# Patient Record
Sex: MALE | Race: WHITE | NOT HISPANIC OR LATINO | Employment: OTHER | URBAN - NONMETROPOLITAN AREA
[De-identification: names, ages, dates, MRNs, and addresses within clinical notes are randomized per-mention and may not be internally consistent; named-entity substitution may affect disease eponyms.]

---

## 2017-01-24 ENCOUNTER — OFFICE VISIT (OUTPATIENT)
Dept: CARDIOLOGY | Facility: CLINIC | Age: 82
End: 2017-01-24

## 2017-01-24 VITALS
HEART RATE: 64 BPM | OXYGEN SATURATION: 99 % | SYSTOLIC BLOOD PRESSURE: 142 MMHG | DIASTOLIC BLOOD PRESSURE: 78 MMHG | HEIGHT: 72 IN | WEIGHT: 176 LBS | BODY MASS INDEX: 23.84 KG/M2

## 2017-01-24 DIAGNOSIS — E78.2 MIXED HYPERLIPIDEMIA: ICD-10-CM

## 2017-01-24 DIAGNOSIS — I44.7 LEFT BUNDLE BRANCH BLOCK: Primary | ICD-10-CM

## 2017-01-24 DIAGNOSIS — I25.10 CAD IN NATIVE ARTERY: ICD-10-CM

## 2017-01-24 DIAGNOSIS — I10 ESSENTIAL HYPERTENSION: ICD-10-CM

## 2017-01-24 PROCEDURE — 99214 OFFICE O/P EST MOD 30 MIN: CPT | Performed by: INTERNAL MEDICINE

## 2017-01-24 NOTE — PROGRESS NOTES
Dexter Suggs  [id number]  1934  82 y.o.  male    Referring Provider: Jarad Alexis MD    Reason for Follow-up Visit: CAD    Overall doing well  Denies any chest pain or excessive shortness of breath  Compliant with medications    History of present illness:  Dexter Suggs is a 82 y.o. yo male with history of CAD who presents today for   Chief Complaint   Patient presents with   • Coronary Artery Disease     1 MON FU RESULTS ECHO, SRINATH   .    History  Past Medical History   Diagnosis Date   • Abnormal nuclear stress test    • BPH (benign prostatic hyperplasia)    • Chest pain    • Coronary artery disease    • Disease of thyroid gland    • Hyperlipidemia    • Hypertension    • LV dysfunction    • Melanoma    ,   Past Surgical History   Procedure Laterality Date   • Cardiac catheterization Left 12/10/2012   • Hernia repair     • Appendectomy     • Prostate surgery     • Knee surgery     • Hand surgery Right    • Skin lesion excision     • Cholecystectomy     • Inguinal hernia repair     • Skin cancer excision       face   ,   Family History   Problem Relation Age of Onset   • Alzheimer's disease Mother    • Cancer Father    • Cancer Sister    ,   Social History   Substance Use Topics   • Smoking status: Former Smoker     Quit date: 1985   • Smokeless tobacco: Never Used   • Alcohol use No   ,     Medications  Current Outpatient Prescriptions   Medication Sig Dispense Refill   • allopurinol (ZYLOPRIM) 300 MG tablet Take 300 mg by mouth Daily.     • aspirin 81 MG EC tablet Take 81 mg by mouth Daily.     • finasteride (PROSCAR) 5 MG tablet Take 5 mg by mouth Daily.     • glucosamine sulfate 500 MG capsule capsule Take  by mouth 3 (Three) Times a Day With Meals.     • isosorbide mononitrate (IMDUR) 30 MG 24 hr tablet Take 1 tablet by mouth Daily. 90 tablet 1   • levothyroxine (SYNTHROID, LEVOTHROID) 75 MCG tablet Take 75 mcg by mouth Daily.     • LORazepam (ATIVAN) 1 MG tablet Take 1 mg by mouth Every 8  "(Eight) Hours As Needed for anxiety.     • metoprolol succinate XL (TOPROL-XL) 50 MG 24 hr tablet TAKE ONE TABLET BY MOUTH ONCE DAILY (NO  FURTHER  REFILLS  WITHOUT  APPOINTMENT) 90 tablet 3   • nitroglycerin (NITROSTAT) 0.4 MG SL tablet Place 1 tablet under the tongue Every 5 (Five) Minutes As Needed for chest pain (PRN for CP as directed). 30 tablet 11   • pantoprazole (PROTONIX) 40 MG EC tablet Take 40 mg by mouth Daily.     • simvastatin (ZOCOR) 10 MG tablet Take 10 mg by mouth Every Night.     • triamterene-hydrochlorothiazide (MAXZIDE) 75-50 MG per tablet Take 1 tablet by mouth Daily.       No current facility-administered medications for this visit.        Allergies:  Adhesive tape and Neosporin [neomycin-bacitracin zn-polymyx]    Review of Systems  Review of Systems   Constitution: Negative.   HENT: Negative.    Eyes: Negative.    Cardiovascular: Negative for chest pain, claudication, cyanosis, dyspnea on exertion, irregular heartbeat, leg swelling, near-syncope, orthopnea, palpitations, paroxysmal nocturnal dyspnea and syncope.   Respiratory: Negative.    Endocrine: Negative.    Hematologic/Lymphatic: Negative.    Skin: Negative.    Gastrointestinal: Negative for anorexia.   Genitourinary: Negative.    Neurological: Negative.    Psychiatric/Behavioral: Negative.        Objective     Physical Exam:  Visit Vitals   • /78   • Pulse 64   • Ht 72\" (182.9 cm)   • Wt 176 lb (79.8 kg)   • SpO2 99%   • BMI 23.87 kg/m2     Physical Exam   Constitutional: He appears well-developed.   HENT:   Head: Normocephalic.   Neck: Normal carotid pulses and no JVD present. No tracheal tenderness present. Carotid bruit is not present. No tracheal deviation and no edema present.   Cardiovascular: Regular rhythm, normal heart sounds and normal pulses.    Pulmonary/Chest: Effort normal. No stridor.   Abdominal: Soft.   Neurological: He is alert. He has normal strength. No cranial nerve deficit or sensory deficit.   Skin: Skin " is warm.   Psychiatric: He has a normal mood and affect. His speech is normal and behavior is normal.       Results Review:     Procedures    Assessment/Plan   Patient Active Problem List   Diagnosis   • Left bundle branch block   • CAD in native artery   • Essential hypertension   • Mixed hyperlipidemia     No palpitations. No significant pedal edema. Compliant with medications and diet. Latest labs and medications reviewed.  Recent nuclear stress test is normal   Echo normal LVEF    Plan:    Close follow up with you as scheduled.  Intensive factor modifications.  See order list.    Counseled regarding disease appropriate diet, fluid, caffeine, stimulants and sodium intake as well as importance of compliance to diet, exercise and regular follow up.    Return in about 10 months (around 11/24/2017).

## 2017-01-24 NOTE — LETTER
January 24, 2017     Jarad Alexis MD  [address] [place] [address]    Patient: Dexter Suggs   YOB: 1934   Date of Visit: 1/24/2017       Dear Dr. Reuben MD:    Thank you for referring Dexter Suggs to me for evaluation. Below are the relevant portions of my assessment and plan of care.    If you have questions, please do not hesitate to call me. I look forward to following Dexter along with you.         Sincerely,        Austin Granados MD        CC: No Recipients  Austin Granados MD  1/24/2017  8:38 AM  Signed  Dexter Suggs  [id number]  1934  82 y.o.  male    Referring Provider: Jarad Alexis MD    Reason for Follow-up Visit: CAD    Overall doing well  Denies any chest pain or excessive shortness of breath  Compliant with medications    History of present illness:  Dexter Suggs is a 82 y.o. yo male with history of CAD who presents today for   Chief Complaint   Patient presents with   • Coronary Artery Disease     1 MON FU RESULTS ECHO, SRINATH   .    History  Past Medical History   Diagnosis Date   • Abnormal nuclear stress test    • BPH (benign prostatic hyperplasia)    • Chest pain    • Coronary artery disease    • Disease of thyroid gland    • Hyperlipidemia    • Hypertension    • LV dysfunction    • Melanoma    ,   Past Surgical History   Procedure Laterality Date   • Cardiac catheterization Left 12/10/2012   • Hernia repair     • Appendectomy     • Prostate surgery     • Knee surgery     • Hand surgery Right    • Skin lesion excision     • Cholecystectomy     • Inguinal hernia repair     • Skin cancer excision       face   ,   Family History   Problem Relation Age of Onset   • Alzheimer's disease Mother    • Cancer Father    • Cancer Sister    ,   Social History   Substance Use Topics   • Smoking status: Former Smoker     Quit date: 1985   • Smokeless tobacco: Never Used   • Alcohol use No   ,     Medications  Current Outpatient Prescriptions   Medication Sig Dispense  "Refill   • allopurinol (ZYLOPRIM) 300 MG tablet Take 300 mg by mouth Daily.     • aspirin 81 MG EC tablet Take 81 mg by mouth Daily.     • finasteride (PROSCAR) 5 MG tablet Take 5 mg by mouth Daily.     • glucosamine sulfate 500 MG capsule capsule Take  by mouth 3 (Three) Times a Day With Meals.     • isosorbide mononitrate (IMDUR) 30 MG 24 hr tablet Take 1 tablet by mouth Daily. 90 tablet 1   • levothyroxine (SYNTHROID, LEVOTHROID) 75 MCG tablet Take 75 mcg by mouth Daily.     • LORazepam (ATIVAN) 1 MG tablet Take 1 mg by mouth Every 8 (Eight) Hours As Needed for anxiety.     • metoprolol succinate XL (TOPROL-XL) 50 MG 24 hr tablet TAKE ONE TABLET BY MOUTH ONCE DAILY (NO  FURTHER  REFILLS  WITHOUT  APPOINTMENT) 90 tablet 3   • nitroglycerin (NITROSTAT) 0.4 MG SL tablet Place 1 tablet under the tongue Every 5 (Five) Minutes As Needed for chest pain (PRN for CP as directed). 30 tablet 11   • pantoprazole (PROTONIX) 40 MG EC tablet Take 40 mg by mouth Daily.     • simvastatin (ZOCOR) 10 MG tablet Take 10 mg by mouth Every Night.     • triamterene-hydrochlorothiazide (MAXZIDE) 75-50 MG per tablet Take 1 tablet by mouth Daily.       No current facility-administered medications for this visit.        Allergies:  Adhesive tape and Neosporin [neomycin-bacitracin zn-polymyx]    Review of Systems  Review of Systems   Constitution: Negative.   HENT: Negative.    Eyes: Negative.    Cardiovascular: Negative for chest pain, claudication, cyanosis, dyspnea on exertion, irregular heartbeat, leg swelling, near-syncope, orthopnea, palpitations, paroxysmal nocturnal dyspnea and syncope.   Respiratory: Negative.    Endocrine: Negative.    Hematologic/Lymphatic: Negative.    Skin: Negative.    Gastrointestinal: Negative for anorexia.   Genitourinary: Negative.    Neurological: Negative.    Psychiatric/Behavioral: Negative.        Objective     Physical Exam:  Visit Vitals   • /78   • Pulse 64   • Ht 72\" (182.9 cm)   • Wt 176 lb " (79.8 kg)   • SpO2 99%   • BMI 23.87 kg/m2     Physical Exam   Constitutional: He appears well-developed.   HENT:   Head: Normocephalic.   Neck: Normal carotid pulses and no JVD present. No tracheal tenderness present. Carotid bruit is not present. No tracheal deviation and no edema present.   Cardiovascular: Regular rhythm, normal heart sounds and normal pulses.    Pulmonary/Chest: Effort normal. No stridor.   Abdominal: Soft.   Neurological: He is alert. He has normal strength. No cranial nerve deficit or sensory deficit.   Skin: Skin is warm.   Psychiatric: He has a normal mood and affect. His speech is normal and behavior is normal.       Results Review:     Procedures    Assessment/Plan   Patient Active Problem List   Diagnosis   • Left bundle branch block   • CAD in native artery   • Essential hypertension   • Mixed hyperlipidemia     No palpitations. No significant pedal edema. Compliant with medications and diet. Latest labs and medications reviewed.  Recent nuclear stress test is normal   Echo normal LVEF    Plan:    Close follow up with you as scheduled.  Intensive factor modifications.  See order list.    Counseled regarding disease appropriate diet, fluid, caffeine, stimulants and sodium intake as well as importance of compliance to diet, exercise and regular follow up.    Return in about 10 months (around 11/24/2017).

## 2017-01-24 NOTE — MR AVS SNAPSHOT
Dexter Suggs   1/24/2017 8:00 AM   Office Visit    Dept Phone:  [phone]   Encounter #:  [id number]    Provider:  Austin Granados MD   Department:  [facility]                Your Full Care Plan              Your Updated Medication List          This list is accurate as of: 1/24/17  8:40 AM.  Always use your most recent med list.                allopurinol 300 MG tablet   Commonly known as:  ZYLOPRIM       aspirin 81 MG EC tablet       finasteride 5 MG tablet   Commonly known as:  PROSCAR       glucosamine sulfate 500 MG capsule capsule       isosorbide mononitrate 30 MG 24 hr tablet   Commonly known as:  IMDUR   Take 1 tablet by mouth Daily.       levothyroxine 75 MCG tablet   Commonly known as:  SYNTHROID, LEVOTHROID       LORazepam 1 MG tablet   Commonly known as:  ATIVAN       metoprolol succinate XL 50 MG 24 hr tablet   Commonly known as:  TOPROL-XL   TAKE ONE TABLET BY MOUTH ONCE DAILY (NO  FURTHER  REFILLS  WITHOUT  APPOINTMENT)       nitroglycerin 0.4 MG SL tablet   Commonly known as:  NITROSTAT   Place 1 tablet under the tongue Every 5 (Five) Minutes As Needed for chest pain (PRN for CP as directed).       pantoprazole 40 MG EC tablet   Commonly known as:  PROTONIX       simvastatin 10 MG tablet   Commonly known as:  ZOCOR       triamterene-hydrochlorothiazide 75-50 MG per tablet   Commonly known as:  MAXZIDE               You Were Diagnosed With        Codes Comments    Left bundle branch block    -  Primary ICD-10-CM: I44.7  ICD-9-CM: 426.3     CAD in native artery     ICD-10-CM: I25.10  ICD-9-CM: 414.01     Essential hypertension     ICD-10-CM: I10  ICD-9-CM: 401.9     Mixed hyperlipidemia     ICD-10-CM: E78.2  ICD-9-CM: 272.2       Instructions     None    Patient Instructions History      Upcoming Appointments     Visit Type Date Time Department    FOLLOW UP 1/24/2017  8:00 AM [facility]    FOLLOW UP 11/20/2017  8:00 AM [facility]  "PAD      [employer] Signup     [facility] [employer] allows you to send messages to your doctor, view your test results, renew your prescriptions, schedule appointments, and more. To sign up, go to [employer] and click on the Sign Up Now link in the New User? box. Enter your [employer] Activation Code exactly as it appears below along with the last four digits of your Social Security Number and your Date of Birth () to complete the sign-up process. If you do not sign up before the expiration date, you must request a new code.    Border[employer] Activation Code: [email or web address]  Expires: 2017  8:39 AM    If you have questions, you can email [email or web address] or call [phone] to talk to our [employer] staff. Remember, [employer] is NOT to be used for urgent needs. For medical emergencies, dial 911.               Other Info from Your Visit           Your Appointments     2017  8:00 AM CST   Follow Up with Austin Granados MD   [facility] (--)    [address] [place] [address] [address]  formerly [facility] [address] [phone]           Arrive 15 minutes prior to appointment.              Allergies     Adhesive Tape      Neosporin [Neomycin-bacitracin Zn-polymyx]        Reason for Visit     Coronary Artery Disease 1 MON FU RESULTS ECHO, SRINATH      Vital Signs     Blood Pressure Pulse Height Weight Oxygen Saturation Body Mass Index    142/78 64 72\" (182.9 cm) 176 lb (79.8 kg) 99% 23.87 kg/m2    Smoking Status                   Former Smoker           Problems and Diagnoses Noted     Heart disease due to blocked artery    High blood pressure    Left bundle branch block    Mixed hyperlipidemia        "

## 2017-01-30 RX ORDER — SIMVASTATIN 10 MG
TABLET ORAL
Qty: 90 TABLET | Refills: 0 | Status: SHIPPED | OUTPATIENT
Start: 2017-01-30 | End: 2017-05-03 | Stop reason: SDUPTHER

## 2017-02-07 ENCOUNTER — HOSPITAL ENCOUNTER (OUTPATIENT)
Dept: GENERAL RADIOLOGY | Facility: HOSPITAL | Age: 82
Discharge: HOME | End: 2017-02-07
Attending: INTERNAL MEDICINE | Admitting: INTERNAL MEDICINE

## 2017-02-07 ENCOUNTER — HOSPITAL ENCOUNTER (OUTPATIENT)
Dept: GENERAL RADIOLOGY | Facility: HOSPITAL | Age: 82
Discharge: HOME | End: 2017-02-07
Attending: INTERNAL MEDICINE

## 2017-02-07 ENCOUNTER — TRANSCRIBE ORDERS (OUTPATIENT)
Dept: GENERAL RADIOLOGY | Facility: HOSPITAL | Age: 82
End: 2017-02-07

## 2017-02-07 DIAGNOSIS — M53.3 COCCYALGIA: ICD-10-CM

## 2017-02-07 DIAGNOSIS — R63.4 LOSS OF WEIGHT: Primary | ICD-10-CM

## 2017-02-07 DIAGNOSIS — R63.4 LOSS OF WEIGHT: ICD-10-CM

## 2017-02-07 PROCEDURE — 71020 HC CHEST PA AND LATERAL: CPT

## 2017-02-07 PROCEDURE — 72220 X-RAY EXAM SACRUM TAILBONE: CPT

## 2017-04-13 ENCOUNTER — OFFICE VISIT (OUTPATIENT)
Dept: GASTROENTEROLOGY | Facility: CLINIC | Age: 82
End: 2017-04-13

## 2017-04-13 VITALS
HEART RATE: 64 BPM | SYSTOLIC BLOOD PRESSURE: 136 MMHG | WEIGHT: 176 LBS | OXYGEN SATURATION: 99 % | BODY MASS INDEX: 23.84 KG/M2 | HEIGHT: 72 IN | DIASTOLIC BLOOD PRESSURE: 74 MMHG

## 2017-04-13 DIAGNOSIS — Z78.9 NONSMOKER: ICD-10-CM

## 2017-04-13 DIAGNOSIS — Z86.010 HX OF COLONIC POLYPS: Primary | ICD-10-CM

## 2017-04-13 DIAGNOSIS — I10 HTN (HYPERTENSION), BENIGN: ICD-10-CM

## 2017-04-13 PROBLEM — Z86.0100 HX OF COLONIC POLYPS: Status: ACTIVE | Noted: 2017-04-13

## 2017-04-13 PROCEDURE — S0260 H&P FOR SURGERY: HCPCS | Performed by: CLINICAL NURSE SPECIALIST

## 2017-04-13 NOTE — PROGRESS NOTES
Dexter Suggs  1934 4/13/2017  Chief Complaint   Patient presents with   • Colon Cancer Screening     Patient is here is here today for colon screening.     Subjective   HPI  Dexter Suggs is a 82 y.o. male who presents as a referral for preventative maintenance. He has no complaints of nausea or vomiting. No change in bowels. No wt loss. No BRBPR. No melena. There is a negative family hx for colon cancer. No abdominal pain.  Past Medical History:   Diagnosis Date   • Abnormal nuclear stress test    • BPH (benign prostatic hyperplasia)    • Chest pain    • Coronary artery disease    • Disease of thyroid gland    • Hyperlipidemia    • Hypertension    • LV dysfunction    • Melanoma      Past Surgical History:   Procedure Laterality Date   • APPENDECTOMY     • CARDIAC CATHETERIZATION Left 12/10/2012   • CHOLECYSTECTOMY     • COLONOSCOPY W/ POLYPECTOMY  02/16/2012    adenomatous polyp at 80cm   • HAND SURGERY Right    • HERNIA REPAIR     • INGUINAL HERNIA REPAIR     • KNEE SURGERY     • PROSTATE SURGERY     • SKIN CANCER EXCISION      face   • SKIN LESION EXCISION       Outpatient Prescriptions Marked as Taking for the 4/13/17 encounter (Office Visit) with ALENA Ramon   Medication Sig Dispense Refill   • allopurinol (ZYLOPRIM) 300 MG tablet Take 300 mg by mouth Daily.     • aspirin 81 MG EC tablet Take 81 mg by mouth Daily.     • finasteride (PROSCAR) 5 MG tablet Take 5 mg by mouth Daily.     • glucosamine sulfate 500 MG capsule capsule Take  by mouth 3 (Three) Times a Day With Meals.     • isosorbide mononitrate (IMDUR) 30 MG 24 hr tablet Take 1 tablet by mouth Daily. 90 tablet 1   • levothyroxine (SYNTHROID, LEVOTHROID) 75 MCG tablet Take 75 mcg by mouth Daily.     • LORazepam (ATIVAN) 1 MG tablet Take 1 mg by mouth Every 8 (Eight) Hours As Needed for anxiety.     • metoprolol succinate XL (TOPROL-XL) 50 MG 24 hr tablet TAKE ONE TABLET BY MOUTH ONCE DAILY (NO  FURTHER  REFILLS  WITHOUT   APPOINTMENT) 90 tablet 3   • nitroglycerin (NITROSTAT) 0.4 MG SL tablet Place 1 tablet under the tongue Every 5 (Five) Minutes As Needed for chest pain (PRN for CP as directed). 30 tablet 11   • pantoprazole (PROTONIX) 40 MG EC tablet Take 40 mg by mouth Daily.     • simvastatin (ZOCOR) 10 MG tablet TAKE ONE TABLET BY MOUTH ONCE DAILY 90 tablet 0   • triamterene-hydrochlorothiazide (MAXZIDE) 75-50 MG per tablet Take 1 tablet by mouth Daily.       Allergies   Allergen Reactions   • Adhesive Tape    • Neosporin [Neomycin-Bacitracin Zn-Polymyx]      Social History     Social History   • Marital status:      Spouse name: N/A   • Number of children: N/A   • Years of education: N/A     Occupational History   • Not on file.     Social History Main Topics   • Smoking status: Former Smoker     Quit date: 1985   • Smokeless tobacco: Never Used   • Alcohol use No   • Drug use: No   • Sexual activity: Defer     Other Topics Concern   • Not on file     Social History Narrative     Family History   Problem Relation Age of Onset   • Alzheimer's disease Mother    • Cancer Father    • Cancer Sister      Health Maintenance   Topic Date Due   • TDAP/TD VACCINES (1 - Tdap) 07/07/1953   • PNEUMOCOCCAL VACCINES (65+ LOW/MEDIUM RISK) (1 of 2 - PCV13) 07/07/1999   • INFLUENZA VACCINE  08/01/2016   • LIPID PANEL  04/13/2017   • ZOSTER VACCINE  04/13/2017   • MEDICARE ANNUAL WELLNESS  04/13/2017       REVIEW OF SYSTEMS  General: well appearing, no fever chills or sweats, no unexplained wt loss  HEENT: no acute visual or hearing disturbances  Cardiovascular: No chest pain or palpitations  Pulmonary: No shortness of breath, coughing, wheezing or hemoptysis  : No burning, urgency, hematuria, or dysuria  Musculoskeletal: No joint pain or stiffness  Peripheral: no edema  Skin: No lesions or rashes  Neuro: No dizziness, headaches, stroke, syncope  Endocrine: No hot or cold intolerances  Hematological: No blood dyscrasias    Objective  "  Vitals:    04/13/17 1004   BP: 136/74   Pulse: 64   SpO2: 99%   Weight: 176 lb (79.8 kg)   Height: 72\" (182.9 cm)     Body mass index is 23.87 kg/(m^2).    PHYSICAL EXAM  General: age appropriate well nourished well appearing, no acute distress  Head: normocephalic and atraumatic  Global assessment-supple  Neck-No JVD noted, no lymphadenopathy  Pulmonary-clear to auscultation bilaterally, normal respiratory effort  Cardiovascular-normal rate and rhythm, normal heart sounds, S1 and S2 noted  Abdomen-soft, non tender, non distended, normal bowel sounds all 4 quadrants, no hepatosplenomegaly noted  Extremities-No clubbing cyanosis or edema  Neuro-Non focal, converses appropriately, awake, alert, oriented    Assessment/Plan     Dexter was seen today for colon cancer screening.    Diagnoses and all orders for this visit:    Hx of colonic polyps  -     polyethylene glycol (GoLYTELY) 236 G solution; Take as directed by office instructions.  -     Case Request; Standing  -     Implement Anesthesia Orders Day of Procedure; Standing  -     Obtain Informed Consent; Standing  -     Verify Informed Consent; Standing  -     Verify bowel prep was successful; Standing  -     Case Request    HTN (hypertension), benign  Comments:  continue BP medication the am of procedure    Nonsmoker        COLONOSCOPY WITH ANESTHESIA (N/A)  Body mass index is 23.87 kg/(m^2).  There are no Patient Instructions on file for this visit.  Patient instructions on prep prior to procedure provided to the patient.    All risks, benefits, alternatives, and indications of colonoscopy procedure have been discussed with the patient. Risks to include perforation of the colon requiring possible surgery or colostomy, risk of bleeding from biopsies or removal of colon tissue, possibility of missing a colon polyp or cancer, or adverse drug reaction.  Benefits to include the diagnosis and management of disease of the colon and rectum. Alternatives to include barium " enema, radiographic evaluation, lab testing or no intervention. Pt verbalizes understanding and agrees.

## 2017-05-03 RX ORDER — SIMVASTATIN 10 MG
TABLET ORAL
Qty: 90 TABLET | Refills: 3 | Status: SHIPPED | OUTPATIENT
Start: 2017-05-03 | End: 2019-02-07

## 2017-05-31 RX ORDER — METOPROLOL SUCCINATE 50 MG/1
50 TABLET, EXTENDED RELEASE ORAL DAILY
Qty: 90 TABLET | Refills: 3 | Status: SHIPPED | OUTPATIENT
Start: 2017-05-31 | End: 2018-05-21 | Stop reason: SDUPTHER

## 2017-06-06 ENCOUNTER — TELEPHONE (OUTPATIENT)
Dept: UROLOGY | Facility: CLINIC | Age: 82
End: 2017-06-06

## 2017-06-06 DIAGNOSIS — C61 MALIGNANT NEOPLASM OF PROSTATE (HCC): Primary | ICD-10-CM

## 2017-06-06 NOTE — TELEPHONE ENCOUNTER
----- Message from Diane Cheng sent at 6/6/2017 10:05 AM CDT -----  He needs order for PSA 6/20/17, thanks

## 2017-06-08 ENCOUNTER — ANESTHESIA EVENT (OUTPATIENT)
Dept: GASTROENTEROLOGY | Facility: HOSPITAL | Age: 82
End: 2017-06-08

## 2017-06-09 ENCOUNTER — ANESTHESIA (OUTPATIENT)
Dept: GASTROENTEROLOGY | Facility: HOSPITAL | Age: 82
End: 2017-06-09

## 2017-06-09 ENCOUNTER — HOSPITAL ENCOUNTER (OUTPATIENT)
Facility: HOSPITAL | Age: 82
Setting detail: HOSPITAL OUTPATIENT SURGERY
Discharge: HOME | End: 2017-06-09
Attending: INTERNAL MEDICINE | Admitting: INTERNAL MEDICINE

## 2017-06-09 VITALS
SYSTOLIC BLOOD PRESSURE: 145 MMHG | HEIGHT: 72 IN | WEIGHT: 177 LBS | DIASTOLIC BLOOD PRESSURE: 83 MMHG | OXYGEN SATURATION: 98 % | RESPIRATION RATE: 20 BRPM | TEMPERATURE: 97.8 F | HEART RATE: 61 BPM | BODY MASS INDEX: 23.98 KG/M2

## 2017-06-09 DIAGNOSIS — Z86.010 HX OF COLONIC POLYPS: ICD-10-CM

## 2017-06-09 PROCEDURE — 25010000002 PROPOFOL 10 MG/ML EMULSION: Performed by: NURSE ANESTHETIST, CERTIFIED REGISTERED

## 2017-06-09 PROCEDURE — G0105 COLORECTAL SCRN; HI RISK IND: HCPCS | Performed by: INTERNAL MEDICINE

## 2017-06-09 RX ORDER — LIDOCAINE HYDROCHLORIDE 20 MG/ML
INJECTION, SOLUTION INFILTRATION; PERINEURAL AS NEEDED
Status: DISCONTINUED | OUTPATIENT
Start: 2017-06-09 | End: 2017-06-09 | Stop reason: SURG

## 2017-06-09 RX ORDER — SODIUM CHLORIDE 0.9 % (FLUSH) 0.9 %
1-10 SYRINGE (ML) INJECTION AS NEEDED
Status: DISCONTINUED | OUTPATIENT
Start: 2017-06-09 | End: 2017-06-09 | Stop reason: HOSPADM

## 2017-06-09 RX ORDER — PROPOFOL 10 MG/ML
VIAL (ML) INTRAVENOUS AS NEEDED
Status: DISCONTINUED | OUTPATIENT
Start: 2017-06-09 | End: 2017-06-09 | Stop reason: SURG

## 2017-06-09 RX ORDER — SODIUM CHLORIDE 9 MG/ML
9 INJECTION, SOLUTION INTRAVENOUS CONTINUOUS PRN
Status: DISCONTINUED | OUTPATIENT
Start: 2017-06-09 | End: 2017-06-09 | Stop reason: HOSPADM

## 2017-06-09 RX ADMIN — LIDOCAINE HYDROCHLORIDE 0.5 ML: 10 INJECTION, SOLUTION EPIDURAL; INFILTRATION; INTRACAUDAL; PERINEURAL at 09:17

## 2017-06-09 RX ADMIN — LIDOCAINE HYDROCHLORIDE 100 MG: 20 INJECTION, SOLUTION INFILTRATION; PERINEURAL at 09:49

## 2017-06-09 RX ADMIN — SODIUM CHLORIDE 9 ML/HR: 9 INJECTION, SOLUTION INTRAVENOUS at 09:17

## 2017-06-09 RX ADMIN — PROPOFOL 200 MG: 10 INJECTION, EMULSION INTRAVENOUS at 09:49

## 2017-06-09 NOTE — PLAN OF CARE
Problem: Patient Care Overview (Adult)  Goal: Plan of Care Review  Outcome: Outcome(s) achieved Date Met:  06/09/17 06/09/17 1020   Coping/Psychosocial Response Interventions   Plan Of Care Reviewed With patient;spouse   Patient Care Overview   Progress improving   Outcome Evaluation   Outcome Summary/Follow up Plan DISCHARGE CRITERIA MET

## 2017-06-09 NOTE — PLAN OF CARE
Problem: GI Endoscopy (Adult)  Goal: Signs and Symptoms of Listed Potential Problems Will be Absent or Manageable (GI Endoscopy)  Outcome: Outcome(s) achieved Date Met:  06/09/17 06/09/17 1020   GI Endoscopy   Problems Assessed (GI Endoscopy) all   Problems Present (GI Endoscopy) none

## 2017-06-09 NOTE — ANESTHESIA POSTPROCEDURE EVALUATION
Patient: Dexter Suggs    Procedure Summary     Date Anesthesia Start Anesthesia Stop Room / Location    06/09/17 0940 1007  [facility] END[facility]COPY 2 /  [facility]       Procedure Diagnosis Surgeon Provider    COLONOSCOPY WITH ANESTHESIA (N/A ) Hx of colonic polyps  (Hx of colonic polyps [Z86.010]) MD Michael Mcintosh CRNA          Anesthesia Type: general  Last vitals  BP      Temp      Pulse     Resp      SpO2        Post Anesthesia Care and Evaluation    Patient location during evaluation: PACU  Patient participation: complete - patient participated  Level of consciousness: awake and alert  Pain score: 1  Pain management: adequate  Airway patency: patent  Anesthetic complications: No anesthetic complications    Cardiovascular status: acceptable  Respiratory status: room air  Hydration status: acceptable

## 2017-06-09 NOTE — PLAN OF CARE
Problem: Patient Care Overview (Adult)  Goal: Plan of Care Review  Outcome: Ongoing (interventions implemented as appropriate)    06/09/17 0978   Coping/Psychosocial Response Interventions   Plan Of Care Reviewed With patient   Patient Care Overview   Progress no change   Outcome Evaluation   Outcome Summary/Follow up Plan tolerating well         Problem: GI Endoscopy (Adult)  Goal: Signs and Symptoms of Listed Potential Problems Will be Absent or Manageable (GI Endoscopy)  Outcome: Ongoing (interventions implemented as appropriate)

## 2017-06-09 NOTE — H&P
[facility] Gastroenterology  Pre Procedure History & Physical    Chief Complaint:   History of polyps    Subjective     HPI:   Here today for colonoscopy.  History of polyps.  See office note dated 4/13/2017    Past Medical History:   Past Medical History:   Diagnosis Date   • Abnormal nuclear stress test    • BPH (benign prostatic hyperplasia)    • Chest pain    • Coronary artery disease    • Disease of thyroid gland    • Hyperlipidemia    • Hypertension    • LV dysfunction    • Melanoma    • Prostate CA        Past Surgical History:  [unfilled]    Family History:  Family History   Problem Relation Age of Onset   • Alzheimer's disease Mother    • Cancer Father    • Cancer Sister        Social History:   reports that he quit smoking about 32 years ago. He has never used smokeless tobacco. He reports that he does not drink alcohol or use illicit drugs.    Medications:   Prior to Admission medications    Medication Sig Start Date End Date Taking? Authorizing Provider   allopurinol (ZYLOPRIM) 300 MG tablet Take 300 mg by mouth Daily.   Yes Historical Provider, MD   glucosamine sulfate 500 MG capsule capsule Take  by mouth 3 (Three) Times a Day With Meals.   Yes Historical Provider, MD   isosorbide mononitrate (IMDUR) 30 MG 24 hr tablet Take 1 tablet by mouth Daily. 11/14/16  Yes Sheridan Yeager PA-C   levothyroxine (SYNTHROID, LEVOTHROID) 75 MCG tablet Take 75 mcg by mouth Daily.   Yes Historical Provider, MD   LORazepam (ATIVAN) 1 MG tablet Take 1 mg by mouth Every 8 (Eight) Hours As Needed for anxiety.   Yes Historical Provider, MD   metoprolol succinate XL (TOPROL-XL) 50 MG 24 hr tablet Take 1 tablet by mouth Daily. 5/31/17  Yes Austin Granados MD   pantoprazole (PROTONIX) 40 MG EC tablet Take 40 mg by mouth Daily.   Yes Historical Provider, MD   simvastatin (ZOCOR) 10 MG tablet TAKE ONE TABLET BY MOUTH ONCE DAILY 5/3/17  Yes Austin Granados MD   triamterene-hydrochlorothiazide (MAXZIDE) 75-50 MG per tablet Take 1  "tablet by mouth Daily.   Yes Historical Provider, MD   finasteride (PROSCAR) 5 MG tablet Take 5 mg by mouth Daily.  6/9/17 Yes Historical Provider, MD   aspirin 81 MG EC tablet Take 81 mg by mouth Daily.    Historical Provider, MD   nitroglycerin (NITROSTAT) 0.4 MG SL tablet Place 1 tablet under the tongue Every 5 (Five) Minutes As Needed for chest pain (PRN for CP as directed). 11/14/16   Sheridan Yeager PA-C   polyethylene glycol (GoLYTELY) 236 G solution Take as directed by office instructions. 4/13/17   ALENA Ramon       Allergies:  Adhesive tape and Neosporin [neomycin-bacitracin zn-polymyx]    Objective     Blood pressure 146/71, pulse 78, temperature 97.8 °F (36.6 °C), temperature source Temporal Artery , resp. rate 20, height 72\" (182.9 cm), weight 177 lb (80.3 kg), SpO2 99 %.    Physical Exam   Constitutional: Pt is oriented to person, place, and in no distress.   HENT: Mouth/Throat: Oropharynx is clear.   Cardiovascular: Normal rate, regular rhythm.    Pulmonary/Chest: Effort normal. No respiratory distress. No  wheezes.   Abdominal: Soft. Non-distended.  Skin: Skin is warm and dry.   Psychiatric: Mood, memory, affect and judgment appear normal.     Assessment/Plan     Diagnosis:  History of polyps    Anticipated Surgical Procedure:    Proceed with colonoscopy as scheduled    The risks, benefits, and alternatives of this procedure have been discussed with the patient or the responsible party- the patient understands and agrees to proceed.    EMR Dragon/transcription disclaimer: Much of this encounter note is an electronic transcription/translation of spoken language to printed text.  The electronic translation of spoken language may permit erroneous, or at times, nonsensical words or phrases to be inadvertently transcribed.  Although I have reviewed the note for such errors, some may still exist.    Felice Marroquin MD  9:47 AM  6/9/2017    "

## 2017-06-09 NOTE — ANESTHESIA PREPROCEDURE EVALUATION
Anesthesia Evaluation     Patient summary reviewed and Nursing notes reviewed   no history of anesthetic complications:  NPO Solid Status: > 8 hours  NPO Liquid Status: > 4 hours     Airway   Mallampati: I  TM distance: >3 FB  Neck ROM: full  no difficulty expected  Dental - normal exam     Pulmonary - normal exam   (+) a smoker Former,   Cardiovascular - normal exam  Exercise tolerance: good (4-7 METS)    Patient on routine beta blocker and Beta blocker given within 24 hours of surgery    (+) hypertension well controlled, CAD, dysrhythmias PAC, hyperlipidemia      Neuro/Psych- negative ROS  GI/Hepatic/Renal/Endo - negative ROS     Musculoskeletal (-) negative ROS    Abdominal  - normal exam   Substance History - negative use     OB/GYN negative ob/gyn ROS         Other      history of cancer                                Anesthesia Plan    ASA 3     general   total IV anesthesia  intravenous induction   Anesthetic plan and risks discussed with patient and spouse/significant other.

## 2017-06-20 DIAGNOSIS — C61 MALIGNANT NEOPLASM OF PROSTATE (HCC): Primary | ICD-10-CM

## 2017-06-20 LAB — PSA SERPL-MCNC: <0.07 NG/ML (ref 0–4)

## 2017-06-20 RX ORDER — ISOSORBIDE MONONITRATE 30 MG/1
30 TABLET, EXTENDED RELEASE ORAL DAILY
Qty: 90 TABLET | Refills: 0 | Status: SHIPPED | OUTPATIENT
Start: 2017-06-20 | End: 2017-09-28 | Stop reason: SDUPTHER

## 2017-06-25 ENCOUNTER — HOSPITAL ENCOUNTER (EMERGENCY)
Facility: HOSPITAL | Age: 82
Discharge: HOME | End: 2017-06-25
Attending: EMERGENCY MEDICINE | Admitting: EMERGENCY MEDICINE

## 2017-06-25 VITALS
DIASTOLIC BLOOD PRESSURE: 55 MMHG | SYSTOLIC BLOOD PRESSURE: 105 MMHG | WEIGHT: 180 LBS | RESPIRATION RATE: 18 BRPM | BODY MASS INDEX: 24.38 KG/M2 | HEART RATE: 58 BPM | OXYGEN SATURATION: 98 % | HEIGHT: 72 IN | TEMPERATURE: 98.1 F

## 2017-06-25 DIAGNOSIS — L24.9 IRRITANT CONTACT DERMATITIS, UNSPECIFIED TRIGGER: ICD-10-CM

## 2017-06-25 DIAGNOSIS — R21 RASH: Primary | ICD-10-CM

## 2017-06-25 DIAGNOSIS — R74.01 TRANSAMINITIS: ICD-10-CM

## 2017-06-25 LAB
ALBUMIN SERPL-MCNC: 3.8 G/DL (ref 3.5–5)
ALBUMIN/GLOB SERPL: 1.2 G/DL (ref 1.1–2.5)
ALP SERPL-CCNC: 154 U/L (ref 24–120)
ALT SERPL W P-5'-P-CCNC: 100 U/L (ref 0–54)
ANION GAP SERPL CALCULATED.3IONS-SCNC: 9 MMOL/L (ref 4–13)
AST SERPL-CCNC: 137 U/L (ref 7–45)
BASOPHILS # BLD AUTO: 0.03 10*3/MM3 (ref 0–0.2)
BASOPHILS NFR BLD AUTO: 0.3 % (ref 0–2)
BILIRUB SERPL-MCNC: 0.8 MG/DL (ref 0.1–1)
BUN BLD-MCNC: 23 MG/DL (ref 5–21)
BUN/CREAT SERPL: 23.5 (ref 7–25)
CALCIUM SPEC-SCNC: 9.8 MG/DL (ref 8.4–10.4)
CHLORIDE SERPL-SCNC: 102 MMOL/L (ref 98–110)
CO2 SERPL-SCNC: 30 MMOL/L (ref 24–31)
CREAT BLD-MCNC: 0.98 MG/DL (ref 0.5–1.4)
DEPRECATED RDW RBC AUTO: 49 FL (ref 40–54)
EOSINOPHIL # BLD AUTO: 0.68 10*3/MM3 (ref 0–0.7)
EOSINOPHIL NFR BLD AUTO: 7.3 % (ref 0–4)
ERYTHROCYTE [DISTWIDTH] IN BLOOD BY AUTOMATED COUNT: 15 % (ref 12–15)
GFR SERPL CREATININE-BSD FRML MDRD: 73 ML/MIN/1.73
GLOBULIN UR ELPH-MCNC: 3.3 GM/DL
GLUCOSE BLD-MCNC: 122 MG/DL (ref 70–100)
HCT VFR BLD AUTO: 36 % (ref 40–52)
HGB BLD-MCNC: 11.6 G/DL (ref 14–18)
IMM GRANULOCYTES # BLD: 0.02 10*3/MM3 (ref 0–0.03)
IMM GRANULOCYTES NFR BLD: 0.2 % (ref 0–5)
LYMPHOCYTES # BLD AUTO: 1.14 10*3/MM3 (ref 0.72–4.86)
LYMPHOCYTES NFR BLD AUTO: 12.3 % (ref 15–45)
MCH RBC QN AUTO: 29 PG (ref 28–32)
MCHC RBC AUTO-ENTMCNC: 32.2 G/DL (ref 33–36)
MCV RBC AUTO: 90 FL (ref 82–95)
MONOCYTES # BLD AUTO: 0.88 10*3/MM3 (ref 0.19–1.3)
MONOCYTES NFR BLD AUTO: 9.5 % (ref 4–12)
NEUTROPHILS # BLD AUTO: 6.51 10*3/MM3 (ref 1.87–8.4)
NEUTROPHILS NFR BLD AUTO: 70.4 % (ref 39–78)
PLATELET # BLD AUTO: 185 10*3/MM3 (ref 130–400)
PMV BLD AUTO: 10.5 FL (ref 6–12)
POTASSIUM BLD-SCNC: 4.4 MMOL/L (ref 3.5–5.3)
PROT SERPL-MCNC: 7.1 G/DL (ref 6.3–8.7)
RBC # BLD AUTO: 4 10*6/MM3 (ref 4.8–5.9)
SODIUM BLD-SCNC: 141 MMOL/L (ref 135–145)
URATE SERPL-MCNC: 5.5 MG/DL (ref 3.5–8.5)
WBC NRBC COR # BLD: 9.26 10*3/MM3 (ref 4.8–10.8)

## 2017-06-25 PROCEDURE — 85025 COMPLETE CBC W/AUTO DIFF WBC: CPT | Performed by: EMERGENCY MEDICINE

## 2017-06-25 PROCEDURE — 80053 COMPREHEN METABOLIC PANEL: CPT | Performed by: EMERGENCY MEDICINE

## 2017-06-25 PROCEDURE — 99283 EMERGENCY DEPT VISIT LOW MDM: CPT

## 2017-06-25 PROCEDURE — 84550 ASSAY OF BLOOD/URIC ACID: CPT | Performed by: EMERGENCY MEDICINE

## 2017-06-25 RX ORDER — CETIRIZINE HYDROCHLORIDE 10 MG/1
5 TABLET ORAL DAILY
Qty: 10 TABLET | Refills: 0 | Status: SHIPPED | OUTPATIENT
Start: 2017-06-25 | End: 2018-06-26 | Stop reason: ALTCHOICE

## 2017-06-25 RX ORDER — METHYLPREDNISOLONE 4 MG/1
TABLET ORAL
Qty: 21 TABLET | Refills: 0 | Status: SHIPPED | OUTPATIENT
Start: 2017-06-25 | End: 2018-01-17

## 2017-06-25 NOTE — ED PROVIDER NOTES
Subjective   Patient is a 82 y.o. male presenting with lower extremity pain.   Lower Extremity Issue   Location:  Foot  Injury: no    Foot location:  L foot and R foot  Pain details:     Quality:  Aching and tingling (rash)    Radiates to:  Does not radiate    Severity:  Mild    Onset quality:  Gradual    Timing:  Constant    Progression:  Unchanged  Chronicity:  New  Dislocation: no    Foreign body present:  No foreign bodies  Associated symptoms: no back pain, no decreased ROM, no fatigue, no fever, no itching, no muscle weakness, no neck pain, no numbness, no stiffness, no swelling and no tingling    Risk factors: no concern for non-accidental trauma, no frequent fractures, no known bone disorder, no obesity and no recent illness        Review of Systems   Constitutional: Negative.  Negative for chills, fatigue and fever.   HENT: Negative.  Negative for congestion.    Respiratory: Negative.  Negative for cough, chest tightness and stridor.    Cardiovascular: Negative.  Negative for chest pain.   Gastrointestinal: Negative.  Negative for abdominal distention, abdominal pain, nausea and vomiting.   Endocrine: Negative.    Genitourinary: Negative.  Negative for difficulty urinating and flank pain.   Musculoskeletal: Negative.  Negative for back pain, neck pain and stiffness.   Skin: Negative.  Negative for color change and itching.   Neurological: Negative.  Negative for dizziness and headaches.   All other systems reviewed and are negative.      Past Medical History:   Diagnosis Date   • Abnormal nuclear stress test    • BPH (benign prostatic hyperplasia)    • Chest pain    • Coronary artery disease    • Disease of thyroid gland    • Hyperlipidemia    • Hypertension    • LV dysfunction    • Melanoma    • Prostate CA        Allergies   Allergen Reactions   • Adhesive Tape    • Neosporin [Neomycin-Bacitracin Zn-Polymyx] Rash       Past Surgical History:   Procedure Laterality Date   • APPENDECTOMY     • CARDIAC  CATHETERIZATION Left 12/10/2012   • CHOLECYSTECTOMY     • COLONOSCOPY N/A 6/9/2017    Procedure: COLONOSCOPY WITH ANESTHESIA;  Surgeon: Felice Marroquin MD;  Location: [facility] ENDOSCOPY;  Service:    • COLONOSCOPY W/ POLYPECTOMY  02/16/2012    adenomatous polyp at 80cm   • HAND SURGERY Right    • HERNIA REPAIR     • INGUINAL HERNIA REPAIR     • KNEE SURGERY     • PROSTATE SURGERY     • SKIN CANCER EXCISION      face   • SKIN LESION EXCISION         Family History   Problem Relation Age of Onset   • Alzheimer's disease Mother    • Cancer Father    • Cancer Sister        Social History     Social History   • Marital status:      Spouse name: N/A   • Number of children: N/A   • Years of education: N/A     Social History Main Topics   • Smoking status: Former Smoker     Quit date: 1985   • Smokeless tobacco: Never Used   • Alcohol use No   • Drug use: No   • Sexual activity: Defer     Other Topics Concern   • None     Social History Narrative           Objective   Physical Exam   Constitutional: He is oriented to person, place, and time. He appears well-developed and well-nourished.   HENT:   Head: Normocephalic and atraumatic.   Eyes: Conjunctivae and EOM are normal. Pupils are equal, round, and reactive to light.   Neck: Normal range of motion. Neck supple.   Cardiovascular: Normal rate, regular rhythm, normal heart sounds and intact distal pulses.    Pulmonary/Chest: Effort normal and breath sounds normal.   Abdominal: Soft. Bowel sounds are normal.   Musculoskeletal: Normal range of motion.   Feet mildly red pruritic no cellulitis dp and pt palpable    Neurological: He is alert and oriented to person, place, and time. He has normal reflexes.   Skin: Skin is warm and dry.   Psychiatric: He has a normal mood and affect.   Nursing note and vitals reviewed.      Procedures         ED Course  ED Course   Comment By Time   I have discussed the the rash with the patient and a reasonable follow-up also discussed his  liver enzyme elevation and the need to follow-up sometimes hep C can present with these are rashes Bert Humphrey MD 06/25 0814                  [facility]    Final diagnoses:   Rash   Irritant contact dermatitis, unspecified trigger   Transaminitis            Bert Humphrey MD  06/25/17 0928

## 2017-06-27 ENCOUNTER — OFFICE VISIT (OUTPATIENT)
Dept: UROLOGY | Facility: CLINIC | Age: 82
End: 2017-06-27

## 2017-06-27 VITALS — TEMPERATURE: 97.4 F | BODY MASS INDEX: 24.38 KG/M2 | HEIGHT: 72 IN | WEIGHT: 180 LBS

## 2017-06-27 DIAGNOSIS — C61 MALIGNANT NEOPLASM OF PROSTATE (HCC): Primary | ICD-10-CM

## 2017-06-27 LAB
BILIRUB BLD-MCNC: NEGATIVE MG/DL
CLARITY, POC: CLEAR
COLOR UR: YELLOW
GLUCOSE UR STRIP-MCNC: NEGATIVE MG/DL
KETONES UR QL: NEGATIVE
LEUKOCYTE EST, POC: NEGATIVE
NITRITE UR-MCNC: NEGATIVE MG/ML
PH UR: 6 [PH] (ref 5–8)
PROT UR STRIP-MCNC: NEGATIVE MG/DL
RBC # UR STRIP: NEGATIVE /UL
SP GR UR: 1.02 (ref 1–1.03)
UROBILINOGEN UR QL: NORMAL

## 2017-06-27 PROCEDURE — 81003 URINALYSIS AUTO W/O SCOPE: CPT | Performed by: UROLOGY

## 2017-06-27 PROCEDURE — 99213 OFFICE O/P EST LOW 20 MIN: CPT | Performed by: UROLOGY

## 2017-06-27 NOTE — PROGRESS NOTES
Subjective    Mr. Suggs is 82 y.o. 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    He received radiation therapy he has done well since his most recent PSA is undetectable he denies any significant change in his symptoms such as gross hematuria flank pain symptoms metastatic disease bone pain back pain weight loss and anorexia    His most recent urinalysis is clear as well he is having no specific complaints except he wants to live 3 more years so his second wife who is been  to 7 years and get a Social Security    From a BPH point review he is doing fine as a way score today is quite low denies any gross hematuria recurrent urinary tract sections etc      History of Present Illness      The following portions of the patient's history were reviewed and updated as appropriate: allergies, current medications, past family history, past medical history, past social history, past surgical history and problem list.    Review of Systems   Constitutional: Negative.  Negative for chills and fever.   Gastrointestinal: Negative for abdominal distention, abdominal pain, anal bleeding, blood in stool and nausea.   Genitourinary: Negative for difficulty urinating, dysuria, flank pain, frequency, hematuria and urgency.   Psychiatric/Behavioral: Negative.  Negative for agitation and confusion.         Current Outpatient Prescriptions:   •  allopurinol (ZYLOPRIM) 300 MG tablet, Take 300 mg by mouth Daily., Disp: , Rfl:   •  aspirin 81 MG EC tablet, Take 81 mg by mouth Daily., Disp: , Rfl:   •  cetirizine (zyrTEC) 10 MG tablet, Take 0.5 tablets by mouth Daily., Disp: 10 tablet, Rfl: 0  •  glucosamine sulfate 500 MG capsule capsule, Take  by mouth 3 (Three) Times a Day With Meals., Disp: , Rfl:   •  isosorbide mononitrate (IMDUR) 30 MG 24 hr tablet, Take 1 tablet by mouth Daily., Disp: 90 tablet, Rfl: 0  •   levothyroxine (SYNTHROID, LEVOTHROID) 75 MCG tablet, Take 75 mcg by mouth Daily., Disp: , Rfl:   •  LORazepam (ATIVAN) 1 MG tablet, Take 1 mg by mouth Every 8 (Eight) Hours As Needed for anxiety., Disp: , Rfl:   •  MethylPREDNISolone (MEDROL, ARON,) 4 MG tablet, Take as directed on package instructions., Disp: 21 tablet, Rfl: 0  •  metoprolol succinate XL (TOPROL-XL) 50 MG 24 hr tablet, Take 1 tablet by mouth Daily., Disp: 90 tablet, Rfl: 3  •  nitroglycerin (NITROSTAT) 0.4 MG SL tablet, Place 1 tablet under the tongue Every 5 (Five) Minutes As Needed for chest pain (PRN for CP as directed)., Disp: 30 tablet, Rfl: 11  •  pantoprazole (PROTONIX) 40 MG EC tablet, Take 40 mg by mouth Daily., Disp: , Rfl:   •  simvastatin (ZOCOR) 10 MG tablet, TAKE ONE TABLET BY MOUTH ONCE DAILY, Disp: 90 tablet, Rfl: 3  •  triamterene-hydrochlorothiazide (MAXZIDE) 75-50 MG per tablet, Take 1 tablet by mouth Daily., Disp: , Rfl:     Past Medical History:   Diagnosis Date   • Abnormal nuclear stress test    • BPH (benign prostatic hyperplasia)    • Chest pain    • Coronary artery disease    • Disease of thyroid gland    • Hyperlipidemia    • Hypertension    • LV dysfunction    • Melanoma    • Prostate CA        Past Surgical History:   Procedure Laterality Date   • APPENDECTOMY     • CARDIAC CATHETERIZATION Left 12/10/2012   • CHOLECYSTECTOMY     • COLONOSCOPY N/A 6/9/2017    Procedure: COLONOSCOPY WITH ANESTHESIA;  Surgeon: Felice Marroquin MD;  Location: [facility] ENDOSCOPY;  Service:    • COLONOSCOPY W/ POLYPECTOMY  02/16/2012    adenomatous polyp at 80cm   • HAND SURGERY Right    • HERNIA REPAIR     • INGUINAL HERNIA REPAIR     • KNEE SURGERY     • PROSTATE SURGERY     • SKIN CANCER EXCISION      face   • SKIN LESION EXCISION         Social History     Social History   • Marital status:      Spouse name: N/A   • Number of children: N/A   • Years of education: N/A     Social History Main Topics   • Smoking status: Former Smoker      "Quit date: 1985   • Smokeless tobacco: Never Used   • Alcohol use No   • Drug use: No   • Sexual activity: Defer     Other Topics Concern   • None     Social History Narrative       Family History   Problem Relation Age of Onset   • Alzheimer's disease Mother    • Cancer Father    • Cancer Sister        Objective    Temp 97.4 °F (36.3 °C)  Ht 72\" (182.9 cm)  Wt 180 lb (81.6 kg)  BMI 24.41 kg/m2    Physical Exam      Admission on 06/25/2017, Discharged on 06/25/2017   Component Date Value Ref Range Status   • Glucose 06/25/2017 122* 70 - 100 mg/dL Final   • BUN 06/25/2017 23* 5 - 21 mg/dL Final   • Creatinine 06/25/2017 0.98  0.50 - 1.40 mg/dL Final   • Sodium 06/25/2017 141  135 - 145 mmol/L Final   • Potassium 06/25/2017 4.4  3.5 - 5.3 mmol/L Final   • Chloride 06/25/2017 102  98 - 110 mmol/L Final   • CO2 06/25/2017 30.0  24.0 - 31.0 mmol/L Final   • Calcium 06/25/2017 9.8  8.4 - 10.4 mg/dL Final   • Total Protein 06/25/2017 7.1  6.3 - 8.7 g/dL Final   • Albumin 06/25/2017 3.80  3.50 - 5.00 g/dL Final   • ALT (SGPT) 06/25/2017 100* 0 - 54 U/L Final   • AST (SGOT) 06/25/2017 137* 7 - 45 U/L Final   • Alkaline Phosphatase 06/25/2017 154* 24 - 120 U/L Final   • Total Bilirubin 06/25/2017 0.8  0.1 - 1.0 mg/dL Final   • eGFR Non  Amer 06/25/2017 73  >60 mL/min/1.73 Final   • Globulin 06/25/2017 3.3  gm/dL Final   • A/G Ratio 06/25/2017 1.2  1.1 - 2.5 g/dL Final   • BUN/Creatinine Ratio 06/25/2017 23.5  7.0 - 25.0 Final   • Anion Gap 06/25/2017 9.0  4.0 - 13.0 mmol/L Final   • Uric Acid 06/25/2017 5.5  3.5 - 8.5 mg/dL Final   • WBC 06/25/2017 9.26  4.80 - 10.80 10*3/mm3 Final   • RBC 06/25/2017 4.00* 4.80 - 5.90 10*6/mm3 Final   • Hemoglobin 06/25/2017 11.6* 14.0 - 18.0 g/dL Final   • Hematocrit 06/25/2017 36.0* 40.0 - 52.0 % Final   • MCV 06/25/2017 90.0  82.0 - 95.0 fL Final   • MCH 06/25/2017 29.0  28.0 - 32.0 pg Final   • MCHC 06/25/2017 32.2* 33.0 - 36.0 g/dL Final   • RDW 06/25/2017 15.0  12.0 - 15.0 % " Final   • RDW-SD 06/25/2017 49.0  40.0 - 54.0 fl Final   • MPV 06/25/2017 10.5  6.0 - 12.0 fL Final   • Platelets 06/25/2017 185  130 - 400 10*3/mm3 Final   • Neutrophil % 06/25/2017 70.4  39.0 - 78.0 % Final   • Lymphocyte % 06/25/2017 12.3* 15.0 - 45.0 % Final   • Monocyte % 06/25/2017 9.5  4.0 - 12.0 % Final   • Eosinophil % 06/25/2017 7.3* 0.0 - 4.0 % Final   • Basophil % 06/25/2017 0.3  0.0 - 2.0 % Final   • Immature Grans % 06/25/2017 0.2  0.0 - 5.0 % Final   • Neutrophils, Absolute 06/25/2017 6.51  1.87 - 8.40 10*3/mm3 Final   • Lymphocytes, Absolute 06/25/2017 1.14  0.72 - 4.86 10*3/mm3 Final   • Monocytes, Absolute 06/25/2017 0.88  0.19 - 1.30 10*3/mm3 Final   • Eosinophils, Absolute 06/25/2017 0.68  0.00 - 0.70 10*3/mm3 Final   • Basophils, Absolute 06/25/2017 0.03  0.00 - 0.20 10*3/mm3 Final   • Immature Grans, Absolute 06/25/2017 0.02  0.00 - 0.03 10*3/mm3 Final       Results for orders placed or performed in visit on 06/27/17   POC Urinalysis Dipstick, Automated   Result Value Ref Range    Color Yellow Yellow, Straw, Dark Yellow, Ariana    Clarity, UA Clear Clear    Glucose, UA Negative Negative, 1000 mg/dL (3+) mg/dL    Bilirubin Negative Negative    Ketones, UA Negative Negative    Specific Gravity  1.020 1.005 - 1.030    Blood, UA Negative Negative    pH, Urine 6.0 5.0 - 8.0    Protein, POC Negative Negative mg/dL    Urobilinogen, UA Normal Normal    Leukocytes Negative Negative    Nitrite, UA Negative Negative       Assessment and Plan    Diagnoses and all orders for this visit:    Malignant neoplasm of prostate  -     POC Urinalysis Dipstick, Automated    Plan--the patient seems to doing well from a seated the prostate is a way score today is quite low he denies any symptoms of metastatic disease and his PSA remains undetectable.    On was seen back again in 1 year's time with a PSA call sooner as needed    EMR Dragon/Transcription disclaimer:  Much of this encounter note is an electronic  transcription/translation of spoken language to printed text. The electronic translation of spoken language may permit erroneous, or at times, nonsensical words or phrases to be inadvertently transcribed; although I have reviewed the note for such errors, some may still exist.

## 2017-06-28 ENCOUNTER — HOSPITAL ENCOUNTER (OUTPATIENT)
Dept: ULTRASOUND IMAGING | Facility: HOSPITAL | Age: 82
Discharge: HOME | End: 2017-06-28
Attending: INTERNAL MEDICINE | Admitting: INTERNAL MEDICINE

## 2017-06-28 ENCOUNTER — TRANSCRIBE ORDERS (OUTPATIENT)
Dept: GENERAL RADIOLOGY | Facility: HOSPITAL | Age: 82
End: 2017-06-28

## 2017-06-28 DIAGNOSIS — R79.89 ABNORMAL LIVER FUNCTION TEST: Primary | ICD-10-CM

## 2017-06-28 DIAGNOSIS — R79.89 ABNORMAL LIVER FUNCTION TEST: ICD-10-CM

## 2017-06-28 PROCEDURE — 76705 ECHO EXAM OF ABDOMEN: CPT

## 2017-07-02 ENCOUNTER — RESULTS ENCOUNTER (OUTPATIENT)
Dept: UROLOGY | Facility: CLINIC | Age: 82
End: 2017-07-02

## 2017-07-02 DIAGNOSIS — C61 MALIGNANT NEOPLASM OF PROSTATE (HCC): ICD-10-CM

## 2017-09-29 RX ORDER — ISOSORBIDE MONONITRATE 30 MG/1
30 TABLET, EXTENDED RELEASE ORAL DAILY
Qty: 90 TABLET | Refills: 3 | Status: SHIPPED | OUTPATIENT
Start: 2017-09-29 | End: 2017-11-20 | Stop reason: SDUPTHER

## 2017-11-20 ENCOUNTER — OFFICE VISIT (OUTPATIENT)
Dept: CARDIOLOGY | Facility: CLINIC | Age: 82
End: 2017-11-20

## 2017-11-20 VITALS
DIASTOLIC BLOOD PRESSURE: 78 MMHG | BODY MASS INDEX: 24.52 KG/M2 | HEART RATE: 71 BPM | HEIGHT: 72 IN | WEIGHT: 181 LBS | SYSTOLIC BLOOD PRESSURE: 154 MMHG

## 2017-11-20 DIAGNOSIS — I44.7 LEFT BUNDLE BRANCH BLOCK: Primary | ICD-10-CM

## 2017-11-20 DIAGNOSIS — C61 MALIGNANT NEOPLASM OF PROSTATE (HCC): ICD-10-CM

## 2017-11-20 DIAGNOSIS — I10 HTN (HYPERTENSION), BENIGN: ICD-10-CM

## 2017-11-20 DIAGNOSIS — I10 ESSENTIAL HYPERTENSION: ICD-10-CM

## 2017-11-20 DIAGNOSIS — Z86.010 HX OF COLONIC POLYPS: ICD-10-CM

## 2017-11-20 DIAGNOSIS — E78.2 MIXED HYPERLIPIDEMIA: ICD-10-CM

## 2017-11-20 DIAGNOSIS — I25.10 CAD IN NATIVE ARTERY: ICD-10-CM

## 2017-11-20 PROCEDURE — 93000 ELECTROCARDIOGRAM COMPLETE: CPT | Performed by: INTERNAL MEDICINE

## 2017-11-20 PROCEDURE — 99214 OFFICE O/P EST MOD 30 MIN: CPT | Performed by: INTERNAL MEDICINE

## 2017-11-20 RX ORDER — ISOSORBIDE MONONITRATE 30 MG/1
30 TABLET, EXTENDED RELEASE ORAL DAILY
Qty: 90 TABLET | Refills: 3 | Status: ON HOLD | OUTPATIENT
Start: 2017-11-20 | End: 2018-11-24

## 2017-11-20 RX ORDER — NITROGLYCERIN 0.4 MG/1
0.4 TABLET SUBLINGUAL
Qty: 30 TABLET | Refills: 11 | Status: SHIPPED | OUTPATIENT
Start: 2017-11-20 | End: 2019-10-29 | Stop reason: SDUPTHER

## 2017-11-20 NOTE — PROGRESS NOTES
Dexter Suggs  [id number]  1934  83 y.o.  male    Referring Provider: Jarad Alexis MD    Reason for Follow-up Visit:  Routine follow up.  Subjective    Overall feeling well   No chest pain or shortness of breath   No palpitations  No significant pedal edema  Compliant with medications and dietary advice  Good effort tolerance  No presyncope or syncope  Compliant with medications  Excellent effort tolerance with no cardiovascular limitations and at the patient's baseline  History of present illness:  Dexter Suggs is a 83 y.o. yo male with history of CAD who presents today for   Chief Complaint   Patient presents with   • Coronary Artery Disease     10 MON FU    .    History  Past Medical History:   Diagnosis Date   • Abnormal nuclear stress test    • BPH (benign prostatic hyperplasia)    • Chest pain    • Coronary artery disease    • Disease of thyroid gland    • Hyperlipidemia    • Hypertension    • LV dysfunction    • Melanoma    • Prostate CA    ,   Past Surgical History:   Procedure Laterality Date   • APPENDECTOMY     • CARDIAC CATHETERIZATION Left 12/10/2012   • CHOLECYSTECTOMY     • COLONOSCOPY N/A 6/9/2017    Procedure: COLONOSCOPY WITH ANESTHESIA;  Surgeon: Felice Marroquin MD;  Location: [facility] ENDOSCOPY;  Service:    • COLONOSCOPY W/ POLYPECTOMY  02/16/2012    adenomatous polyp at 80cm   • HAND SURGERY Right    • HERNIA REPAIR     • INGUINAL HERNIA REPAIR     • KNEE SURGERY     • PROSTATE SURGERY     • SKIN CANCER EXCISION      face   • SKIN LESION EXCISION     ,   Family History   Problem Relation Age of Onset   • Alzheimer's disease Mother    • Cancer Father    • Cancer Sister    ,   Social History   Substance Use Topics   • Smoking status: Former Smoker     Years: 30.00     Types: Cigarettes     Quit date: 1985   • Smokeless tobacco: Never Used   • Alcohol use No   ,     Medications  Current Outpatient Prescriptions   Medication Sig Dispense Refill   • allopurinol (ZYLOPRIM) 300 MG tablet  "Take 300 mg by mouth Daily.     • aspirin 81 MG EC tablet Take 81 mg by mouth Daily.     • glucosamine sulfate 500 MG capsule capsule Take  by mouth 3 (Three) Times a Day With Meals.     • isosorbide mononitrate (IMDUR) 30 MG 24 hr tablet Take 1 tablet by mouth Daily. 90 tablet 3   • levothyroxine (SYNTHROID, LEVOTHROID) 75 MCG tablet Take 75 mcg by mouth Daily.     • metoprolol succinate XL (TOPROL-XL) 50 MG 24 hr tablet Take 1 tablet by mouth Daily. 90 tablet 3   • nitroglycerin (NITROSTAT) 0.4 MG SL tablet Place 1 tablet under the tongue Every 5 (Five) Minutes As Needed for Chest Pain (PRN for CP as directed). 30 tablet 11   • pantoprazole (PROTONIX) 40 MG EC tablet Take 40 mg by mouth Daily.     • triamterene-hydrochlorothiazide (MAXZIDE) 75-50 MG per tablet Take 1 tablet by mouth Daily.     • cetirizine (zyrTEC) 10 MG tablet Take 0.5 tablets by mouth Daily. 10 tablet 0   • LORazepam (ATIVAN) 1 MG tablet Take 1 mg by mouth Every 8 (Eight) Hours As Needed for anxiety.     • MethylPREDNISolone (MEDROL, ARON,) 4 MG tablet Take as directed on package instructions. 21 tablet 0   • simvastatin (ZOCOR) 10 MG tablet TAKE ONE TABLET BY MOUTH ONCE DAILY 90 tablet 3     No current facility-administered medications for this visit.        Allergies:  Adhesive tape and Neosporin [neomycin-bacitracin zn-polymyx]    Review of Systems  Review of Systems   Constitution: Negative.   HENT: Negative.    Eyes: Negative.    Cardiovascular: Negative for chest pain, claudication, cyanosis, dyspnea on exertion, irregular heartbeat, leg swelling, near-syncope, orthopnea, palpitations, paroxysmal nocturnal dyspnea and syncope.   Respiratory: Negative.    Endocrine: Negative.    Hematologic/Lymphatic: Negative.    Skin: Negative.    Gastrointestinal: Negative for anorexia.   Genitourinary: Negative.    Neurological: Negative.    Psychiatric/Behavioral: Negative.        Objective     Physical Exam:  /78  Pulse 71  Ht 72\" (182.9 cm)  " Wt 181 lb (82.1 kg)  BMI 24.55 kg/m2  Physical Exam   Constitutional: He appears well-developed.   HENT:   Head: Normocephalic.   Neck: Normal carotid pulses and no JVD present. No tracheal tenderness present. Carotid bruit is not present. No tracheal deviation and no edema present.   Cardiovascular: Regular rhythm, normal heart sounds and normal pulses.    Pulmonary/Chest: Effort normal. No stridor.   Abdominal: Soft.   Neurological: He is alert. He has normal strength. No cranial nerve deficit or sensory deficit.   Skin: Skin is warm.   Psychiatric: He has a normal mood and affect. His speech is normal and behavior is normal.       Results Review:       ECG 12 Lead  Date/Time: 11/20/2017 8:14 AM  Performed by: RAHUL VAIL  Authorized by: RAHUL VAIL   Comparison: compared with previous ECG from 11/14/2016  Similar to previous ECG  Rhythm: sinus rhythm  Rate: normal  Conduction: left bundle branch block  QRS axis: right  Clinical impression: abnormal ECG            Assessment/Plan   Patient Active Problem List   Diagnosis   • Left bundle branch block   • CAD in native artery   • Essential hypertension   • Mixed hyperlipidemia   • Hx of colonic polyps   • HTN (hypertension), benign   • Malignant neoplasm of prostate     No palpitations. No significant pedal edema. Compliant with medications and diet. Latest labs and medications reviewed.  Recent nuclear stress test is normal   Echo normal LVEF    Plan:    No additional cardiac testing required at this point in time.  Keep LDL below 70 mg/dl. Monitor liver and renal functions.   Monitor for any signs of bleeding including red or dark stools. Fall precautions.   Patient is asked to monitor BP at home or work, several times per month and return with written values at next office visit.  Monitor CBC, CMP and Lipid Panel by primary  Keep LDL below 70 mg/dl. Monitor liver and renal functions.    Refilled medications electronically.  Close follow up with you as  scheduled.  Intensive factor modifications.  See order list.    Counseled regarding disease appropriate diet, fluid, caffeine, stimulants and sodium intake as well as importance of compliance to diet, exercise and regular follow up.    Return in about 6 months (around 5/20/2018).

## 2018-01-17 ENCOUNTER — OFFICE VISIT (OUTPATIENT)
Dept: OTOLARYNGOLOGY | Facility: CLINIC | Age: 83
End: 2018-01-17

## 2018-01-17 VITALS
RESPIRATION RATE: 20 BRPM | DIASTOLIC BLOOD PRESSURE: 80 MMHG | SYSTOLIC BLOOD PRESSURE: 141 MMHG | HEART RATE: 85 BPM | BODY MASS INDEX: 23.57 KG/M2 | TEMPERATURE: 98 F | WEIGHT: 174 LBS | HEIGHT: 72 IN

## 2018-01-17 DIAGNOSIS — L57.0 ACTINIC KERATOSIS: Primary | ICD-10-CM

## 2018-01-17 DIAGNOSIS — D48.5 NEOPLASM OF UNCERTAIN BEHAVIOR OF SKIN: ICD-10-CM

## 2018-01-17 DIAGNOSIS — D04.0 SQUAMOUS CELL CARCINOMA IN SITU OF SKIN OF LIP: ICD-10-CM

## 2018-01-17 PROCEDURE — 99214 OFFICE O/P EST MOD 30 MIN: CPT | Performed by: OTOLARYNGOLOGY

## 2018-01-17 NOTE — PROGRESS NOTES
CC:   Chief Complaint   Patient presents with   • Skin Lesion     LEFT UPPER LIP LESION       HPI: Dexter Suggs is a 83 y.o. male who reports a skin lesion on the left upper lip.  This lesion has been present for 1.5 years.  The lesion has changed. He reports roughness to the skin.  He used fluorouracil for 2 weeks to the lesion 1 year ago and it became irritated and resolved.  It recurred 3 months ago, but over the past 2 weeks has gotten a bit better.  A biopsy has not not been performed.    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  The squamous cell carcinoma was removed with a 6 mm punch biopsy on 08/12/2013.    He also has a history of 2 melanomas of the left cheek treated many years ago with excision.  He has not noted recurrence.    Dermatologist: CESAR Alexis      PFSH:  Past Medical History:   Diagnosis Date   • Abnormal nuclear stress test    • BPH (benign prostatic hyperplasia)    • Chest pain    • Coronary artery disease    • Disease of thyroid gland    • Hyperlipidemia    • Hypertension    • LV dysfunction    • Melanoma    • Prostate CA      Past Surgical History:   Procedure Laterality Date   • APPENDECTOMY     • CARDIAC CATHETERIZATION Left 12/10/2012   • CHOLECYSTECTOMY     • COLONOSCOPY N/A 6/9/2017    Procedure: COLONOSCOPY WITH ANESTHESIA;  Surgeon: Felice Marroquin MD;  Location: [facility] ENDOSCOPY;  Service:    • COLONOSCOPY W/ POLYPECTOMY  02/16/2012    adenomatous polyp at 80cm   • HAND SURGERY Right    • HERNIA REPAIR     • INGUINAL HERNIA REPAIR     • KNEE SURGERY     • PROSTATE SURGERY     • SKIN CANCER EXCISION      face   • SKIN LESION EXCISION       Family History   Problem Relation Age of Onset   • Alzheimer's disease Mother    • Cancer Father    • Cancer Sister      Social History   Substance Use Topics   • Smoking status: Former Smoker     Years: 30.00     Types: Cigarettes     Quit date: 1985   •  "Smokeless tobacco: Never Used   • Alcohol use No     Allergies:  Adhesive tape and Neosporin [neomycin-bacitracin zn-polymyx]    Current Outpatient Prescriptions:   •  allopurinol (ZYLOPRIM) 300 MG tablet, Take 300 mg by mouth Daily., Disp: , Rfl:   •  aspirin 81 MG EC tablet, Take 81 mg by mouth Daily., Disp: , Rfl:   •  cetirizine (zyrTEC) 10 MG tablet, Take 0.5 tablets by mouth Daily., Disp: 10 tablet, Rfl: 0  •  glucosamine sulfate 500 MG capsule capsule, Take  by mouth 3 (Three) Times a Day With Meals., Disp: , Rfl:   •  isosorbide mononitrate (IMDUR) 30 MG 24 hr tablet, Take 1 tablet by mouth Daily., Disp: 90 tablet, Rfl: 3  •  levothyroxine (SYNTHROID, LEVOTHROID) 75 MCG tablet, Take 75 mcg by mouth Daily., Disp: , Rfl:   •  LORazepam (ATIVAN) 1 MG tablet, Take 1 mg by mouth Every 8 (Eight) Hours As Needed for anxiety., Disp: , Rfl:   •  metoprolol succinate XL (TOPROL-XL) 50 MG 24 hr tablet, Take 1 tablet by mouth Daily., Disp: 90 tablet, Rfl: 3  •  nitroglycerin (NITROSTAT) 0.4 MG SL tablet, Place 1 tablet under the tongue Every 5 (Five) Minutes As Needed for Chest Pain (PRN for CP as directed)., Disp: 30 tablet, Rfl: 11  •  pantoprazole (PROTONIX) 40 MG EC tablet, Take 40 mg by mouth Daily., Disp: , Rfl:   •  simvastatin (ZOCOR) 10 MG tablet, TAKE ONE TABLET BY MOUTH ONCE DAILY, Disp: 90 tablet, Rfl: 3  •  triamterene-hydrochlorothiazide (MAXZIDE) 75-50 MG per tablet, Take 1 tablet by mouth Daily., Disp: , Rfl:     ROS:  Review of Systems   Constitutional: Negative for chills, fatigue and unexpected weight change.   Respiratory: Negative for chest tightness and shortness of breath.    Cardiovascular: Negative for chest pain.   Skin: Positive for wound.   Hematological: Negative for adenopathy.   All other systems reviewed and are negative.      PE:  /80  Pulse 85  Temp 98 °F (36.7 °C)  Resp 20  Ht 182.9 cm (72\")  Wt 78.9 kg (174 lb)  BMI 23.6 kg/m2  Physical Exam   Constitutional: He is " oriented to person, place, and time. He appears well-developed and well-nourished. He is cooperative. No distress.   HENT:   Head: Atraumatic.   Right Ear: External ear normal.   Left Ear: External ear normal.   Nose: Nose normal.   Mouth/Throat:       Eyes: Conjunctivae and EOM are normal. Pupils are equal, round, and reactive to light. Right eye exhibits no discharge. Left eye exhibits no discharge. No scleral icterus.   Neck: Normal range of motion. Neck supple. No JVD present. No tracheal deviation present. No thyromegaly present.   Pulmonary/Chest: Effort normal. No stridor.   Musculoskeletal: Normal range of motion. He exhibits no edema or deformity.   Lymphadenopathy:     He has no cervical adenopathy.   Neurological: He is alert and oriented to person, place, and time. He has normal strength. No cranial nerve deficit. Coordination normal.   Skin: Skin is warm and dry. Lesion (See HENT) noted. No rash noted. He is not diaphoretic. No erythema. No pallor.   Psychiatric: He has a normal mood and affect. His speech is normal and behavior is normal. Judgment and thought content normal. Cognition and memory are normal.   Nursing note and vitals reviewed.            Data review:  I have reviewed the prior pathology:      Assessment:  1. Actinic keratosis    2. 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    1.  I have Discussed the option of retreatment with topical chemotherapy cream, cryotherapy, punch biopsy.  Due to the fact that this is getting better and appears more like an actinic keratosis versus squamous cell carcinoma in situ, I have recommended resuming the couple can go cream.  But to see him back 5 months later for further evaluation.  Call the lesion worsens after the treatment.  He has had topical cream before and understands irritation that it may cause.  If the lesion persists after treatment for 4 weeks, will consider  biopsy.  2. The risks and benefits of my recommendations, as well as other treatment options were discussed with the patient today.       Return in about 5 months (around 6/17/2018).      Zia Martin MD   01/17/2018  12:10 PM

## 2018-05-21 ENCOUNTER — OFFICE VISIT (OUTPATIENT)
Dept: CARDIOLOGY | Facility: CLINIC | Age: 83
End: 2018-05-21

## 2018-05-21 VITALS
HEART RATE: 53 BPM | WEIGHT: 174 LBS | BODY MASS INDEX: 23.57 KG/M2 | HEIGHT: 72 IN | SYSTOLIC BLOOD PRESSURE: 136 MMHG | DIASTOLIC BLOOD PRESSURE: 59 MMHG

## 2018-05-21 DIAGNOSIS — I10 HTN (HYPERTENSION), BENIGN: ICD-10-CM

## 2018-05-21 DIAGNOSIS — I10 ESSENTIAL HYPERTENSION: ICD-10-CM

## 2018-05-21 DIAGNOSIS — I44.7 LEFT BUNDLE BRANCH BLOCK: Primary | ICD-10-CM

## 2018-05-21 DIAGNOSIS — C61 MALIGNANT NEOPLASM OF PROSTATE (HCC): ICD-10-CM

## 2018-05-21 DIAGNOSIS — I25.10 CAD IN NATIVE ARTERY: ICD-10-CM

## 2018-05-21 DIAGNOSIS — E78.2 MIXED HYPERLIPIDEMIA: ICD-10-CM

## 2018-05-21 PROCEDURE — 93000 ELECTROCARDIOGRAM COMPLETE: CPT | Performed by: INTERNAL MEDICINE

## 2018-05-21 PROCEDURE — 99214 OFFICE O/P EST MOD 30 MIN: CPT | Performed by: INTERNAL MEDICINE

## 2018-05-21 RX ORDER — METOPROLOL SUCCINATE 50 MG/1
50 TABLET, EXTENDED RELEASE ORAL DAILY
Qty: 90 TABLET | Refills: 3 | Status: SHIPPED | OUTPATIENT
Start: 2018-05-21 | End: 2019-01-18 | Stop reason: SDUPTHER

## 2018-05-21 NOTE — PROGRESS NOTES
Dexter Suggs  [id number]  1934  83 y.o.  male    Referring Provider: Jarad Alexis MD    Reason for Follow-up Visit:  Routine follow up.  coronary artery disease   left bundle branch block     Subjective    Overall feeling well   No chest pain or excessive  shortness of breath   No palpitations  No significant pedal edema  Compliant with medications and dietary advice  Good effort tolerance  No presyncope or syncope  Compliant with medications  Excellent effort tolerance with no cardiovascular limitations and at the patient's baseline  History of present illness:  Dexter Suggs is a 83 y.o. yo male with history of CAD who presents today for   Chief Complaint   Patient presents with   • Coronary Artery Disease     6 mo f/u   .    History  Past Medical History:   Diagnosis Date   • Abnormal nuclear stress test    • BPH (benign prostatic hyperplasia)    • Chest pain    • Coronary artery disease    • Disease of thyroid gland    • Hyperlipidemia    • Hypertension    • LV dysfunction    • Melanoma    • Prostate CA    ,   Past Surgical History:   Procedure Laterality Date   • APPENDECTOMY     • CARDIAC CATHETERIZATION Left 12/10/2012   • CHOLECYSTECTOMY     • COLONOSCOPY N/A 6/9/2017    Procedure: COLONOSCOPY WITH ANESTHESIA;  Surgeon: Felice Marroquin MD;  Location: [facility] ENDOSCOPY;  Service:    • COLONOSCOPY W/ POLYPECTOMY  02/16/2012    adenomatous polyp at 80cm   • HAND SURGERY Right    • HERNIA REPAIR     • INGUINAL HERNIA REPAIR     • KNEE SURGERY     • PROSTATE SURGERY     • SKIN CANCER EXCISION      face   • SKIN LESION EXCISION     ,   Family History   Problem Relation Age of Onset   • Alzheimer's disease Mother    • Cancer Father    • Cancer Sister    ,   Social History   Substance Use Topics   • Smoking status: Former Smoker     Years: 30.00     Types: Cigarettes     Quit date: 1985   • Smokeless tobacco: Never Used   • Alcohol use No   ,     Medications  Current Outpatient Prescriptions    Medication Sig Dispense Refill   • allopurinol (ZYLOPRIM) 300 MG tablet Take 300 mg by mouth Daily.     • aspirin 81 MG EC tablet Take 81 mg by mouth Daily.     • cetirizine (zyrTEC) 10 MG tablet Take 0.5 tablets by mouth Daily. 10 tablet 0   • glucosamine sulfate 500 MG capsule capsule Take  by mouth 3 (Three) Times a Day With Meals.     • isosorbide mononitrate (IMDUR) 30 MG 24 hr tablet Take 1 tablet by mouth Daily. 90 tablet 3   • levothyroxine (SYNTHROID, LEVOTHROID) 75 MCG tablet Take 75 mcg by mouth Daily.     • LORazepam (ATIVAN) 1 MG tablet Take 1 mg by mouth Every 8 (Eight) Hours As Needed for anxiety.     • metoprolol succinate XL (TOPROL-XL) 50 MG 24 hr tablet Take 1 tablet by mouth Daily. 90 tablet 3   • nitroglycerin (NITROSTAT) 0.4 MG SL tablet Place 1 tablet under the tongue Every 5 (Five) Minutes As Needed for Chest Pain (PRN for CP as directed). 30 tablet 11   • pantoprazole (PROTONIX) 40 MG EC tablet Take 40 mg by mouth Daily.     • triamterene-hydrochlorothiazide (MAXZIDE) 75-50 MG per tablet Take 1 tablet by mouth Daily.     • simvastatin (ZOCOR) 10 MG tablet TAKE ONE TABLET BY MOUTH ONCE DAILY 90 tablet 3     No current facility-administered medications for this visit.        Allergies:  Adhesive tape and Neosporin [neomycin-bacitracin zn-polymyx]    Review of Systems  Review of Systems   Constitution: Negative.   HENT: Negative.    Eyes: Negative.    Cardiovascular: Negative for chest pain, claudication, cyanosis, dyspnea on exertion, irregular heartbeat, leg swelling, near-syncope, orthopnea, palpitations, paroxysmal nocturnal dyspnea and syncope.   Respiratory: Negative.    Endocrine: Negative.    Hematologic/Lymphatic: Negative.    Skin: Negative.    Musculoskeletal: Positive for arthritis and back pain.   Gastrointestinal: Negative for anorexia.   Genitourinary: Negative.    Neurological: Negative.    Psychiatric/Behavioral: Negative.        Objective     Physical Exam:  /59    "Pulse 53   Ht 182.9 cm (72\")   Wt 78.9 kg (174 lb)   BMI 23.60 kg/m²   Physical Exam   Constitutional: He appears well-developed.   HENT:   Head: Normocephalic.   Neck: Normal carotid pulses and no JVD present. No tracheal tenderness present. Carotid bruit is not present. No tracheal deviation and no edema present.   Cardiovascular: Regular rhythm, normal heart sounds and normal pulses.    Pulmonary/Chest: Effort normal. No stridor.   Abdominal: Soft.   Neurological: He is alert. He has normal strength. No cranial nerve deficit or sensory deficit.   Skin: Skin is warm.   Psychiatric: He has a normal mood and affect. His speech is normal and behavior is normal.       Results Review:  Results for orders placed during the hospital encounter of 11/22/16   Adult Transthoracic Echo Complete    Narrative · Calculated EF = 64.2%  · Left ventricular wall thickness is consistent with mild concentric   hypertrophy.  · Left ventricular diastolic dysfunction (grade I) consistent with   impaired relaxation.  · No pulmonary hypertension.             ECG 12 Lead  Date/Time: 5/21/2018 8:38 AM  Performed by: RAHUL VAIL  Authorized by: RAHUL VAIL   Comparison: compared with previous ECG from 11/20/2017  Similar to previous ECG  Rhythm: sinus rhythm  Rate: bradycardic  Conduction: complete LBBB and 1st degree  QRS axis: right  Clinical impression: abnormal ECG            Assessment/Plan   Patient Active Problem List   Diagnosis   • Left bundle branch block   • CAD in native artery   • Essential hypertension   • Mixed hyperlipidemia   • Hx of colonic polyps   • HTN (hypertension), benign   • Malignant neoplasm of prostate     No palpitations. No significant pedal edema. Compliant with medications and diet. Latest labs and medications reviewed.  Prior nuclear stress test is normal   Echo normal LVEF    Plan:    Low salt/ HTN/ Heart healthy carbohydrate restricted cardiac diet as applicable to this patient's current diagnoses.   " "This handout has relevant information regarding shopping for food, preparing meals, what to eat at restaurants, tracking of food intake, information regarding sodium intake and salt content, how to read food labels, knowing what to eat, tips reagarding physical activity, calorie count and calorie expenditure. What foods to avoid. Information regarding alcoholic drinks along with \"good\" and \"bad\" fats.  Reiterated prior recommendations     The patient is advised to reduce or avoid caffeine or other cardiac stimulants.     The patient was advised that NSAID-type medications have three  very important potential side effects: cardiovascular complications, gastrointestinal irritation including hemorrhage and renal injuries.  Do not use anti-inflammatories such as Motrin/ibuprofen, Alleve/naprosyn, Mobic and like medications Use Tylenol instead.The patient expresses understanding of these issues and questions were answered.   Monitor for any signs of bleeding including red or dark stools. Fall precautions.       Monitor for any signs of bleeding including red or dark stools. Fall precautions.   Patient is asked to monitor BP at home or work, several times per month and return with written values at next office visit.     Advised staying uptodate with immunizations per established standard guidelines.      Keep LDL below 70 mg/dl. Monitor liver and renal functions.   Monitor CBC, CMP and Lipid Panel by primary     No additional cardiac testing required at this point in time.     Requested Prescriptions     Signed Prescriptions Disp Refills   • metoprolol succinate XL (TOPROL-XL) 50 MG 24 hr tablet 90 tablet 3     Sig: Take 1 tablet by mouth Daily.          Return in about 1 year (around 5/21/2019).                "

## 2018-06-20 ENCOUNTER — OFFICE VISIT (OUTPATIENT)
Dept: OTOLARYNGOLOGY | Facility: CLINIC | Age: 83
End: 2018-06-20

## 2018-06-20 VITALS
DIASTOLIC BLOOD PRESSURE: 78 MMHG | SYSTOLIC BLOOD PRESSURE: 135 MMHG | WEIGHT: 173 LBS | RESPIRATION RATE: 20 BRPM | HEIGHT: 72 IN | TEMPERATURE: 98 F | BODY MASS INDEX: 23.43 KG/M2 | HEART RATE: 82 BPM

## 2018-06-20 DIAGNOSIS — L57.0 ACTINIC KERATOSIS: ICD-10-CM

## 2018-06-20 DIAGNOSIS — C61 MALIGNANT NEOPLASM OF PROSTATE (HCC): Primary | ICD-10-CM

## 2018-06-20 DIAGNOSIS — D04.0 SQUAMOUS CELL CARCINOMA IN SITU OF SKIN OF LIP: Primary | ICD-10-CM

## 2018-06-20 LAB — PSA SERPL-MCNC: <0.07 NG/ML (ref 0–4)

## 2018-06-20 PROCEDURE — 99213 OFFICE O/P EST LOW 20 MIN: CPT | Performed by: NURSE PRACTITIONER

## 2018-06-20 PROCEDURE — 17110 DESTRUCTION B9 LES UP TO 14: CPT | Performed by: NURSE PRACTITIONER

## 2018-06-20 NOTE — PROGRESS NOTES
Procedure   Procedures      Preoperative Diagnosis: Actinic keratosis of left neck    Postoperative Diagnosis: Same    Procedure: Destruction with Cryotherapy     Provider: ALENA Miranda    Anesthesia: None    Location: [facility] office    Complications: None    Details of Procedure:     Patient identity was verified and consent was signed. The lesion was treated with 2 pulses of 6 second duration each; allowing the skin to completely thaw between pulses. The patient tolerated the procedure without complication.    ALENA Sandoval  06/20/18  11:49 AM

## 2018-06-20 NOTE — PROGRESS NOTES
CC:   Chief Complaint   Patient presents with   • Follow-up     LEFT UPPER LIP LESION   • Skin Lesion     LEFT NECK       HPI: Dexter Suggs is a 83 y.o. male who is here to follow-up from complaints of a skin lesion on the left upper lip.  This lesion has been present for 2 years.  The lesion has changed. He reports roughness to the skin.  He used fluorouracil for 2 weeks to the lesion 1-2 years ago and it became irritated and resolved.  It recurred 9 months ago.  A biopsy has not not been performed. He used topical chemotherapy cream for 4 weeks back in January. He states this lesion became irritated and has now resolved.    He also complains of a skin lesion of the left neck. The lesion has been present for a few months. Symptoms associated with the lesion are: rough, flaking. Patient denies itching, bleeding, pain, drainage, infection. Nothing makes the lesion better or worse. A biopsy has not been performed.     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  The squamous cell carcinoma was removed with a 6 mm punch biopsy on 08/12/2013.    He also has a history of 2 melanomas of the left cheek treated many years ago with excision.  He has not noted recurrence.    Dermatologist: Stiven Chavez MD      PFSH:  Past Medical History:   Diagnosis Date   • Abnormal nuclear stress test    • BPH (benign prostatic hyperplasia)    • Chest pain    • Coronary artery disease    • Disease of thyroid gland    • Hyperlipidemia    • Hypertension    • LV dysfunction    • Melanoma    • Prostate CA      Past Surgical History:   Procedure Laterality Date   • APPENDECTOMY     • CARDIAC CATHETERIZATION Left 12/10/2012   • CHOLECYSTECTOMY     • COLONOSCOPY N/A 6/9/2017    Procedure: COLONOSCOPY WITH ANESTHESIA;  Surgeon: Felice Marroquin MD;  Location: [facility] ENDOSCOPY;  Service:    • COLONOSCOPY W/ POLYPECTOMY  02/16/2012    adenomatous polyp at 80cm   •  HAND SURGERY Right    • HERNIA REPAIR     • INGUINAL HERNIA REPAIR     • KNEE SURGERY     • PROSTATE SURGERY     • SKIN CANCER EXCISION      face   • SKIN LESION EXCISION       Family History   Problem Relation Age of Onset   • Alzheimer's disease Mother    • Cancer Father    • Cancer Sister      Social History   Substance Use Topics   • Smoking status: Former Smoker     Years: 30.00     Types: Cigarettes     Quit date: 1985   • Smokeless tobacco: Never Used   • Alcohol use No     Allergies:  Adhesive tape and Neosporin [neomycin-bacitracin zn-polymyx]    Current Outpatient Prescriptions:   •  allopurinol (ZYLOPRIM) 300 MG tablet, Take 300 mg by mouth Daily., Disp: , Rfl:   •  aspirin 81 MG EC tablet, Take 81 mg by mouth Daily., Disp: , Rfl:   •  cetirizine (zyrTEC) 10 MG tablet, Take 0.5 tablets by mouth Daily., Disp: 10 tablet, Rfl: 0  •  glucosamine sulfate 500 MG capsule capsule, Take  by mouth 3 (Three) Times a Day With Meals., Disp: , Rfl:   •  isosorbide mononitrate (IMDUR) 30 MG 24 hr tablet, Take 1 tablet by mouth Daily., Disp: 90 tablet, Rfl: 3  •  levothyroxine (SYNTHROID, LEVOTHROID) 75 MCG tablet, Take 75 mcg by mouth Daily., Disp: , Rfl:   •  LORazepam (ATIVAN) 1 MG tablet, Take 1 mg by mouth Every 8 (Eight) Hours As Needed for anxiety., Disp: , Rfl:   •  metoprolol succinate XL (TOPROL-XL) 50 MG 24 hr tablet, Take 1 tablet by mouth Daily., Disp: 90 tablet, Rfl: 3  •  nitroglycerin (NITROSTAT) 0.4 MG SL tablet, Place 1 tablet under the tongue Every 5 (Five) Minutes As Needed for Chest Pain (PRN for CP as directed)., Disp: 30 tablet, Rfl: 11  •  pantoprazole (PROTONIX) 40 MG EC tablet, Take 40 mg by mouth Daily., Disp: , Rfl:   •  simvastatin (ZOCOR) 10 MG tablet, TAKE ONE TABLET BY MOUTH ONCE DAILY, Disp: 90 tablet, Rfl: 3  •  triamterene-hydrochlorothiazide (MAXZIDE) 75-50 MG per tablet, Take 1 tablet by mouth Daily., Disp: , Rfl:     ROS:  Review of Systems   Constitutional: Negative for chills,  "fatigue and unexpected weight change.   Respiratory: Negative for chest tightness and shortness of breath.    Cardiovascular: Negative for chest pain.   Skin: Negative for wound.   Hematological: Negative for adenopathy.   All other systems reviewed and are negative.      PE:  /78   Pulse 82   Temp 98 °F (36.7 °C)   Resp 20   Ht 182.9 cm (72\")   Wt 78.5 kg (173 lb)   BMI 23.46 kg/m²   Physical Exam   Constitutional: He is oriented to person, place, and time. He appears well-developed and well-nourished. He is cooperative. No distress.   HENT:   Head: Atraumatic.   Right Ear: External ear normal.   Left Ear: External ear normal.   Nose: Nose normal.   Mouth/Throat:       Eyes: Conjunctivae and EOM are normal. Pupils are equal, round, and reactive to light. Right eye exhibits no discharge. Left eye exhibits no discharge. No scleral icterus.   Neck: Normal range of motion. Neck supple. No JVD present. No tracheal deviation present. No thyromegaly present.   Pulmonary/Chest: Effort normal. No stridor.   Musculoskeletal: Normal range of motion. He exhibits no edema or deformity.   Lymphadenopathy:     He has no cervical adenopathy.   Neurological: He is alert and oriented to person, place, and time. He has normal strength. No cranial nerve deficit. Coordination normal.   Skin: Skin is warm and dry. Lesion (See HENT) noted. No rash noted. He is not diaphoretic. No erythema. No pallor.   Left neck with 3-4 mm rough, scaly lesion c/w AK   Psychiatric: He has a normal mood and affect. His speech is normal and behavior is normal. Judgment and thought content normal. Cognition and memory are normal.   Nursing note and vitals reviewed.            Data review:  I have reviewed the prior pathology:      Assessment:  1. 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 Actinic keratosis        Plan:    1.  I have discussed the option of treatment of the AK of the left neck with topical chemotherapy cream vs " cryotherapy. He would like to proceed with cryotherapy today- see procedure note.  If the lesion persists, will consider biopsy.  2. The risks and benefits of my recommendations, as well as other treatment options were discussed with the patient today.   3. JEANNA to upper lip.  4. Discussed the importance of routine skin checks with a dermatologist every 6-12 months. He has been set up to see Dr. Chavez, but cancelled his appointment. He is going to reschedule this. He would like to follow-up here in 6 months.       QUALITY MEASURES    Body Mass Index Screening and Follow-Up Plan  Body mass index is 23.46 kg/m².  Patient's Body mass index is 23.46 kg/m². BMI is within normal parameters. No follow-up required.    Tobacco Use: Screening and Cessation Intervention  Smoking status: Former Smoker                                                              Packs/day: 0.00      Years: 30.00        Types: Cigarettes     Quit date: 1985  Smokeless tobacco: Never Used                          Return in about 6 months (around 12/20/2018).      Jen Maldonado, ALENA   01/17/2018  12:10 PM

## 2018-06-26 ENCOUNTER — OFFICE VISIT (OUTPATIENT)
Dept: UROLOGY | Facility: CLINIC | Age: 83
End: 2018-06-26

## 2018-06-26 VITALS — BODY MASS INDEX: 23.92 KG/M2 | WEIGHT: 176.6 LBS | TEMPERATURE: 97.4 F | HEIGHT: 72 IN

## 2018-06-26 DIAGNOSIS — N40.1 BENIGN PROSTATIC HYPERPLASIA WITH LOWER URINARY TRACT SYMPTOMS, SYMPTOM DETAILS UNSPECIFIED: ICD-10-CM

## 2018-06-26 DIAGNOSIS — C61 MALIGNANT NEOPLASM OF PROSTATE (HCC): Primary | ICD-10-CM

## 2018-06-26 LAB
BILIRUB BLD-MCNC: NEGATIVE MG/DL
CLARITY, POC: CLEAR
COLOR UR: YELLOW
GLUCOSE UR STRIP-MCNC: NEGATIVE MG/DL
KETONES UR QL: NEGATIVE
LEUKOCYTE EST, POC: NEGATIVE
NITRITE UR-MCNC: NEGATIVE MG/ML
PH UR: 5.5 [PH] (ref 5–8)
PROT UR STRIP-MCNC: ABNORMAL MG/DL
RBC # UR STRIP: ABNORMAL /UL
SP GR UR: 1.02 (ref 1–1.03)
UROBILINOGEN UR QL: NORMAL

## 2018-06-26 PROCEDURE — 81001 URINALYSIS AUTO W/SCOPE: CPT | Performed by: UROLOGY

## 2018-06-26 PROCEDURE — 99213 OFFICE O/P EST LOW 20 MIN: CPT | Performed by: UROLOGY

## 2018-06-26 RX ORDER — FINASTERIDE 5 MG/1
5 TABLET, FILM COATED ORAL DAILY
COMMUNITY
End: 2020-01-31

## 2018-06-26 NOTE — PROGRESS NOTES
Subjective    Mr. Suggs is 83 y.o. male    CHIEF COMPLAINT: Prostate cancer    The patient came back today for follow-up prostate cancer I he is now 13 years status post radiation therapy for [place]'s grade 6 (3+3 CA of the prostate.    He is done very well without evidence recurrence his most recent PSA is undetectable.    He denies any gross hematuria there is no flank pain Or Significant Voiding Symptoms His AUA Score Today Is Only 4 No Burning Stinging Urgency Frequency Etc. so He Is Done Very Well with His Radiation and No Obstructive Voiding Symptoms As Far As BPH Is Concerned      History of Present Illness      The following portions of the patient's history were reviewed and updated as appropriate: allergies, current medications, past family history, past medical history, past social history, past surgical history and problem list.    Review of Systems   Constitutional: Negative for chills and fever.   Gastrointestinal: Negative for abdominal pain, anal bleeding and blood in stool.   Genitourinary: Negative for difficulty urinating, flank pain, frequency, hematuria and urgency.         Current Outpatient Prescriptions:   •  allopurinol (ZYLOPRIM) 300 MG tablet, Take 300 mg by mouth Daily., Disp: , Rfl:   •  aspirin 81 MG EC tablet, Take 81 mg by mouth Daily., Disp: , Rfl:   •  finasteride (PROSCAR) 5 MG tablet, Take 5 mg by mouth Daily., Disp: , Rfl:   •  glucosamine sulfate 500 MG capsule capsule, Take  by mouth 3 (Three) Times a Day With Meals., Disp: , Rfl:   •  isosorbide mononitrate (IMDUR) 30 MG 24 hr tablet, Take 1 tablet by mouth Daily., Disp: 90 tablet, Rfl: 3  •  levothyroxine (SYNTHROID, LEVOTHROID) 75 MCG tablet, Take 75 mcg by mouth Daily., Disp: , Rfl:   •  LORazepam (ATIVAN) 1 MG tablet, Take 1 mg by mouth Every 8 (Eight) Hours As Needed for anxiety., Disp: , Rfl:   •  metoprolol succinate XL (TOPROL-XL) 50 MG 24 hr tablet, Take 1 tablet by mouth Daily., Disp: 90 tablet, Rfl: 3  •   "nitroglycerin (NITROSTAT) 0.4 MG SL tablet, Place 1 tablet under the tongue Every 5 (Five) Minutes As Needed for Chest Pain (PRN for CP as directed)., Disp: 30 tablet, Rfl: 11  •  pantoprazole (PROTONIX) 40 MG EC tablet, Take 40 mg by mouth Daily., Disp: , Rfl:   •  simvastatin (ZOCOR) 10 MG tablet, TAKE ONE TABLET BY MOUTH ONCE DAILY, Disp: 90 tablet, Rfl: 3  •  triamterene-hydrochlorothiazide (MAXZIDE) 75-50 MG per tablet, Take 1 tablet by mouth Daily., Disp: , Rfl:     Past Medical History:   Diagnosis Date   • Abnormal nuclear stress test    • BPH (benign prostatic hyperplasia)    • Chest pain    • Coronary artery disease    • Disease of thyroid gland    • Hyperlipidemia    • Hypertension    • LV dysfunction    • Melanoma    • Prostate CA        Past Surgical History:   Procedure Laterality Date   • APPENDECTOMY     • CARDIAC CATHETERIZATION Left 12/10/2012   • CHOLECYSTECTOMY     • COLONOSCOPY N/A 6/9/2017    Procedure: COLONOSCOPY WITH ANESTHESIA;  Surgeon: Felice Marroquin MD;  Location: [facility] ENDOSCOPY;  Service:    • COLONOSCOPY W/ POLYPECTOMY  02/16/2012    adenomatous polyp at 80cm   • HAND SURGERY Right    • HERNIA REPAIR     • INGUINAL HERNIA REPAIR     • KNEE SURGERY     • PROSTATE SURGERY     • SKIN CANCER EXCISION      face   • SKIN LESION EXCISION         Social History     Social History   • Marital status:      Social History Main Topics   • Smoking status: Former Smoker     Years: 30.00     Types: Cigarettes     Quit date: 1985   • Smokeless tobacco: Never Used   • Alcohol use No   • Drug use: No   • Sexual activity: Defer     Other Topics Concern   • Not on file       Family History   Problem Relation Age of Onset   • Alzheimer's disease Mother    • Cancer Father    • Cancer Sister        Objective    Temp 97.4 °F (36.3 °C)   Ht 182.9 cm (72\")   Wt 80.1 kg (176 lb 9.6 oz)   BMI 23.95 kg/m²     Physical Exam      Orders Only on 06/20/2018   Component Date Value Ref Range Status   • PSA " 06/20/2018 <0.070  0.000 - 4.000 ng/mL Final       Results for orders placed or performed in visit on 06/26/18   POC Urinalysis Dipstick, Multipro   Result Value Ref Range    Color Yellow Yellow, Straw, Dark Yellow, Ariana    Clarity, UA Clear Clear    Glucose, UA Negative Negative, 1000 mg/dL (3+) mg/dL    Bilirubin Negative Negative    Ketones, UA Negative Negative    Specific Gravity  1.025 1.005 - 1.030    Blood, UA Small (A) Negative    pH, Urine 5.5 5.0 - 8.0    Protein,  mg/dL (A) Negative mg/dL    Urobilinogen, UA Normal Normal    Nitrite, UA Negative Negative    Leukocytes Negative Negative     Microscopic Urinalysis  I inspected the urine myself based on the clinical situation including the dipstick urine. The urine is spun in a centrifuge for three minutes. The spun urine shows 0-2 rbc/hpf, none wbc/hpf, 0-2 epi/hpf, negative bacteria, negative crystals, and negative casts.     Assessment and Plan    Dexter was seen today for prostate cancer.    Diagnoses and all orders for this visit:    Malignant neoplasm of prostate  -     POC Urinalysis Dipstick, Multipro  -     PSA DIAGNOSTIC; Future    Plan--the patient has no evidence recurrence for prostate cancer he is doing very well he has no symptoms metastatic disease as bone pain back pain weight loss or anorexia.    We will seen back again in 1 year's time with a PSA.    He also has no obstructive voiding symptoms and is doing well from that point of view as well

## 2018-07-01 ENCOUNTER — RESULTS ENCOUNTER (OUTPATIENT)
Dept: UROLOGY | Facility: CLINIC | Age: 83
End: 2018-07-01

## 2018-07-01 DIAGNOSIS — C61 MALIGNANT NEOPLASM OF PROSTATE (HCC): ICD-10-CM

## 2018-11-07 DIAGNOSIS — C61 MALIGNANT NEOPLASM OF PROSTATE (HCC): Primary | ICD-10-CM

## 2018-11-21 ENCOUNTER — HOSPITAL ENCOUNTER (INPATIENT)
Facility: HOSPITAL | Age: 83
LOS: 3 days | Discharge: HOME | End: 2018-11-24
Attending: EMERGENCY MEDICINE | Admitting: INTERNAL MEDICINE

## 2018-11-21 ENCOUNTER — APPOINTMENT (OUTPATIENT)
Dept: GENERAL RADIOLOGY | Facility: HOSPITAL | Age: 83
End: 2018-11-21

## 2018-11-21 DIAGNOSIS — I44.2 COMPLETE HEART BLOCK (HCC): Primary | ICD-10-CM

## 2018-11-21 LAB
ANION GAP SERPL CALCULATED.3IONS-SCNC: 11 MMOL/L (ref 4–13)
BASOPHILS # BLD AUTO: 0.05 10*3/MM3 (ref 0–0.2)
BASOPHILS NFR BLD AUTO: 0.6 % (ref 0–2)
BUN BLD-MCNC: 33 MG/DL (ref 5–21)
BUN/CREAT SERPL: 21.2 (ref 7–25)
CALCIUM SPEC-SCNC: 9.5 MG/DL (ref 8.4–10.4)
CHLORIDE SERPL-SCNC: 105 MMOL/L (ref 98–110)
CK MB SERPL-CCNC: 1.06 NG/ML (ref 0–5)
CO2 SERPL-SCNC: 24 MMOL/L (ref 24–31)
CREAT BLD-MCNC: 1.56 MG/DL (ref 0.5–1.4)
DEPRECATED RDW RBC AUTO: 50.5 FL (ref 40–54)
EOSINOPHIL # BLD AUTO: 0.49 10*3/MM3 (ref 0–0.7)
EOSINOPHIL NFR BLD AUTO: 5.4 % (ref 0–4)
ERYTHROCYTE [DISTWIDTH] IN BLOOD BY AUTOMATED COUNT: 15.5 % (ref 12–15)
GFR SERPL CREATININE-BSD FRML MDRD: 43 ML/MIN/1.73
GLUCOSE BLD-MCNC: 116 MG/DL (ref 70–100)
HCT VFR BLD AUTO: 32.2 % (ref 40–52)
HGB BLD-MCNC: 10.8 G/DL (ref 14–18)
IMM GRANULOCYTES # BLD: 0.02 10*3/MM3 (ref 0–0.03)
IMM GRANULOCYTES NFR BLD: 0.2 % (ref 0–5)
INR PPP: 0.92 (ref 0.91–1.09)
LYMPHOCYTES # BLD AUTO: 1.8 10*3/MM3 (ref 0.72–4.86)
LYMPHOCYTES NFR BLD AUTO: 20 % (ref 15–45)
MAGNESIUM SERPL-MCNC: 1.9 MG/DL (ref 1.4–2.2)
MCH RBC QN AUTO: 29.8 PG (ref 28–32)
MCHC RBC AUTO-ENTMCNC: 33.5 G/DL (ref 33–36)
MCV RBC AUTO: 88.7 FL (ref 82–95)
MONOCYTES # BLD AUTO: 0.71 10*3/MM3 (ref 0.19–1.3)
MONOCYTES NFR BLD AUTO: 7.9 % (ref 4–12)
NEUTROPHILS # BLD AUTO: 5.93 10*3/MM3 (ref 1.87–8.4)
NEUTROPHILS NFR BLD AUTO: 65.9 % (ref 39–78)
NRBC BLD MANUAL-RTO: 0 /100 WBC (ref 0–0)
NT-PROBNP SERPL-MCNC: 2040 PG/ML (ref 0–1800)
PHOSPHATE SERPL-MCNC: 4.2 MG/DL (ref 2.5–4.5)
PLATELET # BLD AUTO: 191 10*3/MM3 (ref 130–400)
PMV BLD AUTO: 10 FL (ref 6–12)
POTASSIUM BLD-SCNC: 4.5 MMOL/L (ref 3.5–5.3)
PROTHROMBIN TIME: 12.6 SECONDS (ref 11.9–14.6)
RBC # BLD AUTO: 3.63 10*6/MM3 (ref 4.8–5.9)
SODIUM BLD-SCNC: 140 MMOL/L (ref 135–145)
T4 FREE SERPL-MCNC: 1.11 NG/DL (ref 0.78–2.19)
TROPONIN I SERPL-MCNC: 0.01 NG/ML (ref 0–0.03)
TSH SERPL DL<=0.05 MIU/L-ACNC: 1.58 MIU/ML (ref 0.47–4.68)
WBC NRBC COR # BLD: 9 10*3/MM3 (ref 4.8–10.8)

## 2018-11-21 PROCEDURE — 82553 CREATINE MB FRACTION: CPT | Performed by: EMERGENCY MEDICINE

## 2018-11-21 PROCEDURE — 93005 ELECTROCARDIOGRAM TRACING: CPT | Performed by: EMERGENCY MEDICINE

## 2018-11-21 PROCEDURE — 84100 ASSAY OF PHOSPHORUS: CPT | Performed by: EMERGENCY MEDICINE

## 2018-11-21 PROCEDURE — 99285 EMERGENCY DEPT VISIT HI MDM: CPT

## 2018-11-21 PROCEDURE — 85610 PROTHROMBIN TIME: CPT | Performed by: EMERGENCY MEDICINE

## 2018-11-21 PROCEDURE — 80048 BASIC METABOLIC PNL TOTAL CA: CPT | Performed by: EMERGENCY MEDICINE

## 2018-11-21 PROCEDURE — 84484 ASSAY OF TROPONIN QUANT: CPT | Performed by: EMERGENCY MEDICINE

## 2018-11-21 PROCEDURE — 83735 ASSAY OF MAGNESIUM: CPT | Performed by: EMERGENCY MEDICINE

## 2018-11-21 PROCEDURE — 25010000002 ENOXAPARIN PER 10 MG: Performed by: INTERNAL MEDICINE

## 2018-11-21 PROCEDURE — 71045 X-RAY EXAM CHEST 1 VIEW: CPT

## 2018-11-21 PROCEDURE — 84439 ASSAY OF FREE THYROXINE: CPT | Performed by: EMERGENCY MEDICINE

## 2018-11-21 PROCEDURE — 83880 ASSAY OF NATRIURETIC PEPTIDE: CPT | Performed by: EMERGENCY MEDICINE

## 2018-11-21 PROCEDURE — 84443 ASSAY THYROID STIM HORMONE: CPT | Performed by: EMERGENCY MEDICINE

## 2018-11-21 PROCEDURE — 93010 ELECTROCARDIOGRAM REPORT: CPT | Performed by: INTERNAL MEDICINE

## 2018-11-21 PROCEDURE — 85025 COMPLETE CBC W/AUTO DIFF WBC: CPT | Performed by: EMERGENCY MEDICINE

## 2018-11-21 RX ORDER — NITROGLYCERIN 0.4 MG/1
0.4 TABLET SUBLINGUAL
Status: DISCONTINUED | OUTPATIENT
Start: 2018-11-21 | End: 2018-11-24 | Stop reason: HOSPADM

## 2018-11-21 RX ORDER — ALLOPURINOL 300 MG/1
300 TABLET ORAL DAILY
Status: DISCONTINUED | OUTPATIENT
Start: 2018-11-22 | End: 2018-11-24 | Stop reason: HOSPADM

## 2018-11-21 RX ORDER — SODIUM CHLORIDE 0.9 % (FLUSH) 0.9 %
3-10 SYRINGE (ML) INJECTION AS NEEDED
Status: DISCONTINUED | OUTPATIENT
Start: 2018-11-21 | End: 2018-11-24 | Stop reason: HOSPADM

## 2018-11-21 RX ORDER — LORAZEPAM 1 MG/1
1 TABLET ORAL EVERY 8 HOURS PRN
Status: DISCONTINUED | OUTPATIENT
Start: 2018-11-21 | End: 2018-11-24 | Stop reason: HOSPADM

## 2018-11-21 RX ORDER — ASPIRIN 81 MG/1
81 TABLET ORAL DAILY
Status: DISCONTINUED | OUTPATIENT
Start: 2018-11-22 | End: 2018-11-24 | Stop reason: HOSPADM

## 2018-11-21 RX ORDER — ATORVASTATIN CALCIUM 10 MG/1
10 TABLET, FILM COATED ORAL DAILY
Status: DISCONTINUED | OUTPATIENT
Start: 2018-11-22 | End: 2018-11-24 | Stop reason: HOSPADM

## 2018-11-21 RX ORDER — TRIAMTERENE AND HYDROCHLOROTHIAZIDE 75; 50 MG/1; MG/1
1 TABLET ORAL DAILY
Status: DISCONTINUED | OUTPATIENT
Start: 2018-11-22 | End: 2018-11-24 | Stop reason: HOSPADM

## 2018-11-21 RX ORDER — ASPIRIN 81 MG/1
324 TABLET, CHEWABLE ORAL ONCE
Status: DISCONTINUED | OUTPATIENT
Start: 2018-11-21 | End: 2018-11-21 | Stop reason: SDUPTHER

## 2018-11-21 RX ORDER — ASPIRIN 81 MG/1
324 TABLET, CHEWABLE ORAL ONCE
Status: COMPLETED | OUTPATIENT
Start: 2018-11-21 | End: 2018-11-21

## 2018-11-21 RX ORDER — ASPIRIN 81 MG/1
81 TABLET ORAL DAILY
Status: DISCONTINUED | OUTPATIENT
Start: 2018-11-22 | End: 2018-11-21 | Stop reason: SDUPTHER

## 2018-11-21 RX ORDER — ACETAMINOPHEN 325 MG/1
650 TABLET ORAL EVERY 4 HOURS PRN
Status: DISCONTINUED | OUTPATIENT
Start: 2018-11-21 | End: 2018-11-24 | Stop reason: HOSPADM

## 2018-11-21 RX ORDER — FINASTERIDE 5 MG/1
5 TABLET, FILM COATED ORAL DAILY
Status: DISCONTINUED | OUTPATIENT
Start: 2018-11-22 | End: 2018-11-24 | Stop reason: HOSPADM

## 2018-11-21 RX ORDER — PANTOPRAZOLE SODIUM 40 MG/1
40 TABLET, DELAYED RELEASE ORAL DAILY
Status: DISCONTINUED | OUTPATIENT
Start: 2018-11-22 | End: 2018-11-24 | Stop reason: HOSPADM

## 2018-11-21 RX ORDER — SODIUM CHLORIDE 0.9 % (FLUSH) 0.9 %
3 SYRINGE (ML) INJECTION EVERY 12 HOURS SCHEDULED
Status: DISCONTINUED | OUTPATIENT
Start: 2018-11-21 | End: 2018-11-23

## 2018-11-21 RX ORDER — LEVOTHYROXINE SODIUM 0.07 MG/1
75 TABLET ORAL
Status: DISCONTINUED | OUTPATIENT
Start: 2018-11-22 | End: 2018-11-24 | Stop reason: HOSPADM

## 2018-11-21 RX ADMIN — SODIUM CHLORIDE 500 ML: 9 INJECTION, SOLUTION INTRAVENOUS at 19:58

## 2018-11-21 RX ADMIN — ENOXAPARIN SODIUM 40 MG: 40 INJECTION SUBCUTANEOUS at 23:07

## 2018-11-21 RX ADMIN — ASPIRIN 81 MG CHEWABLE TABLET 324 MG: 81 TABLET CHEWABLE at 19:27

## 2018-11-22 ENCOUNTER — APPOINTMENT (OUTPATIENT)
Dept: CARDIOLOGY | Facility: HOSPITAL | Age: 83
End: 2018-11-22
Attending: INTERNAL MEDICINE

## 2018-11-22 LAB
ANION GAP SERPL CALCULATED.3IONS-SCNC: 10 MMOL/L (ref 4–13)
BH CV ECHO MEAS - AO MAX PG (FULL): 2.1 MMHG
BH CV ECHO MEAS - AO MAX PG: 7 MMHG
BH CV ECHO MEAS - AO MEAN PG (FULL): 1 MMHG
BH CV ECHO MEAS - AO MEAN PG: 3 MMHG
BH CV ECHO MEAS - AO ROOT AREA (BSA CORRECTED): 1.9
BH CV ECHO MEAS - AO ROOT AREA: 11.3 CM^2
BH CV ECHO MEAS - AO ROOT DIAM: 3.8 CM
BH CV ECHO MEAS - AO V2 MAX: 132 CM/SEC
BH CV ECHO MEAS - AO V2 MEAN: 84.9 CM/SEC
BH CV ECHO MEAS - AO V2 VTI: 31.6 CM
BH CV ECHO MEAS - AVA(I,A): 3.3 CM^2
BH CV ECHO MEAS - AVA(I,D): 3.3 CM^2
BH CV ECHO MEAS - AVA(V,A): 2.9 CM^2
BH CV ECHO MEAS - AVA(V,D): 2.9 CM^2
BH CV ECHO MEAS - BSA(HAYCOCK): 2 M^2
BH CV ECHO MEAS - BSA: 2 M^2
BH CV ECHO MEAS - BZI_BMI: 23.3 KILOGRAMS/M^2
BH CV ECHO MEAS - BZI_METRIC_HEIGHT: 182.9 CM
BH CV ECHO MEAS - BZI_METRIC_WEIGHT: 78 KG
BH CV ECHO MEAS - EDV(CUBED): 121.3 ML
BH CV ECHO MEAS - EDV(MOD-SP4): 154 ML
BH CV ECHO MEAS - EDV(TEICH): 115.5 ML
BH CV ECHO MEAS - EF(CUBED): 79 %
BH CV ECHO MEAS - EF(MOD-SP4): 73.2 %
BH CV ECHO MEAS - EF(TEICH): 71.2 %
BH CV ECHO MEAS - ESV(CUBED): 25.4 ML
BH CV ECHO MEAS - ESV(MOD-SP4): 41.2 ML
BH CV ECHO MEAS - ESV(TEICH): 33.3 ML
BH CV ECHO MEAS - FS: 40.6 %
BH CV ECHO MEAS - IVS/LVPW: 0.91
BH CV ECHO MEAS - IVSD: 0.83 CM
BH CV ECHO MEAS - LA DIMENSION: 3.5 CM
BH CV ECHO MEAS - LA/AO: 0.92
BH CV ECHO MEAS - LAT PEAK E' VEL: 16 CM/SEC
BH CV ECHO MEAS - LV DIASTOLIC VOL/BSA (35-75): 77.1 ML/M^2
BH CV ECHO MEAS - LV MASS(C)D: 148 GRAMS
BH CV ECHO MEAS - LV MASS(C)DI: 74.1 GRAMS/M^2
BH CV ECHO MEAS - LV MAX PG: 4.8 MMHG
BH CV ECHO MEAS - LV MEAN PG: 2 MMHG
BH CV ECHO MEAS - LV SYSTOLIC VOL/BSA (12-30): 20.6 ML/M^2
BH CV ECHO MEAS - LV V1 MAX: 110 CM/SEC
BH CV ECHO MEAS - LV V1 MEAN: 72.7 CM/SEC
BH CV ECHO MEAS - LV V1 VTI: 29.8 CM
BH CV ECHO MEAS - LVIDD: 5 CM
BH CV ECHO MEAS - LVIDS: 2.9 CM
BH CV ECHO MEAS - LVLD AP4: 9.4 CM
BH CV ECHO MEAS - LVLS AP4: 7.3 CM
BH CV ECHO MEAS - LVOT AREA (M): 3.5 CM^2
BH CV ECHO MEAS - LVOT AREA: 3.5 CM^2
BH CV ECHO MEAS - LVOT DIAM: 2.1 CM
BH CV ECHO MEAS - LVPWD: 0.91 CM
BH CV ECHO MEAS - MED PEAK E' VEL: 16.2 CM/SEC
BH CV ECHO MEAS - MV A MAX VEL: 114 CM/SEC
BH CV ECHO MEAS - MV DEC TIME: 0.38 SEC
BH CV ECHO MEAS - MV E MAX VEL: 103.8 CM/SEC
BH CV ECHO MEAS - MV E/A: 0.91
BH CV ECHO MEAS - RAP SYSTOLE: 5 MMHG
BH CV ECHO MEAS - RVSP: 23.3 MMHG
BH CV ECHO MEAS - SI(AO): 179.4 ML/M^2
BH CV ECHO MEAS - SI(CUBED): 48 ML/M^2
BH CV ECHO MEAS - SI(LVOT): 51.7 ML/M^2
BH CV ECHO MEAS - SI(MOD-SP4): 56.5 ML/M^2
BH CV ECHO MEAS - SI(TEICH): 41.1 ML/M^2
BH CV ECHO MEAS - SV(AO): 358.4 ML
BH CV ECHO MEAS - SV(CUBED): 95.9 ML
BH CV ECHO MEAS - SV(LVOT): 103.2 ML
BH CV ECHO MEAS - SV(MOD-SP4): 112.8 ML
BH CV ECHO MEAS - SV(TEICH): 82.2 ML
BH CV ECHO MEAS - TR MAX VEL: 214 CM/SEC
BH CV ECHO MEASUREMENTS AVERAGE E/E' RATIO: 6.45
BUN BLD-MCNC: 32 MG/DL (ref 5–21)
BUN/CREAT SERPL: 23.4 (ref 7–25)
CALCIUM SPEC-SCNC: 9.3 MG/DL (ref 8.4–10.4)
CHLORIDE SERPL-SCNC: 109 MMOL/L (ref 98–110)
CO2 SERPL-SCNC: 24 MMOL/L (ref 24–31)
CREAT BLD-MCNC: 1.37 MG/DL (ref 0.5–1.4)
DEPRECATED RDW RBC AUTO: 50.4 FL (ref 40–54)
ERYTHROCYTE [DISTWIDTH] IN BLOOD BY AUTOMATED COUNT: 15.5 % (ref 12–15)
GFR SERPL CREATININE-BSD FRML MDRD: 50 ML/MIN/1.73
GLUCOSE BLD-MCNC: 113 MG/DL (ref 70–100)
HCT VFR BLD AUTO: 30.6 % (ref 40–52)
HGB BLD-MCNC: 10 G/DL (ref 14–18)
LEFT ATRIUM VOLUME INDEX: 28.8 ML/M2
LEFT ATRIUM VOLUME: 57.5 CM3
LV EF 2D ECHO EST: 65 %
MAGNESIUM SERPL-MCNC: 2.1 MG/DL (ref 1.4–2.2)
MAXIMAL PREDICTED HEART RATE: 136 BPM
MCH RBC QN AUTO: 29.2 PG (ref 28–32)
MCHC RBC AUTO-ENTMCNC: 32.7 G/DL (ref 33–36)
MCV RBC AUTO: 89.5 FL (ref 82–95)
PLATELET # BLD AUTO: 185 10*3/MM3 (ref 130–400)
PMV BLD AUTO: 10.8 FL (ref 6–12)
POTASSIUM BLD-SCNC: 5 MMOL/L (ref 3.5–5.3)
RBC # BLD AUTO: 3.42 10*6/MM3 (ref 4.8–5.9)
SODIUM BLD-SCNC: 143 MMOL/L (ref 135–145)
STRESS TARGET HR: 116 BPM
WBC NRBC COR # BLD: 8.04 10*3/MM3 (ref 4.8–10.8)

## 2018-11-22 PROCEDURE — 80048 BASIC METABOLIC PNL TOTAL CA: CPT | Performed by: INTERNAL MEDICINE

## 2018-11-22 PROCEDURE — 83735 ASSAY OF MAGNESIUM: CPT | Performed by: INTERNAL MEDICINE

## 2018-11-22 PROCEDURE — 99223 1ST HOSP IP/OBS HIGH 75: CPT | Performed by: INTERNAL MEDICINE

## 2018-11-22 PROCEDURE — 94799 UNLISTED PULMONARY SVC/PX: CPT

## 2018-11-22 PROCEDURE — 93306 TTE W/DOPPLER COMPLETE: CPT | Performed by: INTERNAL MEDICINE

## 2018-11-22 PROCEDURE — 93306 TTE W/DOPPLER COMPLETE: CPT

## 2018-11-22 PROCEDURE — 25010000002 PERFLUTREN 6.52 MG/ML SUSPENSION: Performed by: INTERNAL MEDICINE

## 2018-11-22 PROCEDURE — 85027 COMPLETE CBC AUTOMATED: CPT | Performed by: INTERNAL MEDICINE

## 2018-11-22 RX ORDER — CEFAZOLIN SODIUM 1 G/50ML
1 INJECTION, SOLUTION INTRAVENOUS ONCE
Status: DISCONTINUED | OUTPATIENT
Start: 2018-11-22 | End: 2018-11-22

## 2018-11-22 RX ADMIN — TRIAMTERENE AND HYDROCHLOROTHIAZIDE 1 TABLET: 75; 50 TABLET ORAL at 09:18

## 2018-11-22 RX ADMIN — ASPIRIN 81 MG: 81 TABLET, DELAYED RELEASE ORAL at 09:18

## 2018-11-22 RX ADMIN — FINASTERIDE 5 MG: 5 TABLET, FILM COATED ORAL at 09:18

## 2018-11-22 RX ADMIN — SODIUM CHLORIDE, PRESERVATIVE FREE 3 ML: 5 INJECTION INTRAVENOUS at 22:41

## 2018-11-22 RX ADMIN — LORAZEPAM 1 MG: 1 TABLET ORAL at 21:54

## 2018-11-22 RX ADMIN — SODIUM CHLORIDE, PRESERVATIVE FREE 3 ML: 5 INJECTION INTRAVENOUS at 09:18

## 2018-11-22 RX ADMIN — SODIUM CHLORIDE, PRESERVATIVE FREE 3 ML: 5 INJECTION INTRAVENOUS at 00:42

## 2018-11-22 RX ADMIN — PERFLUTREN: 6.52 INJECTION, SUSPENSION INTRAVENOUS at 17:17

## 2018-11-22 RX ADMIN — LEVOTHYROXINE SODIUM 75 MCG: 0.07 TABLET ORAL at 05:39

## 2018-11-22 RX ADMIN — PANTOPRAZOLE SODIUM 40 MG: 40 TABLET, DELAYED RELEASE ORAL at 09:18

## 2018-11-22 RX ADMIN — LORAZEPAM 1 MG: 1 TABLET ORAL at 11:42

## 2018-11-22 RX ADMIN — ALLOPURINOL 300 MG: 300 TABLET ORAL at 09:18

## 2018-11-22 RX ADMIN — ATORVASTATIN CALCIUM 10 MG: 10 TABLET, FILM COATED ORAL at 09:18

## 2018-11-23 ENCOUNTER — APPOINTMENT (OUTPATIENT)
Dept: GENERAL RADIOLOGY | Facility: HOSPITAL | Age: 83
End: 2018-11-23

## 2018-11-23 LAB
ANION GAP SERPL CALCULATED.3IONS-SCNC: 12 MMOL/L (ref 4–13)
BUN BLD-MCNC: 21 MG/DL (ref 5–21)
BUN/CREAT SERPL: 18.9 (ref 7–25)
CALCIUM SPEC-SCNC: 9.7 MG/DL (ref 8.4–10.4)
CHLORIDE SERPL-SCNC: 103 MMOL/L (ref 98–110)
CO2 SERPL-SCNC: 25 MMOL/L (ref 24–31)
CREAT BLD-MCNC: 1.11 MG/DL (ref 0.5–1.4)
DEPRECATED RDW RBC AUTO: 47.9 FL (ref 40–54)
ERYTHROCYTE [DISTWIDTH] IN BLOOD BY AUTOMATED COUNT: 15 % (ref 12–15)
GFR SERPL CREATININE-BSD FRML MDRD: 63 ML/MIN/1.73
GLUCOSE BLD-MCNC: 148 MG/DL (ref 70–100)
HCT VFR BLD AUTO: 35.4 % (ref 40–52)
HGB BLD-MCNC: 11.7 G/DL (ref 14–18)
INR PPP: 0.96 (ref 0.91–1.09)
MCH RBC QN AUTO: 29.4 PG (ref 28–32)
MCHC RBC AUTO-ENTMCNC: 33.1 G/DL (ref 33–36)
MCV RBC AUTO: 88.9 FL (ref 82–95)
PLATELET # BLD AUTO: 187 10*3/MM3 (ref 130–400)
PMV BLD AUTO: 10.1 FL (ref 6–12)
POTASSIUM BLD-SCNC: 4.1 MMOL/L (ref 3.5–5.3)
PROTHROMBIN TIME: 13.1 SECONDS (ref 11.9–14.6)
RBC # BLD AUTO: 3.98 10*6/MM3 (ref 4.8–5.9)
SODIUM BLD-SCNC: 140 MMOL/L (ref 135–145)
WBC NRBC COR # BLD: 7.16 10*3/MM3 (ref 4.8–10.8)

## 2018-11-23 PROCEDURE — 71045 X-RAY EXAM CHEST 1 VIEW: CPT

## 2018-11-23 PROCEDURE — 0JH606Z INSERTION OF PACEMAKER, DUAL CHAMBER INTO CHEST SUBCUTANEOUS TISSUE AND FASCIA, OPEN APPROACH: ICD-10-PCS | Performed by: INTERNAL MEDICINE

## 2018-11-23 PROCEDURE — 02H63JZ INSERTION OF PACEMAKER LEAD INTO RIGHT ATRIUM, PERCUTANEOUS APPROACH: ICD-10-PCS | Performed by: INTERNAL MEDICINE

## 2018-11-23 PROCEDURE — C1898 LEAD, PMKR, OTHER THAN TRANS: HCPCS | Performed by: INTERNAL MEDICINE

## 2018-11-23 PROCEDURE — 80048 BASIC METABOLIC PNL TOTAL CA: CPT | Performed by: INTERNAL MEDICINE

## 2018-11-23 PROCEDURE — 85610 PROTHROMBIN TIME: CPT | Performed by: INTERNAL MEDICINE

## 2018-11-23 PROCEDURE — 25010000002 MIDAZOLAM PER 1 MG: Performed by: INTERNAL MEDICINE

## 2018-11-23 PROCEDURE — 02HK3JZ INSERTION OF PACEMAKER LEAD INTO RIGHT VENTRICLE, PERCUTANEOUS APPROACH: ICD-10-PCS | Performed by: INTERNAL MEDICINE

## 2018-11-23 PROCEDURE — C1785 PMKR, DUAL, RATE-RESP: HCPCS | Performed by: INTERNAL MEDICINE

## 2018-11-23 PROCEDURE — 25010000003 CEFAZOLIN PER 500 MG: Performed by: INTERNAL MEDICINE

## 2018-11-23 PROCEDURE — 0 IOPAMIDOL PER 1 ML: Performed by: INTERNAL MEDICINE

## 2018-11-23 PROCEDURE — 33208 INSRT HEART PM ATRIAL & VENT: CPT | Performed by: INTERNAL MEDICINE

## 2018-11-23 PROCEDURE — 99152 MOD SED SAME PHYS/QHP 5/>YRS: CPT | Performed by: INTERNAL MEDICINE

## 2018-11-23 PROCEDURE — 25010000002 FENTANYL CITRATE (PF) 100 MCG/2ML SOLUTION: Performed by: INTERNAL MEDICINE

## 2018-11-23 PROCEDURE — C1892 INTRO/SHEATH,FIXED,PEEL-AWAY: HCPCS | Performed by: INTERNAL MEDICINE

## 2018-11-23 PROCEDURE — 85027 COMPLETE CBC AUTOMATED: CPT | Performed by: INTERNAL MEDICINE

## 2018-11-23 DEVICE — LD PM TENDRIL STS 6F46CM 2088TC46: Type: IMPLANTABLE DEVICE | Status: FUNCTIONAL

## 2018-11-23 DEVICE — LD PM TENDRIL STS 6F52CM 2088TC52: Type: IMPLANTABLE DEVICE | Status: FUNCTIONAL

## 2018-11-23 DEVICE — GEN PM ASSURITY MRI DR RF PM2272: Type: IMPLANTABLE DEVICE | Status: FUNCTIONAL

## 2018-11-23 RX ORDER — MIDAZOLAM HYDROCHLORIDE 1 MG/ML
INJECTION INTRAMUSCULAR; INTRAVENOUS AS NEEDED
Status: DISCONTINUED | OUTPATIENT
Start: 2018-11-23 | End: 2018-11-23 | Stop reason: HOSPADM

## 2018-11-23 RX ORDER — HYDROCODONE BITARTRATE AND ACETAMINOPHEN 5; 325 MG/1; MG/1
1 TABLET ORAL EVERY 4 HOURS PRN
Status: DISCONTINUED | OUTPATIENT
Start: 2018-11-23 | End: 2018-11-24 | Stop reason: HOSPADM

## 2018-11-23 RX ORDER — SODIUM CHLORIDE 0.9 % (FLUSH) 0.9 %
3 SYRINGE (ML) INJECTION EVERY 12 HOURS SCHEDULED
Status: DISCONTINUED | OUTPATIENT
Start: 2018-11-23 | End: 2018-11-24 | Stop reason: HOSPADM

## 2018-11-23 RX ORDER — SODIUM CHLORIDE 0.9 % (FLUSH) 0.9 %
3-10 SYRINGE (ML) INJECTION AS NEEDED
Status: DISCONTINUED | OUTPATIENT
Start: 2018-11-23 | End: 2018-11-24 | Stop reason: HOSPADM

## 2018-11-23 RX ORDER — FENTANYL CITRATE 50 UG/ML
INJECTION, SOLUTION INTRAMUSCULAR; INTRAVENOUS AS NEEDED
Status: DISCONTINUED | OUTPATIENT
Start: 2018-11-23 | End: 2018-11-23 | Stop reason: HOSPADM

## 2018-11-23 RX ORDER — HYDROCODONE BITARTRATE AND ACETAMINOPHEN 7.5; 325 MG/1; MG/1
2 TABLET ORAL EVERY 4 HOURS PRN
Status: DISCONTINUED | OUTPATIENT
Start: 2018-11-23 | End: 2018-11-24 | Stop reason: HOSPADM

## 2018-11-23 RX ORDER — SODIUM CHLORIDE 9 MG/ML
60 INJECTION, SOLUTION INTRAVENOUS CONTINUOUS
Status: DISCONTINUED | OUTPATIENT
Start: 2018-11-23 | End: 2018-11-24 | Stop reason: HOSPADM

## 2018-11-23 RX ORDER — LIDOCAINE HYDROCHLORIDE 20 MG/ML
INJECTION, SOLUTION INFILTRATION; PERINEURAL AS NEEDED
Status: DISCONTINUED | OUTPATIENT
Start: 2018-11-23 | End: 2018-11-23 | Stop reason: HOSPADM

## 2018-11-23 RX ORDER — CEFAZOLIN SODIUM 1 G/3ML
INJECTION, POWDER, FOR SOLUTION INTRAMUSCULAR; INTRAVENOUS AS NEEDED
Status: DISCONTINUED | OUTPATIENT
Start: 2018-11-23 | End: 2018-11-23 | Stop reason: HOSPADM

## 2018-11-23 RX ADMIN — SODIUM CHLORIDE, PRESERVATIVE FREE 3 ML: 5 INJECTION INTRAVENOUS at 20:56

## 2018-11-23 RX ADMIN — ACETAMINOPHEN 650 MG: 325 TABLET, FILM COATED ORAL at 21:53

## 2018-11-23 RX ADMIN — ALLOPURINOL 300 MG: 300 TABLET ORAL at 18:46

## 2018-11-23 RX ADMIN — SODIUM CHLORIDE 60 ML/HR: 9 INJECTION, SOLUTION INTRAVENOUS at 11:37

## 2018-11-23 RX ADMIN — ACETAMINOPHEN 650 MG: 325 TABLET, FILM COATED ORAL at 17:52

## 2018-11-23 RX ADMIN — SODIUM CHLORIDE, PRESERVATIVE FREE 3 ML: 5 INJECTION INTRAVENOUS at 11:38

## 2018-11-23 RX ADMIN — TRIAMTERENE AND HYDROCHLOROTHIAZIDE 1 TABLET: 75; 50 TABLET ORAL at 20:03

## 2018-11-23 RX ADMIN — SODIUM CHLORIDE 60 ML/HR: 9 INJECTION, SOLUTION INTRAVENOUS at 21:52

## 2018-11-23 RX ADMIN — ASPIRIN 81 MG: 81 TABLET, DELAYED RELEASE ORAL at 18:46

## 2018-11-23 RX ADMIN — LORAZEPAM 1 MG: 1 TABLET ORAL at 21:53

## 2018-11-23 RX ADMIN — ACETAMINOPHEN 650 MG: 325 TABLET, FILM COATED ORAL at 11:46

## 2018-11-23 RX ADMIN — PANTOPRAZOLE SODIUM 40 MG: 40 TABLET, DELAYED RELEASE ORAL at 18:46

## 2018-11-24 VITALS
TEMPERATURE: 98.1 F | BODY MASS INDEX: 23.61 KG/M2 | RESPIRATION RATE: 18 BRPM | OXYGEN SATURATION: 95 % | HEART RATE: 64 BPM | HEIGHT: 72 IN | DIASTOLIC BLOOD PRESSURE: 68 MMHG | WEIGHT: 174.3 LBS | SYSTOLIC BLOOD PRESSURE: 138 MMHG

## 2018-11-24 PROCEDURE — 93010 ELECTROCARDIOGRAM REPORT: CPT | Performed by: INTERNAL MEDICINE

## 2018-11-24 PROCEDURE — 94760 N-INVAS EAR/PLS OXIMETRY 1: CPT

## 2018-11-24 PROCEDURE — 99024 POSTOP FOLLOW-UP VISIT: CPT | Performed by: INTERNAL MEDICINE

## 2018-11-24 PROCEDURE — 94799 UNLISTED PULMONARY SVC/PX: CPT

## 2018-11-24 PROCEDURE — 93005 ELECTROCARDIOGRAM TRACING: CPT | Performed by: INTERNAL MEDICINE

## 2018-11-24 RX ORDER — ISOSORBIDE MONONITRATE 30 MG/1
30 TABLET, EXTENDED RELEASE ORAL DAILY
Qty: 90 TABLET | Refills: 3 | Status: SHIPPED | OUTPATIENT
Start: 2018-11-24 | End: 2019-11-24

## 2018-11-24 RX ADMIN — ASPIRIN 81 MG: 81 TABLET, DELAYED RELEASE ORAL at 08:57

## 2018-11-24 RX ADMIN — ACETAMINOPHEN 650 MG: 325 TABLET, FILM COATED ORAL at 04:14

## 2018-11-24 RX ADMIN — FINASTERIDE 5 MG: 5 TABLET, FILM COATED ORAL at 08:57

## 2018-11-24 RX ADMIN — LEVOTHYROXINE SODIUM 75 MCG: 0.07 TABLET ORAL at 06:39

## 2018-11-24 RX ADMIN — SODIUM CHLORIDE, PRESERVATIVE FREE 3 ML: 5 INJECTION INTRAVENOUS at 08:57

## 2018-11-24 RX ADMIN — TRIAMTERENE AND HYDROCHLOROTHIAZIDE 1 TABLET: 75; 50 TABLET ORAL at 08:57

## 2018-11-24 RX ADMIN — ATORVASTATIN CALCIUM 10 MG: 10 TABLET, FILM COATED ORAL at 08:57

## 2018-11-24 RX ADMIN — PANTOPRAZOLE SODIUM 40 MG: 40 TABLET, DELAYED RELEASE ORAL at 08:57

## 2018-11-24 RX ADMIN — ALLOPURINOL 300 MG: 300 TABLET ORAL at 08:57

## 2018-11-25 ENCOUNTER — NURSE TRIAGE (OUTPATIENT)
Dept: CALL CENTER | Facility: HOSPITAL | Age: 83
End: 2018-11-25

## 2018-11-25 ENCOUNTER — READMISSION MANAGEMENT (OUTPATIENT)
Dept: CALL CENTER | Facility: HOSPITAL | Age: 83
End: 2018-11-25

## 2018-11-25 NOTE — TELEPHONE ENCOUNTER
"Wife is concern about her  who has recently had slow heart rate, pacemaker inserted,  bp was 92/62 heart rate 105, and regular according to the wife. Patient is walking, feeling fine without problems and has been taken off bp medication.  Encourage the wife to call Dr. Granados office on Monday. Will be calling the physician on call.[phone]. Called  Dr. Granados  About the patient suggestions given to decrease the Imdur to one half, hold the diuretic, Maxide and Proscar, wife states the patient is not taking this medication. Wife voiced understanding    Reason for Disposition  • Fall in systolic BP > 20 mm Hg from normal and NOT dizzy, lightheaded, or weak    Additional Information  • Negative: Systolic BP < 90 and feeling dizzy, lightheaded, or weak  • Negative: Started suddenly after an allergic medicine, an allergic food, or bee sting  • Negative: Shock suspected (e.g., cold/pale/clammy skin, too weak to stand, low BP, rapid pulse)  • Negative: Difficult to awaken or acting confused  (e.g., disoriented, slurred speech)  • Negative: Fainted  • Negative: Chest pain  • Negative: Bleeding (e.g., vomiting blood, rectal bleeding or tarry stools, severe vaginal bleeding)  • Negative: Extra heart beats or heart is beating fast  (i.e., \"palpitations\")  • Negative: Sounds like a life-threatening emergency to the triager  • Negative: Systolic BP < 80 and NOT dizzy, lightheaded or weak (feels normal)  • Negative: Abdominal pain  • Negative: Major surgery in the past month  • Negative: Fever > 100.5 F (38.1 C)  • Negative: Drinking very little and has signs of dehydration (e.g., no urine > 12 hours, very dry mouth, very lightheaded)  • Negative: Fall in systolic BP > 20 mm Hg from normal and feeling dizzy, lightheaded, or weak  • Negative: Patient sounds very sick or weak to the triager  • Negative: Systolic BP < 90 and NOT dizzy, lightheaded or weak  • Negative: Systolic BP  while taking blood pressure medications " "and NOT dizzy, lightheaded or weak    Answer Assessment - Initial Assessment Questions  1. BLOOD PRESSURE: \"What is the blood pressure?\" \"Did you take at least two measurements 5 minutes apart?\"      92/52  2. ONSET: \"When did you take your blood pressure?\"      Started keeping tract this morning   3. HOW: \"How did you obtain the blood pressure?\" (e.g., visiting nurse, automatic home BP monitor)       Automatic cuff  4. HISTORY: \"Do you have a history of low blood pressure?\" \"What is your blood pressure normally?\"      None had been on bp medication but is of  5. MEDICATIONS: \"Are you taking any medications for blood pressure?\" If yes: \"Have they been changed recently?\"      None has been discontinued  6. PULSE RATE: \"Do you know what your pulse rate is?\" 105        7. OTHER SYMPTOMS: \"Have you been sick recently?\" \"Have you had a recent injury?\"       Recent pacemaker  8. PREGNANCY: \"Is there any chance you are pregnant?\" \"When was your last menstrual period?\"      na    Protocols used: LOW BLOOD PRESSURE-ADULT-[place]      "

## 2018-11-25 NOTE — TELEPHONE ENCOUNTER
Wife is calling about .  HR is 105-108. He had a pacemaker implanted and wife wants to know what would be a fast heart rate, when should she be concerned.  She says they talked to them about slow heart rate but not a fast heart rate. Instructed normal HR is .   is feeling fine, not having palpitations, no other symptoms.  Denies dizziness when standing.  B/p 105/67  108/76.  Advised to keep check on B/p and HR and if other symptoms occur or heart rate increases to call back.  Reason for Disposition  • [1] Skipped or extra beat(s) AND [2] occurs < 4 times / minute    Additional Information  • Negative: Passed out (i.e., lost consciousness, collapsed and was not responding)  • Negative: Shock suspected (e.g., cold/pale/clammy skin, too weak to stand, low BP, rapid pulse)  • Negative: Difficult to awaken or acting confused (e.g., disoriented, slurred speech)  • Negative: Visible sweat on face or sweat dripping down face  • Negative: Unable to walk, or can only walk with assistance (e.g., requires support)  • Negative: [1] Received SHOCK from implantable cardiac defibrillator AND [2] persisting symptoms (i.e., palpitations, lightheadedness)  • Negative: Sounds like a life-threatening emergency to the triager  • Negative: Chest pain  • Negative: Difficulty breathing  • Negative: Dizziness, lightheadedness, or weakness  • Negative: [1] Heart beating very rapidly (e.g., > 140 / minute) AND [2] present now  (Exception: during exercise)  • Negative: Heart beating very slowly (e.g., < 50 / minute)  (Exception: athlete)  • Negative: New or worsened shortness of breath with activity (dyspnea on exertion)  • Negative: Patient sounds very sick or weak to the triager  • Negative: [1] Heart beating very rapidly (e.g., > 140 / minute) AND [2] not present now  (Exception: during exercise)  • Negative: [1] Skipped or extra beat(s) AND [2] increases with exercise or exertion  • Negative: [1] Skipped or extra beat(s)  "AND [2] occurs 4 or more times per minute  • Negative: New or worsened ankle swelling  • Negative: History of heart disease  (i.e., heart attack, bypass surgery, angina, angioplasty, CHF) (Exception: brief heart beat symptoms that went away and now feels well)  • Negative: Age > 60 years (Exception: brief heart beat symptoms that went away and now feels well)  • Negative: Taking water pill (i.e., diuretic) or heart medication (e.g., digoxin)  • Negative: Wearing a \"holter monitor\" or \"cardiac event monitor\"  • Negative: [1] Received SHOCK from implantable cardiac defibrillator AND [2] now feels well  • Negative: History of hyperthyroidism or taking thyroid medication  • Negative: Known or suspected substance abuse (e.g., cocaine, alcohol abuse)  • Negative: [1] Palpitations AND [2] no improvement after following Care Advice  • Negative: Problems with anxiety or stress  • Negative: Palpitations are a chronic symptom (recurrent or ongoing AND present > 4 weeks)    Answer Assessment - Initial Assessment Questions  1. DESCRIPTION: \"Please describe your heart rate or heart beat that you are having\" (e.g., fast/slow, regular/irregular, skipped or extra beats, \"palpitations\")      No palpitations  2. ONSET: \"When did it start?\" (Minutes, hours or days)      Heart rate is 105  3. DURATION: \"How long does it last\" (e.g., seconds, minutes, hours)      current  4. PATTERN \"Does it come and go, or has it been constant since it started?\"  \"Does it get worse with exertion?\"   \"Are you feeling it now?\"      Feels fine  5. TAP: \"Using your hand, can you tap out what you are feeling on a chair or table in front of you, so that I can hear?\" (Note: not all patients can do this)        na  6. HEART RATE: \"Can you tell me your heart rate?\" \"How many beats in 15 seconds?\"  (Note: not all patients can do this)        105 per machine  7. RECURRENT SYMPTOM: \"Have you ever had this before?\" If so, ask: \"When was the last time?\" and \"What " "happened that time?\"       curent  8. CAUSE: \"What do you think is causing the palpitations?\"      No palpitations  9. CARDIAC HISTORY: \"Do you have any history of heart disease?\" (e.g., heart attack, angina, bypass surgery, angioplasty, arrhythmia)       Pacemaker placed  10. OTHER SYMPTOMS: \"Do you have any other symptoms?\" (e.g., dizziness, chest pain, sweating, difficulty breathing)        No symptoms, feels fine  11. PREGNANCY: \"Is there any chance you are pregnant?\" \"When was your last menstrual period?\"        na    Protocols used: HEART RATE AND HEARTBEAT QUESTIONS-ADULT-[place]      "

## 2018-11-25 NOTE — OUTREACH NOTE
Prep Survey      Responses   Facility patient discharged from?  [facility]   Is patient eligible?  Yes   Discharge diagnosis  Complete heart block, Generalized weakness, S/P Pacemaker placement 11/23/18   Does the patient have one of the following disease processes/diagnoses(primary or secondary)?  General Surgery   Does the patient have Home health ordered?  No   Is there a DME ordered?  No   Comments regarding appointments  See AVS   Prep survey completed?  Yes          Nadya Redd RN

## 2018-11-28 ENCOUNTER — READMISSION MANAGEMENT (OUTPATIENT)
Dept: CALL CENTER | Facility: HOSPITAL | Age: 83
End: 2018-11-28

## 2018-11-28 NOTE — OUTREACH NOTE
General Surgery Week 1 Survey      Responses   Facility patient discharged from?  [facility]   Does the patient have one of the following disease processes/diagnoses(primary or secondary)?  General Surgery   Is there a successful TCM telephone encounter documented?  No   Week 1 attempt successful?  Yes   Call start time  1517   Call end time  1530   Discharge diagnosis  Complete heart block, Generalized weakness, S/P Pacemaker placement 11/23/18   Is patient permission given to speak with other caregiver?  Yes   List who call center can speak with  wife   Meds reviewed with patient/caregiver?  Yes   Is the patient having any side effects they believe may be caused by any medication additions or changes?  No   Does the patient have all medications related to this admission filled (includes all antibiotics, pain medications, etc.)  N/A   Is the patient taking all medications as directed (includes completed medication regime)?  Yes   Medication comments  Pt says that the MD DC HCTC and Metoprolol decreased Imdur to 15mg daily.    Does the patient have a follow up appointment scheduled with their surgeon?  Yes   Has the patient kept scheduled appointments due by today?  N/A   Psychosocial issues?  No   Comments  chest dressing is still intact, no s/sx of inf per pt. /60 HR 80-90   Did the patient receive a copy of their discharge instructions?  Yes   Nursing interventions  Reviewed instructions with patient   What is the patient's perception of their health status since discharge?  Improving   Nursing interventions  Nurse provided patient education   Is the patient /caregiver able to teach back basic post-op care?  Continue use of incentive spirometry at least 1 week post discharge, Drive as instructed by MD in discharge instructions, Keep incision areas clean,dry and protected, Lifting as instructed by MD in discharge instructions   Is the patient/caregiver able to teach back signs and symptoms of incisional  infection?  Increased redness, swelling or pain at the incisonal site, Pus or odor from incision   Is the patient/caregiver able to teach back steps to recovery at home?  Practice good oral hygiene, Set small, achievable goals for return to baseline health, Rest and rebuild strength, gradually increase activity   Is the patient/caregiver able to teach back the hierarchy of who to call/visit for symptoms/problems? PCP, Specialist, Home health nurse, Urgent Care, ED, 911  Yes   Week 1 call completed?  Yes          Luanne Farfan RN

## 2018-12-12 ENCOUNTER — OFFICE VISIT (OUTPATIENT)
Dept: OTOLARYNGOLOGY | Facility: CLINIC | Age: 83
End: 2018-12-12

## 2018-12-12 VITALS
TEMPERATURE: 97.8 F | WEIGHT: 173 LBS | SYSTOLIC BLOOD PRESSURE: 135 MMHG | RESPIRATION RATE: 20 BRPM | HEART RATE: 80 BPM | HEIGHT: 72 IN | BODY MASS INDEX: 23.43 KG/M2 | DIASTOLIC BLOOD PRESSURE: 72 MMHG

## 2018-12-12 DIAGNOSIS — L57.0 ACTINIC KERATOSIS: ICD-10-CM

## 2018-12-12 DIAGNOSIS — D48.5 NEOPLASM OF UNCERTAIN BEHAVIOR OF SKIN: ICD-10-CM

## 2018-12-12 DIAGNOSIS — D04.0 SQUAMOUS CELL CARCINOMA IN SITU OF SKIN OF LIP: Primary | ICD-10-CM

## 2018-12-12 PROCEDURE — 99213 OFFICE O/P EST LOW 20 MIN: CPT | Performed by: OTOLARYNGOLOGY

## 2018-12-12 PROCEDURE — 88305 TISSUE EXAM BY PATHOLOGIST: CPT | Performed by: OTOLARYNGOLOGY

## 2018-12-12 PROCEDURE — 11100 PR BIOPSY OF SKIN LESION: CPT | Performed by: OTOLARYNGOLOGY

## 2018-12-12 NOTE — PROGRESS NOTES
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  The skin was cleansed with alcohol.  The skin was infiltrated with 1 mL of 1% lidocaine with 1-100,000 epinephrine.  After approximately 15 minutes, a 3 millimeter punch biopsy was taken by placing circular motion on the biopsy tool, the skin was elevated and clipped with iris scissors.  The specimen was sent in formalin.  The skin was closed using a simple 5-0 fast absorbing gut suture x 2.  A Band-Aide was placed over the biopsy site.  The patient tolerated the procedure with minimal discomfort.    Zia Martin MD  12/12/18  12:09 PM

## 2018-12-12 NOTE — PROGRESS NOTES
CC:   Chief Complaint   Patient presents with   • Follow-up     Keratosis Left Neck       HPI: Dexter Suggs is a 84 y.o. male who is here to follow-up from complaints of a skin lesion on the left cheek.  This lesion had been present for a few months.  The lesion had changed. He reported roughness to the skin  And overlying scabbing.      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  The squamous cell carcinoma was removed with a 6 mm punch biopsy on 08/12/2013. He also has a history of recurrent actinic keratosis of the left upper lip that was treated with topical chemotherapy cream- he has not noted recurrence.    He also has a history of 2 melanomas of the left cheek treated many years ago with excision.  He has not noted recurrence.    He has multiple rough scaling lesions of the face that have been present for a few months.  Nothing makes them better or worse.  He has not treated these.  They are mildly tender.      Dermatologist: Stiven Chavez MD      PFSH:  Past Medical History:   Diagnosis Date   • Abnormal nuclear stress test    • BPH (benign prostatic hyperplasia)    • Chest pain    • Coronary artery disease    • Disease of thyroid gland    • Hyperlipidemia    • Hypertension    • LV dysfunction    • Melanoma (CMS/HCC)    • Prostate CA (CMS/HCC)      Past Surgical History:   Procedure Laterality Date   • APPENDECTOMY     • CARDIAC CATHETERIZATION Left 12/10/2012   • CHOLECYSTECTOMY     • COLONOSCOPY W/ POLYPECTOMY  02/16/2012    adenomatous polyp at 80cm   • HAND SURGERY Right    • HERNIA REPAIR     • INGUINAL HERNIA REPAIR     • KNEE SURGERY     • PROSTATE SURGERY     • SKIN CANCER EXCISION      face   • SKIN LESION EXCISION       Family History   Problem Relation Age of Onset   • Alzheimer's disease Mother    • Cancer Father    • Cancer Sister      Social History     Tobacco Use   • Smoking status: Former Smoker     Years:  30.00     Types: Cigarettes     Last attempt to quit:      Years since quittin.9   • Smokeless tobacco: Never Used   Substance Use Topics   • Alcohol use: No   • Drug use: No     Allergies:  Adhesive tape and Neosporin [neomycin-bacitracin zn-polymyx]    Current Outpatient Medications:   •  allopurinol (ZYLOPRIM) 300 MG tablet, Take 300 mg by mouth Daily., Disp: , Rfl:   •  aspirin 81 MG EC tablet, Take 81 mg by mouth Daily., Disp: , Rfl:   •  finasteride (PROSCAR) 5 MG tablet, Take 5 mg by mouth Daily., Disp: , Rfl:   •  glucosamine sulfate 500 MG capsule capsule, Take  by mouth 3 (Three) Times a Day With Meals., Disp: , Rfl:   •  isosorbide mononitrate (IMDUR) 30 MG 24 hr tablet, Take 1 tablet by mouth Daily., Disp: 90 tablet, Rfl: 3  •  levothyroxine (SYNTHROID, LEVOTHROID) 75 MCG tablet, Take 75 mcg by mouth Daily., Disp: , Rfl:   •  LORazepam (ATIVAN) 1 MG tablet, Take 1 mg by mouth Every 8 (Eight) Hours As Needed for anxiety., Disp: , Rfl:   •  metoprolol succinate XL (TOPROL-XL) 50 MG 24 hr tablet, Take 1 tablet by mouth Daily., Disp: 90 tablet, Rfl: 3  •  nitroglycerin (NITROSTAT) 0.4 MG SL tablet, Place 1 tablet under the tongue Every 5 (Five) Minutes As Needed for Chest Pain (PRN for CP as directed)., Disp: 30 tablet, Rfl: 11  •  pantoprazole (PROTONIX) 40 MG EC tablet, Take 40 mg by mouth Daily., Disp: , Rfl:   •  simvastatin (ZOCOR) 10 MG tablet, TAKE ONE TABLET BY MOUTH ONCE DAILY, Disp: 90 tablet, Rfl: 3  •  triamterene-hydrochlorothiazide (MAXZIDE) 75-50 MG per tablet, Take 1 tablet by mouth Daily., Disp: , Rfl:     ROS:  Review of Systems   Constitutional: Negative for chills, fatigue and unexpected weight change.   Respiratory: Negative for chest tightness and shortness of breath.    Cardiovascular: Negative for chest pain.   Skin: Negative for wound.   Hematological: Negative for adenopathy.   All other systems reviewed and are negative.      PE:  /72   Pulse 80   Temp 97.8 °F  "(36.6 °C)   Resp 20   Ht 182.9 cm (72\")   Wt 78.5 kg (173 lb)   BMI 23.46 kg/m²   Physical Exam   Constitutional: He is oriented to person, place, and time. He appears well-developed and well-nourished. He is cooperative. No distress.   HENT:   Head: Atraumatic.       Right Ear: External ear normal.   Left Ear: External ear normal.   Nose: Nose normal.   Mouth/Throat:       Eyes: Conjunctivae and EOM are normal. Pupils are equal, round, and reactive to light. Right eye exhibits no discharge. Left eye exhibits no discharge. No scleral icterus.   Neck: Normal range of motion. Neck supple. No JVD present. No tracheal deviation present. No thyromegaly present.   Pulmonary/Chest: Effort normal. No stridor.   Musculoskeletal: Normal range of motion. He exhibits no edema or deformity.   Lymphadenopathy:     He has no cervical adenopathy.   Neurological: He is alert and oriented to person, place, and time. He has normal strength. No cranial nerve deficit. Coordination normal.   Skin: Skin is warm and dry. No rash noted. He is not diaphoretic. No erythema. No pallor.   Psychiatric: He has a normal mood and affect. His speech is normal and behavior is normal. Judgment and thought content normal. Cognition and memory are normal.   Nursing note and vitals reviewed.      Data review:  I have reviewed the prior pathology:      Assessment:  1. 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 Actinic keratosis    3. Neoplasm of uncertain behavior of skin        Plan:    1.  3 mm punch biopsy to the left cheek- see procedure note. Biopsy care instructions were given. We will call him with pathology results and plan. Otherwise, F/U 6 months.  2.  He may treat the actinic keratoses of his face and left ear with topical chemotherapy cream x 2 weeks. He has this Rx at home.   3. The risks and benefits of my recommendations, as well as other treatment options were discussed with the patient today.   4. JEANNA to upper " lip.    QUALITY MEASURES    Body Mass Index Screening and Follow-Up Plan  Body mass index is 23.46 kg/m².  Patient's Body mass index is 23.46 kg/m². BMI is within normal parameters. No follow-up required.    Tobacco Use: Screening and Cessation Intervention  Social History    Tobacco Use      Smoking status: Former Smoker        Years: 30.00        Types: Cigarettes        Quit date:         Years since quittin.9      Smokeless tobacco: Never Used      Return in about 6 months (around 2019), or if symptoms worsen or fail to improve, for Recheck.      Zia Martin MD

## 2018-12-14 LAB
CYTO UR: NORMAL
LAB AP CASE REPORT: NORMAL
LAB AP CLINICAL INFORMATION: NORMAL
PATH REPORT.FINAL DX SPEC: NORMAL
PATH REPORT.GROSS SPEC: NORMAL

## 2018-12-18 ENCOUNTER — CLINICAL SUPPORT NO REQUIREMENTS (OUTPATIENT)
Dept: CARDIOLOGY | Facility: CLINIC | Age: 83
End: 2018-12-18

## 2018-12-18 DIAGNOSIS — Z95.0 PACEMAKER: Primary | ICD-10-CM

## 2018-12-18 DIAGNOSIS — R06.09 DOE (DYSPNEA ON EXERTION): Primary | ICD-10-CM

## 2018-12-18 DIAGNOSIS — I44.2 COMPLETE HEART BLOCK (HCC): ICD-10-CM

## 2018-12-18 PROCEDURE — 93280 PM DEVICE PROGR EVAL DUAL: CPT | Performed by: INTERNAL MEDICINE

## 2018-12-20 NOTE — PROGRESS NOTES
Dual Chamber Pacemaker Evaluation Report  IN OFFICE INTERROGATION BY COMPANY DEVICE REP    December 20, 2018     Interrogation Date:  12/18/2018    Primary Cardiologist: Roberta  : [employer] [facility] Model: [employer]na MRI 2272 Pacemaker  Implant date: 11/23/2018    Reason for evaluation: provider requested  Indication for pacemaker: complete heart block, intermittent    Measurements  Atrial sensing - P wave: N/P  Atrial threshold: N/P  Atrial lead impedance: N/P  Ventricular sensing - R wave: >12 mV  Ventricular threshold: N/P  Ventricular lead impedance:   N/P     Diagnostic Data  Atrial paced: 0 %  Ventricular paced: 12 %    Episodes recorded since 11/23/2018:  1 AMS entry episode, duration 6 seconds, No EGM available--settings changed to store atrial and ventricular EGMs  2 NS-VT episodes, duration 3 seconds, rates 176-192 bpm    Battery status: satisfactory, 3.05V, estimated 9.2-10.4 years remaining      Final Parameters  Mode:  DDDR  Lower rate: 60 bpm   Upper rate: 125 bpm  AV Delay: paced- 200 ms  Sensed-170 ms  Atrial - Amplitude: 2.5 V   Pulse width: 0.4 ms   Sensitivity: 0.5 mV     Ventricular - Amplitude: 2.5 V  Pulse width: 0.4 ms  Sensitivity: 2 mV    Changes made: Channel 1: Stored EGM Configuration A SENSE AMP; Channel 2: Stored EGM Configuration V SENSE AMP; Channels changed from 1 to 2; PMT episode trigger LOW  Conclusions: normal pacemaker function and adequate battery reserve    Follow up: Every 3 months via Merlin, annually in office     Interrogation performed by Jelani Logan [employer]

## 2018-12-23 NOTE — PROGRESS NOTES
I have reviewed the notes, assessments, and/or procedures performed by  Helen Stover RN, I concur with her  documentation  of Dexter Suggs.

## 2018-12-26 ENCOUNTER — APPOINTMENT (OUTPATIENT)
Dept: CARDIOLOGY | Facility: HOSPITAL | Age: 83
End: 2018-12-26
Attending: INTERNAL MEDICINE

## 2019-01-04 ENCOUNTER — HOSPITAL ENCOUNTER (OUTPATIENT)
Dept: CARDIOLOGY | Facility: HOSPITAL | Age: 84
Discharge: HOME | End: 2019-01-04
Attending: INTERNAL MEDICINE

## 2019-01-04 VITALS — HEART RATE: 92 BPM | SYSTOLIC BLOOD PRESSURE: 119 MMHG | DIASTOLIC BLOOD PRESSURE: 60 MMHG

## 2019-01-04 DIAGNOSIS — R06.09 DOE (DYSPNEA ON EXERTION): ICD-10-CM

## 2019-01-04 LAB
BH CV STRESS BP STAGE 1: NORMAL
BH CV STRESS COMMENTS STAGE 1: NORMAL
BH CV STRESS DOSE REGADENOSON STAGE 1: 0.4
BH CV STRESS DURATION MIN STAGE 1: 0
BH CV STRESS DURATION SEC STAGE 1: 10
BH CV STRESS HR STAGE 1: 93
BH CV STRESS PROTOCOL 1: NORMAL
BH CV STRESS RECOVERY BP: NORMAL MMHG
BH CV STRESS RECOVERY HR: 96 BPM
BH CV STRESS STAGE 1: 1
LV EF NUC BP: 63 %
MAXIMAL PREDICTED HEART RATE: 136 BPM
PERCENT MAX PREDICTED HR: 68.38 %
STRESS BASELINE BP: NORMAL MMHG
STRESS BASELINE HR: 92 BPM
STRESS PERCENT HR: 80 %
STRESS POST EXERCISE DUR SEC: 10 SEC
STRESS POST PEAK BP: NORMAL MMHG
STRESS POST PEAK HR: 93 BPM
STRESS TARGET HR: 116 BPM

## 2019-01-04 PROCEDURE — 0 TECHNETIUM SESTAMIBI: Performed by: INTERNAL MEDICINE

## 2019-01-04 PROCEDURE — 78452 HT MUSCLE IMAGE SPECT MULT: CPT | Performed by: INTERNAL MEDICINE

## 2019-01-04 PROCEDURE — A9500 TC99M SESTAMIBI: HCPCS | Performed by: INTERNAL MEDICINE

## 2019-01-04 PROCEDURE — 78452 HT MUSCLE IMAGE SPECT MULT: CPT

## 2019-01-04 PROCEDURE — 25010000002 REGADENOSON 0.4 MG/5ML SOLUTION: Performed by: INTERNAL MEDICINE

## 2019-01-04 PROCEDURE — 93017 CV STRESS TEST TRACING ONLY: CPT

## 2019-01-04 PROCEDURE — 93018 CV STRESS TEST I&R ONLY: CPT | Performed by: INTERNAL MEDICINE

## 2019-01-04 RX ADMIN — TECHNETIUM TC 99M SESTAMIBI 1 DOSE: 1 INJECTION INTRAVENOUS at 09:25

## 2019-01-04 RX ADMIN — REGADENOSON 0.4 MG: 0.08 INJECTION, SOLUTION INTRAVENOUS at 09:21

## 2019-01-04 RX ADMIN — TECHNETIUM TC 99M SESTAMIBI 1 DOSE: 1 INJECTION INTRAVENOUS at 07:56

## 2019-01-07 ENCOUNTER — TELEPHONE (OUTPATIENT)
Dept: OTOLARYNGOLOGY | Facility: CLINIC | Age: 84
End: 2019-01-07

## 2019-01-07 DIAGNOSIS — C44.319 BASAL CELL CARCINOMA OF CHEEK: Primary | ICD-10-CM

## 2019-01-07 NOTE — TELEPHONE ENCOUNTER
Pathology from [facility] is basal cell carcinoma.  I called and left a message that we should excise this in the office.  Sharon to call to schedule.  I gave him our # if he has any questions prior.    Zia Martin MD

## 2019-01-08 ENCOUNTER — TELEPHONE (OUTPATIENT)
Dept: OTOLARYNGOLOGY | Facility: CLINIC | Age: 84
End: 2019-01-08

## 2019-01-08 NOTE — TELEPHONE ENCOUNTER
Patient's wife notified of biopsy result of left cheek. Office procedure for excision of basal cell carcinoma scheduled for 02/07/2018. Patient has appointment with cardiologist on 01/18/2019. I advised I will contact Dr. Granados and see if patient can hold aspirin 5 days prior to procedure and resume 1 day after procedure.

## 2019-01-08 NOTE — TELEPHONE ENCOUNTER
----- Message from Zia Martin MD sent at 1/7/2019  4:43 PM CST -----   Needs excision.  Called 1/7/19 - no answer.  Left message. Would you call to set up excision.

## 2019-01-10 ENCOUNTER — TELEPHONE (OUTPATIENT)
Dept: CARDIOLOGY | Facility: CLINIC | Age: 84
End: 2019-01-10

## 2019-01-10 NOTE — TELEPHONE ENCOUNTER
Acceptable cardiovascular risk of planned procedure.  Can proceed with surgery with usual caution and perioperative hemodynamic and cardiac rhythm monitoring.   OK to hold Aspirin

## 2019-01-10 NOTE — TELEPHONE ENCOUNTER
DR MUNGUIA  IS REQUESTING CLEARANCE FOR SKIN LESION REMOVAL. THIS IS SCHEDULED FOR 02/07/2019.     PT HAS CAD AND LBBB  & PACER PLACED 11/23/2018.     ON ASA  81MG - LOV WITH YOU WAS 05/21/2018 NEXT OV 01/18/2018    DR MUNGUIA WOULD LIKE TO STOP ASA 5 DAYS PRIOR TO PROCEDURE.      PLEASE ADVISE

## 2019-01-18 ENCOUNTER — OFFICE VISIT (OUTPATIENT)
Dept: CARDIOLOGY | Facility: CLINIC | Age: 84
End: 2019-01-18

## 2019-01-18 VITALS
OXYGEN SATURATION: 98 % | BODY MASS INDEX: 24.77 KG/M2 | WEIGHT: 182.9 LBS | DIASTOLIC BLOOD PRESSURE: 80 MMHG | SYSTOLIC BLOOD PRESSURE: 132 MMHG | HEIGHT: 72 IN | HEART RATE: 104 BPM

## 2019-01-18 DIAGNOSIS — I44.2 COMPLETE HEART BLOCK (HCC): ICD-10-CM

## 2019-01-18 DIAGNOSIS — I25.10 CAD IN NATIVE ARTERY: ICD-10-CM

## 2019-01-18 DIAGNOSIS — I10 ESSENTIAL HYPERTENSION: Primary | ICD-10-CM

## 2019-01-18 DIAGNOSIS — I44.7 LEFT BUNDLE BRANCH BLOCK: ICD-10-CM

## 2019-01-18 DIAGNOSIS — Z95.0 PACEMAKER: ICD-10-CM

## 2019-01-18 PROCEDURE — 93000 ELECTROCARDIOGRAM COMPLETE: CPT | Performed by: INTERNAL MEDICINE

## 2019-01-18 PROCEDURE — 99024 POSTOP FOLLOW-UP VISIT: CPT | Performed by: INTERNAL MEDICINE

## 2019-01-18 RX ORDER — METOPROLOL SUCCINATE 25 MG/1
50 TABLET, EXTENDED RELEASE ORAL DAILY
Qty: 90 TABLET | Refills: 3 | Status: SHIPPED | OUTPATIENT
Start: 2019-01-18 | End: 2020-01-31

## 2019-01-18 NOTE — PROGRESS NOTES
Dexter Suggs  [id number]  1934  84 y.o.  male    Referring Provider: Jarad Alexis MD    Reason for Follow-up Visit:  Routine follow up.  coronary artery disease   left bundle branch block   Recent myocardial perfusion scan          Subjective    Mild chronic exertional shortness of breath on exertion relieved with rest  No significant cough or wheezing  Going on for several months or longer    No palpitations  No associated chest pain  No significant pedal edema    No fever or chills  No significant expectoration    No hemoptysis  No presyncope or syncope     myocardial perfusion scan       · Left ventricular ejection fraction is normal (Calculated EF = 63%).  · Myocardial perfusion imaging indicates a medium-sized infarct located in the inferior wall, septal wall and apex with no significant ischemia noted.  · Abnormal LV wall motion consistent with mild hypokinesis of the inferior wall and septal wall.  · Raw images reviewed with the following abnormalities noted: vertical motion.        History of present illness:  Dexter Suggs is a 84 y.o. yo male with history of CAD who presents today for   Chief Complaint   Patient presents with   • Coronary Artery Disease     6 WK FU S/P PACER    • SURGERY CLEARANCE     DR MUNGUIA SKIN CA REMOVAL    .    History  Past Medical History:   Diagnosis Date   • Abnormal nuclear stress test    • BPH (benign prostatic hyperplasia)    • Chest pain    • Coronary artery disease    • Disease of thyroid gland    • Hyperlipidemia    • Hypertension    • LV dysfunction    • Melanoma (CMS/HCC)    • Prostate CA (CMS/HCC)    ,   Past Surgical History:   Procedure Laterality Date   • APPENDECTOMY     • CARDIAC CATHETERIZATION Left 12/10/2012   • CARDIAC ELECTROPHYSIOLOGY PROCEDURE N/A 11/23/2018    Procedure: Pacemaker DC new 11/23/2018;  Surgeon: Austin Granados MD;  Location: [facility] CATH INVASIVE LOCATION;  Service: Cardiology   • CHOLECYSTECTOMY     • COLONOSCOPY N/A 6/9/2017     Procedure: COLONOSCOPY WITH ANESTHESIA;  Surgeon: Felice Marroquin MD;  Location: [facility] ENDOSCOPY;  Service:    • COLONOSCOPY W/ POLYPECTOMY  2012    adenomatous polyp at 80cm   • HAND SURGERY Right    • HERNIA REPAIR     • INGUINAL HERNIA REPAIR     • KNEE SURGERY     • PROSTATE SURGERY     • SKIN CANCER EXCISION      face   • SKIN LESION EXCISION     ,   Family History   Problem Relation Age of Onset   • Alzheimer's disease Mother    • Cancer Father    • Cancer Sister    ,   Social History     Tobacco Use   • Smoking status: Former Smoker     Years: 30.00     Types: Cigarettes     Last attempt to quit: 1985     Years since quittin.0   • Smokeless tobacco: Never Used   Substance Use Topics   • Alcohol use: No   • Drug use: No   ,     Medications  Current Outpatient Medications   Medication Sig Dispense Refill   • allopurinol (ZYLOPRIM) 300 MG tablet Take 300 mg by mouth Daily.     • aspirin 81 MG EC tablet Take 81 mg by mouth Daily.     • finasteride (PROSCAR) 5 MG tablet Take 5 mg by mouth Daily.     • glucosamine sulfate 500 MG capsule capsule Take  by mouth 3 (Three) Times a Day With Meals.     • isosorbide mononitrate (IMDUR) 30 MG 24 hr tablet Take 1 tablet by mouth Daily. 90 tablet 3   • levothyroxine (SYNTHROID, LEVOTHROID) 75 MCG tablet Take 75 mcg by mouth Daily.     • LORazepam (ATIVAN) 1 MG tablet Take 1 mg by mouth Every 8 (Eight) Hours As Needed for anxiety.     • metoprolol succinate XL (TOPROL-XL) 25 MG 24 hr tablet Take 2 tablets by mouth Daily. 90 tablet 3   • nitroglycerin (NITROSTAT) 0.4 MG SL tablet Place 1 tablet under the tongue Every 5 (Five) Minutes As Needed for Chest Pain (PRN for CP as directed). 30 tablet 11   • pantoprazole (PROTONIX) 40 MG EC tablet Take 40 mg by mouth Daily.     • simvastatin (ZOCOR) 10 MG tablet TAKE ONE TABLET BY MOUTH ONCE DAILY 90 tablet 3   • triamterene-hydrochlorothiazide (MAXZIDE) 75-50 MG per tablet Take 1 tablet by mouth Daily.       No current  "facility-administered medications for this visit.        Allergies:  Adhesive tape and Neosporin [neomycin-bacitracin zn-polymyx]    Review of Systems  Review of Systems   Constitution: Negative.   HENT: Negative.    Eyes: Negative.    Cardiovascular: Negative for chest pain, claudication, cyanosis, dyspnea on exertion, irregular heartbeat, leg swelling, near-syncope, orthopnea, palpitations, paroxysmal nocturnal dyspnea and syncope.   Respiratory: Negative.    Endocrine: Negative.    Hematologic/Lymphatic: Negative.    Skin: Negative.    Musculoskeletal: Positive for arthritis and back pain.   Gastrointestinal: Negative for anorexia.   Genitourinary: Negative.    Neurological: Negative.    Psychiatric/Behavioral: Negative.        Objective     Physical Exam:  /80   Pulse 104   Ht 182.9 cm (72.01\")   Wt 83 kg (182 lb 14.4 oz)   SpO2 98%   BMI 24.80 kg/m²   Physical Exam   Constitutional: He appears well-developed.   HENT:   Head: Normocephalic.   Neck: Normal carotid pulses and no JVD present. No tracheal tenderness present. Carotid bruit is not present. No tracheal deviation and no edema present.   Cardiovascular: Regular rhythm, normal heart sounds and normal pulses.   Pulmonary/Chest: Effort normal. No stridor.   Abdominal: Soft.   Neurological: He is alert. He has normal strength. No cranial nerve deficit or sensory deficit.   Skin: Skin is warm.   Psychiatric: He has a normal mood and affect. His speech is normal and behavior is normal.       Results Review:  Results for orders placed during the hospital encounter of 11/21/18   Adult Transthoracic Echo Complete W/ Cont if Necessary Per Protocol    Narrative · Left ventricular systolic function is normal. Estimated EF = 65%.  · Left ventricular diastolic dysfunction.  · No evidence of pulmonary hypertension is present.             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      Resume low dose beta blocker therapy  Though mentioned in chart it appears he is not taking Toprol XL   Will start Toprol XL 25 mg daily  Requested Prescriptions     Signed Prescriptions Disp Refills   • metoprolol succinate XL (TOPROL-XL) 25 MG 24 hr tablet 90 tablet 3     Sig: Take 2 tablets by mouth Daily.          xxxxxxxxxxxxxxxxxxxxxxxxxxxxxxxxxxxxxxxxxxxxxxxxxxxxxxxxxxxxxxxxxxxxxxxxxxxxxxxxxxxxxxxxxxxxxxxxxxxxxxxxxxxxxxxxxxxxxxxxxxxxxxxxxxxxxxxxxxxxxxxxxxxxxxxxxxxxxxxxxxxxxxxxxxxxxxxxxxxxxxxxxxxxxxxxxxxxxxxxxxxxxxxxxxxxxxxxxxxxxxxxxxxxxxxxxxxxxxxxxxxxxxxx  Health maintenance    Low salt/ HTN/ Heart healthy carbohydrate restricted cardiac diet as applicable to this patient's current diagnoses.   This handout has relevant information regarding shopping for food, preparing meals, what to eat at restaurants, tracking of food intake, information regarding sodium intake and salt content, how to read food labels, knowing what to eat, tips reagarding physical activity, calorie count and calorie expenditure. What foods to avoid. Information regarding alcoholic drinks along with \"good\" and \"bad\" fats.  Reiterated prior recommendations     The patient is " advised to reduce or avoid caffeine or other cardiac stimulants.     The patient was advised that NSAID-type medications have three  very important potential side effects: cardiovascular complications, gastrointestinal irritation including hemorrhage and renal injuries.  Do not use anti-inflammatories such as Motrin/ibuprofen, Alleve/naprosyn, Mobic and like medications Use Tylenol instead.The patient expresses understanding of these issues and questions were answered.   Monitor for any signs of bleeding including red or dark stools. Fall precautions.       Monitor for any signs of bleeding including red or dark stools. Fall precautions.   Patient is asked to monitor BP at home or work, several times per month and return with written values at next office visit.     Advised staying uptodate with immunizations per established standard guidelines.      Offered to give patient  a copy of my notes and relevant tests/ prior ECG etc for the patient to review and follow specific advise and relevant findings if any, prognosis, along with my current and future plans.      Questions were encouraged, asked and answered to the patient's  understanding and satisfaction. Questions if any regarding current medications and side effects, need for refills and importance of compliance to medications stressed.    Reviewed available prior notes, consults, prior visits, laboratory findings, radiology and cardiology relevant reports. Updated chart as applicable. I have reviewed the patient's medical history in detail and updated the computerized patient record as relevant.      Updated patient regarding any new or relevant abnormalities on review of records or any new findings on physical exam. Mentioned to patient about purpose of visit and desirable health short and long term goals and  objectives.        xxxxxxxxxxxxxxxxxxxxxxxxxxxxxxxxxxxxxxxxxxxxxxxxxxxxxxxxxxxxxxxxxxxxxxxxxxxxxxxxxxxxxxxxxxxxxxxxxxxxxxxxxxxxxxxxxxxxxxxxxxxxxxxxxxxxxxxxxxxxxxxxxxxxxxxxxxxxxxxxxxxxxxxxxxxxxxxxxxxxxxxxxxxxxxxxxxxxxxxxxxxxxxxxxxxxxxxxxxxxxxxxxxxxxxxxxxxxxxxxxxxxxxxx         Return in about 6 weeks (around 3/1/2019).

## 2019-01-22 ENCOUNTER — CLINICAL SUPPORT (OUTPATIENT)
Dept: CARDIOLOGY | Facility: CLINIC | Age: 84
End: 2019-01-22

## 2019-01-22 DIAGNOSIS — I44.2 COMPLETE HEART BLOCK (HCC): ICD-10-CM

## 2019-01-22 DIAGNOSIS — Z95.0 PACEMAKER: Primary | ICD-10-CM

## 2019-01-25 ENCOUNTER — TELEPHONE (OUTPATIENT)
Dept: OTOLARYNGOLOGY | Facility: CLINIC | Age: 84
End: 2019-01-25

## 2019-01-25 NOTE — TELEPHONE ENCOUNTER
----- Message from Austin Granados MD sent at 1/19/2019 11:17 AM CST -----  Regarding: RE: Other  OK to proceed   Can hold Aspirin  No need for Lovenox  ----- Message -----  From: Sharon Diaz LPN  Sent: 1/9/2019  10:51 AM  To: Austin Granados MD  Subject: Other

## 2019-01-28 ENCOUNTER — CLINICAL SUPPORT NO REQUIREMENTS (OUTPATIENT)
Dept: CARDIOLOGY | Facility: CLINIC | Age: 84
End: 2019-01-28

## 2019-01-28 ENCOUNTER — TELEPHONE (OUTPATIENT)
Dept: CARDIOLOGY | Facility: CLINIC | Age: 84
End: 2019-01-28

## 2019-01-28 DIAGNOSIS — Z95.0 PACEMAKER: Primary | ICD-10-CM

## 2019-01-28 DIAGNOSIS — I44.2 COMPLETE HEART BLOCK (HCC): ICD-10-CM

## 2019-01-28 PROCEDURE — 93280 PM DEVICE PROGR EVAL DUAL: CPT | Performed by: INTERNAL MEDICINE

## 2019-02-07 ENCOUNTER — PROCEDURE VISIT (OUTPATIENT)
Dept: OTOLARYNGOLOGY | Facility: CLINIC | Age: 84
End: 2019-02-07

## 2019-02-07 VITALS
RESPIRATION RATE: 20 BRPM | HEART RATE: 85 BPM | TEMPERATURE: 97.6 F | DIASTOLIC BLOOD PRESSURE: 76 MMHG | WEIGHT: 182 LBS | BODY MASS INDEX: 24.65 KG/M2 | HEIGHT: 72 IN | SYSTOLIC BLOOD PRESSURE: 136 MMHG

## 2019-02-07 DIAGNOSIS — C44.319 BASAL CELL CARCINOMA OF CHEEK: Primary | ICD-10-CM

## 2019-02-07 PROCEDURE — 88331 PATH CONSLTJ SURG 1 BLK 1SPC: CPT | Performed by: PATHOLOGY

## 2019-02-07 PROCEDURE — 13132 CMPLX RPR F/C/C/M/N/AX/G/H/F: CPT | Performed by: OTOLARYNGOLOGY

## 2019-02-07 PROCEDURE — 88305 TISSUE EXAM BY PATHOLOGIST: CPT | Performed by: OTOLARYNGOLOGY

## 2019-02-07 PROCEDURE — 11642 EXC F/E/E/N/L MAL+MRG 1.1-2: CPT | Performed by: OTOLARYNGOLOGY

## 2019-02-07 RX ORDER — FLUTICASONE PROPIONATE 50 MCG
1 SPRAY, SUSPENSION (ML) NASAL DAILY PRN
Status: ON HOLD | COMMUNITY
Start: 2015-12-01 | End: 2022-01-19

## 2019-02-07 RX ORDER — ROSUVASTATIN CALCIUM 10 MG/1
TABLET, COATED ORAL DAILY
COMMUNITY
End: 2020-01-31

## 2019-02-07 RX ORDER — LISINOPRIL 5 MG/1
TABLET ORAL DAILY
COMMUNITY
End: 2020-01-31

## 2019-02-07 NOTE — PROGRESS NOTES
Preprocedure diagnosis: Basal cell carcinoma skin of left cheek    Post procedure diagnosis:  Basal cell carcinoma skin of left cheek    Procedure performed: 1) Excision malignant neoplasm of skin of left cheek, 1.2 cm x 3.0 cm     2) Complex closure of skin of left cheek, 3 cm    Surgeon: Zia Martin M.D.    Anesthesia: Local with 2 cc of 1% lidocaine with 1:100,000 epinephrine    Specimen:   Basal cell carcinoma skin of left cheek - stitch at 12 o'clock     Complications: None    Disposition: Home    Lesion: 4 mm   Margins: 4 mm  Defect: 12 mm x 30 mm  Depth: To subdermal fat, but not including  Closure Length: 3.0 cm      Details: After patient verification and informed consent was obtained, the skin was prepped with alcohol and infiltrated with 1% lidocaine with 1:100,000 epinephrine.  After approximately 10-15 minutes the skin was tested for anesthesia.  The skin was then sterilely prepped with Hibiclens and the patient was sterilely draped.    The skin at the periphery of the lesion was marked with surgical pen.  I then measured 4 mm margins.  I converted this to a fusiform shape.  The skin was then incised in fusiform fashion with a 15 blade beveling the incision laterally to facilitate wound eversion.  The skin was grasped and the lesion was removed from the underlying tissue utilizing sharp dissection with the 15 blade.  The specimen was oriented with a stitch at 12 o'clock and sent for frozen section analysis.  Hemostasis was obtained with bipolar cautery set at 12.      The surrounding skin was undermined in the subcutaneous plane for approximately 1 cm in each direction utilizing a fresh 15 blade.  Hemostasis was again obtained with bipolar cautery set at 12.  The tissue was advanced and closed utilizing 5-0 undyed Vicryl suture in buried fashion, followed by 6-0 fast-absorbing gut suture in running, locking fashion.  Antibiotic ointment was placed to the incision.    The patient tolerated the  procedure without any complication.

## 2019-02-12 LAB
CYTO UR: NORMAL
LAB AP CASE REPORT: NORMAL
LAB AP CLINICAL INFORMATION: NORMAL
Lab: NORMAL
PATH REPORT.FINAL DX SPEC: NORMAL
PATH REPORT.GROSS SPEC: NORMAL

## 2019-03-01 ENCOUNTER — OFFICE VISIT (OUTPATIENT)
Dept: CARDIOLOGY | Facility: CLINIC | Age: 84
End: 2019-03-01

## 2019-03-01 VITALS
HEART RATE: 113 BPM | WEIGHT: 179 LBS | OXYGEN SATURATION: 98 % | BODY MASS INDEX: 24.24 KG/M2 | SYSTOLIC BLOOD PRESSURE: 132 MMHG | HEIGHT: 72 IN | DIASTOLIC BLOOD PRESSURE: 72 MMHG

## 2019-03-01 DIAGNOSIS — I10 ESSENTIAL HYPERTENSION: ICD-10-CM

## 2019-03-01 DIAGNOSIS — E78.2 MIXED HYPERLIPIDEMIA: ICD-10-CM

## 2019-03-01 DIAGNOSIS — Z95.0 PACEMAKER: ICD-10-CM

## 2019-03-01 DIAGNOSIS — I44.7 LEFT BUNDLE BRANCH BLOCK: Primary | ICD-10-CM

## 2019-03-01 PROBLEM — I49.5 SICK SINUS SYNDROME (HCC): Status: ACTIVE | Noted: 2019-03-01

## 2019-03-01 PROCEDURE — 99214 OFFICE O/P EST MOD 30 MIN: CPT | Performed by: INTERNAL MEDICINE

## 2019-03-01 NOTE — PROGRESS NOTES
Dexter Suggs  [id number]  1934  84 y.o.  male    Referring Provider: Jarad Alexis MD    Reason for Follow-up Visit:  Routine follow up.  coronary artery disease   sick sinus syndrome s/p pacemaker          Subjective    Mild chronic exertional shortness of breath on exertion relieved with rest  No significant cough or wheezing  Going on for several months or longer    No palpitations  No associated chest pain  No significant pedal edema    No fever or chills  No significant expectoration    No hemoptysis  No presyncope or syncope     Feels much better           History of present illness:  Dexter Suggs is a 84 y.o. yo male with history of CAD who presents today for   Chief Complaint   Patient presents with   • Coronary Artery Disease     6 WK FU    .    History  Past Medical History:   Diagnosis Date   • Abnormal nuclear stress test    • BPH (benign prostatic hyperplasia)    • Chest pain    • Coronary artery disease    • Disease of thyroid gland    • Hyperlipidemia    • Hypertension    • LV dysfunction    • Melanoma (CMS/HCC)    • Prostate CA (CMS/HCC)    ,   Past Surgical History:   Procedure Laterality Date   • APPENDECTOMY     • CARDIAC CATHETERIZATION Left 12/10/2012   • CARDIAC ELECTROPHYSIOLOGY PROCEDURE N/A 11/23/2018    Procedure: Pacemaker DC new 11/23/2018;  Surgeon: Austin Granados MD;  Location: [facility] CATH INVASIVE LOCATION;  Service: Cardiology   • CHOLECYSTECTOMY     • COLONOSCOPY N/A 6/9/2017    Procedure: COLONOSCOPY WITH ANESTHESIA;  Surgeon: Felice Marroquin MD;  Location: [facility] ENDOSCOPY;  Service:    • COLONOSCOPY W/ POLYPECTOMY  02/16/2012    adenomatous polyp at 80cm   • HAND SURGERY Right    • HERNIA REPAIR     • INGUINAL HERNIA REPAIR     • KNEE SURGERY     • PROSTATE SURGERY     • SKIN CANCER EXCISION      face   • SKIN LESION EXCISION     ,   Family History   Problem Relation Age of Onset   • Alzheimer's disease Mother    • Cancer Father    • Cancer Sister    ,   Social  History     Tobacco Use   • Smoking status: Former Smoker     Years: 30.00     Types: Cigarettes     Last attempt to quit: 1985     Years since quittin.1   • Smokeless tobacco: Never Used   Substance Use Topics   • Alcohol use: No   • Drug use: No   ,     Medications  Current Outpatient Medications   Medication Sig Dispense Refill   • allopurinol (ZYLOPRIM) 300 MG tablet Take 300 mg by mouth Daily.     • aspirin 81 MG EC tablet Take 81 mg by mouth Daily.     • fluticasone (FLONASE) 50 MCG/ACT nasal spray 1 spray into the nostril(s) as directed by provider.     • glucosamine sulfate 500 MG capsule capsule Take  by mouth 3 (Three) Times a Day With Meals.     • isosorbide mononitrate (IMDUR) 30 MG 24 hr tablet Take 1 tablet by mouth Daily. 90 tablet 3   • levothyroxine (SYNTHROID, LEVOTHROID) 75 MCG tablet Take 75 mcg by mouth Daily.     • lisinopril (PRINIVIL,ZESTRIL) 5 MG tablet Take  by mouth Daily.     • LORazepam (ATIVAN) 1 MG tablet Take 1 mg by mouth Every 8 (Eight) Hours As Needed for anxiety.     • metoprolol succinate XL (TOPROL-XL) 25 MG 24 hr tablet Take 2 tablets by mouth Daily. 90 tablet 3   • nitroglycerin (NITROSTAT) 0.4 MG SL tablet Place 1 tablet under the tongue Every 5 (Five) Minutes As Needed for Chest Pain (PRN for CP as directed). 30 tablet 11   • pantoprazole (PROTONIX) 40 MG EC tablet Take 40 mg by mouth Daily.     • rosuvastatin (CRESTOR) 10 MG tablet Take  by mouth Daily.     • triamterene-hydrochlorothiazide (MAXZIDE) 75-50 MG per tablet Take 1 tablet by mouth Daily.     • finasteride (PROSCAR) 5 MG tablet Take 5 mg by mouth Daily.       No current facility-administered medications for this visit.        Allergies:  Adhesive tape and Neosporin [neomycin-bacitracin zn-polymyx]    Review of Systems  Review of Systems   Constitution: Negative.   HENT: Negative.    Eyes: Negative.    Cardiovascular: Negative for chest pain, claudication, cyanosis, dyspnea on exertion, irregular heartbeat,  "leg swelling, near-syncope, orthopnea, palpitations, paroxysmal nocturnal dyspnea and syncope.   Respiratory: Negative.    Endocrine: Negative.    Hematologic/Lymphatic: Negative.    Skin: Negative.    Musculoskeletal: Positive for arthritis and back pain.   Gastrointestinal: Negative for anorexia.   Genitourinary: Negative.    Neurological: Negative.    Psychiatric/Behavioral: Negative.        Objective     Physical Exam:  /72   Pulse 113   Ht 182.9 cm (72.01\")   Wt 81.2 kg (179 lb)   SpO2 98%   BMI 24.27 kg/m²   Physical Exam   Constitutional: He appears well-developed.   HENT:   Head: Normocephalic.   Neck: Normal carotid pulses and no JVD present. No tracheal tenderness present. Carotid bruit is not present. No tracheal deviation and no edema present.   Cardiovascular: Regular rhythm, normal heart sounds and normal pulses.   Pulmonary/Chest: Effort normal. No stridor.   Abdominal: Soft.   Neurological: He is alert. He has normal strength. No cranial nerve deficit or sensory deficit.   Skin: Skin is warm.   Psychiatric: He has a normal mood and affect. His speech is normal and behavior is normal.       Results Review:  Results for orders placed during the hospital encounter of 11/21/18   Adult Transthoracic Echo Complete W/ Cont if Necessary Per Protocol    Narrative · Left ventricular systolic function is normal. Estimated EF = 65%.  · Left ventricular diastolic dysfunction.  · No evidence of pulmonary hypertension is present.             myocardial perfusion scan         · Left ventricular ejection fraction is normal (Calculated EF = 63%).  · Myocardial perfusion imaging indicates a medium-sized infarct located in the inferior wall, septal wall and apex with no significant ischemia noted.  · Abnormal LV wall motion consistent with mild hypokinesis of the inferior wall and septal wall.  Raw images reviewed with the following abnormalities noted: vertical motion.       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   • Actinic keratosis   • Benign prostatic hyperplasia with lower urinary tract symptoms   • Complete heart block (CMS/HCC)   • Pacemaker   • Sick sinus syndrome (CMS/HCC)            Plan    Monitor cardiac rhythm device function regularly per established schedule or PRN     ____________________________________________________________________________________________________________________________________________  Health maintenance and recommendations      Low salt/ HTN/ Heart healthy carbohydrate restricted cardiac diet   The patient is advised to reduce or avoid caffeine or other cardiac stimulants.     The patient was advised that NSAID-type medications        Monitor for any signs of bleeding including red or dark stools. Fall precautions.        Advised staying uptodate with immunizations per established standard guidelines.      Offered to give patient  a copy      Questions were encouraged, asked and answered to the patient's  understanding and satisfaction. Questions if any regarding current medications and side effects, need for refills and importance of compliance to medications stressed.    Reviewed available prior notes, consults, prior visits, laboratory findings, radiology and cardiology relevant reports. Updated chart as applicable. I have reviewed the patient's medical history in detail and updated the computerized patient record as relevant.      Updated patient regarding any new or relevant abnormalities on review of records or any new findings on physical exam. Mentioned to patient about purpose of visit and desirable health short and long term goals and objectives.    Primary to monitor CBC CMP Lipid panel and TSH as  applicable    ___________________________________________________________________________________________________________________________________________              Return in about 6 months (around 9/1/2019).

## 2019-03-15 ENCOUNTER — APPOINTMENT (OUTPATIENT)
Dept: CT IMAGING | Facility: HOSPITAL | Age: 84
End: 2019-03-15

## 2019-03-15 ENCOUNTER — APPOINTMENT (OUTPATIENT)
Dept: GENERAL RADIOLOGY | Facility: HOSPITAL | Age: 84
End: 2019-03-15

## 2019-03-15 ENCOUNTER — HOSPITAL ENCOUNTER (EMERGENCY)
Facility: HOSPITAL | Age: 84
Discharge: HOME | End: 2019-03-15
Admitting: EMERGENCY MEDICINE

## 2019-03-15 VITALS
DIASTOLIC BLOOD PRESSURE: 63 MMHG | HEIGHT: 72 IN | BODY MASS INDEX: 24.24 KG/M2 | OXYGEN SATURATION: 98 % | HEART RATE: 106 BPM | RESPIRATION RATE: 16 BRPM | WEIGHT: 179 LBS | SYSTOLIC BLOOD PRESSURE: 124 MMHG | TEMPERATURE: 97.7 F

## 2019-03-15 DIAGNOSIS — R93.7 ABNORMAL MUSCULOSKELETAL X-RAY: ICD-10-CM
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
## 2019-03-15 DIAGNOSIS — S52.501A CLOSED FRACTURE OF DISTAL END OF RIGHT RADIUS, UNSPECIFIED FRACTURE MORPHOLOGY, INITIAL ENCOUNTER: ICD-10-CM

## 2019-03-15 DIAGNOSIS — S01.81XA FACIAL LACERATION, INITIAL ENCOUNTER: ICD-10-CM

## 2019-03-15 LAB
ALBUMIN SERPL-MCNC: 3.9 G/DL (ref 3.5–5)
ALBUMIN/GLOB SERPL: 1.1 G/DL (ref 1.1–2.5)
ALP SERPL-CCNC: 136 U/L (ref 24–120)
ALT SERPL W P-5'-P-CCNC: <15 U/L (ref 0–54)
ANION GAP SERPL CALCULATED.3IONS-SCNC: 9 MMOL/L (ref 4–13)
AST SERPL-CCNC: 27 U/L (ref 7–45)
BASOPHILS # BLD AUTO: 0.05 10*3/MM3 (ref 0–0.2)
BASOPHILS NFR BLD AUTO: 0.7 % (ref 0–2)
BILIRUB SERPL-MCNC: 0.5 MG/DL (ref 0.1–1)
BUN BLD-MCNC: 18 MG/DL (ref 5–21)
BUN/CREAT SERPL: 20.9 (ref 7–25)
CALCIUM SPEC-SCNC: 9.8 MG/DL (ref 8.4–10.4)
CHLORIDE SERPL-SCNC: 104 MMOL/L (ref 98–110)
CO2 SERPL-SCNC: 28 MMOL/L (ref 24–31)
CREAT BLD-MCNC: 0.86 MG/DL (ref 0.5–1.4)
DEPRECATED RDW RBC AUTO: 49.7 FL (ref 40–54)
EOSINOPHIL # BLD AUTO: 0.35 10*3/MM3 (ref 0–0.7)
EOSINOPHIL NFR BLD AUTO: 5.1 % (ref 0–4)
ERYTHROCYTE [DISTWIDTH] IN BLOOD BY AUTOMATED COUNT: 16 % (ref 12–15)
GFR SERPL CREATININE-BSD FRML MDRD: 85 ML/MIN/1.73
GLOBULIN UR ELPH-MCNC: 3.4 GM/DL
GLUCOSE BLD-MCNC: 128 MG/DL (ref 70–100)
HCT VFR BLD AUTO: 33.8 % (ref 40–52)
HGB BLD-MCNC: 11.1 G/DL (ref 14–18)
IMM GRANULOCYTES # BLD AUTO: 0.02 10*3/MM3 (ref 0–0.05)
IMM GRANULOCYTES NFR BLD AUTO: 0.3 % (ref 0–5)
LYMPHOCYTES # BLD AUTO: 1.51 10*3/MM3 (ref 0.72–4.86)
LYMPHOCYTES NFR BLD AUTO: 22.1 % (ref 15–45)
MCH RBC QN AUTO: 28.4 PG (ref 28–32)
MCHC RBC AUTO-ENTMCNC: 32.8 G/DL (ref 33–36)
MCV RBC AUTO: 86.4 FL (ref 82–95)
MONOCYTES # BLD AUTO: 0.58 10*3/MM3 (ref 0.19–1.3)
MONOCYTES NFR BLD AUTO: 8.5 % (ref 4–12)
NEUTROPHILS # BLD AUTO: 4.32 10*3/MM3 (ref 1.87–8.4)
NEUTROPHILS NFR BLD AUTO: 63.3 % (ref 39–78)
NRBC BLD AUTO-RTO: 0 /100 WBC (ref 0–0)
PLATELET # BLD AUTO: 206 10*3/MM3 (ref 130–400)
PMV BLD AUTO: 9.7 FL (ref 6–12)
POTASSIUM BLD-SCNC: 4 MMOL/L (ref 3.5–5.3)
PROT SERPL-MCNC: 7.3 G/DL (ref 6.3–8.7)
RBC # BLD AUTO: 3.91 10*6/MM3 (ref 4.8–5.9)
SODIUM BLD-SCNC: 141 MMOL/L (ref 135–145)
WBC NRBC COR # BLD: 6.83 10*3/MM3 (ref 4.8–10.8)

## 2019-03-15 PROCEDURE — 25010000002 TDAP 5-2.5-18.5 LF-MCG/0.5 SUSPENSION: Performed by: PHYSICIAN ASSISTANT

## 2019-03-15 PROCEDURE — 80053 COMPREHEN METABOLIC PANEL: CPT | Performed by: PHYSICIAN ASSISTANT

## 2019-03-15 PROCEDURE — 25010000002 ONDANSETRON PER 1 MG: Performed by: EMERGENCY MEDICINE

## 2019-03-15 PROCEDURE — S0260 H&P FOR SURGERY: HCPCS | Performed by: OTOLARYNGOLOGY

## 2019-03-15 PROCEDURE — 13153 CMPLX RPR E/N/E/L ADDL 5CM/<: CPT | Performed by: OTOLARYNGOLOGY

## 2019-03-15 PROCEDURE — 90471 IMMUNIZATION ADMIN: CPT | Performed by: PHYSICIAN ASSISTANT

## 2019-03-15 PROCEDURE — 25010000002 MORPHINE PER 10 MG: Performed by: EMERGENCY MEDICINE

## 2019-03-15 PROCEDURE — 99284 EMERGENCY DEPT VISIT MOD MDM: CPT

## 2019-03-15 PROCEDURE — 25010000002 CEFAZOLIN PER 500 MG: Performed by: PHYSICIAN ASSISTANT

## 2019-03-15 PROCEDURE — 96365 THER/PROPH/DIAG IV INF INIT: CPT

## 2019-03-15 PROCEDURE — 90715 TDAP VACCINE 7 YRS/> IM: CPT | Performed by: PHYSICIAN ASSISTANT

## 2019-03-15 PROCEDURE — 13152 CMPLX RPR E/N/E/L 2.6-7.5 CM: CPT | Performed by: OTOLARYNGOLOGY

## 2019-03-15 PROCEDURE — 72125 CT NECK SPINE W/O DYE: CPT

## 2019-03-15 PROCEDURE — 25010000002 HYDROMORPHONE PER 4 MG: Performed by: EMERGENCY MEDICINE

## 2019-03-15 PROCEDURE — 96375 TX/PRO/DX INJ NEW DRUG ADDON: CPT

## 2019-03-15 PROCEDURE — 70450 CT HEAD/BRAIN W/O DYE: CPT

## 2019-03-15 PROCEDURE — 85025 COMPLETE CBC W/AUTO DIFF WBC: CPT | Performed by: PHYSICIAN ASSISTANT

## 2019-03-15 PROCEDURE — 99283 EMERGENCY DEPT VISIT LOW MDM: CPT | Performed by: OTOLARYNGOLOGY

## 2019-03-15 PROCEDURE — 73110 X-RAY EXAM OF WRIST: CPT

## 2019-03-15 PROCEDURE — 70486 CT MAXILLOFACIAL W/O DYE: CPT

## 2019-03-15 PROCEDURE — 73130 X-RAY EXAM OF HAND: CPT

## 2019-03-15 PROCEDURE — 73564 X-RAY EXAM KNEE 4 OR MORE: CPT

## 2019-03-15 RX ORDER — HYDROCODONE BITARTRATE AND ACETAMINOPHEN 5; 325 MG/1; MG/1
1 TABLET ORAL EVERY 6 HOURS PRN
Qty: 12 TABLET | Refills: 0 | Status: SHIPPED | OUTPATIENT
Start: 2019-03-15 | End: 2020-01-31

## 2019-03-15 RX ORDER — ONDANSETRON 2 MG/ML
4 INJECTION INTRAMUSCULAR; INTRAVENOUS ONCE
Status: COMPLETED | OUTPATIENT
Start: 2019-03-15 | End: 2019-03-15

## 2019-03-15 RX ORDER — CEPHALEXIN 500 MG/1
500 CAPSULE ORAL 3 TIMES DAILY
Qty: 21 CAPSULE | Refills: 0 | Status: SHIPPED | OUTPATIENT
Start: 2019-03-15 | End: 2020-01-31

## 2019-03-15 RX ORDER — LIDOCAINE HYDROCHLORIDE 10 MG/ML
20 INJECTION, SOLUTION EPIDURAL; INFILTRATION; INTRACAUDAL; PERINEURAL ONCE
Status: DISCONTINUED | OUTPATIENT
Start: 2019-03-15 | End: 2019-03-15 | Stop reason: HOSPADM

## 2019-03-15 RX ORDER — HYDROMORPHONE HYDROCHLORIDE 1 MG/ML
0.5 INJECTION, SOLUTION INTRAMUSCULAR; INTRAVENOUS; SUBCUTANEOUS ONCE
Status: COMPLETED | OUTPATIENT
Start: 2019-03-15 | End: 2019-03-15

## 2019-03-15 RX ORDER — LIDOCAINE HYDROCHLORIDE AND EPINEPHRINE BITARTRATE 20; .01 MG/ML; MG/ML
10 INJECTION, SOLUTION SUBCUTANEOUS ONCE
Status: DISCONTINUED | OUTPATIENT
Start: 2019-03-15 | End: 2019-03-15 | Stop reason: HOSPADM

## 2019-03-15 RX ORDER — BUPIVACAINE HCL/0.9 % NACL/PF 0.1 %
2 PLASTIC BAG, INJECTION (ML) EPIDURAL ONCE
Status: COMPLETED | OUTPATIENT
Start: 2019-03-15 | End: 2019-03-15

## 2019-03-15 RX ADMIN — TETANUS TOXOID, REDUCED DIPHTHERIA TOXOID AND ACELLULAR PERTUSSIS VACCINE, ADSORBED 0.5 ML: 5; 2.5; 8; 8; 2.5 SUSPENSION INTRAMUSCULAR at 09:20

## 2019-03-15 RX ADMIN — MORPHINE SULFATE 4 MG: 4 INJECTION INTRAVENOUS at 09:16

## 2019-03-15 RX ADMIN — ONDANSETRON 4 MG: 2 INJECTION INTRAMUSCULAR; INTRAVENOUS at 09:16

## 2019-03-15 RX ADMIN — SODIUM CHLORIDE 2 G: 9 INJECTION, SOLUTION INTRAVENOUS at 11:05

## 2019-03-15 RX ADMIN — HYDROMORPHONE HYDROCHLORIDE 0.5 MG: 1 INJECTION, SOLUTION INTRAMUSCULAR; INTRAVENOUS; SUBCUTANEOUS at 11:05

## 2019-03-15 RX ADMIN — LIDOCAINE HYDROCHLORIDE: 20 JELLY TOPICAL at 10:20

## 2019-03-25 ENCOUNTER — OFFICE VISIT (OUTPATIENT)
Dept: OTOLARYNGOLOGY | Facility: CLINIC | Age: 84
End: 2019-03-25

## 2019-03-25 DIAGNOSIS — S01.81XD LACERATION OF FACE WITHOUT COMPLICATION, SUBSEQUENT ENCOUNTER: Primary | ICD-10-CM

## 2019-03-25 PROCEDURE — 99024 POSTOP FOLLOW-UP VISIT: CPT | Performed by: OTOLARYNGOLOGY

## 2019-03-25 NOTE — PROGRESS NOTES
Procedure   Suture Removal  Date/Time: 3/25/2019 10:35 AM  Performed by: Josie Winslow RN  Authorized by: Zia Martin MD   Consent: Verbal consent obtained.  Consent given by: patient  Patient identity confirmed: verbally with patient  Body area: head/neck  Location details: left eyebrow  Comments: Patient presents for suture removal. The incision is well-approximated with no infection or redness noted. Patient seen by Dr. Martin today.

## 2019-05-02 ENCOUNTER — CLINICAL SUPPORT (OUTPATIENT)
Dept: CARDIOLOGY | Facility: CLINIC | Age: 84
End: 2019-05-02

## 2019-05-02 DIAGNOSIS — I44.2 COMPLETE HEART BLOCK (HCC): ICD-10-CM

## 2019-05-02 PROCEDURE — 93294 REM INTERROG EVL PM/LDLS PM: CPT | Performed by: PHYSICIAN ASSISTANT

## 2019-05-02 PROCEDURE — 93296 REM INTERROG EVL PM/IDS: CPT | Performed by: PHYSICIAN ASSISTANT

## 2019-05-02 NOTE — PROGRESS NOTES
Dual Chamber Pacemaker Evaluation Report  Merlin    May 2, 2019    Primary Cardiologist: Roberta  : [facility] Model: Assurity  Implant date: 11/23/18    Reason for evaluation: routine  Indication for pacemaker: complete heart block    Measurements  Atrial sensing - P wave: 2.2 mV  Atrial threshold: n/r  Atrial lead impedance: 430 ohms  Ventricular sensing - R wave: >12 mV  Ventricular threshold: n/r  Ventricular lead impedance:  210 ohms     Diagnostic Data  Atrial paced: 16 %  Ventricular paced: 61 %  Other: no new episodes noted  Battery status: satisfactory         Final Parameters  Mode:  DDDR  Lower rate: 60 bpm   Upper rate: 125 bpm  AV Delay: paced- 200 ms  Sensed-170 ms  Atrial - Amplitude: 2.5 V   Pulse width: 0.4 ms   Sensitivity: 0.5 mV     Ventricular - Amplitude: 2.5 V  Pulse width: 0.4 ms  Sensitivity: 2.0 mV    Changes made: n/a  Conclusions: normal pacemaker function    Follow up: 3 months

## 2019-05-05 NOTE — PROGRESS NOTES
I have reviewed the notes, assessments, and/or procedures performed by  Sheridan Yeager PA-C  , I concur with her  documentation of Dexter Suggs.

## 2019-06-12 ENCOUNTER — OFFICE VISIT (OUTPATIENT)
Dept: OTOLARYNGOLOGY | Facility: CLINIC | Age: 84
End: 2019-06-12

## 2019-06-12 VITALS
BODY MASS INDEX: 23.43 KG/M2 | TEMPERATURE: 97.6 F | DIASTOLIC BLOOD PRESSURE: 81 MMHG | HEART RATE: 68 BPM | SYSTOLIC BLOOD PRESSURE: 155 MMHG | HEIGHT: 72 IN | WEIGHT: 173 LBS | RESPIRATION RATE: 18 BRPM

## 2019-06-12 DIAGNOSIS — D04.0 SQUAMOUS CELL CARCINOMA IN SITU OF SKIN OF LIP: ICD-10-CM

## 2019-06-12 DIAGNOSIS — L57.0 ACTINIC KERATOSIS: ICD-10-CM

## 2019-06-12 DIAGNOSIS — S01.81XD LACERATION OF FACE WITHOUT COMPLICATION, SUBSEQUENT ENCOUNTER: ICD-10-CM

## 2019-06-12 DIAGNOSIS — C44.319 BASAL CELL CARCINOMA OF CHEEK: Primary | ICD-10-CM

## 2019-06-12 DIAGNOSIS — D48.5 NEOPLASM OF UNCERTAIN BEHAVIOR OF SKIN: ICD-10-CM

## 2019-06-12 PROCEDURE — 11104 PUNCH BX SKIN SINGLE LESION: CPT | Performed by: OTOLARYNGOLOGY

## 2019-06-12 PROCEDURE — 17110 DESTRUCTION B9 LES UP TO 14: CPT | Performed by: OTOLARYNGOLOGY

## 2019-06-12 PROCEDURE — 99214 OFFICE O/P EST MOD 30 MIN: CPT | Performed by: OTOLARYNGOLOGY

## 2019-06-12 PROCEDURE — 88305 TISSUE EXAM BY PATHOLOGIST: CPT | Performed by: OTOLARYNGOLOGY

## 2019-06-12 NOTE — PROGRESS NOTES
Procedure   Procedures    Preprocedure diagnosis: A changing lesion of the right brow    Post procedure diagnosis: Same    Procedure: Punch biopsy    Details:  Patient consent was obtained.  The skin was cleansed with alcohol.  The skin was infiltrated with 1 mL of 1% lidocaine with 1-100,000 epinephrine.  After approximately 10 minutes, a 3 millimeter punch biopsy was taken by placing circular motion on the biopsy tool, the skin was elevated and clipped with iris scissors.  The specimen was sent in formalin.  The skin was closed using a simple 5-0 fast absorbing gut suture, orienting the closure  In the horizontal fashion.  Antibiotic ointment was placed over the biopsy site.  The patient tolerated the procedure with minimal discomfort.          Preoperative Diagnosis: Actinic keratosis of left cheek    Postoperative Diagnosis: Same    Procedure: Destruction with Cryotherapy     Provider: Zia Martin MD    Anesthesia: None    Location: [facility] office    Complications: None    Details of Procedure:     Patient identity was verified and consent was signed. The lesion was treated with 2 pulses of 8 second duration each; allowing the skin to completely thaw between pulses. The patient tolerated the procedure without complication.        Zia Martin MD  06/12/19  10:08 AM

## 2019-06-12 NOTE — PROGRESS NOTES
Chief Complaint   Patient presents with   • Skin Lesion     6 month follow-up.       Skin Cancer Follow-up and Surveillance Visit    Dexter Suggs presents for a cancer surveillance and skin check following excision of a basal cell carcinoma of the left cheek with linear closure on 2/7/19. He is also s/p repair of the left forehead, eyebrow, and eyelid lacerations from a fall on 3/15/19.    Subjective: Since surgery, He is doing well without complaints.  Symptoms denied postoperatively include fever, chills, bleeding and drainage. The patient states the pain has ceased since surgery.     Patient presents for follow-up general head and neck skin examination.  Patient has a history of basal cell carcinoma, squamous cell carcinoma, malignant melanoma.  He has not noted recurrence.      The patient has noted new worrisome lesions. He complains of a skin lesion of the right eyebrow. The lesion has been present for 3 months. Symptoms associated with the lesion are: raised, bleeding, and crusting. Patient denies increasing diameter, increasing thickness, itching, pain, drainage, infection. Nothing makes the lesion better or worse. A biopsy has not been performed.     He also complains of a skin lesion of the left cheek. The lesion has been present for several months. Symptoms associated with the lesion are: rough. Patient denies increasing diameter, increasing thickness, itching, bleeding, pain, drainage, infection. Nothing makes the lesion better or worse. A biopsy has not been performed.      Pathology review: Pathology was reviewed previously.  Pathology demonstrates a diagnosis of basal cell carcinoma with clear margins.     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  The squamous cell carcinoma was removed with a 6 mm punch biopsy on 08/12/2013. He also has a history of recurrent actinic  keratosis of the left upper lip that was treated with topical chemotherapy cream- he has not noted recurrence.     He also has a history of 2 melanomas of the left cheek treated many years ago with excision.  He has not noted recurrence    Past Medical History:   Diagnosis Date   • Abnormal nuclear stress test    • BPH (benign prostatic hyperplasia)    • Chest pain    • Coronary artery disease    • Disease of thyroid gland    • Hyperlipidemia    • Hypertension    • LV dysfunction    • Melanoma (CMS/HCC)    • Prostate CA (CMS/HCC)      Past Surgical History:   Procedure Laterality Date   • APPENDECTOMY     • CARDIAC CATHETERIZATION Left 12/10/2012   • CARDIAC ELECTROPHYSIOLOGY PROCEDURE N/A 2018    Procedure: Pacemaker DC new 2018;  Surgeon: Austin Granados MD;  Location: [facility] CATH INVASIVE LOCATION;  Service: Cardiology   • CHOLECYSTECTOMY     • COLONOSCOPY N/A 2017    Procedure: COLONOSCOPY WITH ANESTHESIA;  Surgeon: Felice Marroquin MD;  Location: [facility] ENDOSCOPY;  Service:    • COLONOSCOPY W/ POLYPECTOMY  2012    adenomatous polyp at 80cm   • HAND SURGERY Right    • HERNIA REPAIR     • INGUINAL HERNIA REPAIR     • KNEE SURGERY     • PROSTATE SURGERY     • SKIN CANCER EXCISION      face   • SKIN LESION EXCISION       Family History   Problem Relation Age of Onset   • Alzheimer's disease Mother    • Cancer Father    • Cancer Sister      Social History     Tobacco Use   • Smoking status: Former Smoker     Years: 30.00     Types: Cigarettes     Last attempt to quit: 1985     Years since quittin.4   • Smokeless tobacco: Never Used   Substance Use Topics   • Alcohol use: No   • Drug use: No     Allergies:  Adhesive tape; Simvastatin; and Neosporin [neomycin-bacitracin zn-polymyx]    Current Outpatient Medications:   •  allopurinol (ZYLOPRIM) 300 MG tablet, Take 300 mg by mouth Daily., Disp: , Rfl:   •  aspirin 81 MG EC tablet, Take 81 mg by mouth Daily., Disp: , Rfl:   •  fluticasone  (FLONASE) 50 MCG/ACT nasal spray, 1 spray into the nostril(s) as directed by provider., Disp: , Rfl:   •  glucosamine sulfate 500 MG capsule capsule, Take  by mouth 3 (Three) Times a Day With Meals., Disp: , Rfl:   •  levothyroxine (SYNTHROID, LEVOTHROID) 75 MCG tablet, Take 75 mcg by mouth Daily., Disp: , Rfl:   •  LORazepam (ATIVAN) 1 MG tablet, Take 1 mg by mouth Every 8 (Eight) Hours As Needed for anxiety., Disp: , Rfl:   •  metoprolol succinate XL (TOPROL-XL) 25 MG 24 hr tablet, Take 2 tablets by mouth Daily., Disp: 90 tablet, Rfl: 3  •  nitroglycerin (NITROSTAT) 0.4 MG SL tablet, Place 1 tablet under the tongue Every 5 (Five) Minutes As Needed for Chest Pain (PRN for CP as directed)., Disp: 30 tablet, Rfl: 11  •  pantoprazole (PROTONIX) 40 MG EC tablet, Take 40 mg by mouth Daily., Disp: , Rfl:   •  triamterene-hydrochlorothiazide (MAXZIDE) 75-50 MG per tablet, Take 1 tablet by mouth Daily., Disp: , Rfl:   •  cephalexin (KEFLEX) 500 MG capsule, Take 1 capsule by mouth 3 (Three) Times a Day., Disp: 21 capsule, Rfl: 0  •  finasteride (PROSCAR) 5 MG tablet, Take 5 mg by mouth Daily., Disp: , Rfl:   •  HYDROcodone-acetaminophen (NORCO) 5-325 MG per tablet, Take 1 tablet by mouth Every 6 (Six) Hours As Needed for Moderate Pain ., Disp: 12 tablet, Rfl: 0  •  isosorbide mononitrate (IMDUR) 30 MG 24 hr tablet, Take 1 tablet by mouth Daily., Disp: 90 tablet, Rfl: 3  •  lisinopril (PRINIVIL,ZESTRIL) 5 MG tablet, Take  by mouth Daily., Disp: , Rfl:   •  rosuvastatin (CRESTOR) 10 MG tablet, Take  by mouth Daily., Disp: , Rfl:     Review of Systems   Constitutional: Negative for chills, fatigue and unexpected weight change.   Respiratory: Negative for chest tightness and shortness of breath.    Cardiovascular: Negative for chest pain.   Skin: Negative for wound.   Hematological: Negative for adenopathy.   All other systems reviewed and are negative.         Objective:  /81 (BP Location: Left arm, Patient Position:  "Sitting, Cuff Size: Adult)   Pulse 68   Temp 97.6 °F (36.4 °C) (Temporal)   Resp 18   Ht 182.9 cm (72\")   Wt 78.5 kg (173 lb)   BMI 23.46 kg/m²     Physical Exam   Constitutional: He is oriented to person, place, and time. He appears well-developed and well-nourished. He is cooperative. No distress.   HENT:   Head: Normocephalic and atraumatic.       Right Ear: External ear normal.   Left Ear: External ear normal.   Nose: Nasal deformity present.   Mouth/Throat: Oropharynx is clear and moist.       Old C-shaped nasal deformity   Eyes: Conjunctivae and EOM are normal. Pupils are equal, round, and reactive to light. Right eye exhibits no discharge. Left eye exhibits no discharge. No scleral icterus.   Neck: Normal range of motion. Neck supple. No JVD present. No tracheal deviation present. No thyromegaly present.   Pulmonary/Chest: Effort normal. No stridor.   Musculoskeletal: Normal range of motion. He exhibits no edema or deformity.   Lymphadenopathy:     He has no cervical adenopathy.   Neurological: He is alert and oriented to person, place, and time. He has normal strength. No cranial nerve deficit. Coordination normal.   Skin: Skin is warm and dry. No rash noted. He is not diaphoretic. No erythema. No pallor.   Psychiatric: He has a normal mood and affect. His speech is normal and behavior is normal. Judgment and thought content normal. Cognition and memory are normal.   Nursing note and vitals reviewed.      Assessment:  Dexter was seen today for skin lesion.    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 See procedure note. Biopsy care instructions were given.  I will call him with pathology results and plan.  "     Cryotherapy to left cheek lesion. See procedure note.     If the right brow lesion is benign, we will plan to follow-up in 6 months for routine surveillance.     Return in about 6 months (around 12/12/2019), or if symptoms worsen or fail to improve, for Recheck.      Zia Martin MD  06/12/19  10:10 AM

## 2019-06-14 ENCOUNTER — TELEPHONE (OUTPATIENT)
Dept: OTOLARYNGOLOGY | Facility: CLINIC | Age: 84
End: 2019-06-14

## 2019-06-18 DIAGNOSIS — C61 MALIGNANT NEOPLASM OF PROSTATE (HCC): ICD-10-CM

## 2019-06-19 LAB — PSA SERPL-MCNC: 0.04 NG/ML (ref 0–4)

## 2019-06-24 NOTE — PROGRESS NOTES
Mr. Suggs is 84 y.o. male    Chief Complaint   Patient presents with   • Prostate Cancer       History of Present Illness  Patient is here to follow up on prostate cancer which was diagnosed approximately 14 years ago and is s/p radiation therapy.  His prostate cancer was graded a [place] 6 (3+3).  His current PSA is 0.035.  He does not demonstrate any evidence of metastasis.  He denies any hematuria, flank pain, voiding symptoms, dysuria, bone pain, weight loss.      The following portions of the patient's history were reviewed and updated as appropriate: allergies, current medications, past family history, past medical history, past social history, past surgical history and problem list.    Review of Systems   Constitutional: Negative for chills and fever.   Gastrointestinal: Negative for abdominal distention, abdominal pain, anal bleeding, blood in stool, nausea and vomiting.   Genitourinary: Negative for decreased urine volume, difficulty urinating, discharge, dysuria, enuresis, flank pain, frequency, genital sores, hematuria, penile pain, penile swelling, scrotal swelling, testicular pain and urgency.         Current Outpatient Medications:   •  allopurinol (ZYLOPRIM) 300 MG tablet, Take 300 mg by mouth Daily., Disp: , Rfl:   •  aspirin 81 MG EC tablet, Take 81 mg by mouth Daily., Disp: , Rfl:   •  cephalexin (KEFLEX) 500 MG capsule, Take 1 capsule by mouth 3 (Three) Times a Day., Disp: 21 capsule, Rfl: 0  •  finasteride (PROSCAR) 5 MG tablet, Take 5 mg by mouth Daily., Disp: , Rfl:   •  fluticasone (FLONASE) 50 MCG/ACT nasal spray, 1 spray into the nostril(s) as directed by provider., Disp: , Rfl:   •  glucosamine sulfate 500 MG capsule capsule, Take  by mouth 3 (Three) Times a Day With Meals., Disp: , Rfl:   •  HYDROcodone-acetaminophen (NORCO) 5-325 MG per tablet, Take 1 tablet by mouth Every 6 (Six) Hours As Needed for Moderate Pain ., Disp: 12 tablet, Rfl: 0  •  isosorbide mononitrate (IMDUR) 30 MG 24 hr  tablet, Take 1 tablet by mouth Daily., Disp: 90 tablet, Rfl: 3  •  levothyroxine (SYNTHROID, LEVOTHROID) 75 MCG tablet, Take 75 mcg by mouth Daily., Disp: , Rfl:   •  lisinopril (PRINIVIL,ZESTRIL) 5 MG tablet, Take  by mouth Daily., Disp: , Rfl:   •  LORazepam (ATIVAN) 1 MG tablet, Take 1 mg by mouth Every 8 (Eight) Hours As Needed for anxiety., Disp: , Rfl:   •  metoprolol succinate XL (TOPROL-XL) 25 MG 24 hr tablet, Take 2 tablets by mouth Daily., Disp: 90 tablet, Rfl: 3  •  nitroglycerin (NITROSTAT) 0.4 MG SL tablet, Place 1 tablet under the tongue Every 5 (Five) Minutes As Needed for Chest Pain (PRN for CP as directed)., Disp: 30 tablet, Rfl: 11  •  pantoprazole (PROTONIX) 40 MG EC tablet, Take 40 mg by mouth Daily., Disp: , Rfl:   •  rosuvastatin (CRESTOR) 10 MG tablet, Take  by mouth Daily., Disp: , Rfl:   •  triamterene-hydrochlorothiazide (MAXZIDE) 75-50 MG per tablet, Take 1 tablet by mouth Daily., Disp: , Rfl:     Past Medical History:   Diagnosis Date   • Abnormal nuclear stress test    • BPH (benign prostatic hyperplasia)    • Chest pain    • Coronary artery disease    • Disease of thyroid gland    • Hyperlipidemia    • Hypertension    • LV dysfunction    • Melanoma (CMS/HCC)    • Prostate CA (CMS/HCC)        Past Surgical History:   Procedure Laterality Date   • APPENDECTOMY     • CARDIAC CATHETERIZATION Left 12/10/2012   • CARDIAC ELECTROPHYSIOLOGY PROCEDURE N/A 11/23/2018    Procedure: Pacemaker DC new 11/23/2018;  Surgeon: Austin Granados MD;  Location: [facility] CATH INVASIVE LOCATION;  Service: Cardiology   • CHOLECYSTECTOMY     • COLONOSCOPY N/A 6/9/2017    Procedure: COLONOSCOPY WITH ANESTHESIA;  Surgeon: Felice Marroquin MD;  Location: [facility] ENDOSCOPY;  Service:    • COLONOSCOPY W/ POLYPECTOMY  02/16/2012    adenomatous polyp at 80cm   • HAND SURGERY Right    • HERNIA REPAIR     • INGUINAL HERNIA REPAIR     • KNEE SURGERY     • PROSTATE SURGERY     • SKIN CANCER EXCISION      face   • SKIN LESION  "EXCISION         Social History     Socioeconomic History   • Marital status:      Spouse name: Not on file   • Number of children: Not on file   • Years of education: Not on file   • Highest education level: Not on file   Tobacco Use   • Smoking status: Former Smoker     Years: 30.00     Types: Cigarettes     Last attempt to quit: 1985     Years since quittin.5   • Smokeless tobacco: Never Used   Substance and Sexual Activity   • Alcohol use: No   • Drug use: No   • Sexual activity: Defer       Family History   Problem Relation Age of Onset   • Alzheimer's disease Mother    • Cancer Father    • Cancer Sister        Objective    Temp 97.6 °F (36.4 °C)   Ht 182.9 cm (72.01\")   Wt 79.4 kg (175 lb)   BMI 23.73 kg/m²     Physical Exam   Constitutional: He is oriented to person, place, and time. He appears well-developed and well-nourished.   HENT:   Head: Normocephalic.   Pulmonary/Chest: Effort normal. No respiratory distress.   Abdominal: He exhibits no distension.   Musculoskeletal: He exhibits no edema.   Neurological: He is alert and oriented to person, place, and time.   Skin: Skin is warm and dry.   Psychiatric: He has a normal mood and affect. His behavior is normal.   Vitals reviewed.    Patient's Body mass index is 23.73 kg/m². BMI is within normal parameters. No follow-up required..      Orders Only on 2019   Component Date Value Ref Range Status   • PSA 2019 0.035  0.000 - 4.000 ng/mL Final       Results for orders placed or performed in visit on 19   POC Urinalysis Dipstick, Multipro   Result Value Ref Range    Color Yellow Yellow, Straw, Dark Yellow, Ariana    Clarity, UA Clear Clear    Glucose, UA Negative Negative, 1000 mg/dL (3+) mg/dL    Bilirubin Negative Negative    Ketones, UA Negative Negative    Specific Gravity  1.020 1.005 - 1.030    Blood, UA Small (A) Negative    pH, Urine 5.5 5.0 - 8.0    Protein, POC 30 mg/dL (A) Negative mg/dL    Urobilinogen, UA Normal " Normal    Nitrite, UA Negative Negative    Leukocytes Negative Negative          Assessment/Plan   Assessment and Plan    Dexter was seen today for prostate cancer.    Diagnoses and all orders for this visit:    Malignant neoplasm of prostate (CMS/HCC)  -     POC Urinalysis Dipstick, Multipro  -     PSA Diagnostic; Future      Patient presents for follow-up of prostate cancer.  He is doing well with no evidence of metastasis.  He is also doing well voiding symptoms.  We will follow-up with him in 1 year with a PSA prior.

## 2019-06-25 ENCOUNTER — OFFICE VISIT (OUTPATIENT)
Dept: UROLOGY | Facility: CLINIC | Age: 84
End: 2019-06-25

## 2019-06-25 VITALS — BODY MASS INDEX: 23.7 KG/M2 | TEMPERATURE: 97.6 F | WEIGHT: 175 LBS | HEIGHT: 72 IN

## 2019-06-25 DIAGNOSIS — C61 MALIGNANT NEOPLASM OF PROSTATE (HCC): Primary | ICD-10-CM

## 2019-06-25 PROCEDURE — 99212 OFFICE O/P EST SF 10 MIN: CPT | Performed by: NURSE PRACTITIONER

## 2019-06-25 PROCEDURE — 81001 URINALYSIS AUTO W/SCOPE: CPT | Performed by: NURSE PRACTITIONER

## 2019-08-02 ENCOUNTER — CLINICAL SUPPORT (OUTPATIENT)
Dept: CARDIOLOGY | Facility: CLINIC | Age: 84
End: 2019-08-02

## 2019-08-02 DIAGNOSIS — I44.2 COMPLETE HEART BLOCK (HCC): ICD-10-CM

## 2019-08-02 PROCEDURE — 93294 REM INTERROG EVL PM/LDLS PM: CPT | Performed by: PHYSICIAN ASSISTANT

## 2019-08-02 PROCEDURE — 93296 REM INTERROG EVL PM/IDS: CPT | Performed by: PHYSICIAN ASSISTANT

## 2019-08-05 NOTE — PROGRESS NOTES
Dual Chamber Pacemaker Evaluation Report  Merlin    August 4, 2019    Primary Cardiologist: Roberta  : [employer] Model: Assurity  Implant date: 11/23/18    Reason for evaluation: routine  Indication for pacemaker: complete heart block    Measurements  Atrial sensing - P wave: 3.0 mV  Atrial threshold: n/r  Atrial lead impedance: 430 ohms  Ventricular sensing - R wave: >12 mV  Ventricular threshold: n/r  Ventricular lead impedance:   600 ohms     Diagnostic Data  Atrial paced: 15 %  Ventricular paced: 99 %  Other: 1 alert noted for 3 sec high V rate.  No strips to correlate.  Battery status: satisfactory         Final Parameters  Mode:  DDDR  Lower rate: 60 bpm   Upper rate: 125 bpm  AV Delay: paced- 200 ms  Sensed-170 ms  Atrial - Amplitude: 2.5 V   Pulse width: 0.4 ms   Sensitivity: 0.5 mV     Ventricular - Amplitude: 2.5 V  Pulse width: 0.4 ms  Sensitivity: 2.0 mV    Changes made: n/a  Conclusions: normal pacemaker function    Follow up: 3 months

## 2019-09-13 NOTE — TELEPHONE ENCOUNTER
Pt of Dr Granados,  Will forward to him to refill.     This was sent to the [facility] office.    Have a great day   No

## 2019-10-29 ENCOUNTER — DOCUMENTATION (OUTPATIENT)
Dept: CARDIOLOGY | Facility: CLINIC | Age: 84
End: 2019-10-29

## 2019-10-29 ENCOUNTER — OFFICE VISIT (OUTPATIENT)
Dept: CARDIOLOGY | Facility: CLINIC | Age: 84
End: 2019-10-29

## 2019-10-29 VITALS
HEIGHT: 72 IN | DIASTOLIC BLOOD PRESSURE: 60 MMHG | SYSTOLIC BLOOD PRESSURE: 132 MMHG | WEIGHT: 171 LBS | OXYGEN SATURATION: 98 % | BODY MASS INDEX: 23.16 KG/M2 | HEART RATE: 70 BPM

## 2019-10-29 DIAGNOSIS — Z95.0 PACEMAKER: Primary | ICD-10-CM

## 2019-10-29 DIAGNOSIS — I25.10 CAD IN NATIVE ARTERY: ICD-10-CM

## 2019-10-29 DIAGNOSIS — I48.0 PAF (PAROXYSMAL ATRIAL FIBRILLATION) (HCC): ICD-10-CM

## 2019-10-29 DIAGNOSIS — I10 HTN (HYPERTENSION), BENIGN: ICD-10-CM

## 2019-10-29 DIAGNOSIS — I10 ESSENTIAL HYPERTENSION: Primary | ICD-10-CM

## 2019-10-29 DIAGNOSIS — E78.2 MIXED HYPERLIPIDEMIA: ICD-10-CM

## 2019-10-29 DIAGNOSIS — I44.2 COMPLETE HEART BLOCK (HCC): ICD-10-CM

## 2019-10-29 DIAGNOSIS — Z95.0 PACEMAKER: ICD-10-CM

## 2019-10-29 DIAGNOSIS — C61 MALIGNANT NEOPLASM OF PROSTATE (HCC): ICD-10-CM

## 2019-10-29 PROBLEM — I48.92 PAROXYSMAL ATRIAL FLUTTER: Status: ACTIVE | Noted: 2019-10-29

## 2019-10-29 PROCEDURE — 99214 OFFICE O/P EST MOD 30 MIN: CPT | Performed by: INTERNAL MEDICINE

## 2019-10-29 PROCEDURE — 93000 ELECTROCARDIOGRAM COMPLETE: CPT | Performed by: INTERNAL MEDICINE

## 2019-10-29 RX ORDER — NITROGLYCERIN 0.4 MG/1
TABLET SUBLINGUAL
Qty: 30 TABLET | Refills: 3 | Status: SHIPPED | OUTPATIENT
Start: 2019-10-29 | End: 2021-11-23 | Stop reason: SDUPTHER

## 2019-10-29 NOTE — PROGRESS NOTES
Dexter Suggs  [id number]  1934  85 y.o.  male    Referring Provider: Jarad Alexis MD    Reason for Follow-up Visit:  Routine follow up.  coronary artery disease   sick sinus syndrome s/p pacemaker       Subjective        Feels same overall as during last visit  No new events or complaints since last visit   Overall the patient feels no major change from baseline symptoms   Similar symptoms as during last visit      Paroxysmal atrial flutter on pacer check     Mild chronic exertional shortness of breath on exertion relieved with rest  No significant cough or wheezing    No palpitations  No associated chest pain  No significant pedal edema    No fever or chills  No significant expectoration    No hemoptysis  No presyncope or syncope     Feels much better           History of present illness:  Dexter Suggs is a 85 y.o. yo male with history of CAD who presents today for   Chief Complaint   Patient presents with   • Coronary Artery Disease     6 mo f/u   .    History  Past Medical History:   Diagnosis Date   • Abnormal nuclear stress test    • BPH (benign prostatic hyperplasia)    • Chest pain    • Coronary artery disease    • Disease of thyroid gland    • Hyperlipidemia    • Hypertension    • LV dysfunction    • Melanoma (CMS/HCC)    • Prostate CA (CMS/HCC)    ,   Past Surgical History:   Procedure Laterality Date   • APPENDECTOMY     • CARDIAC CATHETERIZATION Left 12/10/2012   • CARDIAC ELECTROPHYSIOLOGY PROCEDURE N/A 11/23/2018    Procedure: Pacemaker DC new 11/23/2018;  Surgeon: Austin Granados MD;  Location: [facility] CATH INVASIVE LOCATION;  Service: Cardiology   • CHOLECYSTECTOMY     • COLONOSCOPY N/A 6/9/2017    Procedure: COLONOSCOPY WITH ANESTHESIA;  Surgeon: Felice Marroquin MD;  Location: [facility] ENDOSCOPY;  Service:    • COLONOSCOPY W/ POLYPECTOMY  02/16/2012    adenomatous polyp at 80cm   • HAND SURGERY Right    • HERNIA REPAIR     • INGUINAL HERNIA REPAIR     • KNEE SURGERY     • PROSTATE SURGERY      • SKIN CANCER EXCISION      face   • SKIN LESION EXCISION     ,   Family History   Problem Relation Age of Onset   • Alzheimer's disease Mother    • Cancer Father    • Cancer Sister    ,   Social History     Tobacco Use   • Smoking status: Former Smoker     Years: 30.00     Types: Cigarettes     Last attempt to quit: 1985     Years since quittin.8   • Smokeless tobacco: Never Used   Substance Use Topics   • Alcohol use: No   • Drug use: No   ,     Medications  Current Outpatient Medications   Medication Sig Dispense Refill   • allopurinol (ZYLOPRIM) 300 MG tablet Take 300 mg by mouth Daily.     • aspirin 81 MG EC tablet Take 81 mg by mouth Daily.     • glucosamine sulfate 500 MG capsule capsule Take  by mouth 3 (Three) Times a Day With Meals.     • isosorbide mononitrate (IMDUR) 30 MG 24 hr tablet Take 1 tablet by mouth Daily. 90 tablet 3   • levothyroxine (SYNTHROID, LEVOTHROID) 75 MCG tablet Take 75 mcg by mouth Daily.     • LORazepam (ATIVAN) 1 MG tablet Take 1 mg by mouth Every 8 (Eight) Hours As Needed for anxiety.     • metoprolol succinate XL (TOPROL-XL) 25 MG 24 hr tablet Take 2 tablets by mouth Daily. 90 tablet 3   • nitroglycerin (NITROSTAT) 0.4 MG SL tablet Place 1 tablet under the tongue Every 5 (Five) Minutes As Needed for Chest Pain (PRN for CP as directed). 30 tablet 11   • pantoprazole (PROTONIX) 40 MG EC tablet Take 40 mg by mouth Daily.     • triamterene-hydrochlorothiazide (MAXZIDE) 75-50 MG per tablet Take 1 tablet by mouth Daily.     • apixaban (ELIQUIS) 5 MG tablet tablet Take 1 tablet by mouth Every 12 (Twelve) Hours. 60 tablet 11   • cephalexin (KEFLEX) 500 MG capsule Take 1 capsule by mouth 3 (Three) Times a Day. 21 capsule 0   • finasteride (PROSCAR) 5 MG tablet Take 5 mg by mouth Daily.     • fluticasone (FLONASE) 50 MCG/ACT nasal spray 1 spray into the nostril(s) as directed by provider.     • HYDROcodone-acetaminophen (NORCO) 5-325 MG per tablet Take 1 tablet by mouth Every 6  "(Six) Hours As Needed for Moderate Pain . 12 tablet 0   • lisinopril (PRINIVIL,ZESTRIL) 5 MG tablet Take  by mouth Daily.     • rosuvastatin (CRESTOR) 10 MG tablet Take  by mouth Daily.       No current facility-administered medications for this visit.        Allergies:  Adhesive tape; Simvastatin; and Neosporin [neomycin-bacitracin zn-polymyx]    Review of Systems  Review of Systems   Constitution: Negative.   HENT: Negative.    Eyes: Negative.    Cardiovascular: Negative for chest pain, claudication, cyanosis, dyspnea on exertion, irregular heartbeat, leg swelling, near-syncope, orthopnea, palpitations, paroxysmal nocturnal dyspnea and syncope.   Respiratory: Negative.    Endocrine: Negative.    Hematologic/Lymphatic: Negative.    Skin: Negative.    Musculoskeletal: Positive for arthritis and back pain.   Gastrointestinal: Negative for anorexia.   Genitourinary: Negative.    Neurological: Negative.    Psychiatric/Behavioral: Negative.        Objective     Physical Exam:  /60   Pulse 70   Ht 182.9 cm (72\")   Wt 77.6 kg (171 lb)   SpO2 98%   BMI 23.19 kg/m²   Physical Exam   Constitutional: He appears well-developed.   HENT:   Head: Normocephalic.   Neck: Normal carotid pulses and no JVD present. No tracheal tenderness present. Carotid bruit is not present. No tracheal deviation and no edema present.   Cardiovascular: Regular rhythm, normal heart sounds and normal pulses.   Pulmonary/Chest: Effort normal. No stridor.   Abdominal: Soft. He exhibits no distension. There is no hepatosplenomegaly. There is no tenderness.   Neurological: He is alert. He has normal strength. No cranial nerve deficit or sensory deficit.   Skin: Skin is warm.   Psychiatric: He has a normal mood and affect. His speech is normal and behavior is normal.       Results Review:  Results for orders placed during the hospital encounter of 11/21/18   Adult Transthoracic Echo Complete W/ Cont if Necessary Per Protocol    Narrative · Left " ventricular systolic function is normal. Estimated EF = 65%.  · Left ventricular diastolic dysfunction.  · No evidence of pulmonary hypertension is present.             myocardial perfusion scan         · Left ventricular ejection fraction is normal (Calculated EF = 63%).  · Myocardial perfusion imaging indicates a medium-sized infarct located in the inferior wall, septal wall and apex with no significant ischemia noted.  · Abnormal LV wall motion consistent with mild hypokinesis of the inferior wall and septal wall.  Raw images reviewed with the following abnormalities noted: vertical motion.         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        Monitor cardiac rhythm device function regularly per established schedule or PRN       ____________________________________________________________________________________________________________________________________________  Health maintenance and recommendations      Similar recommendations as last visit       Offered to give patient  a copy      Questions were encouraged, asked and answered to the patient's  understanding and satisfaction. Questions if any regarding current medications and side effects, need for refills and importance of compliance to medications stressed.    Reviewed available prior notes, consults, prior visits, laboratory findings, radiology and cardiology relevant reports. Updated chart as applicable. I have reviewed the patient's medical history in detail and updated the computerized patient record as relevant.      Updated patient regarding any new or relevant abnormalities on review of records or any new findings on physical exam. Mentioned to patient about purpose of visit and desirable health short and long term goals and objectives.    Primary to monitor CBC CMP Lipid panel and TSH as applicable    ___________________________________________________________________________________________________________________________________________       Return in about 3 months (around 1/29/2020).

## 2019-11-05 NOTE — PROGRESS NOTES
Dual Chamber Pacemaker Evaluation Report  REMOTE/MERLIN    October 29, 2019    Primary Cardiologist: Roberta  : [employer] Medical Model: Assurity MRI 2272 Pacemaker  Implant date: 11/23/2018    Reason for evaluation: routine  Indication for pacemaker: complete heart block    Measurements  Atrial sensing - P wave: 2.7 mV  Atrial threshold: N/P  Atrial lead impedance: 410 ohms  Ventricular sensing - R wave: 11.8 mV  Ventricular threshold: N/P  Ventricular lead impedance:   540 ohms     Diagnostic Data  Atrial paced: 23 %  Ventricular paced: >99 %    Episodes recorded since 8/2/2019:  3 paroxysmal atrial flutter episodes: longest duration 8 seconds, ventricular rates controlled, burden < 1%.  Patient started on Eliquis during OV with Dr. Granados.    NS-VT x 1, rate 205 bpm, duration 2 seconds    Battery status: satisfactory   3.01V, estimated 8.9 years remaining      Final Parameters  Mode:  DDDR  Lower rate: 60 bpm   Upper rate: 125 bpm  AV Delay: paced- 200 ms  Sensed-170 ms  Atrial - Amplitude: 2.5 V   Pulse width: 0.4 ms   Sensitivity: 0.5 mV     Ventricular - Amplitude: 2.5 V  Pulse width: 0.4 ms  Sensitivity: 2 mV    Changes made: No changes made.  Conclusions: normal pacemaker function and adequate battery reserve    Follow up: Every 3 months via [facility], annually in office (12/17/2019)

## 2019-12-11 ENCOUNTER — OFFICE VISIT (OUTPATIENT)
Dept: OTOLARYNGOLOGY | Facility: CLINIC | Age: 84
End: 2019-12-11

## 2019-12-11 VITALS
DIASTOLIC BLOOD PRESSURE: 70 MMHG | RESPIRATION RATE: 20 BRPM | SYSTOLIC BLOOD PRESSURE: 148 MMHG | HEIGHT: 72 IN | TEMPERATURE: 97.3 F | BODY MASS INDEX: 23.16 KG/M2 | HEART RATE: 81 BPM | WEIGHT: 171 LBS

## 2019-12-11 DIAGNOSIS — S02.2XXS OPEN FRACTURE OF NASAL BONE, SEQUELA: ICD-10-CM

## 2019-12-11 DIAGNOSIS — S01.81XD LACERATION OF FACE WITHOUT COMPLICATION, SUBSEQUENT ENCOUNTER: ICD-10-CM

## 2019-12-11 DIAGNOSIS — D04.0 SQUAMOUS CELL CARCINOMA IN SITU OF SKIN OF LIP: ICD-10-CM

## 2019-12-11 DIAGNOSIS — C44.319 BASAL CELL CARCINOMA OF CHEEK: Primary | ICD-10-CM

## 2019-12-11 DIAGNOSIS — L57.0 ACTINIC KERATOSIS: ICD-10-CM

## 2019-12-11 PROCEDURE — 99214 OFFICE O/P EST MOD 30 MIN: CPT | Performed by: OTOLARYNGOLOGY

## 2019-12-11 RX ORDER — FLUOROURACIL 50 MG/G
CREAM TOPICAL 2 TIMES DAILY
Qty: 30 G | Refills: 0 | Status: SHIPPED | OUTPATIENT
Start: 2019-12-11 | End: 2019-12-25

## 2019-12-11 NOTE — PROGRESS NOTES
Chief Complaint   Patient presents with   • Follow-up     skin lesions  Skin cancer surveillance       Skin Cancer Follow-up and Surveillance Visit    Dexter Suggs presents for a cancer surveillance and skin check following excision of a basal cell carcinoma of the left cheek with linear closure on 2/7/19. He is also s/p repair of the left forehead, eyebrow, and eyelid lacerations from a fall on 3/15/19. At his last visit, he had an actinic keratosis of the left cheek treated with cryotherapy. He also had a biopsy of a right brow lesion revealing severe actinic change.     He is doing well without complaints.  Symptoms denied postoperatively include fever, chills, bleeding and drainage.      Patient presents for follow-up general head and neck skin examination.  Patient has a history of basal cell carcinoma, squamous cell carcinoma, malignant melanoma.  He has not noted any recurrence.    The patient has noted new a few worrisome lesions. He complains of a skin lesions of the right cheek and left cheek. The lesion has been present for a few months. Symptoms associated with the lesion are: rough. Patient denies increasing diameter, increasing thickness, itching, pain, drainage, infection. Nothing makes the lesion better or worse. A biopsy has not been performed. He has had similar spots treated successfully with topical fluorouracil.    Dermatologist:  Stiven Chavez MD     Additional Skin Cancer History was reviewed: He has a biopsy-proven actinic keratosis and squamous cell carcinoma in situ of the right upper lip.  The squamous cell carcinoma was removed with a 6 mm punch biopsy on 08/12/2013. He also has a history of recurrent actinic keratosis of the left upper lip that was treated with topical chemotherapy cream- he has not noted recurrence. He also has a history of 2 melanomas of the left cheek treated many years ago with excision.  He has not noted recurrence    He also has a history of nasal bone fractures  (open) with repair of the lacerations, but no repair of the nasal bones.  He denies nasal obstruction.  Reports moderate nasal asymmetry.  Glasses fit well on the bridge of the nose - nothing makes better, nor worse.    Past Medical History:   Diagnosis Date   • Abnormal nuclear stress test    • BPH (benign prostatic hyperplasia)    • Chest pain    • Coronary artery disease    • Disease of thyroid gland    • Hyperlipidemia    • Hypertension    • LV dysfunction    • Melanoma (CMS/HCC)    • Prostate CA (CMS/HCC)      Past Surgical History:   Procedure Laterality Date   • APPENDECTOMY     • CARDIAC CATHETERIZATION Left 12/10/2012   • CARDIAC ELECTROPHYSIOLOGY PROCEDURE N/A 2018    Procedure: Pacemaker DC new 2018;  Surgeon: Austin Granados MD;  Location:  [facility] CATH INVASIVE LOCATION;  Service: Cardiology   • CHOLECYSTECTOMY     • COLONOSCOPY N/A 2017    Procedure: COLONOSCOPY WITH ANESTHESIA;  Surgeon: Felice Marroquin MD;  Location:  [facility] ENDOSCOPY;  Service:    • COLONOSCOPY W/ POLYPECTOMY  2012    adenomatous polyp at 80cm   • HAND SURGERY Right    • HERNIA REPAIR     • INGUINAL HERNIA REPAIR     • KNEE SURGERY     • PROSTATE SURGERY     • SKIN CANCER EXCISION      face   • SKIN LESION EXCISION       Family History   Problem Relation Age of Onset   • Alzheimer's disease Mother    • Cancer Father    • Cancer Sister      Social History     Tobacco Use   • Smoking status: Former Smoker     Years: 30.00     Types: Cigarettes     Last attempt to quit: 1985     Years since quittin.9   • Smokeless tobacco: Never Used   Substance Use Topics   • Alcohol use: No   • Drug use: No     Allergies:  Adhesive tape; Simvastatin; and Neosporin [neomycin-bacitracin zn-polymyx]    Current Outpatient Medications:   •  allopurinol (ZYLOPRIM) 300 MG tablet, Take 300 mg by mouth Daily., Disp: , Rfl:   •  apixaban (ELIQUIS) 5 MG tablet tablet, Take 1 tablet by mouth Every 12 (Twelve) Hours., Disp: 60 tablet, Rfl:  11  •  aspirin 81 MG EC tablet, Take 81 mg by mouth Daily., Disp: , Rfl:   •  cephalexin (KEFLEX) 500 MG capsule, Take 1 capsule by mouth 3 (Three) Times a Day., Disp: 21 capsule, Rfl: 0  •  finasteride (PROSCAR) 5 MG tablet, Take 5 mg by mouth Daily., Disp: , Rfl:   •  fluticasone (FLONASE) 50 MCG/ACT nasal spray, 1 spray into the nostril(s) as directed by provider., Disp: , Rfl:   •  glucosamine sulfate 500 MG capsule capsule, Take  by mouth 3 (Three) Times a Day With Meals., Disp: , Rfl:   •  HYDROcodone-acetaminophen (NORCO) 5-325 MG per tablet, Take 1 tablet by mouth Every 6 (Six) Hours As Needed for Moderate Pain ., Disp: 12 tablet, Rfl: 0  •  levothyroxine (SYNTHROID, LEVOTHROID) 75 MCG tablet, Take 75 mcg by mouth Daily., Disp: , Rfl:   •  lisinopril (PRINIVIL,ZESTRIL) 5 MG tablet, Take  by mouth Daily., Disp: , Rfl:   •  LORazepam (ATIVAN) 1 MG tablet, Take 1 mg by mouth Every 8 (Eight) Hours As Needed for anxiety., Disp: , Rfl:   •  metoprolol succinate XL (TOPROL-XL) 25 MG 24 hr tablet, Take 2 tablets by mouth Daily., Disp: 90 tablet, Rfl: 3  •  nitroglycerin (NITROSTAT) 0.4 MG SL tablet, DISSOLVE 1 UNDER TONGUE AS NEEDED FOR CHEST PAIN, MAY REPEAT TWICE IN 15 MIN PERIOD., Disp: 30 tablet, Rfl: 3  •  pantoprazole (PROTONIX) 40 MG EC tablet, Take 40 mg by mouth Daily., Disp: , Rfl:   •  rosuvastatin (CRESTOR) 10 MG tablet, Take  by mouth Daily., Disp: , Rfl:   •  triamterene-hydrochlorothiazide (MAXZIDE) 75-50 MG per tablet, Take 1 tablet by mouth Daily., Disp: , Rfl:   •  fluorouracil (EFUDEX) 5 % cream, Apply  topically to the appropriate area as directed 2 (Two) Times a Day for 14 days. 6%, Disp: 30 g, Rfl: 0    Review of Systems   Constitutional: Negative for chills, fatigue and unexpected weight change.   Respiratory: Negative for chest tightness and shortness of breath.    Cardiovascular: Negative for chest pain.   Skin: Negative for color change and wound.   Hematological: Negative for adenopathy.  "  All other systems reviewed and are negative.       I have reviewed and edited the CC/HPI/ROS/PFSH/Allergy/Medication information entered into the note by the patient/ancillary staff to accurately document the details of this visit.    Objective:  /70   Pulse 81   Temp 97.3 °F (36.3 °C)   Resp 20   Ht 182.9 cm (72\")   Wt 77.6 kg (171 lb)   BMI 23.19 kg/m²     Physical Exam   Constitutional: He is oriented to person, place, and time. He appears well-developed and well-nourished. He is cooperative. No distress.   HENT:   Head: Normocephalic and atraumatic.       Right Ear: Tympanic membrane, external ear and ear canal normal. Decreased hearing is noted.   Left Ear: Tympanic membrane, external ear and ear canal normal. Decreased hearing is noted.   Ears:    Nose: Nasal deformity present.   Mouth/Throat: Oropharynx is clear and moist.       Old C-shaped nasal deformity   Eyes: Pupils are equal, round, and reactive to light. Conjunctivae and EOM are normal. Right eye exhibits no discharge. Left eye exhibits no discharge. No scleral icterus.   Neck: Normal range of motion. Neck supple. No JVD present. No tracheal deviation present. No thyromegaly present.   Pulmonary/Chest: Effort normal. No stridor.   Musculoskeletal: Normal range of motion. He exhibits no edema or deformity.   Lymphadenopathy:     He has no cervical adenopathy.   Neurological: He is alert and oriented to person, place, and time. He has normal strength. No cranial nerve deficit. Coordination normal.   Skin: Skin is warm and dry. No rash noted. He is not diaphoretic. No erythema. No pallor.   Psychiatric: He has a normal mood and affect. His speech is normal and behavior is normal. Judgment and thought content normal. Cognition and memory are normal.   Nursing note and vitals reviewed.    Results Reviewed:        Assessment:  Dexter was seen today for follow-up.    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 6%        Plan:    May apply topical chemotherapy cream to the actinic keratoses on the right and left cheeks BID for 2-3 weeks. Refill of 5FU sent to his pharmacy.     Follow-up in 6 months for routine surveillance.     Return in about 6 months (around 6/11/2020), or if symptoms worsen or fail to improve, for Recheck.      Zia Martin MD  12/11/19  10:44 AM

## 2019-12-17 ENCOUNTER — CLINICAL SUPPORT NO REQUIREMENTS (OUTPATIENT)
Dept: CARDIOLOGY | Facility: CLINIC | Age: 84
End: 2019-12-17

## 2019-12-17 DIAGNOSIS — I44.2 COMPLETE HEART BLOCK (HCC): ICD-10-CM

## 2019-12-17 DIAGNOSIS — Z95.0 PACEMAKER: Primary | ICD-10-CM

## 2019-12-17 PROCEDURE — 93280 PM DEVICE PROGR EVAL DUAL: CPT | Performed by: INTERNAL MEDICINE

## 2019-12-17 NOTE — PROGRESS NOTES
Dual Chamber Pacemaker Evaluation Report  IN OFFICE INTERROGATION    December 17, 2019    Primary Cardiologist: Roberta  : [employer] Medical Model: Assurity MRI 2272 Pacemaker  Implant date: 11/23/2018    Reason for evaluation: routine  Indication for pacemaker: complete heart block    Measurements  Atrial sensing - P wave: 3 mV  Atrial threshold: 0.75V@ 0.4ms  Atrial lead impedance: 400 ohms  Ventricular sensing - R wave: None mV  Ventricular threshold: 0.5 V @ 0.4 ms  Ventricular lead impedance:   540 ohms     Diagnostic Data  Atrial paced: 21 %  Ventricular paced: >99 %    Episodes recorded since 10/29/2019:  P-AFl, burden <1%, longest duration 8 seconds.  Anticoagulated with Eliquis.  NS-VT x 1, duration 2 seconds, rate 196 bpm    Battery status: satisfactory   3.01V, estimated 8.9 years remaining    Final Parameters  Mode:  DDDR  Lower rate: 60 bpm   Upper rate: 125 bpm  AV Delay: paced- 200 ms  Sensed-170 ms  Atrial - Amplitude: 2.5 V   Pulse width: 0.4 ms   Sensitivity: Auto mV     Ventricular - Amplitude: 2.5 V  Pulse width: 0.4 ms  Sensitivity: Auto mV    Changes made: Ventricular and atrial autosense turned on  Conclusions: normal pacemaker function, stable pacing and sensing thresholds and adequate battery reserve    Follow up: Every 3 months via [facility], annually in office (12/22/2020)

## 2020-01-28 ENCOUNTER — OFFICE VISIT (OUTPATIENT)
Dept: CARDIOLOGY | Facility: CLINIC | Age: 85
End: 2020-01-28

## 2020-01-28 VITALS
BODY MASS INDEX: 23.3 KG/M2 | SYSTOLIC BLOOD PRESSURE: 140 MMHG | HEART RATE: 99 BPM | DIASTOLIC BLOOD PRESSURE: 88 MMHG | HEIGHT: 72 IN | WEIGHT: 172 LBS | OXYGEN SATURATION: 95 %

## 2020-01-28 DIAGNOSIS — E78.2 MIXED HYPERLIPIDEMIA: ICD-10-CM

## 2020-01-28 DIAGNOSIS — I25.10 CAD IN NATIVE ARTERY: Primary | ICD-10-CM

## 2020-01-28 DIAGNOSIS — N40.1 BENIGN PROSTATIC HYPERPLASIA WITH LOWER URINARY TRACT SYMPTOMS, SYMPTOM DETAILS UNSPECIFIED: ICD-10-CM

## 2020-01-28 DIAGNOSIS — I48.0 PAF (PAROXYSMAL ATRIAL FIBRILLATION) (HCC): ICD-10-CM

## 2020-01-28 DIAGNOSIS — I10 HTN (HYPERTENSION), BENIGN: ICD-10-CM

## 2020-01-28 DIAGNOSIS — I10 ESSENTIAL HYPERTENSION: ICD-10-CM

## 2020-01-28 DIAGNOSIS — I49.5 SICK SINUS SYNDROME (HCC): ICD-10-CM

## 2020-01-28 PROCEDURE — 99213 OFFICE O/P EST LOW 20 MIN: CPT | Performed by: INTERNAL MEDICINE

## 2020-01-28 NOTE — PROGRESS NOTES
Dexter Suggs  [id number]  1934  85 y.o.  male    Referring Provider: Jarad Alexis MD    Reason for Follow-up Visit:  Routine follow up.  coronary artery disease   sick sinus syndrome s/p pacemaker     Subjective    Feels same overall as during last visit  No new events or complaints since last visit   Overall the patient feels no major change from baseline symptoms   Similar symptoms as during last visit      Paroxysmal atrial flutter on pacer check     Mild chronic exertional shortness of breath on exertion relieved with rest  No significant cough or wheezing    No palpitations  No associated chest pain  No significant pedal edema    No fever or chills  No significant expectoration    No hemoptysis  No presyncope or syncope     Excellent effort tolerance with no cardiovascular limitations and at the patient's baseline   BP well controlled at home.      Tolerating current medications well with no untoward side effects     History of present illness:  Dexter Suggs is a 85 y.o. yo male with history of CAD sick sinus syndrome s/p pacemaker who presents today for   Chief Complaint   Patient presents with   • Atrial Fibrillation     3 mo f/u   • SSS   .    History  Past Medical History:   Diagnosis Date   • Abnormal nuclear stress test    • BPH (benign prostatic hyperplasia)    • Chest pain    • Coronary artery disease    • Disease of thyroid gland    • Hyperlipidemia    • Hypertension    • LV dysfunction    • Melanoma (CMS/HCC)    • Prostate CA (CMS/HCC)    ,   Past Surgical History:   Procedure Laterality Date   • APPENDECTOMY     • CARDIAC CATHETERIZATION Left 12/10/2012   • CARDIAC ELECTROPHYSIOLOGY PROCEDURE N/A 11/23/2018    Procedure: Pacemaker DC new 11/23/2018;  Surgeon: Austin Granados MD;  Location: [facility] CATH INVASIVE LOCATION;  Service: Cardiology   • CHOLECYSTECTOMY     • COLONOSCOPY N/A 6/9/2017    Procedure: COLONOSCOPY WITH ANESTHESIA;  Surgeon: Felice Marroquin MD;  Location: [facility]  ENDOSCOPY;  Service:    • COLONOSCOPY W/ POLYPECTOMY  2012    adenomatous polyp at 80cm   • HAND SURGERY Right    • HERNIA REPAIR     • INGUINAL HERNIA REPAIR     • KNEE SURGERY     • PROSTATE SURGERY     • SKIN CANCER EXCISION      face   • SKIN LESION EXCISION     ,   Family History   Problem Relation Age of Onset   • Alzheimer's disease Mother    • Cancer Father    • Cancer Sister    ,   Social History     Tobacco Use   • Smoking status: Former Smoker     Years: 30.00     Types: Cigarettes     Last attempt to quit: 1985     Years since quittin.0   • Smokeless tobacco: Never Used   Substance Use Topics   • Alcohol use: No   • Drug use: No   ,     Medications  Current Outpatient Medications   Medication Sig Dispense Refill   • allopurinol (ZYLOPRIM) 300 MG tablet Take 300 mg by mouth Daily.     • apixaban (ELIQUIS) 5 MG tablet tablet Take 1 tablet by mouth Every 12 (Twelve) Hours. 60 tablet 11   • aspirin 81 MG EC tablet Take 81 mg by mouth Daily.     • cephalexin (KEFLEX) 500 MG capsule Take 1 capsule by mouth 3 (Three) Times a Day. 21 capsule 0   • finasteride (PROSCAR) 5 MG tablet Take 5 mg by mouth Daily.     • fluticasone (FLONASE) 50 MCG/ACT nasal spray 1 spray into the nostril(s) as directed by provider.     • glucosamine sulfate 500 MG capsule capsule Take  by mouth 3 (Three) Times a Day With Meals.     • HYDROcodone-acetaminophen (NORCO) 5-325 MG per tablet Take 1 tablet by mouth Every 6 (Six) Hours As Needed for Moderate Pain . 12 tablet 0   • levothyroxine (SYNTHROID, LEVOTHROID) 75 MCG tablet Take 75 mcg by mouth Daily.     • lisinopril (PRINIVIL,ZESTRIL) 5 MG tablet Take  by mouth Daily.     • LORazepam (ATIVAN) 1 MG tablet Take 1 mg by mouth Every 8 (Eight) Hours As Needed for anxiety.     • metoprolol succinate XL (TOPROL-XL) 25 MG 24 hr tablet Take 2 tablets by mouth Daily. 90 tablet 3   • nitroglycerin (NITROSTAT) 0.4 MG SL tablet DISSOLVE 1 UNDER TONGUE AS NEEDED FOR CHEST PAIN, MAY  "REPEAT TWICE IN 15 MIN PERIOD. 30 tablet 3   • pantoprazole (PROTONIX) 40 MG EC tablet Take 40 mg by mouth Daily.     • rosuvastatin (CRESTOR) 10 MG tablet Take  by mouth Daily.     • triamterene-hydrochlorothiazide (MAXZIDE) 75-50 MG per tablet Take 1 tablet by mouth Daily.       No current facility-administered medications for this visit.        Allergies:  Adhesive tape; Simvastatin; and Neosporin [neomycin-bacitracin zn-polymyx]    Review of Systems  Review of Systems   Constitution: Negative.   HENT: Negative.    Eyes: Negative.    Cardiovascular: Negative for chest pain, claudication, cyanosis, dyspnea on exertion, irregular heartbeat, leg swelling, near-syncope, orthopnea, palpitations, paroxysmal nocturnal dyspnea and syncope.   Respiratory: Negative.    Endocrine: Negative.    Hematologic/Lymphatic: Negative.    Skin: Negative.    Musculoskeletal: Positive for arthritis and back pain.   Gastrointestinal: Negative for anorexia.   Genitourinary: Negative.    Neurological: Negative.    Psychiatric/Behavioral: Negative.        Objective     Physical Exam:  /88   Pulse 99   Ht 182.9 cm (72\")   Wt 78 kg (172 lb)   SpO2 95%   BMI 23.33 kg/m²   Physical Exam   Constitutional: He appears well-developed.   HENT:   Head: Normocephalic.   Neck: Normal carotid pulses and no JVD present. No tracheal tenderness present. Carotid bruit is not present. No tracheal deviation and no edema present.   Cardiovascular: Regular rhythm, normal heart sounds and normal pulses.   Pulmonary/Chest: Effort normal. No stridor.   Abdominal: Soft. He exhibits no distension. There is no hepatosplenomegaly. There is no tenderness.   Neurological: He is alert. He has normal strength. No cranial nerve deficit or sensory deficit.   Skin: Skin is warm.   Psychiatric: He has a normal mood and affect. His speech is normal and behavior is normal.       Results Review:  Results for orders placed during the hospital encounter of 11/21/18 "   Adult Transthoracic Echo Complete W/ Cont if Necessary Per Protocol    Narrative · Left ventricular systolic function is normal. Estimated EF = 65%.  · Left ventricular diastolic dysfunction.  · No evidence of pulmonary hypertension is present.             myocardial perfusion scan         · Left ventricular ejection fraction is normal (Calculated EF = 63%).  · Myocardial perfusion imaging indicates a medium-sized infarct located in the inferior wall, septal wall and apex with no significant ischemia noted.  · Abnormal LV wall motion consistent with mild hypokinesis of the inferior wall and septal wall.  Raw images reviewed with the following abnormalities noted: vertical motion.       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      ____________________________________________________________________________________________________________________________________________  Health maintenance and recommendations      Similar recommendations as last visit       Offered to give patient  a copy      Questions were encouraged, asked and answered to the patient's  understanding and satisfaction. Questions if any regarding current medications and side effects, need for refills and importance of  compliance to medications stressed.    Reviewed available prior notes, consults, prior visits, laboratory findings, radiology and cardiology relevant reports. Updated chart as applicable. I have reviewed the patient's medical history in detail and updated the computerized patient record as relevant.      Updated patient regarding any new or relevant abnormalities on review of records or any new findings on physical exam. Mentioned to patient about purpose of visit and desirable health short and long term goals and objectives.    Primary to monitor CBC CMP Lipid panel and TSH as applicable    ___________________________________________________________________________________________________________________________________________       Return in about 6 months (around 7/28/2020).

## 2020-01-31 RX ORDER — METOPROLOL SUCCINATE 25 MG/1
25 TABLET, EXTENDED RELEASE ORAL DAILY
Qty: 90 TABLET | Refills: 3
Start: 2020-01-31 | End: 2021-03-10

## 2020-02-19 ENCOUNTER — CLINICAL SUPPORT (OUTPATIENT)
Dept: INTERNAL MEDICINE | Facility: CLINIC | Age: 85
End: 2020-02-19

## 2020-02-19 DIAGNOSIS — D51.0 PERNICIOUS ANEMIA: Primary | ICD-10-CM

## 2020-02-19 PROCEDURE — 96372 THER/PROPH/DIAG INJ SC/IM: CPT | Performed by: INTERNAL MEDICINE

## 2020-02-19 RX ORDER — CYANOCOBALAMIN 1000 UG/ML
1000 INJECTION, SOLUTION INTRAMUSCULAR; SUBCUTANEOUS ONCE
Status: COMPLETED | OUTPATIENT
Start: 2020-02-19 | End: 2020-02-19

## 2020-02-19 RX ADMIN — CYANOCOBALAMIN 1000 MCG: 1000 INJECTION, SOLUTION INTRAMUSCULAR; SUBCUTANEOUS at 13:39

## 2020-03-02 RX ORDER — METOPROLOL SUCCINATE 25 MG/1
TABLET, EXTENDED RELEASE ORAL
Qty: 90 TABLET | Refills: 3 | OUTPATIENT
Start: 2020-03-02

## 2020-03-11 RX ORDER — PANTOPRAZOLE SODIUM 40 MG/1
TABLET, DELAYED RELEASE ORAL
Qty: 90 TABLET | Refills: 3 | Status: SHIPPED | OUTPATIENT
Start: 2020-03-11 | End: 2021-03-02

## 2020-03-17 ENCOUNTER — CLINICAL SUPPORT (OUTPATIENT)
Dept: CARDIOLOGY | Facility: CLINIC | Age: 85
End: 2020-03-17

## 2020-03-17 DIAGNOSIS — I44.2 COMPLETE HEART BLOCK (HCC): ICD-10-CM

## 2020-03-17 PROCEDURE — 93296 REM INTERROG EVL PM/IDS: CPT | Performed by: PHYSICIAN ASSISTANT

## 2020-03-17 PROCEDURE — 93294 REM INTERROG EVL PM/LDLS PM: CPT | Performed by: PHYSICIAN ASSISTANT

## 2020-03-23 NOTE — PROGRESS NOTES
Dual Chamber Pacemaker Evaluation Report  Merlin    March 22, 2020    Primary Cardiologist: Roberta  : [facility] Model: Assurity  Implant date: 11/23/18    Reason for evaluation: routine  Indication for pacemaker: complete heart block    Measurements  Atrial sensing - P wave: 3.1 mV  Atrial threshold: n/r  Atrial lead impedance: 410 ohms  Ventricular sensing - R wave: 11.2 mV  Ventricular threshold: n/r  Ventricular lead impedance:   550 ohms     Diagnostic Data  Atrial paced: 18 %  Ventricular paced: >99 %  Other: 1 AMS entry that lasted for 6 sec.  Battery status: satisfactory         Final Parameters  Mode:  DDDR  Lower rate: 60 bpm   Upper rate: 155 bpm  AV Delay: paced- 200 ms  Sensed-170 ms  Atrial - Amplitude: 2.5 V   Pulse width: 0.4 ms   Sensitivity: auto     Ventricular - Amplitude: 2.5 V  Pulse width: 0.4 ms  Sensitivity: auto    Changes made: n/a  Conclusions: normal pacemaker function    Follow up: 3 months

## 2020-04-21 ENCOUNTER — TELEPHONE (OUTPATIENT)
Dept: INTERNAL MEDICINE | Facility: CLINIC | Age: 85
End: 2020-04-21

## 2020-04-21 RX ORDER — CYANOCOBALAMIN 1000 UG/ML
1000 INJECTION, SOLUTION INTRAMUSCULAR; SUBCUTANEOUS
Qty: 1 ML | Refills: 2 | Status: ON HOLD | OUTPATIENT
Start: 2020-04-21 | End: 2021-08-25

## 2020-04-21 NOTE — TELEPHONE ENCOUNTER
PT'S WIFE, ALYSON, CALLED REQUESTING A B12 SHOT BE SENT TO PT'S PHARMACY. ALYSON STATED SHE WAS ADVISED TO GIVE PT OVER THE COUNTER B12 HOWEVER PT'S PHARMACY TOLD ALYSON THEY COULD GIVE PT THE SHOT.    PLEASE ADVISE ALYSON.    PHARMACY: [facility], [facility] - [address] [place] - [phone] [facility] [phone]   [phone]    ALYSON'S CALLBACK NUMBER: [phone]

## 2020-05-12 ENCOUNTER — HOSPITAL ENCOUNTER (EMERGENCY)
Facility: HOSPITAL | Age: 85
Discharge: HOME | End: 2020-05-12
Admitting: FAMILY MEDICINE

## 2020-05-12 VITALS
RESPIRATION RATE: 20 BRPM | HEART RATE: 87 BPM | TEMPERATURE: 98 F | WEIGHT: 176 LBS | SYSTOLIC BLOOD PRESSURE: 138 MMHG | HEIGHT: 72 IN | BODY MASS INDEX: 23.84 KG/M2 | DIASTOLIC BLOOD PRESSURE: 109 MMHG | OXYGEN SATURATION: 97 %

## 2020-05-12 DIAGNOSIS — S69.92XA FISH HOOK INJURY OF LEFT THUMB, INITIAL ENCOUNTER: Primary | ICD-10-CM

## 2020-05-12 PROCEDURE — 99283 EMERGENCY DEPT VISIT LOW MDM: CPT

## 2020-05-12 NOTE — DISCHARGE INSTRUCTIONS
Increase fluids. Tylenol as needed for pain/fever. Wash area three times a day with antibacterial soap and apply antibiotic ointment. Follow up with PCP - call for appointment. Return to ED if condition does not improve or worsens

## 2020-05-12 NOTE — ED PROVIDER NOTES
Subjective   85 yom presents with fish hook in the left thumb.  He states he was fishing this am, caught a fish and while trying to remove the hook caught it in his left thumb.  He has +PMS of the left thumb.  His TD is UTD.          Review of Systems   Constitutional: Negative for activity change, appetite change, fatigue and fever.   HENT: Negative for congestion, ear pain, facial swelling and sore throat.    Eyes: Negative for discharge and visual disturbance.   Respiratory: Negative for apnea, chest tightness, shortness of breath, wheezing and stridor.    Cardiovascular: Negative for chest pain and palpitations.   Gastrointestinal: Negative for abdominal distention, abdominal pain, diarrhea, nausea and vomiting.   Genitourinary: Negative for difficulty urinating and dysuria.   Musculoskeletal: Negative for arthralgias and myalgias.   Skin: Negative for rash and wound.   Neurological: Negative for dizziness and seizures.   Psychiatric/Behavioral: Negative for agitation and confusion.       Past Medical History:   Diagnosis Date   • Abnormal nuclear stress test    • BPH (benign prostatic hyperplasia)    • Chest pain    • Coronary artery disease    • Disease of thyroid gland    • Hyperlipidemia    • Hypertension    • LV dysfunction    • Melanoma (CMS/HCC)    • Prostate CA (CMS/HCC)        Allergies   Allergen Reactions   • Adhesive Tape    • Simvastatin Other (See Comments)     Abnormal LFT's   • Neosporin [Neomycin-Bacitracin Zn-Polymyx] Rash       Past Surgical History:   Procedure Laterality Date   • APPENDECTOMY     • CARDIAC CATHETERIZATION Left 12/10/2012   • CARDIAC ELECTROPHYSIOLOGY PROCEDURE N/A 11/23/2018    Procedure: Pacemaker DC new 11/23/2018;  Surgeon: Austin Granados MD;  Location: [facility] CATH INVASIVE LOCATION;  Service: Cardiology   • CHOLECYSTECTOMY     • COLONOSCOPY N/A 6/9/2017    Procedure: COLONOSCOPY WITH ANESTHESIA;  Surgeon: Felice Marroquin MD;  Location: [facility] ENDOSCOPY;  Service:    •  COLONOSCOPY W/ POLYPECTOMY  2012    adenomatous polyp at 80cm   • HAND SURGERY Right    • HERNIA REPAIR     • INGUINAL HERNIA REPAIR     • KNEE SURGERY     • PROSTATE SURGERY     • SKIN CANCER EXCISION      face   • SKIN LESION EXCISION         Family History   Problem Relation Age of Onset   • Alzheimer's disease Mother    • Cancer Father    • Cancer Sister        Social History     Socioeconomic History   • Marital status:      Spouse name: Not on file   • Number of children: Not on file   • Years of education: Not on file   • Highest education level: Not on file   Tobacco Use   • Smoking status: Former Smoker     Years: 30.00     Types: Cigarettes     Last attempt to quit:      Years since quittin.3   • Smokeless tobacco: Never Used   Substance and Sexual Activity   • Alcohol use: No   • Drug use: No   • Sexual activity: Defer           Objective   Physical Exam   Constitutional: He is oriented to person, place, and time. He appears well-developed.   HENT:   Head: Normocephalic.   Eyes: Pupils are equal, round, and reactive to light. EOM are normal.   Neck: Normal range of motion. Neck supple.   Cardiovascular: Normal rate and regular rhythm.   No murmur heard.  Pulmonary/Chest: Effort normal and breath sounds normal.   Abdominal: Soft. Bowel sounds are normal.   Musculoskeletal: Normal range of motion.        Hands:  One tushar of a fish hook present to the lateral aspect of the left thumb.  +PMS of the left thumb present.   Neurological: He is alert and oriented to person, place, and time.   Skin: Skin is warm and dry.   Psychiatric: He has a normal mood and affect.   Nursing note and vitals reviewed.      Foreign Body Removal - Embedded  Date/Time: 2020 2:00 PM  Performed by: Burke Sparks APRN  Authorized by: Burke Sparks APRN     Consent:     Consent obtained:  Verbal    Consent given by:  Patient    Risks discussed:  Bleeding, incomplete removal, infection,  nerve damage, pain, poor cosmetic result and worsening of condition    Alternatives discussed:  No treatment  Location:     Location:  Finger    Finger location:  L thumb    Depth:  Subcutaneous    Tendon involvement:  None  Pre-procedure details:     Imaging:  None    Neurovascular status: intact    Anesthesia (see MAR for exact dosages):     Anesthesia method:  None  Procedure type:     Procedure complexity:  Simple  Procedure details:     Localization method:  Visualized    Dissection of underlying tissues: no      Removal mechanism: Suture placed around tushar. Slight pressure applied to thumb and hook easily removed.    Foreign bodies recovered:  1    Description:  One fish hook tushar  Post-procedure details:     Neurovascular status: intact      Confirmation:  No additional foreign bodies on visualization    Skin closure:  None    Dressing:  Antibiotic ointment and non-adherent dressing    Patient tolerance of procedure:  Tolerated well, no immediate complications  Comments:      Procedure completed by Dr Hong               ED Course                                           MDM  Number of Diagnoses or Management Options  Fish hook injury of left thumb, initial encounter: minor  Risk of Complications, Morbidity, and/or Mortality  Presenting problems: minimal  Diagnostic procedures: minimal  Management options: minimal    Patient Progress  Patient progress: improved      Final diagnoses:   Fish hook injury of left thumb, initial encounter            Burke Sparks, APRN  05/12/20 [id number]

## 2020-05-13 DIAGNOSIS — F41.9 ANXIETY: Primary | ICD-10-CM

## 2020-05-13 RX ORDER — LORAZEPAM 1 MG/1
TABLET ORAL
Qty: 90 TABLET | Refills: 1 | Status: SHIPPED | OUTPATIENT
Start: 2020-05-13 | End: 2020-11-10

## 2020-06-01 ENCOUNTER — CLINICAL SUPPORT (OUTPATIENT)
Dept: INTERNAL MEDICINE | Facility: CLINIC | Age: 85
End: 2020-06-01

## 2020-06-01 DIAGNOSIS — D51.0 PERNICIOUS ANEMIA: Primary | ICD-10-CM

## 2020-06-01 PROCEDURE — 96372 THER/PROPH/DIAG INJ SC/IM: CPT | Performed by: INTERNAL MEDICINE

## 2020-06-01 RX ADMIN — CYANOCOBALAMIN 1000 MCG: 1000 INJECTION, SOLUTION INTRAMUSCULAR; SUBCUTANEOUS at 15:15

## 2020-06-02 RX ORDER — CYANOCOBALAMIN 1000 UG/ML
1000 INJECTION, SOLUTION INTRAMUSCULAR; SUBCUTANEOUS
Status: SHIPPED | OUTPATIENT
Start: 2020-06-01

## 2020-06-10 ENCOUNTER — OFFICE VISIT (OUTPATIENT)
Dept: OTOLARYNGOLOGY | Facility: CLINIC | Age: 85
End: 2020-06-10

## 2020-06-10 VITALS
RESPIRATION RATE: 20 BRPM | TEMPERATURE: 98 F | HEART RATE: 66 BPM | WEIGHT: 164 LBS | BODY MASS INDEX: 22.21 KG/M2 | DIASTOLIC BLOOD PRESSURE: 78 MMHG | SYSTOLIC BLOOD PRESSURE: 130 MMHG | HEIGHT: 72 IN

## 2020-06-10 DIAGNOSIS — C44.319 BASAL CELL CARCINOMA OF CHEEK: ICD-10-CM

## 2020-06-10 DIAGNOSIS — S02.2XXS OPEN FRACTURE OF NASAL BONE, SEQUELA: ICD-10-CM

## 2020-06-10 DIAGNOSIS — D04.0 SQUAMOUS CELL CARCINOMA IN SITU OF SKIN OF LIP: ICD-10-CM

## 2020-06-10 DIAGNOSIS — L57.0 ACTINIC KERATOSIS: Primary | ICD-10-CM

## 2020-06-10 DIAGNOSIS — S01.81XD LACERATION OF FACE WITHOUT COMPLICATION, SUBSEQUENT ENCOUNTER: ICD-10-CM

## 2020-06-10 DIAGNOSIS — D48.5 NEOPLASM OF UNCERTAIN BEHAVIOR OF SKIN: ICD-10-CM

## 2020-06-10 PROCEDURE — 17110 DESTRUCTION B9 LES UP TO 14: CPT | Performed by: OTOLARYNGOLOGY

## 2020-06-10 PROCEDURE — 99213 OFFICE O/P EST LOW 20 MIN: CPT | Performed by: OTOLARYNGOLOGY

## 2020-06-10 NOTE — PROGRESS NOTES
Preoperative Diagnosis: 1.) Actinic keratosis of right forearm                                         2.) Actinic keratosis of right auricle                                          3.) Actinic keratosis of left cheek    Postoperative Diagnosis: Same    Procedure: 1.) Destruction with Cryotherapy                      2.) Destruction with Cryotherapy                     3.) Destruction with Cryotherapy     Provider: Zia Martin MD    Anesthesia: None    Location: [place] [facility] office    Complications: None    Details of Procedure:     Patient identity was verified and consent was signed. Lesion #1 was treated with 2 pulses of 8 second duration each; allowing the skin to completely thaw between pulses. Lesion #2 was treated with 2 pulses of 6 second duration each; allowing the skin to completely thaw between pulses. Lesion #3 was treated with 2 pulses of 6 second duration each; allowing the skin to completely thaw between pulses. The patient tolerated the procedure without complication.    Zia Martin MD  06/10/20  09:25

## 2020-06-10 NOTE — PROGRESS NOTES
Chief Complaint   Patient presents with   • Follow-up     skin lesions  Skin cancer surveillance       Skin Cancer Follow-up and Surveillance Visit    Dexter Suggs presents for a cancer surveillance and skin check following excision of a basal cell carcinoma of the left cheek with linear closure on 2/7/19. He is also s/p repair of the left forehead, eyebrow, and eyelid lacerations from a fall on 3/15/19.     Patient presents for follow-up general head and neck skin examination.  Patient has a history of basal cell carcinoma, squamous cell carcinoma, malignant melanoma.  He has not noted any recurrence.    He reports 3 flaking and waxing-and-waning lesions.  One is located on the right forearm, one on the left cheek (separate from the prior lesion treated with topical), and one on the right antihelix (inferior harleen).  These lesions have been present for 3 months.  They are mild.    Additional Skin Cancer History was reviewed: He has a biopsy-proven actinic keratosis and squamous cell carcinoma in situ of the right upper lip.  The squamous cell carcinoma was removed with a 6 mm punch biopsy on 08/12/2013. He also has a history of recurrent actinic keratosis of the left upper lip that was treated with topical chemotherapy cream- he has not noted recurrence. He also has a history of 2 melanomas of the left cheek treated many years ago with excision.  He has not noted recurrence      Past Medical History:   Diagnosis Date   • Abnormal nuclear stress test    • BPH (benign prostatic hyperplasia)    • Chest pain    • Coronary artery disease    • Disease of thyroid gland    • Hyperlipidemia    • Hypertension    • LV dysfunction    • Melanoma (CMS/HCC)    • Prostate CA (CMS/HCC)      Past Surgical History:   Procedure Laterality Date   • APPENDECTOMY     • CARDIAC CATHETERIZATION Left 12/10/2012   • CARDIAC ELECTROPHYSIOLOGY PROCEDURE N/A 11/23/2018    Procedure: Pacemaker DC new 11/23/2018;  Surgeon: Austin Granados MD;   Location: [facility] CATH INVASIVE LOCATION;  Service: Cardiology   • CHOLECYSTECTOMY     • COLONOSCOPY N/A 2017    Procedure: COLONOSCOPY WITH ANESTHESIA;  Surgeon: Felice Marroquin MD;  Location: [facility] ENDOSCOPY;  Service:    • COLONOSCOPY W/ POLYPECTOMY  2012    adenomatous polyp at 80cm   • HAND SURGERY Right    • HERNIA REPAIR     • INGUINAL HERNIA REPAIR     • KNEE SURGERY     • PROSTATE SURGERY     • SKIN CANCER EXCISION      face   • SKIN LESION EXCISION       Family History   Problem Relation Age of Onset   • Alzheimer's disease Mother    • Cancer Father    • Cancer Sister      Social History     Tobacco Use   • Smoking status: Former Smoker     Years: 30.00     Types: Cigarettes     Last attempt to quit:      Years since quittin.4   • Smokeless tobacco: Never Used   Substance Use Topics   • Alcohol use: No   • Drug use: No     Allergies:  Adhesive tape; Simvastatin; and Neosporin [neomycin-bacitracin zn-polymyx]    Current Outpatient Medications:   •  allopurinol (ZYLOPRIM) 300 MG tablet, Take 300 mg by mouth Daily., Disp: , Rfl:   •  apixaban (ELIQUIS) 5 MG tablet tablet, Take 1 tablet by mouth Every 12 (Twelve) Hours., Disp: 60 tablet, Rfl: 11  •  aspirin 81 MG EC tablet, Take 81 mg by mouth Daily., Disp: , Rfl:   •  cyanocobalamin 1000 MCG/ML injection, Inject 1 mL into the appropriate muscle as directed by prescriber Every 28 (Twenty-Eight) Days., Disp: 1 mL, Rfl: 2  •  fluticasone (FLONASE) 50 MCG/ACT nasal spray, 1 spray into the nostril(s) as directed by provider., Disp: , Rfl:   •  glucosamine sulfate 500 MG capsule capsule, Take  by mouth 3 (Three) Times a Day With Meals., Disp: , Rfl:   •  levothyroxine (SYNTHROID, LEVOTHROID) 75 MCG tablet, Take 75 mcg by mouth Daily., Disp: , Rfl:   •  LORazepam (ATIVAN) 1 MG tablet, TAKE 1 TABLET BY MOUTH EVERY DAY, Disp: 90 tablet, Rfl: 1  •  metoprolol succinate XL (TOPROL-XL) 25 MG 24 hr tablet, Take 1 tablet by mouth Daily., Disp: 90 tablet,  "Rfl: 3  •  nitroglycerin (NITROSTAT) 0.4 MG SL tablet, DISSOLVE 1 UNDER TONGUE AS NEEDED FOR CHEST PAIN, MAY REPEAT TWICE IN 15 MIN PERIOD., Disp: 30 tablet, Rfl: 3  •  pantoprazole (PROTONIX) 40 MG EC tablet, TAKE 1 TABLET BY MOUTH EVERY DAY, Disp: 90 tablet, Rfl: 3  •  triamterene-hydrochlorothiazide (MAXZIDE) 75-50 MG per tablet, Take 1 tablet by mouth Daily., Disp: , Rfl:     Current Facility-Administered Medications:   •  cyanocobalamin injection 1,000 mcg, 1,000 mcg, Intramuscular, Q28 Days, Jarad Alexis MD, 1,000 mcg at 06/01/20 1515    Review of Systems   Constitutional: Negative for chills, fatigue and unexpected weight change.   Respiratory: Negative for chest tightness and shortness of breath.    Cardiovascular: Negative for chest pain.   Skin: Positive for wound (Finger - stuc with fishing hook). Negative for color change.   Hematological: Negative for adenopathy. Bruises/bleeds easily.   All other systems reviewed and are negative.       I have reviewed and edited the CC/HPI/ROS/PFSH/Allergy/Medication information entered into the note by the patient/ancillary staff to accurately document the details of this visit.    Objective:  /78   Pulse 66   Temp 98 °F (36.7 °C)   Resp 20   Ht 182.9 cm (72\")   Wt 74.4 kg (164 lb)   BMI 22.24 kg/m²     Physical Exam   Constitutional: He is oriented to person, place, and time. He appears well-developed and well-nourished. He is cooperative. No distress.   HENT:   Head: Normocephalic and atraumatic.   Right Ear: Tympanic membrane, external ear and ear canal normal. Decreased hearing is noted.   Left Ear: Tympanic membrane, external ear and ear canal normal. Decreased hearing is noted.   Nose: Nasal deformity present.   Mouth/Throat: Oropharynx is clear and moist.       Old C-shaped nasal deformity   Eyes: Pupils are equal, round, and reactive to light. Conjunctivae and EOM are normal. Right eye exhibits no discharge. Left eye exhibits no discharge. No " scleral icterus.   Neck: Normal range of motion. Neck supple. No JVD present. No tracheal deviation present. No thyromegaly present.   Pulmonary/Chest: Effort normal. No stridor.   Musculoskeletal: Normal range of motion. He exhibits no edema or deformity.   Lymphadenopathy:     He has no cervical adenopathy.   Neurological: He is alert and oriented to person, place, and time. He has normal strength. No cranial nerve deficit. Coordination normal.   Skin: Skin is warm and dry. No rash noted. He is not diaphoretic. No erythema. No pallor.   Psychiatric: He has a normal mood and affect. His speech is normal and behavior is normal. Judgment and thought content normal. Cognition and memory are normal.   Nursing note and vitals reviewed.    Results Reviewed:        Assessment:  Dexter was seen today for follow-up.    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     Follow-up in 6 months for routine surveillance.     Return in about 6 months (around 12/10/2020).      Zia Martin MD  06/10/20  09:41

## 2020-06-22 ENCOUNTER — CLINICAL SUPPORT (OUTPATIENT)
Dept: CARDIOLOGY | Facility: CLINIC | Age: 85
End: 2020-06-22

## 2020-06-22 DIAGNOSIS — I44.2 COMPLETE HEART BLOCK (HCC): ICD-10-CM

## 2020-06-22 PROCEDURE — 93294 REM INTERROG EVL PM/LDLS PM: CPT | Performed by: PHYSICIAN ASSISTANT

## 2020-06-22 PROCEDURE — 93296 REM INTERROG EVL PM/IDS: CPT | Performed by: PHYSICIAN ASSISTANT

## 2020-06-22 NOTE — PROGRESS NOTES
Dual Chamber Pacemaker Evaluation Report  Merlin    June 22, 2020    Primary Cardiologist: Roberta  : [facility] Model: Assurity  Implant date: 11/23/18    Reason for evaluation: routine  Indication for pacemaker: complete heart block    Measurements  Atrial sensing - P wave: 2.7 mV  Atrial threshold: n/r  Atrial lead impedance: 430 ohms  Ventricular sensing - R wave: 10.4 mV  Ventricular threshold: n/r  Ventricular lead impedance:   540 ohms     Diagnostic Data  Atrial paced: 24 %  Ventricular paced: >99 %  Other: 18 sec run of AF noted.  V rate controlled.  Battery status: satisfactory         Final Parameters  Mode:  DDDR  Lower rate: 60 bpm   Upper rate: 125 bpm  AV Delay: paced- 200 ms  Sensed-170 ms  Atrial - Amplitude: 2.5 V   Pulse width: 0.4 ms   Sensitivity: auto     Ventricular - Amplitude: 2.5 V  Pulse width: 0.4 ms  Sensitivity: auto    Changes made: n/a  Conclusions: normal pacemaker function    Follow up: 3 months

## 2020-06-25 ENCOUNTER — RESULTS ENCOUNTER (OUTPATIENT)
Dept: UROLOGY | Facility: CLINIC | Age: 85
End: 2020-06-25

## 2020-06-25 DIAGNOSIS — C61 MALIGNANT NEOPLASM OF PROSTATE (HCC): ICD-10-CM

## 2020-06-28 NOTE — PROGRESS NOTES
Agree with assessment and plan of mid level provider as below with any changes if made as noted by Sheridan Yeager PA-C     of Dexter Suggs.

## 2020-07-02 ENCOUNTER — LAB (OUTPATIENT)
Dept: INTERNAL MEDICINE | Facility: CLINIC | Age: 85
End: 2020-07-02

## 2020-07-02 DIAGNOSIS — Z12.5 ENCOUNTER FOR SCREENING FOR MALIGNANT NEOPLASM OF PROSTATE: ICD-10-CM

## 2020-07-02 DIAGNOSIS — E11.9 TYPE 2 DIABETES MELLITUS WITHOUT COMPLICATION, WITHOUT LONG-TERM CURRENT USE OF INSULIN (HCC): ICD-10-CM

## 2020-07-02 DIAGNOSIS — I10 ESSENTIAL HYPERTENSION: Primary | ICD-10-CM

## 2020-07-02 DIAGNOSIS — C61 MALIGNANT NEOPLASM OF PROSTATE (HCC): ICD-10-CM

## 2020-07-02 DIAGNOSIS — E78.2 MIXED HYPERLIPIDEMIA: ICD-10-CM

## 2020-07-03 LAB
ALBUMIN SERPL-MCNC: 3.8 G/DL (ref 3.5–5.2)
ALBUMIN/GLOB SERPL: 1.2 G/DL
ALP SERPL-CCNC: 114 U/L (ref 39–117)
ALT SERPL-CCNC: 8 U/L (ref 1–41)
APPEARANCE UR: CLEAR
AST SERPL-CCNC: 11 U/L (ref 1–40)
BACTERIA #/AREA URNS HPF: NORMAL /HPF
BASOPHILS # BLD AUTO: 0.06 10*3/MM3 (ref 0–0.2)
BASOPHILS NFR BLD AUTO: 0.7 % (ref 0–1.5)
BILIRUB SERPL-MCNC: 0.5 MG/DL (ref 0.2–1.2)
BILIRUB UR QL STRIP: NEGATIVE
BUN SERPL-MCNC: 16 MG/DL (ref 8–23)
BUN/CREAT SERPL: 15.7 (ref 7–25)
CALCIUM SERPL-MCNC: 9.7 MG/DL (ref 8.6–10.5)
CASTS URNS MICRO: NORMAL
CHLORIDE SERPL-SCNC: 105 MMOL/L (ref 98–107)
CHOLEST SERPL-MCNC: 135 MG/DL (ref 0–200)
CO2 SERPL-SCNC: 28.2 MMOL/L (ref 22–29)
COLOR UR: YELLOW
CREAT SERPL-MCNC: 1.02 MG/DL (ref 0.76–1.27)
EOSINOPHIL # BLD AUTO: 0.39 10*3/MM3 (ref 0–0.4)
EOSINOPHIL NFR BLD AUTO: 4.5 % (ref 0.3–6.2)
EPI CELLS #/AREA URNS HPF: NORMAL /HPF
ERYTHROCYTE [DISTWIDTH] IN BLOOD BY AUTOMATED COUNT: 15.2 % (ref 12.3–15.4)
GLOBULIN SER CALC-MCNC: 3.2 GM/DL
GLUCOSE SERPL-MCNC: 115 MG/DL (ref 65–99)
GLUCOSE UR QL: NEGATIVE
HBA1C MFR BLD: 6 % (ref 4.8–5.6)
HCT VFR BLD AUTO: 34 % (ref 37.5–51)
HDLC SERPL-MCNC: 39 MG/DL (ref 40–60)
HGB BLD-MCNC: 11.1 G/DL (ref 13–17.7)
HGB UR QL STRIP: NEGATIVE
IMM GRANULOCYTES # BLD AUTO: 0.02 10*3/MM3 (ref 0–0.05)
IMM GRANULOCYTES NFR BLD AUTO: 0.2 % (ref 0–0.5)
KETONES UR QL STRIP: NEGATIVE
LDLC SERPL CALC-MCNC: 77 MG/DL (ref 0–100)
LEUKOCYTE ESTERASE UR QL STRIP: NEGATIVE
LYMPHOCYTES # BLD AUTO: 1.78 10*3/MM3 (ref 0.7–3.1)
LYMPHOCYTES NFR BLD AUTO: 20.5 % (ref 19.6–45.3)
MAGNESIUM SERPL-MCNC: 2 MG/DL (ref 1.6–2.4)
MCH RBC QN AUTO: 29 PG (ref 26.6–33)
MCHC RBC AUTO-ENTMCNC: 32.6 G/DL (ref 31.5–35.7)
MCV RBC AUTO: 88.8 FL (ref 79–97)
MICROALBUMIN UR-MCNC: 104.6 UG/ML
MONOCYTES # BLD AUTO: 0.77 10*3/MM3 (ref 0.1–0.9)
MONOCYTES NFR BLD AUTO: 8.9 % (ref 5–12)
NEUTROPHILS # BLD AUTO: 5.66 10*3/MM3 (ref 1.7–7)
NEUTROPHILS NFR BLD AUTO: 65.2 % (ref 42.7–76)
NITRITE UR QL STRIP: NEGATIVE
NRBC BLD AUTO-RTO: 0 /100 WBC (ref 0–0.2)
PH UR STRIP: <=5 [PH] (ref 5–8)
PLATELET # BLD AUTO: 218 10*3/MM3 (ref 140–450)
POTASSIUM SERPL-SCNC: 4.5 MMOL/L (ref 3.5–5.2)
PROT SERPL-MCNC: 7 G/DL (ref 6–8.5)
PROT UR QL STRIP: ABNORMAL
PSA SERPL-MCNC: 0.02 NG/ML (ref 0–4)
RBC # BLD AUTO: 3.83 10*6/MM3 (ref 4.14–5.8)
RBC #/AREA URNS HPF: NORMAL /HPF
SODIUM SERPL-SCNC: 141 MMOL/L (ref 136–145)
SP GR UR: 1.02 (ref 1–1.03)
TRIGL SERPL-MCNC: 93 MG/DL (ref 0–150)
TSH SERPL DL<=0.005 MIU/L-ACNC: 1.5 UIU/ML (ref 0.27–4.2)
URATE SERPL-MCNC: 5.4 MG/DL (ref 3.4–7)
UROBILINOGEN UR STRIP-MCNC: ABNORMAL MG/DL
VLDLC SERPL CALC-MCNC: 18.6 MG/DL
WBC # BLD AUTO: 8.68 10*3/MM3 (ref 3.4–10.8)
WBC #/AREA URNS HPF: NORMAL /HPF

## 2020-07-07 ENCOUNTER — OFFICE VISIT (OUTPATIENT)
Dept: INTERNAL MEDICINE | Facility: CLINIC | Age: 85
End: 2020-07-07

## 2020-07-07 VITALS
DIASTOLIC BLOOD PRESSURE: 60 MMHG | HEIGHT: 72 IN | SYSTOLIC BLOOD PRESSURE: 124 MMHG | HEART RATE: 81 BPM | WEIGHT: 163.38 LBS | RESPIRATION RATE: 18 BRPM | OXYGEN SATURATION: 98 % | BODY MASS INDEX: 22.13 KG/M2 | TEMPERATURE: 97.5 F

## 2020-07-07 DIAGNOSIS — I25.10 CAD IN NATIVE ARTERY: ICD-10-CM

## 2020-07-07 DIAGNOSIS — I48.0 PAF (PAROXYSMAL ATRIAL FIBRILLATION) (HCC): ICD-10-CM

## 2020-07-07 DIAGNOSIS — E78.2 MIXED HYPERLIPIDEMIA: ICD-10-CM

## 2020-07-07 DIAGNOSIS — I10 ESSENTIAL HYPERTENSION: Primary | ICD-10-CM

## 2020-07-07 PROCEDURE — G0439 PPPS, SUBSEQ VISIT: HCPCS | Performed by: INTERNAL MEDICINE

## 2020-07-07 NOTE — PROGRESS NOTES
The ABCs of the Annual Wellness Visit  Initial Medicare Wellness Visit    Chief Complaint   Patient presents with   • Welcome To Medicare     Medicare Wellness Exam       Subjective   History of Present Illness:  Dexter Suggs is a 86 y.o. male who presents for an Initial Medicare Wellness Visit.  Patient has no new complaints other than feeling of not being able to exert himself and do the work that he was able to do many years ago all of which is likely attributable age    HEALTH RISK ASSESSMENT    Recent Hospitalizations:  No hospitalization(s) within the last year.    Current Medical Providers:  Patient Care Team:  Jarad Alexis MD as PCP - General  Jarad Alexis MD as PCP - Family Medicine  Austin Granados MD as Cardiologist (Cardiology)  Chi Hunter MD as Consulting Physician (Urology)    Smoking Status:  Social History     Tobacco Use   Smoking Status Former Smoker   • Years: 30.00   • Types: Cigarettes   • Last attempt to quit:    • Years since quittin.5   Smokeless Tobacco Never Used       Alcohol Consumption:  Social History     Substance and Sexual Activity   Alcohol Use No       Depression Screen:   PHQ-2/PHQ-9 Depression Screening 2020   Little interest or pleasure in doing things 0   Feeling down, depressed, or hopeless 0   Total Score 0       Fall Risk Screen:  TARYNADI Fall Risk Assessment was completed, and patient is at LOW risk for falls.Assessment completed on:2020    Health Habits and Functional and Cognitive Screening:  Functional & Cognitive Status 2020   Do you have difficulty preparing food and eating? No   Do you have difficulty bathing yourself, getting dressed or grooming yourself? No   Do you have difficulty using the toilet? No   Do you have difficulty moving around from place to place? No   Do you have trouble with steps or getting out of a bed or a chair? No   Current Diet Well Balanced Diet   Dental Exam Not up to date   Eye Exam Up to date    Exercise (times per week) 5 times per week   Current Exercise Activities Include Walking   Do you need help using the phone?  No   Are you deaf or do you have serious difficulty hearing?  Yes   Do you need help with transportation? No   Do you need help shopping? No   Do you need help preparing meals?  No   Do you need help with housework?  No   Do you need help with laundry? No   Do you need help taking your medications? No   Do you need help managing money? No   Do you ever drive or ride in a car without wearing a seat belt? No   Have you felt unusual stress, anger or loneliness in the last month? No   Who do you live with? Spouse   If you need help, do you have trouble finding someone available to you? No   Have you been bothered in the last four weeks by sexual problems? No   Do you have difficulty concentrating, remembering or making decisions? No         Does the patient have evidence of cognitive impairment? No    Asprin use counseling:Taking ASA appropriately as indicated    Age-appropriate Screening Schedule:  Refer to the list below for future screening recommendations based on patient's age, sex and/or medical conditions. Orders for these recommended tests are listed in the plan section. The patient has been provided with a written plan.    Health Maintenance   Topic Date Due   • ZOSTER VACCINE (2 of 3) 03/25/2010   • DIABETIC EYE EXAM  04/13/2017   • INFLUENZA VACCINE  08/01/2020   • HEMOGLOBIN A1C  01/02/2021   • LIPID PANEL  07/02/2021   • URINE MICROALBUMIN  07/02/2021   • TDAP/TD VACCINES (2 - Td) 03/15/2029          The following portions of the patient's history were reviewed and updated as appropriate: allergies, current medications, past family history, past medical history, past social history, past surgical history and problem list.    Outpatient Medications Prior to Visit   Medication Sig Dispense Refill   • allopurinol (ZYLOPRIM) 300 MG tablet Take 300 mg by mouth Daily.     • apixaban  (ELIQUIS) 5 MG tablet tablet Take 1 tablet by mouth Every 12 (Twelve) Hours. 60 tablet 11   • aspirin 81 MG EC tablet Take 81 mg by mouth Daily.     • cyanocobalamin 1000 MCG/ML injection Inject 1 mL into the appropriate muscle as directed by prescriber Every 28 (Twenty-Eight) Days. 1 mL 2   • fluticasone (FLONASE) 50 MCG/ACT nasal spray 1 spray into the nostril(s) as directed by provider.     • glucosamine sulfate 500 MG capsule capsule Take  by mouth 3 (Three) Times a Day With Meals.     • levothyroxine (SYNTHROID, LEVOTHROID) 75 MCG tablet Take 75 mcg by mouth Daily.     • LORazepam (ATIVAN) 1 MG tablet TAKE 1 TABLET BY MOUTH EVERY DAY 90 tablet 1   • metoprolol succinate XL (TOPROL-XL) 25 MG 24 hr tablet Take 1 tablet by mouth Daily. 90 tablet 3   • nitroglycerin (NITROSTAT) 0.4 MG SL tablet DISSOLVE 1 UNDER TONGUE AS NEEDED FOR CHEST PAIN, MAY REPEAT TWICE IN 15 MIN PERIOD. 30 tablet 3   • pantoprazole (PROTONIX) 40 MG EC tablet TAKE 1 TABLET BY MOUTH EVERY DAY 90 tablet 3   • triamterene-hydrochlorothiazide (MAXZIDE) 75-50 MG per tablet Take 1 tablet by mouth Daily.       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 Patient has an advance directive in EMR which is still valid.     Review of Systems   Constitutional: Negative for activity change, appetite change and chills.   HENT: Negative for congestion, ear pain and facial swelling.    Eyes: Negative for pain, discharge and itching.   Respiratory: Negative for apnea, cough and shortness of breath.    Cardiovascular: Negative for chest pain, palpitations and leg swelling.   Gastrointestinal: Negative for abdominal distention, abdominal pain and anal bleeding.   Endocrine: Negative for cold intolerance and heat intolerance.   Genitourinary: Negative for difficulty urinating, dysuria and flank pain.   Musculoskeletal: Negative for arthralgias, back pain and joint swelling.   Skin: Negative for color change, pallor and rash.   Allergic/Immunologic: Negative for environmental allergies and food allergies.   Neurological: Negative for dizziness and facial asymmetry.   Hematological: Negative for adenopathy. Does not bruise/bleed easily.   Psychiatric/Behavioral: Negative for agitation, behavioral problems and confusion.       Compared to one year ago, the patient feels his physical health is the same.  Compared to one year ago, the patient feels his mental health is the same.    Reviewed chart for potential of high risk medication in the elderly: yes  Reviewed chart for potential of harmful drug interactions in the elderly:yes    Objective         Vitals:    07/07/20 0910   BP: 124/60   BP Location: Left arm   Patient Position: Sitting   Cuff Size: Adult   Pulse: 81   Resp: 18   Temp: 97.5 °F (36.4 °C)   TempSrc: Temporal   SpO2: 98%   Weight: 74.1 kg (163 lb 6 oz)   Height: 182.9 cm (72\")       Body mass index is 22.16 kg/m².  Discussed the patient's BMI with him. The BMI is in the acceptable range.    Physical Exam   Constitutional: He is oriented to person, place, and time. He appears well-developed and well-nourished. "   HENT:   Head: Normocephalic and atraumatic.   Mouth/Throat: Oropharynx is clear and moist.   Eyes: Pupils are equal, round, and reactive to light. EOM are normal.   Neck: Normal range of motion. Neck supple.   Cardiovascular: Normal rate and regular rhythm.   Pulmonary/Chest: Effort normal and breath sounds normal.   Abdominal: Soft. Bowel sounds are normal.   Musculoskeletal: Normal range of motion.   Neurological: He is alert and oriented to person, place, and time.   Skin: Skin is warm and dry.   Psychiatric: He has a normal mood and affect. His behavior is normal.   Nursing note and vitals reviewed.      Lab Results   Component Value Date     (H) 07/02/2020    CHLPL 135 07/02/2020    TRIG 93 07/02/2020    HDL 39 (L) 07/02/2020    LDL 77 07/02/2020    VLDL 18.6 07/02/2020    HGBA1C 6.00 (H) 07/02/2020        Assessment/Plan   Medicare Risks and Personalized Health Plan  CMS Preventative Services Quick Reference  Cardiovascular risk  Colon Cancer Screening  Immunizations Discussed/Encouraged (specific immunizations; Shingrix )  Prostate Cancer Screening     The above risks/problems have been discussed with the patient.  Pertinent information has been shared with the patient in the After Visit Summary.  Follow up plans and orders are seen below in the Assessment/Plan Section.    Diagnoses and all orders for this visit:    1. Essential hypertension (Primary)    2. CAD in native artery    3. Mixed hyperlipidemia    4. PAF (paroxysmal atrial fibrillation) (CMS/[facility])      Follow Up:  Return in about 6 months (around 1/7/2021).  Patient's blood pressure is adequately controlled his atrial fib fib is in sinus rhythm today he is not having chest discomfort.  We did discuss his PSA which shows only trace activity    An After Visit Summary and PPPS were given to the patient.

## 2020-07-16 ENCOUNTER — CLINICAL SUPPORT (OUTPATIENT)
Dept: INTERNAL MEDICINE | Facility: CLINIC | Age: 85
End: 2020-07-16

## 2020-07-16 DIAGNOSIS — D51.0 PERNICIOUS ANEMIA: ICD-10-CM

## 2020-07-16 PROCEDURE — 96372 THER/PROPH/DIAG INJ SC/IM: CPT | Performed by: INTERNAL MEDICINE

## 2020-07-16 RX ADMIN — CYANOCOBALAMIN 1000 MCG: 1000 INJECTION, SOLUTION INTRAMUSCULAR; SUBCUTANEOUS at 15:29

## 2020-08-05 ENCOUNTER — OFFICE VISIT (OUTPATIENT)
Dept: CARDIOLOGY | Facility: CLINIC | Age: 85
End: 2020-08-05

## 2020-08-05 VITALS
HEIGHT: 72 IN | HEART RATE: 97 BPM | OXYGEN SATURATION: 98 % | DIASTOLIC BLOOD PRESSURE: 72 MMHG | SYSTOLIC BLOOD PRESSURE: 163 MMHG | WEIGHT: 162 LBS | BODY MASS INDEX: 21.94 KG/M2

## 2020-08-05 DIAGNOSIS — I48.92 PAROXYSMAL ATRIAL FLUTTER (HCC): ICD-10-CM

## 2020-08-05 DIAGNOSIS — I49.5 SICK SINUS SYNDROME (HCC): ICD-10-CM

## 2020-08-05 DIAGNOSIS — Z86.010 HX OF COLONIC POLYPS: ICD-10-CM

## 2020-08-05 DIAGNOSIS — E78.2 MIXED HYPERLIPIDEMIA: ICD-10-CM

## 2020-08-05 DIAGNOSIS — I48.0 PAF (PAROXYSMAL ATRIAL FIBRILLATION) (HCC): ICD-10-CM

## 2020-08-05 DIAGNOSIS — Z95.0 PACEMAKER: ICD-10-CM

## 2020-08-05 DIAGNOSIS — I25.10 CAD IN NATIVE ARTERY: ICD-10-CM

## 2020-08-05 DIAGNOSIS — C61 MALIGNANT NEOPLASM OF PROSTATE (HCC): Primary | ICD-10-CM

## 2020-08-05 DIAGNOSIS — I10 HTN (HYPERTENSION), BENIGN: ICD-10-CM

## 2020-08-05 DIAGNOSIS — I10 ESSENTIAL HYPERTENSION: ICD-10-CM

## 2020-08-05 PROCEDURE — 99213 OFFICE O/P EST LOW 20 MIN: CPT | Performed by: INTERNAL MEDICINE

## 2020-08-05 PROCEDURE — 93000 ELECTROCARDIOGRAM COMPLETE: CPT | Performed by: INTERNAL MEDICINE

## 2020-08-05 NOTE — PROGRESS NOTES
Dexter Suggs  [id number]  1934  86 y.o.  male    Referring Provider: Jarad Alexis MD    Reason for Follow-up Visit:  Routine follow up.  coronary artery disease   sick sinus syndrome s/p pacemaker     Subjective    Feels same overall as during last visit  No new events or complaints since last visit except irina his neck while trying to garden  No major injuries   Overall the patient feels no major change from baseline symptoms   Similar symptoms as during last visit      Paroxysmal atrial flutter on pacer check     Mild chronic exertional shortness of breath on exertion relieved with rest  No significant cough or wheezing    No palpitations  No associated chest pain  No significant pedal edema    No fever or chills  No significant expectoration    No hemoptysis  No presyncope or syncope     Excellent effort tolerance with no cardiovascular limitations and at the patient's baseline   BP well controlled at home.      Tolerating current medications well with no untoward side effects     History of present illness:  Dexter Suggs is a 86 y.o. yo male with history of CAD sick sinus syndrome s/p pacemaker who presents today for   Chief Complaint   Patient presents with   • Coronary Artery Disease     7 MON FU    .    History  Past Medical History:   Diagnosis Date   • Abnormal nuclear stress test    • BPH (benign prostatic hyperplasia)    • Chest pain    • Coronary artery disease    • Disease of thyroid gland    • Hyperlipidemia    • Hypertension    • LV dysfunction    • Melanoma (CMS/HCC)    • Prostate CA (CMS/HCC)    ,   Past Surgical History:   Procedure Laterality Date   • APPENDECTOMY     • CARDIAC CATHETERIZATION Left 12/10/2012   • CARDIAC ELECTROPHYSIOLOGY PROCEDURE N/A 11/23/2018    Procedure: Pacemaker DC new 11/23/2018;  Surgeon: Austin Granados MD;  Location: [facility] CATH INVASIVE LOCATION;  Service: Cardiology   • CHOLECYSTECTOMY     • COLONOSCOPY N/A 6/9/2017    Procedure: COLONOSCOPY WITH  ANESTHESIA;  Surgeon: Felice Marroquin MD;  Location: [facility] ENDOSCOPY;  Service:    • COLONOSCOPY W/ POLYPECTOMY  2012    adenomatous polyp at 80cm   • HAND SURGERY Right    • HERNIA REPAIR     • INGUINAL HERNIA REPAIR     • KNEE SURGERY     • PROSTATE SURGERY     • SKIN CANCER EXCISION      face   • SKIN LESION EXCISION     ,   Family History   Problem Relation Age of Onset   • Alzheimer's disease Mother    • Cancer Father    • Cancer Sister    ,   Social History     Tobacco Use   • Smoking status: Former Smoker     Years: 30.00     Types: Cigarettes     Last attempt to quit: 1985     Years since quittin.6   • Smokeless tobacco: Never Used   Substance Use Topics   • Alcohol use: No   • Drug use: No   ,     Medications  Current Outpatient Medications   Medication Sig Dispense Refill   • allopurinol (ZYLOPRIM) 300 MG tablet Take 300 mg by mouth Daily.     • apixaban (ELIQUIS) 5 MG tablet tablet Take 1 tablet by mouth Every 12 (Twelve) Hours. 60 tablet 11   • aspirin 81 MG EC tablet Take 81 mg by mouth Daily.     • cyanocobalamin 1000 MCG/ML injection Inject 1 mL into the appropriate muscle as directed by prescriber Every 28 (Twenty-Eight) Days. 1 mL 2   • fluticasone (FLONASE) 50 MCG/ACT nasal spray 1 spray into the nostril(s) as directed by provider.     • glucosamine sulfate 500 MG capsule capsule Take  by mouth 3 (Three) Times a Day With Meals.     • levothyroxine (SYNTHROID, LEVOTHROID) 75 MCG tablet Take 75 mcg by mouth Daily.     • LORazepam (ATIVAN) 1 MG tablet TAKE 1 TABLET BY MOUTH EVERY DAY 90 tablet 1   • metoprolol succinate XL (TOPROL-XL) 25 MG 24 hr tablet Take 1 tablet by mouth Daily. 90 tablet 3   • nitroglycerin (NITROSTAT) 0.4 MG SL tablet DISSOLVE 1 UNDER TONGUE AS NEEDED FOR CHEST PAIN, MAY REPEAT TWICE IN 15 MIN PERIOD. 30 tablet 3   • pantoprazole (PROTONIX) 40 MG EC tablet TAKE 1 TABLET BY MOUTH EVERY DAY 90 tablet 3   • triamterene-hydrochlorothiazide (MAXZIDE) 75-50 MG per tablet  "Take 1 tablet by mouth Daily.       Current Facility-Administered Medications   Medication Dose Route Frequency Provider Last Rate Last Dose   • cyanocobalamin injection 1,000 mcg  1,000 mcg Intramuscular Q28 Days Jarad Alexis MD   1,000 mcg at 07/16/20 1529       Allergies:  Adhesive tape; Simvastatin; and Neosporin [neomycin-bacitracin zn-polymyx]    Review of Systems  Review of Systems   Constitution: Negative.   HENT: Negative.    Eyes: Negative.    Cardiovascular: Negative for chest pain, claudication, cyanosis, dyspnea on exertion, irregular heartbeat, leg swelling, near-syncope, orthopnea, palpitations, paroxysmal nocturnal dyspnea and syncope.   Respiratory: Negative.    Endocrine: Negative.    Hematologic/Lymphatic: Negative.    Skin: Negative.    Musculoskeletal: Positive for arthritis and back pain.   Gastrointestinal: Negative for anorexia.   Genitourinary: Negative.    Neurological: Negative.    Psychiatric/Behavioral: Negative.        Objective     Physical Exam:  /72   Pulse 97   Ht 182.9 cm (72\")   Wt 73.5 kg (162 lb)   SpO2 98%   BMI 21.97 kg/m²   Physical Exam   Constitutional: He appears well-developed.   HENT:   Head: Normocephalic.   Neck: Normal carotid pulses and no JVD present. No tracheal tenderness present. Carotid bruit is not present. No tracheal deviation and no edema present.   Cardiovascular: Regular rhythm, normal heart sounds and normal pulses.   Pulmonary/Chest: Effort normal. No stridor.   Abdominal: Soft. He exhibits no distension. There is no hepatosplenomegaly. There is no tenderness.   Neurological: He is alert. He has normal strength. No cranial nerve deficit or sensory deficit.   Skin: Skin is warm.   Psychiatric: He has a normal mood and affect. His speech is normal and behavior is normal.       Results Review:  Results for orders placed during the hospital encounter of 11/21/18   Adult Transthoracic Echo Complete W/ Cont if Necessary Per Protocol    " Narrative · Left ventricular systolic function is normal. Estimated EF = 65%.  · Left ventricular diastolic dysfunction.  · No evidence of pulmonary hypertension is present.             myocardial perfusion scan         · Left ventricular ejection fraction is normal (Calculated EF = 63%).  · Myocardial perfusion imaging indicates a medium-sized infarct located in the inferior wall, septal wall and apex with no significant ischemia noted.  · Abnormal LV wall motion consistent with mild hypokinesis of the inferior wall and septal wall.  Raw images reviewed with the following abnormalities noted: vertical motion.         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   • Actinic keratosis   • Benign prostatic hyperplasia with lower urinary tract symptoms   • Complete heart block (CMS/HCC)   • Pacemaker   • Sick sinus syndrome (CMS/HCC)   • Laceration of face without complication   • PAF (paroxysmal atrial fibrillation) (CMS/HCC)   • Paroxysmal atrial flutter (CMS/HCC)   • Pernicious anemia        ____________________________________________________________________________________________________________________________________________  Health maintenance and recommendations    Similar recommendations as last visit   Offered to give patient  a copy      Questions were encouraged, asked and answered to the patient's  understanding and satisfaction. Questions if any regarding current medications and side  effects, need for refills and importance of compliance to medications stressed.    Reviewed available prior notes, consults, prior visits, laboratory findings, radiology and cardiology relevant reports. Updated chart as applicable. I have reviewed the patient's medical history in detail and updated the computerized patient record as relevant.      Updated patient regarding any new or relevant abnormalities on review of records or any new findings on physical exam. Mentioned to patient about purpose of visit and desirable health short and long term goals and objectives.    Primary to monitor CBC CMP Lipid panel and TSH as applicable    ___________________________________________________________________________________________________________________________________________       Plan      Check BP and heart rates twice daily at least 3x / week at home and bring a recording for me to review next visit  If BP >140/90 or < 100/60 persistently over 3 reading 30 mins apart call sooner     Monitor cardiac rhythm device function regularly per established schedule or PRN      No additional cardiac testing required at this point in time.    Monitor for any signs of bleeding including red or dark stools. Fall precautions.   Patient is asked to monitor BP at home or work, several times per month and return with written values at next office visit.         Return in about 6 months (around 2/5/2021).

## 2020-08-17 ENCOUNTER — OFFICE VISIT (OUTPATIENT)
Dept: INTERNAL MEDICINE | Facility: CLINIC | Age: 85
End: 2020-08-17

## 2020-08-17 VITALS
TEMPERATURE: 98.2 F | HEIGHT: 72 IN | DIASTOLIC BLOOD PRESSURE: 68 MMHG | BODY MASS INDEX: 22.44 KG/M2 | WEIGHT: 165.7 LBS | OXYGEN SATURATION: 98 % | SYSTOLIC BLOOD PRESSURE: 140 MMHG | HEART RATE: 97 BPM

## 2020-08-17 DIAGNOSIS — M06.9 RHEUMATOID ARTHRITIS OF HAND, UNSPECIFIED LATERALITY, UNSPECIFIED RHEUMATOID FACTOR PRESENCE: Primary | ICD-10-CM

## 2020-08-17 DIAGNOSIS — C44.619 BASAL CELL CARCINOMA (BCC) OF SKIN OF LEFT UPPER EXTREMITY INCLUDING SHOULDER: ICD-10-CM

## 2020-08-17 DIAGNOSIS — D51.0 PERNICIOUS ANEMIA: ICD-10-CM

## 2020-08-17 PROBLEM — R74.8 ALKALINE PHOSPHATASE ELEVATION: Status: ACTIVE | Noted: 2020-08-17

## 2020-08-17 PROBLEM — E06.3 HYPOTHYROIDISM DUE TO HASHIMOTO'S THYROIDITIS: Status: ACTIVE | Noted: 2020-08-17

## 2020-08-17 PROBLEM — K21.9 GASTROESOPHAGEAL REFLUX DISEASE: Status: ACTIVE | Noted: 2020-08-17

## 2020-08-17 PROBLEM — C43.8: Status: ACTIVE | Noted: 2020-08-17

## 2020-08-17 PROBLEM — R73.01 IMPAIRED FASTING GLUCOSE: Status: ACTIVE | Noted: 2020-08-17

## 2020-08-17 PROBLEM — E03.8 HYPOTHYROIDISM DUE TO HASHIMOTO'S THYROIDITIS: Status: ACTIVE | Noted: 2020-08-17

## 2020-08-17 PROBLEM — D64.9 ANEMIA: Status: ACTIVE | Noted: 2020-08-17

## 2020-08-17 PROCEDURE — 99214 OFFICE O/P EST MOD 30 MIN: CPT | Performed by: INTERNAL MEDICINE

## 2020-08-17 PROCEDURE — 96372 THER/PROPH/DIAG INJ SC/IM: CPT | Performed by: INTERNAL MEDICINE

## 2020-08-17 RX ORDER — METHYLPREDNISOLONE 4 MG/1
TABLET ORAL
Qty: 1 TABLET | Refills: 0 | Status: SHIPPED | OUTPATIENT
Start: 2020-08-17 | End: 2020-12-01

## 2020-08-17 RX ADMIN — CYANOCOBALAMIN 1000 MCG: 1000 INJECTION, SOLUTION INTRAMUSCULAR; SUBCUTANEOUS at 09:54

## 2020-08-17 NOTE — PROGRESS NOTES
Subjective   Dexter Suggs is a 86 y.o. male.   Chief Complaint   Patient presents with   • Hand Pain     LEFT HAND PAIN AND SWELLING    • SPOT ON LEFT ARM       Patient is here for hand arthritis.  He is having difficulty with his MCPs both hands swelling as well as his wrist very stiff he has difficulty making a full fist.       The following portions of the patient's history were reviewed and updated as appropriate: allergies, current medications, past family history, past medical history, past social history, past surgical history and problem list.    Review of Systems   Constitutional: Negative for activity change, appetite change, fatigue, fever, unexpected weight gain and unexpected weight loss.   HENT: Negative for swollen glands, trouble swallowing and voice change.    Eyes: Negative for blurred vision and visual disturbance.   Respiratory: Negative for cough and shortness of breath.    Cardiovascular: Negative for chest pain, palpitations and leg swelling.   Gastrointestinal: Negative for abdominal pain, constipation, diarrhea, nausea, vomiting and indigestion.   Endocrine: Negative for cold intolerance, heat intolerance, polydipsia and polyphagia.   Genitourinary: Negative for dysuria and frequency.   Musculoskeletal: Positive for arthralgias ( Both hands and wrist). Negative for back pain, joint swelling and neck pain.   Skin: Negative for color change, rash and skin lesions.   Neurological: Negative for dizziness, weakness, headache, memory problem and confusion.   Hematological: Does not bruise/bleed easily.   Psychiatric/Behavioral: Negative for agitation, hallucinations and suicidal ideas. The patient is not nervous/anxious.        Objective   Past Medical History:   Diagnosis Date   • Abnormal nuclear stress test    • BPH (benign prostatic hyperplasia)    • Chest pain    • Coronary artery disease    • Disease of thyroid gland    • Hyperlipidemia    • Hypertension    • LV dysfunction    • Melanoma  (CMS/HCC)    • Prostate CA (CMS/HCC)       Past Surgical History:   Procedure Laterality Date   • APPENDECTOMY     • CARDIAC CATHETERIZATION Left 12/10/2012   • CARDIAC ELECTROPHYSIOLOGY PROCEDURE N/A 11/23/2018    Procedure: Pacemaker DC new 11/23/2018;  Surgeon: Austin Granados MD;  Location:  [facility] CATH INVASIVE LOCATION;  Service: Cardiology   • CHOLECYSTECTOMY     • COLONOSCOPY N/A 6/9/2017    Procedure: COLONOSCOPY WITH ANESTHESIA;  Surgeon: Felice Marroquin MD;  Location:  [facility] ENDOSCOPY;  Service:    • COLONOSCOPY W/ POLYPECTOMY  02/16/2012    adenomatous polyp at 80cm   • HAND SURGERY Right    • HERNIA REPAIR     • INGUINAL HERNIA REPAIR     • KNEE SURGERY     • PROSTATE SURGERY     • SKIN CANCER EXCISION      face   • SKIN LESION EXCISION          Current Outpatient Medications:   •  allopurinol (ZYLOPRIM) 300 MG tablet, Take 300 mg by mouth Daily., Disp: , Rfl:   •  apixaban (ELIQUIS) 5 MG tablet tablet, Take 1 tablet by mouth Every 12 (Twelve) Hours., Disp: 60 tablet, Rfl: 11  •  aspirin 81 MG EC tablet, Take 81 mg by mouth Daily., Disp: , Rfl:   •  cyanocobalamin 1000 MCG/ML injection, Inject 1 mL into the appropriate muscle as directed by prescriber Every 28 (Twenty-Eight) Days., Disp: 1 mL, Rfl: 2  •  fluticasone (FLONASE) 50 MCG/ACT nasal spray, 1 spray into the nostril(s) as directed by provider., Disp: , Rfl:   •  glucosamine sulfate 500 MG capsule capsule, Take  by mouth 3 (Three) Times a Day With Meals., Disp: , Rfl:   •  levothyroxine (SYNTHROID, LEVOTHROID) 75 MCG tablet, Take 75 mcg by mouth Daily., Disp: , Rfl:   •  LORazepam (ATIVAN) 1 MG tablet, TAKE 1 TABLET BY MOUTH EVERY DAY, Disp: 90 tablet, Rfl: 1  •  metoprolol succinate XL (TOPROL-XL) 25 MG 24 hr tablet, Take 1 tablet by mouth Daily., Disp: 90 tablet, Rfl: 3  •  nitroglycerin (NITROSTAT) 0.4 MG SL tablet, DISSOLVE 1 UNDER TONGUE AS NEEDED FOR CHEST PAIN, MAY REPEAT TWICE IN 15 MIN PERIOD., Disp: 30 tablet, Rfl: 3  •  pantoprazole  (PROTONIX) 40 MG EC tablet, TAKE 1 TABLET BY MOUTH EVERY DAY, Disp: 90 tablet, Rfl: 3  •  triamterene-hydrochlorothiazide (MAXZIDE) 75-50 MG per tablet, Take 1 tablet by mouth Daily., Disp: , Rfl:   •  methylPREDNISolone (MEDROL, ARON,) 4 MG tablet, Take as directed on package instructions., Disp: 1 tablet, Rfl: 0    Current Facility-Administered Medications:   •  cyanocobalamin injection 1,000 mcg, 1,000 mcg, Intramuscular, Q28 Days, Jarad Alexis MD, 1,000 mcg at 08/17/20 0954     Vitals:    08/17/20 0940   BP: 140/68   Pulse: 97   Temp: 98.2 °F (36.8 °C)   SpO2: 98%         08/17/20  0940   Weight: 75.2 kg (165 lb 11.2 oz)     Patient's Body mass index is 22.47 kg/m². BMI is .      Physical Exam   Constitutional: He is oriented to person, place, and time. He appears well-developed and well-nourished.   HENT:   Head: Normocephalic and atraumatic.   Right Ear: External ear normal.   Left Ear: External ear normal.   Nose: Nose normal.   Mouth/Throat: Oropharynx is clear and moist.   Eyes: Pupils are equal, round, and reactive to light. Conjunctivae and EOM are normal.   Neck: Normal range of motion. Neck supple. No thyromegaly present.   Cardiovascular: Normal rate, regular rhythm, normal heart sounds and intact distal pulses.   Pulmonary/Chest: Effort normal and breath sounds normal.   Abdominal: Soft. Bowel sounds are normal.   Lymphadenopathy:     He has no cervical adenopathy.   Neurological: He is alert and oriented to person, place, and time.   Skin: Skin is warm and dry.   Pearly lesion dorsum of left forearm measures about 8 mm   Psychiatric: He has a normal mood and affect. His behavior is normal. Thought content normal.   Nursing note and vitals reviewed.            Assessment/Plan   Diagnoses and all orders for this visit:    1. Rheumatoid arthritis of hand, unspecified laterality, unspecified rheumatoid factor presence (CMS/[facility]) (Primary)  -     Rheumatoid Factor  -     Sedimentation Rate  -      Antinuclear Antibodies Direct    2. Pernicious anemia    3. Basal cell carcinoma (BCC) of skin of left upper extremity including shoulder  -     Ambulatory Referral to ENT (Otolaryngology)    Other orders  -     methylPREDNISolone (MEDROL, ARON,) 4 MG tablet; Take as directed on package instructions.  Dispense: 1 tablet; Refill: 0    This appears to be a new onset rheumatoid arthritis both MCPs as well as his left wrist I am giving him a round of steroids running the usual rheumatoid sed rate MARY ANN.  Coincidently noticed a lesion on the dorsum of his left forearm which appears to be a basal cell cancer tried to make an appoint with Dr. Chavez however he is wanting to see Dr. Martin who is done all of his facial skin cancers.  We will check and see if Dr. Martin will remove this.           Injection

## 2020-08-19 LAB
ANA TITR SER IF: NEGATIVE {TITER}
ERYTHROCYTE [SEDIMENTATION RATE] IN BLOOD BY WESTERGREN METHOD: 81 MM/HR (ref 0–20)
RHEUMATOID FACT SERPL-ACNC: <10 IU/ML (ref 0–13.9)

## 2020-08-25 DIAGNOSIS — M06.9 RHEUMATOID ARTHRITIS OF HAND, UNSPECIFIED LATERALITY, UNSPECIFIED RHEUMATOID FACTOR PRESENCE: Primary | ICD-10-CM

## 2020-08-25 RX ORDER — LEVOTHYROXINE SODIUM 0.07 MG/1
TABLET ORAL
Qty: 90 TABLET | Refills: 3 | Status: SHIPPED | OUTPATIENT
Start: 2020-08-25 | End: 2021-05-25

## 2020-08-26 ENCOUNTER — TELEPHONE (OUTPATIENT)
Dept: INTERNAL MEDICINE | Facility: CLINIC | Age: 85
End: 2020-08-26

## 2020-08-26 RX ORDER — PREDNISONE 1 MG/1
5 TABLET ORAL DAILY
Qty: 7 TABLET | Refills: 0 | Status: SHIPPED | OUTPATIENT
Start: 2020-08-26 | End: 2020-12-01

## 2020-08-26 NOTE — TELEPHONE ENCOUNTER
Called wife and she says that when she got home yesterday and was relaying the information we had given her, he said that his hand(s) did get better for a day or two, but now swollen and painful again, so will place him on Prednisone 5mg daily, until his appt here on 8/31, as per Dr. Alexis's previous plan.

## 2020-08-26 NOTE — TELEPHONE ENCOUNTER
PATIENTS WIFE CALLING IN STATING THAT  WOULD LIKE TO START TAKING THE MEDICATION THAT DR. BARRY HAS SUGGESTED. PREDNISONE.     CONFIRMED PHARMACY: [facility] - [address] - [phone]  - [phone]   [phone]

## 2020-08-31 ENCOUNTER — OFFICE VISIT (OUTPATIENT)
Dept: INTERNAL MEDICINE | Facility: CLINIC | Age: 85
End: 2020-08-31

## 2020-08-31 VITALS
SYSTOLIC BLOOD PRESSURE: 130 MMHG | WEIGHT: 167.8 LBS | DIASTOLIC BLOOD PRESSURE: 78 MMHG | OXYGEN SATURATION: 99 % | HEIGHT: 72 IN | BODY MASS INDEX: 22.73 KG/M2 | TEMPERATURE: 97.7 F | HEART RATE: 124 BPM

## 2020-08-31 DIAGNOSIS — M19.041 PRIMARY OSTEOARTHRITIS OF BOTH HANDS: ICD-10-CM

## 2020-08-31 DIAGNOSIS — I10 ESSENTIAL HYPERTENSION: ICD-10-CM

## 2020-08-31 DIAGNOSIS — M72.0 DUPUYTREN'S CONTRACTURE OF BOTH HANDS: Primary | ICD-10-CM

## 2020-08-31 DIAGNOSIS — I44.2 COMPLETE HEART BLOCK (HCC): ICD-10-CM

## 2020-08-31 DIAGNOSIS — M19.042 PRIMARY OSTEOARTHRITIS OF BOTH HANDS: ICD-10-CM

## 2020-08-31 PROCEDURE — 99213 OFFICE O/P EST LOW 20 MIN: CPT | Performed by: INTERNAL MEDICINE

## 2020-08-31 NOTE — PROGRESS NOTES
Subjective   Dexter Suggs is a 86 y.o. male.   Chief Complaint   Patient presents with   • Follow-up     LT HAND PAIN        Patient is here complaining of pain in both hands left worse than right he is having difficulty straightening his fingers.  He recently took a Medrol Dosepak however he is still having pains in his hands.       The following portions of the patient's history were reviewed and updated as appropriate: allergies, current medications, past family history, past medical history, past social history, past surgical history and problem list.    Review of Systems   Constitutional: Negative for activity change, appetite change, fatigue, fever, unexpected weight gain and unexpected weight loss.   HENT: Negative for swollen glands, trouble swallowing and voice change.    Eyes: Negative for blurred vision and visual disturbance.   Respiratory: Negative for cough and shortness of breath.    Cardiovascular: Negative for chest pain, palpitations and leg swelling.   Gastrointestinal: Negative for abdominal pain, constipation, diarrhea, nausea, vomiting and indigestion.   Endocrine: Negative for cold intolerance, heat intolerance, polydipsia and polyphagia.   Genitourinary: Negative for dysuria and frequency.   Musculoskeletal: Positive for arthralgias ( Bilateral hand pain and stiffness stiffness is worse in the right than the left). Negative for back pain, joint swelling and neck pain.   Skin: Negative for color change, rash and skin lesions.   Neurological: Negative for dizziness, weakness, headache, memory problem and confusion.   Hematological: Does not bruise/bleed easily.   Psychiatric/Behavioral: Negative for agitation, hallucinations and suicidal ideas. The patient is not nervous/anxious.        Objective   Past Medical History:   Diagnosis Date   • Abnormal nuclear stress test    • BPH (benign prostatic hyperplasia)    • Chest pain    • Coronary artery disease    • Disease of thyroid gland    •  Hyperlipidemia    • Hypertension    • LV dysfunction    • Melanoma (CMS/HCC)    • Prostate CA (CMS/HCC)       Past Surgical History:   Procedure Laterality Date   • APPENDECTOMY     • CARDIAC CATHETERIZATION Left 12/10/2012   • CARDIAC ELECTROPHYSIOLOGY PROCEDURE N/A 11/23/2018    Procedure: Pacemaker DC new 11/23/2018;  Surgeon: Austin Granados MD;  Location:  [facility] CATH INVASIVE LOCATION;  Service: Cardiology   • CHOLECYSTECTOMY     • COLONOSCOPY N/A 6/9/2017    Procedure: COLONOSCOPY WITH ANESTHESIA;  Surgeon: Felice Marroquin MD;  Location:  [facility] ENDOSCOPY;  Service:    • COLONOSCOPY W/ POLYPECTOMY  02/16/2012    adenomatous polyp at 80cm   • HAND SURGERY Right    • HERNIA REPAIR     • INGUINAL HERNIA REPAIR     • KNEE SURGERY     • PROSTATE SURGERY     • SKIN CANCER EXCISION      face   • SKIN LESION EXCISION          Current Outpatient Medications:   •  allopurinol (ZYLOPRIM) 300 MG tablet, Take 300 mg by mouth Daily., Disp: , Rfl:   •  apixaban (ELIQUIS) 5 MG tablet tablet, Take 1 tablet by mouth Every 12 (Twelve) Hours., Disp: 60 tablet, Rfl: 11  •  aspirin 81 MG EC tablet, Take 81 mg by mouth Daily., Disp: , Rfl:   •  cyanocobalamin 1000 MCG/ML injection, Inject 1 mL into the appropriate muscle as directed by prescriber Every 28 (Twenty-Eight) Days., Disp: 1 mL, Rfl: 2  •  fluticasone (FLONASE) 50 MCG/ACT nasal spray, 1 spray into the nostril(s) as directed by provider., Disp: , Rfl:   •  glucosamine sulfate 500 MG capsule capsule, Take  by mouth 3 (Three) Times a Day With Meals., Disp: , Rfl:   •  levothyroxine (SYNTHROID, LEVOTHROID) 75 MCG tablet, TAKE 1 TABLET BY MOUTH EVERY DAY , Disp: 90 tablet, Rfl: 3  •  LORazepam (ATIVAN) 1 MG tablet, TAKE 1 TABLET BY MOUTH EVERY DAY, Disp: 90 tablet, Rfl: 1  •  methylPREDNISolone (MEDROL, ARON,) 4 MG tablet, Take as directed on package instructions., Disp: 1 tablet, Rfl: 0  •  metoprolol succinate XL (TOPROL-XL) 25 MG 24 hr tablet, Take 1 tablet by mouth Daily.,  Disp: 90 tablet, Rfl: 3  •  nitroglycerin (NITROSTAT) 0.4 MG SL tablet, DISSOLVE 1 UNDER TONGUE AS NEEDED FOR CHEST PAIN, MAY REPEAT TWICE IN 15 MIN PERIOD., Disp: 30 tablet, Rfl: 3  •  pantoprazole (PROTONIX) 40 MG EC tablet, TAKE 1 TABLET BY MOUTH EVERY DAY, Disp: 90 tablet, Rfl: 3  •  predniSONE (DELTASONE) 5 MG tablet, Take 1 tablet by mouth Daily., Disp: 7 tablet, Rfl: 0  •  triamterene-hydrochlorothiazide (MAXZIDE) 75-50 MG per tablet, Take 1 tablet by mouth Daily., Disp: , Rfl:     Current Facility-Administered Medications:   •  cyanocobalamin injection 1,000 mcg, 1,000 mcg, Intramuscular, Q28 Days, Jarad Alexis MD, 1,000 mcg at 08/17/20 0954     Vitals:    08/31/20 1451   BP: 130/78   Pulse: (!) 124   Temp: 97.7 °F (36.5 °C)   SpO2: 99%         08/31/20  1451   Weight: 76.1 kg (167 lb 12.8 oz)     Patient's Body mass index is 22.76 kg/m². BMI is .      Physical Exam   Constitutional: He is oriented to person, place, and time. He appears well-developed and well-nourished.   HENT:   Head: Normocephalic and atraumatic.   Right Ear: External ear normal.   Left Ear: External ear normal.   Nose: Nose normal.   Mouth/Throat: Oropharynx is clear and moist.   Eyes: Pupils are equal, round, and reactive to light. Conjunctivae and EOM are normal.   Neck: Normal range of motion. Neck supple. No thyromegaly present.   Cardiovascular: Normal rate, regular rhythm, normal heart sounds and intact distal pulses.   Pulmonary/Chest: Effort normal and breath sounds normal.   Abdominal: Soft. Bowel sounds are normal.   Musculoskeletal:   Bilateral Dupuytren contractures as well as deformities of PIP and DIP joints   Lymphadenopathy:     He has no cervical adenopathy.   Neurological: He is alert and oriented to person, place, and time.   Skin: Skin is warm and dry.   Psychiatric: He has a normal mood and affect. His behavior is normal. Thought content normal.   Nursing note and vitals reviewed.            Assessment/Plan    Diagnoses and all orders for this visit:    1. Dupuytren's contracture of both hands (Primary)    2. Essential hypertension    3. Complete heart block (CMS/HCC)    4. Primary osteoarthritis of both hands    Other orders  -     Ambulatory Referral to Hand Surgery      Will refer to hand surgery for Dupuytren's contractures I am not adding additional medication today as he is recently completed a steroid Dosepak and is trying some over-the- creams.

## 2020-09-10 ENCOUNTER — TRANSCRIBE ORDERS (OUTPATIENT)
Dept: ADMINISTRATIVE | Facility: HOSPITAL | Age: 85
End: 2020-09-10

## 2020-09-10 DIAGNOSIS — Z01.818 PREOP TESTING: Primary | ICD-10-CM

## 2020-10-15 ENCOUNTER — CLINICAL SUPPORT (OUTPATIENT)
Dept: INTERNAL MEDICINE | Facility: CLINIC | Age: 85
End: 2020-10-15

## 2020-10-15 DIAGNOSIS — D51.0 PERNICIOUS ANEMIA: Primary | ICD-10-CM

## 2020-10-15 PROCEDURE — 96372 THER/PROPH/DIAG INJ SC/IM: CPT | Performed by: INTERNAL MEDICINE

## 2020-10-15 RX ADMIN — CYANOCOBALAMIN 1000 MCG: 1000 INJECTION, SOLUTION INTRAMUSCULAR; SUBCUTANEOUS at 11:16

## 2020-10-19 RX ORDER — ALLOPURINOL 300 MG/1
TABLET ORAL
Qty: 90 TABLET | Refills: 3 | Status: SHIPPED | OUTPATIENT
Start: 2020-10-19 | End: 2021-11-08

## 2020-11-03 RX ORDER — TRIAMTERENE AND HYDROCHLOROTHIAZIDE 75; 50 MG/1; MG/1
TABLET ORAL
Qty: 45 TABLET | Refills: 3 | Status: SHIPPED | OUTPATIENT
Start: 2020-11-03 | End: 2021-11-23

## 2020-11-10 DIAGNOSIS — F41.9 ANXIETY: ICD-10-CM

## 2020-11-10 RX ORDER — LORAZEPAM 1 MG/1
TABLET ORAL
Qty: 90 TABLET | Refills: 1 | Status: SHIPPED | OUTPATIENT
Start: 2020-11-10 | End: 2021-05-10

## 2020-11-10 RX ORDER — APIXABAN 5 MG/1
TABLET, FILM COATED ORAL
Qty: 180 TABLET | Refills: 4 | Status: SHIPPED | OUTPATIENT
Start: 2020-11-10 | End: 2021-12-01

## 2020-11-17 ENCOUNTER — CLINICAL SUPPORT (OUTPATIENT)
Dept: INTERNAL MEDICINE | Facility: CLINIC | Age: 85
End: 2020-11-17

## 2020-11-17 DIAGNOSIS — D51.0 PERNICIOUS ANEMIA: ICD-10-CM

## 2020-11-17 PROCEDURE — 96372 THER/PROPH/DIAG INJ SC/IM: CPT | Performed by: INTERNAL MEDICINE

## 2020-11-17 RX ADMIN — CYANOCOBALAMIN 1000 MCG: 1000 INJECTION, SOLUTION INTRAMUSCULAR; SUBCUTANEOUS at 11:35

## 2020-12-01 ENCOUNTER — OFFICE VISIT (OUTPATIENT)
Dept: INTERNAL MEDICINE | Facility: CLINIC | Age: 85
End: 2020-12-01

## 2020-12-01 ENCOUNTER — HOSPITAL ENCOUNTER (OUTPATIENT)
Dept: GENERAL RADIOLOGY | Facility: HOSPITAL | Age: 85
Discharge: HOME | End: 2020-12-01
Admitting: INTERNAL MEDICINE

## 2020-12-01 VITALS
DIASTOLIC BLOOD PRESSURE: 80 MMHG | OXYGEN SATURATION: 98 % | SYSTOLIC BLOOD PRESSURE: 138 MMHG | WEIGHT: 162 LBS | HEIGHT: 72 IN | BODY MASS INDEX: 21.94 KG/M2 | TEMPERATURE: 97.1 F | HEART RATE: 104 BPM

## 2020-12-01 DIAGNOSIS — M06.041 RHEUMATOID ARTHRITIS INVOLVING BOTH HANDS WITH NEGATIVE RHEUMATOID FACTOR (HCC): ICD-10-CM

## 2020-12-01 DIAGNOSIS — M06.042 RHEUMATOID ARTHRITIS INVOLVING BOTH HANDS WITH NEGATIVE RHEUMATOID FACTOR (HCC): ICD-10-CM

## 2020-12-01 DIAGNOSIS — B35.3 TINEA PEDIS, UNSPECIFIED LATERALITY: ICD-10-CM

## 2020-12-01 DIAGNOSIS — E11.9 ENCOUNTER FOR DIABETIC FOOT EXAM (HCC): ICD-10-CM

## 2020-12-01 DIAGNOSIS — R73.01 IMPAIRED FASTING GLUCOSE: Primary | ICD-10-CM

## 2020-12-01 LAB — HBA1C MFR BLD: 5.6 %

## 2020-12-01 PROCEDURE — 71046 X-RAY EXAM CHEST 2 VIEWS: CPT

## 2020-12-01 PROCEDURE — 99214 OFFICE O/P EST MOD 30 MIN: CPT | Performed by: INTERNAL MEDICINE

## 2020-12-01 PROCEDURE — 83036 HEMOGLOBIN GLYCOSYLATED A1C: CPT | Performed by: INTERNAL MEDICINE

## 2020-12-01 RX ORDER — PRENATAL VIT 91/IRON/FOLIC/DHA 28-975-200
COMBINATION PACKAGE (EA) ORAL NIGHTLY
Qty: 1 EACH | Refills: 0 | Status: SHIPPED | OUTPATIENT
Start: 2020-12-01 | End: 2021-05-26

## 2020-12-01 RX ORDER — BENZONATATE 100 MG/1
100 CAPSULE ORAL 3 TIMES DAILY PRN
Qty: 30 CAPSULE | Refills: 1 | Status: SHIPPED | OUTPATIENT
Start: 2020-12-01 | End: 2021-02-24

## 2020-12-01 NOTE — PROGRESS NOTES
Subjective   Dexter Suggs is a 86 y.o. male.   Chief Complaint   Patient presents with   • Hypertension     3 MONTH FOLLOW UP    • Prediabetes     A1C: 5.6       Patient is continuing to have hand pain he is unable to make a full fist. Had seen his orthopedic surgeon and was placed on some topical diclofenac. Last visit I had offered to make patient an appointment with Dr. Fernandez I had run some blood work including sed rate rheumatoid factor MARY ANN. Sed rate was elevated approximately 80. Rheumatoid factor was negative my working diagnosis was seronegative rheumatoid arthritis. Patient responded nicely to steroids but has residual arthritic MCP changes.       The following portions of the patient's history were reviewed and updated as appropriate: allergies, current medications, past family history, past medical history, past social history, past surgical history and problem list.    Review of Systems   Constitutional: Negative for activity change, appetite change, fatigue, fever, unexpected weight gain and unexpected weight loss.   HENT: Negative for swollen glands, trouble swallowing and voice change.    Eyes: Negative for blurred vision and visual disturbance.   Respiratory: Negative for cough and shortness of breath.    Cardiovascular: Negative for chest pain, palpitations and leg swelling.   Gastrointestinal: Positive for diarrhea ( OCCASIONAL). Negative for abdominal pain, constipation, nausea, vomiting and indigestion.   Endocrine: Negative for cold intolerance, heat intolerance, polydipsia and polyphagia.   Genitourinary: Negative for dysuria and frequency.   Musculoskeletal: Positive for arthralgias ( HANDS). Negative for back pain, joint swelling and neck pain.   Skin: Negative for color change, rash and skin lesions.   Neurological: Negative for dizziness, weakness, headache, memory problem and confusion.   Hematological: Does not bruise/bleed easily.   Psychiatric/Behavioral: Negative for agitation,  hallucinations and suicidal ideas. The patient is not nervous/anxious.        Objective   Past Medical History:   Diagnosis Date   • Abnormal nuclear stress test    • BPH (benign prostatic hyperplasia)    • Chest pain    • Coronary artery disease    • Disease of thyroid gland    • Hyperlipidemia    • Hypertension    • LV dysfunction    • Melanoma (CMS/HCC)    • Prostate CA (CMS/HCC)       Past Surgical History:   Procedure Laterality Date   • APPENDECTOMY     • CARDIAC CATHETERIZATION Left 12/10/2012   • CARDIAC ELECTROPHYSIOLOGY PROCEDURE N/A 11/23/2018    Procedure: Pacemaker DC new 11/23/2018;  Surgeon: Austin Granados MD;  Location:  [facility];  Service: Cardiology   • CHOLECYSTECTOMY     • COLONOSCOPY N/A 6/9/2017    Procedure: COLONOSCOPY WITH ANESTHESIA;  Surgeon: Felice Marroquin MD;  Location:  [facility] ENDOSCOPY;  Service:    • COLONOSCOPY W/ POLYPECTOMY  02/16/2012    adenomatous polyp at 80cm   • HAND SURGERY Right    • HERNIA REPAIR     • INGUINAL HERNIA REPAIR     • KNEE SURGERY     • PROSTATE SURGERY     • SKIN CANCER EXCISION      face   • SKIN LESION EXCISION          Current Outpatient Medications:   •  allopurinol (ZYLOPRIM) 300 MG tablet, TAKE 1 TABLET BY MOUTH EVERY DAY , Disp: 90 tablet, Rfl: 3  •  aspirin 81 MG EC tablet, Take 81 mg by mouth Daily., Disp: , Rfl:   •  cyanocobalamin 1000 MCG/ML injection, Inject 1 mL into the appropriate muscle as directed by prescriber Every 28 (Twenty-Eight) Days., Disp: 1 mL, Rfl: 2  •  Eliquis 5 MG tablet tablet, TAKE 1 TABLET BY MOUTH EVERY 12 HOURS , Disp: 180 tablet, Rfl: 4  •  fluticasone (FLONASE) 50 MCG/ACT nasal spray, 1 spray into the nostril(s) as directed by provider., Disp: , Rfl:   •  glucosamine sulfate 500 MG capsule capsule, Take  by mouth 3 (Three) Times a Day With Meals., Disp: , Rfl:   •  levothyroxine (SYNTHROID, LEVOTHROID) 75 MCG tablet, TAKE 1 TABLET BY MOUTH EVERY DAY , Disp: 90 tablet, Rfl: 3  •  LORazepam (ATIVAN) 1 MG  tablet, TAKE 1 TABLET BY MOUTH EVERY DAY , Disp: 90 tablet, Rfl: 1  •  metoprolol succinate XL (TOPROL-XL) 25 MG 24 hr tablet, Take 1 tablet by mouth Daily., Disp: 90 tablet, Rfl: 3  •  nitroglycerin (NITROSTAT) 0.4 MG SL tablet, DISSOLVE 1 UNDER TONGUE AS NEEDED FOR CHEST PAIN, MAY REPEAT TWICE IN 15 MIN PERIOD., Disp: 30 tablet, Rfl: 3  •  pantoprazole (PROTONIX) 40 MG EC tablet, TAKE 1 TABLET BY MOUTH EVERY DAY, Disp: 90 tablet, Rfl: 3  •  triamterene-hydrochlorothiazide (MAXZIDE) 75-50 MG per tablet, TAKE 1/2 TABLET BY MOUTH EVERY DAY , Disp: 45 tablet, Rfl: 3  •  terbinafine (LamISIL AT) 1 % cream, Apply  topically to the appropriate area as directed Every Night., Disp: 1 each, Rfl: 0    Current Facility-Administered Medications:   •  cyanocobalamin injection 1,000 mcg, 1,000 mcg, Intramuscular, Q28 Days, Jarad Alexis MD, 1,000 mcg at 11/17/20 1135     Vitals:    12/01/20 0943   BP: 138/80   Pulse: 104   Temp: 97.1 °F (36.2 °C)   SpO2: 98%         12/01/20  0943   Weight: 73.5 kg (162 lb)     Patient's Body mass index is 21.97 kg/m². BMI is .      Physical Exam  Vitals signs and nursing note reviewed.   Constitutional:       General: He is not in acute distress.     Appearance: Normal appearance. He is well-developed.   HENT:      Head: Normocephalic and atraumatic.      Right Ear: External ear normal.      Left Ear: External ear normal.      Nose: Nose normal.   Eyes:      Extraocular Movements: Extraocular movements intact.      Conjunctiva/sclera: Conjunctivae normal.      Pupils: Pupils are equal, round, and reactive to light.   Neck:      Musculoskeletal: Normal range of motion and neck supple. No neck rigidity or muscular tenderness.   Cardiovascular:      Rate and Rhythm: Normal rate and regular rhythm.      Pulses: Normal pulses.      Heart sounds: Normal heart sounds.   Pulmonary:      Effort: Pulmonary effort is normal.      Breath sounds: Normal breath sounds.   Abdominal:      General: Bowel  sounds are normal.      Palpations: Abdomen is soft.   Musculoskeletal: Normal range of motion.         General: Deformity ( BOTH HANDS) present.   Skin:     General: Skin is warm and dry.   Neurological:      General: No focal deficit present.      Mental Status: He is alert and oriented to person, place, and time.   Psychiatric:         Mood and Affect: Mood normal.         Behavior: Behavior normal.               Assessment/Plan   Diagnoses and all orders for this visit:    1. Impaired fasting glucose (Primary)  -     POC Glycosylated Hemoglobin (Hb A1C)    2. Encounter for diabetic foot exam (CMS/[facility])  -     POC Glycosylated Hemoglobin (Hb A1C)    3. Rheumatoid arthritis involving both hands with negative rheumatoid factor (CMS/[facility])  -     Ambulatory Referral to Rheumatology  -     XR Chest 2 View; Future    4. Tinea pedis, unspecified laterality    Other orders  -     terbinafine (LamISIL AT) 1 % cream; Apply  topically to the appropriate area as directed Every Night.  Dispense: 1 each; Refill: 0        Patient has what appears to be tenia pedis on his foot. He also has what appears to be seronegative rheumatoid arthritis and he has agreed to seeing a rheumatologist at this point I think treatment with alternative agents like methotrexate would be appropriate at this point I am not prescribing anything additional will wait for his consultation.

## 2020-12-16 PROCEDURE — U0004 COV-19 TEST NON-CDC HGH THRU: HCPCS | Performed by: FAMILY MEDICINE

## 2021-01-04 ENCOUNTER — TELEPHONE (OUTPATIENT)
Dept: INTERNAL MEDICINE | Facility: CLINIC | Age: 86
End: 2021-01-04

## 2021-01-08 ENCOUNTER — OFFICE VISIT (OUTPATIENT)
Dept: INTERNAL MEDICINE | Facility: CLINIC | Age: 86
End: 2021-01-08

## 2021-01-08 ENCOUNTER — HOSPITAL ENCOUNTER (OUTPATIENT)
Dept: INFUSION THERAPY | Facility: HOSPITAL | Age: 86
Discharge: HOME | End: 2021-01-08
Admitting: INTERNAL MEDICINE

## 2021-01-08 VITALS
RESPIRATION RATE: 18 BRPM | HEART RATE: 72 BPM | TEMPERATURE: 101.1 F | SYSTOLIC BLOOD PRESSURE: 121 MMHG | DIASTOLIC BLOOD PRESSURE: 57 MMHG | OXYGEN SATURATION: 100 %

## 2021-01-08 VITALS — TEMPERATURE: 98.5 F

## 2021-01-08 DIAGNOSIS — U07.1 COVID-19 VIRUS INFECTION: Primary | ICD-10-CM

## 2021-01-08 PROCEDURE — 25010000006 BAMLANIVIMAB 700 MG/20ML SOLUTION 20 ML VIAL: Performed by: INTERNAL MEDICINE

## 2021-01-08 PROCEDURE — 87636 SARSCOV2 & INF A&B AMP PRB: CPT | Performed by: NURSE PRACTITIONER

## 2021-01-08 PROCEDURE — 99442 PR PHYS/QHP TELEPHONE EVALUATION 11-20 MIN: CPT | Performed by: INTERNAL MEDICINE

## 2021-01-08 PROCEDURE — M0239 BAMLANIVIMAB-XXXX INFUSION: HCPCS | Performed by: INTERNAL MEDICINE

## 2021-01-08 PROCEDURE — 96413 CHEMO IV INFUSION 1 HR: CPT

## 2021-01-08 RX ORDER — EPINEPHRINE 1 MG/ML
0.3 INJECTION, SOLUTION, CONCENTRATE INTRAVENOUS AS NEEDED
Status: CANCELLED | OUTPATIENT
Start: 2021-01-08

## 2021-01-08 RX ORDER — SODIUM CHLORIDE 9 MG/ML
250 INJECTION, SOLUTION INTRAVENOUS ONCE
Status: CANCELLED | OUTPATIENT
Start: 2021-01-08

## 2021-01-08 RX ORDER — DIPHENHYDRAMINE HYDROCHLORIDE 50 MG/ML
50 INJECTION INTRAMUSCULAR; INTRAVENOUS AS NEEDED
Status: CANCELLED | OUTPATIENT
Start: 2021-01-08

## 2021-01-08 RX ORDER — DIPHENHYDRAMINE HYDROCHLORIDE 50 MG/ML
50 INJECTION INTRAMUSCULAR; INTRAVENOUS AS NEEDED
Status: DISCONTINUED | OUTPATIENT
Start: 2021-01-08 | End: 2021-01-10 | Stop reason: HOSPADM

## 2021-01-08 RX ORDER — SODIUM CHLORIDE 9 MG/ML
250 INJECTION, SOLUTION INTRAVENOUS ONCE
Status: COMPLETED | OUTPATIENT
Start: 2021-01-08 | End: 2021-01-08

## 2021-01-08 RX ORDER — METHYLPREDNISOLONE SODIUM SUCCINATE 125 MG/2ML
125 INJECTION, POWDER, LYOPHILIZED, FOR SOLUTION INTRAMUSCULAR; INTRAVENOUS AS NEEDED
Status: DISCONTINUED | OUTPATIENT
Start: 2021-01-08 | End: 2021-01-10 | Stop reason: HOSPADM

## 2021-01-08 RX ORDER — METHYLPREDNISOLONE SODIUM SUCCINATE 125 MG/2ML
125 INJECTION, POWDER, LYOPHILIZED, FOR SOLUTION INTRAMUSCULAR; INTRAVENOUS AS NEEDED
Status: CANCELLED | OUTPATIENT
Start: 2021-01-08

## 2021-01-08 RX ORDER — EPINEPHRINE 1 MG/ML
0.3 INJECTION, SOLUTION, CONCENTRATE INTRAVENOUS AS NEEDED
Status: DISCONTINUED | OUTPATIENT
Start: 2021-01-08 | End: 2021-01-10 | Stop reason: HOSPADM

## 2021-01-08 RX ADMIN — SODIUM CHLORIDE 250 ML: 9 INJECTION, SOLUTION INTRAVENOUS at 13:42

## 2021-01-08 RX ADMIN — SODIUM CHLORIDE 700 MG: 9 INJECTION, SOLUTION INTRAVENOUS at 13:44

## 2021-01-08 NOTE — PROGRESS NOTES
Subjective   Dexter Suggs is a 86 y.o. male.   Chief Complaint   Patient presents with   • Illness     Covid Positive, symptoms started 1/6, productive cough with light yellow sputum, fever, denies nausea vomiting and diarrhea.       History of Present Illness You have chosen to receive care through a telephone visit. Do you consent to use a telephone visit for your medical care today? Yes  At this point patient has had a temperature for over 24hours they were very suspicious that he might have Covid's they went to urgent care this morning and was seen he had a quick test which was positive for COVID-19.  He has had not had diarrhea or vomiting he has not had any sinus problems or cough.  He has lost his sense of smell at this point.    The following portions of the patient's history were reviewed and updated as appropriate: allergies, current medications, past family history, past medical history, past social history, past surgical history and problem list.    Review of Systems   Constitutional: Negative for activity change, appetite change, fatigue, fever, unexpected weight gain and unexpected weight loss.   HENT: Negative for swollen glands, trouble swallowing and voice change.    Eyes: Negative for blurred vision and visual disturbance.   Respiratory: Negative for cough and shortness of breath.    Cardiovascular: Negative for chest pain, palpitations and leg swelling.   Gastrointestinal: Negative for abdominal pain, constipation, diarrhea, nausea, vomiting and indigestion.   Endocrine: Negative for cold intolerance, heat intolerance, polydipsia and polyphagia.   Genitourinary: Negative for dysuria and frequency.   Musculoskeletal: Negative for arthralgias, back pain, joint swelling and neck pain.   Skin: Negative for color change, rash and skin lesions.   Neurological: Negative for dizziness, weakness, headache, memory problem and confusion.   Hematological: Does not bruise/bleed easily.    Psychiatric/Behavioral: Negative for agitation, hallucinations and suicidal ideas. The patient is not nervous/anxious.        Objective   Past Medical History:   Diagnosis Date   • Abnormal nuclear stress test    • BPH (benign prostatic hyperplasia)    • Chest pain    • Coronary artery disease    • Disease of thyroid gland    • Hyperlipidemia    • Hypertension    • LV dysfunction    • Melanoma (CMS/HCC)    • Prostate CA (CMS/HCC)       Past Surgical History:   Procedure Laterality Date   • APPENDECTOMY     • CARDIAC CATHETERIZATION Left 12/10/2012   • CARDIAC ELECTROPHYSIOLOGY PROCEDURE N/A 11/23/2018    Procedure: Pacemaker DC new 11/23/2018;  Surgeon: Austin Granados MD;  Location:  [facility];  Service: Cardiology   • CHOLECYSTECTOMY     • COLONOSCOPY N/A 6/9/2017    Procedure: COLONOSCOPY WITH ANESTHESIA;  Surgeon: Felice Marroquin MD;  Location: [facility] ENDOSCOPY;  Service:    • COLONOSCOPY W/ POLYPECTOMY  02/16/2012    adenomatous polyp at 80cm   • HAND SURGERY Right    • HERNIA REPAIR     • INGUINAL HERNIA REPAIR     • KNEE SURGERY     • PROSTATE SURGERY     • SKIN CANCER EXCISION      face   • SKIN LESION EXCISION          Current Outpatient Medications:   •  allopurinol (ZYLOPRIM) 300 MG tablet, TAKE 1 TABLET BY MOUTH EVERY DAY , Disp: 90 tablet, Rfl: 3  •  aspirin 81 MG EC tablet, Take 81 mg by mouth Daily., Disp: , Rfl:   •  benzonatate (Tessalon Perles) 100 MG capsule, Take 1 capsule by mouth 3 (Three) Times a Day As Needed for Cough., Disp: 30 capsule, Rfl: 1  •  cyanocobalamin 1000 MCG/ML injection, Inject 1 mL into the appropriate muscle as directed by prescriber Every 28 (Twenty-Eight) Days., Disp: 1 mL, Rfl: 2  •  Eliquis 5 MG tablet tablet, TAKE 1 TABLET BY MOUTH EVERY 12 HOURS , Disp: 180 tablet, Rfl: 4  •  levothyroxine (SYNTHROID, LEVOTHROID) 75 MCG tablet, TAKE 1 TABLET BY MOUTH EVERY DAY , Disp: 90 tablet, Rfl: 3  •  LORazepam (ATIVAN) 1 MG tablet, TAKE 1 TABLET BY MOUTH EVERY  DAY , Disp: 90 tablet, Rfl: 1  •  metoprolol succinate XL (TOPROL-XL) 25 MG 24 hr tablet, Take 1 tablet by mouth Daily., Disp: 90 tablet, Rfl: 3  •  nitroglycerin (NITROSTAT) 0.4 MG SL tablet, DISSOLVE 1 UNDER TONGUE AS NEEDED FOR CHEST PAIN, MAY REPEAT TWICE IN 15 MIN PERIOD., Disp: 30 tablet, Rfl: 3  •  pantoprazole (PROTONIX) 40 MG EC tablet, TAKE 1 TABLET BY MOUTH EVERY DAY, Disp: 90 tablet, Rfl: 3  •  terbinafine (LamISIL AT) 1 % cream, Apply  topically to the appropriate area as directed Every Night., Disp: 1 each, Rfl: 0  •  triamterene-hydrochlorothiazide (MAXZIDE) 75-50 MG per tablet, TAKE 1/2 TABLET BY MOUTH EVERY DAY , Disp: 45 tablet, Rfl: 3  •  fluticasone (FLONASE) 50 MCG/ACT nasal spray, 1 spray into the nostril(s) as directed by provider., Disp: , Rfl:   •  glucosamine sulfate 500 MG capsule capsule, Take  by mouth 3 (Three) Times a Day With Meals., Disp: , Rfl:     Current Facility-Administered Medications:   •  cyanocobalamin injection 1,000 mcg, 1,000 mcg, Intramuscular, Q28 Days, Jarad Alexis MD, 1,000 mcg at 11/17/20 1135     Vitals:    01/08/21 1011   Temp: 98.5 °F (36.9 °C)     There were no vitals filed for this visit.  Patient's There is no height or weight on file to calculate BMI. BMI is .      Physical Exam          Assessment/Plan   Diagnoses and all orders for this visit:    1. COVID-19 virus infection (Primary)      At this time patient is positive for COVID-19 he meets the criteria based on age for COVID-19 antibody infusion.  Patient also has other comorbid conditions other than his age including prediabetes and single-vessel coronary artery disease.    This visit has been rescheduled as a phone visit to comply with patient safety concerns in accordance with CDC recommendations. Total time of discussion was 15 minutes.

## 2021-01-12 ENCOUNTER — TELEPHONE (OUTPATIENT)
Dept: INTERNAL MEDICINE | Facility: CLINIC | Age: 86
End: 2021-01-12

## 2021-01-12 NOTE — TELEPHONE ENCOUNTER
PATIENTS WIFE ALYSON CALLS         REQUESTING A CALL BACK TO BE ADVISED ABOUT PATIENT LOSING HIS VOICE AND IF THERE IS ANYTHING THAT HE CAN TAKE FOR IT   SHE STATES HE WAS POSITIVE FOR COVID 19 LAST Friday       GOOD CONTACT NUMBER   [phone] ([place])

## 2021-01-13 NOTE — TELEPHONE ENCOUNTER
Called wife and advised that resting voice is the best thing.  She voiced understanding and says he is not having any SOB, doing well otherwise except for a low grade fever.

## 2021-01-26 ENCOUNTER — CLINICAL SUPPORT (OUTPATIENT)
Dept: INTERNAL MEDICINE | Facility: CLINIC | Age: 86
End: 2021-01-26

## 2021-01-26 DIAGNOSIS — D51.0 PERNICIOUS ANEMIA: ICD-10-CM

## 2021-01-26 PROCEDURE — 96372 THER/PROPH/DIAG INJ SC/IM: CPT | Performed by: INTERNAL MEDICINE

## 2021-01-26 RX ADMIN — CYANOCOBALAMIN 1000 MCG: 1000 INJECTION, SOLUTION INTRAMUSCULAR; SUBCUTANEOUS at 15:09

## 2021-02-03 ENCOUNTER — TELEPHONE (OUTPATIENT)
Dept: INTERNAL MEDICINE | Facility: CLINIC | Age: 86
End: 2021-02-03

## 2021-02-03 NOTE — TELEPHONE ENCOUNTER
Had talked with Dr. Alexis after lunch and tried to call wife back and there was NA.  Called her now and told her had discussed with Dr. Alexis and he says he cannot do any more for him in the office than what he would recommend over the phone.  Most of the time, this sort of thing is expected to be short-lived and he needs to remain hydrated and follow the BRAT diet, avoid dairy.  As long as he is urinating, he is hydrated.  Continue to use Imodium to try to slow diarrhea, though wife says hadn't helped much yet.  If continues on for another 24-48hours, will need to come to the ER for lab, possible xrays, etc.  She voiced understanding.

## 2021-02-03 NOTE — TELEPHONE ENCOUNTER
Called wife - has had watery diarrhea since Sunday, Imodium not helping.  No fever, some abd cramps.  Has appt tomorrow but asking for something for relief today.  Do you want to see him today instead or do a telephone visit?

## 2021-02-03 NOTE — TELEPHONE ENCOUNTER
Caller: Amirah Suggs    Relationship to patient: Emergency Contact    Best call back number: [phone]    Patient is needing:   Patient spouse called and wanted to have PCP or nurse call them back to see what could possibly be taken to provide comfort until appt 02/04/21 @ 9:00

## 2021-02-05 ENCOUNTER — CLINICAL SUPPORT NO REQUIREMENTS (OUTPATIENT)
Dept: CARDIOLOGY | Facility: CLINIC | Age: 86
End: 2021-02-05

## 2021-02-05 ENCOUNTER — OFFICE VISIT (OUTPATIENT)
Dept: CARDIOLOGY | Facility: CLINIC | Age: 86
End: 2021-02-05

## 2021-02-05 VITALS
OXYGEN SATURATION: 98 % | WEIGHT: 154 LBS | HEART RATE: 80 BPM | SYSTOLIC BLOOD PRESSURE: 120 MMHG | HEIGHT: 72 IN | BODY MASS INDEX: 20.86 KG/M2 | DIASTOLIC BLOOD PRESSURE: 60 MMHG

## 2021-02-05 DIAGNOSIS — I44.2 COMPLETE HEART BLOCK (HCC): ICD-10-CM

## 2021-02-05 DIAGNOSIS — I10 HTN (HYPERTENSION), BENIGN: ICD-10-CM

## 2021-02-05 DIAGNOSIS — I48.0 PAF (PAROXYSMAL ATRIAL FIBRILLATION) (HCC): ICD-10-CM

## 2021-02-05 DIAGNOSIS — I10 ESSENTIAL HYPERTENSION: ICD-10-CM

## 2021-02-05 DIAGNOSIS — R73.01 IMPAIRED FASTING GLUCOSE: ICD-10-CM

## 2021-02-05 DIAGNOSIS — R06.09 DOE (DYSPNEA ON EXERTION): ICD-10-CM

## 2021-02-05 DIAGNOSIS — E78.2 MIXED HYPERLIPIDEMIA: Primary | ICD-10-CM

## 2021-02-05 DIAGNOSIS — Z95.0 PACEMAKER: ICD-10-CM

## 2021-02-05 DIAGNOSIS — I25.10 SINGLE VESSEL CORONARY ARTERY DISEASE: ICD-10-CM

## 2021-02-05 PROCEDURE — 93000 ELECTROCARDIOGRAM COMPLETE: CPT | Performed by: INTERNAL MEDICINE

## 2021-02-05 PROCEDURE — 99213 OFFICE O/P EST LOW 20 MIN: CPT | Performed by: INTERNAL MEDICINE

## 2021-02-05 PROCEDURE — 93288 INTERROG EVL PM/LDLS PM IP: CPT | Performed by: PHYSICIAN ASSISTANT

## 2021-02-05 NOTE — PROGRESS NOTES
Dexter Suggs  [id number]  1934  86 y.o.  male    Referring Provider: Jarad Alexis MD    Reason for Follow-up Visit:  Routine follow up.  coronary artery disease   sick sinus syndrome s/p pacemaker       Subjective    Recent Covid   paroxysmal atrial fibrillation on device check   Mild chronic exertional shortness of breath on exertion relieved with rest  No significant cough or wheezing    No palpitations  No associated chest pain  No significant pedal edema    No fever or chills  No significant expectoration    No hemoptysis  No presyncope or syncope    Tolerating current medications well with no untoward side effects   Compliant with prescribed medication regimen. Tries to adhere to cardiac diet.     No bleeding, excessive bruising, gait instability or fall risks    BP well controlled at home.         History of present illness:  Dexter Suggs is a 86 y.o. yo male with history of paroxysmal atrial fibrillation coronary artery disease  sick sinus syndrome s/p pacemaker who presents today for   Chief Complaint   Patient presents with   • Coronary Artery Disease     6 MONTH FOLLOW UP    • Hypertension   .    History  Past Medical History:   Diagnosis Date   • Abnormal nuclear stress test    • BPH (benign prostatic hyperplasia)    • Chest pain    • Coronary artery disease    • Disease of thyroid gland    • Hyperlipidemia    • Hypertension    • LV dysfunction    • Melanoma (CMS/HCC)    • Prostate CA (CMS/HCC)    ,   Past Surgical History:   Procedure Laterality Date   • APPENDECTOMY     • CARDIAC CATHETERIZATION Left 12/10/2012   • CARDIAC ELECTROPHYSIOLOGY PROCEDURE N/A 11/23/2018    Procedure: Pacemaker DC new 11/23/2018;  Surgeon: Austin Granados MD;  Location: [facility] CATH INVASIVE LOCATION;  Service: Cardiology   • CHOLECYSTECTOMY     • COLONOSCOPY N/A 6/9/2017    Procedure: COLONOSCOPY WITH ANESTHESIA;  Surgeon: Felice Marroquin MD;  Location: [facility] ENDOSCOPY;  Service:    • COLONOSCOPY W/ POLYPECTOMY   2012    adenomatous polyp at 80cm   • HAND SURGERY Right    • HERNIA REPAIR     • INGUINAL HERNIA REPAIR     • KNEE SURGERY     • PROSTATE SURGERY     • SKIN CANCER EXCISION      face   • SKIN LESION EXCISION     ,   Family History   Problem Relation Age of Onset   • Alzheimer's disease Mother    • Cancer Father    • Cancer Sister    ,   Social History     Tobacco Use   • Smoking status: Former Smoker     Years: 30.00     Types: Cigarettes     Quit date:      Years since quittin.1   • Smokeless tobacco: Never Used   Substance Use Topics   • Alcohol use: No   • Drug use: No   ,     Medications  Current Outpatient Medications   Medication Sig Dispense Refill   • allopurinol (ZYLOPRIM) 300 MG tablet TAKE 1 TABLET BY MOUTH EVERY DAY  90 tablet 3   • aspirin 81 MG EC tablet Take 81 mg by mouth Daily.     • Eliquis 5 MG tablet tablet TAKE 1 TABLET BY MOUTH EVERY 12 HOURS  180 tablet 4   • glucosamine sulfate 500 MG capsule capsule Take  by mouth 3 (Three) Times a Day With Meals.     • levothyroxine (SYNTHROID, LEVOTHROID) 75 MCG tablet TAKE 1 TABLET BY MOUTH EVERY DAY  90 tablet 3   • LORazepam (ATIVAN) 1 MG tablet TAKE 1 TABLET BY MOUTH EVERY DAY  90 tablet 1   • metoprolol succinate XL (TOPROL-XL) 25 MG 24 hr tablet Take 1 tablet by mouth Daily. 90 tablet 3   • nitroglycerin (NITROSTAT) 0.4 MG SL tablet DISSOLVE 1 UNDER TONGUE AS NEEDED FOR CHEST PAIN, MAY REPEAT TWICE IN 15 MIN PERIOD. 30 tablet 3   • pantoprazole (PROTONIX) 40 MG EC tablet TAKE 1 TABLET BY MOUTH EVERY DAY 90 tablet 3   • triamterene-hydrochlorothiazide (MAXZIDE) 75-50 MG per tablet TAKE 1/2 TABLET BY MOUTH EVERY DAY  45 tablet 3   • benzonatate (Tessalon Perles) 100 MG capsule Take 1 capsule by mouth 3 (Three) Times a Day As Needed for Cough. 30 capsule 1   • cyanocobalamin 1000 MCG/ML injection Inject 1 mL into the appropriate muscle as directed by prescriber Every 28 (Twenty-Eight) Days. 1 mL 2   • fluticasone (FLONASE) 50 MCG/ACT  "nasal spray 1 spray into the nostril(s) as directed by provider.     • terbinafine (LamISIL AT) 1 % cream Apply  topically to the appropriate area as directed Every Night. 1 each 0     Current Facility-Administered Medications   Medication Dose Route Frequency Provider Last Rate Last Admin   • cyanocobalamin injection 1,000 mcg  1,000 mcg Intramuscular Q28 Days Jarad Alexis MD   1,000 mcg at 01/26/21 1509       Allergies:  Adhesive tape, Simvastatin, and Neosporin [neomycin-bacitracin zn-polymyx]    Review of Systems  Review of Systems   Constitution: Negative.   HENT: Negative.    Eyes: Negative.    Cardiovascular: Negative for chest pain, claudication, cyanosis, dyspnea on exertion, irregular heartbeat, leg swelling, near-syncope, orthopnea, palpitations, paroxysmal nocturnal dyspnea and syncope.   Respiratory: Negative.    Endocrine: Negative.    Hematologic/Lymphatic: Negative.    Skin: Negative.    Musculoskeletal: Positive for arthritis and back pain.   Gastrointestinal: Negative for anorexia.   Genitourinary: Negative.    Neurological: Negative.    Psychiatric/Behavioral: Negative.        Objective     Physical Exam:  /60   Pulse 80   Ht 182.9 cm (72\")   Wt 69.9 kg (154 lb)   SpO2 98%   BMI 20.89 kg/m²   Physical Exam   Constitutional: He appears well-developed.   HENT:   Head: Normocephalic.   Neck: Normal carotid pulses and no JVD present. No tracheal tenderness present. Carotid bruit is not present. No tracheal deviation and no edema present.   Cardiovascular: Regular rhythm, normal heart sounds and normal pulses.   Pulmonary/Chest: Effort normal. No stridor.   Abdominal: Soft. He exhibits no distension. There is no abdominal tenderness.   Neurological: He is alert. No cranial nerve deficit or sensory deficit.   Skin: Skin is warm.   Psychiatric: His speech is normal and behavior is normal.       Results Review:  Results for orders placed during the hospital encounter of 11/21/18   Adult " Transthoracic Echo Complete W/ Cont if Necessary Per Protocol    Narrative · Left ventricular systolic function is normal. Estimated EF = 65%.  · Left ventricular diastolic dysfunction.  · No evidence of pulmonary hypertension is present.             myocardial perfusion scan         · Left ventricular ejection fraction is normal (Calculated EF = 63%).  · Myocardial perfusion imaging indicates a medium-sized infarct located in the inferior wall, septal wall and apex with no significant ischemia noted.  · Abnormal LV wall motion consistent with mild hypokinesis of the inferior wall and septal wall.  Raw images reviewed with the following abnormalities noted: vertical motion.         ECG 12 Lead    Date/Time: 2/5/2021 9:05 AM  Performed by: Austin Granados MD  Authorized by: Austin Granados MD   Comparison: compared with previous ECG from 8/5/2020  Similar to previous ECG  Comparison to previous ECG: .A sensed V paced   Rhythm: sinus rhythm and paced  Rate: normal    Clinical impression: abnormal EKG            Assessment/Plan   Patient Active Problem List   Diagnosis   • Single vessel coronary artery disease   • Essential hypertension   • Mixed hyperlipidemia   • Hx of colonic polyps   • HTN (hypertension), benign   • Malignant neoplasm of prostate (CMS/HCC)   • 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   • Actinic keratosis   • Benign prostatic hyperplasia with lower urinary tract symptoms   • Complete heart block (CMS/HCC)   • Pacemaker   • Sick sinus syndrome (CMS/HCC)   • Laceration of face without complication   • PAF (paroxysmal atrial fibrillation) (CMS/HCC) on Eliquis    • Paroxysmal atrial flutter (CMS/HCC)   • Pernicious anemia   • Alkaline phosphatase elevation   • Anemia   • Gastroesophageal reflux disease   • 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 Questions if any regarding current medications and side effects, need for refills and importance of compliance to medications stressed.    Reviewed available prior notes, consults, prior visits, laboratory findings, radiology and cardiology relevant reports. Updated chart as applicable. I have reviewed the patient's medical history in detail and updated the computerized patient record as relevant.      Updated patient regarding any new or relevant abnormalities on review of records or any new findings on physical exam. Mentioned to patient about purpose of visit and desirable health short and long term goals and objectives.    Primary to monitor CBC CMP Lipid panel and TSH as applicable    ___________________________________________________________________________________________________________________________________________       Plan      Orders Placed This Encounter   Procedures   • ECG 12 Lead     This order was created via procedure documentation   • Adult Transthoracic Echo Complete w/ Color, Spectral and Contrast if necessary per protocol     Myocardial strain to be performed during echocardiogram as long as technically feasible     Standing Status:   Future     Standing Expiration Date:   2/5/2022     Order Specific Question:   Reason for exam?     Answer:   Dyspnea      The current medical regimen is effective;  continue present plan and medications.     Check BP and heart rates twice daily at least 3x / week, week a month  at home and bring a recording for me to review next visit  If BP >130/85 or < 100/60 persistently over 3 reading 30 mins apart  call sooner      Monitor for any signs of bleeding including red or dark stools as well as easy bruisabilty. Fall precautions.   Monitor cardiac rhythm device function regularly per established schedule or PRN          No follow-ups on file.

## 2021-02-17 DIAGNOSIS — E11.9 TYPE 2 DIABETES MELLITUS WITHOUT COMPLICATION, WITHOUT LONG-TERM CURRENT USE OF INSULIN (HCC): Primary | ICD-10-CM

## 2021-02-17 RX ORDER — LANCETS 30 GAUGE
EACH MISCELLANEOUS
Qty: 100 EACH | Refills: 3 | Status: ON HOLD | OUTPATIENT
Start: 2021-02-17 | End: 2022-03-26

## 2021-02-17 RX ORDER — CALCIUM CITRATE/VITAMIN D3 200MG-6.25
TABLET ORAL
Qty: 300 EACH | Refills: 3 | Status: SHIPPED | OUTPATIENT
Start: 2021-02-17 | End: 2022-01-22 | Stop reason: HOSPADM

## 2021-02-24 ENCOUNTER — OFFICE VISIT (OUTPATIENT)
Dept: INTERNAL MEDICINE | Facility: CLINIC | Age: 86
End: 2021-02-24

## 2021-02-24 VITALS
OXYGEN SATURATION: 98 % | BODY MASS INDEX: 21.62 KG/M2 | WEIGHT: 159.6 LBS | SYSTOLIC BLOOD PRESSURE: 132 MMHG | TEMPERATURE: 97.3 F | HEART RATE: 79 BPM | HEIGHT: 72 IN | DIASTOLIC BLOOD PRESSURE: 80 MMHG

## 2021-02-24 DIAGNOSIS — E78.2 MIXED HYPERLIPIDEMIA: ICD-10-CM

## 2021-02-24 DIAGNOSIS — R73.01 IMPAIRED FASTING GLUCOSE: Primary | ICD-10-CM

## 2021-02-24 DIAGNOSIS — D51.0 PERNICIOUS ANEMIA: ICD-10-CM

## 2021-02-24 DIAGNOSIS — I10 ESSENTIAL HYPERTENSION: ICD-10-CM

## 2021-02-24 LAB — HBA1C MFR BLD: 5.8 %

## 2021-02-24 PROCEDURE — 83036 HEMOGLOBIN GLYCOSYLATED A1C: CPT | Performed by: INTERNAL MEDICINE

## 2021-02-24 PROCEDURE — 99214 OFFICE O/P EST MOD 30 MIN: CPT | Performed by: INTERNAL MEDICINE

## 2021-02-24 PROCEDURE — 96372 THER/PROPH/DIAG INJ SC/IM: CPT | Performed by: INTERNAL MEDICINE

## 2021-02-24 RX ORDER — FOLIC ACID 1 MG/1
1 TABLET ORAL DAILY
COMMUNITY

## 2021-02-24 RX ORDER — PREDNISONE 1 MG/1
1 TABLET ORAL EVERY OTHER DAY
COMMUNITY
End: 2023-02-02

## 2021-02-24 RX ADMIN — CYANOCOBALAMIN 1000 MCG: 1000 INJECTION, SOLUTION INTRAMUSCULAR; SUBCUTANEOUS at 09:26

## 2021-02-24 NOTE — PROGRESS NOTES
Subjective   Dexter Suggs is a 86 y.o. male.   Chief Complaint   Patient presents with   • IFG     A1C: 5.8%   • Hypertension       History of Present Illness this is a follow-up visit for patient with impaired fasting glucose and hypertension he also has recently undergone COVID-19 with outpatient Covid antibody infusion.    The following portions of the patient's history were reviewed and updated as appropriate: allergies, current medications, past family history, past medical history, past social history, past surgical history and problem list.    Review of Systems   Constitutional: Positive for fatigue and unexpected weight loss. Negative for activity change, appetite change, fever and unexpected weight gain.   HENT: Negative for swollen glands, trouble swallowing and voice change.    Eyes: Negative for blurred vision and visual disturbance.   Respiratory: Negative for cough and shortness of breath.    Cardiovascular: Negative for chest pain, palpitations and leg swelling.   Gastrointestinal: Negative for abdominal pain, constipation, diarrhea, nausea, vomiting and indigestion.   Endocrine: Negative for cold intolerance, heat intolerance, polydipsia and polyphagia.   Genitourinary: Negative for dysuria and frequency.   Musculoskeletal: Positive for arthralgias and back pain. Negative for joint swelling and neck pain.   Skin: Negative for color change, rash and skin lesions.   Neurological: Negative for dizziness, weakness, headache, memory problem and confusion.   Hematological: Does not bruise/bleed easily.   Psychiatric/Behavioral: Negative for agitation, hallucinations and suicidal ideas. The patient is not nervous/anxious.        Objective   Past Medical History:   Diagnosis Date   • Abnormal nuclear stress test    • BPH (benign prostatic hyperplasia)    • Chest pain    • Coronary artery disease    • Disease of thyroid gland    • Hyperlipidemia    • Hypertension    • LV dysfunction    • Melanoma (CMS/HCC)     • Prostate CA (CMS/HCC)       Past Surgical History:   Procedure Laterality Date   • APPENDECTOMY     • CARDIAC CATHETERIZATION Left 12/10/2012   • CARDIAC ELECTROPHYSIOLOGY PROCEDURE N/A 11/23/2018    Procedure: Pacemaker DC new 11/23/2018;  Surgeon: Austin Granados MD;  Location: [facility] CATH INVASIVE LOCATION;  Service: Cardiology   • CHOLECYSTECTOMY     • COLONOSCOPY N/A 6/9/2017    Procedure: COLONOSCOPY WITH ANESTHESIA;  Surgeon: Felice Marroquin MD;  Location: [facility] ENDOSCOPY;  Service:    • COLONOSCOPY W/ POLYPECTOMY  02/16/2012    adenomatous polyp at 80cm   • HAND SURGERY Right    • HERNIA REPAIR     • INGUINAL HERNIA REPAIR     • KNEE SURGERY     • PROSTATE SURGERY     • SKIN CANCER EXCISION      face   • SKIN LESION EXCISION          Current Outpatient Medications:   •  allopurinol (ZYLOPRIM) 300 MG tablet, TAKE 1 TABLET BY MOUTH EVERY DAY , Disp: 90 tablet, Rfl: 3  •  aspirin 81 MG EC tablet, Take 81 mg by mouth Daily., Disp: , Rfl:   •  cyanocobalamin 1000 MCG/ML injection, Inject 1 mL into the appropriate muscle as directed by prescriber Every 28 (Twenty-Eight) Days., Disp: 1 mL, Rfl: 2  •  Diclofenac Sodium (VOLTAREN) 1 % gel gel, Apply 4 g topically to the appropriate area as directed 4 (Four) Times a Day As Needed., Disp: , Rfl:   •  Easy Touch Lancets 30G/Twist misc, TEST DAILY AS DIRECTED , Disp: 100 each, Rfl: 3  •  Eliquis 5 MG tablet tablet, TAKE 1 TABLET BY MOUTH EVERY 12 HOURS , Disp: 180 tablet, Rfl: 4  •  fluticasone (FLONASE) 50 MCG/ACT nasal spray, 1 spray into the nostril(s) as directed by provider., Disp: , Rfl:   •  folic acid (FOLVITE) 1 MG tablet, Take 1 mg by mouth Daily., Disp: , Rfl:   •  glucosamine sulfate 500 MG capsule capsule, Take  by mouth 3 (Three) Times a Day With Meals., Disp: , Rfl:   •  levothyroxine (SYNTHROID, LEVOTHROID) 75 MCG tablet, TAKE 1 TABLET BY MOUTH EVERY DAY , Disp: 90 tablet, Rfl: 3  •  LORazepam (ATIVAN) 1 MG tablet, TAKE 1 TABLET BY MOUTH EVERY DAY ,  Disp: 90 tablet, Rfl: 1  •  methotrexate 2.5 MG tablet, Take 7.5 mg by mouth 1 (One) Time Per Week., Disp: , Rfl:   •  metoprolol succinate XL (TOPROL-XL) 25 MG 24 hr tablet, Take 1 tablet by mouth Daily., Disp: 90 tablet, Rfl: 3  •  nitroglycerin (NITROSTAT) 0.4 MG SL tablet, DISSOLVE 1 UNDER TONGUE AS NEEDED FOR CHEST PAIN, MAY REPEAT TWICE IN 15 MIN PERIOD., Disp: 30 tablet, Rfl: 3  •  pantoprazole (PROTONIX) 40 MG EC tablet, TAKE 1 TABLET BY MOUTH EVERY DAY, Disp: 90 tablet, Rfl: 3  •  predniSONE (DELTASONE) 5 MG tablet, Take 5 mg by mouth Daily., Disp: , Rfl:   •  terbinafine (LamISIL AT) 1 % cream, Apply  topically to the appropriate area as directed Every Night., Disp: 1 each, Rfl: 0  •  triamterene-hydrochlorothiazide (MAXZIDE) 75-50 MG per tablet, TAKE 1/2 TABLET BY MOUTH EVERY DAY , Disp: 45 tablet, Rfl: 3  •  True Metrix Blood Glucose Test test strip, TEST DAILY AS DIRECTED , Disp: 300 each, Rfl: 3    Current Facility-Administered Medications:   •  cyanocobalamin injection 1,000 mcg, 1,000 mcg, Intramuscular, Q28 Days, Jarad Alexis MD, 1,000 mcg at 02/24/21 0926     Vitals:    02/24/21 0856   BP: 132/80   Pulse: 79   Temp: 97.3 °F (36.3 °C)   SpO2: 98%         02/24/21  0856   Weight: 72.4 kg (159 lb 9.6 oz)     Patient's Body mass index is 21.65 kg/m². BMI is .      Physical Exam  Vitals signs and nursing note reviewed.   Constitutional:       General: He is not in acute distress.     Appearance: Normal appearance. He is well-developed.   HENT:      Head: Normocephalic and atraumatic.      Right Ear: External ear normal.      Left Ear: External ear normal.      Nose: Nose normal.   Eyes:      Extraocular Movements: Extraocular movements intact.      Conjunctiva/sclera: Conjunctivae normal.      Pupils: Pupils are equal, round, and reactive to light.   Neck:      Musculoskeletal: Normal range of motion and neck supple. No neck rigidity or muscular tenderness.   Cardiovascular:      Rate and Rhythm:  Normal rate and regular rhythm.      Pulses: Normal pulses.      Heart sounds: Normal heart sounds.   Pulmonary:      Effort: Pulmonary effort is normal.      Breath sounds: Normal breath sounds.   Abdominal:      General: Bowel sounds are normal.      Palpations: Abdomen is soft.   Musculoskeletal: Normal range of motion.   Skin:     General: Skin is warm and dry.   Neurological:      General: No focal deficit present.      Mental Status: He is alert and oriented to person, place, and time.   Psychiatric:         Mood and Affect: Mood normal.         Behavior: Behavior normal.               Assessment/Plan   Diagnoses and all orders for this visit:    1. Impaired fasting glucose (Primary)  -     POC Glycosylated Hemoglobin (Hb A1C)  -     Comprehensive Metabolic Panel    2. Mixed hyperlipidemia  -     Comprehensive Metabolic Panel  -     CBC & Differential    3. Essential hypertension    4. Pernicious anemia      Today patient is being followed for for long-term chronic problems #1 impaired fasting glucose which is adequately controlled at this point he is to continue his current diet regimen.  In regard to his hyperlipidemia and his recent weight loss I am going to go ahead and run CMP and CBC on him.  In regard to his hypertension it is reasonably well controlled.  Patient's pernicious anemia he is to continue B12 injections.

## 2021-02-25 LAB
ALBUMIN SERPL-MCNC: 3.8 G/DL (ref 3.5–5.2)
ALBUMIN/GLOB SERPL: 1.3 G/DL
ALP SERPL-CCNC: 135 U/L (ref 39–117)
ALT SERPL-CCNC: 18 U/L (ref 1–41)
AST SERPL-CCNC: 23 U/L (ref 1–40)
BASOPHILS # BLD AUTO: 0.06 10*3/MM3 (ref 0–0.2)
BASOPHILS NFR BLD AUTO: 0.7 % (ref 0–1.5)
BILIRUB SERPL-MCNC: 0.4 MG/DL (ref 0–1.2)
BUN SERPL-MCNC: 20 MG/DL (ref 8–23)
BUN/CREAT SERPL: 20.8 (ref 7–25)
CALCIUM SERPL-MCNC: 10.3 MG/DL (ref 8.6–10.5)
CHLORIDE SERPL-SCNC: 102 MMOL/L (ref 98–107)
CO2 SERPL-SCNC: 26 MMOL/L (ref 22–29)
CREAT SERPL-MCNC: 0.96 MG/DL (ref 0.76–1.27)
EOSINOPHIL # BLD AUTO: 0.11 10*3/MM3 (ref 0–0.4)
EOSINOPHIL NFR BLD AUTO: 1.3 % (ref 0.3–6.2)
ERYTHROCYTE [DISTWIDTH] IN BLOOD BY AUTOMATED COUNT: 17.5 % (ref 12.3–15.4)
GLOBULIN SER CALC-MCNC: 2.9 GM/DL
GLUCOSE SERPL-MCNC: 117 MG/DL (ref 65–99)
HCT VFR BLD AUTO: 35.2 % (ref 37.5–51)
HGB BLD-MCNC: 10.9 G/DL (ref 13–17.7)
IMM GRANULOCYTES # BLD AUTO: 0.05 10*3/MM3 (ref 0–0.05)
IMM GRANULOCYTES NFR BLD AUTO: 0.6 % (ref 0–0.5)
LYMPHOCYTES # BLD AUTO: 1.31 10*3/MM3 (ref 0.7–3.1)
LYMPHOCYTES NFR BLD AUTO: 15.4 % (ref 19.6–45.3)
MCH RBC QN AUTO: 26.7 PG (ref 26.6–33)
MCHC RBC AUTO-ENTMCNC: 31 G/DL (ref 31.5–35.7)
MCV RBC AUTO: 86.1 FL (ref 79–97)
MONOCYTES # BLD AUTO: 0.67 10*3/MM3 (ref 0.1–0.9)
MONOCYTES NFR BLD AUTO: 7.9 % (ref 5–12)
NEUTROPHILS # BLD AUTO: 6.33 10*3/MM3 (ref 1.7–7)
NEUTROPHILS NFR BLD AUTO: 74.1 % (ref 42.7–76)
NRBC BLD AUTO-RTO: 0 /100 WBC (ref 0–0.2)
PLATELET # BLD AUTO: 240 10*3/MM3 (ref 140–450)
POTASSIUM SERPL-SCNC: 4.8 MMOL/L (ref 3.5–5.2)
PROT SERPL-MCNC: 6.7 G/DL (ref 6–8.5)
RBC # BLD AUTO: 4.09 10*6/MM3 (ref 4.14–5.8)
SODIUM SERPL-SCNC: 140 MMOL/L (ref 136–145)
WBC # BLD AUTO: 8.53 10*3/MM3 (ref 3.4–10.8)

## 2021-03-02 RX ORDER — PANTOPRAZOLE SODIUM 40 MG/1
TABLET, DELAYED RELEASE ORAL
Qty: 90 TABLET | Refills: 3 | Status: ON HOLD | OUTPATIENT
Start: 2021-03-02 | End: 2022-01-19

## 2021-03-05 ENCOUNTER — HOSPITAL ENCOUNTER (OUTPATIENT)
Dept: CARDIOLOGY | Facility: HOSPITAL | Age: 86
Discharge: HOME | End: 2021-03-05
Admitting: INTERNAL MEDICINE

## 2021-03-05 VITALS
BODY MASS INDEX: 21.54 KG/M2 | DIASTOLIC BLOOD PRESSURE: 80 MMHG | SYSTOLIC BLOOD PRESSURE: 132 MMHG | WEIGHT: 159 LBS | HEIGHT: 72 IN

## 2021-03-05 DIAGNOSIS — R06.09 DOE (DYSPNEA ON EXERTION): ICD-10-CM

## 2021-03-05 PROCEDURE — 93356 MYOCRD STRAIN IMG SPCKL TRCK: CPT

## 2021-03-05 PROCEDURE — 93356 MYOCRD STRAIN IMG SPCKL TRCK: CPT | Performed by: INTERNAL MEDICINE

## 2021-03-05 PROCEDURE — 93306 TTE W/DOPPLER COMPLETE: CPT

## 2021-03-05 PROCEDURE — 93306 TTE W/DOPPLER COMPLETE: CPT | Performed by: INTERNAL MEDICINE

## 2021-03-07 LAB
BH CV ECHO MEAS - AO MAX PG (FULL): 3.3 MMHG
BH CV ECHO MEAS - AO MAX PG: 6.1 MMHG
BH CV ECHO MEAS - AO MEAN PG (FULL): 2 MMHG
BH CV ECHO MEAS - AO MEAN PG: 4 MMHG
BH CV ECHO MEAS - AO ROOT AREA (BSA CORRECTED): 1.8
BH CV ECHO MEAS - AO ROOT AREA: 9.1 CM^2
BH CV ECHO MEAS - AO ROOT DIAM: 3.4 CM
BH CV ECHO MEAS - AO V2 MAX: 123 CM/SEC
BH CV ECHO MEAS - AO V2 MEAN: 97.2 CM/SEC
BH CV ECHO MEAS - AO V2 VTI: 31.4 CM
BH CV ECHO MEAS - AVA(I,A): 2.6 CM^2
BH CV ECHO MEAS - AVA(I,D): 2.6 CM^2
BH CV ECHO MEAS - AVA(V,A): 2.8 CM^2
BH CV ECHO MEAS - AVA(V,D): 2.8 CM^2
BH CV ECHO MEAS - BSA(HAYCOCK): 1.9 M^2
BH CV ECHO MEAS - BSA: 1.9 M^2
BH CV ECHO MEAS - BZI_BMI: 21.6 KILOGRAMS/M^2
BH CV ECHO MEAS - BZI_METRIC_HEIGHT: 182.9 CM
BH CV ECHO MEAS - BZI_METRIC_WEIGHT: 72.1 KG
BH CV ECHO MEAS - EDV(CUBED): 101.2 ML
BH CV ECHO MEAS - EDV(MOD-SP4): 71 ML
BH CV ECHO MEAS - EDV(TEICH): 100.3 ML
BH CV ECHO MEAS - EF(CUBED): 65.1 %
BH CV ECHO MEAS - EF(MOD-SP4): 58 %
BH CV ECHO MEAS - EF(TEICH): 56.7 %
BH CV ECHO MEAS - ESV(CUBED): 35.3 ML
BH CV ECHO MEAS - ESV(MOD-SP4): 29.8 ML
BH CV ECHO MEAS - ESV(TEICH): 43.5 ML
BH CV ECHO MEAS - FS: 29.6 %
BH CV ECHO MEAS - IVS/LVPW: 1.2
BH CV ECHO MEAS - IVSD: 1.3 CM
BH CV ECHO MEAS - LA DIMENSION: 3.5 CM
BH CV ECHO MEAS - LA/AO: 1
BH CV ECHO MEAS - LAT PEAK E' VEL: 9.7 CM/SEC
BH CV ECHO MEAS - LV DIASTOLIC VOL/BSA (35-75): 36.7 ML/M^2
BH CV ECHO MEAS - LV MASS(C)D: 274.5 GRAMS
BH CV ECHO MEAS - LV MASS(C)DI: 142 GRAMS/M^2
BH CV ECHO MEAS - LV MAX PG: 2.8 MMHG
BH CV ECHO MEAS - LV MEAN PG: 2 MMHG
BH CV ECHO MEAS - LV SYSTOLIC VOL/BSA (12-30): 15.4 ML/M^2
BH CV ECHO MEAS - LV V1 MAX: 83.5 CM/SEC
BH CV ECHO MEAS - LV V1 MEAN: 56.5 CM/SEC
BH CV ECHO MEAS - LV V1 VTI: 19.4 CM
BH CV ECHO MEAS - LVIDD: 4.7 CM
BH CV ECHO MEAS - LVIDS: 3.3 CM
BH CV ECHO MEAS - LVLD AP4: 7.7 CM
BH CV ECHO MEAS - LVLS AP4: 6.9 CM
BH CV ECHO MEAS - LVOT AREA (M): 4.2 CM^2
BH CV ECHO MEAS - LVOT AREA: 4.2 CM^2
BH CV ECHO MEAS - LVOT DIAM: 2.3 CM
BH CV ECHO MEAS - LVPWD: 1.3 CM
BH CV ECHO MEAS - MED PEAK E' VEL: 11.9 CM/SEC
BH CV ECHO MEAS - MV A MAX VEL: 101 CM/SEC
BH CV ECHO MEAS - MV DEC SLOPE: 305 CM/SEC^2
BH CV ECHO MEAS - MV DEC TIME: 0.23 SEC
BH CV ECHO MEAS - MV E MAX VEL: 69.8 CM/SEC
BH CV ECHO MEAS - MV E/A: 0.69
BH CV ECHO MEAS - RAP SYSTOLE: 5 MMHG
BH CV ECHO MEAS - RVSP: 23.1 MMHG
BH CV ECHO MEAS - SI(AO): 147.5 ML/M^2
BH CV ECHO MEAS - SI(CUBED): 34.1 ML/M^2
BH CV ECHO MEAS - SI(LVOT): 41.7 ML/M^2
BH CV ECHO MEAS - SI(MOD-SP4): 21.3 ML/M^2
BH CV ECHO MEAS - SI(TEICH): 29.4 ML/M^2
BH CV ECHO MEAS - SV(AO): 285.1 ML
BH CV ECHO MEAS - SV(CUBED): 65.9 ML
BH CV ECHO MEAS - SV(LVOT): 80.6 ML
BH CV ECHO MEAS - SV(MOD-SP4): 41.2 ML
BH CV ECHO MEAS - SV(TEICH): 56.8 ML
BH CV ECHO MEAS - TR MAX VEL: 213 CM/SEC
BH CV ECHO MEASUREMENTS AVERAGE E/E' RATIO: 6.46
LEFT ATRIUM VOLUME INDEX: 17.4 ML/M2
LEFT ATRIUM VOLUME: 33.6 CM3
MAXIMAL PREDICTED HEART RATE: 134 BPM
STRESS TARGET HR: 114 BPM

## 2021-03-10 RX ORDER — METOPROLOL SUCCINATE 25 MG/1
TABLET, EXTENDED RELEASE ORAL
Qty: 90 TABLET | Refills: 3 | Status: ON HOLD | OUTPATIENT
Start: 2021-03-10 | End: 2022-01-19

## 2021-04-05 PROCEDURE — 93294 REM INTERROG EVL PM/LDLS PM: CPT | Performed by: INTERNAL MEDICINE

## 2021-04-05 PROCEDURE — 93296 REM INTERROG EVL PM/IDS: CPT | Performed by: INTERNAL MEDICINE

## 2021-04-14 ENCOUNTER — CLINICAL SUPPORT (OUTPATIENT)
Dept: INTERNAL MEDICINE | Facility: CLINIC | Age: 86
End: 2021-04-14

## 2021-04-14 DIAGNOSIS — D51.0 PERNICIOUS ANEMIA: ICD-10-CM

## 2021-04-14 PROCEDURE — 96372 THER/PROPH/DIAG INJ SC/IM: CPT | Performed by: INTERNAL MEDICINE

## 2021-04-14 RX ADMIN — CYANOCOBALAMIN 1000 MCG: 1000 INJECTION, SOLUTION INTRAMUSCULAR; SUBCUTANEOUS at 08:36

## 2021-05-10 DIAGNOSIS — F41.9 ANXIETY: ICD-10-CM

## 2021-05-11 RX ORDER — LORAZEPAM 1 MG/1
TABLET ORAL
Qty: 90 TABLET | Refills: 1 | Status: SHIPPED | OUTPATIENT
Start: 2021-05-11 | End: 2021-11-30

## 2021-05-25 RX ORDER — LEVOTHYROXINE SODIUM 0.07 MG/1
TABLET ORAL
Qty: 90 TABLET | Refills: 3 | Status: ON HOLD | OUTPATIENT
Start: 2021-05-25 | End: 2022-01-19

## 2021-05-26 ENCOUNTER — OFFICE VISIT (OUTPATIENT)
Dept: INTERNAL MEDICINE | Facility: CLINIC | Age: 86
End: 2021-05-26

## 2021-05-26 VITALS
DIASTOLIC BLOOD PRESSURE: 78 MMHG | HEART RATE: 66 BPM | HEIGHT: 72 IN | WEIGHT: 168 LBS | TEMPERATURE: 97.1 F | BODY MASS INDEX: 22.75 KG/M2 | SYSTOLIC BLOOD PRESSURE: 158 MMHG | OXYGEN SATURATION: 99 %

## 2021-05-26 DIAGNOSIS — M54.42 CHRONIC BILATERAL LOW BACK PAIN WITH BILATERAL SCIATICA: ICD-10-CM

## 2021-05-26 DIAGNOSIS — I10 ESSENTIAL HYPERTENSION: ICD-10-CM

## 2021-05-26 DIAGNOSIS — R73.01 IMPAIRED FASTING GLUCOSE: ICD-10-CM

## 2021-05-26 DIAGNOSIS — M54.41 CHRONIC BILATERAL LOW BACK PAIN WITH BILATERAL SCIATICA: ICD-10-CM

## 2021-05-26 DIAGNOSIS — D51.0 PERNICIOUS ANEMIA: ICD-10-CM

## 2021-05-26 DIAGNOSIS — G89.29 CHRONIC BILATERAL LOW BACK PAIN WITH BILATERAL SCIATICA: ICD-10-CM

## 2021-05-26 DIAGNOSIS — E11.9 ENCOUNTER FOR DIABETIC FOOT EXAM (HCC): Primary | ICD-10-CM

## 2021-05-26 LAB — HBA1C MFR BLD: 6 %

## 2021-05-26 PROCEDURE — 99214 OFFICE O/P EST MOD 30 MIN: CPT | Performed by: INTERNAL MEDICINE

## 2021-05-26 PROCEDURE — 96372 THER/PROPH/DIAG INJ SC/IM: CPT | Performed by: INTERNAL MEDICINE

## 2021-05-26 PROCEDURE — 83036 HEMOGLOBIN GLYCOSYLATED A1C: CPT | Performed by: INTERNAL MEDICINE

## 2021-05-26 RX ADMIN — CYANOCOBALAMIN 1000 MCG: 1000 INJECTION, SOLUTION INTRAMUSCULAR; SUBCUTANEOUS at 10:34

## 2021-05-26 NOTE — PROGRESS NOTES
Subjective   Dexter Suggs is a 86 y.o. male.   Chief Complaint   Patient presents with   • Hypertension     3 month follow up    • Prediabetes     a1c: 6.0   • Cough     persistant cough  over the last couple of years, says has been xrayed but everythign is clear,  honey seems to sooth it some    • Back Pain     ongoing back and shoulder pain        History of Present Illness patient is here for 3-month follow-up of hypertension diabetes.  He is having a persistent cough he has had a chest x-ray recently I went back and actually pulled the films up and we read that found he does have a elevated right hemidiaphragm.  Is also having some ongoing back and shoulder problems he is apparently on the lawnmower a lot mowing and he likes to do that but is obviously irritating his back.    The following portions of the patient's history were reviewed and updated as appropriate: allergies, current medications, past family history, past medical history, past social history, past surgical history and problem list.    Review of Systems   Constitutional: Negative for activity change, appetite change, fatigue, fever, unexpected weight gain and unexpected weight loss.   HENT: Negative for swollen glands, trouble swallowing and voice change.    Eyes: Negative for blurred vision and visual disturbance.   Respiratory: Negative for cough and shortness of breath.    Cardiovascular: Negative for chest pain, palpitations and leg swelling.   Gastrointestinal: Negative for abdominal pain, constipation, diarrhea, nausea, vomiting and indigestion.   Endocrine: Negative for cold intolerance, heat intolerance, polydipsia and polyphagia.   Genitourinary: Negative for dysuria and frequency.   Musculoskeletal: Negative for arthralgias, back pain, joint swelling and neck pain.   Skin: Negative for color change, rash and skin lesions.   Neurological: Negative for dizziness, weakness, headache, memory problem and confusion.   Hematological: Does  not bruise/bleed easily.   Psychiatric/Behavioral: Negative for agitation, hallucinations and suicidal ideas. The patient is not nervous/anxious.        Objective   Past Medical History:   Diagnosis Date   • Abnormal nuclear stress test    • BPH (benign prostatic hyperplasia)    • Chest pain    • Coronary artery disease    • Disease of thyroid gland    • Hyperlipidemia    • Hypertension    • LV dysfunction    • Melanoma (CMS/HCC)    • Prostate CA (CMS/HCC)       Past Surgical History:   Procedure Laterality Date   • APPENDECTOMY     • CARDIAC CATHETERIZATION Left 12/10/2012   • CARDIAC ELECTROPHYSIOLOGY PROCEDURE N/A 11/23/2018    Procedure: Pacemaker DC new 11/23/2018;  Surgeon: Austin Granados MD;  Location:  [facility] CATH INVASIVE LOCATION;  Service: Cardiology   • CHOLECYSTECTOMY     • COLONOSCOPY N/A 6/9/2017    Procedure: COLONOSCOPY WITH ANESTHESIA;  Surgeon: Felice Marroquin MD;  Location:  [facility] ENDOSCOPY;  Service:    • COLONOSCOPY W/ POLYPECTOMY  02/16/2012    adenomatous polyp at 80cm   • HAND SURGERY Right    • HERNIA REPAIR     • INGUINAL HERNIA REPAIR     • KNEE SURGERY     • PROSTATE SURGERY     • SKIN CANCER EXCISION      face   • SKIN LESION EXCISION          Current Outpatient Medications:   •  allopurinol (ZYLOPRIM) 300 MG tablet, TAKE 1 TABLET BY MOUTH EVERY DAY , Disp: 90 tablet, Rfl: 3  •  aspirin 81 MG EC tablet, Take 81 mg by mouth Daily., Disp: , Rfl:   •  cyanocobalamin 1000 MCG/ML injection, Inject 1 mL into the appropriate muscle as directed by prescriber Every 28 (Twenty-Eight) Days., Disp: 1 mL, Rfl: 2  •  Easy Touch Lancets 30G/Twist misc, TEST DAILY AS DIRECTED , Disp: 100 each, Rfl: 3  •  Eliquis 5 MG tablet tablet, TAKE 1 TABLET BY MOUTH EVERY 12 HOURS , Disp: 180 tablet, Rfl: 4  •  fluticasone (FLONASE) 50 MCG/ACT nasal spray, 1 spray into the nostril(s) as directed by provider., Disp: , Rfl:   •  folic acid (FOLVITE) 1 MG tablet, Take 1 mg by mouth Daily., Disp: , Rfl:   •  glucosamine  sulfate 500 MG capsule capsule, Take  by mouth 3 (Three) Times a Day With Meals., Disp: , Rfl:   •  levothyroxine (SYNTHROID, LEVOTHROID) 75 MCG tablet, TAKE 1 TABLET BY MOUTH EVERY DAY , Disp: 90 tablet, Rfl: 3  •  LORazepam (ATIVAN) 1 MG tablet, TAKE 1 TABLET BY MOUTH EVERY DAY , Disp: 90 tablet, Rfl: 1  •  methotrexate 2.5 MG tablet, Take 7.5 mg by mouth 1 (One) Time Per Week., Disp: , Rfl:   •  metoprolol succinate XL (TOPROL-XL) 25 MG 24 hr tablet, TAKE 1 TABLET BY MOUTH EVERY DAY , Disp: 90 tablet, Rfl: 3  •  nitroglycerin (NITROSTAT) 0.4 MG SL tablet, DISSOLVE 1 UNDER TONGUE AS NEEDED FOR CHEST PAIN, MAY REPEAT TWICE IN 15 MIN PERIOD., Disp: 30 tablet, Rfl: 3  •  pantoprazole (PROTONIX) 40 MG EC tablet, TAKE 1 TABLET BY MOUTH EVERY DAY , Disp: 90 tablet, Rfl: 3  •  predniSONE (DELTASONE) 5 MG tablet, Take 5 mg by mouth Daily., Disp: , Rfl:   •  triamterene-hydrochlorothiazide (MAXZIDE) 75-50 MG per tablet, TAKE 1/2 TABLET BY MOUTH EVERY DAY , Disp: 45 tablet, Rfl: 3  •  True Metrix Blood Glucose Test test strip, TEST DAILY AS DIRECTED , Disp: 300 each, Rfl: 3    Current Facility-Administered Medications:   •  cyanocobalamin injection 1,000 mcg, 1,000 mcg, Intramuscular, Q28 Days, Jarad Alexis MD, 1,000 mcg at 04/14/21 0836     Vitals:    05/26/21 0922   BP: 158/78   Pulse: 66   Temp: 97.1 °F (36.2 °C)   SpO2: 99%         05/26/21 0922   Weight: 76.2 kg (168 lb)         Physical Exam  Vitals and nursing note reviewed.   Constitutional:       General: He is not in acute distress.     Appearance: Normal appearance. He is well-developed.   HENT:      Head: Normocephalic and atraumatic.      Right Ear: External ear normal.      Left Ear: External ear normal.      Nose: Nose normal.   Eyes:      Extraocular Movements: Extraocular movements intact.      Conjunctiva/sclera: Conjunctivae normal.      Pupils: Pupils are equal, round, and reactive to light.   Cardiovascular:      Rate and Rhythm: Normal rate and  regular rhythm.      Pulses: Normal pulses.      Heart sounds: Normal heart sounds.   Pulmonary:      Effort: Pulmonary effort is normal.      Breath sounds: Normal breath sounds.   Abdominal:      General: Bowel sounds are normal.      Palpations: Abdomen is soft.   Musculoskeletal:         General: Normal range of motion.      Cervical back: Normal range of motion and neck supple. No rigidity. No muscular tenderness.   Skin:     General: Skin is warm and dry.   Neurological:      General: No focal deficit present.      Mental Status: He is alert and oriented to person, place, and time.   Psychiatric:         Mood and Affect: Mood normal.         Behavior: Behavior normal.               Assessment/Plan   Diagnoses and all orders for this visit:    1. Encounter for diabetic foot exam (CMS/[facility]) (Primary)  -     POC Glycosylated Hemoglobin (Hb A1C)    2. Essential hypertension  -     Basic Metabolic Panel    3. Impaired fasting glucose    4. Chronic bilateral low back pain with bilateral sciatica      Patient has well controlled impaired fasting glucose.  He also has hypertension with slightly elevated systolic reading of 158.  He is having quite a bit of lower back issues he rides a mower a lot during the day and that creates worsening of his back pain.  He is recovered well from COVID-19 no new symptoms there.  I did pull up his actual films and reread those he does have an elevated right hemidiaphragm which likely he has some congestion that right chest that requires clearing out I do not see anything more suggestive of a acute problem.

## 2021-05-27 LAB
BUN SERPL-MCNC: 18 MG/DL (ref 8–23)
BUN/CREAT SERPL: 19.8 (ref 7–25)
CALCIUM SERPL-MCNC: 10.5 MG/DL (ref 8.6–10.5)
CHLORIDE SERPL-SCNC: 102 MMOL/L (ref 98–107)
CO2 SERPL-SCNC: 26.4 MMOL/L (ref 22–29)
CREAT SERPL-MCNC: 0.91 MG/DL (ref 0.76–1.27)
GLUCOSE SERPL-MCNC: 109 MG/DL (ref 65–99)
POTASSIUM SERPL-SCNC: 4.4 MMOL/L (ref 3.5–5.2)
SODIUM SERPL-SCNC: 143 MMOL/L (ref 136–145)

## 2021-06-02 ENCOUNTER — TELEPHONE (OUTPATIENT)
Dept: INTERNAL MEDICINE | Facility: CLINIC | Age: 86
End: 2021-06-02

## 2021-08-03 ENCOUNTER — CLINICAL SUPPORT (OUTPATIENT)
Dept: INTERNAL MEDICINE | Facility: CLINIC | Age: 86
End: 2021-08-03

## 2021-08-03 DIAGNOSIS — D51.0 PERNICIOUS ANEMIA: ICD-10-CM

## 2021-08-03 PROCEDURE — 96372 THER/PROPH/DIAG INJ SC/IM: CPT | Performed by: INTERNAL MEDICINE

## 2021-08-03 RX ADMIN — CYANOCOBALAMIN 1000 MCG: 1000 INJECTION, SOLUTION INTRAMUSCULAR; SUBCUTANEOUS at 15:55

## 2021-08-06 ENCOUNTER — APPOINTMENT (OUTPATIENT)
Dept: GENERAL RADIOLOGY | Facility: HOSPITAL | Age: 86
End: 2021-08-06

## 2021-08-06 ENCOUNTER — HOSPITAL ENCOUNTER (EMERGENCY)
Facility: HOSPITAL | Age: 86
Discharge: HOME | End: 2021-08-06
Attending: EMERGENCY MEDICINE | Admitting: EMERGENCY MEDICINE

## 2021-08-06 ENCOUNTER — APPOINTMENT (OUTPATIENT)
Dept: CT IMAGING | Facility: HOSPITAL | Age: 86
End: 2021-08-06

## 2021-08-06 VITALS
TEMPERATURE: 98.2 F | HEIGHT: 72 IN | BODY MASS INDEX: 21.67 KG/M2 | SYSTOLIC BLOOD PRESSURE: 138 MMHG | DIASTOLIC BLOOD PRESSURE: 70 MMHG | WEIGHT: 160 LBS | HEART RATE: 78 BPM | RESPIRATION RATE: 17 BRPM | OXYGEN SATURATION: 98 %

## 2021-08-06 DIAGNOSIS — S82.832A CLOSED FRACTURE OF DISTAL END OF LEFT FIBULA, UNSPECIFIED FRACTURE MORPHOLOGY, INITIAL ENCOUNTER: Primary | ICD-10-CM

## 2021-08-06 DIAGNOSIS — S76.211A INGUINAL STRAIN, RIGHT, INITIAL ENCOUNTER: ICD-10-CM

## 2021-08-06 PROCEDURE — 96372 THER/PROPH/DIAG INJ SC/IM: CPT

## 2021-08-06 PROCEDURE — 63710000001 ONDANSETRON ODT 4 MG TABLET DISPERSIBLE: Performed by: EMERGENCY MEDICINE

## 2021-08-06 PROCEDURE — 72192 CT PELVIS W/O DYE: CPT

## 2021-08-06 PROCEDURE — 99283 EMERGENCY DEPT VISIT LOW MDM: CPT

## 2021-08-06 PROCEDURE — 73502 X-RAY EXAM HIP UNI 2-3 VIEWS: CPT

## 2021-08-06 PROCEDURE — 73610 X-RAY EXAM OF ANKLE: CPT

## 2021-08-06 PROCEDURE — 25010000002 MORPHINE PER 10 MG: Performed by: EMERGENCY MEDICINE

## 2021-08-06 RX ORDER — ONDANSETRON 4 MG/1
4 TABLET, ORALLY DISINTEGRATING ORAL ONCE
Status: COMPLETED | OUTPATIENT
Start: 2021-08-06 | End: 2021-08-06

## 2021-08-06 RX ORDER — HYDROCODONE BITARTRATE AND ACETAMINOPHEN 5; 325 MG/1; MG/1
1 TABLET ORAL EVERY 6 HOURS PRN
Qty: 6 TABLET | Refills: 0 | Status: ON HOLD | OUTPATIENT
Start: 2021-08-06 | End: 2021-08-25

## 2021-08-06 RX ORDER — MORPHINE SULFATE 10 MG/ML
6 INJECTION INTRAMUSCULAR; INTRAVENOUS; SUBCUTANEOUS ONCE
Status: COMPLETED | OUTPATIENT
Start: 2021-08-06 | End: 2021-08-06

## 2021-08-06 RX ADMIN — ONDANSETRON 4 MG: 4 TABLET, ORALLY DISINTEGRATING ORAL at 20:14

## 2021-08-06 RX ADMIN — MORPHINE SULFATE 6 MG: 10 INJECTION INTRAVENOUS at 20:14

## 2021-08-07 NOTE — DISCHARGE INSTRUCTIONS
"Dexter and wife,    You did break the small bone in your ankle called your fibula. Please take it easy! You need to elevate that leg and apply ice when you are not walking. It is a \"hairline\" and should do well with the boot but you should have either your family doctor or Dr. Anderson repeat an xray to assure it is not getting worse.   "

## 2021-08-07 NOTE — ED PROVIDER NOTES
"Subjective   86 y/o male arrives for evaluation of left ankle pain and right thigh pain. He notes that 3 days ago he \"twisted\" his left ankle. Since that time he has been \"favoring\" this leg putting more weight on the right leg noting worsening pain today to right upper thigh. All pain is at the locations above, no radiation, made worse with ambulating without prior similar history nor alleviating factors. He denies any weakness, numbness or tingling, loss of sensation, further falls or trauma, nausea, vomiting, diarrhea. He has not tried anything at home but he is continued to walking on his \"bad leg.\"            Review of Systems   All other systems reviewed and are negative.      Past Medical History:   Diagnosis Date   • Abnormal nuclear stress test    • BPH (benign prostatic hyperplasia)    • Chest pain    • Coronary artery disease    • Disease of thyroid gland    • Hyperlipidemia    • Hypertension    • LV dysfunction    • Melanoma (CMS/HCC)    • Prostate CA (CMS/HCC)        Allergies   Allergen Reactions   • Adhesive Tape    • Simvastatin Other (See Comments)     Abnormal LFT's   • Neosporin [Neomycin-Bacitracin Zn-Polymyx] Rash       Past Surgical History:   Procedure Laterality Date   • APPENDECTOMY     • CARDIAC CATHETERIZATION Left 12/10/2012   • CARDIAC ELECTROPHYSIOLOGY PROCEDURE N/A 11/23/2018    Procedure: Pacemaker DC new 11/23/2018;  Surgeon: Austin Granados MD;  Location: [facility] CATH INVASIVE LOCATION;  Service: Cardiology   • CHOLECYSTECTOMY     • COLONOSCOPY N/A 6/9/2017    Procedure: COLONOSCOPY WITH ANESTHESIA;  Surgeon: Felice Marroquin MD;  Location: [facility] ENDOSCOPY;  Service:    • COLONOSCOPY W/ POLYPECTOMY  02/16/2012    adenomatous polyp at 80cm   • HAND SURGERY Right    • HERNIA REPAIR     • INGUINAL HERNIA REPAIR     • KNEE SURGERY     • PROSTATE SURGERY     • SKIN CANCER EXCISION      face   • SKIN LESION EXCISION         Family History   Problem Relation Age of Onset   • Alzheimer's " disease Mother    • Cancer Father    • Cancer Sister        Social History     Socioeconomic History   • Marital status:      Spouse name: Not on file   • Number of children: Not on file   • Years of education: Not on file   • Highest education level: Not on file   Tobacco Use   • Smoking status: Former Smoker     Years: 30.00     Types: Cigarettes     Quit date:      Years since quittin.6   • Smokeless tobacco: Never Used   Vaping Use   • Vaping Use: Never used   Substance and Sexual Activity   • Alcohol use: No   • Drug use: No   • Sexual activity: Defer           Objective   Physical Exam  Vitals and nursing note reviewed.   Constitutional:       Appearance: Normal appearance. He is normal weight.   HENT:      Head: Normocephalic and atraumatic.      Right Ear: External ear normal.      Left Ear: External ear normal.      Nose: Nose normal.      Mouth/Throat:      Mouth: Mucous membranes are moist.      Pharynx: Oropharynx is clear.   Eyes:      Conjunctiva/sclera: Conjunctivae normal.      Pupils: Pupils are equal, round, and reactive to light.   Musculoskeletal:         General: Swelling, tenderness and signs of injury present. No deformity.      Cervical back: Normal range of motion and neck supple. No tenderness.      Left ankle: Swelling present. Tenderness present over the lateral malleolus. No CF ligament or posterior TF ligament tenderness. Decreased range of motion.        Legs:       Comments: Tenderness over the right groin with movement, no obvious swelling or masses, distally pulses are intact, sensation is intact.    Swelling and pain to the left ankle,     Distally all pulses and motor function are intact       Skin:     General: Skin is warm and dry.      Capillary Refill: Capillary refill takes less than 2 seconds.   Neurological:      General: No focal deficit present.      Mental Status: He is alert and oriented to person, place, and time. Mental status is at baseline.    Psychiatric:         Mood and Affect: Mood normal.         Behavior: Behavior normal.         Thought Content: Thought content normal.         Procedures           ED Course  ED Course as of Aug 06 2222   Fri Aug 06, 2021   2209 Given no displacement of the ? Fracture at the fibula in a patient that CANNOT walk with crutches we will do his walker at home with a walking boot. We will also encourage follow up and return. He remains neurologically intact after walking boot placed.     []   2218 I explained to the patient that I felt his right groin hurt because he strained the muscle in this area from favoring his broken ankle. We will do short term of narcotic pain medication with strict return and follow up. All questions answered.     []      ED Course User Index  [] Earle Adames MD            CT Pelvis Without Contrast   Final Result      XR Ankle 3+ View Left   Final Result      XR Hip With or Without Pelvis 2 - 3 View Right   Final Result        Labs Reviewed - No data to display                                    MDM    Final diagnoses:   Closed fracture of distal end of left fibula, unspecified fracture morphology, initial encounter   Inguinal strain, right, initial encounter       ED Disposition  ED Disposition     ED Disposition Condition Comment    Discharge Stable           Jarad Alexis MD  [address] [place] [id number] [phone]          Yuval Anderson, DPM  [address] [place] [id number] [phone]    Call            Medication List      New Prescriptions    HYDROcodone-acetaminophen 5-325 MG per tablet  Commonly known as: NORCO  Take 1 tablet by mouth Every 6 (Six) Hours As Needed for Moderate Pain .           Where to Get Your Medications      These medications were sent to [facility]/pharmacy #[id number] - [place] - [address] AT ACROSS FROM [place] [place]  [phone] [facility] [phone]   [address] [place] [address]    Hours: 24-hours Phone:  [phone]   · HYDROcodone-acetaminophen 5-325 MG per tablet          Earle Adames MD  08/06/21 2221       Earle Adames MD  08/06/21 9635

## 2021-08-11 ENCOUNTER — OFFICE VISIT (OUTPATIENT)
Dept: INTERNAL MEDICINE | Facility: CLINIC | Age: 86
End: 2021-08-11

## 2021-08-11 VITALS
WEIGHT: 160 LBS | SYSTOLIC BLOOD PRESSURE: 138 MMHG | HEIGHT: 71 IN | HEART RATE: 63 BPM | TEMPERATURE: 97.9 F | DIASTOLIC BLOOD PRESSURE: 80 MMHG | OXYGEN SATURATION: 99 % | BODY MASS INDEX: 22.4 KG/M2

## 2021-08-11 DIAGNOSIS — M16.10 HIP ARTHRITIS: ICD-10-CM

## 2021-08-11 DIAGNOSIS — S82.892A CLOSED FRACTURE OF LEFT ANKLE, INITIAL ENCOUNTER: Primary | ICD-10-CM

## 2021-08-11 PROBLEM — M19.049 ARTHRITIS OF HAND: Status: ACTIVE | Noted: 2021-08-11

## 2021-08-11 PROBLEM — S52.509A CLOSED FRACTURE OF DISTAL END OF RADIUS: Status: ACTIVE | Noted: 2019-03-25

## 2021-08-11 PROCEDURE — 96372 THER/PROPH/DIAG INJ SC/IM: CPT | Performed by: INTERNAL MEDICINE

## 2021-08-11 PROCEDURE — 99213 OFFICE O/P EST LOW 20 MIN: CPT | Performed by: INTERNAL MEDICINE

## 2021-08-11 RX ORDER — OXYCODONE AND ACETAMINOPHEN 7.5; 325 MG/1; MG/1
1 TABLET ORAL EVERY 6 HOURS PRN
Qty: 60 TABLET | Refills: 0 | Status: SHIPPED | OUTPATIENT
Start: 2021-08-11 | End: 2021-09-20

## 2021-08-11 RX ORDER — METHYLPREDNISOLONE ACETATE 80 MG/ML
80 INJECTION, SUSPENSION INTRA-ARTICULAR; INTRALESIONAL; INTRAMUSCULAR; SOFT TISSUE ONCE
Status: COMPLETED | OUTPATIENT
Start: 2021-08-11 | End: 2021-08-11

## 2021-08-11 RX ADMIN — METHYLPREDNISOLONE ACETATE 80 MG: 80 INJECTION, SUSPENSION INTRA-ARTICULAR; INTRALESIONAL; INTRAMUSCULAR; SOFT TISSUE at 14:29

## 2021-08-11 NOTE — PROGRESS NOTES
Subjective   Dexter Suggs is a 87 y.o. male.   Chief Complaint   Patient presents with   • ankle fracture     left ankle; seen in [facility] ER on 08/06/2021   • Groin Pain     right, done around or just after ankle        History of Present Illness I have reviewed patient's x-rays of ankle and pelvis.  He has a tremendous amount of arthritis and spurs in both hips he also has a fractured left ankle.  Patient did not fall he stumbled in the yard turning his left ankle since that time he has been hurting in his right hip area.  Apparently he hobbled around on the broken ankle for about a week.    The following portions of the patient's history were reviewed and updated as appropriate: allergies, current medications, past family history, past medical history, past social history, past surgical history and problem list.    Review of Systems   Constitutional: Negative for activity change, appetite change, fatigue, fever, unexpected weight gain and unexpected weight loss.   HENT: Negative for swollen glands, trouble swallowing and voice change.    Eyes: Negative for blurred vision and visual disturbance.   Respiratory: Negative for cough and shortness of breath.    Cardiovascular: Negative for chest pain, palpitations and leg swelling.   Gastrointestinal: Negative for abdominal pain, constipation, diarrhea, nausea, vomiting and indigestion.   Endocrine: Negative for cold intolerance, heat intolerance, polydipsia and polyphagia.   Genitourinary: Negative for dysuria and frequency.   Musculoskeletal: Positive for arthralgias. Negative for back pain, joint swelling and neck pain.   Skin: Negative for color change, rash and skin lesions.   Neurological: Negative for dizziness, weakness, headache, memory problem and confusion.   Hematological: Does not bruise/bleed easily.   Psychiatric/Behavioral: Negative for agitation, hallucinations and suicidal ideas. The patient is not nervous/anxious.        Objective   Past Medical  History:   Diagnosis Date   • Abnormal nuclear stress test    • BPH (benign prostatic hyperplasia)    • Chest pain    • Coronary artery disease    • Disease of thyroid gland    • Hyperlipidemia    • Hypertension    • LV dysfunction    • Melanoma (CMS/HCC)    • Prostate CA (CMS/HCC)       Past Surgical History:   Procedure Laterality Date   • APPENDECTOMY     • CARDIAC CATHETERIZATION Left 12/10/2012   • CARDIAC ELECTROPHYSIOLOGY PROCEDURE N/A 11/23/2018    Procedure: Pacemaker DC new 11/23/2018;  Surgeon: Austin Granados MD;  Location: [facility] CATH INVASIVE LOCATION;  Service: Cardiology   • CHOLECYSTECTOMY     • COLONOSCOPY N/A 6/9/2017    Procedure: COLONOSCOPY WITH ANESTHESIA;  Surgeon: Felice Marroquin MD;  Location:  [facility] ENDOSCOPY;  Service:    • COLONOSCOPY W/ POLYPECTOMY  02/16/2012    adenomatous polyp at 80cm   • HAND SURGERY Right    • HERNIA REPAIR     • INGUINAL HERNIA REPAIR     • KNEE SURGERY     • PROSTATE SURGERY     • SKIN CANCER EXCISION      face   • SKIN LESION EXCISION          Current Outpatient Medications:   •  allopurinol (ZYLOPRIM) 300 MG tablet, TAKE 1 TABLET BY MOUTH EVERY DAY , Disp: 90 tablet, Rfl: 3  •  aspirin 81 MG EC tablet, Take 81 mg by mouth Daily., Disp: , Rfl:   •  cyanocobalamin 1000 MCG/ML injection, Inject 1 mL into the appropriate muscle as directed by prescriber Every 28 (Twenty-Eight) Days., Disp: 1 mL, Rfl: 2  •  Easy Touch Lancets 30G/Twist misc, TEST DAILY AS DIRECTED , Disp: 100 each, Rfl: 3  •  Eliquis 5 MG tablet tablet, TAKE 1 TABLET BY MOUTH EVERY 12 HOURS , Disp: 180 tablet, Rfl: 4  •  fluticasone (FLONASE) 50 MCG/ACT nasal spray, 1 spray into the nostril(s) as directed by provider., Disp: , Rfl:   •  folic acid (FOLVITE) 1 MG tablet, Take 1 mg by mouth Daily., Disp: , Rfl:   •  glucosamine sulfate 500 MG capsule capsule, Take  by mouth 3 (Three) Times a Day With Meals., Disp: , Rfl:   •  HYDROcodone-acetaminophen (NORCO) 5-325 MG per tablet, Take 1 tablet by mouth  Every 6 (Six) Hours As Needed for Moderate Pain ., Disp: 6 tablet, Rfl: 0  •  levothyroxine (SYNTHROID, LEVOTHROID) 75 MCG tablet, TAKE 1 TABLET BY MOUTH EVERY DAY , Disp: 90 tablet, Rfl: 3  •  LORazepam (ATIVAN) 1 MG tablet, TAKE 1 TABLET BY MOUTH EVERY DAY , Disp: 90 tablet, Rfl: 1  •  methotrexate 2.5 MG tablet, Take 7.5 mg by mouth 1 (One) Time Per Week., Disp: , Rfl:   •  metoprolol succinate XL (TOPROL-XL) 25 MG 24 hr tablet, TAKE 1 TABLET BY MOUTH EVERY DAY , Disp: 90 tablet, Rfl: 3  •  nitroglycerin (NITROSTAT) 0.4 MG SL tablet, DISSOLVE 1 UNDER TONGUE AS NEEDED FOR CHEST PAIN, MAY REPEAT TWICE IN 15 MIN PERIOD., Disp: 30 tablet, Rfl: 3  •  pantoprazole (PROTONIX) 40 MG EC tablet, TAKE 1 TABLET BY MOUTH EVERY DAY , Disp: 90 tablet, Rfl: 3  •  predniSONE (DELTASONE) 5 MG tablet, Take 5 mg by mouth Daily., Disp: , Rfl:   •  triamterene-hydrochlorothiazide (MAXZIDE) 75-50 MG per tablet, TAKE 1/2 TABLET BY MOUTH EVERY DAY , Disp: 45 tablet, Rfl: 3  •  True Metrix Blood Glucose Test test strip, TEST DAILY AS DIRECTED , Disp: 300 each, Rfl: 3  •  oxyCODONE-acetaminophen (Percocet) 7.5-325 MG per tablet, Take 1 tablet by mouth Every 6 (Six) Hours As Needed (pain)., Disp: 60 tablet, Rfl: 0    Current Facility-Administered Medications:   •  cyanocobalamin injection 1,000 mcg, 1,000 mcg, Intramuscular, Q28 Days, Jarad Alexis MD, 1,000 mcg at 08/03/21 1555  •  methylPREDNISolone acetate (DEPO-medrol) injection 80 mg, 80 mg, Intramuscular, Once, Jarad Alexis MD     Vitals:    08/11/21 1357   BP: 138/80   Pulse: 63   Temp: 97.9 °F (36.6 °C)   SpO2: 99%         08/11/21  1357   Weight: 72.6 kg (160 lb)         Physical Exam  Vitals and nursing note reviewed.   Constitutional:       General: He is not in acute distress.     Appearance: Normal appearance. He is well-developed.   HENT:      Head: Normocephalic and atraumatic.      Right Ear: External ear normal.      Left Ear: External ear normal.      Nose: Nose  normal.   Eyes:      Extraocular Movements: Extraocular movements intact.      Conjunctiva/sclera: Conjunctivae normal.      Pupils: Pupils are equal, round, and reactive to light.   Cardiovascular:      Rate and Rhythm: Normal rate and regular rhythm.      Pulses: Normal pulses.      Heart sounds: Normal heart sounds.   Pulmonary:      Effort: Pulmonary effort is normal.      Breath sounds: Normal breath sounds.   Abdominal:      General: Bowel sounds are normal.      Palpations: Abdomen is soft.   Musculoskeletal:      Cervical back: Normal range of motion and neck supple. No rigidity. No muscular tenderness.      Comments: Patient is walking with a walker today he has the left ankle and a cast boot.  He has some swelling and tenderness over the lateral portion of the left foot and ankle.  He also has diminished range of motion of the right hip he guards tremendously it hurts when I try to rotate his hip.   Skin:     General: Skin is warm and dry.   Neurological:      General: No focal deficit present.      Mental Status: He is alert and oriented to person, place, and time.   Psychiatric:         Mood and Affect: Mood normal.         Behavior: Behavior normal.               Assessment/Plan   Diagnoses and all orders for this visit:    1. Closed fracture of left ankle, initial encounter (Primary)  -     Ambulatory Referral to Podiatry  -     oxyCODONE-acetaminophen (Percocet) 7.5-325 MG per tablet; Take 1 tablet by mouth Every 6 (Six) Hours As Needed (pain).  Dispense: 60 tablet; Refill: 0    2. Hip arthritis  -     Ambulatory Referral to Orthopedic Surgery  -     methylPREDNISolone acetate (DEPO-medrol) injection 80 mg      At today's visit patient has quite a bit of pain in his right hip area and after examining his hip trying to rotate in reviewing his x-rays of his pelvis he has a tremendous amount of arthritis he is also having some spasm around the hip.  I gave him a shot of Depo-Medrol also increased his  pain medication to Percocet until we can get him into see one of the orthopedic surgeons ultimately he is going to need to have some hip surgery I believe.  He does have a left ankle fracture he does have some swelling around that I encouraged him to elevate the left ankle is much as possible and I have made him appointment with  from the orthopedic [place].

## 2021-08-13 ENCOUNTER — OFFICE VISIT (OUTPATIENT)
Dept: CARDIOLOGY | Facility: CLINIC | Age: 86
End: 2021-08-13

## 2021-08-13 VITALS
SYSTOLIC BLOOD PRESSURE: 130 MMHG | HEIGHT: 72 IN | HEART RATE: 89 BPM | OXYGEN SATURATION: 98 % | DIASTOLIC BLOOD PRESSURE: 70 MMHG | BODY MASS INDEX: 21.7 KG/M2

## 2021-08-13 DIAGNOSIS — I10 ESSENTIAL HYPERTENSION: Primary | ICD-10-CM

## 2021-08-13 DIAGNOSIS — R06.09 DOE (DYSPNEA ON EXERTION): ICD-10-CM

## 2021-08-13 DIAGNOSIS — Z86.010 HX OF COLONIC POLYPS: ICD-10-CM

## 2021-08-13 DIAGNOSIS — I25.10 SINGLE VESSEL CORONARY ARTERY DISEASE: ICD-10-CM

## 2021-08-13 DIAGNOSIS — I10 HTN (HYPERTENSION), BENIGN: ICD-10-CM

## 2021-08-13 DIAGNOSIS — E78.2 MIXED HYPERLIPIDEMIA: ICD-10-CM

## 2021-08-13 DIAGNOSIS — E06.3 HYPOTHYROIDISM DUE TO HASHIMOTO'S THYROIDITIS: ICD-10-CM

## 2021-08-13 DIAGNOSIS — I48.0 PAF (PAROXYSMAL ATRIAL FIBRILLATION) (HCC): ICD-10-CM

## 2021-08-13 DIAGNOSIS — E03.8 HYPOTHYROIDISM DUE TO HASHIMOTO'S THYROIDITIS: ICD-10-CM

## 2021-08-13 PROCEDURE — 99214 OFFICE O/P EST MOD 30 MIN: CPT | Performed by: INTERNAL MEDICINE

## 2021-08-13 PROCEDURE — 93000 ELECTROCARDIOGRAM COMPLETE: CPT | Performed by: INTERNAL MEDICINE

## 2021-08-13 NOTE — PROGRESS NOTES
Dexter Suggs  [id number]  1934  87 y.o.  male    Referring Provider: Jarad Alexis MD    Reason for Follow-up Visit:  Routine follow up.  coronary artery disease   sick sinus syndrome s/p pacemaker   Recent accidental fall and fracture left foot in walking boot   Pain right hip and possible right hip surgery, due to see Dr Thomas    Subjective    Recent Covid   paroxysmal atrial fibrillation on device check   Mild chronic exertional shortness of breath on exertion relieved with rest  No significant cough or wheezing    No palpitations  No associated chest pain  No significant pedal edema    No fever or chills  No significant expectoration    No hemoptysis  No presyncope or syncope    Tolerating current medications well with no untoward side effects   Compliant with prescribed medication regimen. Tries to adhere to cardiac diet.     No bleeding, excessive bruising, gait instability or fall risks    BP well controlled at home.         History of present illness:  Dexter Suggs is a 87 y.o. yo male with history of paroxysmal atrial fibrillation coronary artery disease  sick sinus syndrome s/p pacemaker who presents today for   Chief Complaint   Patient presents with   • Coronary Artery Disease     6 MO- NO PRBLEMS   .    History  Past Medical History:   Diagnosis Date   • Abnormal nuclear stress test    • BPH (benign prostatic hyperplasia)    • Chest pain    • Coronary artery disease    • Disease of thyroid gland    • Hyperlipidemia    • Hypertension    • LV dysfunction    • Melanoma (CMS/HCC)    • Prostate CA (CMS/HCC)    ,   Past Surgical History:   Procedure Laterality Date   • APPENDECTOMY     • CARDIAC CATHETERIZATION Left 12/10/2012   • CARDIAC ELECTROPHYSIOLOGY PROCEDURE N/A 11/23/2018    Procedure: Pacemaker DC new 11/23/2018;  Surgeon: Austin Granados MD;  Location: [facility] CATH INVASIVE LOCATION;  Service: Cardiology   • CHOLECYSTECTOMY     • COLONOSCOPY N/A 6/9/2017    Procedure: COLONOSCOPY WITH  ANESTHESIA;  Surgeon: Felice Marroquin MD;  Location: [facility] ENDOSCOPY;  Service:    • COLONOSCOPY W/ POLYPECTOMY  2012    adenomatous polyp at 80cm   • HAND SURGERY Right    • HERNIA REPAIR     • INGUINAL HERNIA REPAIR     • KNEE SURGERY     • PROSTATE SURGERY     • SKIN CANCER EXCISION      face   • SKIN LESION EXCISION     ,   Family History   Problem Relation Age of Onset   • Alzheimer's disease Mother    • Cancer Father    • Cancer Sister    ,   Social History     Tobacco Use   • Smoking status: Former Smoker     Years: 30.00     Types: Cigarettes     Quit date:      Years since quittin.6   • Smokeless tobacco: Never Used   Vaping Use   • Vaping Use: Never used   Substance Use Topics   • Alcohol use: No   • Drug use: No   ,     Medications  Current Outpatient Medications   Medication Sig Dispense Refill   • allopurinol (ZYLOPRIM) 300 MG tablet TAKE 1 TABLET BY MOUTH EVERY DAY  90 tablet 3   • aspirin 81 MG EC tablet Take 81 mg by mouth Daily.     • cyanocobalamin 1000 MCG/ML injection Inject 1 mL into the appropriate muscle as directed by prescriber Every 28 (Twenty-Eight) Days. 1 mL 2   • Easy Touch Lancets 30G/Twist misc TEST DAILY AS DIRECTED  100 each 3   • Eliquis 5 MG tablet tablet TAKE 1 TABLET BY MOUTH EVERY 12 HOURS  180 tablet 4   • fluticasone (FLONASE) 50 MCG/ACT nasal spray 1 spray into the nostril(s) as directed by provider.     • folic acid (FOLVITE) 1 MG tablet Take 1 mg by mouth Daily.     • glucosamine sulfate 500 MG capsule capsule Take  by mouth 3 (Three) Times a Day With Meals.     • levothyroxine (SYNTHROID, LEVOTHROID) 75 MCG tablet TAKE 1 TABLET BY MOUTH EVERY DAY  90 tablet 3   • LORazepam (ATIVAN) 1 MG tablet TAKE 1 TABLET BY MOUTH EVERY DAY  90 tablet 1   • methotrexate 2.5 MG tablet Take 17.5 mg by mouth 1 (One) Time Per Week.     • metoprolol succinate XL (TOPROL-XL) 25 MG 24 hr tablet TAKE 1 TABLET BY MOUTH EVERY DAY  90 tablet 3   • nitroglycerin (NITROSTAT) 0.4 MG  "SL tablet DISSOLVE 1 UNDER TONGUE AS NEEDED FOR CHEST PAIN, MAY REPEAT TWICE IN 15 MIN PERIOD. 30 tablet 3   • oxyCODONE-acetaminophen (Percocet) 7.5-325 MG per tablet Take 1 tablet by mouth Every 6 (Six) Hours As Needed (pain). 60 tablet 0   • pantoprazole (PROTONIX) 40 MG EC tablet TAKE 1 TABLET BY MOUTH EVERY DAY  90 tablet 3   • predniSONE (DELTASONE) 5 MG tablet Take 5 mg by mouth Daily.     • triamterene-hydrochlorothiazide (MAXZIDE) 75-50 MG per tablet TAKE 1/2 TABLET BY MOUTH EVERY DAY  45 tablet 3   • True Metrix Blood Glucose Test test strip TEST DAILY AS DIRECTED  300 each 3   • HYDROcodone-acetaminophen (NORCO) 5-325 MG per tablet Take 1 tablet by mouth Every 6 (Six) Hours As Needed for Moderate Pain . 6 tablet 0     Current Facility-Administered Medications   Medication Dose Route Frequency Provider Last Rate Last Admin   • cyanocobalamin injection 1,000 mcg  1,000 mcg Intramuscular Q28 Days Jarad Alexis MD   1,000 mcg at 08/03/21 1555       Allergies:  Adhesive tape, Simvastatin, and Neosporin [neomycin-bacitracin zn-polymyx]    Review of Systems  Review of Systems   Constitutional: Negative.   HENT: Negative.    Eyes: Negative.    Cardiovascular: Positive for dyspnea on exertion. Negative for chest pain, claudication, cyanosis, irregular heartbeat, leg swelling, near-syncope, orthopnea, palpitations, paroxysmal nocturnal dyspnea and syncope.   Respiratory: Negative.    Endocrine: Negative.    Hematologic/Lymphatic: Negative.    Skin: Negative.    Musculoskeletal: Positive for arthritis and back pain.   Gastrointestinal: Negative for anorexia.   Genitourinary: Negative.    Neurological: Negative.    Psychiatric/Behavioral: Negative.        Objective     Physical Exam:  /70   Pulse 89   Ht 182.9 cm (72\")   SpO2 98%   BMI 21.70 kg/m²   Physical Exam   Constitutional: He appears well-developed.   HENT:   Head: Normocephalic.   Neck: Normal carotid pulses and no JVD present. No tracheal " tenderness present. Carotid bruit is not present. No tracheal deviation present.   Cardiovascular: Regular rhythm, normal heart sounds and normal pulses.   Pulmonary/Chest: Effort normal. No stridor.   Abdominal: Soft. He exhibits no distension. There is no abdominal tenderness.   Neurological: He is alert. No cranial nerve deficit or sensory deficit.   Skin: Skin is warm.   Psychiatric: His speech is normal and behavior is normal.       Results Review:  Results for orders placed during the hospital encounter of 03/05/21    Adult Transthoracic Echo Complete w/ Color, Spectral and Contrast if necessary per protocol    Interpretation Summary  · Left ventricular wall thickness is consistent with mild concentric hypertrophy.  · Left ventricular ejection fraction appears to be 56 - 60%. Left ventricular systolic function is normal.  · Abnormal global longitudinal LV strain (GLS) = -13%.  · Left ventricular diastolic function was normal.  · Estimated right ventricular systolic pressure from tricuspid regurgitation is normal (<35 mmHg).         myocardial perfusion scan  1/2019       · Left ventricular ejection fraction is normal (Calculated EF = 63%).  · Myocardial perfusion imaging indicates a medium-sized infarct located in the inferior wall, septal wall and apex with no significant ischemia noted.  · Abnormal LV wall motion consistent with mild hypokinesis of the inferior wall and septal wall.  Raw images reviewed with the following abnormalities noted: vertical motion.         ECG 12 Lead    Date/Time: 8/13/2021 10:14 AM  Performed by: Austin Granados MD  Authorized by: Austin Granados MD   Comparison: compared with previous ECG from 2/5/2021  Similar to previous ECG  Comparison to previous ECG: .A sensed V paced     Clinical impression: abnormal EKG          ____________________________________________________________________________________________________________________________________________  Health maintenance and  recommendations    Low salt/ HTN/ Heart healthy carbohydrate restricted cardiac diet (printed dietary and general instructions provided for home review )  The patient is advised to reduce or avoid caffeine or other cardiac stimulants.     The patient was advised to avoid long term NSAIDS , use Tylenol PRN instead  Avoid cardiac stimulants including common drugs like Pseudoephedrine or excessive amounts of caffeine  Monitor for any signs of bleeding including red or dark stools. Fall precautions.   Advised staying uptodate with immunizations per established standard guidelines.  Offered to give patient  a copy      Questions were encouraged, asked and answered to the patient's  understanding and satisfaction. Questions if any regarding current medications and side effects, need for refills and importance of compliance to medications stressed.    Reviewed available prior notes, consults, prior visits, laboratory findings, radiology and cardiology relevant reports. Updated chart as applicable. I have reviewed the patient's medical history in detail and updated the computerized patient record as relevant.      Updated patient regarding any new or relevant abnormalities on review of records or any new findings on physical exam. Mentioned to patient about purpose of visit and desirable health short and long term goals and objectives.    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   • Actinic keratosis   • Benign prostatic hyperplasia with lower urinary tract  symptoms   • Complete heart block (CMS/HCC)   • Pacemaker   • Sick sinus syndrome (CMS/HCC)   • Laceration of face without complication   • PAF (paroxysmal atrial fibrillation) (CMS/HCC) on Eliquis    • Paroxysmal atrial flutter (CMS/HCC)   • Pernicious anemia   • Alkaline phosphatase elevation   • Anemia   • Gastroesophageal reflux disease   • Hypothyroidism due to Hashimoto's thyroiditis   • Impaired fasting glucose   • Overlapping malignant melanoma of skin (CMS/HCC)   • Primary osteoarthritis of both hands   • COVID-19 virus infection   • Closed fracture of distal end of radius   • Arthritis of hand          Plan    Orders Placed This Encounter   Procedures   • Stress Test With Myocardial Perfusion (1 Day)     Standing Status:   Future     Standing Expiration Date:   8/13/2022     Order Specific Question:   What stress agent will be used?     Answer:   Regadenoson (Lexiscan)     Order Specific Question:   Difficulty walking criteria?     Answer:   Musculoskeletal (hips, knees, feet, back, amputee)     Order Specific Question:   Reason for exam?     Answer:   Known Coronary Artery Disease     Order Specific Question:   Release to patient     Answer:   Immediate   • ECG 12 Lead     This order was created via procedure documentation     Order Specific Question:   Release to patient     Answer:   Immediate      Patient expressed understanding  Encouraged and answered all questions   Discussed with the patient and all questioned fully answered. He will call me if any problems arise.   Discussed results of prior testing with patient : echo   as well electrocardiogram from today       Due to see Dr Thomas for right hip pain      The current medical regimen is effective;  continue present plan and medications.     Check BP and heart rates twice daily at least 3x / week, week a month  at home and bring a recording for me to review next visit  If BP >130/85 or < 100/60 persistently over 3 reading 30 mins apart call sooner       Monitor for any signs of bleeding including red or dark stools as well as easy bruisabilty. Fall precautions.   Monitor cardiac rhythm device function regularly per established schedule or PRN      I support the patient's decision to take the Covid -19 vaccine   Had both dose   No major issues   Now fully immunized    Recommend booster when approved recommended         Return in about 7 months (around 3/13/2022).

## 2021-08-23 ENCOUNTER — APPOINTMENT (OUTPATIENT)
Dept: GENERAL RADIOLOGY | Facility: HOSPITAL | Age: 86
End: 2021-08-23

## 2021-08-23 ENCOUNTER — HOSPITAL ENCOUNTER (INPATIENT)
Facility: HOSPITAL | Age: 86
LOS: 6 days | Discharge: NURSING, REHAB OR LONG-TERM CARE | End: 2021-08-30
Attending: EMERGENCY MEDICINE | Admitting: INTERNAL MEDICINE

## 2021-08-23 ENCOUNTER — TELEPHONE (OUTPATIENT)
Dept: INTERNAL MEDICINE | Facility: CLINIC | Age: 86
End: 2021-08-23

## 2021-08-23 ENCOUNTER — APPOINTMENT (OUTPATIENT)
Dept: ULTRASOUND IMAGING | Facility: HOSPITAL | Age: 86
End: 2021-08-23

## 2021-08-23 DIAGNOSIS — S72.019A: ICD-10-CM

## 2021-08-23 DIAGNOSIS — Z74.09 IMPAIRED MOBILITY: ICD-10-CM

## 2021-08-23 DIAGNOSIS — S72.001A CLOSED FRACTURE OF RIGHT HIP, INITIAL ENCOUNTER (HCC): Primary | ICD-10-CM

## 2021-08-23 LAB
ALBUMIN SERPL-MCNC: 3.6 G/DL (ref 3.5–5.2)
ALBUMIN/GLOB SERPL: 1.2 G/DL
ALP SERPL-CCNC: 156 U/L (ref 39–117)
ALT SERPL W P-5'-P-CCNC: 10 U/L (ref 1–41)
ANION GAP SERPL CALCULATED.3IONS-SCNC: 6 MMOL/L (ref 5–15)
AST SERPL-CCNC: 12 U/L (ref 1–40)
BASOPHILS # BLD AUTO: 0.02 10*3/MM3 (ref 0–0.2)
BASOPHILS NFR BLD AUTO: 0.2 % (ref 0–1.5)
BILIRUB SERPL-MCNC: 0.3 MG/DL (ref 0–1.2)
BUN SERPL-MCNC: 21 MG/DL (ref 8–23)
BUN/CREAT SERPL: 22.6 (ref 7–25)
CALCIUM SPEC-SCNC: 9.7 MG/DL (ref 8.6–10.5)
CHLORIDE SERPL-SCNC: 104 MMOL/L (ref 98–107)
CO2 SERPL-SCNC: 29 MMOL/L (ref 22–29)
CREAT SERPL-MCNC: 0.93 MG/DL (ref 0.76–1.27)
DEPRECATED RDW RBC AUTO: 58.3 FL (ref 37–54)
EOSINOPHIL # BLD AUTO: 0.05 10*3/MM3 (ref 0–0.4)
EOSINOPHIL NFR BLD AUTO: 0.5 % (ref 0.3–6.2)
ERYTHROCYTE [DISTWIDTH] IN BLOOD BY AUTOMATED COUNT: 17.2 % (ref 12.3–15.4)
GFR SERPL CREATININE-BSD FRML MDRD: 77 ML/MIN/1.73
GLOBULIN UR ELPH-MCNC: 2.9 GM/DL
GLUCOSE SERPL-MCNC: 150 MG/DL (ref 65–99)
HCT VFR BLD AUTO: 34.3 % (ref 37.5–51)
HGB BLD-MCNC: 11.2 G/DL (ref 13–17.7)
IMM GRANULOCYTES # BLD AUTO: 0.11 10*3/MM3 (ref 0–0.05)
IMM GRANULOCYTES NFR BLD AUTO: 1.1 % (ref 0–0.5)
LYMPHOCYTES # BLD AUTO: 1.24 10*3/MM3 (ref 0.7–3.1)
LYMPHOCYTES NFR BLD AUTO: 12.6 % (ref 19.6–45.3)
MCH RBC QN AUTO: 30.9 PG (ref 26.6–33)
MCHC RBC AUTO-ENTMCNC: 32.7 G/DL (ref 31.5–35.7)
MCV RBC AUTO: 94.5 FL (ref 79–97)
MONOCYTES # BLD AUTO: 0.84 10*3/MM3 (ref 0.1–0.9)
MONOCYTES NFR BLD AUTO: 8.6 % (ref 5–12)
NEUTROPHILS NFR BLD AUTO: 7.55 10*3/MM3 (ref 1.7–7)
NEUTROPHILS NFR BLD AUTO: 77 % (ref 42.7–76)
NRBC BLD AUTO-RTO: 0 /100 WBC (ref 0–0.2)
PLATELET # BLD AUTO: 195 10*3/MM3 (ref 140–450)
PMV BLD AUTO: 9.3 FL (ref 6–12)
POTASSIUM SERPL-SCNC: 4.2 MMOL/L (ref 3.5–5.2)
PROT SERPL-MCNC: 6.5 G/DL (ref 6–8.5)
RBC # BLD AUTO: 3.63 10*6/MM3 (ref 4.14–5.8)
SODIUM SERPL-SCNC: 139 MMOL/L (ref 136–145)
WBC # BLD AUTO: 9.81 10*3/MM3 (ref 3.4–10.8)

## 2021-08-23 PROCEDURE — 84439 ASSAY OF FREE THYROXINE: CPT | Performed by: INTERNAL MEDICINE

## 2021-08-23 PROCEDURE — 73502 X-RAY EXAM HIP UNI 2-3 VIEWS: CPT

## 2021-08-23 PROCEDURE — 84443 ASSAY THYROID STIM HORMONE: CPT | Performed by: INTERNAL MEDICINE

## 2021-08-23 PROCEDURE — 80053 COMPREHEN METABOLIC PANEL: CPT | Performed by: EMERGENCY MEDICINE

## 2021-08-23 PROCEDURE — 83735 ASSAY OF MAGNESIUM: CPT | Performed by: INTERNAL MEDICINE

## 2021-08-23 PROCEDURE — 99284 EMERGENCY DEPT VISIT MOD MDM: CPT

## 2021-08-23 PROCEDURE — 83036 HEMOGLOBIN GLYCOSYLATED A1C: CPT | Performed by: INTERNAL MEDICINE

## 2021-08-23 PROCEDURE — 93971 EXTREMITY STUDY: CPT | Performed by: SURGERY

## 2021-08-23 PROCEDURE — 93971 EXTREMITY STUDY: CPT

## 2021-08-23 PROCEDURE — P9612 CATHETERIZE FOR URINE SPEC: HCPCS

## 2021-08-23 PROCEDURE — 85025 COMPLETE CBC W/AUTO DIFF WBC: CPT | Performed by: EMERGENCY MEDICINE

## 2021-08-23 NOTE — TELEPHONE ENCOUNTER
Patient's wife was called. She stated the same message over again. She was informed that the best thing she could do for an appointment with Dr. Thomas is to be put on a cancellation list, which they are on already. As far as the pain, it is controlled with the Oxycodone but the concern is that it is causing him to be loopy. He was able to tolerate a lower frequency of dosing for this medication- being taken [id number]. They are hoping that he could have home health come in and do PT for his hip pain. He is not as active as he was previously and are concerned that the patient may have risk of getting pneumonia. I advised deep breathing exercises in the meantime to help prevent lung constriction. Patient and wife were advised to go to the ER if pain is uncontrollable. Are there any further instructions that you would like to give the patient or further advise on?

## 2021-08-23 NOTE — TELEPHONE ENCOUNTER
Wife stated pt is in pain. He has appt with Dr. austin 10/21 for his hip. They are hoping Dr talamantes can get him in sooner or possibly in [place] sooner. Possibly order PT or something. She is worried he might get pneumonia since he can't get up and move around. Also the oxycodone is making him loopy. She is worried about him hurting, and what the med is doing to him. They have even stretched the oxycodone out further than directed.

## 2021-08-24 ENCOUNTER — TELEPHONE (OUTPATIENT)
Dept: INTERNAL MEDICINE | Facility: CLINIC | Age: 86
End: 2021-08-24

## 2021-08-24 ENCOUNTER — APPOINTMENT (OUTPATIENT)
Dept: CT IMAGING | Facility: HOSPITAL | Age: 86
End: 2021-08-24

## 2021-08-24 PROBLEM — Z79.01 CHRONIC ANTICOAGULATION: Status: ACTIVE | Noted: 2021-08-24

## 2021-08-24 PROBLEM — S72.001A CLOSED FRACTURE OF RIGHT HIP: Status: ACTIVE | Noted: 2021-08-24

## 2021-08-24 PROBLEM — R31.0 GROSS HEMATURIA: Status: ACTIVE | Noted: 2021-08-24

## 2021-08-24 LAB
BACTERIA UR QL AUTO: ABNORMAL /HPF
BACTERIA UR QL AUTO: ABNORMAL /HPF
BASOPHILS # BLD AUTO: 0.04 10*3/MM3 (ref 0–0.2)
BASOPHILS NFR BLD AUTO: 0.4 % (ref 0–1.5)
BILIRUB UR QL STRIP: NEGATIVE
BILIRUB UR QL STRIP: NEGATIVE
CLARITY UR: ABNORMAL
CLARITY UR: CLEAR
COLOR UR: ABNORMAL
COLOR UR: YELLOW
DEPRECATED RDW RBC AUTO: 59.1 FL (ref 37–54)
EOSINOPHIL # BLD AUTO: 0.08 10*3/MM3 (ref 0–0.4)
EOSINOPHIL NFR BLD AUTO: 0.8 % (ref 0.3–6.2)
ERYTHROCYTE [DISTWIDTH] IN BLOOD BY AUTOMATED COUNT: 17.2 % (ref 12.3–15.4)
GLUCOSE BLDC GLUCOMTR-MCNC: 95 MG/DL (ref 70–130)
GLUCOSE UR STRIP-MCNC: NEGATIVE MG/DL
GLUCOSE UR STRIP-MCNC: NEGATIVE MG/DL
HBA1C MFR BLD: 6 % (ref 4.8–5.6)
HCT VFR BLD AUTO: 33.9 % (ref 37.5–51)
HCT VFR BLD AUTO: 33.9 % (ref 37.5–51)
HCT VFR BLD AUTO: 34.3 % (ref 37.5–51)
HGB BLD-MCNC: 11.1 G/DL (ref 13–17.7)
HGB BLD-MCNC: 11.2 G/DL (ref 13–17.7)
HGB BLD-MCNC: 11.2 G/DL (ref 13–17.7)
HGB UR QL STRIP.AUTO: ABNORMAL
HGB UR QL STRIP.AUTO: ABNORMAL
HYALINE CASTS UR QL AUTO: ABNORMAL /LPF
HYALINE CASTS UR QL AUTO: ABNORMAL /LPF
IMM GRANULOCYTES # BLD AUTO: 0.09 10*3/MM3 (ref 0–0.05)
IMM GRANULOCYTES NFR BLD AUTO: 0.9 % (ref 0–0.5)
KETONES UR QL STRIP: NEGATIVE
KETONES UR QL STRIP: NEGATIVE
LEUKOCYTE ESTERASE UR QL STRIP.AUTO: ABNORMAL
LEUKOCYTE ESTERASE UR QL STRIP.AUTO: NEGATIVE
LYMPHOCYTES # BLD AUTO: 1.53 10*3/MM3 (ref 0.7–3.1)
LYMPHOCYTES NFR BLD AUTO: 15.6 % (ref 19.6–45.3)
MAGNESIUM SERPL-MCNC: 1.9 MG/DL (ref 1.6–2.4)
MCH RBC QN AUTO: 31 PG (ref 26.6–33)
MCHC RBC AUTO-ENTMCNC: 33 G/DL (ref 31.5–35.7)
MCV RBC AUTO: 93.9 FL (ref 79–97)
MONOCYTES # BLD AUTO: 0.89 10*3/MM3 (ref 0.1–0.9)
MONOCYTES NFR BLD AUTO: 9.1 % (ref 5–12)
NEUTROPHILS NFR BLD AUTO: 7.18 10*3/MM3 (ref 1.7–7)
NEUTROPHILS NFR BLD AUTO: 73.2 % (ref 42.7–76)
NITRITE UR QL STRIP: NEGATIVE
NITRITE UR QL STRIP: POSITIVE
NRBC BLD AUTO-RTO: 0 /100 WBC (ref 0–0.2)
PH UR STRIP.AUTO: 8.5 [PH] (ref 5–8)
PH UR STRIP.AUTO: >=9 [PH] (ref 5–8)
PLATELET # BLD AUTO: 185 10*3/MM3 (ref 140–450)
PMV BLD AUTO: 9.3 FL (ref 6–12)
PROT UR QL STRIP: ABNORMAL
PROT UR QL STRIP: NEGATIVE
RBC # BLD AUTO: 3.61 10*6/MM3 (ref 4.14–5.8)
RBC # UR: ABNORMAL /HPF
RBC # UR: ABNORMAL /HPF
REF LAB TEST METHOD: ABNORMAL
REF LAB TEST METHOD: ABNORMAL
SARS-COV-2 RNA PNL SPEC NAA+PROBE: NOT DETECTED
SP GR UR STRIP: 1.01 (ref 1–1.03)
SP GR UR STRIP: 1.02 (ref 1–1.03)
SQUAMOUS #/AREA URNS HPF: ABNORMAL /HPF
SQUAMOUS #/AREA URNS HPF: ABNORMAL /HPF
T4 FREE SERPL-MCNC: 1.08 NG/DL (ref 0.93–1.7)
TSH SERPL DL<=0.05 MIU/L-ACNC: 1.79 UIU/ML (ref 0.27–4.2)
UROBILINOGEN UR QL STRIP: ABNORMAL
UROBILINOGEN UR QL STRIP: ABNORMAL
WBC # BLD AUTO: 9.81 10*3/MM3 (ref 3.4–10.8)
WBC UR QL AUTO: ABNORMAL /HPF
WBC UR QL AUTO: ABNORMAL /HPF

## 2021-08-24 PROCEDURE — 73700 CT LOWER EXTREMITY W/O DYE: CPT

## 2021-08-24 PROCEDURE — 85018 HEMOGLOBIN: CPT | Performed by: NURSE PRACTITIONER

## 2021-08-24 PROCEDURE — 81001 URINALYSIS AUTO W/SCOPE: CPT | Performed by: INTERNAL MEDICINE

## 2021-08-24 PROCEDURE — 87635 SARS-COV-2 COVID-19 AMP PRB: CPT | Performed by: EMERGENCY MEDICINE

## 2021-08-24 PROCEDURE — 85014 HEMATOCRIT: CPT | Performed by: NURSE PRACTITIONER

## 2021-08-24 PROCEDURE — 82962 GLUCOSE BLOOD TEST: CPT

## 2021-08-24 PROCEDURE — 87086 URINE CULTURE/COLONY COUNT: CPT | Performed by: INTERNAL MEDICINE

## 2021-08-24 PROCEDURE — 85025 COMPLETE CBC W/AUTO DIFF WBC: CPT | Performed by: INTERNAL MEDICINE

## 2021-08-24 PROCEDURE — 99221 1ST HOSP IP/OBS SF/LOW 40: CPT | Performed by: UROLOGY

## 2021-08-24 PROCEDURE — 25010000002 HYDROMORPHONE PER 4 MG: Performed by: INTERNAL MEDICINE

## 2021-08-24 RX ORDER — TRIAMTERENE AND HYDROCHLOROTHIAZIDE 37.5; 25 MG/1; MG/1
1 TABLET ORAL DAILY
Status: DISCONTINUED | OUTPATIENT
Start: 2021-08-24 | End: 2021-08-30 | Stop reason: HOSPADM

## 2021-08-24 RX ORDER — PANTOPRAZOLE SODIUM 40 MG/1
40 TABLET, DELAYED RELEASE ORAL
Status: DISCONTINUED | OUTPATIENT
Start: 2021-08-24 | End: 2021-08-30 | Stop reason: HOSPADM

## 2021-08-24 RX ORDER — ALLOPURINOL 300 MG/1
300 TABLET ORAL DAILY
Status: DISCONTINUED | OUTPATIENT
Start: 2021-08-24 | End: 2021-08-30 | Stop reason: HOSPADM

## 2021-08-24 RX ORDER — SODIUM CHLORIDE, SODIUM LACTATE, POTASSIUM CHLORIDE, CALCIUM CHLORIDE 600; 310; 30; 20 MG/100ML; MG/100ML; MG/100ML; MG/100ML
100 INJECTION, SOLUTION INTRAVENOUS CONTINUOUS
Status: DISCONTINUED | OUTPATIENT
Start: 2021-08-24 | End: 2021-08-27

## 2021-08-24 RX ORDER — OXYCODONE AND ACETAMINOPHEN 7.5; 325 MG/1; MG/1
1 TABLET ORAL EVERY 6 HOURS PRN
Status: DISCONTINUED | OUTPATIENT
Start: 2021-08-24 | End: 2021-08-27

## 2021-08-24 RX ORDER — HYDROMORPHONE HYDROCHLORIDE 1 MG/ML
0.5 INJECTION, SOLUTION INTRAMUSCULAR; INTRAVENOUS; SUBCUTANEOUS
Status: DISCONTINUED | OUTPATIENT
Start: 2021-08-24 | End: 2021-08-27 | Stop reason: RX

## 2021-08-24 RX ORDER — LORAZEPAM 1 MG/1
1 TABLET ORAL NIGHTLY
Status: DISCONTINUED | OUTPATIENT
Start: 2021-08-24 | End: 2021-08-30 | Stop reason: HOSPADM

## 2021-08-24 RX ORDER — METOPROLOL SUCCINATE 25 MG/1
25 TABLET, EXTENDED RELEASE ORAL DAILY
Status: DISCONTINUED | OUTPATIENT
Start: 2021-08-24 | End: 2021-08-30 | Stop reason: HOSPADM

## 2021-08-24 RX ORDER — ACETAMINOPHEN 160 MG/5ML
650 SOLUTION ORAL EVERY 4 HOURS PRN
Status: DISCONTINUED | OUTPATIENT
Start: 2021-08-24 | End: 2021-08-27

## 2021-08-24 RX ORDER — LEVOTHYROXINE SODIUM 0.07 MG/1
75 TABLET ORAL
Status: DISCONTINUED | OUTPATIENT
Start: 2021-08-24 | End: 2021-08-30 | Stop reason: HOSPADM

## 2021-08-24 RX ORDER — NALOXONE HCL 0.4 MG/ML
0.4 VIAL (ML) INJECTION
Status: DISCONTINUED | OUTPATIENT
Start: 2021-08-24 | End: 2021-08-30 | Stop reason: HOSPADM

## 2021-08-24 RX ORDER — NITROGLYCERIN 0.4 MG/1
0.4 TABLET SUBLINGUAL
Status: DISCONTINUED | OUTPATIENT
Start: 2021-08-24 | End: 2021-08-30 | Stop reason: HOSPADM

## 2021-08-24 RX ORDER — NICOTINE POLACRILEX 4 MG
15 LOZENGE BUCCAL
Status: DISCONTINUED | OUTPATIENT
Start: 2021-08-24 | End: 2021-08-30 | Stop reason: HOSPADM

## 2021-08-24 RX ORDER — ONDANSETRON 4 MG/1
4 TABLET, FILM COATED ORAL EVERY 6 HOURS PRN
Status: DISCONTINUED | OUTPATIENT
Start: 2021-08-24 | End: 2021-08-30 | Stop reason: HOSPADM

## 2021-08-24 RX ORDER — ACETAMINOPHEN 325 MG/1
650 TABLET ORAL EVERY 4 HOURS PRN
Status: DISCONTINUED | OUTPATIENT
Start: 2021-08-24 | End: 2021-08-27

## 2021-08-24 RX ORDER — PREDNISONE 1 MG/1
5 TABLET ORAL DAILY
Status: DISCONTINUED | OUTPATIENT
Start: 2021-08-24 | End: 2021-08-30 | Stop reason: HOSPADM

## 2021-08-24 RX ORDER — FOLIC ACID 1 MG/1
1 TABLET ORAL DAILY
Status: DISCONTINUED | OUTPATIENT
Start: 2021-08-24 | End: 2021-08-30 | Stop reason: HOSPADM

## 2021-08-24 RX ORDER — LORAZEPAM 1 MG/1
1 TABLET ORAL DAILY
Status: DISCONTINUED | OUTPATIENT
Start: 2021-08-24 | End: 2021-08-24

## 2021-08-24 RX ORDER — DEXTROSE MONOHYDRATE 25 G/50ML
25 INJECTION, SOLUTION INTRAVENOUS
Status: DISCONTINUED | OUTPATIENT
Start: 2021-08-24 | End: 2021-08-30 | Stop reason: HOSPADM

## 2021-08-24 RX ORDER — ONDANSETRON 2 MG/ML
4 INJECTION INTRAMUSCULAR; INTRAVENOUS EVERY 6 HOURS PRN
Status: DISCONTINUED | OUTPATIENT
Start: 2021-08-24 | End: 2021-08-30 | Stop reason: HOSPADM

## 2021-08-24 RX ORDER — ACETAMINOPHEN 650 MG/1
650 SUPPOSITORY RECTAL EVERY 4 HOURS PRN
Status: DISCONTINUED | OUTPATIENT
Start: 2021-08-24 | End: 2021-08-27

## 2021-08-24 RX ORDER — SODIUM CHLORIDE 0.9 % (FLUSH) 0.9 %
10 SYRINGE (ML) INJECTION AS NEEDED
Status: DISCONTINUED | OUTPATIENT
Start: 2021-08-24 | End: 2021-08-30 | Stop reason: HOSPADM

## 2021-08-24 RX ORDER — FLUTICASONE PROPIONATE 50 MCG
1 SPRAY, SUSPENSION (ML) NASAL DAILY
Status: DISCONTINUED | OUTPATIENT
Start: 2021-08-24 | End: 2021-08-30 | Stop reason: HOSPADM

## 2021-08-24 RX ORDER — SODIUM CHLORIDE 0.9 % (FLUSH) 0.9 %
10 SYRINGE (ML) INJECTION EVERY 12 HOURS SCHEDULED
Status: DISCONTINUED | OUTPATIENT
Start: 2021-08-24 | End: 2021-08-30 | Stop reason: HOSPADM

## 2021-08-24 RX ADMIN — SODIUM CHLORIDE, POTASSIUM CHLORIDE, SODIUM LACTATE AND CALCIUM CHLORIDE 100 ML/HR: 600; 310; 30; 20 INJECTION, SOLUTION INTRAVENOUS at 05:32

## 2021-08-24 RX ADMIN — LEVOTHYROXINE SODIUM 75 MCG: 75 TABLET ORAL at 06:39

## 2021-08-24 RX ADMIN — PANTOPRAZOLE SODIUM 40 MG: 40 TABLET, DELAYED RELEASE ORAL at 06:28

## 2021-08-24 RX ADMIN — HYDROMORPHONE HYDROCHLORIDE 0.5 MG: 1 INJECTION, SOLUTION INTRAMUSCULAR; INTRAVENOUS; SUBCUTANEOUS at 05:35

## 2021-08-24 NOTE — TELEPHONE ENCOUNTER
PT'S WIFE CALLED TO REPORT THAT PT WAS ADMITTED TO [facility] WITH A BROKEN HIP, WILL REQUIRE HIP REPLACEMENT SURGERY    PT'S WIFE WOULD LIKE DR. BARRY TO CALL HER WHEN ABLE TO DISCUSS DETAILS AND FUTURE CARE    CALLBACK [phone]

## 2021-08-24 NOTE — TELEPHONE ENCOUNTER
Patient ended up going to the ER last night, there was a hip fracture. He is being taken care of through hospital.

## 2021-08-24 NOTE — TELEPHONE ENCOUNTER
NANDA - went to ER last night with swollen leg and was found to have a hip fracture.  He was xrayed earlier this month, after an doing an activity that caused sharp pain in his hip, but was apparently negative per wife's report.  He is currently an inpatient, being taken off his anticoag in preparation for surgery.

## 2021-08-24 NOTE — TELEPHONE ENCOUNTER
Tell her I went back and looked at the actual films myself and the first xray read by Radiologist doesn't show an obvious fx however it was likely there and likely worsened and now if obviously broken. He is pretty tough, cat scan leaves no doubt of fx and need for surgwey to get him on his feet again

## 2021-08-25 LAB
ABO GROUP BLD: NORMAL
BACTERIA SPEC AEROBE CULT: NO GROWTH
BLD GP AB SCN SERPL QL: NEGATIVE
GLUCOSE BLDC GLUCOMTR-MCNC: 100 MG/DL (ref 70–130)
GLUCOSE BLDC GLUCOMTR-MCNC: 95 MG/DL (ref 70–130)
GLUCOSE BLDC GLUCOMTR-MCNC: 98 MG/DL (ref 70–130)
HCT VFR BLD AUTO: 34.4 % (ref 37.5–51)
HCT VFR BLD AUTO: 35.6 % (ref 37.5–51)
HGB BLD-MCNC: 11.2 G/DL (ref 13–17.7)
HGB BLD-MCNC: 11.7 G/DL (ref 13–17.7)
RH BLD: POSITIVE
T&S EXPIRATION DATE: NORMAL

## 2021-08-25 PROCEDURE — 86900 BLOOD TYPING SEROLOGIC ABO: CPT | Performed by: ORTHOPAEDIC SURGERY

## 2021-08-25 PROCEDURE — 86850 RBC ANTIBODY SCREEN: CPT | Performed by: ORTHOPAEDIC SURGERY

## 2021-08-25 PROCEDURE — 99231 SBSQ HOSP IP/OBS SF/LOW 25: CPT | Performed by: UROLOGY

## 2021-08-25 PROCEDURE — 82962 GLUCOSE BLOOD TEST: CPT

## 2021-08-25 PROCEDURE — 85014 HEMATOCRIT: CPT | Performed by: NURSE PRACTITIONER

## 2021-08-25 PROCEDURE — 36415 COLL VENOUS BLD VENIPUNCTURE: CPT | Performed by: NURSE PRACTITIONER

## 2021-08-25 PROCEDURE — 86920 COMPATIBILITY TEST SPIN: CPT

## 2021-08-25 PROCEDURE — 99222 1ST HOSP IP/OBS MODERATE 55: CPT | Performed by: INTERNAL MEDICINE

## 2021-08-25 PROCEDURE — 85018 HEMOGLOBIN: CPT | Performed by: NURSE PRACTITIONER

## 2021-08-25 PROCEDURE — 63710000001 METHOTREXATE 2.5 MG TABLET: Performed by: INTERNAL MEDICINE

## 2021-08-25 PROCEDURE — 86901 BLOOD TYPING SEROLOGIC RH(D): CPT | Performed by: ORTHOPAEDIC SURGERY

## 2021-08-25 PROCEDURE — 94799 UNLISTED PULMONARY SVC/PX: CPT

## 2021-08-25 RX ORDER — ASPIRIN 81 MG/1
81 TABLET, CHEWABLE ORAL DAILY
Status: DISCONTINUED | OUTPATIENT
Start: 2021-08-25 | End: 2021-08-25

## 2021-08-25 RX ADMIN — LORAZEPAM 1 MG: 1 TABLET ORAL at 20:55

## 2021-08-25 RX ADMIN — METOPROLOL SUCCINATE 25 MG: 25 TABLET, EXTENDED RELEASE ORAL at 08:51

## 2021-08-25 RX ADMIN — SODIUM CHLORIDE, PRESERVATIVE FREE 10 ML: 5 INJECTION INTRAVENOUS at 08:52

## 2021-08-25 RX ADMIN — SODIUM CHLORIDE, POTASSIUM CHLORIDE, SODIUM LACTATE AND CALCIUM CHLORIDE 100 ML/HR: 600; 310; 30; 20 INJECTION, SOLUTION INTRAVENOUS at 10:30

## 2021-08-25 RX ADMIN — SODIUM CHLORIDE, PRESERVATIVE FREE 10 ML: 5 INJECTION INTRAVENOUS at 20:55

## 2021-08-25 RX ADMIN — SODIUM CHLORIDE, POTASSIUM CHLORIDE, SODIUM LACTATE AND CALCIUM CHLORIDE 100 ML/HR: 600; 310; 30; 20 INJECTION, SOLUTION INTRAVENOUS at 20:57

## 2021-08-25 RX ADMIN — METHOTREXATE SODIUM 17.5 MG: 2.5 TABLET ORAL at 20:55

## 2021-08-26 LAB
ANION GAP SERPL CALCULATED.3IONS-SCNC: 6 MMOL/L (ref 5–15)
BUN SERPL-MCNC: 21 MG/DL (ref 8–23)
BUN/CREAT SERPL: 21.9 (ref 7–25)
CALCIUM SPEC-SCNC: 9.6 MG/DL (ref 8.6–10.5)
CHLORIDE SERPL-SCNC: 104 MMOL/L (ref 98–107)
CO2 SERPL-SCNC: 29 MMOL/L (ref 22–29)
CREAT SERPL-MCNC: 0.96 MG/DL (ref 0.76–1.27)
DEPRECATED RDW RBC AUTO: 58.7 FL (ref 37–54)
ERYTHROCYTE [DISTWIDTH] IN BLOOD BY AUTOMATED COUNT: 17.2 % (ref 12.3–15.4)
GFR SERPL CREATININE-BSD FRML MDRD: 74 ML/MIN/1.73
GLUCOSE BLDC GLUCOMTR-MCNC: 121 MG/DL (ref 70–130)
GLUCOSE BLDC GLUCOMTR-MCNC: 152 MG/DL (ref 70–130)
GLUCOSE BLDC GLUCOMTR-MCNC: 86 MG/DL (ref 70–130)
GLUCOSE SERPL-MCNC: 96 MG/DL (ref 65–99)
HCT VFR BLD AUTO: 33.2 % (ref 37.5–51)
HCT VFR BLD AUTO: 34.5 % (ref 37.5–51)
HGB BLD-MCNC: 10.9 G/DL (ref 13–17.7)
HGB BLD-MCNC: 11.1 G/DL (ref 13–17.7)
MCH RBC QN AUTO: 30.4 PG (ref 26.6–33)
MCHC RBC AUTO-ENTMCNC: 32.2 G/DL (ref 31.5–35.7)
MCV RBC AUTO: 94.5 FL (ref 79–97)
PLATELET # BLD AUTO: 176 10*3/MM3 (ref 140–450)
PMV BLD AUTO: 9.1 FL (ref 6–12)
POTASSIUM SERPL-SCNC: 4.4 MMOL/L (ref 3.5–5.2)
RBC # BLD AUTO: 3.65 10*6/MM3 (ref 4.14–5.8)
SODIUM SERPL-SCNC: 139 MMOL/L (ref 136–145)
WBC # BLD AUTO: 8.77 10*3/MM3 (ref 3.4–10.8)

## 2021-08-26 PROCEDURE — 99232 SBSQ HOSP IP/OBS MODERATE 35: CPT | Performed by: NURSE PRACTITIONER

## 2021-08-26 PROCEDURE — 80048 BASIC METABOLIC PNL TOTAL CA: CPT | Performed by: NURSE PRACTITIONER

## 2021-08-26 PROCEDURE — 63710000001 PREDNISONE PER 5 MG: Performed by: INTERNAL MEDICINE

## 2021-08-26 PROCEDURE — 63710000001 INSULIN LISPRO (HUMAN) PER 5 UNITS: Performed by: INTERNAL MEDICINE

## 2021-08-26 PROCEDURE — 82962 GLUCOSE BLOOD TEST: CPT

## 2021-08-26 PROCEDURE — 94799 UNLISTED PULMONARY SVC/PX: CPT

## 2021-08-26 PROCEDURE — 85014 HEMATOCRIT: CPT | Performed by: NURSE PRACTITIONER

## 2021-08-26 PROCEDURE — 36415 COLL VENOUS BLD VENIPUNCTURE: CPT | Performed by: NURSE PRACTITIONER

## 2021-08-26 PROCEDURE — 85018 HEMOGLOBIN: CPT | Performed by: NURSE PRACTITIONER

## 2021-08-26 PROCEDURE — 85027 COMPLETE CBC AUTOMATED: CPT | Performed by: NURSE PRACTITIONER

## 2021-08-26 RX ORDER — ASPIRIN 81 MG/1
81 TABLET ORAL DAILY
Status: DISCONTINUED | OUTPATIENT
Start: 2021-08-26 | End: 2021-08-27 | Stop reason: SDUPTHER

## 2021-08-26 RX ADMIN — SODIUM CHLORIDE, PRESERVATIVE FREE 10 ML: 5 INJECTION INTRAVENOUS at 20:14

## 2021-08-26 RX ADMIN — LEVOTHYROXINE SODIUM 75 MCG: 75 TABLET ORAL at 05:53

## 2021-08-26 RX ADMIN — TRIAMTERENE AND HYDROCHLOROTHIAZIDE 1 TABLET: 37.5; 25 TABLET ORAL at 09:18

## 2021-08-26 RX ADMIN — SODIUM CHLORIDE, POTASSIUM CHLORIDE, SODIUM LACTATE AND CALCIUM CHLORIDE 100 ML/HR: 600; 310; 30; 20 INJECTION, SOLUTION INTRAVENOUS at 20:14

## 2021-08-26 RX ADMIN — SODIUM CHLORIDE, PRESERVATIVE FREE 10 ML: 5 INJECTION INTRAVENOUS at 09:19

## 2021-08-26 RX ADMIN — FLUTICASONE PROPIONATE 1 SPRAY: 50 SPRAY, METERED NASAL at 09:18

## 2021-08-26 RX ADMIN — LORAZEPAM 1 MG: 1 TABLET ORAL at 20:14

## 2021-08-26 RX ADMIN — METOPROLOL SUCCINATE 25 MG: 25 TABLET, EXTENDED RELEASE ORAL at 09:18

## 2021-08-26 RX ADMIN — FOLIC ACID 1 MG: 1 TABLET ORAL at 09:18

## 2021-08-26 RX ADMIN — ALLOPURINOL 300 MG: 300 TABLET ORAL at 09:18

## 2021-08-26 RX ADMIN — ASPIRIN 81 MG: 81 TABLET, COATED ORAL at 14:08

## 2021-08-26 RX ADMIN — INSULIN LISPRO 2 UNITS: 100 INJECTION, SOLUTION INTRAVENOUS; SUBCUTANEOUS at 11:58

## 2021-08-26 RX ADMIN — PREDNISONE 5 MG: 5 TABLET ORAL at 09:18

## 2021-08-26 RX ADMIN — PANTOPRAZOLE SODIUM 40 MG: 40 TABLET, DELAYED RELEASE ORAL at 05:53

## 2021-08-26 RX ADMIN — SODIUM CHLORIDE, POTASSIUM CHLORIDE, SODIUM LACTATE AND CALCIUM CHLORIDE 100 ML/HR: 600; 310; 30; 20 INJECTION, SOLUTION INTRAVENOUS at 06:59

## 2021-08-27 ENCOUNTER — ANESTHESIA EVENT (OUTPATIENT)
Dept: PERIOP | Facility: HOSPITAL | Age: 86
End: 2021-08-27

## 2021-08-27 ENCOUNTER — ANESTHESIA (OUTPATIENT)
Dept: PERIOP | Facility: HOSPITAL | Age: 86
End: 2021-08-27

## 2021-08-27 ENCOUNTER — APPOINTMENT (OUTPATIENT)
Dept: GENERAL RADIOLOGY | Facility: HOSPITAL | Age: 86
End: 2021-08-27

## 2021-08-27 LAB
GLUCOSE BLDC GLUCOMTR-MCNC: 103 MG/DL (ref 70–130)
GLUCOSE BLDC GLUCOMTR-MCNC: 104 MG/DL (ref 70–130)
GLUCOSE BLDC GLUCOMTR-MCNC: 115 MG/DL (ref 70–130)
HCT VFR BLD AUTO: 32.7 % (ref 37.5–51)
HGB BLD-MCNC: 10.7 G/DL (ref 13–17.7)

## 2021-08-27 PROCEDURE — 82962 GLUCOSE BLOOD TEST: CPT

## 2021-08-27 PROCEDURE — 76000 FLUOROSCOPY <1 HR PHYS/QHP: CPT

## 2021-08-27 PROCEDURE — 85014 HEMATOCRIT: CPT | Performed by: NURSE PRACTITIONER

## 2021-08-27 PROCEDURE — 25010000002 FENTANYL CITRATE (PF) 100 MCG/2ML SOLUTION: Performed by: NURSE ANESTHETIST, CERTIFIED REGISTERED

## 2021-08-27 PROCEDURE — 25010000002 PROPOFOL 10 MG/ML EMULSION: Performed by: NURSE ANESTHETIST, CERTIFIED REGISTERED

## 2021-08-27 PROCEDURE — 25010000002 VANCOMYCIN 1 G RECONSTITUTED SOLUTION 1 EACH VIAL: Performed by: ORTHOPAEDIC SURGERY

## 2021-08-27 PROCEDURE — C1776 JOINT DEVICE (IMPLANTABLE): HCPCS | Performed by: ORTHOPAEDIC SURGERY

## 2021-08-27 PROCEDURE — 25010000002 DEXAMETHASONE PER 1 MG: Performed by: NURSE ANESTHETIST, CERTIFIED REGISTERED

## 2021-08-27 PROCEDURE — 73502 X-RAY EXAM HIP UNI 2-3 VIEWS: CPT

## 2021-08-27 PROCEDURE — 25010000002 ROPIVACAINE PER 1 MG: Performed by: NURSE ANESTHETIST, CERTIFIED REGISTERED

## 2021-08-27 PROCEDURE — 88311 DECALCIFY TISSUE: CPT | Performed by: ORTHOPAEDIC SURGERY

## 2021-08-27 PROCEDURE — 25010000002 CEFAZOLIN PER 500 MG: Performed by: ORTHOPAEDIC SURGERY

## 2021-08-27 PROCEDURE — 88304 TISSUE EXAM BY PATHOLOGIST: CPT | Performed by: ORTHOPAEDIC SURGERY

## 2021-08-27 PROCEDURE — 0SR904A REPLACEMENT OF RIGHT HIP JOINT WITH CERAMIC ON POLYETHYLENE SYNTHETIC SUBSTITUTE, UNCEMENTED, OPEN APPROACH: ICD-10-PCS | Performed by: ORTHOPAEDIC SURGERY

## 2021-08-27 PROCEDURE — 85018 HEMOGLOBIN: CPT | Performed by: NURSE PRACTITIONER

## 2021-08-27 PROCEDURE — 25010000002 ONDANSETRON PER 1 MG: Performed by: NURSE ANESTHETIST, CERTIFIED REGISTERED

## 2021-08-27 DEVICE — PINNACLE GRIPTION ACETABULAR SHELL MULTI-HOLE 60MM OD
Type: IMPLANTABLE DEVICE | Site: HIP | Status: FUNCTIONAL
Brand: PINNACLE GRIPTION

## 2021-08-27 DEVICE — TOTL HIP M/H GRIPTION CUP DEPUY UPCHRG: Type: IMPLANTABLE DEVICE | Site: HIP | Status: FUNCTIONAL

## 2021-08-27 DEVICE — BIOLOX DELTA TS CERAMIC FEMORAL HEAD 12/14 TAPER REVISION DIAMETER 40MM +1.5
Type: IMPLANTABLE DEVICE | Site: HIP | Status: FUNCTIONAL
Brand: BIOLOX DELTA

## 2021-08-27 DEVICE — PINNACLE CANCELLOUS BONE SCREW 6.5MM X 20MM
Type: IMPLANTABLE DEVICE | Site: HIP | Status: FUNCTIONAL
Brand: PINNACLE

## 2021-08-27 DEVICE — CORAIL HIP SYSTEM CEMENTLESS FEMORAL STEM 12/14 AMT 135 DEGREES KA SIZE 15 HA COATED STANDARD COLLAR
Type: IMPLANTABLE DEVICE | Site: HIP | Status: FUNCTIONAL
Brand: CORAIL

## 2021-08-27 DEVICE — PINNACLE HIP SOLUTIONS ALTRX POLYETHYLENE ACETABULAR LINER NEUTRAL 40MM ID 60MM OD
Type: IMPLANTABLE DEVICE | Site: HIP | Status: FUNCTIONAL
Brand: PINNACLE ALTRX

## 2021-08-27 DEVICE — TOTL HIP COP DEPUY 9641334: Type: IMPLANTABLE DEVICE | Site: HIP | Status: FUNCTIONAL

## 2021-08-27 DEVICE — PINNACLE CANCELLOUS BONE SCREW 6.5MM X 30MM
Type: IMPLANTABLE DEVICE | Site: HIP | Status: FUNCTIONAL
Brand: PINNACLE

## 2021-08-27 RX ORDER — ROPIVACAINE HYDROCHLORIDE 5 MG/ML
INJECTION, SOLUTION EPIDURAL; INFILTRATION; PERINEURAL
Status: COMPLETED | OUTPATIENT
Start: 2021-08-27 | End: 2021-08-27

## 2021-08-27 RX ORDER — BUPIVACAINE HCL/0.9 % NACL/PF 0.1 %
2 PLASTIC BAG, INJECTION (ML) EPIDURAL EVERY 8 HOURS
Status: COMPLETED | OUTPATIENT
Start: 2021-08-27 | End: 2021-08-28

## 2021-08-27 RX ORDER — BUPIVACAINE HCL/0.9 % NACL/PF 0.1 %
2 PLASTIC BAG, INJECTION (ML) EPIDURAL ONCE
Status: COMPLETED | OUTPATIENT
Start: 2021-08-27 | End: 2021-08-27

## 2021-08-27 RX ORDER — MAGNESIUM HYDROXIDE 1200 MG/15ML
LIQUID ORAL AS NEEDED
Status: DISCONTINUED | OUTPATIENT
Start: 2021-08-27 | End: 2021-08-27 | Stop reason: HOSPADM

## 2021-08-27 RX ORDER — SODIUM CHLORIDE 0.9 % (FLUSH) 0.9 %
3 SYRINGE (ML) INJECTION EVERY 12 HOURS SCHEDULED
Status: DISCONTINUED | OUTPATIENT
Start: 2021-08-27 | End: 2021-08-27 | Stop reason: HOSPADM

## 2021-08-27 RX ORDER — SODIUM CHLORIDE, SODIUM LACTATE, POTASSIUM CHLORIDE, CALCIUM CHLORIDE 600; 310; 30; 20 MG/100ML; MG/100ML; MG/100ML; MG/100ML
50 INJECTION, SOLUTION INTRAVENOUS CONTINUOUS
Status: DISCONTINUED | OUTPATIENT
Start: 2021-08-27 | End: 2021-08-28

## 2021-08-27 RX ORDER — NEOSTIGMINE METHYLSULFATE 5 MG/5 ML
SYRINGE (ML) INTRAVENOUS AS NEEDED
Status: DISCONTINUED | OUTPATIENT
Start: 2021-08-27 | End: 2021-08-27 | Stop reason: SURG

## 2021-08-27 RX ORDER — ROCURONIUM BROMIDE 10 MG/ML
INJECTION, SOLUTION INTRAVENOUS AS NEEDED
Status: DISCONTINUED | OUTPATIENT
Start: 2021-08-27 | End: 2021-08-27 | Stop reason: SURG

## 2021-08-27 RX ORDER — ROPIVACAINE HYDROCHLORIDE 2 MG/ML
INJECTION, SOLUTION EPIDURAL; INFILTRATION; PERINEURAL
Status: COMPLETED | OUTPATIENT
Start: 2021-08-27 | End: 2021-08-27

## 2021-08-27 RX ORDER — FENTANYL CITRATE 50 UG/ML
25 INJECTION, SOLUTION INTRAMUSCULAR; INTRAVENOUS
Status: DISCONTINUED | OUTPATIENT
Start: 2021-08-27 | End: 2021-08-27 | Stop reason: HOSPADM

## 2021-08-27 RX ORDER — NALOXONE HCL 0.4 MG/ML
0.04 VIAL (ML) INJECTION AS NEEDED
Status: DISCONTINUED | OUTPATIENT
Start: 2021-08-27 | End: 2021-08-27 | Stop reason: HOSPADM

## 2021-08-27 RX ORDER — SUCCINYLCHOLINE/SOD CL,ISO/PF 200MG/10ML
SYRINGE (ML) INTRAVENOUS AS NEEDED
Status: DISCONTINUED | OUTPATIENT
Start: 2021-08-27 | End: 2021-08-27 | Stop reason: SURG

## 2021-08-27 RX ORDER — PROPOFOL 10 MG/ML
VIAL (ML) INTRAVENOUS AS NEEDED
Status: DISCONTINUED | OUTPATIENT
Start: 2021-08-27 | End: 2021-08-27 | Stop reason: SURG

## 2021-08-27 RX ORDER — ASPIRIN 81 MG/1
81 TABLET ORAL DAILY
Status: DISCONTINUED | OUTPATIENT
Start: 2021-08-28 | End: 2021-08-28

## 2021-08-27 RX ORDER — PHENYLEPHRINE HCL IN 0.9% NACL 1 MG/10 ML
SYRINGE (ML) INTRAVENOUS AS NEEDED
Status: DISCONTINUED | OUTPATIENT
Start: 2021-08-27 | End: 2021-08-27 | Stop reason: SURG

## 2021-08-27 RX ORDER — SODIUM CHLORIDE 0.9 % (FLUSH) 0.9 %
3-10 SYRINGE (ML) INJECTION AS NEEDED
Status: DISCONTINUED | OUTPATIENT
Start: 2021-08-27 | End: 2021-08-27 | Stop reason: HOSPADM

## 2021-08-27 RX ORDER — HYDROMORPHONE HYDROCHLORIDE 1 MG/ML
0.2 INJECTION, SOLUTION INTRAMUSCULAR; INTRAVENOUS; SUBCUTANEOUS
Status: DISCONTINUED | OUTPATIENT
Start: 2021-08-27 | End: 2021-08-27 | Stop reason: HOSPADM

## 2021-08-27 RX ORDER — FENTANYL CITRATE 50 UG/ML
INJECTION, SOLUTION INTRAMUSCULAR; INTRAVENOUS AS NEEDED
Status: DISCONTINUED | OUTPATIENT
Start: 2021-08-27 | End: 2021-08-27 | Stop reason: SURG

## 2021-08-27 RX ORDER — SODIUM CHLORIDE 9 MG/ML
INJECTION, SOLUTION INTRAVENOUS AS NEEDED
Status: DISCONTINUED | OUTPATIENT
Start: 2021-08-27 | End: 2021-08-27 | Stop reason: HOSPADM

## 2021-08-27 RX ORDER — ONDANSETRON 2 MG/ML
4 INJECTION INTRAMUSCULAR; INTRAVENOUS AS NEEDED
Status: DISCONTINUED | OUTPATIENT
Start: 2021-08-27 | End: 2021-08-27 | Stop reason: HOSPADM

## 2021-08-27 RX ORDER — LIDOCAINE HYDROCHLORIDE 20 MG/ML
INJECTION, SOLUTION EPIDURAL; INFILTRATION; INTRACAUDAL; PERINEURAL AS NEEDED
Status: DISCONTINUED | OUTPATIENT
Start: 2021-08-27 | End: 2021-08-27 | Stop reason: SURG

## 2021-08-27 RX ORDER — OXYCODONE AND ACETAMINOPHEN 10; 325 MG/1; MG/1
1 TABLET ORAL ONCE AS NEEDED
Status: COMPLETED | OUTPATIENT
Start: 2021-08-27 | End: 2021-08-27

## 2021-08-27 RX ORDER — LABETALOL HYDROCHLORIDE 5 MG/ML
5 INJECTION, SOLUTION INTRAVENOUS
Status: DISCONTINUED | OUTPATIENT
Start: 2021-08-27 | End: 2021-08-27 | Stop reason: HOSPADM

## 2021-08-27 RX ORDER — DEXAMETHASONE SODIUM PHOSPHATE 4 MG/ML
INJECTION, SOLUTION INTRA-ARTICULAR; INTRALESIONAL; INTRAMUSCULAR; INTRAVENOUS; SOFT TISSUE AS NEEDED
Status: DISCONTINUED | OUTPATIENT
Start: 2021-08-27 | End: 2021-08-27 | Stop reason: SURG

## 2021-08-27 RX ORDER — SODIUM CHLORIDE, SODIUM LACTATE, POTASSIUM CHLORIDE, CALCIUM CHLORIDE 600; 310; 30; 20 MG/100ML; MG/100ML; MG/100ML; MG/100ML
100 INJECTION, SOLUTION INTRAVENOUS CONTINUOUS
Status: DISCONTINUED | OUTPATIENT
Start: 2021-08-27 | End: 2021-08-27

## 2021-08-27 RX ORDER — FLUMAZENIL 0.1 MG/ML
0.2 INJECTION INTRAVENOUS AS NEEDED
Status: DISCONTINUED | OUTPATIENT
Start: 2021-08-27 | End: 2021-08-27 | Stop reason: HOSPADM

## 2021-08-27 RX ORDER — OXYCODONE AND ACETAMINOPHEN 7.5; 325 MG/1; MG/1
1 TABLET ORAL EVERY 4 HOURS PRN
Status: DISCONTINUED | OUTPATIENT
Start: 2021-08-27 | End: 2021-08-30 | Stop reason: HOSPADM

## 2021-08-27 RX ORDER — MORPHINE SULFATE 2 MG/ML
2 INJECTION, SOLUTION INTRAMUSCULAR; INTRAVENOUS EVERY 4 HOURS PRN
Status: DISCONTINUED | OUTPATIENT
Start: 2021-08-27 | End: 2021-08-28

## 2021-08-27 RX ORDER — ONDANSETRON 2 MG/ML
INJECTION INTRAMUSCULAR; INTRAVENOUS AS NEEDED
Status: DISCONTINUED | OUTPATIENT
Start: 2021-08-27 | End: 2021-08-27 | Stop reason: SURG

## 2021-08-27 RX ORDER — LIDOCAINE HYDROCHLORIDE 10 MG/ML
0.5 INJECTION, SOLUTION EPIDURAL; INFILTRATION; INTRACAUDAL; PERINEURAL ONCE AS NEEDED
Status: DISCONTINUED | OUTPATIENT
Start: 2021-08-27 | End: 2021-08-27 | Stop reason: HOSPADM

## 2021-08-27 RX ADMIN — FENTANYL CITRATE 50 MCG: 50 INJECTION, SOLUTION INTRAMUSCULAR; INTRAVENOUS at 11:59

## 2021-08-27 RX ADMIN — Medication 100 MCG: at 11:45

## 2021-08-27 RX ADMIN — LIDOCAINE HYDROCHLORIDE 100 MG: 20 INJECTION, SOLUTION EPIDURAL; INFILTRATION; INTRACAUDAL; PERINEURAL at 10:15

## 2021-08-27 RX ADMIN — ROCURONIUM BROMIDE 10 MG: 50 INJECTION INTRAVENOUS at 10:15

## 2021-08-27 RX ADMIN — CEFAZOLIN SODIUM 2 G: 10 INJECTION, POWDER, FOR SOLUTION INTRAVENOUS at 18:31

## 2021-08-27 RX ADMIN — Medication 200 MG: at 10:15

## 2021-08-27 RX ADMIN — FENTANYL CITRATE 50 MCG: 50 INJECTION, SOLUTION INTRAMUSCULAR; INTRAVENOUS at 11:25

## 2021-08-27 RX ADMIN — ROPIVACAINE HYDROCHLORIDE 20 ML: 2 INJECTION, SOLUTION EPIDURAL; INFILTRATION at 10:22

## 2021-08-27 RX ADMIN — DEXAMETHASONE SODIUM PHOSPHATE 4 MG: 4 INJECTION, SOLUTION INTRA-ARTICULAR; INTRALESIONAL; INTRAMUSCULAR; INTRAVENOUS; SOFT TISSUE at 10:34

## 2021-08-27 RX ADMIN — OXYCODONE HYDROCHLORIDE AND ACETAMINOPHEN 1 TABLET: 7.5; 325 TABLET ORAL at 14:18

## 2021-08-27 RX ADMIN — Medication 100 MCG: at 12:16

## 2021-08-27 RX ADMIN — SODIUM CHLORIDE, POTASSIUM CHLORIDE, SODIUM LACTATE AND CALCIUM CHLORIDE 50 ML/HR: 600; 310; 30; 20 INJECTION, SOLUTION INTRAVENOUS at 15:02

## 2021-08-27 RX ADMIN — PROPOFOL 120 MG: 10 INJECTION, EMULSION INTRAVENOUS at 10:15

## 2021-08-27 RX ADMIN — Medication 2 G: at 10:59

## 2021-08-27 RX ADMIN — Medication 3 MG: at 12:31

## 2021-08-27 RX ADMIN — SODIUM CHLORIDE, POTASSIUM CHLORIDE, SODIUM LACTATE AND CALCIUM CHLORIDE: 600; 310; 30; 20 INJECTION, SOLUTION INTRAVENOUS at 12:31

## 2021-08-27 RX ADMIN — SODIUM CHLORIDE, PRESERVATIVE FREE 10 ML: 5 INJECTION INTRAVENOUS at 20:54

## 2021-08-27 RX ADMIN — Medication 100 MCG: at 12:30

## 2021-08-27 RX ADMIN — ROPIVACAINE HYDROCHLORIDE 20 ML: 5 INJECTION, SOLUTION EPIDURAL; INFILTRATION; PERINEURAL at 10:22

## 2021-08-27 RX ADMIN — ONDANSETRON 4 MG: 2 INJECTION INTRAMUSCULAR; INTRAVENOUS at 12:17

## 2021-08-27 RX ADMIN — OXYCODONE HYDROCHLORIDE AND ACETAMINOPHEN 1 TABLET: 7.5; 325 TABLET ORAL at 18:56

## 2021-08-27 RX ADMIN — Medication 160 MCG: at 10:55

## 2021-08-27 RX ADMIN — LORAZEPAM 1 MG: 1 TABLET ORAL at 20:54

## 2021-08-27 RX ADMIN — Medication 100 MCG: at 11:36

## 2021-08-27 RX ADMIN — GLYCOPYRROLATE 0.4 MG: 0.2 INJECTION, SOLUTION INTRAMUSCULAR; INTRAVENOUS at 12:31

## 2021-08-27 RX ADMIN — METOPROLOL SUCCINATE 25 MG: 25 TABLET, EXTENDED RELEASE ORAL at 08:13

## 2021-08-27 RX ADMIN — OXYCODONE HYDROCHLORIDE AND ACETAMINOPHEN 1 TABLET: 10; 325 TABLET ORAL at 13:09

## 2021-08-27 NOTE — ANESTHESIA PROCEDURE NOTES
Peripheral Block    Pre-sedation assessment completed: 8/27/2021 10:19 AM    Patient reassessed immediately prior to procedure    Patient location during procedure: OR  Start time: 8/27/2021 10:20 AM  Stop time: 8/27/2021 10:22 AM  Reason for block: procedure for pain, at surgeon's request, post-op pain management and Requested by   Performed by  Anesthesiologist: Jessica Magaña MD  Preanesthetic Checklist  Completed: patient identified, IV checked, site marked, risks and benefits discussed, surgical consent, monitors and equipment checked, pre-op evaluation and timeout performed  Prep:  Pt Position: supine  Sterile barriers:gloves  Prep: ChloraPrep  Patient monitoring: blood pressure monitoring, continuous pulse oximetry and EKG  Procedure  Sedation:yes  Performed under: general  Guidance:ultrasound guided and fascial plane identified and local anesthetic infiltrated in iliaca compartment  ULTRASOUND INTERPRETATION.  Using ultrasound guidance a 20 G gauge needle was placed in close proximity to the femoral nerve, at which point, under ultrasound guidance anesthetic was injected in the area of the nerve and spread of the anesthesia was seen on ultrasound in close proximity thereto.  There were no abnormalities seen on ultrasound; a digital image was taken; and the patient tolerated the procedure with no complications. Images:still images obtained (picture printed and placed in patients chart)    Laterality:right  Block Type:fascia iliaca compartment  Injection Technique:single-shot  Needle Type:echogenic  Needle Gauge:20 G      Medications Used: ropivacaine (NAROPIN) injection 0.5 %, 20 mL  ropivacaine (NAROPIN) 0.2% injection, 20 mL  Med admintered at 8/27/2021 10:22 AM      Post Assessment  Injection Assessment: negative aspiration for heme, no paresthesia on injection and incremental injection  Patient Tolerance:comfortable throughout block  Complications:no

## 2021-08-27 NOTE — ANESTHESIA POSTPROCEDURE EVALUATION
"Patient: Dexter Suggs    Procedure Summary     Date: 08/27/21 Room / Location: [facility] OR  /  [facility] OR    Anesthesia Start: 1008 Anesthesia Stop: 1247    Procedure: TOTAL HIP REPLACEMENT (Right Hip) Diagnosis: (HIP FRACTURE)    Surgeons: Arik Magaña MD Provider: Renetta Headley CRNA    Anesthesia Type: general with block ASA Status: 3          Anesthesia Type: general with block    Vitals  Vitals Value Taken Time   /71 08/27/21 1319   Temp 97.4 °F (36.3 °C) 08/27/21 1319   Pulse 60 08/27/21 1322   Resp 16 08/27/21 1319   SpO2 94 % 08/27/21 1322   Vitals shown include unvalidated device data.        Post Anesthesia Care and Evaluation    Patient location during evaluation: PACU  Patient participation: complete - patient participated  Level of consciousness: awake and alert  Pain management: adequate  Airway patency: patent  Anesthetic complications: No anesthetic complications    Cardiovascular status: acceptable  Respiratory status: acceptable  Hydration status: acceptable    Comments: Blood pressure 161/70, pulse 59, temperature 97.6 °F (36.4 °C), temperature source Oral, resp. rate 16, height 182.9 cm (72\"), weight 72.6 kg (160 lb), SpO2 95 %.    Pt discharged from PACU based on beth score >8      "

## 2021-08-27 NOTE — ANESTHESIA PREPROCEDURE EVALUATION
Anesthesia Evaluation     no history of anesthetic complications:  NPO Solid Status: > 8 hours  NPO Liquid Status: > 8 hours           Airway   Mallampati: IV  TM distance: <3 FB  Neck ROM: limited  Difficult intubation highly probable  Dental      Pulmonary    (+) a smoker (quit 1985) Former,   Cardiovascular   Exercise tolerance: poor (<4 METS)    PT is on anticoagulation therapy  Patient on routine beta blocker and Beta blocker given within 24 hours of surgery    (+) pacemaker pacemaker, hypertension, CAD, dysrhythmias (complete heart block) Atrial Fib, Atrial Flutter, hyperlipidemia,     ROS comment: [employer] pacemaker, interrogation reviewed 3/2021  99% RV paced, adequate battery    Stress test 2018  · Left ventricular ejection fraction is normal (Calculated EF = 63%).  · Myocardial perfusion imaging indicates a medium-sized infarct located in the inferior wall, septal wall and apex with no significant ischemia noted.  · Abnormal LV wall motion consistent with mild hypokinesis of the inferior wall and septal wall.  Raw images reviewed with the following abnormalities noted: vertical motion.  Recently saw Dr Granados who ordered repeat stress test    Neuro/Psych  (-) seizures, TIA, CVA  GI/Hepatic/Renal/Endo    (+)  GERD,  thyroid problem hypothyroidism  (-) liver disease, no renal disease, diabetes    Musculoskeletal     Abdominal    Substance History      OB/GYN          Other   arthritis (ankylosing spondylitis), blood dyscrasia anemia,chronic steroid use    history of cancer (prostate cancer) remission      Other Comment: 5 mg prednisone daily                  Anesthesia Plan    ASA 3     general with block   (Will proceed with general anesthesia, have airway equipment ready. Fascia iliaca after induction. )  intravenous induction     Anesthetic plan, all risks, benefits, and alternatives have been provided, discussed and informed consent has been obtained with: patient.

## 2021-08-27 NOTE — ANESTHESIA PROCEDURE NOTES
Airway  Urgency: elective    Date/Time: 8/27/2021 10:17 AM  Airway not difficult    General Information and Staff    Patient location during procedure: OR  CRNA: Renetta Headley CRNA    Indications and Patient Condition  Indications for airway management: airway protection    Preoxygenated: yes  Mask difficulty assessment: 1 - vent by mask (Easy mask)    Final Airway Details  Final airway type: endotracheal airway      Successful airway: ETT  Cuffed: yes   Successful intubation technique: video laryngoscopy  Facilitating devices/methods: intubating stylet  Endotracheal tube insertion site: oral  Blade: Jesse  Blade size: 4  ETT size (mm): 7.5  Cormack-Lehane Classification: grade I - full view of glottis  Placement verified by: chest auscultation and capnometry   Cuff volume (mL): 8  Measured from: lips  ETT/EBT  to lips (cm): 23  Number of attempts at approach: 1  Assessment: lips, teeth, and gum same as pre-op and atraumatic intubation    Additional Comments  Started with Paula d/t ankylosing spondylitis. Very easy intubation (wide open mouth and glottis)

## 2021-08-28 PROBLEM — D62 POSTOPERATIVE ANEMIA DUE TO ACUTE BLOOD LOSS: Status: ACTIVE | Noted: 2021-08-28

## 2021-08-28 LAB
GLUCOSE BLDC GLUCOMTR-MCNC: 115 MG/DL (ref 70–130)
GLUCOSE BLDC GLUCOMTR-MCNC: 116 MG/DL (ref 70–130)
GLUCOSE BLDC GLUCOMTR-MCNC: 132 MG/DL (ref 70–130)
GLUCOSE BLDC GLUCOMTR-MCNC: 144 MG/DL (ref 70–130)
HCT VFR BLD AUTO: 31.4 % (ref 37.5–51)
HGB BLD-MCNC: 9.8 G/DL (ref 13–17.7)

## 2021-08-28 PROCEDURE — 85014 HEMATOCRIT: CPT | Performed by: ORTHOPAEDIC SURGERY

## 2021-08-28 PROCEDURE — 97166 OT EVAL MOD COMPLEX 45 MIN: CPT

## 2021-08-28 PROCEDURE — 25010000002 ONDANSETRON PER 1 MG: Performed by: ORTHOPAEDIC SURGERY

## 2021-08-28 PROCEDURE — 63710000001 PREDNISONE PER 5 MG: Performed by: ORTHOPAEDIC SURGERY

## 2021-08-28 PROCEDURE — 97161 PT EVAL LOW COMPLEX 20 MIN: CPT | Performed by: PHYSICAL THERAPIST

## 2021-08-28 PROCEDURE — 25010000002 CEFAZOLIN PER 500 MG: Performed by: ORTHOPAEDIC SURGERY

## 2021-08-28 PROCEDURE — 85018 HEMOGLOBIN: CPT | Performed by: ORTHOPAEDIC SURGERY

## 2021-08-28 PROCEDURE — 82962 GLUCOSE BLOOD TEST: CPT

## 2021-08-28 PROCEDURE — 99232 SBSQ HOSP IP/OBS MODERATE 35: CPT | Performed by: INTERNAL MEDICINE

## 2021-08-28 RX ORDER — MAGNESIUM CARB/ALUMINUM HYDROX 105-160MG
296 TABLET,CHEWABLE ORAL ONCE
Status: COMPLETED | OUTPATIENT
Start: 2021-08-28 | End: 2021-08-28

## 2021-08-28 RX ADMIN — TRIAMTERENE AND HYDROCHLOROTHIAZIDE 1 TABLET: 37.5; 25 TABLET ORAL at 08:28

## 2021-08-28 RX ADMIN — FOLIC ACID 1 MG: 1 TABLET ORAL at 08:28

## 2021-08-28 RX ADMIN — METOPROLOL SUCCINATE 25 MG: 25 TABLET, EXTENDED RELEASE ORAL at 08:28

## 2021-08-28 RX ADMIN — ONDANSETRON 4 MG: 2 INJECTION INTRAMUSCULAR; INTRAVENOUS at 08:10

## 2021-08-28 RX ADMIN — SODIUM CHLORIDE, PRESERVATIVE FREE 10 ML: 5 INJECTION INTRAVENOUS at 08:31

## 2021-08-28 RX ADMIN — FLUTICASONE PROPIONATE 1 SPRAY: 50 SPRAY, METERED NASAL at 08:31

## 2021-08-28 RX ADMIN — ASPIRIN 81 MG: 81 TABLET, COATED ORAL at 08:28

## 2021-08-28 RX ADMIN — CEFAZOLIN SODIUM 2 G: 10 INJECTION, POWDER, FOR SOLUTION INTRAVENOUS at 02:12

## 2021-08-28 RX ADMIN — APIXABAN 5 MG: 5 TABLET, FILM COATED ORAL at 11:49

## 2021-08-28 RX ADMIN — PANTOPRAZOLE SODIUM 40 MG: 40 TABLET, DELAYED RELEASE ORAL at 05:31

## 2021-08-28 RX ADMIN — OXYCODONE HYDROCHLORIDE AND ACETAMINOPHEN 1 TABLET: 7.5; 325 TABLET ORAL at 08:28

## 2021-08-28 RX ADMIN — LEVOTHYROXINE SODIUM 75 MCG: 75 TABLET ORAL at 05:31

## 2021-08-28 RX ADMIN — LORAZEPAM 1 MG: 1 TABLET ORAL at 20:27

## 2021-08-28 RX ADMIN — OXYCODONE HYDROCHLORIDE AND ACETAMINOPHEN 1 TABLET: 7.5; 325 TABLET ORAL at 12:39

## 2021-08-28 RX ADMIN — PREDNISONE 5 MG: 5 TABLET ORAL at 08:28

## 2021-08-28 RX ADMIN — APIXABAN 5 MG: 5 TABLET, FILM COATED ORAL at 20:27

## 2021-08-28 RX ADMIN — ALLOPURINOL 300 MG: 300 TABLET ORAL at 08:28

## 2021-08-28 RX ADMIN — Medication 296 ML: at 18:32

## 2021-08-28 RX ADMIN — OXYCODONE HYDROCHLORIDE AND ACETAMINOPHEN 1 TABLET: 7.5; 325 TABLET ORAL at 04:21

## 2021-08-29 LAB
BH BB BLOOD EXPIRATION DATE: NORMAL
BH BB BLOOD EXPIRATION DATE: NORMAL
BH BB BLOOD TYPE BARCODE: 5100
BH BB BLOOD TYPE BARCODE: 5100
BH BB DISPENSE STATUS: NORMAL
BH BB DISPENSE STATUS: NORMAL
BH BB PRODUCT CODE: NORMAL
BH BB PRODUCT CODE: NORMAL
BH BB UNIT NUMBER: NORMAL
BH BB UNIT NUMBER: NORMAL
CROSSMATCH INTERPRETATION: NORMAL
CROSSMATCH INTERPRETATION: NORMAL
DEPRECATED RDW RBC AUTO: 57.5 FL (ref 37–54)
ERYTHROCYTE [DISTWIDTH] IN BLOOD BY AUTOMATED COUNT: 16.3 % (ref 12.3–15.4)
GLUCOSE BLDC GLUCOMTR-MCNC: 135 MG/DL (ref 70–130)
GLUCOSE BLDC GLUCOMTR-MCNC: 140 MG/DL (ref 70–130)
GLUCOSE BLDC GLUCOMTR-MCNC: 143 MG/DL (ref 70–130)
GLUCOSE BLDC GLUCOMTR-MCNC: 151 MG/DL (ref 70–130)
HCT VFR BLD AUTO: 28.2 % (ref 37.5–51)
HGB BLD-MCNC: 9 G/DL (ref 13–17.7)
MCH RBC QN AUTO: 30.2 PG (ref 26.6–33)
MCHC RBC AUTO-ENTMCNC: 31.9 G/DL (ref 31.5–35.7)
MCV RBC AUTO: 94.6 FL (ref 79–97)
PLATELET # BLD AUTO: 174 10*3/MM3 (ref 140–450)
PMV BLD AUTO: 9.7 FL (ref 6–12)
RBC # BLD AUTO: 2.98 10*6/MM3 (ref 4.14–5.8)
UNIT  ABO: NORMAL
UNIT  ABO: NORMAL
UNIT  RH: NORMAL
UNIT  RH: NORMAL
WBC # BLD AUTO: 9.07 10*3/MM3 (ref 3.4–10.8)

## 2021-08-29 PROCEDURE — 97116 GAIT TRAINING THERAPY: CPT

## 2021-08-29 PROCEDURE — 63710000001 PREDNISONE PER 5 MG: Performed by: ORTHOPAEDIC SURGERY

## 2021-08-29 PROCEDURE — 82962 GLUCOSE BLOOD TEST: CPT

## 2021-08-29 PROCEDURE — 85027 COMPLETE CBC AUTOMATED: CPT | Performed by: NURSE PRACTITIONER

## 2021-08-29 PROCEDURE — 97110 THERAPEUTIC EXERCISES: CPT

## 2021-08-29 RX ORDER — MAGNESIUM CARB/ALUMINUM HYDROX 105-160MG
296 TABLET,CHEWABLE ORAL ONCE
Status: COMPLETED | OUTPATIENT
Start: 2021-08-29 | End: 2021-08-29

## 2021-08-29 RX ADMIN — LEVOTHYROXINE SODIUM 75 MCG: 75 TABLET ORAL at 06:07

## 2021-08-29 RX ADMIN — FOLIC ACID 1 MG: 1 TABLET ORAL at 08:34

## 2021-08-29 RX ADMIN — APIXABAN 5 MG: 5 TABLET, FILM COATED ORAL at 08:34

## 2021-08-29 RX ADMIN — Medication 296 ML: at 16:13

## 2021-08-29 RX ADMIN — METOPROLOL SUCCINATE 25 MG: 25 TABLET, EXTENDED RELEASE ORAL at 08:34

## 2021-08-29 RX ADMIN — LORAZEPAM 1 MG: 1 TABLET ORAL at 21:14

## 2021-08-29 RX ADMIN — ALLOPURINOL 300 MG: 300 TABLET ORAL at 08:34

## 2021-08-29 RX ADMIN — TRIAMTERENE AND HYDROCHLOROTHIAZIDE 1 TABLET: 37.5; 25 TABLET ORAL at 08:34

## 2021-08-29 RX ADMIN — PREDNISONE 5 MG: 5 TABLET ORAL at 08:34

## 2021-08-29 RX ADMIN — PANTOPRAZOLE SODIUM 40 MG: 40 TABLET, DELAYED RELEASE ORAL at 06:07

## 2021-08-29 RX ADMIN — SODIUM CHLORIDE, PRESERVATIVE FREE 10 ML: 5 INJECTION INTRAVENOUS at 21:14

## 2021-08-29 RX ADMIN — APIXABAN 5 MG: 5 TABLET, FILM COATED ORAL at 21:14

## 2021-08-30 VITALS
WEIGHT: 160 LBS | BODY MASS INDEX: 21.67 KG/M2 | SYSTOLIC BLOOD PRESSURE: 113 MMHG | DIASTOLIC BLOOD PRESSURE: 51 MMHG | HEIGHT: 72 IN | HEART RATE: 95 BPM | OXYGEN SATURATION: 95 % | TEMPERATURE: 99.5 F | RESPIRATION RATE: 16 BRPM

## 2021-08-30 LAB
ANION GAP SERPL CALCULATED.3IONS-SCNC: 6 MMOL/L (ref 5–15)
BUN SERPL-MCNC: 23 MG/DL (ref 8–23)
BUN/CREAT SERPL: 20.9 (ref 7–25)
CALCIUM SPEC-SCNC: 9.7 MG/DL (ref 8.6–10.5)
CHLORIDE SERPL-SCNC: 98 MMOL/L (ref 98–107)
CO2 SERPL-SCNC: 31 MMOL/L (ref 22–29)
CREAT SERPL-MCNC: 1.1 MG/DL (ref 0.76–1.27)
DEPRECATED RDW RBC AUTO: 57.1 FL (ref 37–54)
ERYTHROCYTE [DISTWIDTH] IN BLOOD BY AUTOMATED COUNT: 16.5 % (ref 12.3–15.4)
GFR SERPL CREATININE-BSD FRML MDRD: 63 ML/MIN/1.73
GLUCOSE BLDC GLUCOMTR-MCNC: 137 MG/DL (ref 70–130)
GLUCOSE BLDC GLUCOMTR-MCNC: 137 MG/DL (ref 70–130)
GLUCOSE BLDC GLUCOMTR-MCNC: 151 MG/DL (ref 70–130)
GLUCOSE SERPL-MCNC: 136 MG/DL (ref 65–99)
HCT VFR BLD AUTO: 27.4 % (ref 37.5–51)
HGB BLD-MCNC: 8.7 G/DL (ref 13–17.7)
MCH RBC QN AUTO: 30.1 PG (ref 26.6–33)
MCHC RBC AUTO-ENTMCNC: 31.8 G/DL (ref 31.5–35.7)
MCV RBC AUTO: 94.8 FL (ref 79–97)
PLATELET # BLD AUTO: 201 10*3/MM3 (ref 140–450)
PMV BLD AUTO: 9.9 FL (ref 6–12)
POTASSIUM SERPL-SCNC: 4.4 MMOL/L (ref 3.5–5.2)
RBC # BLD AUTO: 2.89 10*6/MM3 (ref 4.14–5.8)
SARS-COV-2 RNA PNL SPEC NAA+PROBE: NOT DETECTED
SODIUM SERPL-SCNC: 135 MMOL/L (ref 136–145)
WBC # BLD AUTO: 9.98 10*3/MM3 (ref 3.4–10.8)

## 2021-08-30 PROCEDURE — 80048 BASIC METABOLIC PNL TOTAL CA: CPT | Performed by: NURSE PRACTITIONER

## 2021-08-30 PROCEDURE — 63710000001 PREDNISONE PER 5 MG: Performed by: ORTHOPAEDIC SURGERY

## 2021-08-30 PROCEDURE — 97116 GAIT TRAINING THERAPY: CPT

## 2021-08-30 PROCEDURE — 87635 SARS-COV-2 COVID-19 AMP PRB: CPT | Performed by: INTERNAL MEDICINE

## 2021-08-30 PROCEDURE — 85027 COMPLETE CBC AUTOMATED: CPT | Performed by: NURSE PRACTITIONER

## 2021-08-30 PROCEDURE — 82962 GLUCOSE BLOOD TEST: CPT

## 2021-08-30 PROCEDURE — 97110 THERAPEUTIC EXERCISES: CPT

## 2021-08-30 PROCEDURE — 63710000001 INSULIN LISPRO (HUMAN) PER 5 UNITS: Performed by: ORTHOPAEDIC SURGERY

## 2021-08-30 RX ADMIN — FLUTICASONE PROPIONATE 1 SPRAY: 50 SPRAY, METERED NASAL at 09:01

## 2021-08-30 RX ADMIN — FOLIC ACID 1 MG: 1 TABLET ORAL at 08:59

## 2021-08-30 RX ADMIN — PANTOPRAZOLE SODIUM 40 MG: 40 TABLET, DELAYED RELEASE ORAL at 06:35

## 2021-08-30 RX ADMIN — ALLOPURINOL 300 MG: 300 TABLET ORAL at 09:00

## 2021-08-30 RX ADMIN — LEVOTHYROXINE SODIUM 75 MCG: 75 TABLET ORAL at 06:35

## 2021-08-30 RX ADMIN — INSULIN LISPRO 2 UNITS: 100 INJECTION, SOLUTION INTRAVENOUS; SUBCUTANEOUS at 12:02

## 2021-08-30 RX ADMIN — PREDNISONE 5 MG: 5 TABLET ORAL at 08:59

## 2021-08-30 RX ADMIN — APIXABAN 5 MG: 5 TABLET, FILM COATED ORAL at 09:00

## 2021-09-02 LAB
CYTO UR: NORMAL
LAB AP CASE REPORT: NORMAL
PATH REPORT.FINAL DX SPEC: NORMAL
PATH REPORT.GROSS SPEC: NORMAL

## 2021-09-08 ENCOUNTER — TELEPHONE (OUTPATIENT)
Dept: INTERNAL MEDICINE | Facility: CLINIC | Age: 86
End: 2021-09-08

## 2021-09-09 NOTE — TELEPHONE ENCOUNTER
Called Joy and informed that we will follow for medical purposes, but anything orthopaedic related or therapy related needs to go to his ortho.  She voiced understanding.

## 2021-09-20 ENCOUNTER — OFFICE VISIT (OUTPATIENT)
Dept: INTERNAL MEDICINE | Facility: CLINIC | Age: 86
End: 2021-09-20

## 2021-09-20 VITALS
SYSTOLIC BLOOD PRESSURE: 138 MMHG | TEMPERATURE: 97.9 F | HEIGHT: 72 IN | DIASTOLIC BLOOD PRESSURE: 80 MMHG | OXYGEN SATURATION: 99 % | BODY MASS INDEX: 22.21 KG/M2 | WEIGHT: 164 LBS | HEART RATE: 75 BPM

## 2021-09-20 DIAGNOSIS — D50.8 OTHER IRON DEFICIENCY ANEMIA: Primary | ICD-10-CM

## 2021-09-20 DIAGNOSIS — S72.001D CLOSED FRACTURE OF RIGHT HIP WITH ROUTINE HEALING, SUBSEQUENT ENCOUNTER: ICD-10-CM

## 2021-09-20 DIAGNOSIS — I10 ESSENTIAL HYPERTENSION: ICD-10-CM

## 2021-09-20 DIAGNOSIS — I48.92 PAROXYSMAL ATRIAL FLUTTER (HCC): ICD-10-CM

## 2021-09-20 PROBLEM — S72.124D CLOSED NONDISPLACED FRACTURE OF LESSER TROCHANTER OF RIGHT FEMUR WITH ROUTINE HEALING: Status: ACTIVE | Noted: 2021-08-31

## 2021-09-20 PROCEDURE — 96372 THER/PROPH/DIAG INJ SC/IM: CPT | Performed by: INTERNAL MEDICINE

## 2021-09-20 PROCEDURE — 99214 OFFICE O/P EST MOD 30 MIN: CPT | Performed by: INTERNAL MEDICINE

## 2021-09-20 RX ADMIN — CYANOCOBALAMIN 1000 MCG: 1000 INJECTION, SOLUTION INTRAMUSCULAR; SUBCUTANEOUS at 11:37

## 2021-09-20 NOTE — PROGRESS NOTES
Subjective   Dexter Suggs is a 87 y.o. male.   Chief Complaint   Patient presents with   • Hospital Follow Up Visit     d/c from [facility] on 09/11/2021; right hip replacement    • Foot Swelling     both feet were swelling yesterday    • discuss medications     pt was put on iron while in hospital; wants to discuss checking to see if he really needs to be on this ; pt has not been taking since he was at home        History of Present Illness this is a hospital follow-up for patient who had recently had a episode of hemorrhagic cystitis as well as a right hip fracture.  Patient had been seen here in the office after a fall where he was having hip pain he had his hip x-rayed no fractures were seen he continued to be very active and have pain he eventually ended up in the hospital emergency room and had his hip jamie-rayed a subcapital fracture was seen.  Also he had an episode of hemorrhagic cystitis while he was there in the emergency room he has a follow-up with Dr. Hsu from urology.    The following portions of the patient's history were reviewed and updated as appropriate: allergies, current medications, past family history, past medical history, past social history, past surgical history and problem list.    Review of Systems   Constitutional: Positive for fatigue. Negative for activity change, appetite change, fever, unexpected weight gain and unexpected weight loss.   HENT: Negative for swollen glands, trouble swallowing and voice change.    Eyes: Negative for blurred vision and visual disturbance.   Respiratory: Negative for cough and shortness of breath.    Cardiovascular: Negative for chest pain, palpitations and leg swelling.   Gastrointestinal: Negative for abdominal pain, constipation, diarrhea, nausea, vomiting and indigestion.   Endocrine: Negative for cold intolerance, heat intolerance, polydipsia and polyphagia.   Genitourinary: Negative for dysuria and frequency.   Musculoskeletal: Negative for  arthralgias, back pain, joint swelling and neck pain.   Skin: Negative for color change, rash and skin lesions.   Neurological: Negative for dizziness, weakness, headache, memory problem and confusion.   Hematological: Does not bruise/bleed easily.   Psychiatric/Behavioral: Negative for agitation, hallucinations and suicidal ideas. The patient is not nervous/anxious.        Objective   Past Medical History:   Diagnosis Date   • Abnormal nuclear stress test    • BPH (benign prostatic hyperplasia)    • Chest pain    • Coronary artery disease    • Disease of thyroid gland    • Hyperlipidemia    • Hypertension    • LV dysfunction    • Melanoma (CMS/HCC)    • Prostate CA (CMS/HCC)       Past Surgical History:   Procedure Laterality Date   • APPENDECTOMY     • CARDIAC CATHETERIZATION Left 12/10/2012   • CARDIAC ELECTROPHYSIOLOGY PROCEDURE N/A 11/23/2018    Procedure: Pacemaker DC new 11/23/2018;  Surgeon: Austin rGanados MD;  Location: [facility] CATH INVASIVE LOCATION;  Service: Cardiology   • CHOLECYSTECTOMY     • COLONOSCOPY N/A 6/9/2017    Procedure: COLONOSCOPY WITH ANESTHESIA;  Surgeon: Felice Marroquin MD;  Location: [facility] ENDOSCOPY;  Service:    • COLONOSCOPY W/ POLYPECTOMY  02/16/2012    adenomatous polyp at 80cm   • HAND SURGERY Right    • HERNIA REPAIR     • INGUINAL HERNIA REPAIR     • KNEE SURGERY     • PROSTATE SURGERY     • SKIN CANCER EXCISION      face   • SKIN LESION EXCISION     • TOTAL HIP ARTHROPLASTY Right 8/27/2021    Procedure: TOTAL HIP REPLACEMENT;  Surgeon: Arik Magaña MD;  Location: [facility] OR;  Service: Orthopedics;  Laterality: Right;        Current Outpatient Medications:   •  allopurinol (ZYLOPRIM) 300 MG tablet, TAKE 1 TABLET BY MOUTH EVERY DAY , Disp: 90 tablet, Rfl: 3  •  aspirin 81 MG EC tablet, Take 81 mg by mouth Daily., Disp: , Rfl:   •  Easy Touch Lancets 30G/Twist misc, TEST DAILY AS DIRECTED , Disp: 100 each, Rfl: 3  •  Eliquis 5 MG tablet tablet, TAKE 1 TABLET BY MOUTH EVERY 12  HOURS , Disp: 180 tablet, Rfl: 4  •  fluticasone (FLONASE) 50 MCG/ACT nasal spray, 1 spray into the nostril(s) as directed by provider Daily As Needed., Disp: , Rfl:   •  folic acid (FOLVITE) 1 MG tablet, Take 1 mg by mouth Daily., Disp: , Rfl:   •  levothyroxine (SYNTHROID, LEVOTHROID) 75 MCG tablet, TAKE 1 TABLET BY MOUTH EVERY DAY , Disp: 90 tablet, Rfl: 3  •  LORazepam (ATIVAN) 1 MG tablet, TAKE 1 TABLET BY MOUTH EVERY DAY , Disp: 90 tablet, Rfl: 1  •  methotrexate 2.5 MG tablet, Take 17.5 mg by mouth 1 (One) Time Per Week., Disp: , Rfl:   •  metoprolol succinate XL (TOPROL-XL) 25 MG 24 hr tablet, TAKE 1 TABLET BY MOUTH EVERY DAY , Disp: 90 tablet, Rfl: 3  •  nitroglycerin (NITROSTAT) 0.4 MG SL tablet, DISSOLVE 1 UNDER TONGUE AS NEEDED FOR CHEST PAIN, MAY REPEAT TWICE IN 15 MIN PERIOD., Disp: 30 tablet, Rfl: 3  •  pantoprazole (PROTONIX) 40 MG EC tablet, TAKE 1 TABLET BY MOUTH EVERY DAY , Disp: 90 tablet, Rfl: 3  •  predniSONE (DELTASONE) 5 MG tablet, Take 5 mg by mouth Daily., Disp: , Rfl:   •  triamterene-hydrochlorothiazide (MAXZIDE) 75-50 MG per tablet, TAKE 1/2 TABLET BY MOUTH EVERY DAY , Disp: 45 tablet, Rfl: 3  •  True Metrix Blood Glucose Test test strip, TEST DAILY AS DIRECTED , Disp: 300 each, Rfl: 3    Current Facility-Administered Medications:   •  cyanocobalamin injection 1,000 mcg, 1,000 mcg, Intramuscular, Q28 Days, Jarad Alexis MD, 1,000 mcg at 09/20/21 1137      Vitals:    09/20/21 1102   BP: 138/80   Pulse: 75   Temp: 97.9 °F (36.6 °C)   SpO2: 99%         09/20/21  1102   Weight: 74.4 kg (164 lb)       Body mass index is 22.24 kg/m².    Physical Exam  Vitals and nursing note reviewed.   Constitutional:       General: He is not in acute distress.     Appearance: Normal appearance. He is well-developed.   HENT:      Head: Normocephalic and atraumatic.      Right Ear: External ear normal.      Left Ear: External ear normal.      Nose: Nose normal.   Eyes:      Extraocular Movements:  Extraocular movements intact.      Conjunctiva/sclera: Conjunctivae normal.      Pupils: Pupils are equal, round, and reactive to light.   Cardiovascular:      Rate and Rhythm: Normal rate and regular rhythm.      Pulses: Normal pulses.      Heart sounds: Normal heart sounds.   Pulmonary:      Effort: Pulmonary effort is normal.      Breath sounds: Normal breath sounds.   Abdominal:      General: Bowel sounds are normal.      Palpations: Abdomen is soft.   Musculoskeletal:         General: Normal range of motion.      Cervical back: Normal range of motion and neck supple. No rigidity. No muscular tenderness.   Skin:     General: Skin is warm and dry.   Neurological:      General: No focal deficit present.      Mental Status: He is alert and oriented to person, place, and time.   Psychiatric:         Mood and Affect: Mood normal.         Behavior: Behavior normal.               Assessment/Plan   Diagnoses and all orders for this visit:    1. Other iron deficiency anemia (Primary)  -     Basic Metabolic Panel  -     CBC & Differential  -     Iron Profile    2. Essential hypertension    3. Paroxysmal atrial flutter (CMS/HCC)    4. Closed fracture of right hip with routine healing, subsequent encounter    At this point patient is wife and requested iron levels were also can get his electrolytes as he is on diuretic therapy.  Patient has significant edema in his feet apparently which is a new problem.  When questioned about use of salt apparently he is using more salt than usual particular on tomatoes.  He has chronic atrial fibrillation he is chronically anticoagulated.  His blood pressure is good he seems to be routinely healing in regard to the hip fracture.  He has an appointment coming up to have a cystoscopy to look at his bladder in the future.

## 2021-09-21 ENCOUNTER — TELEPHONE (OUTPATIENT)
Dept: INTERNAL MEDICINE | Facility: CLINIC | Age: 86
End: 2021-09-21

## 2021-09-21 LAB
BASOPHILS # BLD AUTO: 0.04 10*3/MM3 (ref 0–0.2)
BASOPHILS NFR BLD AUTO: 0.6 % (ref 0–1.5)
BUN SERPL-MCNC: 15 MG/DL (ref 8–23)
BUN/CREAT SERPL: 17 (ref 7–25)
CALCIUM SERPL-MCNC: 10.1 MG/DL (ref 8.6–10.5)
CHLORIDE SERPL-SCNC: 103 MMOL/L (ref 98–107)
CO2 SERPL-SCNC: 28.2 MMOL/L (ref 22–29)
CREAT SERPL-MCNC: 0.88 MG/DL (ref 0.76–1.27)
EOSINOPHIL # BLD AUTO: 0.18 10*3/MM3 (ref 0–0.4)
EOSINOPHIL NFR BLD AUTO: 2.5 % (ref 0.3–6.2)
ERYTHROCYTE [DISTWIDTH] IN BLOOD BY AUTOMATED COUNT: 16.1 % (ref 12.3–15.4)
GLUCOSE SERPL-MCNC: 101 MG/DL (ref 65–99)
HCT VFR BLD AUTO: 31.5 % (ref 37.5–51)
HGB BLD-MCNC: 10 G/DL (ref 13–17.7)
IMM GRANULOCYTES # BLD AUTO: 0.06 10*3/MM3 (ref 0–0.05)
IMM GRANULOCYTES NFR BLD AUTO: 0.8 % (ref 0–0.5)
IRON SATN MFR SERPL: 14 % (ref 20–50)
IRON SERPL-MCNC: 38 MCG/DL (ref 59–158)
LYMPHOCYTES # BLD AUTO: 1.1 10*3/MM3 (ref 0.7–3.1)
LYMPHOCYTES NFR BLD AUTO: 15.4 % (ref 19.6–45.3)
MCH RBC QN AUTO: 29.8 PG (ref 26.6–33)
MCHC RBC AUTO-ENTMCNC: 31.7 G/DL (ref 31.5–35.7)
MCV RBC AUTO: 93.8 FL (ref 79–97)
MONOCYTES # BLD AUTO: 0.41 10*3/MM3 (ref 0.1–0.9)
MONOCYTES NFR BLD AUTO: 5.7 % (ref 5–12)
NEUTROPHILS # BLD AUTO: 5.36 10*3/MM3 (ref 1.7–7)
NEUTROPHILS NFR BLD AUTO: 75 % (ref 42.7–76)
NRBC BLD AUTO-RTO: 0.1 /100 WBC (ref 0–0.2)
PLATELET # BLD AUTO: 256 10*3/MM3 (ref 140–450)
POTASSIUM SERPL-SCNC: 4 MMOL/L (ref 3.5–5.2)
RBC # BLD AUTO: 3.36 10*6/MM3 (ref 4.14–5.8)
SODIUM SERPL-SCNC: 142 MMOL/L (ref 136–145)
TIBC SERPL-MCNC: 267 MCG/DL
UIBC SERPL-MCNC: 229 MCG/DL (ref 112–346)
WBC # BLD AUTO: 7.15 10*3/MM3 (ref 3.4–10.8)

## 2021-09-21 NOTE — TELEPHONE ENCOUNTER
----- Message from Jarad Alexis MD sent at 9/21/2021  7:43 AM CDT -----  His anemia is improving however his iron levels are still low needs to continue on ferrous sulfate 3 times daily will usually take about 6 months.

## 2021-09-30 ENCOUNTER — OFFICE VISIT (OUTPATIENT)
Dept: INTERNAL MEDICINE | Facility: CLINIC | Age: 86
End: 2021-09-30

## 2021-09-30 VITALS
HEIGHT: 72 IN | OXYGEN SATURATION: 98 % | HEART RATE: 105 BPM | SYSTOLIC BLOOD PRESSURE: 124 MMHG | DIASTOLIC BLOOD PRESSURE: 66 MMHG | WEIGHT: 167 LBS | TEMPERATURE: 97.1 F | BODY MASS INDEX: 22.62 KG/M2

## 2021-09-30 DIAGNOSIS — R60.0 BILATERAL LOWER EXTREMITY EDEMA: Primary | ICD-10-CM

## 2021-09-30 PROCEDURE — 99213 OFFICE O/P EST LOW 20 MIN: CPT | Performed by: NURSE PRACTITIONER

## 2021-09-30 RX ORDER — DOCUSATE SODIUM 100 MG/1
100 CAPSULE, LIQUID FILLED ORAL DAILY
Status: ON HOLD | COMMUNITY
End: 2022-01-19

## 2021-09-30 RX ORDER — FERROUS SULFATE 325(65) MG
325 TABLET ORAL
Status: ON HOLD | COMMUNITY
End: 2022-01-19

## 2021-09-30 NOTE — PROGRESS NOTES
Subjective   Dexter Suggs is a 87 y.o. male.   Chief Complaint   Patient presents with   • Foot Swelling     Both, ankles. Left worse than right       Dexter presents today for complaints of lower ankle and feet swelling that has been present for a few weeks now.  About 6 weeks ago he fractured his left ankle and was subsequently in a boot.  He recently had a total hip replacement to the right after a hip fracture.  He had recent evaluation completed by Dr. Alexis and had swelling at that time and was advised to avoid added sodium.  He states the swelling stayed about the same after reducing some of the sodium intake but started to worsen last night.  He is working with physical therapy right now and his last session was yesterday with a weight outside.  He continues to be up and walking frequently and states when he is sitting he tries to keep the foot elevated with his ankle with his knee.  He denies any pain, numbness or tingling.  He is on chronic anticoagulation therapy for A. fib.  He denies any shortness of breath chest pain or palpitations.  No recent medication changes.  He continues on his triamterene-HCTZ daily.       The following portions of the patient's history were reviewed and updated as appropriate: allergies, current medications, past family history, past medical history, past social history, past surgical history and problem list.    Review of Systems   Constitutional: Negative for activity change, appetite change, fatigue, fever, unexpected weight gain and unexpected weight loss.   HENT: Negative for swollen glands, trouble swallowing and voice change.    Eyes: Negative for blurred vision and visual disturbance.   Respiratory: Negative for cough and shortness of breath.    Cardiovascular: Positive for leg swelling. Negative for chest pain and palpitations.   Gastrointestinal: Negative for abdominal pain, constipation, diarrhea, nausea, vomiting and indigestion.   Endocrine: Negative for cold  intolerance, heat intolerance, polydipsia and polyphagia.   Genitourinary: Negative for dysuria and frequency.   Musculoskeletal: Negative for arthralgias, back pain, joint swelling and neck pain.   Skin: Negative for color change, rash and skin lesions.   Neurological: Negative for dizziness, weakness, headache, memory problem and confusion.   Hematological: Does not bruise/bleed easily.   Psychiatric/Behavioral: Negative for agitation, hallucinations and suicidal ideas. The patient is not nervous/anxious.        Objective   Past Medical History:   Diagnosis Date   • Abnormal nuclear stress test    • BPH (benign prostatic hyperplasia)    • Chest pain    • Coronary artery disease    • Disease of thyroid gland    • Hyperlipidemia    • Hypertension    • LV dysfunction    • Melanoma (CMS/HCC)    • Prostate CA (CMS/HCC)       Past Surgical History:   Procedure Laterality Date   • APPENDECTOMY     • CARDIAC CATHETERIZATION Left 12/10/2012   • CARDIAC ELECTROPHYSIOLOGY PROCEDURE N/A 11/23/2018    Procedure: Pacemaker DC new 11/23/2018;  Surgeon: Austin Granados MD;  Location: [facility] CATH INVASIVE LOCATION;  Service: Cardiology   • CHOLECYSTECTOMY     • COLONOSCOPY N/A 6/9/2017    Procedure: COLONOSCOPY WITH ANESTHESIA;  Surgeon: Felice Marroquin MD;  Location: [facility] ENDOSCOPY;  Service:    • COLONOSCOPY W/ POLYPECTOMY  02/16/2012    adenomatous polyp at 80cm   • HAND SURGERY Right    • HERNIA REPAIR     • INGUINAL HERNIA REPAIR     • KNEE SURGERY     • PROSTATE SURGERY     • SKIN CANCER EXCISION      face   • SKIN LESION EXCISION     • TOTAL HIP ARTHROPLASTY Right 8/27/2021    Procedure: TOTAL HIP REPLACEMENT;  Surgeon: Arik Magaña MD;  Location: [facility] OR;  Service: Orthopedics;  Laterality: Right;        Current Outpatient Medications:   •  allopurinol (ZYLOPRIM) 300 MG tablet, TAKE 1 TABLET BY MOUTH EVERY DAY , Disp: 90 tablet, Rfl: 3  •  aspirin 81 MG EC tablet, Take 81 mg by mouth Daily., Disp: , Rfl:   •   docusate sodium (COLACE) 100 MG capsule, Take 100 mg by mouth Daily., Disp: , Rfl:   •  Easy Touch Lancets 30G/Twist misc, TEST DAILY AS DIRECTED , Disp: 100 each, Rfl: 3  •  Eliquis 5 MG tablet tablet, TAKE 1 TABLET BY MOUTH EVERY 12 HOURS , Disp: 180 tablet, Rfl: 4  •  ferrous sulfate 325 (65 FE) MG tablet, Take 325 mg by mouth 3 (Three) Times a Day With Meals., Disp: , Rfl:   •  fluticasone (FLONASE) 50 MCG/ACT nasal spray, 1 spray into the nostril(s) as directed by provider Daily As Needed., Disp: , Rfl:   •  folic acid (FOLVITE) 1 MG tablet, Take 1 mg by mouth Daily., Disp: , Rfl:   •  levothyroxine (SYNTHROID, LEVOTHROID) 75 MCG tablet, TAKE 1 TABLET BY MOUTH EVERY DAY , Disp: 90 tablet, Rfl: 3  •  LORazepam (ATIVAN) 1 MG tablet, TAKE 1 TABLET BY MOUTH EVERY DAY , Disp: 90 tablet, Rfl: 1  •  methotrexate 2.5 MG tablet, Take 17.5 mg by mouth 1 (One) Time Per Week., Disp: , Rfl:   •  metoprolol succinate XL (TOPROL-XL) 25 MG 24 hr tablet, TAKE 1 TABLET BY MOUTH EVERY DAY , Disp: 90 tablet, Rfl: 3  •  nitroglycerin (NITROSTAT) 0.4 MG SL tablet, DISSOLVE 1 UNDER TONGUE AS NEEDED FOR CHEST PAIN, MAY REPEAT TWICE IN 15 MIN PERIOD., Disp: 30 tablet, Rfl: 3  •  pantoprazole (PROTONIX) 40 MG EC tablet, TAKE 1 TABLET BY MOUTH EVERY DAY , Disp: 90 tablet, Rfl: 3  •  predniSONE (DELTASONE) 5 MG tablet, Take 5 mg by mouth Daily., Disp: , Rfl:   •  triamterene-hydrochlorothiazide (MAXZIDE) 75-50 MG per tablet, TAKE 1/2 TABLET BY MOUTH EVERY DAY , Disp: 45 tablet, Rfl: 3  •  True Metrix Blood Glucose Test test strip, TEST DAILY AS DIRECTED , Disp: 300 each, Rfl: 3    Current Facility-Administered Medications:   •  cyanocobalamin injection 1,000 mcg, 1,000 mcg, Intramuscular, Q28 Days, Jarad Alexis MD, 1,000 mcg at 09/20/21 1137      Vitals:    09/30/21 1104   BP: 124/66   Pulse: 105   Temp: 97.1 °F (36.2 °C)   SpO2: 98%         09/30/21  1104   Weight: 75.8 kg (167 lb)       Body mass index is 22.65 kg/m².    Physical  Exam  Constitutional:       General: He is not in acute distress.     Appearance: He is well-developed.   HENT:      Head: Normocephalic.      Right Ear: External ear normal.      Left Ear: External ear normal.      Mouth/Throat:      Mouth: No oral lesions.   Eyes:      Conjunctiva/sclera: Conjunctivae normal.   Cardiovascular:      Rate and Rhythm: Normal rate. Rhythm irregular.      Pulses: Normal pulses.      Heart sounds: Normal heart sounds.      Comments: Edema is located in the bilateral ankles and feet.  No redness or increased warmth; no breaks in the skin or weeping noted.  Mild tenderness with palpation over the left inner ankle.    Pulmonary:      Effort: Pulmonary effort is normal.      Breath sounds: Normal breath sounds.   Musculoskeletal:      Right lower le+ Edema present.      Left lower leg: 3+ Edema present.   Skin:     General: Skin is warm and dry.   Neurological:      Mental Status: He is alert and oriented to person, place, and time.      Gait: Gait normal.   Psychiatric:         Mood and Affect: Mood normal.         Speech: Speech normal.         Behavior: Behavior normal.         Thought Content: Thought content normal.               Assessment/Plan   Diagnoses and all orders for this visit:    1. Bilateral lower extremity edema (Primary)  -     Compression Stockings    Heart rate was rechecked and was down to 74 and 75.  I further discussed sodium limitations.  He has been eating bags of chips and consuming soda.  Discussed hidden sources of sodium such as canned foods frozen and boxed foods.  Advised to increase more fresh foods.  Encouraged him to keep his feet elevated when sitting.  We will send him some compression hose he can apply in the morning and before bedtime.  With him having a fracture to the left ankle previously I suspect that his swelling is likely related to decreased activity with physical therapy.  We will try continued sodium reduction and compression hose at this  time and will consider increasing his diuretic if this does not improve his symptoms.  Recent BMP was completed has been reviewed

## 2021-10-18 ENCOUNTER — PROCEDURE VISIT (OUTPATIENT)
Dept: UROLOGY | Facility: CLINIC | Age: 86
End: 2021-10-18

## 2021-10-18 DIAGNOSIS — C61 MALIGNANT NEOPLASM OF PROSTATE (HCC): Primary | ICD-10-CM

## 2021-10-18 LAB
BILIRUB BLD-MCNC: NEGATIVE MG/DL
CLARITY, POC: CLEAR
COLOR UR: YELLOW
GLUCOSE UR STRIP-MCNC: NEGATIVE MG/DL
KETONES UR QL: NEGATIVE
LEUKOCYTE EST, POC: NEGATIVE
NITRITE UR-MCNC: NEGATIVE MG/ML
PH UR: 5.5 [PH] (ref 5–8)
PROT UR STRIP-MCNC: NEGATIVE MG/DL
RBC # UR STRIP: NEGATIVE /UL
SP GR UR: 1.01 (ref 1–1.03)
UROBILINOGEN UR QL: NORMAL

## 2021-10-18 PROCEDURE — 52000 CYSTOURETHROSCOPY: CPT | Performed by: UROLOGY

## 2021-10-18 PROCEDURE — 81001 URINALYSIS AUTO W/SCOPE: CPT | Performed by: UROLOGY

## 2021-10-18 NOTE — PROGRESS NOTES
Pre- operative diagnosis:  Hematuria on Eliquis when he was in the hospital in August with hip fracture now resolved.  His Martinez catheter was removed while in the hospital.  He did have radiation therapy approximately 16 years ago for Ebonie 6 prostate cancer.  He states he is voiding well without current LUTS.  CT pelvis in August demonstrated grossly normal bladder with enlarged prostate.    Post operative diagnosis:  Enlarged prostate.    Procedure:  The patient was prepped and draped in a normal sterile fashion.  The urethra was anesthetized with 2% lidocaine jelly.  A flexible cystoscope was introduced per urethra.      Urethra:  Normal    Bladder:  Normal mucosa and mild trabeculation    Ureteral orifices:  Normal position bilaterally and Clear efflux bilaterally    Prostate:  lateral lobe hypertrophy-with kissing lateral lobes    Patient tolerated the procedure well    Complications: none    Blood loss: minimal    Follow up:    Routine follow up-in 6 months with preclinic renal ultrasound.  Consider finasteride as needed in the future for his enlarged prostate.  We discussed the likely prostatic source of prior hematuria.

## 2021-10-19 ENCOUNTER — OFFICE VISIT (OUTPATIENT)
Dept: INTERNAL MEDICINE | Facility: CLINIC | Age: 86
End: 2021-10-19

## 2021-10-19 VITALS
HEIGHT: 72 IN | BODY MASS INDEX: 22.62 KG/M2 | HEART RATE: 83 BPM | WEIGHT: 167 LBS | OXYGEN SATURATION: 98 % | TEMPERATURE: 97.9 F | SYSTOLIC BLOOD PRESSURE: 132 MMHG | DIASTOLIC BLOOD PRESSURE: 68 MMHG

## 2021-10-19 DIAGNOSIS — I10 HTN (HYPERTENSION), BENIGN: Primary | ICD-10-CM

## 2021-10-19 DIAGNOSIS — R73.01 IMPAIRED FASTING GLUCOSE: ICD-10-CM

## 2021-10-19 DIAGNOSIS — N40.1 BENIGN PROSTATIC HYPERPLASIA WITH LOWER URINARY TRACT SYMPTOMS, SYMPTOM DETAILS UNSPECIFIED: ICD-10-CM

## 2021-10-19 PROCEDURE — 99214 OFFICE O/P EST MOD 30 MIN: CPT | Performed by: INTERNAL MEDICINE

## 2021-10-19 PROCEDURE — 96372 THER/PROPH/DIAG INJ SC/IM: CPT | Performed by: INTERNAL MEDICINE

## 2021-10-19 RX ADMIN — CYANOCOBALAMIN 1000 MCG: 1000 INJECTION, SOLUTION INTRAMUSCULAR; SUBCUTANEOUS at 14:00

## 2021-10-19 NOTE — PROGRESS NOTES
Subjective   Dexter Suggs is a 87 y.o. male.   Chief Complaint   Patient presents with   • Follow-up     saw guillermina on 09/30/2021; has been using compression socks.  after a few days the swelling went down and has stayed down.    • muscle development     wife is worried about muscle development in his legs since surgery        History of Present Illness patient is here for follow-up of recent right hip fracture he is now walking with a walker.  He does have some muscle weakness his wife is present with him to discuss therapy.    The following portions of the patient's history were reviewed and updated as appropriate: allergies, current medications, past family history, past medical history, past social history, past surgical history and problem list.    Review of Systems   Constitutional: Negative for activity change, appetite change, fatigue, fever, unexpected weight gain and unexpected weight loss.   HENT: Negative for swollen glands, trouble swallowing and voice change.    Eyes: Negative for blurred vision and visual disturbance.   Respiratory: Negative for cough and shortness of breath.    Cardiovascular: Negative for chest pain, palpitations and leg swelling.   Gastrointestinal: Negative for abdominal pain, constipation, diarrhea, nausea, vomiting and indigestion.   Endocrine: Negative for cold intolerance, heat intolerance, polydipsia and polyphagia.   Genitourinary: Negative for dysuria and frequency.   Musculoskeletal: Negative for arthralgias, back pain, joint swelling and neck pain.   Skin: Negative for color change, rash and skin lesions.   Neurological: Negative for dizziness, weakness, headache, memory problem and confusion.   Hematological: Does not bruise/bleed easily.   Psychiatric/Behavioral: Negative for agitation, hallucinations and suicidal ideas. The patient is not nervous/anxious.        Objective   Past Medical History:   Diagnosis Date   • Abnormal nuclear stress test    • BPH (benign  prostatic hyperplasia)    • Chest pain    • Coronary artery disease    • Disease of thyroid gland    • Hyperlipidemia    • Hypertension    • LV dysfunction    • Melanoma (HCC)    • Prostate CA (HCC)       Past Surgical History:   Procedure Laterality Date   • APPENDECTOMY     • CARDIAC CATHETERIZATION Left 12/10/2012   • CARDIAC ELECTROPHYSIOLOGY PROCEDURE N/A 11/23/2018    Procedure: Pacemaker DC new 11/23/2018;  Surgeon: Austin Granados MD;  Location: [facility] CATH INVASIVE LOCATION;  Service: Cardiology   • CHOLECYSTECTOMY     • COLONOSCOPY N/A 6/9/2017    Procedure: COLONOSCOPY WITH ANESTHESIA;  Surgeon: Felice Marroquin MD;  Location: [facility] ENDOSCOPY;  Service:    • COLONOSCOPY W/ POLYPECTOMY  02/16/2012    adenomatous polyp at 80cm   • HAND SURGERY Right    • HERNIA REPAIR     • INGUINAL HERNIA REPAIR     • KNEE SURGERY     • PROSTATE SURGERY     • SKIN CANCER EXCISION      face   • SKIN LESION EXCISION     • TOTAL HIP ARTHROPLASTY Right 8/27/2021    Procedure: TOTAL HIP REPLACEMENT;  Surgeon: Arik Magaña MD;  Location: [facility] OR;  Service: Orthopedics;  Laterality: Right;        Current Outpatient Medications:   •  allopurinol (ZYLOPRIM) 300 MG tablet, TAKE 1 TABLET BY MOUTH EVERY DAY , Disp: 90 tablet, Rfl: 3  •  aspirin 81 MG EC tablet, Take 81 mg by mouth Daily., Disp: , Rfl:   •  docusate sodium (COLACE) 100 MG capsule, Take 100 mg by mouth Daily., Disp: , Rfl:   •  Easy Touch Lancets 30G/Twist misc, TEST DAILY AS DIRECTED , Disp: 100 each, Rfl: 3  •  Eliquis 5 MG tablet tablet, TAKE 1 TABLET BY MOUTH EVERY 12 HOURS , Disp: 180 tablet, Rfl: 4  •  ferrous sulfate 325 (65 FE) MG tablet, Take 325 mg by mouth 3 (Three) Times a Day With Meals., Disp: , Rfl:   •  fluticasone (FLONASE) 50 MCG/ACT nasal spray, 1 spray into the nostril(s) as directed by provider Daily As Needed., Disp: , Rfl:   •  folic acid (FOLVITE) 1 MG tablet, Take 1 mg by mouth Daily., Disp: , Rfl:   •  levothyroxine (SYNTHROID,  LEVOTHROID) 75 MCG tablet, TAKE 1 TABLET BY MOUTH EVERY DAY , Disp: 90 tablet, Rfl: 3  •  LORazepam (ATIVAN) 1 MG tablet, TAKE 1 TABLET BY MOUTH EVERY DAY , Disp: 90 tablet, Rfl: 1  •  methotrexate 2.5 MG tablet, Take 17.5 mg by mouth 1 (One) Time Per Week., Disp: , Rfl:   •  metoprolol succinate XL (TOPROL-XL) 25 MG 24 hr tablet, TAKE 1 TABLET BY MOUTH EVERY DAY , Disp: 90 tablet, Rfl: 3  •  nitroglycerin (NITROSTAT) 0.4 MG SL tablet, DISSOLVE 1 UNDER TONGUE AS NEEDED FOR CHEST PAIN, MAY REPEAT TWICE IN 15 MIN PERIOD., Disp: 30 tablet, Rfl: 3  •  pantoprazole (PROTONIX) 40 MG EC tablet, TAKE 1 TABLET BY MOUTH EVERY DAY , Disp: 90 tablet, Rfl: 3  •  predniSONE (DELTASONE) 5 MG tablet, Take 5 mg by mouth Daily., Disp: , Rfl:   •  triamterene-hydrochlorothiazide (MAXZIDE) 75-50 MG per tablet, TAKE 1/2 TABLET BY MOUTH EVERY DAY , Disp: 45 tablet, Rfl: 3  •  True Metrix Blood Glucose Test test strip, TEST DAILY AS DIRECTED , Disp: 300 each, Rfl: 3    Current Facility-Administered Medications:   •  cyanocobalamin injection 1,000 mcg, 1,000 mcg, Intramuscular, Q28 Days, Jarad Alexis MD, 1,000 mcg at 09/20/21 1137      Vitals:    10/19/21 1321   BP: 132/68   Pulse: 83   Temp: 97.9 °F (36.6 °C)   SpO2: 98%         10/19/21  1321   Weight: 75.8 kg (167 lb)       Body mass index is 22.65 kg/m².    Physical Exam  Vitals and nursing note reviewed.   Constitutional:       General: He is not in acute distress.     Appearance: Normal appearance. He is well-developed.   HENT:      Head: Normocephalic and atraumatic.      Right Ear: External ear normal.      Left Ear: External ear normal.      Nose: Nose normal.   Eyes:      Extraocular Movements: Extraocular movements intact.      Conjunctiva/sclera: Conjunctivae normal.      Pupils: Pupils are equal, round, and reactive to light.   Cardiovascular:      Rate and Rhythm: Normal rate and regular rhythm.      Pulses: Normal pulses.      Heart sounds: Normal heart sounds.    Pulmonary:      Effort: Pulmonary effort is normal.      Breath sounds: Normal breath sounds.   Abdominal:      General: Bowel sounds are normal.      Palpations: Abdomen is soft.   Musculoskeletal:         General: Normal range of motion.      Cervical back: Normal range of motion and neck supple. No rigidity. No muscular tenderness.   Skin:     General: Skin is warm and dry.   Neurological:      General: No focal deficit present.      Mental Status: He is alert and oriented to person, place, and time.   Psychiatric:         Mood and Affect: Mood normal.         Behavior: Behavior normal.               Assessment/Plan   Diagnoses and all orders for this visit:    1. HTN (hypertension), benign (Primary)    2. Impaired fasting glucose    3. Benign prostatic hyperplasia with lower urinary tract symptoms, symptom details unspecified        Patient is here to follow-up with his therapy demonstrated some exercises that he can do while sitting in a chair I encouraged him to try exercising best standing up from seated position.  Also his sugar his blood pressure seem to be doing fine I will see him back for his routine follow-up.

## 2021-11-08 RX ORDER — ALLOPURINOL 300 MG/1
TABLET ORAL
Qty: 90 TABLET | Refills: 0 | Status: ON HOLD | OUTPATIENT
Start: 2021-11-08 | End: 2022-01-19

## 2021-11-23 RX ORDER — TRIAMTERENE AND HYDROCHLOROTHIAZIDE 75; 50 MG/1; MG/1
TABLET ORAL
Qty: 45 TABLET | Refills: 0 | Status: ON HOLD | OUTPATIENT
Start: 2021-11-23 | End: 2022-01-19

## 2021-11-24 ENCOUNTER — OFFICE VISIT (OUTPATIENT)
Dept: INTERNAL MEDICINE | Facility: CLINIC | Age: 86
End: 2021-11-24

## 2021-11-24 VITALS
TEMPERATURE: 97.9 F | DIASTOLIC BLOOD PRESSURE: 62 MMHG | SYSTOLIC BLOOD PRESSURE: 118 MMHG | WEIGHT: 173 LBS | BODY MASS INDEX: 23.43 KG/M2 | HEIGHT: 72 IN | OXYGEN SATURATION: 99 % | HEART RATE: 72 BPM

## 2021-11-24 DIAGNOSIS — E06.3 HYPOTHYROIDISM DUE TO HASHIMOTO'S THYROIDITIS: ICD-10-CM

## 2021-11-24 DIAGNOSIS — E03.8 HYPOTHYROIDISM DUE TO HASHIMOTO'S THYROIDITIS: ICD-10-CM

## 2021-11-24 DIAGNOSIS — R73.01 IMPAIRED FASTING GLUCOSE: ICD-10-CM

## 2021-11-24 DIAGNOSIS — D51.0 PERNICIOUS ANEMIA: ICD-10-CM

## 2021-11-24 DIAGNOSIS — I10 ESSENTIAL HYPERTENSION: Primary | ICD-10-CM

## 2021-11-24 DIAGNOSIS — E78.2 MIXED HYPERLIPIDEMIA: ICD-10-CM

## 2021-11-24 LAB — HBA1C MFR BLD: 5.7 %

## 2021-11-24 PROCEDURE — 3044F HG A1C LEVEL LT 7.0%: CPT | Performed by: INTERNAL MEDICINE

## 2021-11-24 PROCEDURE — 1126F AMNT PAIN NOTED NONE PRSNT: CPT | Performed by: INTERNAL MEDICINE

## 2021-11-24 PROCEDURE — 1170F FXNL STATUS ASSESSED: CPT | Performed by: INTERNAL MEDICINE

## 2021-11-24 PROCEDURE — 83036 HEMOGLOBIN GLYCOSYLATED A1C: CPT | Performed by: INTERNAL MEDICINE

## 2021-11-24 PROCEDURE — G0439 PPPS, SUBSEQ VISIT: HCPCS | Performed by: INTERNAL MEDICINE

## 2021-11-24 PROCEDURE — 99214 OFFICE O/P EST MOD 30 MIN: CPT | Performed by: INTERNAL MEDICINE

## 2021-11-24 PROCEDURE — 36416 COLLJ CAPILLARY BLOOD SPEC: CPT | Performed by: INTERNAL MEDICINE

## 2021-11-24 PROCEDURE — 96372 THER/PROPH/DIAG INJ SC/IM: CPT | Performed by: INTERNAL MEDICINE

## 2021-11-24 PROCEDURE — 1159F MED LIST DOCD IN RCRD: CPT | Performed by: INTERNAL MEDICINE

## 2021-11-24 RX ORDER — NITROGLYCERIN 0.4 MG/1
0.4 TABLET SUBLINGUAL
Qty: 25 TABLET | Refills: 13 | Status: ON HOLD | OUTPATIENT
Start: 2021-11-24 | End: 2022-01-19

## 2021-11-24 RX ORDER — CLOTRIMAZOLE AND BETAMETHASONE DIPROPIONATE 10; .64 MG/G; MG/G
1 CREAM TOPICAL 2 TIMES DAILY
Qty: 45 G | Refills: 0 | Status: SHIPPED | OUTPATIENT
Start: 2021-11-24 | End: 2022-01-13 | Stop reason: SDUPTHER

## 2021-11-24 RX ADMIN — CYANOCOBALAMIN 1000 MCG: 1000 INJECTION, SOLUTION INTRAMUSCULAR; SUBCUTANEOUS at 09:01

## 2021-11-24 NOTE — PROGRESS NOTES
Subjective   Dexter Suggs is a 87 y.o. male.   Chief Complaint   Patient presents with   • Medicare Wellness-subsequent     a1c: 5.7       History of Present Illness     The following portions of the patient's history were reviewed and updated as appropriate: allergies, current medications, past family history, past medical history, past social history, past surgical history and problem list.    Review of Systems    Objective   Past Medical History:   Diagnosis Date   • Abnormal nuclear stress test    • BPH (benign prostatic hyperplasia)    • Chest pain    • Coronary artery disease    • Disease of thyroid gland    • Hyperlipidemia    • Hypertension    • LV dysfunction    • Melanoma (HCC)    • Prostate CA (HCC)       Past Surgical History:   Procedure Laterality Date   • APPENDECTOMY     • CARDIAC CATHETERIZATION Left 12/10/2012   • CARDIAC ELECTROPHYSIOLOGY PROCEDURE N/A 11/23/2018    Procedure: Pacemaker DC new 11/23/2018;  Surgeon: Austin Granados MD;  Location: [facility] CATH INVASIVE LOCATION;  Service: Cardiology   • CHOLECYSTECTOMY     • COLONOSCOPY N/A 6/9/2017    Procedure: COLONOSCOPY WITH ANESTHESIA;  Surgeon: Felice Marroquin MD;  Location: [facility] ENDOSCOPY;  Service:    • COLONOSCOPY W/ POLYPECTOMY  02/16/2012    adenomatous polyp at 80cm   • HAND SURGERY Right    • HERNIA REPAIR     • INGUINAL HERNIA REPAIR     • KNEE SURGERY     • PROSTATE SURGERY     • SKIN CANCER EXCISION      face   • SKIN LESION EXCISION     • TOTAL HIP ARTHROPLASTY Right 8/27/2021    Procedure: TOTAL HIP REPLACEMENT;  Surgeon: Arik Magaña MD;  Location: [facility] OR;  Service: Orthopedics;  Laterality: Right;        Current Outpatient Medications:   •  allopurinol (ZYLOPRIM) 300 MG tablet, TAKE 1 TABLET BY MOUTH EVERY DAY, Disp: 90 tablet, Rfl: 0  •  aspirin 81 MG EC tablet, Take 81 mg by mouth Daily., Disp: , Rfl:   •  docusate sodium (COLACE) 100 MG capsule, Take 100 mg by mouth Daily., Disp: , Rfl:   •  Easy Touch Lancets  30G/Twist misc, TEST DAILY AS DIRECTED , Disp: 100 each, Rfl: 3  •  Eliquis 5 MG tablet tablet, TAKE 1 TABLET BY MOUTH EVERY 12 HOURS , Disp: 180 tablet, Rfl: 4  •  ferrous sulfate 325 (65 FE) MG tablet, Take 325 mg by mouth 3 (Three) Times a Day With Meals., Disp: , Rfl:   •  fluticasone (FLONASE) 50 MCG/ACT nasal spray, 1 spray into the nostril(s) as directed by provider Daily As Needed., Disp: , Rfl:   •  folic acid (FOLVITE) 1 MG tablet, Take 1 mg by mouth Daily., Disp: , Rfl:   •  levothyroxine (SYNTHROID, LEVOTHROID) 75 MCG tablet, TAKE 1 TABLET BY MOUTH EVERY DAY , Disp: 90 tablet, Rfl: 3  •  LORazepam (ATIVAN) 1 MG tablet, TAKE 1 TABLET BY MOUTH EVERY DAY , Disp: 90 tablet, Rfl: 1  •  methotrexate 2.5 MG tablet, Take 17.5 mg by mouth 1 (One) Time Per Week., Disp: , Rfl:   •  metoprolol succinate XL (TOPROL-XL) 25 MG 24 hr tablet, TAKE 1 TABLET BY MOUTH EVERY DAY , Disp: 90 tablet, Rfl: 3  •  nitroglycerin (NITROSTAT) 0.4 MG SL tablet, Place 1 tablet under the tongue Every 5 (Five) Minutes As Needed for Chest Pain. Take no more than 3 doses in 15 minutes., Disp: 25 tablet, Rfl: 13  •  pantoprazole (PROTONIX) 40 MG EC tablet, TAKE 1 TABLET BY MOUTH EVERY DAY , Disp: 90 tablet, Rfl: 3  •  predniSONE (DELTASONE) 5 MG tablet, Take 5 mg by mouth Daily., Disp: , Rfl:   •  triamterene-hydrochlorothiazide (MAXZIDE) 75-50 MG per tablet, TAKE 1/2 TABLET BY MOUTH EVERY DAY, Disp: 45 tablet, Rfl: 0  •  True Metrix Blood Glucose Test test strip, TEST DAILY AS DIRECTED , Disp: 300 each, Rfl: 3    Current Facility-Administered Medications:   •  cyanocobalamin injection 1,000 mcg, 1,000 mcg, Intramuscular, Q28 Days, Jarad Alexis MD, 1,000 mcg at 10/19/21 1400      Vitals:    11/24/21 0851   BP: 118/62   Pulse: 72   Temp: 97.9 °F (36.6 °C)   SpO2: 99%         11/24/21  0851   Weight: 78.5 kg (173 lb)       Body mass index is 23.46 kg/m².    Physical Exam          Assessment/Plan   Diagnoses and all orders for this  visit:    1. Essential hypertension (Primary)  -     Comprehensive Metabolic Panel  -     Urinalysis With Microscopic - Urine, Clean Catch  -     Uric Acid    2. Mixed hyperlipidemia  -     TSH  -     Lipid Panel    3. Hypothyroidism due to Hashimoto's thyroiditis    4. Pernicious anemia  -     CBC & Differential    5. Impaired fasting glucose  -     POC Glycated Hemoglobin, Total

## 2021-11-24 NOTE — PROGRESS NOTES
The ABCs of the Annual Wellness Visit  Subsequent Medicare Wellness Visit    Chief Complaint   Patient presents with   • Medicare Wellness-subsequent     a1c: 5.7      Subjective    History of Present Illness:  Dexter Suggs is a 87 y.o. male who presents for a Subsequent Medicare Wellness Visit.  At today's visit patient is complaining about some buttock irritation    The following portions of the patient's history were reviewed and   updated as appropriate: allergies, current medications, past family history, past medical history, past social history, past surgical history and problem list.    Compared to one year ago, the patient feels his physical   health is the same.    Compared to one year ago, the patient feels his mental   health is the same.    Recent Hospitalizations:  This patient has had a [facility], [place], operated by [facility] admission record on file within the last 365 days.    Current Medical Providers:  Patient Care Team:  Jarad Alexis MD as PCP - General  Jarad Alexis MD as PCP - Family Medicine  Austin Granados MD as Cardiologist (Cardiology)  Chi Hunter MD as Consulting Physician (Urology)    Outpatient Medications Prior to Visit   Medication Sig Dispense Refill   • allopurinol (ZYLOPRIM) 300 MG tablet TAKE 1 TABLET BY MOUTH EVERY DAY 90 tablet 0   • aspirin 81 MG EC tablet Take 81 mg by mouth Daily.     • docusate sodium (COLACE) 100 MG capsule Take 100 mg by mouth Daily.     • Easy Touch Lancets 30G/Twist misc TEST DAILY AS DIRECTED  100 each 3   • Eliquis 5 MG tablet tablet TAKE 1 TABLET BY MOUTH EVERY 12 HOURS  180 tablet 4   • ferrous sulfate 325 (65 FE) MG tablet Take 325 mg by mouth 3 (Three) Times a Day With Meals.     • fluticasone (FLONASE) 50 MCG/ACT nasal spray 1 spray into the nostril(s) as directed by provider Daily As Needed.     • folic acid (FOLVITE) 1 MG tablet Take 1 mg by mouth Daily.     • levothyroxine (SYNTHROID, LEVOTHROID) 75 MCG tablet TAKE 1 TABLET BY MOUTH EVERY DAY  90  tablet 3   • LORazepam (ATIVAN) 1 MG tablet TAKE 1 TABLET BY MOUTH EVERY DAY  90 tablet 1   • methotrexate 2.5 MG tablet Take 17.5 mg by mouth 1 (One) Time Per Week.     • metoprolol succinate XL (TOPROL-XL) 25 MG 24 hr tablet TAKE 1 TABLET BY MOUTH EVERY DAY  90 tablet 3   • nitroglycerin (NITROSTAT) 0.4 MG SL tablet Place 1 tablet under the tongue Every 5 (Five) Minutes As Needed for Chest Pain. Take no more than 3 doses in 15 minutes. 25 tablet 13   • pantoprazole (PROTONIX) 40 MG EC tablet TAKE 1 TABLET BY MOUTH EVERY DAY  90 tablet 3   • predniSONE (DELTASONE) 5 MG tablet Take 5 mg by mouth Daily.     • triamterene-hydrochlorothiazide (MAXZIDE) 75-50 MG per tablet TAKE 1/2 TABLET BY MOUTH EVERY DAY 45 tablet 0   • True Metrix Blood Glucose Test test strip TEST DAILY AS DIRECTED  300 each 3     Facility-Administered Medications Prior to Visit   Medication Dose Route Frequency Provider Last Rate Last Admin   • cyanocobalamin injection 1,000 mcg  1,000 mcg Intramuscular Q28 Days Jarad Alexis MD   1,000 mcg at 11/24/21 0901       No opioid medication identified on active medication list. I have reviewed chart for other potential  high risk medication/s and harmful drug interactions in the elderly.          Aspirin is on active medication list. Aspirin use is indicated based on review of current medical condition/s. Pros and cons of this therapy have been discussed today. Benefits of this medication outweigh potential harm.  Patient has been encouraged to continue taking this medication.  .      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   • Actinic keratosis   • Benign prostatic hyperplasia with lower urinary tract symptoms   • Complete heart block (HCC)   • Sick sinus  syndrome s/p pacemaker    • Sick sinus syndrome (HCC)   • Laceration of face without complication   • PAF (paroxysmal atrial fibrillation) (CMS/HCC) on Eliquis    • Paroxysmal atrial flutter (HCC)   • Pernicious anemia   • Alkaline phosphatase elevation   • Anemia   • Gastroesophageal reflux disease   • Hypothyroidism due to Hashimoto's thyroiditis   • Impaired fasting glucose   • Overlapping malignant melanoma of skin (HCC)   • Primary osteoarthritis of both hands   • COVID-19 virus infection   • Closed fracture of distal end of radius   • Arthritis of hand   • Closed fracture of right hip (HCC)   • Gross hematuria   • Chronic anticoagulation   • Postoperative anemia due to acute blood loss   • Closed nondisplaced fracture of lesser trochanter of right femur with routine healing     Advance Care Planning  Advance Directive is on file.  ACP discussion was held with the patient during this visit. Patient has an advance directive in EMR which is still valid.     Review of Systems   Constitutional: Negative for appetite change, chills and fever.   HENT: Negative for congestion and ear pain.    Eyes: Negative for pain and discharge.   Respiratory: Negative for cough, chest tightness and shortness of breath.    Cardiovascular: Negative for chest pain, palpitations and leg swelling.   Gastrointestinal: Negative for abdominal distention and abdominal pain.   Endocrine: Negative for cold intolerance.   Genitourinary: Negative for difficulty urinating, dysuria and flank pain.   Musculoskeletal: Negative for arthralgias, back pain and gait problem.   Skin: Positive for rash ( Perianal). Negative for color change.   Neurological: Negative for dizziness and seizures.   Psychiatric/Behavioral: Negative for agitation, decreased concentration and dysphoric mood.        Objective    Vitals:    11/24/21 0851   BP: 118/62   BP Location: Left arm   Patient Position: Sitting   Cuff Size: Adult   Pulse: 72   Temp: 97.9 °F (36.6 °C)  "  TempSrc: Temporal   SpO2: 99%   Weight: 78.5 kg (173 lb)   Height: 182.9 cm (72\")   PainSc: 0-No pain     BMI Readings from Last 1 Encounters:   21 23.46 kg/m²   BMI is within normal parameters. No follow-up required.    Does the patient have evidence of cognitive impairment? No    Physical Exam  Vitals and nursing note reviewed.   Constitutional:       General: He is not in acute distress.     Appearance: Normal appearance. He is well-developed.   HENT:      Head: Normocephalic and atraumatic.      Right Ear: External ear normal.      Left Ear: External ear normal.      Nose: Nose normal.   Eyes:      Extraocular Movements: Extraocular movements intact.      Conjunctiva/sclera: Conjunctivae normal.      Pupils: Pupils are equal, round, and reactive to light.   Cardiovascular:      Rate and Rhythm: Normal rate and regular rhythm.      Pulses: Normal pulses.      Heart sounds: Normal heart sounds.   Pulmonary:      Effort: Pulmonary effort is normal.      Breath sounds: Normal breath sounds.   Abdominal:      General: Bowel sounds are normal.      Palpations: Abdomen is soft.   Genitourinary:         Comments: Redness and irritation  Musculoskeletal:         General: Normal range of motion.      Cervical back: Normal range of motion and neck supple. No rigidity. No muscular tenderness.   Skin:     General: Skin is warm and dry.   Neurological:      General: No focal deficit present.      Mental Status: He is alert and oriented to person, place, and time.   Psychiatric:         Mood and Affect: Mood normal.         Behavior: Behavior normal.       Lab Results   Component Value Date    HGBA1C 5.7 2021            HEALTH RISK ASSESSMENT    Smoking Status:  Social History     Tobacco Use   Smoking Status Former Smoker   • Years: 30.00   • Types: Cigarettes   • Quit date:    • Years since quittin.9   Smokeless Tobacco Never Used     Alcohol Consumption:  Social History     Substance and Sexual " Activity   Alcohol Use No     Fall Risk Screen:    STEADI Fall Risk Assessment was completed, and patient is at MODERATE risk for falls. Assessment completed on:11/24/2021    Depression Screening:  PHQ-2/PHQ-9 Depression Screening 11/24/2021   Little interest or pleasure in doing things 0   Feeling down, depressed, or hopeless 0   Total Score 0       Health Habits and Functional and Cognitive Screening:  Functional & Cognitive Status 11/24/2021   Do you have difficulty preparing food and eating? No   Do you have difficulty bathing yourself, getting dressed or grooming yourself? No   Do you have difficulty using the toilet? No   Do you have difficulty moving around from place to place? No   Do you have trouble with steps or getting out of a bed or a chair? No   Current Diet Well Balanced Diet   Dental Exam Up to date   Eye Exam Up to date   Exercise (times per week) 0 times per week   Current Exercises Include No Regular Exercise   Current Exercise Activities Include -   Do you need help using the phone?  No   Are you deaf or do you have serious difficulty hearing?  Yes   Do you need help with transportation? No   Do you need help shopping? No   Do you need help preparing meals?  No   Do you need help with housework?  No   Do you need help with laundry? No   Do you need help taking your medications? No   Do you need help managing money? No   Do you ever drive or ride in a car without wearing a seat belt? No   Have you felt unusual stress, anger or loneliness in the last month? No   Who do you live with? Spouse   If you need help, do you have trouble finding someone available to you? No   Have you been bothered in the last four weeks by sexual problems? No   Do you have difficulty concentrating, remembering or making decisions? No       Age-appropriate Screening Schedule:  Refer to the list below for future screening recommendations based on patient's age, sex and/or medical conditions. Orders for these recommended  tests are listed in the plan section. The patient has been provided with a written plan.    Health Maintenance   Topic Date Due   • ZOSTER VACCINE (2 of 3) 03/25/2010   • LIPID PANEL  07/02/2021   • URINE MICROALBUMIN  07/02/2021   • HEMOGLOBIN A1C  05/24/2022   • DIABETIC EYE EXAM  07/08/2022   • TDAP/TD VACCINES (2 - Td or Tdap) 03/15/2029   • INFLUENZA VACCINE  Completed              Assessment/Plan   CMS Preventative Services Quick Reference  Risk Factors Identified During Encounter  Immunizations Discussed/Encouraged (specific Immunizations; Shingrix and COVID19  The above risks/problems have been discussed with the patient.  Follow up actions/plans if indicated are seen below in the Assessment/Plan Section.  Pertinent information has been shared with the patient in the After Visit Summary.    Diagnoses and all orders for this visit:    1. Essential hypertension (Primary)  -     Comprehensive Metabolic Panel  -     Urinalysis With Microscopic - Urine, Clean Catch  -     Uric Acid    2. Mixed hyperlipidemia  -     TSH  -     Lipid Panel    3. Hypothyroidism due to Hashimoto's thyroiditis    4. Pernicious anemia  -     CBC & Differential    5. Impaired fasting glucose  -     POC Glycated Hemoglobin, Total    Other orders  -     clotrimazole-betamethasone (Lotrisone) 1-0.05 % cream; Apply 1 application topically to the appropriate area as directed 2 (Two) Times a Day.  Dispense: 45 g; Refill: 0        Follow Up:   Return in about 6 months (around 5/24/2022).     An After Visit Summary and PPPS were made available to the patient.      Patient is here for his annual wellness evaluation he had some concerns about cleavage of his buttock being irritated.  I have gone ahead and given patient Lotrisone cream that should clear him up appears to be a yeast infection.  In addition to his wellness evaluation his blood pressures under good control his sugar seems to be doing fine.

## 2021-11-25 LAB
ALBUMIN SERPL-MCNC: 3.7 G/DL (ref 3.5–5.2)
ALBUMIN/GLOB SERPL: 1.2 G/DL
ALP SERPL-CCNC: 152 U/L (ref 39–117)
ALT SERPL-CCNC: 13 U/L (ref 1–41)
APPEARANCE UR: CLEAR
AST SERPL-CCNC: 19 U/L (ref 1–40)
BACTERIA #/AREA URNS HPF: ABNORMAL /HPF
BASOPHILS # BLD AUTO: 0.08 10*3/MM3 (ref 0–0.2)
BASOPHILS NFR BLD AUTO: 1 % (ref 0–1.5)
BILIRUB SERPL-MCNC: 0.2 MG/DL (ref 0–1.2)
BILIRUB UR QL STRIP: NEGATIVE
BUN SERPL-MCNC: 20 MG/DL (ref 8–23)
BUN/CREAT SERPL: 17.1 (ref 7–25)
CALCIUM SERPL-MCNC: 10.4 MG/DL (ref 8.6–10.5)
CASTS URNS MICRO: ABNORMAL
CHLORIDE SERPL-SCNC: 99 MMOL/L (ref 98–107)
CHOLEST SERPL-MCNC: 158 MG/DL (ref 0–200)
CO2 SERPL-SCNC: 28.3 MMOL/L (ref 22–29)
COLOR UR: YELLOW
CREAT SERPL-MCNC: 1.17 MG/DL (ref 0.76–1.27)
CRYSTALS URNS MICRO: ABNORMAL
EOSINOPHIL # BLD AUTO: 0.44 10*3/MM3 (ref 0–0.4)
EOSINOPHIL NFR BLD AUTO: 5.7 % (ref 0.3–6.2)
EPI CELLS #/AREA URNS HPF: ABNORMAL /HPF
ERYTHROCYTE [DISTWIDTH] IN BLOOD BY AUTOMATED COUNT: 15.9 % (ref 12.3–15.4)
GLOBULIN SER CALC-MCNC: 3.1 GM/DL
GLUCOSE SERPL-MCNC: 122 MG/DL (ref 65–99)
GLUCOSE UR QL: NEGATIVE
HCT VFR BLD AUTO: 33.5 % (ref 37.5–51)
HDLC SERPL-MCNC: 43 MG/DL (ref 40–60)
HGB BLD-MCNC: 11.2 G/DL (ref 13–17.7)
HGB UR QL STRIP: NEGATIVE
IMM GRANULOCYTES # BLD AUTO: 0.14 10*3/MM3 (ref 0–0.05)
IMM GRANULOCYTES NFR BLD AUTO: 1.8 % (ref 0–0.5)
KETONES UR QL STRIP: ABNORMAL
LDLC SERPL CALC-MCNC: 85 MG/DL (ref 0–100)
LEUKOCYTE ESTERASE UR QL STRIP: ABNORMAL
LYMPHOCYTES # BLD AUTO: 1.89 10*3/MM3 (ref 0.7–3.1)
LYMPHOCYTES NFR BLD AUTO: 24.6 % (ref 19.6–45.3)
MCH RBC QN AUTO: 30.8 PG (ref 26.6–33)
MCHC RBC AUTO-ENTMCNC: 33.4 G/DL (ref 31.5–35.7)
MCV RBC AUTO: 92 FL (ref 79–97)
MONOCYTES # BLD AUTO: 0.81 10*3/MM3 (ref 0.1–0.9)
MONOCYTES NFR BLD AUTO: 10.5 % (ref 5–12)
NEUTROPHILS # BLD AUTO: 4.33 10*3/MM3 (ref 1.7–7)
NEUTROPHILS NFR BLD AUTO: 56.4 % (ref 42.7–76)
NITRITE UR QL STRIP: NEGATIVE
NRBC BLD AUTO-RTO: 0.3 /100 WBC (ref 0–0.2)
PH UR STRIP: 5.5 [PH] (ref 5–8)
PLATELET # BLD AUTO: 244 10*3/MM3 (ref 140–450)
POTASSIUM SERPL-SCNC: 4.4 MMOL/L (ref 3.5–5.2)
PROT SERPL-MCNC: 6.8 G/DL (ref 6–8.5)
PROT UR QL STRIP: ABNORMAL
RBC # BLD AUTO: 3.64 10*6/MM3 (ref 4.14–5.8)
RBC #/AREA URNS HPF: ABNORMAL /HPF
SODIUM SERPL-SCNC: 139 MMOL/L (ref 136–145)
SP GR UR: 1.02 (ref 1–1.03)
TRIGL SERPL-MCNC: 178 MG/DL (ref 0–150)
TSH SERPL DL<=0.005 MIU/L-ACNC: 3.49 UIU/ML (ref 0.27–4.2)
URATE SERPL-MCNC: 4.9 MG/DL (ref 3.4–7)
UROBILINOGEN UR STRIP-MCNC: ABNORMAL MG/DL
VLDLC SERPL CALC-MCNC: 30 MG/DL (ref 5–40)
WBC # BLD AUTO: 7.69 10*3/MM3 (ref 3.4–10.8)
WBC #/AREA URNS HPF: ABNORMAL /HPF

## 2021-11-30 DIAGNOSIS — F41.9 ANXIETY: ICD-10-CM

## 2021-11-30 RX ORDER — LORAZEPAM 1 MG/1
TABLET ORAL
Qty: 90 TABLET | Refills: 0 | Status: ON HOLD | OUTPATIENT
Start: 2021-11-30 | End: 2022-01-19

## 2021-12-01 RX ORDER — APIXABAN 5 MG/1
TABLET, FILM COATED ORAL
Qty: 180 TABLET | Refills: 4 | Status: ON HOLD | OUTPATIENT
Start: 2021-12-01 | End: 2022-01-19

## 2021-12-17 ENCOUNTER — TELEPHONE (OUTPATIENT)
Dept: OTOLARYNGOLOGY | Facility: CLINIC | Age: 86
End: 2021-12-17

## 2022-01-03 PROCEDURE — 93294 REM INTERROG EVL PM/LDLS PM: CPT | Performed by: INTERNAL MEDICINE

## 2022-01-03 PROCEDURE — 93296 REM INTERROG EVL PM/IDS: CPT | Performed by: INTERNAL MEDICINE

## 2022-01-04 ENCOUNTER — TELEPHONE (OUTPATIENT)
Dept: INTERNAL MEDICINE | Facility: CLINIC | Age: 87
End: 2022-01-04

## 2022-01-04 RX ORDER — CLOTRIMAZOLE AND BETAMETHASONE DIPROPIONATE 10; .64 MG/G; MG/G
CREAM TOPICAL
Qty: 45 G | Refills: 0 | OUTPATIENT
Start: 2022-01-04

## 2022-01-04 RX ORDER — FLUCONAZOLE 100 MG/1
100 TABLET ORAL DAILY
Qty: 5 TABLET | Refills: 0 | Status: ON HOLD | OUTPATIENT
Start: 2022-01-04 | End: 2022-01-19

## 2022-01-04 NOTE — TELEPHONE ENCOUNTER
"Sent telephone msg to Dr. Alexis, as wife says he got \"some better\" while using the cream, then got much worse after he ran out of it, but never really cleared up while using either.  "

## 2022-01-04 NOTE — TELEPHONE ENCOUNTER
"Pt was seen 11/24 and had a perianal rash, for which you gave Lotrisone.  Wife says it got \"some better\" while using, but took the whole tube to notice any improvement.  Now that he has been out, it has gotten much worse.  She asked to refill the Lotrisone, but didn't know if that was appropriate or if you would advise a different treatment, since it didn't resolve.  Please advise.  "

## 2022-01-04 NOTE — TELEPHONE ENCOUNTER
Amirah Suggs    Relationship: Emergency Contact    Requested Prescriptions:   Requested Prescriptions     Pending Prescriptions Disp Refills   • clotrimazole-betamethasone (LOTRISONE) 1-0.05 % cream [Pharmacy Med Name: CLOTRIMAZOLE/BETAMETHASONE DIPROPIONATE 1-0.05% CREAM] 45 g 0     Sig: APPLY TO AFFECTED AREA TWICE DAILY AS DIRECTED        Pharmacy where request should be sent: [facility] [place] - [phone] [facility] [phone]      Marcelo Green, ARY   01/04/22 10:02 CST

## 2022-01-09 ENCOUNTER — HOSPITAL ENCOUNTER (EMERGENCY)
Facility: HOSPITAL | Age: 87
Discharge: HOME | End: 2022-01-09
Attending: FAMILY MEDICINE | Admitting: FAMILY MEDICINE

## 2022-01-09 ENCOUNTER — APPOINTMENT (OUTPATIENT)
Dept: GENERAL RADIOLOGY | Facility: HOSPITAL | Age: 87
End: 2022-01-09

## 2022-01-09 ENCOUNTER — APPOINTMENT (OUTPATIENT)
Dept: CT IMAGING | Facility: HOSPITAL | Age: 87
End: 2022-01-09

## 2022-01-09 VITALS
SYSTOLIC BLOOD PRESSURE: 143 MMHG | DIASTOLIC BLOOD PRESSURE: 69 MMHG | HEART RATE: 88 BPM | OXYGEN SATURATION: 100 % | RESPIRATION RATE: 18 BRPM | WEIGHT: 185 LBS | TEMPERATURE: 97.7 F | BODY MASS INDEX: 24.52 KG/M2 | HEIGHT: 73 IN

## 2022-01-09 DIAGNOSIS — M25.552 LEFT HIP PAIN: Primary | ICD-10-CM

## 2022-01-09 LAB
ALBUMIN SERPL-MCNC: 4 G/DL (ref 3.5–5.2)
ALBUMIN/GLOB SERPL: 1.3 G/DL
ALP SERPL-CCNC: 124 U/L (ref 39–117)
ALT SERPL W P-5'-P-CCNC: 8 U/L (ref 1–41)
ANION GAP SERPL CALCULATED.3IONS-SCNC: 10 MMOL/L (ref 5–15)
AST SERPL-CCNC: 17 U/L (ref 1–40)
BASOPHILS # BLD AUTO: 0.03 10*3/MM3 (ref 0–0.2)
BASOPHILS NFR BLD AUTO: 0.3 % (ref 0–1.5)
BILIRUB SERPL-MCNC: 0.5 MG/DL (ref 0–1.2)
BUN SERPL-MCNC: 30 MG/DL (ref 8–23)
BUN/CREAT SERPL: 22.2 (ref 7–25)
CALCIUM SPEC-SCNC: 10.2 MG/DL (ref 8.6–10.5)
CHLORIDE SERPL-SCNC: 103 MMOL/L (ref 98–107)
CO2 SERPL-SCNC: 28 MMOL/L (ref 22–29)
CREAT SERPL-MCNC: 1.35 MG/DL (ref 0.76–1.27)
CRP SERPL-MCNC: 2.34 MG/DL (ref 0–0.5)
DEPRECATED RDW RBC AUTO: 58.1 FL (ref 37–54)
EOSINOPHIL # BLD AUTO: 0.07 10*3/MM3 (ref 0–0.4)
EOSINOPHIL NFR BLD AUTO: 0.7 % (ref 0.3–6.2)
ERYTHROCYTE [DISTWIDTH] IN BLOOD BY AUTOMATED COUNT: 16.6 % (ref 12.3–15.4)
ERYTHROCYTE [SEDIMENTATION RATE] IN BLOOD: 41 MM/HR (ref 0–20)
GFR SERPL CREATININE-BSD FRML MDRD: 50 ML/MIN/1.73
GLOBULIN UR ELPH-MCNC: 3.2 GM/DL
GLUCOSE SERPL-MCNC: 155 MG/DL (ref 65–99)
HCT VFR BLD AUTO: 34.8 % (ref 37.5–51)
HGB BLD-MCNC: 10.9 G/DL (ref 13–17.7)
IMM GRANULOCYTES # BLD AUTO: 0.05 10*3/MM3 (ref 0–0.05)
IMM GRANULOCYTES NFR BLD AUTO: 0.5 % (ref 0–0.5)
LYMPHOCYTES # BLD AUTO: 1.11 10*3/MM3 (ref 0.7–3.1)
LYMPHOCYTES NFR BLD AUTO: 11.7 % (ref 19.6–45.3)
MCH RBC QN AUTO: 29.9 PG (ref 26.6–33)
MCHC RBC AUTO-ENTMCNC: 31.3 G/DL (ref 31.5–35.7)
MCV RBC AUTO: 95.6 FL (ref 79–97)
MONOCYTES # BLD AUTO: 0.52 10*3/MM3 (ref 0.1–0.9)
MONOCYTES NFR BLD AUTO: 5.5 % (ref 5–12)
NEUTROPHILS NFR BLD AUTO: 7.74 10*3/MM3 (ref 1.7–7)
NEUTROPHILS NFR BLD AUTO: 81.3 % (ref 42.7–76)
NRBC BLD AUTO-RTO: 0 /100 WBC (ref 0–0.2)
PLATELET # BLD AUTO: 262 10*3/MM3 (ref 140–450)
PMV BLD AUTO: 9.4 FL (ref 6–12)
POTASSIUM SERPL-SCNC: 4.8 MMOL/L (ref 3.5–5.2)
PROT SERPL-MCNC: 7.2 G/DL (ref 6–8.5)
RBC # BLD AUTO: 3.64 10*6/MM3 (ref 4.14–5.8)
SODIUM SERPL-SCNC: 141 MMOL/L (ref 136–145)
WBC NRBC COR # BLD: 9.52 10*3/MM3 (ref 3.4–10.8)

## 2022-01-09 PROCEDURE — 99283 EMERGENCY DEPT VISIT LOW MDM: CPT

## 2022-01-09 PROCEDURE — 72192 CT PELVIS W/O DYE: CPT

## 2022-01-09 PROCEDURE — 80053 COMPREHEN METABOLIC PANEL: CPT | Performed by: FAMILY MEDICINE

## 2022-01-09 PROCEDURE — 85652 RBC SED RATE AUTOMATED: CPT | Performed by: FAMILY MEDICINE

## 2022-01-09 PROCEDURE — 73502 X-RAY EXAM HIP UNI 2-3 VIEWS: CPT

## 2022-01-09 PROCEDURE — 85025 COMPLETE CBC W/AUTO DIFF WBC: CPT | Performed by: FAMILY MEDICINE

## 2022-01-09 PROCEDURE — 86140 C-REACTIVE PROTEIN: CPT | Performed by: FAMILY MEDICINE

## 2022-01-09 RX ORDER — ACETAMINOPHEN 500 MG
1000 TABLET ORAL ONCE
Status: DISCONTINUED | OUTPATIENT
Start: 2022-01-09 | End: 2022-01-09 | Stop reason: HOSPADM

## 2022-01-09 RX ORDER — IBUPROFEN 200 MG
400 TABLET ORAL EVERY 8 HOURS PRN
Qty: 12 TABLET | Refills: 0 | Status: SHIPPED | OUTPATIENT
Start: 2022-01-09 | End: 2022-01-09

## 2022-01-09 RX ORDER — IBUPROFEN 200 MG
400 TABLET ORAL 3 TIMES DAILY
Qty: 12 TABLET | Refills: 0 | Status: SHIPPED | OUTPATIENT
Start: 2022-01-09 | End: 2022-01-22 | Stop reason: HOSPADM

## 2022-01-09 NOTE — ED PROVIDER NOTES
"Subjective   Mr. Bermudez is an 87-year-old gentleman who does have a history of a right hip replacement who was having a normal day today when he had sudden onset severe left hip pain. Of note, the patient had no specific trauma and no fall. He tried heat and cold and took 2 Tylenol but without good effect. He continued to have great difficulty ambulating and felt like his leg would \"give out\" because of the pain. He had no fever or other systemic symptoms. Is been using the bathroom normally for him.          Review of Systems   Musculoskeletal: Positive for arthralgias.   All other systems reviewed and are negative.      Past Medical History:   Diagnosis Date   • Abnormal nuclear stress test    • BPH (benign prostatic hyperplasia)    • Chest pain    • Coronary artery disease    • Disease of thyroid gland    • Hyperlipidemia    • Hypertension    • LV dysfunction    • Melanoma (HCC)    • Prostate CA (HCC)        Allergies   Allergen Reactions   • Adhesive Tape    • Simvastatin Other (See Comments)     Abnormal LFT's   • Neosporin [Neomycin-Bacitracin Zn-Polymyx] Rash       Past Surgical History:   Procedure Laterality Date   • APPENDECTOMY     • CARDIAC CATHETERIZATION Left 12/10/2012   • CARDIAC ELECTROPHYSIOLOGY PROCEDURE N/A 11/23/2018    Procedure: Pacemaker DC new 11/23/2018;  Surgeon: Austin Granados MD;  Location: [facility] CATH INVASIVE LOCATION;  Service: Cardiology   • CHOLECYSTECTOMY     • COLONOSCOPY N/A 6/9/2017    Procedure: COLONOSCOPY WITH ANESTHESIA;  Surgeon: Felice Marroquin MD;  Location: [facility] ENDOSCOPY;  Service:    • COLONOSCOPY W/ POLYPECTOMY  02/16/2012    adenomatous polyp at 80cm   • HAND SURGERY Right    • HERNIA REPAIR     • INGUINAL HERNIA REPAIR     • KNEE SURGERY     • PROSTATE SURGERY     • SKIN CANCER EXCISION      face   • SKIN LESION EXCISION     • TOTAL HIP ARTHROPLASTY Right 8/27/2021    Procedure: TOTAL HIP REPLACEMENT;  Surgeon: Arik Magaña MD;  Location: [facility] OR;  Service: " Orthopedics;  Laterality: Right;       Family History   Problem Relation Age of Onset   • Alzheimer's disease Mother    • Cancer Father    • Cancer Sister        Social History     Socioeconomic History   • Marital status:    Tobacco Use   • Smoking status: Former Smoker     Years: 30.00     Types: Cigarettes     Quit date:      Years since quittin.0   • Smokeless tobacco: Never Used   Vaping Use   • Vaping Use: Never used   Substance and Sexual Activity   • Alcohol use: No   • Drug use: No   • Sexual activity: Not Currently     Partners: Female           Objective   Physical Exam  Vitals and nursing note reviewed.   Constitutional:       Appearance: He is well-developed.   HENT:      Head: Normocephalic and atraumatic.      Right Ear: External ear normal.      Left Ear: External ear normal.      Nose: Nose normal.   Eyes:      Conjunctiva/sclera: Conjunctivae normal.   Cardiovascular:      Rate and Rhythm: Normal rate and regular rhythm.      Heart sounds: Normal heart sounds.   Pulmonary:      Effort: Pulmonary effort is normal.      Breath sounds: Normal breath sounds.   Abdominal:      General: Bowel sounds are normal.      Palpations: Abdomen is soft.   Musculoskeletal:         General: Normal range of motion.      Cervical back: Normal range of motion and neck supple.      Comments: The patient twice tried to ambulate but each time with a step or 2 he would have severe left hip pain which he localizes to the area of the greater trochanter. He would jump with the pain in almost fall.   Skin:     General: Skin is warm and dry.      Capillary Refill: Capillary refill takes less than 2 seconds.   Neurological:      Mental Status: He is alert and oriented to person, place, and time.   Psychiatric:         Behavior: Behavior normal.         Thought Content: Thought content normal.         Judgment: Judgment normal.         Procedures           ED Course                                                  MDM  Number of Diagnoses or Management Options     Amount and/or Complexity of Data Reviewed  Clinical lab tests: reviewed and ordered  Tests in the radiology section of CPT®: reviewed and ordered  Decide to obtain previous medical records or to obtain history from someone other than the patient: yes    Patient Progress  Patient progress: stable      Final diagnoses:   Left hip pain       ED Disposition  ED Disposition     ED Disposition Condition Comment    Discharge Stable           No follow-up provider specified.       Medication List      New Prescriptions    ibuprofen 200 MG tablet  Commonly known as: ADVIL,MOTRIN  Take 2 tablets by mouth 3 (Three) Times a Day.           Where to Get Your Medications      These medications were sent to [facility] - [place], [place] - [address] - [phone]  - [phone] FX  [address], [facility] [address]    Phone: [phone]   · ibuprofen 200 MG tablet       The patient's initial x-ray was negative and radiology suggested a CT to confirm.  The CT scan was also negative.  Of note, the patient does have a history of gout and I wonder if this is a manifestation of that.  Overall he has negative imaging studies and elevated inflammatory markers.  I discussed the case at length with the patient and his sister and we decided we try sending him home to rest and try a short course of anti-inflammatories.  He understands to return to the ED for any worsening of symptoms.     Stefan Hong MD  01/09/22 1800

## 2022-01-10 NOTE — DISCHARGE INSTRUCTIONS
As we discussed, please try resting for the next few days and taking some ibuprofen and applying ice to the area.  If he gets worse in any way especially if you develop fever please return to the ED.    Follow up with one of the [facility] physician groups below to setup primary care. If you have trouble making an appointment, please call the [facility] Nurse Line at [phone]    Dr. Wen Potter DO, Dr. Cesar Kwok DO, and ALENA Foster  [facility] Primary [facility]  [address] [place] [address], [address] [phone]    Dr. Jonathan Randhawa MD  [facility] Internal Medicine - [address] [address], [address], [place] [address] [phone]    Dr. Tru Ken DO, Dr. Jah Christie DO,  ALENA Pantoja, and ALENA Craft  [facility] Family & Internal Medicine - [address] [address], [address], [place] [address] [phone]     Dr. Liya Neal MD, and ALENA Whipple  [address] [address] [phone]    Dr. Javed Anderson MD and Dr. Grant Merlos MD  [facility] Family [facility]  [address] [place] [address], [address]  [phone]    Dr. Kayden eNves MD  [facility] Family [facility]  [address] [place] [address], [facility], [place], [place], [address] [phone]    Dr. Norris Cohen MD  [facility] Family [facility]  [address] [place], [place], [address] [phone]

## 2022-01-13 ENCOUNTER — OFFICE VISIT (OUTPATIENT)
Dept: INTERNAL MEDICINE | Facility: CLINIC | Age: 87
End: 2022-01-13

## 2022-01-13 VITALS
SYSTOLIC BLOOD PRESSURE: 153 MMHG | OXYGEN SATURATION: 99 % | WEIGHT: 174 LBS | HEIGHT: 73 IN | HEART RATE: 54 BPM | TEMPERATURE: 97.9 F | BODY MASS INDEX: 23.06 KG/M2 | DIASTOLIC BLOOD PRESSURE: 70 MMHG

## 2022-01-13 DIAGNOSIS — M25.559 HIP PAIN: Primary | ICD-10-CM

## 2022-01-13 DIAGNOSIS — B37.9 YEAST INFECTION: ICD-10-CM

## 2022-01-13 PROCEDURE — 99214 OFFICE O/P EST MOD 30 MIN: CPT | Performed by: INTERNAL MEDICINE

## 2022-01-13 RX ORDER — TRAMADOL HYDROCHLORIDE 50 MG/1
50 TABLET ORAL EVERY 6 HOURS PRN
Qty: 60 TABLET | Refills: 2 | Status: ON HOLD | OUTPATIENT
Start: 2022-01-13 | End: 2022-01-22 | Stop reason: SDUPTHER

## 2022-01-13 RX ORDER — CLOTRIMAZOLE AND BETAMETHASONE DIPROPIONATE 10; .64 MG/G; MG/G
1 CREAM TOPICAL 2 TIMES DAILY
Qty: 45 G | Refills: 2 | Status: SHIPPED | OUTPATIENT
Start: 2022-01-13 | End: 2022-05-06

## 2022-01-13 NOTE — PROGRESS NOTES
Subjective   Dexter Suggs is a 87 y.o. male.   Chief Complaint   Patient presents with   • Follow-up     follow up from ER at [facility] on 01/09/2022- left hip pain ; they are worried it may be the same situation as it was with the right hip ; pain is a 0 at rest and a 10 in motion    • yeast infection     diflucan is not helping with this issue        History of Present Illness patient is here for left hip pain.  Patient had gone through for quite stressful ordeal with right hip pain eventually of finding a fracture of his right hip he is now developed left hip pain he went to the emergency room.  I reviewed a CAT scan done of his left hip there is no evidence of a fracture however there is spurring and arthritis seen.  Also he is having some recurrent yeast infection on his bottom he is wanting a refill of a cream that we had given him previously.    The following portions of the patient's history were reviewed and updated as appropriate: allergies, current medications, past family history, past medical history, past social history, past surgical history and problem list.    Review of Systems   Constitutional: Negative for activity change, appetite change, fatigue, fever, unexpected weight gain and unexpected weight loss.   HENT: Negative for swollen glands, trouble swallowing and voice change.    Eyes: Negative for blurred vision and visual disturbance.   Respiratory: Negative for cough and shortness of breath.    Cardiovascular: Negative for chest pain, palpitations and leg swelling.   Gastrointestinal: Negative for abdominal pain, constipation, diarrhea, nausea, vomiting and indigestion.   Endocrine: Negative for cold intolerance, heat intolerance, polydipsia and polyphagia.   Genitourinary: Negative for dysuria and frequency.   Musculoskeletal: Positive for arthralgias ( Hip pain). Negative for back pain, joint swelling and neck pain.   Skin: Positive for rash ( Buttock rash). Negative for color change and  skin lesions.   Neurological: Negative for dizziness, weakness, headache, memory problem and confusion.   Hematological: Does not bruise/bleed easily.   Psychiatric/Behavioral: Negative for agitation, hallucinations and suicidal ideas. The patient is not nervous/anxious.        Objective   Past Medical History:   Diagnosis Date   • Abnormal nuclear stress test    • BPH (benign prostatic hyperplasia)    • Chest pain    • Coronary artery disease    • Disease of thyroid gland    • Hyperlipidemia    • Hypertension    • LV dysfunction    • Melanoma (HCC)    • Prostate CA (HCC)       Past Surgical History:   Procedure Laterality Date   • APPENDECTOMY     • CARDIAC CATHETERIZATION Left 12/10/2012   • CARDIAC ELECTROPHYSIOLOGY PROCEDURE N/A 11/23/2018    Procedure: Pacemaker DC new 11/23/2018;  Surgeon: Austin Granados MD;  Location: [facility] CATH INVASIVE LOCATION;  Service: Cardiology   • CHOLECYSTECTOMY     • COLONOSCOPY N/A 6/9/2017    Procedure: COLONOSCOPY WITH ANESTHESIA;  Surgeon: Felice Marroquin MD;  Location: [facility] ENDOSCOPY;  Service:    • COLONOSCOPY W/ POLYPECTOMY  02/16/2012    adenomatous polyp at 80cm   • HAND SURGERY Right    • HERNIA REPAIR     • INGUINAL HERNIA REPAIR     • KNEE SURGERY     • PROSTATE SURGERY     • SKIN CANCER EXCISION      face   • SKIN LESION EXCISION     • TOTAL HIP ARTHROPLASTY Right 8/27/2021    Procedure: TOTAL HIP REPLACEMENT;  Surgeon: Arik Magaña MD;  Location: [facility] OR;  Service: Orthopedics;  Laterality: Right;        Current Outpatient Medications:   •  allopurinol (ZYLOPRIM) 300 MG tablet, TAKE 1 TABLET BY MOUTH EVERY DAY, Disp: 90 tablet, Rfl: 0  •  aspirin 81 MG EC tablet, Take 81 mg by mouth Daily., Disp: , Rfl:   •  clotrimazole-betamethasone (Lotrisone) 1-0.05 % cream, Apply 1 application topically to the appropriate area as directed 2 (Two) Times a Day., Disp: 45 g, Rfl: 2  •  docusate sodium (COLACE) 100 MG capsule, Take 100 mg by mouth Daily., Disp: , Rfl:   •   Easy Touch Lancets 30G/Twist misc, TEST DAILY AS DIRECTED , Disp: 100 each, Rfl: 3  •  Eliquis 5 MG tablet tablet, TAKE 1 TABLET BY MOUTH EVERY 12 HOURS, Disp: 180 tablet, Rfl: 4  •  ferrous sulfate 325 (65 FE) MG tablet, Take 325 mg by mouth 3 (Three) Times a Day With Meals., Disp: , Rfl:   •  fluconazole (Diflucan) 100 MG tablet, Take 1 tablet by mouth Daily., Disp: 5 tablet, Rfl: 0  •  fluticasone (FLONASE) 50 MCG/ACT nasal spray, 1 spray into the nostril(s) as directed by provider Daily As Needed., Disp: , Rfl:   •  folic acid (FOLVITE) 1 MG tablet, Take 1 mg by mouth Daily., Disp: , Rfl:   •  ibuprofen (ADVIL,MOTRIN) 200 MG tablet, Take 2 tablets by mouth 3 (Three) Times a Day., Disp: 12 tablet, Rfl: 0  •  levothyroxine (SYNTHROID, LEVOTHROID) 75 MCG tablet, TAKE 1 TABLET BY MOUTH EVERY DAY , Disp: 90 tablet, Rfl: 3  •  LORazepam (ATIVAN) 1 MG tablet, TAKE 1 TABLET BY MOUTH EVERY DAY, Disp: 90 tablet, Rfl: 0  •  methotrexate 2.5 MG tablet, Take 17.5 mg by mouth 1 (One) Time Per Week., Disp: , Rfl:   •  metoprolol succinate XL (TOPROL-XL) 25 MG 24 hr tablet, TAKE 1 TABLET BY MOUTH EVERY DAY , Disp: 90 tablet, Rfl: 3  •  nitroglycerin (NITROSTAT) 0.4 MG SL tablet, Place 1 tablet under the tongue Every 5 (Five) Minutes As Needed for Chest Pain. Take no more than 3 doses in 15 minutes., Disp: 25 tablet, Rfl: 13  •  pantoprazole (PROTONIX) 40 MG EC tablet, TAKE 1 TABLET BY MOUTH EVERY DAY , Disp: 90 tablet, Rfl: 3  •  predniSONE (DELTASONE) 5 MG tablet, Take 5 mg by mouth Daily., Disp: , Rfl:   •  triamterene-hydrochlorothiazide (MAXZIDE) 75-50 MG per tablet, TAKE 1/2 TABLET BY MOUTH EVERY DAY, Disp: 45 tablet, Rfl: 0  •  True Metrix Blood Glucose Test test strip, TEST DAILY AS DIRECTED , Disp: 300 each, Rfl: 3  •  traMADol (ULTRAM) 50 MG tablet, Take 1 tablet by mouth Every 6 (Six) Hours As Needed for Moderate Pain ., Disp: 60 tablet, Rfl: 2    Current Facility-Administered Medications:   •  cyanocobalamin  injection 1,000 mcg, 1,000 mcg, Intramuscular, Q28 Days, Jarad Alexis MD, 1,000 mcg at 11/24/21 0901      Vitals:    01/13/22 1530   BP: 153/70   Pulse: 54   Temp: 97.9 °F (36.6 °C)   SpO2: 99%         01/13/22  1530   Weight: 78.9 kg (174 lb)       Body mass index is 22.96 kg/m².    Physical Exam  Vitals and nursing note reviewed.   Constitutional:       General: He is not in acute distress.     Appearance: Normal appearance. He is well-developed.   HENT:      Head: Normocephalic and atraumatic.      Right Ear: External ear normal.      Left Ear: External ear normal.      Nose: Nose normal.   Eyes:      Extraocular Movements: Extraocular movements intact.      Conjunctiva/sclera: Conjunctivae normal.      Pupils: Pupils are equal, round, and reactive to light.   Cardiovascular:      Rate and Rhythm: Normal rate and regular rhythm.      Pulses: Normal pulses.      Heart sounds: Normal heart sounds.   Pulmonary:      Effort: Pulmonary effort is normal.      Breath sounds: Normal breath sounds.   Abdominal:      General: Bowel sounds are normal.      Palpations: Abdomen is soft.   Musculoskeletal:      Cervical back: Normal range of motion and neck supple. No rigidity. No muscular tenderness.      Comments: Patient has decreased range of motion of his left hip   Skin:     General: Skin is warm and dry.   Neurological:      General: No focal deficit present.      Mental Status: He is alert and oriented to person, place, and time.      Gait: Gait abnormal.   Psychiatric:         Mood and Affect: Mood normal.         Behavior: Behavior normal.               Assessment/Plan   Diagnoses and all orders for this visit:    1. Hip pain (Primary)  -     traMADol (ULTRAM) 50 MG tablet; Take 1 tablet by mouth Every 6 (Six) Hours As Needed for Moderate Pain .  Dispense: 60 tablet; Refill: 2    2. Yeast infection    Other orders  -     clotrimazole-betamethasone (Lotrisone) 1-0.05 % cream; Apply 1 application topically to the  appropriate area as directed 2 (Two) Times a Day.  Dispense: 45 g; Refill: 2      I spent time reviewing patient's ER record also pulled up the films and reviewed those with patient and his wife specifically showing them the spurs and arthritis in the left hip.  Along with that I suspect were dealing with some tendinitis.  Will see how he does with a prescription for tramadol for pain I have encouraged them to come back and we will jamie-ray that hip we need to give it some time if he has a occult fracture or small fracture there it may not show up.

## 2022-01-18 ENCOUNTER — ANESTHESIA EVENT (OUTPATIENT)
Dept: PERIOP | Facility: HOSPITAL | Age: 87
End: 2022-01-18

## 2022-01-18 ENCOUNTER — APPOINTMENT (OUTPATIENT)
Dept: CT IMAGING | Facility: HOSPITAL | Age: 87
End: 2022-01-18

## 2022-01-18 ENCOUNTER — ANESTHESIA (OUTPATIENT)
Dept: PERIOP | Facility: HOSPITAL | Age: 87
End: 2022-01-18

## 2022-01-18 ENCOUNTER — APPOINTMENT (OUTPATIENT)
Dept: GENERAL RADIOLOGY | Facility: HOSPITAL | Age: 87
End: 2022-01-18

## 2022-01-18 ENCOUNTER — HOSPITAL ENCOUNTER (INPATIENT)
Facility: HOSPITAL | Age: 87
LOS: 4 days | Discharge: NURSING, REHAB OR LONG-TERM CARE | End: 2022-01-22
Attending: FAMILY MEDICINE | Admitting: FAMILY MEDICINE

## 2022-01-18 DIAGNOSIS — M25.559 HIP PAIN: ICD-10-CM

## 2022-01-18 DIAGNOSIS — D62 POSTOPERATIVE ANEMIA DUE TO ACUTE BLOOD LOSS: ICD-10-CM

## 2022-01-18 DIAGNOSIS — E03.8 HYPOTHYROIDISM DUE TO HASHIMOTO'S THYROIDITIS: ICD-10-CM

## 2022-01-18 DIAGNOSIS — C43.8 OVERLAPPING MALIGNANT MELANOMA OF SKIN: ICD-10-CM

## 2022-01-18 DIAGNOSIS — R74.8 ALKALINE PHOSPHATASE ELEVATION: ICD-10-CM

## 2022-01-18 DIAGNOSIS — D64.9 ANEMIA, UNSPECIFIED TYPE: ICD-10-CM

## 2022-01-18 DIAGNOSIS — D04.0 SQUAMOUS CELL CARCINOMA IN SITU OF SKIN OF LIP: ICD-10-CM

## 2022-01-18 DIAGNOSIS — L57.0 ACTINIC KERATOSIS: ICD-10-CM

## 2022-01-18 DIAGNOSIS — R31.0 GROSS HEMATURIA: ICD-10-CM

## 2022-01-18 DIAGNOSIS — S72.124D CLOSED NONDISPLACED FRACTURE OF LESSER TROCHANTER OF RIGHT FEMUR WITH ROUTINE HEALING: ICD-10-CM

## 2022-01-18 DIAGNOSIS — S72.012A CLOSED SUBCAPITAL FRACTURE OF FEMUR, LEFT, INITIAL ENCOUNTER: ICD-10-CM

## 2022-01-18 DIAGNOSIS — E78.2 MIXED HYPERLIPIDEMIA: ICD-10-CM

## 2022-01-18 DIAGNOSIS — D51.0 PERNICIOUS ANEMIA: ICD-10-CM

## 2022-01-18 DIAGNOSIS — Z86.010 HX OF COLONIC POLYPS: ICD-10-CM

## 2022-01-18 DIAGNOSIS — I44.2 COMPLETE HEART BLOCK: ICD-10-CM

## 2022-01-18 DIAGNOSIS — S01.81XD LACERATION OF FACE WITHOUT COMPLICATION, SUBSEQUENT ENCOUNTER: ICD-10-CM

## 2022-01-18 DIAGNOSIS — K21.9 GASTROESOPHAGEAL REFLUX DISEASE, UNSPECIFIED WHETHER ESOPHAGITIS PRESENT: ICD-10-CM

## 2022-01-18 DIAGNOSIS — C61 MALIGNANT NEOPLASM OF PROSTATE: ICD-10-CM

## 2022-01-18 DIAGNOSIS — N40.1 BENIGN PROSTATIC HYPERPLASIA WITH LOWER URINARY TRACT SYMPTOMS, SYMPTOM DETAILS UNSPECIFIED: ICD-10-CM

## 2022-01-18 DIAGNOSIS — M19.049 ARTHRITIS OF HAND: ICD-10-CM

## 2022-01-18 DIAGNOSIS — I48.0 PAF (PAROXYSMAL ATRIAL FIBRILLATION): ICD-10-CM

## 2022-01-18 DIAGNOSIS — S72.002A CLOSED FRACTURE OF LEFT HIP, INITIAL ENCOUNTER: Primary | ICD-10-CM

## 2022-01-18 DIAGNOSIS — I25.10 SINGLE VESSEL CORONARY ARTERY DISEASE: ICD-10-CM

## 2022-01-18 DIAGNOSIS — I48.92 PAROXYSMAL ATRIAL FLUTTER: ICD-10-CM

## 2022-01-18 DIAGNOSIS — Z79.01 CHRONIC ANTICOAGULATION: ICD-10-CM

## 2022-01-18 DIAGNOSIS — M19.041 PRIMARY OSTEOARTHRITIS OF BOTH HANDS: ICD-10-CM

## 2022-01-18 DIAGNOSIS — M19.042 PRIMARY OSTEOARTHRITIS OF BOTH HANDS: ICD-10-CM

## 2022-01-18 DIAGNOSIS — S52.509P: ICD-10-CM

## 2022-01-18 DIAGNOSIS — S72.001D CLOSED FRACTURE OF RIGHT HIP WITH ROUTINE HEALING, SUBSEQUENT ENCOUNTER: ICD-10-CM

## 2022-01-18 DIAGNOSIS — E06.3 HYPOTHYROIDISM DUE TO HASHIMOTO'S THYROIDITIS: ICD-10-CM

## 2022-01-18 DIAGNOSIS — U07.1 COVID-19 VIRUS INFECTION: ICD-10-CM

## 2022-01-18 DIAGNOSIS — I49.5 SICK SINUS SYNDROME: ICD-10-CM

## 2022-01-18 DIAGNOSIS — I10 ESSENTIAL HYPERTENSION: ICD-10-CM

## 2022-01-18 DIAGNOSIS — Z74.09 IMPAIRED MOBILITY: ICD-10-CM

## 2022-01-18 DIAGNOSIS — I10 HTN (HYPERTENSION), BENIGN: ICD-10-CM

## 2022-01-18 DIAGNOSIS — R73.01 IMPAIRED FASTING GLUCOSE: ICD-10-CM

## 2022-01-18 DIAGNOSIS — Z95.0 PACEMAKER: ICD-10-CM

## 2022-01-18 PROBLEM — S72.009A HIP FRACTURE (HCC): Status: ACTIVE | Noted: 2022-01-18

## 2022-01-18 LAB
ABO GROUP BLD: NORMAL
ALBUMIN SERPL-MCNC: 3.7 G/DL (ref 3.5–5.2)
ALBUMIN/GLOB SERPL: 1 G/DL
ALP SERPL-CCNC: 106 U/L (ref 39–117)
ALT SERPL W P-5'-P-CCNC: 10 U/L (ref 1–41)
AMORPH URATE CRY URNS QL MICRO: ABNORMAL /HPF
ANION GAP SERPL CALCULATED.3IONS-SCNC: 8 MMOL/L (ref 5–15)
APTT PPP: 35.9 SECONDS (ref 24.1–35)
AST SERPL-CCNC: 20 U/L (ref 1–40)
BACTERIA UR QL AUTO: ABNORMAL /HPF
BASOPHILS # BLD AUTO: 0.03 10*3/MM3 (ref 0–0.2)
BASOPHILS NFR BLD AUTO: 0.4 % (ref 0–1.5)
BILIRUB SERPL-MCNC: 0.4 MG/DL (ref 0–1.2)
BILIRUB UR QL STRIP: NEGATIVE
BLD GP AB SCN SERPL QL: NEGATIVE
BUN SERPL-MCNC: 26 MG/DL (ref 8–23)
BUN/CREAT SERPL: 24.5 (ref 7–25)
CALCIUM SPEC-SCNC: 10.5 MG/DL (ref 8.6–10.5)
CHLORIDE SERPL-SCNC: 98 MMOL/L (ref 98–107)
CLARITY UR: ABNORMAL
CO2 SERPL-SCNC: 32 MMOL/L (ref 22–29)
COLOR UR: YELLOW
CREAT SERPL-MCNC: 1.06 MG/DL (ref 0.76–1.27)
CRP SERPL-MCNC: 2.87 MG/DL (ref 0–0.5)
DEPRECATED RDW RBC AUTO: 56.9 FL (ref 37–54)
EOSINOPHIL # BLD AUTO: 0.13 10*3/MM3 (ref 0–0.4)
EOSINOPHIL NFR BLD AUTO: 1.7 % (ref 0.3–6.2)
ERYTHROCYTE [DISTWIDTH] IN BLOOD BY AUTOMATED COUNT: 16.3 % (ref 12.3–15.4)
ERYTHROCYTE [SEDIMENTATION RATE] IN BLOOD: 25 MM/HR (ref 0–20)
GFR SERPL CREATININE-BSD FRML MDRD: 66 ML/MIN/1.73
GLOBULIN UR ELPH-MCNC: 3.6 GM/DL
GLUCOSE SERPL-MCNC: 152 MG/DL (ref 65–99)
GLUCOSE UR STRIP-MCNC: NEGATIVE MG/DL
HCT VFR BLD AUTO: 32.3 % (ref 37.5–51)
HGB BLD-MCNC: 10.4 G/DL (ref 13–17.7)
HGB UR QL STRIP.AUTO: NEGATIVE
HYALINE CASTS UR QL AUTO: ABNORMAL /LPF
IMM GRANULOCYTES # BLD AUTO: 0.08 10*3/MM3 (ref 0–0.05)
IMM GRANULOCYTES NFR BLD AUTO: 1 % (ref 0–0.5)
INR PPP: 1.34 (ref 0.91–1.09)
KETONES UR QL STRIP: NEGATIVE
LEUKOCYTE ESTERASE UR QL STRIP.AUTO: ABNORMAL
LYMPHOCYTES # BLD AUTO: 1.32 10*3/MM3 (ref 0.7–3.1)
LYMPHOCYTES NFR BLD AUTO: 16.8 % (ref 19.6–45.3)
MCH RBC QN AUTO: 30.9 PG (ref 26.6–33)
MCHC RBC AUTO-ENTMCNC: 32.2 G/DL (ref 31.5–35.7)
MCV RBC AUTO: 95.8 FL (ref 79–97)
MONOCYTES # BLD AUTO: 0.64 10*3/MM3 (ref 0.1–0.9)
MONOCYTES NFR BLD AUTO: 8.1 % (ref 5–12)
NEUTROPHILS NFR BLD AUTO: 5.67 10*3/MM3 (ref 1.7–7)
NEUTROPHILS NFR BLD AUTO: 72 % (ref 42.7–76)
NITRITE UR QL STRIP: NEGATIVE
NRBC BLD AUTO-RTO: 0 /100 WBC (ref 0–0.2)
PH UR STRIP.AUTO: <=5 [PH] (ref 5–8)
PLATELET # BLD AUTO: 200 10*3/MM3 (ref 140–450)
PMV BLD AUTO: 9.8 FL (ref 6–12)
POTASSIUM SERPL-SCNC: 4.1 MMOL/L (ref 3.5–5.2)
PROT SERPL-MCNC: 7.3 G/DL (ref 6–8.5)
PROT UR QL STRIP: ABNORMAL
PROTHROMBIN TIME: 16.1 SECONDS (ref 11.9–14.6)
RBC # BLD AUTO: 3.37 10*6/MM3 (ref 4.14–5.8)
RBC # UR STRIP: ABNORMAL /HPF
REF LAB TEST METHOD: ABNORMAL
RH BLD: POSITIVE
SARS-COV-2 RNA PNL SPEC NAA+PROBE: NOT DETECTED
SODIUM SERPL-SCNC: 138 MMOL/L (ref 136–145)
SP GR UR STRIP: 1.02 (ref 1–1.03)
SQUAMOUS #/AREA URNS HPF: ABNORMAL /HPF
T&S EXPIRATION DATE: NORMAL
UROBILINOGEN UR QL STRIP: ABNORMAL
WBC # UR STRIP: ABNORMAL /HPF
WBC NRBC COR # BLD: 7.87 10*3/MM3 (ref 3.4–10.8)

## 2022-01-18 PROCEDURE — 25010000002 DEXAMETHASONE PER 1 MG: Performed by: NURSE ANESTHETIST, CERTIFIED REGISTERED

## 2022-01-18 PROCEDURE — 86901 BLOOD TYPING SEROLOGIC RH(D): CPT | Performed by: NURSE PRACTITIONER

## 2022-01-18 PROCEDURE — 80053 COMPREHEN METABOLIC PANEL: CPT | Performed by: NURSE PRACTITIONER

## 2022-01-18 PROCEDURE — 0QS934Z REPOSITION LEFT FEMORAL SHAFT WITH INTERNAL FIXATION DEVICE, PERCUTANEOUS APPROACH: ICD-10-PCS | Performed by: ORTHOPAEDIC SURGERY

## 2022-01-18 PROCEDURE — 87635 SARS-COV-2 COVID-19 AMP PRB: CPT | Performed by: NURSE PRACTITIONER

## 2022-01-18 PROCEDURE — 93010 ELECTROCARDIOGRAM REPORT: CPT | Performed by: INTERNAL MEDICINE

## 2022-01-18 PROCEDURE — 99284 EMERGENCY DEPT VISIT MOD MDM: CPT

## 2022-01-18 PROCEDURE — 85652 RBC SED RATE AUTOMATED: CPT | Performed by: NURSE PRACTITIONER

## 2022-01-18 PROCEDURE — 25010000002 ONDANSETRON PER 1 MG: Performed by: NURSE ANESTHETIST, CERTIFIED REGISTERED

## 2022-01-18 PROCEDURE — 25010000002 FENTANYL CITRATE (PF) 100 MCG/2ML SOLUTION: Performed by: NURSE ANESTHETIST, CERTIFIED REGISTERED

## 2022-01-18 PROCEDURE — 72192 CT PELVIS W/O DYE: CPT

## 2022-01-18 PROCEDURE — 85730 THROMBOPLASTIN TIME PARTIAL: CPT | Performed by: NURSE PRACTITIONER

## 2022-01-18 PROCEDURE — 85610 PROTHROMBIN TIME: CPT | Performed by: NURSE PRACTITIONER

## 2022-01-18 PROCEDURE — 76000 FLUOROSCOPY <1 HR PHYS/QHP: CPT

## 2022-01-18 PROCEDURE — 94799 UNLISTED PULMONARY SVC/PX: CPT

## 2022-01-18 PROCEDURE — 81001 URINALYSIS AUTO W/SCOPE: CPT | Performed by: NURSE PRACTITIONER

## 2022-01-18 PROCEDURE — 93005 ELECTROCARDIOGRAM TRACING: CPT | Performed by: NURSE PRACTITIONER

## 2022-01-18 PROCEDURE — 86900 BLOOD TYPING SEROLOGIC ABO: CPT | Performed by: NURSE PRACTITIONER

## 2022-01-18 PROCEDURE — 25010000002 PROPOFOL 10 MG/ML EMULSION: Performed by: NURSE ANESTHETIST, CERTIFIED REGISTERED

## 2022-01-18 PROCEDURE — 71045 X-RAY EXAM CHEST 1 VIEW: CPT

## 2022-01-18 PROCEDURE — C1713 ANCHOR/SCREW BN/BN,TIS/BN: HCPCS | Performed by: ORTHOPAEDIC SURGERY

## 2022-01-18 PROCEDURE — 85025 COMPLETE CBC W/AUTO DIFF WBC: CPT | Performed by: NURSE PRACTITIONER

## 2022-01-18 PROCEDURE — 86140 C-REACTIVE PROTEIN: CPT | Performed by: NURSE PRACTITIONER

## 2022-01-18 PROCEDURE — 86850 RBC ANTIBODY SCREEN: CPT | Performed by: NURSE PRACTITIONER

## 2022-01-18 PROCEDURE — 72131 CT LUMBAR SPINE W/O DYE: CPT

## 2022-01-18 PROCEDURE — 87086 URINE CULTURE/COLONY COUNT: CPT | Performed by: NURSE PRACTITIONER

## 2022-01-18 PROCEDURE — 25010000002 CEFAZOLIN PER 500 MG

## 2022-01-18 PROCEDURE — C1769 GUIDE WIRE: HCPCS | Performed by: ORTHOPAEDIC SURGERY

## 2022-01-18 PROCEDURE — 73502 X-RAY EXAM HIP UNI 2-3 VIEWS: CPT

## 2022-01-18 DEVICE — IMPLANTABLE DEVICE: Type: IMPLANTABLE DEVICE | Site: HIP | Status: FUNCTIONAL

## 2022-01-18 DEVICE — WASHR BONE 13MM: Type: IMPLANTABLE DEVICE | Site: HIP | Status: FUNCTIONAL

## 2022-01-18 RX ORDER — HYDROCODONE BITARTRATE AND ACETAMINOPHEN 5; 325 MG/1; MG/1
1 TABLET ORAL EVERY 4 HOURS PRN
Status: DISCONTINUED | OUTPATIENT
Start: 2022-01-18 | End: 2022-01-22 | Stop reason: HOSPADM

## 2022-01-18 RX ORDER — DOCUSATE SODIUM 100 MG/1
100 CAPSULE, LIQUID FILLED ORAL 2 TIMES DAILY PRN
Status: DISCONTINUED | OUTPATIENT
Start: 2022-01-18 | End: 2022-01-22 | Stop reason: HOSPADM

## 2022-01-18 RX ORDER — LIDOCAINE HYDROCHLORIDE 10 MG/ML
0.5 INJECTION, SOLUTION EPIDURAL; INFILTRATION; INTRACAUDAL; PERINEURAL ONCE AS NEEDED
Status: DISCONTINUED | OUTPATIENT
Start: 2022-01-18 | End: 2022-01-18 | Stop reason: HOSPADM

## 2022-01-18 RX ORDER — ALLOPURINOL 300 MG/1
300 TABLET ORAL DAILY
Status: DISCONTINUED | OUTPATIENT
Start: 2022-01-19 | End: 2022-01-22 | Stop reason: HOSPADM

## 2022-01-18 RX ORDER — OXYCODONE AND ACETAMINOPHEN 7.5; 325 MG/1; MG/1
2 TABLET ORAL EVERY 4 HOURS PRN
Status: DISCONTINUED | OUTPATIENT
Start: 2022-01-18 | End: 2022-01-18 | Stop reason: HOSPADM

## 2022-01-18 RX ORDER — HYDROMORPHONE HYDROCHLORIDE 1 MG/ML
0.5 INJECTION, SOLUTION INTRAMUSCULAR; INTRAVENOUS; SUBCUTANEOUS
Status: DISCONTINUED | OUTPATIENT
Start: 2022-01-18 | End: 2022-01-22 | Stop reason: HOSPADM

## 2022-01-18 RX ORDER — SODIUM CHLORIDE 0.9 % (FLUSH) 0.9 %
10 SYRINGE (ML) INJECTION EVERY 12 HOURS SCHEDULED
Status: DISCONTINUED | OUTPATIENT
Start: 2022-01-18 | End: 2022-01-22 | Stop reason: HOSPADM

## 2022-01-18 RX ORDER — FENTANYL CITRATE 50 UG/ML
25 INJECTION, SOLUTION INTRAMUSCULAR; INTRAVENOUS
Status: DISCONTINUED | OUTPATIENT
Start: 2022-01-18 | End: 2022-01-18 | Stop reason: HOSPADM

## 2022-01-18 RX ORDER — PROPOFOL 10 MG/ML
VIAL (ML) INTRAVENOUS AS NEEDED
Status: DISCONTINUED | OUTPATIENT
Start: 2022-01-18 | End: 2022-01-18 | Stop reason: SURG

## 2022-01-18 RX ORDER — ONDANSETRON 2 MG/ML
4 INJECTION INTRAMUSCULAR; INTRAVENOUS ONCE AS NEEDED
Status: DISCONTINUED | OUTPATIENT
Start: 2022-01-18 | End: 2022-01-18 | Stop reason: HOSPADM

## 2022-01-18 RX ORDER — ONDANSETRON 2 MG/ML
INJECTION INTRAMUSCULAR; INTRAVENOUS AS NEEDED
Status: DISCONTINUED | OUTPATIENT
Start: 2022-01-18 | End: 2022-01-18 | Stop reason: SURG

## 2022-01-18 RX ORDER — SODIUM CHLORIDE 0.9 % (FLUSH) 0.9 %
1-10 SYRINGE (ML) INJECTION AS NEEDED
Status: DISCONTINUED | OUTPATIENT
Start: 2022-01-18 | End: 2022-01-22 | Stop reason: HOSPADM

## 2022-01-18 RX ORDER — FENTANYL CITRATE 50 UG/ML
INJECTION, SOLUTION INTRAMUSCULAR; INTRAVENOUS AS NEEDED
Status: DISCONTINUED | OUTPATIENT
Start: 2022-01-18 | End: 2022-01-18 | Stop reason: SURG

## 2022-01-18 RX ORDER — LIDOCAINE HYDROCHLORIDE 20 MG/ML
INJECTION, SOLUTION EPIDURAL; INFILTRATION; INTRACAUDAL; PERINEURAL AS NEEDED
Status: DISCONTINUED | OUTPATIENT
Start: 2022-01-18 | End: 2022-01-18 | Stop reason: SURG

## 2022-01-18 RX ORDER — BUPIVACAINE HCL/0.9 % NACL/PF 0.1 %
2 PLASTIC BAG, INJECTION (ML) EPIDURAL EVERY 8 HOURS
Status: COMPLETED | OUTPATIENT
Start: 2022-01-19 | End: 2022-01-19

## 2022-01-18 RX ORDER — NALOXONE HCL 0.4 MG/ML
0.1 VIAL (ML) INJECTION
Status: DISCONTINUED | OUTPATIENT
Start: 2022-01-18 | End: 2022-01-22 | Stop reason: HOSPADM

## 2022-01-18 RX ORDER — KETAMINE HCL IN NACL, ISO-OSM 100MG/10ML
SYRINGE (ML) INJECTION AS NEEDED
Status: DISCONTINUED | OUTPATIENT
Start: 2022-01-18 | End: 2022-01-18 | Stop reason: SURG

## 2022-01-18 RX ORDER — PREDNISONE 1 MG/1
5 TABLET ORAL DAILY
Status: DISCONTINUED | OUTPATIENT
Start: 2022-01-19 | End: 2022-01-22 | Stop reason: HOSPADM

## 2022-01-18 RX ORDER — DOCUSATE SODIUM 100 MG/1
100 CAPSULE, LIQUID FILLED ORAL DAILY
Status: DISCONTINUED | OUTPATIENT
Start: 2022-01-19 | End: 2022-01-19

## 2022-01-18 RX ORDER — SODIUM CHLORIDE, SODIUM LACTATE, POTASSIUM CHLORIDE, CALCIUM CHLORIDE 600; 310; 30; 20 MG/100ML; MG/100ML; MG/100ML; MG/100ML
100 INJECTION, SOLUTION INTRAVENOUS CONTINUOUS
Status: DISCONTINUED | OUTPATIENT
Start: 2022-01-18 | End: 2022-01-22 | Stop reason: HOSPADM

## 2022-01-18 RX ORDER — ONDANSETRON 4 MG/1
4 TABLET, FILM COATED ORAL EVERY 6 HOURS PRN
Status: DISCONTINUED | OUTPATIENT
Start: 2022-01-18 | End: 2022-01-22 | Stop reason: HOSPADM

## 2022-01-18 RX ORDER — FERROUS SULFATE 325(65) MG
325 TABLET ORAL
Status: DISCONTINUED | OUTPATIENT
Start: 2022-01-19 | End: 2022-01-22 | Stop reason: HOSPADM

## 2022-01-18 RX ORDER — MAGNESIUM HYDROXIDE 1200 MG/15ML
LIQUID ORAL AS NEEDED
Status: DISCONTINUED | OUTPATIENT
Start: 2022-01-18 | End: 2022-01-18 | Stop reason: HOSPADM

## 2022-01-18 RX ORDER — DEXAMETHASONE SODIUM PHOSPHATE 4 MG/ML
INJECTION, SOLUTION INTRA-ARTICULAR; INTRALESIONAL; INTRAMUSCULAR; INTRAVENOUS; SOFT TISSUE AS NEEDED
Status: DISCONTINUED | OUTPATIENT
Start: 2022-01-18 | End: 2022-01-18 | Stop reason: SURG

## 2022-01-18 RX ORDER — PROMETHAZINE HYDROCHLORIDE 25 MG/1
12.5 TABLET ORAL EVERY 6 HOURS PRN
Status: DISCONTINUED | OUTPATIENT
Start: 2022-01-18 | End: 2022-01-22 | Stop reason: HOSPADM

## 2022-01-18 RX ORDER — HYDROCODONE BITARTRATE AND ACETAMINOPHEN 5; 325 MG/1; MG/1
2 TABLET ORAL EVERY 4 HOURS PRN
Status: DISCONTINUED | OUTPATIENT
Start: 2022-01-18 | End: 2022-01-22 | Stop reason: HOSPADM

## 2022-01-18 RX ORDER — SODIUM CHLORIDE 0.9 % (FLUSH) 0.9 %
3-10 SYRINGE (ML) INJECTION AS NEEDED
Status: DISCONTINUED | OUTPATIENT
Start: 2022-01-18 | End: 2022-01-18 | Stop reason: HOSPADM

## 2022-01-18 RX ORDER — SODIUM CHLORIDE 0.9 % (FLUSH) 0.9 %
3 SYRINGE (ML) INJECTION EVERY 12 HOURS SCHEDULED
Status: DISCONTINUED | OUTPATIENT
Start: 2022-01-18 | End: 2022-01-18 | Stop reason: HOSPADM

## 2022-01-18 RX ORDER — LABETALOL HYDROCHLORIDE 5 MG/ML
5 INJECTION, SOLUTION INTRAVENOUS
Status: DISCONTINUED | OUTPATIENT
Start: 2022-01-18 | End: 2022-01-18 | Stop reason: HOSPADM

## 2022-01-18 RX ORDER — SODIUM CHLORIDE 0.9 % (FLUSH) 0.9 %
10 SYRINGE (ML) INJECTION EVERY 12 HOURS SCHEDULED
Status: DISCONTINUED | OUTPATIENT
Start: 2022-01-18 | End: 2022-01-18 | Stop reason: HOSPADM

## 2022-01-18 RX ORDER — FOLIC ACID 1 MG/1
1 TABLET ORAL DAILY
Status: DISCONTINUED | OUTPATIENT
Start: 2022-01-19 | End: 2022-01-22 | Stop reason: HOSPADM

## 2022-01-18 RX ORDER — ROCURONIUM BROMIDE 10 MG/ML
INJECTION, SOLUTION INTRAVENOUS AS NEEDED
Status: DISCONTINUED | OUTPATIENT
Start: 2022-01-18 | End: 2022-01-18 | Stop reason: SURG

## 2022-01-18 RX ORDER — BUPIVACAINE HCL/0.9 % NACL/PF 0.1 %
2 PLASTIC BAG, INJECTION (ML) EPIDURAL ONCE
Status: COMPLETED | OUTPATIENT
Start: 2022-01-18 | End: 2022-01-18

## 2022-01-18 RX ORDER — NALOXONE HCL 0.4 MG/ML
0.4 VIAL (ML) INJECTION AS NEEDED
Status: DISCONTINUED | OUTPATIENT
Start: 2022-01-18 | End: 2022-01-18 | Stop reason: HOSPADM

## 2022-01-18 RX ORDER — LEVOTHYROXINE SODIUM 0.07 MG/1
75 TABLET ORAL
Status: DISCONTINUED | OUTPATIENT
Start: 2022-01-19 | End: 2022-01-22 | Stop reason: HOSPADM

## 2022-01-18 RX ORDER — PROMETHAZINE HYDROCHLORIDE 12.5 MG/1
12.5 SUPPOSITORY RECTAL EVERY 6 HOURS PRN
Status: DISCONTINUED | OUTPATIENT
Start: 2022-01-18 | End: 2022-01-22 | Stop reason: HOSPADM

## 2022-01-18 RX ORDER — OXYCODONE AND ACETAMINOPHEN 10; 325 MG/1; MG/1
1 TABLET ORAL ONCE AS NEEDED
Status: COMPLETED | OUTPATIENT
Start: 2022-01-18 | End: 2022-01-18

## 2022-01-18 RX ORDER — SODIUM CHLORIDE 0.9 % (FLUSH) 0.9 %
10 SYRINGE (ML) INJECTION AS NEEDED
Status: DISCONTINUED | OUTPATIENT
Start: 2022-01-18 | End: 2022-01-22 | Stop reason: HOSPADM

## 2022-01-18 RX ORDER — ASPIRIN 81 MG/1
81 TABLET ORAL DAILY
Status: DISCONTINUED | OUTPATIENT
Start: 2022-01-19 | End: 2022-01-22 | Stop reason: HOSPADM

## 2022-01-18 RX ORDER — NITROGLYCERIN 0.4 MG/1
0.4 TABLET SUBLINGUAL
Status: DISCONTINUED | OUTPATIENT
Start: 2022-01-18 | End: 2022-01-22 | Stop reason: HOSPADM

## 2022-01-18 RX ORDER — SODIUM CHLORIDE, SODIUM LACTATE, POTASSIUM CHLORIDE, CALCIUM CHLORIDE 600; 310; 30; 20 MG/100ML; MG/100ML; MG/100ML; MG/100ML
100 INJECTION, SOLUTION INTRAVENOUS CONTINUOUS
Status: DISCONTINUED | OUTPATIENT
Start: 2022-01-18 | End: 2022-01-18 | Stop reason: HOSPADM

## 2022-01-18 RX ORDER — SODIUM CHLORIDE 0.9 % (FLUSH) 0.9 %
10 SYRINGE (ML) INJECTION AS NEEDED
Status: DISCONTINUED | OUTPATIENT
Start: 2022-01-18 | End: 2022-01-18 | Stop reason: HOSPADM

## 2022-01-18 RX ORDER — FLUMAZENIL 0.1 MG/ML
0.2 INJECTION INTRAVENOUS AS NEEDED
Status: DISCONTINUED | OUTPATIENT
Start: 2022-01-18 | End: 2022-01-18 | Stop reason: HOSPADM

## 2022-01-18 RX ORDER — ONDANSETRON 2 MG/ML
4 INJECTION INTRAMUSCULAR; INTRAVENOUS EVERY 6 HOURS PRN
Status: DISCONTINUED | OUTPATIENT
Start: 2022-01-18 | End: 2022-01-22 | Stop reason: HOSPADM

## 2022-01-18 RX ORDER — PANTOPRAZOLE SODIUM 40 MG/1
40 TABLET, DELAYED RELEASE ORAL
Status: DISCONTINUED | OUTPATIENT
Start: 2022-01-19 | End: 2022-01-22 | Stop reason: HOSPADM

## 2022-01-18 RX ORDER — LORAZEPAM 0.5 MG/1
0.5 TABLET ORAL DAILY PRN
Status: DISCONTINUED | OUTPATIENT
Start: 2022-01-18 | End: 2022-01-22 | Stop reason: HOSPADM

## 2022-01-18 RX ORDER — NEOSTIGMINE METHYLSULFATE 5 MG/5 ML
SYRINGE (ML) INTRAVENOUS AS NEEDED
Status: DISCONTINUED | OUTPATIENT
Start: 2022-01-18 | End: 2022-01-18 | Stop reason: SURG

## 2022-01-18 RX ORDER — PHENYLEPHRINE HCL IN 0.9% NACL 1 MG/10 ML
SYRINGE (ML) INTRAVENOUS AS NEEDED
Status: DISCONTINUED | OUTPATIENT
Start: 2022-01-18 | End: 2022-01-18 | Stop reason: SURG

## 2022-01-18 RX ORDER — ACETAMINOPHEN 500 MG
500 TABLET ORAL ONCE
Status: COMPLETED | OUTPATIENT
Start: 2022-01-18 | End: 2022-01-18

## 2022-01-18 RX ORDER — METOPROLOL SUCCINATE 25 MG/1
25 TABLET, EXTENDED RELEASE ORAL DAILY
Status: DISCONTINUED | OUTPATIENT
Start: 2022-01-19 | End: 2022-01-22 | Stop reason: HOSPADM

## 2022-01-18 RX ORDER — TRAMADOL HYDROCHLORIDE 50 MG/1
50 TABLET ORAL EVERY 6 HOURS PRN
Status: DISCONTINUED | OUTPATIENT
Start: 2022-01-18 | End: 2022-01-22 | Stop reason: HOSPADM

## 2022-01-18 RX ORDER — TRIAMTERENE AND HYDROCHLOROTHIAZIDE 75; 50 MG/1; MG/1
0.5 TABLET ORAL DAILY
Status: DISCONTINUED | OUTPATIENT
Start: 2022-01-19 | End: 2022-01-22 | Stop reason: HOSPADM

## 2022-01-18 RX ADMIN — HYDROCODONE BITARTRATE AND ACETAMINOPHEN 1 TABLET: 5; 325 TABLET ORAL at 23:27

## 2022-01-18 RX ADMIN — Medication 2 G: at 17:32

## 2022-01-18 RX ADMIN — OXYCODONE AND ACETAMINOPHEN 1 TABLET: 325; 10 TABLET ORAL at 19:07

## 2022-01-18 RX ADMIN — ACETAMINOPHEN 500 MG: 500 TABLET ORAL at 15:44

## 2022-01-18 RX ADMIN — FENTANYL CITRATE 100 MCG: 50 INJECTION, SOLUTION INTRAMUSCULAR; INTRAVENOUS at 17:45

## 2022-01-18 RX ADMIN — SODIUM CHLORIDE, PRESERVATIVE FREE 10 ML: 5 INJECTION INTRAVENOUS at 23:27

## 2022-01-18 RX ADMIN — Medication 3 MG: at 18:05

## 2022-01-18 RX ADMIN — SODIUM CHLORIDE, POTASSIUM CHLORIDE, SODIUM LACTATE AND CALCIUM CHLORIDE 100 ML/HR: 600; 310; 30; 20 INJECTION, SOLUTION INTRAVENOUS at 23:27

## 2022-01-18 RX ADMIN — Medication 50 MG: at 17:40

## 2022-01-18 RX ADMIN — PROPOFOL 80 MG: 10 INJECTION, EMULSION INTRAVENOUS at 17:34

## 2022-01-18 RX ADMIN — SUGAMMADEX 200 MG: 100 INJECTION, SOLUTION INTRAVENOUS at 18:13

## 2022-01-18 RX ADMIN — Medication 100 MCG: at 17:49

## 2022-01-18 RX ADMIN — GLYCOPYRROLATE 0.4 MG: 0.2 INJECTION, SOLUTION INTRAMUSCULAR; INTRAVENOUS at 18:05

## 2022-01-18 RX ADMIN — DEXAMETHASONE SODIUM PHOSPHATE 4 MG: 4 INJECTION, SOLUTION INTRA-ARTICULAR; INTRALESIONAL; INTRAMUSCULAR; INTRAVENOUS; SOFT TISSUE at 18:05

## 2022-01-18 RX ADMIN — ROCURONIUM BROMIDE 35 MG: 10 INJECTION INTRAVENOUS at 17:34

## 2022-01-18 RX ADMIN — ONDANSETRON 4 MG: 2 INJECTION INTRAMUSCULAR; INTRAVENOUS at 18:05

## 2022-01-18 RX ADMIN — SODIUM CHLORIDE, POTASSIUM CHLORIDE, SODIUM LACTATE AND CALCIUM CHLORIDE 100 ML/HR: 600; 310; 30; 20 INJECTION, SOLUTION INTRAVENOUS at 15:45

## 2022-01-18 RX ADMIN — LIDOCAINE HYDROCHLORIDE 100 MG: 20 INJECTION, SOLUTION EPIDURAL; INFILTRATION; INTRACAUDAL; PERINEURAL at 17:32

## 2022-01-18 NOTE — ED NOTES
The following fall interventions were initiated:    [x] Patient and/or family given education   [x] Call light within reach and educated on how to use   [x] Bed rails up per protocol    [x] Bed locked and in the lowest position   [] Bed alarm set and on loudest setting   [] Fall wrist band applied   [] Non skid footwear applied   [x] Room free of clutter   [x] Patient items within reach   [x] Adequate lighting provided  [] Falls sign present    [] Patient moved closer to nursing station   [] Restraints applied        Sandhya Ball, RN  01/18/22 4092

## 2022-01-18 NOTE — H&P
[facility] Medicine Services  HISTORY AND PHYSICAL    Date of Admission: 1/18/2022  Primary Care Physician: Jarad Alexis MD    Subjective     Chief Complaint: Left hip fracture.    History of Present Illness  Patient is a 89-year-old male presents ER with complaint of left hip pain.  Patient denies any history of falling.  Patient hip pain started engendered the night 2022.  Patient came to ER then and had x-ray and a CT scan of pelvic which was negative for fracture.  Patient was discharged home with anti-inflammatory.  Patient symptom has been progressing getting worse over time.  Patient presented here with similar symptoms.  CT scan today shows left hip fracture.  Dr. Leo has been notified possible plan for surgery today.  Patient on anticoagulation for atrial fibrillation.  N.p.o. and hold anticoagulation at this time.  Last time patient ate was banana and water at 945 this morning.    Review of Systems   Constitutional: Positive for activity change, appetite change and fatigue. Negative for chills and fever.   HENT: Negative for hearing loss, nosebleeds, tinnitus and trouble swallowing.    Eyes: Negative for visual disturbance.   Respiratory: Negative for cough, chest tightness, shortness of breath and wheezing.    Cardiovascular: Negative for chest pain, palpitations and leg swelling.   Gastrointestinal: Negative for abdominal distention, abdominal pain, blood in stool, constipation, diarrhea, nausea and vomiting.   Endocrine: Negative for cold intolerance, heat intolerance, polydipsia, polyphagia and polyuria.   Genitourinary: Negative for decreased urine volume, difficulty urinating, dysuria, flank pain, frequency and hematuria.   Musculoskeletal: Positive for arthralgias, gait problem and myalgias. Negative for joint swelling.        Left hip pain.   Skin: Negative for rash.   Allergic/Immunologic: Negative for immunocompromised state.   Neurological: Positive  for weakness. Negative for dizziness, syncope, light-headedness and headaches.   Hematological: Negative for adenopathy. Does not bruise/bleed easily.   Psychiatric/Behavioral: Positive for confusion. Negative for sleep disturbance. The patient is not nervous/anxious.         Otherwise complete ROS reviewed and negative except as mentioned in the HPI.      Past Medical History:   Past Medical History:   Diagnosis Date   • Abnormal nuclear stress test    • BPH (benign prostatic hyperplasia)    • Chest pain    • Coronary artery disease    • Disease of thyroid gland    • Hyperlipidemia    • Hypertension    • LV dysfunction    • Melanoma (HCC)    • Prostate CA (HCC)        Past Surgical History:  Past Surgical History:   Procedure Laterality Date   • APPENDECTOMY     • CARDIAC CATHETERIZATION Left 12/10/2012   • CARDIAC ELECTROPHYSIOLOGY PROCEDURE N/A 11/23/2018    Procedure: Pacemaker DC new 11/23/2018;  Surgeon: Austin Granados MD;  Location: [facility] CATH INVASIVE LOCATION;  Service: Cardiology   • CHOLECYSTECTOMY     • COLONOSCOPY N/A 6/9/2017    Procedure: COLONOSCOPY WITH ANESTHESIA;  Surgeon: Felice Marroquin MD;  Location: [facility] ENDOSCOPY;  Service:    • COLONOSCOPY W/ POLYPECTOMY  02/16/2012    adenomatous polyp at 80cm   • HAND SURGERY Right    • HERNIA REPAIR     • INGUINAL HERNIA REPAIR     • KNEE SURGERY     • PROSTATE SURGERY     • SKIN CANCER EXCISION      face   • SKIN LESION EXCISION     • TOTAL HIP ARTHROPLASTY Right 8/27/2021    Procedure: TOTAL HIP REPLACEMENT;  Surgeon: Arik Magaña MD;  Location: [facility] OR;  Service: Orthopedics;  Laterality: Right;       Family History: family history includes Alzheimer's disease in his mother; Cancer in his father and sister.    Social History:  reports that he quit smoking about 37 years ago. His smoking use included cigarettes. He quit after 30.00 years of use. He has never used smokeless tobacco. He reports that he does not drink alcohol and does not use  drugs.    Medications:  Prior to Admission medications    Medication Sig Start Date End Date Taking? Authorizing Provider   allopurinol (ZYLOPRIM) 300 MG tablet TAKE 1 TABLET BY MOUTH EVERY DAY 11/8/21   Francesca Holden APRN   aspirin 81 MG EC tablet Take 81 mg by mouth Daily.    Cm Membreno MD   clotrimazole-betamethasone (Lotrisone) 1-0.05 % cream Apply 1 application topically to the appropriate area as directed 2 (Two) Times a Day. 1/13/22   Jarad Alexis MD   docusate sodium (COLACE) 100 MG capsule Take 100 mg by mouth Daily.    Cm Membreno MD   Easy Touch Lancets 30G/Twist misc TEST DAILY AS DIRECTED  2/17/21   Jarad Alexis MD   Eliquis 5 MG tablet tablet TAKE 1 TABLET BY MOUTH EVERY 12 HOURS 12/1/21   Austin Granados MD   ferrous sulfate 325 (65 FE) MG tablet Take 325 mg by mouth 3 (Three) Times a Day With Meals.    Cm Membreno MD   fluconazole (Diflucan) 100 MG tablet Take 1 tablet by mouth Daily. 1/4/22   Jarad Alexis MD   fluticasone (FLONASE) 50 MCG/ACT nasal spray 1 spray into the nostril(s) as directed by provider Daily As Needed. 12/1/15   Cm Membreno MD   folic acid (FOLVITE) 1 MG tablet Take 1 mg by mouth Daily.    Cm Membreno MD   ibuprofen (ADVIL,MOTRIN) 200 MG tablet Take 2 tablets by mouth 3 (Three) Times a Day. 1/9/22   Stefan Hong MD   levothyroxine (SYNTHROID, LEVOTHROID) 75 MCG tablet TAKE 1 TABLET BY MOUTH EVERY DAY  5/25/21   Francesca Holden APRN   LORazepam (ATIVAN) 1 MG tablet TAKE 1 TABLET BY MOUTH EVERY DAY 11/30/21   Jarad Alexis MD   methotrexate 2.5 MG tablet Take 17.5 mg by mouth 1 (One) Time Per Week.    Cm Membreno MD   metoprolol succinate XL (TOPROL-XL) 25 MG 24 hr tablet TAKE 1 TABLET BY MOUTH EVERY DAY  3/10/21   Austin Granados MD   nitroglycerin (NITROSTAT) 0.4 MG SL tablet Place 1 tablet under the tongue Every 5 (Five) Minutes As Needed for Chest Pain. Take no more than 3 doses in 15 minutes.  "11/24/21   Austin Granados MD   pantoprazole (PROTONIX) 40 MG EC tablet TAKE 1 TABLET BY MOUTH EVERY DAY  3/2/21   Jarad Alexis MD   predniSONE (DELTASONE) 5 MG tablet Take 5 mg by mouth Daily.    Provider, MD Cm   traMADol (ULTRAM) 50 MG tablet Take 1 tablet by mouth Every 6 (Six) Hours As Needed for Moderate Pain . 1/13/22   Jarad Alexis MD   triamterene-hydrochlorothiazide (MAXZIDE) 75-50 MG per tablet TAKE 1/2 TABLET BY MOUTH EVERY DAY 11/23/21   Jarad Alexis MD   True Metrix Blood Glucose Test test strip TEST DAILY AS DIRECTED  2/17/21   Jarad Alexis MD     Allergies:  Allergies   Allergen Reactions   • Adhesive Tape    • Simvastatin Other (See Comments)     Abnormal LFT's   • Neosporin [Neomycin-Bacitracin Zn-Polymyx] Rash       Objective     Vital Signs: /56   Pulse 82   Temp 97.9 °F (36.6 °C)   Resp 18   Ht 182.9 cm (72\")   Wt 77.1 kg (170 lb)   SpO2 93%   BMI 23.06 kg/m²   Physical Exam  Vitals and nursing note reviewed.   Constitutional:       Appearance: He is well-developed.      Comments: Advanced age.  Patient is presently joking around.   HENT:      Head: Normocephalic.   Eyes:      Conjunctiva/sclera: Conjunctivae normal.      Pupils: Pupils are equal, round, and reactive to light.   Neck:      Vascular: No JVD.   Cardiovascular:      Rate and Rhythm: Normal rate and regular rhythm.      Heart sounds: Normal heart sounds. No murmur heard.  No friction rub. No gallop.       Comments: Pacing.  Pulmonary:      Effort: No respiratory distress.      Breath sounds: No wheezing or rales.      Comments: Diminished breath sound, clear .  Chest:      Chest wall: No tenderness.   Abdominal:      General: Bowel sounds are normal. There is no distension.      Palpations: Abdomen is soft.      Tenderness: There is no abdominal tenderness. There is no guarding or rebound.   Musculoskeletal:         General: No tenderness or deformity.      Cervical back: Neck supple.      " Comments: Left hip pain.  Left hip fracture.   Skin:     General: Skin is warm and dry.      Findings: No rash.   Neurological:      Mental Status: He is alert and oriented to person, place, and time.      Cranial Nerves: No cranial nerve deficit.      Motor: Weakness present. No abnormal muscle tone.      Coordination: Coordination abnormal.      Gait: Gait abnormal.      Deep Tendon Reflexes: Reflexes normal.   Psychiatric:         Behavior: Behavior normal.             Results Reviewed:    Lab Results (last 24 hours)     Procedure Component Value Units Date/Time    COVID-19,[facility] IN-HOUSE,Nasal Swab No Transport Media 3-4 HR TAT - Swab, Nasal Cavity [[id number]] Collected: 01/18/22 1117    Specimen: Swab from Nasal Cavity Updated: 01/18/22 1126    Comprehensive Metabolic Panel [[id number]]  (Abnormal) Collected: 01/18/22 0855    Specimen: Blood from Arm, Left Updated: 01/18/22 1059     Glucose 152 mg/dL      BUN 26 mg/dL      Creatinine 1.06 mg/dL      Sodium 138 mmol/L      Potassium 4.1 mmol/L      Chloride 98 mmol/L      CO2 32.0 mmol/L      Calcium 10.5 mg/dL      Total Protein 7.3 g/dL      Albumin 3.70 g/dL      ALT (SGPT) 10 U/L      AST (SGOT) 20 U/L      Alkaline Phosphatase 106 U/L      Total Bilirubin 0.4 mg/dL      eGFR Non African Amer 66 mL/min/1.73      Globulin 3.6 gm/dL      A/G Ratio 1.0 g/dL      BUN/Creatinine Ratio 24.5     Anion Gap 8.0 mmol/L     Narrative:      GFR Normal >60  Chronic Kidney Disease <60  Kidney Failure <15      C-reactive Protein [[id number]]  (Abnormal) Collected: 01/18/22 0855    Specimen: Blood from Arm, Left Updated: 01/18/22 0936     C-Reactive Protein 2.87 mg/dL     Sedimentation Rate [[id number]]  (Abnormal) Collected: 01/18/22 0855    Specimen: Blood from Arm, Left Updated: 01/18/22 0927     Sed Rate 25 mm/hr     Protime-INR [[id number]]  (Abnormal) Collected: 01/18/22 0855    Specimen: Blood from Arm, Left Updated: 01/18/22 0926     Protime 16.1 Seconds      INR  1.34    aPTT [[id number]]  (Abnormal) Collected: 01/18/22 0855    Specimen: Blood from Arm, Left Updated: 01/18/22 0926     PTT 35.9 seconds     CBC & Differential [[id number]]  (Abnormal) Collected: 01/18/22 0855    Specimen: Blood from Arm, Left Updated: 01/18/22 0916    Narrative:      The following orders were created for panel order CBC & Differential.  Procedure                               Abnormality         Status                     ---------                               -----------         ------                     CBC Auto Differential[[id number]]        Abnormal            Final result                 Please view results for these tests on the individual orders.    CBC Auto Differential [[id number]]  (Abnormal) Collected: 01/18/22 0855    Specimen: Blood from Arm, Left Updated: 01/18/22 0916     WBC 7.87 10*3/mm3      RBC 3.37 10*6/mm3      Hemoglobin 10.4 g/dL      Hematocrit 32.3 %      MCV 95.8 fL      MCH 30.9 pg      MCHC 32.2 g/dL      RDW 16.3 %      RDW-SD 56.9 fl      MPV 9.8 fL      Platelets 200 10*3/mm3      Neutrophil % 72.0 %      Lymphocyte % 16.8 %      Monocyte % 8.1 %      Eosinophil % 1.7 %      Basophil % 0.4 %      Immature Grans % 1.0 %      Neutrophils, Absolute 5.67 10*3/mm3      Lymphocytes, Absolute 1.32 10*3/mm3      Monocytes, Absolute 0.64 10*3/mm3      Eosinophils, Absolute 0.13 10*3/mm3      Basophils, Absolute 0.03 10*3/mm3      Immature Grans, Absolute 0.08 10*3/mm3      nRBC 0.0 /100 WBC            Radiology Data:    Imaging Results (Last 24 Hours)     Procedure Component Value Units Date/Time    XR Chest 1 View [[id number]] Collected: 01/18/22 1057     Updated: 01/18/22 1102    Narrative:      EXAM: XR CHEST 1 VW- 1/18/2022 10:47 AM CST     HISTORY: pre-op       COMPARISON: December 1, 2020.     TECHNIQUE: Frontal radiograph of the chest     FINDINGS:   The lungs are clear. A radiolucent patch projects over the right chest..  Cardiac silhouette is normal. Pacing device  present soft tissues of the  left chest.      The osseous structures and surrounding soft tissues demonstrate no acute  abnormality.          Impression:      1. No radiographic evidence of acute cardiopulmonary process.        This report was finalized on 01/18/2022 10:59 by Dr. Damien Boland MD.    CT Pelvis Without Contrast [[id number]] Collected: 01/18/22 0957     Updated: 01/18/22 1005    Narrative:      EXAMINATION: CT pelvis without contrast 1/18/2022     HISTORY: Left hip pain. Evaluate for sacral insufficiency fracture     FINDINGS: Multiple contiguous axials are obtained through the bony  pelvis at 3 mm intervals with reformatted images obtained in the  sagittal and coronal projections from the original data set.     There is degenerative disc disease at L5-S1 with grade 1 anterolisthesis  of L5 on S1 likely representing subluxation at the level of the facets.  The patient has undergone previous right total hip arthroplasty. There  is a mildly displaced impacted subcapital fracture of the left hip.  There is advanced osteoarthrosis of the left hip with near complete loss  of joint space and spurring of both the acetabular roof and femoral  head. No additional fractures are present.       Impression:      1.. Minimally displaced mildly impacted subcapital fracture of the left  hip. There is osteoarthrosis of the left hip.  2. The patient is status post previous right total hip arthroplasty. No  additional fractures are present.  3. There is facet overgrowth within the mid and lower lumbar spine.  There is grade 1 anterolisthesis of L5 on S1 felt to represent  subluxation at the level of the facets.  This report was finalized on 01/18/2022 10:01 by Dr. Obinna Luna MD.    CT Lumbar Spine Without Contrast [[id number]] Collected: 01/18/22 0957     Updated: 01/18/22 1004    Narrative:      EXAMINATION: CT LUMBAR SPINE WITHOUT IV CONTRAST 1/18/2022     COMPARISON: None.     HISTORY: Male, 87 years-old. Back  pain, r/o sacral insufficiency  fracture     TECHNIQUE: Multiple CT images were obtained of the lumbar spine without  IV contrast. The images were formatted in the axial, coronal and  sagittal planes.     Radiation dose equals  mGy-cm.  Automated exposure control dose  reduction technique was implemented.     FINDINGS:   Severe demineralization of the skeleton limits bony details.  Grade 1 anterolisthesis of L5 on S1.No acute compression deformity. No  dislocation. No abnormal soft tissue density spinal canal.There is  degenerative disc disease at multiple levels, worst at L5-S1.There is no  significant spinal canal stenosis. Moderate to severe right and severe  left foraminal stenosis at L5-S1. Moderate bilateral foraminal stenosis  at L3-L4 and L4-5.          Impression:      1.  Demineralized skeleton limits bony details.  2.  No acute osseous post traumatic findings.  3.  Foraminal stenosis, worst on the left at L5-S1.        This report was finalized on 01/18/2022 10:01 by Dr. Radha Jimenez MD.          I have personally reviewed and interpreted the radiology studies and ECG obtained at time of admission.     Assessment / Plan      Assessment & Plan  Active Hospital Problems    Diagnosis    • Hip fracture (HCC)    • Gastroesophageal reflux disease    • Pernicious anemia    • PAF (paroxysmal atrial fibrillation) (CMS/HCC) on Eliquis       Chads vas score 3     • Sick sinus syndrome (HCC)    • Sick sinus syndrome s/p pacemaker     • Single vessel coronary artery disease      Heart cath in Jan. - single vessel CAD per Dr. Granados - med. mgmt. only     • Essential hypertension    • Mixed hyperlipidemia      Plans.   Left hip fracture/foraminal stenosis/demineralized skeleton.  Plan for surgery today.  Consulted peds.  CT scan of the lumbar spine-demineralized skeleton limits bony details, No acute osseous post traumatic findings, Foraminal stenosis- worst on the left at L5-S1.  CT scan of pelvic- Minimally  displaced mildly impacted subcapital fracture of the left hip- osteoarthrosis of the left hip, status post previous right total hip arthroplasty, facet overgrowth within the mid and lower lumbar spine, grade 1 anterolisthesis of L5 on S1 felt to represent subluxation at the level of the facets.    Sick sinus syndrome/pacemaker in place/CAD/atrial fibrillation.  Hold anticoagulation-planning for surgery.  Continue aspirin.  Hold Eliquis for surgery.  Continue metoprolol.  Nitro as needed.  Continue Maxide.  Echocardiogram 3/5/2021- ejection fraction 56 to 60%, mild concentric hypertrophy, diastolic function normal, tricuspid regurgitation.  Chest x-ray-no radiographic evidence of acute cardiopulmonary process.    Gout.  Continue allopurinol.    Constipation.  Colace.    Anemia.  Iron sulfate.    Arthritis/chronic pain.  Hold ibuprofen.  Hold methotrexate.  Continue prednisone.  Ultram as needed    Hypothyroidism.  Continue Synthroid.    Reflux.  Protonix.    Anxiety.  Ativan as needed.    Nutrition.  Folic acid.    Code Status: DNR.  DNI.  The patient and make medical decision for himself, his wife Amirah will make it for him.     I discussed the patient's findings and my recommendations with: Patient .    Estimated length of stay: 2 to 5 days    Electronically signed by Sg Ashton MD, 01/18/22, 11:31 AM CST.

## 2022-01-18 NOTE — ED PROVIDER NOTES
Subjective   Patient is a 89-year-old male who presents the ER today with complaint of left hip pain.  The patient reports that he began to have left hip pain on January 9, 2022.  The patient was seen at this facility at that time.  The patient had a normal x-ray and normal CT scan of the hip.  He was discharged home with anti-inflammatories.  He states that the pain did not go away and that it is worse with movement.  He states when he is lying still he has no pain but when he tries to walk it hurts.  He saw his primary care provider on January 13, 2022.  They tried a trial of tramadol and was going to repeat the x-ray in 1 week.  The patient states that the pain has continued and due to this they decided to come back to the ER today for further evaluation.  He denies any recent falls or injuries.  He states that he previously had the same type of pain with his right hip and was found to have a fracture and had to have a hip replacement. He reports that he does have occasional low back pain. Pt reports no back pain at this time. Pt reports that he is on elqiuis; did take a dose this AM prior to ER arrival. Presents to ER today for further evaluation.       History provided by:  Patient   used: No    Hip Pain  Location:  Lt hip, lt low back pain  Severity:  Moderate  Onset quality:  Sudden  Duration:  9 days  Timing:  Constant  Progression:  Unchanged  Chronicity:  New  Associated symptoms: no abdominal pain, no chest pain, no congestion, no cough, no diarrhea, no ear pain, no fatigue, no fever, no headaches, no loss of consciousness, no myalgias, no nausea, no rash, no rhinorrhea, no shortness of breath, no sore throat, no vomiting and no wheezing        Review of Systems   Constitutional: Negative for fatigue and fever.   HENT: Negative for congestion, ear pain, rhinorrhea and sore throat.    Respiratory: Negative for cough, shortness of breath and wheezing.    Cardiovascular: Negative for  chest pain.   Gastrointestinal: Negative for abdominal pain, diarrhea, nausea and vomiting.   Musculoskeletal: Negative for myalgias.   Skin: Negative for rash.   Neurological: Negative for loss of consciousness and headaches.   All other systems reviewed and are negative.      Past Medical History:   Diagnosis Date   • Abnormal nuclear stress test    • BPH (benign prostatic hyperplasia)    • Chest pain    • Coronary artery disease    • Disease of thyroid gland    • Hyperlipidemia    • Hypertension    • LV dysfunction    • Melanoma (HCC)    • Prostate CA (HCC)        Allergies   Allergen Reactions   • Adhesive Tape    • Simvastatin Other (See Comments)     Abnormal LFT's   • Neosporin [Neomycin-Bacitracin Zn-Polymyx] Rash       Past Surgical History:   Procedure Laterality Date   • APPENDECTOMY     • CARDIAC CATHETERIZATION Left 12/10/2012   • CARDIAC ELECTROPHYSIOLOGY PROCEDURE N/A 11/23/2018    Procedure: Pacemaker DC new 11/23/2018;  Surgeon: Austin Granados MD;  Location: [facility] CATH INVASIVE LOCATION;  Service: Cardiology   • CHOLECYSTECTOMY     • COLONOSCOPY N/A 6/9/2017    Procedure: COLONOSCOPY WITH ANESTHESIA;  Surgeon: Felice Marroquin MD;  Location: [facility] ENDOSCOPY;  Service:    • COLONOSCOPY W/ POLYPECTOMY  02/16/2012    adenomatous polyp at 80cm   • HAND SURGERY Right    • HERNIA REPAIR     • INGUINAL HERNIA REPAIR     • KNEE SURGERY     • PROSTATE SURGERY     • SKIN CANCER EXCISION      face   • SKIN LESION EXCISION     • TOTAL HIP ARTHROPLASTY Right 8/27/2021    Procedure: TOTAL HIP REPLACEMENT;  Surgeon: Arik Magaña MD;  Location: [facility] OR;  Service: Orthopedics;  Laterality: Right;       Family History   Problem Relation Age of Onset   • Alzheimer's disease Mother    • Cancer Father    • Cancer Sister        Social History     Socioeconomic History   • Marital status:    Tobacco Use   • Smoking status: Former Smoker     Years: 30.00     Types: Cigarettes     Quit date: 1985     Years  since quittin.0   • Smokeless tobacco: Never Used   Vaping Use   • Vaping Use: Never used   Substance and Sexual Activity   • Alcohol use: No   • Drug use: No   • Sexual activity: Not Currently     Partners: Female           Objective   Physical Exam  Vitals and nursing note reviewed.   Constitutional:       Appearance: Normal appearance.   HENT:      Head: Normocephalic and atraumatic.      Mouth/Throat:      Pharynx: Oropharynx is clear.   Eyes:      Conjunctiva/sclera: Conjunctivae normal.   Cardiovascular:      Rate and Rhythm: Normal rate and regular rhythm.   Pulmonary:      Effort: Pulmonary effort is normal.      Breath sounds: Normal breath sounds.   Abdominal:      General: Bowel sounds are normal.      Palpations: Abdomen is soft.   Musculoskeletal:      Comments: Pain to lt lateral and posterior hip, no swelling, erythema or ecchymosis noted, pedal pulses 2+, +CMS, neuorvasculuarly intact, no rotation or shortening. Pt does report intermittent pain to lt lower back, no swelling or erythema noted, no pain to palpation at this time, no laxictiy or step-off noted, pt able to move all ext, neurologically intact   Skin:     General: Skin is warm and dry.      Capillary Refill: Capillary refill takes less than 2 seconds.   Neurological:      General: No focal deficit present.      Mental Status: He is alert.   Psychiatric:         Mood and Affect: Mood normal.         Procedures           ED Course  ED Course as of 22 1107   Tue 2022   0943 Discussed pt case with Dr. Humphrey; will order CT scans; pt has pacemaker so unable to do MRI.  [LF]   1030 Reviewed CT scans; call placed to Dr. Sheehan, orthopedics.  [LF]   1044 I spoke with ROMELIA Guillen for Dr. Sheehan. He has asked that pt remain NPO; will see pt later today. Has asked that pt be admitted to medicine. Call placed to hospitalist. [LF]   1047 Pt last ate/drank 0945 (banana, water).  [LF]   1105 Pt case discussed with Dr. Doss; pt  will be admitted at this time in stable cond.  [LF]      ED Course User Index  [LF] Joy Albert M, APRN                                         XR Chest 1 View   Final Result   1. No radiographic evidence of acute cardiopulmonary process.           This report was finalized on 01/18/2022 10:59 by Dr. Damien Boland MD.      CT Lumbar Spine Without Contrast   Final Result   1.  Demineralized skeleton limits bony details.   2.  No acute osseous post traumatic findings.   3.  Foraminal stenosis, worst on the left at L5-S1.           This report was finalized on 01/18/2022 10:01 by Dr. Radha Jimenez MD.      CT Pelvis Without Contrast   Final Result   1.. Minimally displaced mildly impacted subcapital fracture of the left   hip. There is osteoarthrosis of the left hip.   2. The patient is status post previous right total hip arthroplasty. No   additional fractures are present.   3. There is facet overgrowth within the mid and lower lumbar spine.   There is grade 1 anterolisthesis of L5 on S1 felt to represent   subluxation at the level of the facets.   This report was finalized on 01/18/2022 10:01 by Dr. Obinna Luna MD.        Labs Reviewed   COMPREHENSIVE METABOLIC PANEL - Abnormal; Notable for the following components:       Result Value    Glucose 152 (*)     BUN 26 (*)     CO2 32.0 (*)     All other components within normal limits    Narrative:     GFR Normal >60  Chronic Kidney Disease <60  Kidney Failure <15     PROTIME-INR - Abnormal; Notable for the following components:    Protime 16.1 (*)     INR 1.34 (*)     All other components within normal limits   APTT - Abnormal; Notable for the following components:    PTT 35.9 (*)     All other components within normal limits   C-REACTIVE PROTEIN - Abnormal; Notable for the following components:    C-Reactive Protein 2.87 (*)     All other components within normal limits   SEDIMENTATION RATE - Abnormal; Notable for the following components:    Sed Rate 25  (*)     All other components within normal limits   CBC WITH AUTO DIFFERENTIAL - Abnormal; Notable for the following components:    RBC 3.37 (*)     Hemoglobin 10.4 (*)     Hematocrit 32.3 (*)     RDW 16.3 (*)     RDW-SD 56.9 (*)     Lymphocyte % 16.8 (*)     Immature Grans % 1.0 (*)     Immature Grans, Absolute 0.08 (*)     All other components within normal limits   COVID-19,[facility] NO TRANSPORT MEDIA 2 HR TAT   URINALYSIS W/ CULTURE IF INDICATED   TYPE AND SCREEN   CBC AND DIFFERENTIAL    Narrative:     The following orders were created for panel order CBC & Differential.  Procedure                               Abnormality         Status                     ---------                               -----------         ------                     CBC Auto Differential[[id number]]        Abnormal            Final result                 Please view results for these tests on the individual orders.             MDM  Number of Diagnoses or Management Options  Closed fracture of left hip, initial encounter (HCC): new and requires workup     Amount and/or Complexity of Data Reviewed  Clinical lab tests: ordered and reviewed  Tests in the radiology section of CPT®: reviewed and ordered  Tests in the medicine section of CPT®: ordered  Decide to obtain previous medical records or to obtain history from someone other than the patient: yes  Discuss the patient with other providers: yes    Patient Progress  Patient progress: stable      Final diagnoses:   Closed fracture of left hip, initial encounter (HCC)       ED Disposition  ED Disposition     ED Disposition Condition Comment    Decision to Admit  Level of Care: Med/Surg [1]   Diagnosis: Hip fracture (HCC) [126567]   Admitting Physician: FAYE SWAIN [[id number]]   Attending Physician: FAYE SWAIN [[id number]]   Isolate for COVID?: No [0]   Certification: I Certify That Inpatient Hospital Services Are Medically Necessary For Greater Than 2 Midnights             No follow-up provider specified.       Medication List      No changes were made to your prescriptions during this visit.          Joy Albert, APRN  01/18/22 1107

## 2022-01-18 NOTE — ANESTHESIA PREPROCEDURE EVALUATION
Anesthesia Evaluation     Patient summary reviewed   no history of anesthetic complications:  NPO Solid Status: > 6 hours  NPO Liquid Status: > 8 hours           Airway   Mallampati: IV  TM distance: <3 FB  Neck ROM: limited  Difficult intubation highly probable  Dental    (+) upper dentures    Pulmonary    (+) a smoker (quit 1985) Former,   Cardiovascular   Exercise tolerance: poor (<4 METS)    PT is on anticoagulation therapy  Patient on routine beta blocker and Beta blocker given within 24 hours of surgery    (+) pacemaker (complete heart block ) pacemaker interrogated 1-3 months ago, hypertension, CAD, dysrhythmias (complete heart block) Atrial Fib, Atrial Flutter, hyperlipidemia,     ROS comment: · Left ventricular wall thickness is consistent with mild concentric hypertrophy.  · Left ventricular ejection fraction appears to be 56 - 60%. Left ventricular systolic function is normal.  · Abnormal global longitudinal LV strain (GLS) = -13%.  · Left ventricular diastolic function was normal.  · Estimated right ventricular systolic pressure from tricuspid regurgitation is normal (<35 mmHg).        Neuro/Psych  (-) seizures, TIA, CVA  GI/Hepatic/Renal/Endo    (+)  GERD,  renal disease CRI, thyroid problem hypothyroidism  (-) liver disease, diabetes    Musculoskeletal     Abdominal    Substance History      OB/GYN          Other   arthritis (ankylosing spondylitis), blood dyscrasia anemia,   history of cancer (prostate cancer) remission      Other Comment: 5 mg prednisone daily                    Anesthesia Plan    ASA 3     general   (Wife reports pt had eliquis today. Pacemaker interrogation in recovery. Minimal c-spine extension; perioperative suspension of DNR after clear conversation with pt--he expressed understanding well )  intravenous induction     Anesthetic plan, all risks, benefits, and alternatives have been provided, discussed and informed consent has been obtained with: patient.

## 2022-01-18 NOTE — CONSULTS
Orthopaedic Inpatient Consultation    NAME:  Dexter Suggs   : 1934  MRN: [id number]    2022  8:28 AM    Requesting Physician: Dr. Ashton     CHIEF COMPLAINT:  left hip pain      HISTORY OF PRESENT ILLNESS:   The patient is a 87 y.o. male with left hip pain that began in early January. He was evaluated at that time and was sent home. He denies any falls or trauma. CT Scan today confirms a nondisplaced hip fracture.  Pain is located in the left hip, rated a 2/5, dull and constant, worse with movement, better with rest and medication.  There are no associated symptoms.      Past Medical History:    Past Medical History:   Diagnosis Date   • Abnormal nuclear stress test    • BPH (benign prostatic hyperplasia)    • Chest pain    • Coronary artery disease    • Disease of thyroid gland    • Hyperlipidemia    • Hypertension    • LV dysfunction    • Melanoma (HCC)    • Prostate CA (HCC)        Past Surgical History:    Past Surgical History:   Procedure Laterality Date   • APPENDECTOMY     • CARDIAC CATHETERIZATION Left 12/10/2012   • CARDIAC ELECTROPHYSIOLOGY PROCEDURE N/A 2018    Procedure: Pacemaker DC new 2018;  Surgeon: Austin Granados MD;  Location: [facility] CATH INVASIVE LOCATION;  Service: Cardiology   • CHOLECYSTECTOMY     • COLONOSCOPY N/A 2017    Procedure: COLONOSCOPY WITH ANESTHESIA;  Surgeon: Felice Marroquin MD;  Location: [facility] ENDOSCOPY;  Service:    • COLONOSCOPY W/ POLYPECTOMY  2012    adenomatous polyp at 80cm   • HAND SURGERY Right    • HERNIA REPAIR     • INGUINAL HERNIA REPAIR     • KNEE SURGERY     • PROSTATE SURGERY     • SKIN CANCER EXCISION      face   • SKIN LESION EXCISION     • TOTAL HIP ARTHROPLASTY Right 2021    Procedure: TOTAL HIP REPLACEMENT;  Surgeon: Arik Magaña MD;  Location: [facility] OR;  Service: Orthopedics;  Laterality: Right;       Current Medications:   Prior to Admission medications    Medication Sig Start Date End Date Taking?  Authorizing Provider   allopurinol (ZYLOPRIM) 300 MG tablet TAKE 1 TABLET BY MOUTH EVERY DAY 11/8/21   Francesca Holden APRN   aspirin 81 MG EC tablet Take 81 mg by mouth Daily.    Cm Membreno MD   clotrimazole-betamethasone (Lotrisone) 1-0.05 % cream Apply 1 application topically to the appropriate area as directed 2 (Two) Times a Day. 1/13/22   Jarad Alexis MD   docusate sodium (COLACE) 100 MG capsule Take 100 mg by mouth Daily.    Cm Membreno MD   Easy Touch Lancets 30G/Twist misc TEST DAILY AS DIRECTED  2/17/21   Jarad Alexis MD   Eliquis 5 MG tablet tablet TAKE 1 TABLET BY MOUTH EVERY 12 HOURS 12/1/21   Austin Granados MD   ferrous sulfate 325 (65 FE) MG tablet Take 325 mg by mouth 3 (Three) Times a Day With Meals.    Cm Membreno MD   fluconazole (Diflucan) 100 MG tablet Take 1 tablet by mouth Daily. 1/4/22   Jarad Alexis MD   fluticasone (FLONASE) 50 MCG/ACT nasal spray 1 spray into the nostril(s) as directed by provider Daily As Needed. 12/1/15   Cm Membreno MD   folic acid (FOLVITE) 1 MG tablet Take 1 mg by mouth Daily.    Cm Membreno MD   ibuprofen (ADVIL,MOTRIN) 200 MG tablet Take 2 tablets by mouth 3 (Three) Times a Day. 1/9/22   Stefan Hong MD   levothyroxine (SYNTHROID, LEVOTHROID) 75 MCG tablet TAKE 1 TABLET BY MOUTH EVERY DAY  5/25/21   Francesca Holden APRN   LORazepam (ATIVAN) 1 MG tablet TAKE 1 TABLET BY MOUTH EVERY DAY 11/30/21   Jarad Alexis MD   methotrexate 2.5 MG tablet Take 17.5 mg by mouth 1 (One) Time Per Week.    Cm Membreno MD   metoprolol succinate XL (TOPROL-XL) 25 MG 24 hr tablet TAKE 1 TABLET BY MOUTH EVERY DAY  3/10/21   Austin Granados MD   nitroglycerin (NITROSTAT) 0.4 MG SL tablet Place 1 tablet under the tongue Every 5 (Five) Minutes As Needed for Chest Pain. Take no more than 3 doses in 15 minutes. 11/24/21   Austin Granados MD   pantoprazole (PROTONIX) 40 MG EC tablet TAKE 1 TABLET BY MOUTH EVERY  "DAY  3/2/21   Jarad Alexis MD   predniSONE (DELTASONE) 5 MG tablet Take 5 mg by mouth Daily.    Provider, MD Cm   traMADol (ULTRAM) 50 MG tablet Take 1 tablet by mouth Every 6 (Six) Hours As Needed for Moderate Pain . 22   Jarad Alexis MD   triamterene-hydrochlorothiazide (MAXZIDE) 75-50 MG per tablet TAKE 1/2 TABLET BY MOUTH EVERY DAY 21   Jarad Alexis MD   True Metrix Blood Glucose Test test strip TEST DAILY AS DIRECTED  21   Jarad Alexis MD       Allergies:  Adhesive tape, Simvastatin, and Neosporin [neomycin-bacitracin zn-polymyx]    Social History:   Social History     Socioeconomic History   • Marital status:    Tobacco Use   • Smoking status: Former Smoker     Years: 30.00     Types: Cigarettes     Quit date:      Years since quittin.0   • Smokeless tobacco: Never Used   Vaping Use   • Vaping Use: Never used   Substance and Sexual Activity   • Alcohol use: No   • Drug use: No   • Sexual activity: Not Currently     Partners: Female       Family History:   Family History   Problem Relation Age of Onset   • Alzheimer's disease Mother    • Cancer Father    • Cancer Sister        REVIEW OF SYSTEMS:  14 point review of systems has been reviewed from the patient's emergency room visit, reviewed with the patient on today's date with no new changes.    PHYSICAL EXAM:      Physical Examination:  Vitals:   Vitals:    22 0837 22 0849 22 0903 22 1003   BP:  156/71 146/65 122/56   Pulse: 88  76 82   Resp: 18      Temp: 97.9 °F (36.6 °C)      SpO2: 97%  97% 93%   Weight: 77.1 kg (170 lb)      Height: 182.9 cm (72\")        General:  Appears stated age, no distress.  Orientation:  Alert and oriented to time, place, and person.  Mood and Affect:  Cooperative and pleasant.  Gait:  Resting comfortably in bed.  Cardiovascular:  Symmetric 1-2 plus pulses in upper and lower extremities.  Lymph:  No cervical or inguinal lymphadenopathy " noted.  Sensation:  Grossly intact to light touch.  DTR:  Normal, no pathologic reflexes.  Coordination/balance:  Normal    Musculoskeletal:  Right upper extremity exam:  There is no tenderness to palpation about the shoulder, elbow, wrist or hand.  Full motion.  Stability normal with provocative tests, 5/5 strength, and skin is normal.      Left upper extremity exam:  There is no tenderness to palpation about the shoulder, elbow, wrist or hand. Full motion.  Stability normal with provocative tests, 5/5 strength, and skin is normal.     Right lower extremity exam:  There is no tenderness to palpation about the hip, knee, ankle or foot.  Full motion  Stability normal with provocative tests, 5/5 strength, and skin is normal.     Left lower extremity exam:  Tenderness in left hip, groin pain with internal and external rotation. skin is normal.      DATA:    LAB RESULTS:      Lab 01/18/22  0855   WBC 7.87   HEMOGLOBIN 10.4*   HEMATOCRIT 32.3*   PLATELETS 200   NEUTROS ABS 5.67   IMMATURE GRANS (ABS) 0.08*   LYMPHS ABS 1.32   MONOS ABS 0.64   EOS ABS 0.13   MCV 95.8   SED RATE 25*   CRP 2.87*   PROTIME 16.1*   APTT 35.9*         Lab 01/18/22  0855   SODIUM 138   POTASSIUM 4.1   CHLORIDE 98   CO2 32.0*   ANION GAP 8.0   BUN 26*   CREATININE 1.06   GLUCOSE 152*   CALCIUM 10.5         Lab 01/18/22  0855   TOTAL PROTEIN 7.3   ALBUMIN 3.70   GLOBULIN 3.6   ALT (SGPT) 10   AST (SGOT) 20   BILIRUBIN 0.4   ALK PHOS 106         Lab 01/18/22  0855   PROTIME 16.1*   INR 1.34*             Lab 01/18/22  1157   ABO TYPING O   RH TYPING Positive   ANTIBODY SCREEN Negative         Brief Urine Lab Results  (Last result in the past 365 days)      Color   Clarity   Blood   Leuk Est   Nitrite   Protein   CREAT   Urine HCG        01/18/22 1154 Yellow   Cloudy   Negative   Moderate (2+)   Negative   30 mg/dL (1+)               Microbiology Results (last 10 days)     Procedure Component Value - Date/Time    COVID-19,[facility] IN-HOUSE,Nasal  Swab No Transport Media 3-4 HR TAT - Swab, Nasal Cavity [[id number]]  (Normal) Collected: 01/18/22 1117    Lab Status: Final result Specimen: Swab from Nasal Cavity Updated: 01/18/22 1215     COVID19 Not Detected    Narrative:      Fact sheet for providers: https://www.fda.gov/media/467139/download     Fact sheet for patients: https://www.[employer].gov/media/763166/download    Test performed by PCR.    Consider negative results in combination with clinical observations, patient history, and epidemiological information.             ----------------------------------------------------------------------------------------------------------------------  I have reviewed the radiology images above and agree with the findings dictated below    Radiology: CT Lumbar Spine Without Contrast    Result Date: 1/18/2022  EXAMINATION: CT LUMBAR SPINE WITHOUT IV CONTRAST 1/18/2022  COMPARISON: None.  HISTORY: Male, 87 years-old. Back pain, r/o sacral insufficiency fracture  TECHNIQUE: Multiple CT images were obtained of the lumbar spine without IV contrast. The images were formatted in the axial, coronal and sagittal planes.  Radiation dose equals  mGy-cm.  Automated exposure control dose reduction technique was implemented.  FINDINGS: Severe demineralization of the skeleton limits bony details. Grade 1 anterolisthesis of L5 on S1.No acute compression deformity. No dislocation. No abnormal soft tissue density spinal canal.There is degenerative disc disease at multiple levels, worst at L5-S1.There is no significant spinal canal stenosis. Moderate to severe right and severe left foraminal stenosis at L5-S1. Moderate bilateral foraminal stenosis at L3-L4 and L4-5.       1.  Demineralized skeleton limits bony details. 2.  No acute osseous post traumatic findings. 3.  Foraminal stenosis, worst on the left at L5-S1.   This report was finalized on 01/18/2022 10:01 by Dr. Rahda Jimenez MD.    CT Pelvis Without Contrast    Result Date:  1/18/2022  EXAMINATION: CT pelvis without contrast 1/18/2022  HISTORY: Left hip pain. Evaluate for sacral insufficiency fracture  FINDINGS: Multiple contiguous axials are obtained through the bony pelvis at 3 mm intervals with reformatted images obtained in the sagittal and coronal projections from the original data set.  There is degenerative disc disease at L5-S1 with grade 1 anterolisthesis of L5 on S1 likely representing subluxation at the level of the facets. The patient has undergone previous right total hip arthroplasty. There is a mildly displaced impacted subcapital fracture of the left hip. There is advanced osteoarthrosis of the left hip with near complete loss of joint space and spurring of both the acetabular roof and femoral head. No additional fractures are present.      1.. Minimally displaced mildly impacted subcapital fracture of the left hip. There is osteoarthrosis of the left hip. 2. The patient is status post previous right total hip arthroplasty. No additional fractures are present. 3. There is facet overgrowth within the mid and lower lumbar spine. There is grade 1 anterolisthesis of L5 on S1 felt to represent subluxation at the level of the facets. This report was finalized on 01/18/2022 10:01 by Dr. Obinna Luna MD.    XR Chest 1 View    Result Date: 1/18/2022  EXAM: XR CHEST 1 VW- 1/18/2022 10:47 AM CST  HISTORY: pre-op   COMPARISON: December 1, 2020.  TECHNIQUE: Frontal radiograph of the chest  FINDINGS: The lungs are clear. A radiolucent patch projects over the right chest.. Cardiac silhouette is normal. Pacing device present soft tissues of the left chest.  The osseous structures and surrounding soft tissues demonstrate no acute abnormality.       1. No radiographic evidence of acute cardiopulmonary process.   This report was finalized on 01/18/2022 10:59 by Dr. Damien Boland MD.        ----------------------------------------------------------------------------------------------------------------------  Assessment:    1)  Acute traumatic nondisplaced subcapital fracture of the gaby hip, initial encounter for closed fracture    Single vessel coronary artery disease    Essential hypertension    Mixed hyperlipidemia    Sick sinus syndrome s/p pacemaker     Sick sinus syndrome (HCC)    PAF (paroxysmal atrial fibrillation) (CMS/HCC) on Eliquis     Pernicious anemia    Gastroesophageal reflux disease    Hip fracture (HCC)       Plan:  1) to OR for Perc Screws to L Hip afternoon of 1/18/22 - we discussed the risks, benefits, and alternatives and the patient wishes to proceed  2) Admit postop for pain control, PT/OT  3) NPO    Electronically signed by Wilmer Lobo PA-C on 1/18/2022 at 13:14 CST

## 2022-01-18 NOTE — ANESTHESIA PROCEDURE NOTES
Airway  Urgency: elective    Date/Time: 1/18/2022 5:35 PM  Airway not difficult    General Information and Staff    Patient location during procedure: OR  CRNA: TRISTAN Burr CRNA    Indications and Patient Condition  Indications for airway management: airway protection    Preoxygenated: yes  MILS maintained throughout  Mask difficulty assessment: 1 - vent by mask    Final Airway Details  Final airway type: endotracheal airway      Successful airway: ETT  Cuffed: yes   Successful intubation technique: direct laryngoscopy  Facilitating devices/methods: intubating stylet  Endotracheal tube insertion site: oral  Blade: [place]  Blade size: 3  ETT size (mm): 7.5  Cormack-Lehane Classification: grade I - full view of glottis  Placement verified by: chest auscultation and capnometry   Cuff volume (mL): 5  Measured from: lips  ETT/EBT  to lips (cm): 21  Number of attempts at approach: 1  Assessment: lips, teeth, and gum same as pre-op and atraumatic intubation

## 2022-01-19 LAB
ALBUMIN SERPL-MCNC: 3.2 G/DL (ref 3.5–5.2)
ALBUMIN/GLOB SERPL: 1 G/DL
ALP SERPL-CCNC: 86 U/L (ref 39–117)
ALT SERPL W P-5'-P-CCNC: 8 U/L (ref 1–41)
ANION GAP SERPL CALCULATED.3IONS-SCNC: 9 MMOL/L (ref 5–15)
AST SERPL-CCNC: 12 U/L (ref 1–40)
BASOPHILS # BLD AUTO: 0.01 10*3/MM3 (ref 0–0.2)
BASOPHILS NFR BLD AUTO: 0.1 % (ref 0–1.5)
BILIRUB SERPL-MCNC: 0.3 MG/DL (ref 0–1.2)
BUN SERPL-MCNC: 23 MG/DL (ref 8–23)
BUN/CREAT SERPL: 22.8 (ref 7–25)
CALCIUM SPEC-SCNC: 10.1 MG/DL (ref 8.6–10.5)
CHLORIDE SERPL-SCNC: 98 MMOL/L (ref 98–107)
CHOLEST SERPL-MCNC: 149 MG/DL (ref 0–200)
CO2 SERPL-SCNC: 30 MMOL/L (ref 22–29)
CREAT SERPL-MCNC: 1.01 MG/DL (ref 0.76–1.27)
DEPRECATED RDW RBC AUTO: 55.6 FL (ref 37–54)
EOSINOPHIL # BLD AUTO: 0 10*3/MM3 (ref 0–0.4)
EOSINOPHIL NFR BLD AUTO: 0 % (ref 0.3–6.2)
ERYTHROCYTE [DISTWIDTH] IN BLOOD BY AUTOMATED COUNT: 16.3 % (ref 12.3–15.4)
GFR SERPL CREATININE-BSD FRML MDRD: 70 ML/MIN/1.73
GLOBULIN UR ELPH-MCNC: 3.2 GM/DL
GLUCOSE SERPL-MCNC: 153 MG/DL (ref 65–99)
HBA1C MFR BLD: 6 % (ref 4.8–5.6)
HCT VFR BLD AUTO: 30.3 % (ref 37.5–51)
HCT VFR BLD AUTO: 31.9 % (ref 37.5–51)
HDLC SERPL-MCNC: 40 MG/DL (ref 40–60)
HGB BLD-MCNC: 10.2 G/DL (ref 13–17.7)
HGB BLD-MCNC: 10.2 G/DL (ref 13–17.7)
IMM GRANULOCYTES # BLD AUTO: 0.06 10*3/MM3 (ref 0–0.05)
IMM GRANULOCYTES NFR BLD AUTO: 0.8 % (ref 0–0.5)
LDLC SERPL CALC-MCNC: 91 MG/DL (ref 0–100)
LDLC/HDLC SERPL: 2.26 {RATIO}
LYMPHOCYTES # BLD AUTO: 0.54 10*3/MM3 (ref 0.7–3.1)
LYMPHOCYTES NFR BLD AUTO: 7 % (ref 19.6–45.3)
MCH RBC QN AUTO: 29.9 PG (ref 26.6–33)
MCHC RBC AUTO-ENTMCNC: 32 G/DL (ref 31.5–35.7)
MCV RBC AUTO: 93.5 FL (ref 79–97)
MONOCYTES # BLD AUTO: 0.38 10*3/MM3 (ref 0.1–0.9)
MONOCYTES NFR BLD AUTO: 5 % (ref 5–12)
NEUTROPHILS NFR BLD AUTO: 6.67 10*3/MM3 (ref 1.7–7)
NEUTROPHILS NFR BLD AUTO: 87.1 % (ref 42.7–76)
NRBC BLD AUTO-RTO: 0 /100 WBC (ref 0–0.2)
PLATELET # BLD AUTO: 198 10*3/MM3 (ref 140–450)
PMV BLD AUTO: 9.8 FL (ref 6–12)
POTASSIUM SERPL-SCNC: 4.6 MMOL/L (ref 3.5–5.2)
PROT SERPL-MCNC: 6.4 G/DL (ref 6–8.5)
QT INTERVAL: 428 MS
QTC INTERVAL: 497 MS
RBC # BLD AUTO: 3.41 10*6/MM3 (ref 4.14–5.8)
SODIUM SERPL-SCNC: 137 MMOL/L (ref 136–145)
TRIGL SERPL-MCNC: 94 MG/DL (ref 0–150)
TSH SERPL DL<=0.05 MIU/L-ACNC: 0.61 UIU/ML (ref 0.27–4.2)
VLDLC SERPL-MCNC: 18 MG/DL (ref 5–40)
WBC NRBC COR # BLD: 7.66 10*3/MM3 (ref 3.4–10.8)

## 2022-01-19 PROCEDURE — 63710000001 PREDNISONE PER 5 MG: Performed by: FAMILY MEDICINE

## 2022-01-19 PROCEDURE — 97116 GAIT TRAINING THERAPY: CPT

## 2022-01-19 PROCEDURE — 80061 LIPID PANEL: CPT | Performed by: FAMILY MEDICINE

## 2022-01-19 PROCEDURE — 80053 COMPREHEN METABOLIC PANEL: CPT | Performed by: FAMILY MEDICINE

## 2022-01-19 PROCEDURE — 97166 OT EVAL MOD COMPLEX 45 MIN: CPT

## 2022-01-19 PROCEDURE — 25010000002 CEFAZOLIN PER 500 MG

## 2022-01-19 PROCEDURE — 85014 HEMATOCRIT: CPT | Performed by: FAMILY MEDICINE

## 2022-01-19 PROCEDURE — 83036 HEMOGLOBIN GLYCOSYLATED A1C: CPT | Performed by: FAMILY MEDICINE

## 2022-01-19 PROCEDURE — 25010000002 CEFTRIAXONE PER 250 MG: Performed by: FAMILY MEDICINE

## 2022-01-19 PROCEDURE — 97530 THERAPEUTIC ACTIVITIES: CPT

## 2022-01-19 PROCEDURE — 97161 PT EVAL LOW COMPLEX 20 MIN: CPT | Performed by: PHYSICAL THERAPIST

## 2022-01-19 PROCEDURE — 84443 ASSAY THYROID STIM HORMONE: CPT | Performed by: FAMILY MEDICINE

## 2022-01-19 PROCEDURE — 85018 HEMOGLOBIN: CPT | Performed by: FAMILY MEDICINE

## 2022-01-19 PROCEDURE — 85025 COMPLETE CBC W/AUTO DIFF WBC: CPT | Performed by: FAMILY MEDICINE

## 2022-01-19 PROCEDURE — 25010000002 CEFAZOLIN PER 500 MG: Performed by: ORTHOPAEDIC SURGERY

## 2022-01-19 RX ORDER — METOPROLOL SUCCINATE 25 MG/1
25 TABLET, EXTENDED RELEASE ORAL DAILY
COMMUNITY
End: 2022-03-15

## 2022-01-19 RX ORDER — IRON POLYSACCHARIDE COMPLEX 150 MG
150 CAPSULE ORAL DAILY
COMMUNITY

## 2022-01-19 RX ORDER — LORAZEPAM 1 MG/1
1 TABLET ORAL DAILY
COMMUNITY
End: 2022-01-22 | Stop reason: HOSPADM

## 2022-01-19 RX ORDER — PANTOPRAZOLE SODIUM 40 MG/1
40 TABLET, DELAYED RELEASE ORAL DAILY
COMMUNITY

## 2022-01-19 RX ORDER — TRIAMTERENE AND HYDROCHLOROTHIAZIDE 75; 50 MG/1; MG/1
0.5 TABLET ORAL DAILY
COMMUNITY

## 2022-01-19 RX ORDER — LEVOTHYROXINE SODIUM 0.07 MG/1
75 TABLET ORAL DAILY
COMMUNITY

## 2022-01-19 RX ORDER — ALLOPURINOL 300 MG/1
300 TABLET ORAL DAILY
COMMUNITY
End: 2022-02-21

## 2022-01-19 RX ADMIN — TRIAMTERENE AND HYDROCHLOROTHIAZIDE 0.5 TABLET: 75; 50 TABLET ORAL at 08:48

## 2022-01-19 RX ADMIN — METOPROLOL SUCCINATE 25 MG: 25 TABLET, EXTENDED RELEASE ORAL at 08:48

## 2022-01-19 RX ADMIN — ALLOPURINOL 300 MG: 300 TABLET ORAL at 08:49

## 2022-01-19 RX ADMIN — CEFAZOLIN SODIUM 2 G: 10 INJECTION, POWDER, FOR SOLUTION INTRAVENOUS at 12:00

## 2022-01-19 RX ADMIN — PANTOPRAZOLE SODIUM 40 MG: 40 TABLET, DELAYED RELEASE ORAL at 05:47

## 2022-01-19 RX ADMIN — SODIUM CHLORIDE 1 G: 900 INJECTION INTRAVENOUS at 17:01

## 2022-01-19 RX ADMIN — FERROUS SULFATE TAB 325 MG (65 MG ELEMENTAL FE) 325 MG: 325 (65 FE) TAB at 08:49

## 2022-01-19 RX ADMIN — ASPIRIN 81 MG: 81 TABLET, COATED ORAL at 08:49

## 2022-01-19 RX ADMIN — SODIUM CHLORIDE, POTASSIUM CHLORIDE, SODIUM LACTATE AND CALCIUM CHLORIDE 100 ML/HR: 600; 310; 30; 20 INJECTION, SOLUTION INTRAVENOUS at 15:02

## 2022-01-19 RX ADMIN — SODIUM CHLORIDE, PRESERVATIVE FREE 10 ML: 5 INJECTION INTRAVENOUS at 08:49

## 2022-01-19 RX ADMIN — DOCUSATE SODIUM 100 MG: 100 CAPSULE, LIQUID FILLED ORAL at 08:48

## 2022-01-19 RX ADMIN — CEFAZOLIN SODIUM 2 G: 10 INJECTION, POWDER, FOR SOLUTION INTRAVENOUS at 02:21

## 2022-01-19 RX ADMIN — HYDROCODONE BITARTRATE AND ACETAMINOPHEN 1 TABLET: 5; 325 TABLET ORAL at 05:47

## 2022-01-19 RX ADMIN — FERROUS SULFATE TAB 325 MG (65 MG ELEMENTAL FE) 325 MG: 325 (65 FE) TAB at 12:32

## 2022-01-19 RX ADMIN — FERROUS SULFATE TAB 325 MG (65 MG ELEMENTAL FE) 325 MG: 325 (65 FE) TAB at 17:44

## 2022-01-19 RX ADMIN — LEVOTHYROXINE SODIUM 75 MCG: 75 TABLET ORAL at 05:47

## 2022-01-19 RX ADMIN — FOLIC ACID 1 MG: 1 TABLET ORAL at 08:48

## 2022-01-19 RX ADMIN — PREDNISONE 5 MG: 5 TABLET ORAL at 08:48

## 2022-01-19 NOTE — NURSING NOTE
"While in room to DC pt's lozoya cath, pt c/o sensation of fullness in bladder.  First 4-6\" of lozoya tubing outside of penis full of red clots.  Attempted to irrigate lozoya, able to flush water in, but cannot get anything to come back out.  Lozoya removed, large clot seen in tip of cath.  Dr. King notified.  Order to bladder scan and if a large amount in bladder insert 3 way and start CBI.  Bladder scan shows 333ml.  Pt is beginning to void, will wait and let pt continue to attempt to void on his own.  Explained to pt and son in room.    "

## 2022-01-19 NOTE — PLAN OF CARE
Goal Outcome Evaluation:  Plan of Care Reviewed With: patient        Progress: no change  Outcome Summary: The patient presents alert and oriented x4. He is high spirited and ready to work with therapy. He is WBAT on his L LE s/p cannulated screw placement. He required assist to stand due to poor anterior weight shifting, however once standing he was able to walk 20ft. He demonstrates great movement of the L LE to get out of bed and to walk. He has little pain and tolerated weight bearing through his L LE. He will benefit from continued PT to work on sterngthening, gait mechanics, and increased activity tolerance. Recommend discharge home with assist with home health vs inpt acute rehab.

## 2022-01-19 NOTE — ANESTHESIA POSTPROCEDURE EVALUATION
"Patient: Dexter Suggs    Procedure Summary     Date: 01/18/22 Room / Location:  [facility] OR 09 /  [facility] OR    Anesthesia Start: 1732 Anesthesia Stop: 1821    Procedure: HIP CANNULATED SCREW PLACEMENT (Left Hip) Diagnosis:       Closed subcapital fracture of femur, left, initial encounter ([facility])      (left FNFx)    Surgeons: Isaiah Sheehan MD Provider: TRISTAN Burr CRNA    Anesthesia Type: general ASA Status: 3          Anesthesia Type: general    Vitals  Vitals Value Taken Time   /74 01/18/22 1915   Temp 97.6 °F (36.4 °C) 01/18/22 1915   Pulse 71 01/18/22 1917   Resp 18 01/18/22 1915   SpO2 99 % 01/18/22 1917   Vitals shown include unvalidated device data.        Post Anesthesia Care and Evaluation    Patient location during evaluation: PACU  Patient participation: complete - patient participated  Level of consciousness: awake and alert  Pain management: adequate  Airway patency: patent  Anesthetic complications: No anesthetic complications    Cardiovascular status: acceptable  Respiratory status: acceptable  Hydration status: acceptable    Comments: Blood pressure 124/65, pulse 60, temperature 98.2 °F (36.8 °C), temperature source Oral, resp. rate 18, height 182.9 cm (72\"), weight 77.1 kg (170 lb), SpO2 97 %.    Pt discharged from PACU based on beth score >8      "

## 2022-01-19 NOTE — THERAPY EVALUATION
Patient Name: Dexter Suggs  : 1934    MRN: [id number]                              Today's Date: 2022       Admit Date: 2022    Visit Dx:     ICD-10-CM ICD-9-CM   1. Closed fracture of left hip, initial encounter ([facility])  S72.002A 820.8   2. 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   • Actinic keratosis   • Benign prostatic hyperplasia with lower urinary tract symptoms   • Complete heart block (HCC)   • Sick sinus syndrome s/p pacemaker    • Sick sinus syndrome (HCC)   • Laceration of face without complication   • PAF (paroxysmal atrial fibrillation) (CMS/HCC) on Eliquis    • Paroxysmal atrial flutter (HCC)   • Pernicious anemia   • Alkaline phosphatase elevation   • Anemia   • Gastroesophageal reflux disease   • Hypothyroidism due to Hashimoto's thyroiditis   • Impaired fasting glucose   • Overlapping malignant melanoma of skin (HCC)   • Primary osteoarthritis of both hands   • COVID-19 virus infection   • Closed fracture of distal end of radius   • Arthritis of hand   • Closed fracture of right hip (HCC)   • Gross hematuria   • Chronic anticoagulation   • Postoperative anemia due to acute blood loss   • Closed nondisplaced fracture of lesser trochanter of right femur with routine healing   • Closed subcapital fracture of femur, left, initial encounter ([facility])     Past Medical History:   Diagnosis Date   • Abnormal nuclear stress test    • BPH (benign prostatic hyperplasia)    • Chest pain    • Coronary artery disease    • Disease of thyroid gland    • Hyperlipidemia    • Hypertension    • LV dysfunction    • Melanoma (HCC)    • Prostate CA (HCC)      Past Surgical History:   Procedure Laterality Date   • APPENDECTOMY     •  CARDIAC CATHETERIZATION Left 12/10/2012   • CARDIAC ELECTROPHYSIOLOGY PROCEDURE N/A 11/23/2018    Procedure: Pacemaker DC new 11/23/2018;  Surgeon: Austin Granados MD;  Location:  [facility] CATH [facility]ASIVE [facility];  Service: Cardiology   • CHOLECYSTECTOMY     • COLONOSCOPY N/A 6/9/2017    Procedure: COLONOSCOPY WITH ANESTHESIA;  Surgeon: Felice Marroquin MD;  Location:  [facility] ENDOSCOPY;  Service:    • COLONOSCOPY W/ POLYPECTOMY  02/16/2012    adenomatous polyp at 80cm   • HAND SURGERY Right    • HERNIA REPAIR     • HIP CANNULATED SCREW PLACEMENT Left 1/18/2022    Procedure: HIP CANNULATED SCREW PLACEMENT;  Surgeon: Isaiah Sheehan MD;  Location:  [facility] OR;  Service: Orthopedics;  Laterality: Left;   • INGUINAL HERNIA REPAIR     • KNEE SURGERY     • PROSTATE SURGERY     • SKIN CANCER EXCISION      face   • SKIN LESION EXCISION     • TOTAL HIP ARTHROPLASTY Right 8/27/2021    Procedure: TOTAL HIP REPLACEMENT;  Surgeon: Arik Magaña MD;  Location:  [facility] OR;  Service: Orthopedics;  Laterality: Right;      General Information     Row Name 01/19/22 0731          Physical Therapy Time and Intention    Document Type evaluation  s/p L hip cannulated screw due to femur fracture. recent R anterior THR 8/21  -MS     Mode of Treatment physical therapy; co-treatment  -MS     Row Name 01/19/22 0731          General Information    Patient Profile Reviewed yes  -MS     Prior Level of Function independent:; all household mobility; ADL's  -MS     Existing Precautions/Restrictions fall  WBAT L LE  -MS     Barriers to Rehab none identified  -MS     Row Name 01/19/22 0731          Living Environment    Lives With spouse  -MS     Row Name 01/19/22 0731          Home Main Entrance    Number of Stairs, Main Entrance three  -MS     Stair Railings, Main Entrance railing on right side (ascending)  -MS     Row Name 01/19/22 0731          Stairs Within Home, Primary    Number of Stairs, Within Home, Primary none  -MS     Row Name 01/19/22  0731          Cognition    Orientation Status (Cognition) oriented x 4  -MS     Row Name 01/19/22 0731          Safety Issues, Functional Mobility    Impairments Affecting Function (Mobility) balance; strength  -MS           User Key  (r) = Recorded By, (t) = Taken By, (c) = Cosigned By    Initials Name Provider Type    Alec Baronkin RESENDIZ, PT, DPT, NCS Physical Therapist               Mobility     Row Name 01/19/22 0731          Bed Mobility    Bed Mobility supine-sit  -MS     Supine-Sit [place] (Bed Mobility) standby assist  -MS     Assistive Device (Bed Mobility) bed rails; head of bed elevated  -MS     Row Name 01/19/22 0731          Transfers    Comment (Transfers) min A to shift pt's weight forward to allow for stand  -MS     Row Name 01/19/22 0731          Sit-Stand Transfer    Sit-Stand [place] (Transfers) minimum assist (75% patient effort); verbal cues  -MS     Assistive Device (Sit-Stand Transfers) walker, front-wheeled  -MS     Row Name 01/19/22 0731          Gait/Stairs (Locomotion)    [place] Level (Gait) contact guard; verbal cues; nonverbal cues (demo/gesture)  -MS     Assistive Device (Gait) walker, front-wheeled  -MS     Distance in Feet (Gait) 20ft with step to gait pattern  -MS     Row Name 01/19/22 0731          Mobility    Extremity Weight-bearing Status left lower extremity  -MS     Left Lower Extremity (Weight-bearing Status) weight-bearing as tolerated (WBAT)  -MS           User Key  (r) = Recorded By, (t) = Taken By, (c) = Cosigned By    Initials Name Provider Type    MS Mitchell Keke RESENDIZ, PT, DPT, NCS Physical Therapist               Obj/Interventions     Row Name 01/19/22 0731          Range of Motion Comprehensive    General Range of Motion no range of motion deficits identified  -MS     Row Name 01/19/22 0731          Strength Comprehensive (MMT)    Comment, General Manual Muscle Testing (MMT) Assessment L LE grossly 4/5  -MS     Row Name 01/19/22 0731          Balance     Static Sitting Balance WFL  -MS     Dynamic Sitting Balance WFL  -MS     Static Standing Balance mild impairment  -MS     Dynamic Standing Balance mild impairment  -MS     Row Name 01/19/22 0731          Sensory Assessment (Somatosensory)    Sensory Assessment (Somatosensory) sensation intact  -MS           User Key  (r) = Recorded By, (t) = Taken By, (c) = Cosigned By    Initials Name Provider Type    Keke Baron R, PT, DPT, NCS Physical Therapist               Goals/Plan     Row Name 01/19/22 0731          Bed Mobility Goal 1 (PT)    Activity/Assistive Device (Bed Mobility Goal 1, PT) bed mobility activities, all  -MS     [place] Level/Cues Needed (Bed Mobility Goal 1, PT) independent  -MS     Time Frame (Bed Mobility Goal 1, PT) long term goal (LTG); by discharge  -MS     Progress/Outcomes (Bed Mobility Goal 1, PT) goal ongoing  -MS     Row Name 01/19/22 0731          Transfer Goal 1 (PT)    Activity/Assistive Device (Transfer Goal 1, PT) sit-to-stand/stand-to-sit; bed-to-chair/chair-to-bed; walker, rolling  -MS     [place] Level/Cues Needed (Transfer Goal 1, PT) modified independence  -MS     Time Frame (Transfer Goal 1, PT) long term goal (LTG); by discharge  -MS     Progress/Outcome (Transfer Goal 1, PT) goal ongoing  -MS     Row Name 01/19/22 0731          Gait Training Goal 1 (PT)    Activity/Assistive Device (Gait Training Goal 1, PT) gait (walking locomotion); assistive device use; improve balance and speed; increase endurance/gait distance; walker, rolling  -MS     [place] Level (Gait Training Goal 1, PT) modified independence  -MS     Distance (Gait Training Goal 1, PT) 75ft  -MS     Time Frame (Gait Training Goal 1, PT) long term goal (LTG); by discharge  -MS     Progress/Outcome (Gait Training Goal 1, PT) goal ongoing  -MS     Row Name 01/19/22 0731          Stairs Goal 1 (PT)    Activity/Assistive Device (Stairs Goal 1, PT) ascending stairs; descending stairs; using handrail,  right; step-to-step  -MS     [place] Level/Cues Needed (Stairs Goal 1, PT) contact guard assist  -MS     Number of Stairs (Stairs Goal 1, PT) 3  -MS     Time Frame (Stairs Goal 1, PT) long term goal (LTG); by discharge  -MS     Progress/Outcome (Stairs Goal 1, PT) goal ongoing  -MS           User Key  (r) = Recorded By, (t) = Taken By, (c) = Cosigned By    Initials Name Provider Type    MS Keke Mitchell R, PT, DPT, NCS Physical Therapist               Clinical Impression     Row Name 01/19/22 0731          Pain    Additional Documentation Pain Scale: Numbers Pre/Post-Treatment (Group)  -MS     Row Name 01/19/22 0731          Pain Scale: Numbers Pre/Post-Treatment    Pretreatment Pain Rating 0/10 - no pain  -MS     Posttreatment Pain Rating 3/10  -MS     Pain Location - Side Left  -MS     Pain Location hip  -MS     Pain Intervention(s) Medication (See MAR); Repositioned; Ambulation/increased activity  -MS     Row Name 01/19/22 0731          Plan of Care Review    Plan of Care Reviewed With patient  -MS     Progress no change  -MS     Outcome Summary The patient presents alert and oriented x4. He is high spirited and ready to work with therapy. He is WBAT on his L LE s/p cannulated screw placement. He required assist to stand due to poor anterior weight shifting, however once standing he was able to walk 20ft. He demonstrates great movement of the L LE to get out of bed and to walk. He has little pain and tolerated weight bearing through his L LE. He will benefit from continued PT to work on sterngthening, gait mechanics, and increased activity tolerance. Recommend discharge home with assist with home health vs inpt acute rehab.  -MS     Row Name 01/19/22 0731          Therapy Assessment/Plan (PT)    Patient/Family Therapy Goals Statement (PT) go home  -MS     Rehab Potential (PT) good, to achieve stated therapy goals  -MS     Criteria for Skilled Interventions Met (PT) yes; meets criteria; skilled treatment is  necessary  -MS     Predicted Duration of Therapy Intervention (PT) until discharge  -MS     Row Name 01/19/22 0731          Positioning and Restraints    Post Treatment Position chair  -MS     In Chair sitting; call light within reach; encouraged to call for assist; with family/caregiver  -MS           User Key  (r) = Recorded By, (t) = Taken By, (c) = Cosigned By    Initials Name Provider Type    Keke Baron, PT, DPT, NCS Physical Therapist               Outcome Measures     Row Name 01/19/22 0731          How much help from another person do you currently need...    Turning from your back to your side while in flat bed without using bedrails? 3  -MS     Moving from lying on back to sitting on the side of a flat bed without bedrails? 3  -MS     Moving to and from a bed to a chair (including a wheelchair)? 3  -MS     Standing up from a chair using your arms (e.g., wheelchair, bedside chair)? 3  -MS     Climbing 3-5 steps with a railing? 1  -MS     To walk in hospital room? 3  -MS     AM-PAC 6 Clicks Score (PT) 16  -MS     Row Name 01/19/22 0731 01/19/22 0730       Functional Assessment    Outcome Measure Options AM-PAC 6 Clicks Basic Mobility (PT)  -MS AM-PAC 6 Clicks Daily Activity (OT)  -CS          User Key  (r) = Recorded By, (t) = Taken By, (c) = Cosigned By    Initials Name Provider Type    Keke Baron MARTÍN, PT, DPT, NCS Physical Therapist    CS Hilda Russell, OTR/L, CNT Occupational Therapist                             Physical Therapy Education                 Title: PT OT SLP Therapies (In Progress)     Topic: Physical Therapy (In Progress)     Point: Mobility training (Done)     Learning Progress Summary           Patient Acceptance, E, VU by MS at 1/19/2022 0935    Comment: role of PT in his care, WBAT L LE                   Point: Home exercise program (Not Started)     Learner Progress:  Not documented in this visit.          Point: Body mechanics (Not Started)     Learner Progress:  Not  documented in this visit.          Point: Precautions (Not Started)     Learner Progress:  Not documented in this visit.                      User Key     Initials Effective Dates Name Provider Type Discipline    MS 06/19/18 -  Keke Mitchell, PT, DPT, NCS Physical Therapist PT              PT Recommendation and Plan  Planned Therapy Interventions (PT): balance training, bed mobility training, gait training, patient/family education, strengthening, stair training, transfer training  Plan of Care Reviewed With: patient  Progress: no change  Outcome Summary: The patient presents alert and oriented x4. He is high spirited and ready to work with therapy. He is WBAT on his L LE s/p cannulated screw placement. He required assist to stand due to poor anterior weight shifting, however once standing he was able to walk 20ft. He demonstrates great movement of the L LE to get out of bed and to walk. He has little pain and tolerated weight bearing through his L LE. He will benefit from continued PT to work on sterngthening, gait mechanics, and increased activity tolerance. Recommend discharge home with assist with home health vs inpt acute rehab.     Time Calculation:    PT Charges     Row Name 01/19/22 0731             Time Calculation    Start Time 0730  -MS      Stop Time 0810  -MS      Time Calculation (min) 40 min  -MS      PT Received On 01/19/22  -MS      PT Goal Re-Cert Due Date 01/29/22  -MS              Untimed Charges    PT Eval/Re-eval Minutes 40  -MS              Total Minutes    Untimed Charges Total Minutes 40  -MS       Total Minutes 40  -MS            User Key  (r) = Recorded By, (t) = Taken By, (c) = Cosigned By    Initials Name Provider Type    MS Keke Mitchell, PT, DPT, NCS Physical Therapist              Therapy Charges for Today     Code Description Service Date Service Provider Modifiers Qty    [id number] HC PT EVAL LOW COMPLEXITY 3 1/19/2022 Keke Mitchell, PT, DPT, NCS GP 1          PT  G-Codes  Outcome Measure Options: AM-PAC 6 Clicks Basic Mobility (PT)  AM-PAC 6 Clicks Score (PT): 16  AM-PAC 6 Clicks Score (OT): 20    Keke Mitchell, PT, DPT, NCS  1/19/2022

## 2022-01-19 NOTE — PLAN OF CARE
Goal Outcome Evaluation:  Plan of Care Reviewed With: patient        Progress: improving  Outcome Summary: Left hip drsg CDI.  PPP.  Was confused when first arrived to floor from PACU, probably due to pain meds.  Now A&Ox4.  Very pleasant.  Assist with turning.  Med x1 for pain with stated relief.  FC will be DC'd this am.

## 2022-01-19 NOTE — PROGRESS NOTES
[facility] Medicine Services  INPATIENT PROGRESS NOTE    Length of Stay: 1  Date of Admission: 1/18/2022  Primary Care Physician: Jarad Alexis MD    Subjective   Chief Complaint: Left hip fracture/hematuria    HPI   We will continue to hold Eliquis due to hematuria.  Status post left hip surgery.  Patient will need rehab placement.  Gross hematuria noted.  Martinez cath has been removed.    Review of Systems   Constitutional: Positive for activity change, appetite change and fatigue. Negative for chills and fever.   HENT: Negative for hearing loss, nosebleeds, tinnitus and trouble swallowing.    Eyes: Negative for visual disturbance.   Respiratory: Negative for cough, chest tightness, shortness of breath and wheezing.    Cardiovascular: Negative for chest pain, palpitations and leg swelling.   Gastrointestinal: Negative for abdominal distention, abdominal pain, blood in stool, constipation, diarrhea, nausea and vomiting.   Endocrine: Negative for cold intolerance, heat intolerance, polydipsia, polyphagia and polyuria.   Genitourinary: Negative for decreased urine volume, difficulty urinating, dysuria, flank pain, frequency.  Hematuria.  Martinez removed.  Musculoskeletal: Positive for arthralgias, gait problem and myalgias. Negative for joint swelling.        Left hip pain.   Skin: Negative for rash.   Allergic/Immunologic: Negative for immunocompromised state.   Neurological: Positive for weakness. Negative for dizziness, syncope, light-headedness and headaches.   Hematological: Negative for adenopathy. Does not bruise/bleed easily.   Psychiatric/Behavioral: Positive for confusion. Negative for sleep disturbance. The patient is not nervous/anxious.      All pertinent negatives and positives are as above. All other systems have been reviewed and are negative unless otherwise stated.     Objective    Temp:  [97.2 °F (36.2 °C)-98.4 °F (36.9 °C)] 98.4 °F (36.9 °C)  Heart Rate:   [59-99] 74  Resp:  [8-18] 18  BP: (114-174)/(45-92) 129/71    Intake/Output Summary (Last 24 hours) at 1/19/2022 1140  Last data filed at 1/19/2022 1000  Gross per 24 hour   Intake 740 ml   Output 250 ml   Net 490 ml     Physical Exam  Vitals and nursing note reviewed.   Constitutional:       Appearance: He is well-developed.      Comments: Advanced age.  Patient is presently joking around.   HENT:      Head: Normocephalic.   Eyes:      Conjunctiva/sclera: Conjunctivae normal.      Pupils: Pupils are equal, round, and reactive to light.   Neck:      Vascular: No JVD.   Cardiovascular:      Rate and Rhythm: Normal rate and regular rhythm.      Heart sounds: Normal heart sounds. No murmur heard.  No friction rub. No gallop.       Comments: Pacing.  Pulmonary:      Effort: No respiratory distress.      Breath sounds: No wheezing or rales.      Comments: Diminished breath sound, clear .  Chest:      Chest wall: No tenderness.   Abdominal:      General: Bowel sounds are normal. There is no distension.      Palpations: Abdomen is soft.      Tenderness: There is no abdominal tenderness. There is no guarding or rebound.   Musculoskeletal:         General: No tenderness or deformity.      Cervical back: Neck supple.      Comments: Left hip pain.  Left hip fracture.   Skin:     General: Skin is warm and dry.      Findings: No rash.   Neurological:      Mental Status: He is alert and oriented to person, place, and time.      Cranial Nerves: No cranial nerve deficit.      Motor: Weakness present. No abnormal muscle tone.      Coordination: Coordination abnormal.      Gait: Gait abnormal.      Deep Tendon Reflexes: Reflexes normal.   Psychiatric:         Behavior: Behavior normal.      Results Review:  Lab Results (last 24 hours)     Procedure Component Value Units Date/Time    Lipid Panel [[id number]] Collected: 01/19/22 0510    Specimen: Blood Updated: 01/19/22 0634     Total Cholesterol 149 mg/dL      Triglycerides 94 mg/dL       HDL Cholesterol 40 mg/dL      LDL Cholesterol  91 mg/dL      VLDL Cholesterol 18 mg/dL      LDL/HDL Ratio 2.26    Narrative:      Cholesterol Reference Ranges  (U.S. Department of Health and Human Services ATP III Classifications)    Desirable          <200 mg/dL  Borderline High    200-239 mg/dL  High Risk          >240 mg/dL      Triglyceride Reference Ranges  (U.S. Department of Health and Human Services ATP III Classifications)    Normal           <150 mg/dL  Borderline High  150-199 mg/dL  High             200-499 mg/dL  Very High        >500 mg/dL    HDL Reference Ranges  (U.S. Department of Health and Human Services ATP III Classifcations)    Low     <40 mg/dl (major risk factor for CHD)  High    >60 mg/dl ('negative' risk factor for CHD)        LDL Reference Ranges  (U.S. Department of Health and Human Services ATP III Classifcations)    Optimal          <100 mg/dL  Near Optimal     100-129 mg/dL  Borderline High  130-159 mg/dL  High             160-189 mg/dL  Very High        >189 mg/dL    TSH [[id number]]  (Normal) Collected: 01/19/22 0510    Specimen: Blood Updated: 01/19/22 0634     TSH 0.615 uIU/mL     Comprehensive Metabolic Panel [[id number]]  (Abnormal) Collected: 01/19/22 0510    Specimen: Blood Updated: 01/19/22 0555     Glucose 153 mg/dL      BUN 23 mg/dL      Creatinine 1.01 mg/dL      Sodium 137 mmol/L      Potassium 4.6 mmol/L      Chloride 98 mmol/L      CO2 30.0 mmol/L      Calcium 10.1 mg/dL      Total Protein 6.4 g/dL      Albumin 3.20 g/dL      ALT (SGPT) 8 U/L      AST (SGOT) 12 U/L      Alkaline Phosphatase 86 U/L      Total Bilirubin 0.3 mg/dL      eGFR Non African Amer 70 mL/min/1.73      Globulin 3.2 gm/dL      A/G Ratio 1.0 g/dL      BUN/Creatinine Ratio 22.8     Anion Gap 9.0 mmol/L     Narrative:      GFR Normal >60  Chronic Kidney Disease <60  Kidney Failure <15      Hemoglobin A1c [[id number]]  (Abnormal) Collected: 01/19/22 0510    Specimen: Blood Updated: 01/19/22 0548      Hemoglobin A1C 6.00 %     Narrative:      Hemoglobin A1C Ranges:    Increased Risk for Diabetes  5.7% to 6.4%  Diabetes                     >= 6.5%  Diabetic Goal                < 7.0%    CBC Auto Differential [[id number]]  (Abnormal) Collected: 01/19/22 0510    Specimen: Blood Updated: 01/19/22 0537     WBC 7.66 10*3/mm3      RBC 3.41 10*6/mm3      Hemoglobin 10.2 g/dL      Hematocrit 31.9 %      MCV 93.5 fL      MCH 29.9 pg      MCHC 32.0 g/dL      RDW 16.3 %      RDW-SD 55.6 fl      MPV 9.8 fL      Platelets 198 10*3/mm3      Neutrophil % 87.1 %      Lymphocyte % 7.0 %      Monocyte % 5.0 %      Eosinophil % 0.0 %      Basophil % 0.1 %      Immature Grans % 0.8 %      Neutrophils, Absolute 6.67 10*3/mm3      Lymphocytes, Absolute 0.54 10*3/mm3      Monocytes, Absolute 0.38 10*3/mm3      Eosinophils, Absolute 0.00 10*3/mm3      Basophils, Absolute 0.01 10*3/mm3      Immature Grans, Absolute 0.06 10*3/mm3      nRBC 0.0 /100 WBC     Urinalysis, Microscopic Only - Urine, Clean Catch [[id number]]  (Abnormal) Collected: 01/18/22 1154    Specimen: Urine, Clean Catch Updated: 01/18/22 1234     RBC, UA None Seen /HPF      WBC, UA 31-50 /HPF      Bacteria, UA 3+ /HPF      Squamous Epithelial Cells, UA 0-2 /HPF      Hyaline Casts, UA None Seen /LPF      Amorphous Crystals, UA Small/1+ /HPF      Methodology Manual Light Microscopy    Urine Culture - Urine, Urine, Clean Catch [[id number]] Collected: 01/18/22 1154    Specimen: Urine, Clean Catch Updated: 01/18/22 1234    Urinalysis With Culture If Indicated - Urine, Clean Catch [[id number]]  (Abnormal) Collected: 01/18/22 1154    Specimen: Urine, Clean Catch Updated: 01/18/22 1227     Color, UA Yellow     Appearance, UA Cloudy     pH, UA <=5.0     Specific Gravity, UA 1.019     Glucose, UA Negative     Ketones, UA Negative     Bilirubin, UA Negative     Blood, UA Negative     Protein, UA 30 mg/dL (1+)     Leuk Esterase, UA Moderate (2+)     Nitrite, UA Negative     Urobilinogen,  UA 0.2 E.U./dL    COVID-19,[facility] [employer] IN-HOUSE,Nasal Swab No Transport Media 3-4 HR TAT - Swab, Nasal Cavity [[id number]]  (Normal) Collected: 01/18/22 1117    Specimen: Swab from Nasal Cavity Updated: 01/18/22 1215     COVID19 Not Detected    Narrative:      Fact sheet for providers: https://www.fda.gov/media/409612/download     Fact sheet for patients: https://www.fda.gov/media/095107/download    Test performed by PCR.    Consider negative results in combination with clinical observations, patient history, and epidemiological information.           Cultures:  No results found for: BLOODCX, URINECX, WOUNDCX, MRSACX, RESPCX, STOOLCX    Radiology Data:    Imaging Results (Last 24 Hours)     Procedure Component Value Units Date/Time    XR Hip With or Without Pelvis 2 - 3 View Left [[id number]] Collected: 01/18/22 1829     Updated: 01/19/22 0736    Narrative:      EXAMINATION: XR HIP W OR WO PELVIS 2-3 VIEW LEFT-     1/18/2022 5:41 PM CST     HISTORY: cannulated screw; S72.002A-Fracture of unspecified part of neck  of left femur, initial encounter for closed fracture     Number of fluoroscopic spot images obtained = 2.     Fluoroscopy dose in Air Kerma mGy = 15.565.     Fluoroscopy total exposure time = 41.1 seconds.     Spot images document screw fixation of the left femoral neck.  This report was finalized on 01/18/2022 18:29 by Dr. Geronimo Roldan MD.    FL C Arm During Surgery [[id number]] Collected: 01/18/22 1829     Updated: 01/19/22 0736    Narrative:      EXAMINATION: XR HIP W OR WO PELVIS 2-3 VIEW LEFT-     1/18/2022 5:41 PM CST     HISTORY: cannulated screw; S72.002A-Fracture of unspecified part of neck  of left femur, initial encounter for closed fracture     Number of fluoroscopic spot images obtained = 2.     Fluoroscopy dose in Air Kerma mGy = 15.565.     Fluoroscopy total exposure time = 41.1 seconds.     Spot images document screw fixation of the left femoral neck.  This report was finalized on 01/18/2022  18:29 by Dr. Geronimo Roldan MD.          Allergies   Allergen Reactions   • Adhesive Tape    • Simvastatin Other (See Comments)     Abnormal LFT's   • Neosporin [Neomycin-Bacitracin Zn-Polymyx] Rash       Scheduled meds:   allopurinol, 300 mg, Oral, Daily  aspirin, 81 mg, Oral, Daily  ceFAZolin, 2 g, Intravenous, Q8H  ferrous sulfate, 325 mg, Oral, TID With Meals  folic acid, 1 mg, Oral, Daily  levothyroxine, 75 mcg, Oral, Q AM  metoprolol succinate XL, 25 mg, Oral, Daily  pantoprazole, 40 mg, Oral, Q AM  predniSONE, 5 mg, Oral, Daily  sodium chloride, 10 mL, Intravenous, Q12H  triamterene-hydrochlorothiazide, 0.5 tablet, Oral, Daily        PRN meds:  docusate sodium  •  HYDROcodone-acetaminophen  •  HYDROcodone-acetaminophen  •  HYDROmorphone **AND** naloxone  •  LORazepam  •  magnesium hydroxide  •  nitroglycerin  •  ondansetron **OR** ondansetron  •  phenol  •  promethazine **OR** promethazine  •  sodium chloride  •  [COMPLETED] Insert peripheral IV **AND** sodium chloride  •  traMADol    Assessment/Plan       Closed subcapital fracture of femur, left, initial encounter ([facility])    Single vessel coronary artery disease    Essential hypertension    Mixed hyperlipidemia    Sick sinus syndrome s/p pacemaker     Sick sinus syndrome ([facility])    PAF (paroxysmal atrial fibrillation) (CMS/[facility]) on Eliquis     Pernicious anemia    Gastroesophageal reflux disease      Plan:  Left hip fracture/foraminal stenosis/demineralized skeleton.  Plan for surgery today.  Consulted orthopedics.  CT scan of the lumbar spine-demineralized skeleton limits bony details, No acute osseous post traumatic findings, Foraminal stenosis- worst on the left at L5-S1.  CT scan of pelvic- Minimally displaced mildly impacted subcapital fracture of the left hip- osteoarthrosis of the left hip, status post previous right total hip arthroplasty, facet overgrowth within the mid and lower lumbar spine, grade 1 anterolisthesis of L5 on S1 felt to represent  subluxation at the level of the facets.  Status post 2022.- hip cannulated screw placement     Sick sinus syndrome/pacemaker in place/CAD/atrial fibrillation.  Hold anticoagulation-planning for surgery.  Continue aspirin.    Continue to hold Eliquis secondary to hematuria and status post surgery. Continue metoprolol.  Nitro as needed.  Continue Maxide.  Echocardiogram 3/5/2021- ejection fraction 56 to 60%, mild concentric hypertrophy, diastolic function normal, tricuspid regurgitation.  Chest x-ray-no radiographic evidence of acute cardiopulmonary process.    UTI/hematuria.  Possible from his Martinez cath.  Rocephin antibiotics x3 days 2022.  Check hemoglobin now about 2:00 PM.     Gout.  Continue allopurinol.     Constipation.  Colace.     Anemia.  Iron sulfate.  Hemoglobin stable.  No sign of acute bleed.     Arthritis/chronic pain.  Hold ibuprofen.  Wife requested to him to take his methotrexate today.  Continue prednisone.  Ultram as needed     Hypothyroidism.  Continue Synthroid.     Reflux.  Protonix.     Anxiety.  Ativan as needed.     Nutrition.  Folic acid.    Discharge Plannin-4 days.  Pending rehab placement.  DNR.  DNI.    Electronically signed by Sg Ashton MD, 22, 11:40 AM CST.

## 2022-01-19 NOTE — OP NOTE
Patient Name: Alexandra  MRN: [id number]  : 1934    DATE of SURGERY: 2022    SURGEON: Isaiah Sheehan MD    ASSISTANT: NONE    PREOPERATIVE DIAGNOSIS: Acute nondisplaced subcapital fracture of the neck of the left femur, initial encounter for closed fracture    POSTOPERATIVE DIAGNOSIS:  Acute nondisplaced subcapital fracture of the neck of the left femur, initial encounter for closed fracture    PROCEDURE PERFORMED: Percutaneous screw fixation of  minimally displaced subcapital femoral neck fracture      IMPLANTS: Synthes 6.5 cannulated screws    ANESTHESIA USED: General endotrachial anesthesia    OPERATIVE INDICATIONS: 87 y.o. male who began having left hip pain 2 weeks ago without a traumatic event.  He was evaluated in the ED and his work up was negative.  Given his continued pain, he returned to the ED this morning where a repeat CT showed a femoral neck fracture.  Surgical indications include fracture displacement, stabilization of fracture, and mobilization of the patient.  Risks include, but are not limited to, anesthesia, bleeding, infection, pain, damage to local structures, need for further surgery, malunion, nonunion, fracture displacement, failure of hardware, intraoperative death.  Risks, benefits, and alternative were discussed and the patient wishes to proceed with surgery.    ESTIMATED BLOOD LOSS: 50 mL    DRAINS: none     COMPLICATIONS: none    SPECIMENS: none    FINDINGS: see op note    PROCEDURE in DETAIL:  The patient was seen in the preoperative holding room, the informed consent was reviewed and signed, and the correct operative extremity marked with the patient’s agreement.  The patient was transported to the operating room, where a timeout was performed identifying the correct patient and operative site.  Perioperative antibiotics were administered prior to incision. C-arm images were utilized noting the fracture to remain minimally displaced and therefore perc screws were  chosen.    After a sterile prep and drape, threaded 3.2 mm k-wires were placed in strategic fashion from the lateral aspect of the proximal femur into the femoral head in an inverted triangle fashion with the inferior screw being placed centrally and the superior screws being placed anteriorly and posteriorly.  K-wires were measured and the path of the screws were drilled.  The appropriately sized partially threaded 6.5 cannulated screws were inserted gaining excellent purchase.    C-arm images verified all implants were well contained within the femoral head as an approach-withdraw technique was utilized.      The incisions were irrigated, closed in layers and the skin was stapled.  A sterile dressing was placed, the patient awakened from anesthesia, transported to the PACU in stable condition.      POSTOPERATIVE PLAN:  1) WBAT  2) DVT prophylaxis x 3 weeks  3) PT/OT    Electronically signed by Isaiah Sheehan MD on 1/18/2022 at 18:14 CST

## 2022-01-19 NOTE — PROGRESS NOTES
"  Orthopedic Surgery Progress Note    Dexter Suggs  1/19/2022      Subjective:     Systemic or Specific Complaints: doing well    Objective:     Patient Vitals for the past 24 hrs:   BP Temp Temp src Pulse Resp SpO2 Height Weight   01/19/22 0512 124/65 98.2 °F (36.8 °C) Oral 60 18 97 % -- --   01/19/22 0135 122/63 97.2 °F (36.2 °C) Oral 73 18 97 % -- --   01/18/22 2021 -- -- -- 68 18 98 % -- --   01/18/22 2002 149/63 97.4 °F (36.3 °C) Oral 72 18 100 % -- --   01/18/22 1915 153/74 97.6 °F (36.4 °C) Temporal 69 18 100 % -- --   01/18/22 1900 174/76 -- -- 85 18 96 % -- --   01/18/22 1845 156/73 -- -- 99 13 97 % -- --   01/18/22 1840 153/61 -- -- 92 15 (!) 88 % -- --   01/18/22 1835 140/76 -- -- 95 15 92 % -- --   01/18/22 1830 173/92 -- -- 94 12 98 % -- --   01/18/22 1826 165/67 -- -- 94 11 97 % -- --   01/18/22 1822 173/70 97.3 °F (36.3 °C) Temporal 89 8 95 % -- --   01/18/22 1522 -- -- -- 84 -- 96 % -- --   01/18/22 1501 120/59 98.1 °F (36.7 °C) -- 59 18 96 % -- --   01/18/22 1401 128/45 -- -- 85 -- 94 % -- --   01/18/22 1331 143/71 -- -- 88 -- 95 % -- --   01/18/22 1301 114/70 -- -- 83 -- 96 % -- --   01/18/22 1231 119/63 -- -- 83 -- 94 % -- --   01/18/22 1201 131/67 -- -- 85 -- 95 % -- --   01/18/22 1131 115/72 -- -- 84 -- 98 % -- --   01/18/22 1101 128/69 -- -- 84 -- 95 % -- --   01/18/22 1003 122/56 -- -- 82 -- 93 % -- --   01/18/22 0903 146/65 -- -- 76 -- 97 % -- --   01/18/22 0849 156/71 -- -- -- -- -- -- --   01/18/22 0837 -- 97.9 °F (36.6 °C) -- 88 18 97 % 182.9 cm (72\") 77.1 kg (170 lb)       left lower  General: alert, appears stated age and cooperative   Wound: covered             Dressing: Clean, dry, intact   Extremity: Distal NVI           DVT Exam: No evidence of DVT                   Data Review:  Lab Results (last 24 hours)     Procedure Component Value Units Date/Time    Lipid Panel [[id number]] Collected: 01/19/22 0510    Specimen: Blood Updated: 01/19/22 0634     Total Cholesterol 149 mg/dL      " Triglycerides 94 mg/dL      HDL Cholesterol 40 mg/dL      LDL Cholesterol  91 mg/dL      VLDL Cholesterol 18 mg/dL      LDL/HDL Ratio 2.26    Narrative:      Cholesterol Reference Ranges  (U.S. Department of Health and Human Services ATP III Classifications)    Desirable          <200 mg/dL  Borderline High    200-239 mg/dL  High Risk          >240 mg/dL      Triglyceride Reference Ranges  (U.S. Department of Health and Human Services ATP III Classifications)    Normal           <150 mg/dL  Borderline High  150-199 mg/dL  High             200-499 mg/dL  Very High        >500 mg/dL    HDL Reference Ranges  (U.S. Department of Health and Human Services ATP III Classifcations)    Low     <40 mg/dl (major risk factor for CHD)  High    >60 mg/dl ('negative' risk factor for CHD)        LDL Reference Ranges  (U.S. Department of Health and Human Services ATP III Classifcations)    Optimal          <100 mg/dL  Near Optimal     100-129 mg/dL  Borderline High  130-159 mg/dL  High             160-189 mg/dL  Very High        >189 mg/dL    TSH [[id number]]  (Normal) Collected: 01/19/22 0510    Specimen: Blood Updated: 01/19/22 0634     TSH 0.615 uIU/mL     Comprehensive Metabolic Panel [[id number]]  (Abnormal) Collected: 01/19/22 0510    Specimen: Blood Updated: 01/19/22 0555     Glucose 153 mg/dL      BUN 23 mg/dL      Creatinine 1.01 mg/dL      Sodium 137 mmol/L      Potassium 4.6 mmol/L      Chloride 98 mmol/L      CO2 30.0 mmol/L      Calcium 10.1 mg/dL      Total Protein 6.4 g/dL      Albumin 3.20 g/dL      ALT (SGPT) 8 U/L      AST (SGOT) 12 U/L      Alkaline Phosphatase 86 U/L      Total Bilirubin 0.3 mg/dL      eGFR Non African Amer 70 mL/min/1.73      Globulin 3.2 gm/dL      A/G Ratio 1.0 g/dL      BUN/Creatinine Ratio 22.8     Anion Gap 9.0 mmol/L     Narrative:      GFR Normal >60  Chronic Kidney Disease <60  Kidney Failure <15      Hemoglobin A1c [[id number]]  (Abnormal) Collected: 01/19/22 0510    Specimen: Blood  Updated: 01/19/22 0548     Hemoglobin A1C 6.00 %     Narrative:      Hemoglobin A1C Ranges:    Increased Risk for Diabetes  5.7% to 6.4%  Diabetes                     >= 6.5%  Diabetic Goal                < 7.0%    CBC Auto Differential [[id number]]  (Abnormal) Collected: 01/19/22 0510    Specimen: Blood Updated: 01/19/22 0537     WBC 7.66 10*3/mm3      RBC 3.41 10*6/mm3      Hemoglobin 10.2 g/dL      Hematocrit 31.9 %      MCV 93.5 fL      MCH 29.9 pg      MCHC 32.0 g/dL      RDW 16.3 %      RDW-SD 55.6 fl      MPV 9.8 fL      Platelets 198 10*3/mm3      Neutrophil % 87.1 %      Lymphocyte % 7.0 %      Monocyte % 5.0 %      Eosinophil % 0.0 %      Basophil % 0.1 %      Immature Grans % 0.8 %      Neutrophils, Absolute 6.67 10*3/mm3      Lymphocytes, Absolute 0.54 10*3/mm3      Monocytes, Absolute 0.38 10*3/mm3      Eosinophils, Absolute 0.00 10*3/mm3      Basophils, Absolute 0.01 10*3/mm3      Immature Grans, Absolute 0.06 10*3/mm3      nRBC 0.0 /100 WBC     Urinalysis, Microscopic Only - Urine, Clean Catch [[id number]]  (Abnormal) Collected: 01/18/22 1154    Specimen: Urine, Clean Catch Updated: 01/18/22 1234     RBC, UA None Seen /HPF      WBC, UA 31-50 /HPF      Bacteria, UA 3+ /HPF      Squamous Epithelial Cells, UA 0-2 /HPF      Hyaline Casts, UA None Seen /LPF      Amorphous Crystals, UA Small/1+ /HPF      Methodology Manual Light Microscopy    Urine Culture - Urine, Urine, Clean Catch [[id number]] Collected: 01/18/22 1154    Specimen: Urine, Clean Catch Updated: 01/18/22 1234    Urinalysis With Culture If Indicated - Urine, Clean Catch [[id number]]  (Abnormal) Collected: 01/18/22 1154    Specimen: Urine, Clean Catch Updated: 01/18/22 1227     Color, UA Yellow     Appearance, UA Cloudy     pH, UA <=5.0     Specific Gravity, UA 1.019     Glucose, UA Negative     Ketones, UA Negative     Bilirubin, UA Negative     Blood, UA Negative     Protein, UA 30 mg/dL (1+)     Leuk Esterase, UA Moderate (2+)     Nitrite, UA  Negative     Urobilinogen, UA 0.2 E.U./dL    COVID-19,[facility] IN-HOUSE,Nasal Swab No Transport Media 3-4 HR TAT - Swab, Nasal Cavity [[id number]]  (Normal) Collected: 01/18/22 1117    Specimen: Swab from Nasal Cavity Updated: 01/18/22 1215     COVID19 Not Detected    Narrative:      Fact sheet for providers: https://www.fda.gov/media/752759/download     Fact sheet for patients: https://www.fda.gov/media/019264/download    Test performed by PCR.    Consider negative results in combination with clinical observations, patient history, and epidemiological information.    Comprehensive Metabolic Panel [[id number]]  (Abnormal) Collected: 01/18/22 0855    Specimen: Blood from Arm, Left Updated: 01/18/22 1059     Glucose 152 mg/dL      BUN 26 mg/dL      Creatinine 1.06 mg/dL      Sodium 138 mmol/L      Potassium 4.1 mmol/L      Chloride 98 mmol/L      CO2 32.0 mmol/L      Calcium 10.5 mg/dL      Total Protein 7.3 g/dL      Albumin 3.70 g/dL      ALT (SGPT) 10 U/L      AST (SGOT) 20 U/L      Alkaline Phosphatase 106 U/L      Total Bilirubin 0.4 mg/dL      eGFR Non African Amer 66 mL/min/1.73      Globulin 3.6 gm/dL      A/G Ratio 1.0 g/dL      BUN/Creatinine Ratio 24.5     Anion Gap 8.0 mmol/L     Narrative:      GFR Normal >60  Chronic Kidney Disease <60  Kidney Failure <15      C-reactive Protein [[id number]]  (Abnormal) Collected: 01/18/22 0855    Specimen: Blood from Arm, Left Updated: 01/18/22 0936     C-Reactive Protein 2.87 mg/dL     Sedimentation Rate [[id number]]  (Abnormal) Collected: 01/18/22 0855    Specimen: Blood from Arm, Left Updated: 01/18/22 0927     Sed Rate 25 mm/hr     Protime-INR [[id number]]  (Abnormal) Collected: 01/18/22 0855    Specimen: Blood from Arm, Left Updated: 01/18/22 0926     Protime 16.1 Seconds      INR 1.34    aPTT [[id number]]  (Abnormal) Collected: 01/18/22 0855    Specimen: Blood from Arm, Left Updated: 01/18/22 0926     PTT 35.9 seconds     CBC & Differential [[id number]]  (Abnormal)  Collected: 01/18/22 0855    Specimen: Blood from Arm, Left Updated: 01/18/22 0916    Narrative:      The following orders were created for panel order CBC & Differential.  Procedure                               Abnormality         Status                     ---------                               -----------         ------                     CBC Auto Differential[[id number]]        Abnormal            Final result                 Please view results for these tests on the individual orders.    CBC Auto Differential [[id number]]  (Abnormal) Collected: 01/18/22 0855    Specimen: Blood from Arm, Left Updated: 01/18/22 0916     WBC 7.87 10*3/mm3      RBC 3.37 10*6/mm3      Hemoglobin 10.4 g/dL      Hematocrit 32.3 %      MCV 95.8 fL      MCH 30.9 pg      MCHC 32.2 g/dL      RDW 16.3 %      RDW-SD 56.9 fl      MPV 9.8 fL      Platelets 200 10*3/mm3      Neutrophil % 72.0 %      Lymphocyte % 16.8 %      Monocyte % 8.1 %      Eosinophil % 1.7 %      Basophil % 0.4 %      Immature Grans % 1.0 %      Neutrophils, Absolute 5.67 10*3/mm3      Lymphocytes, Absolute 1.32 10*3/mm3      Monocytes, Absolute 0.64 10*3/mm3      Eosinophils, Absolute 0.13 10*3/mm3      Basophils, Absolute 0.03 10*3/mm3      Immature Grans, Absolute 0.08 10*3/mm3      nRBC 0.0 /100 WBC         Imaging Results (Last 24 Hours)     Procedure Component Value Units Date/Time    XR Hip With or Without Pelvis 2 - 3 View Left [[id number]] Collected: 01/18/22 1829     Updated: 01/19/22 0736    Narrative:      EXAMINATION: XR HIP W OR WO PELVIS 2-3 VIEW LEFT-     1/18/2022 5:41 PM CST     HISTORY: cannulated screw; S72.002A-Fracture of unspecified part of neck  of left femur, initial encounter for closed fracture     Number of fluoroscopic spot images obtained = 2.     Fluoroscopy dose in Air Kerma mGy = 15.565.     Fluoroscopy total exposure time = 41.1 seconds.     Spot images document screw fixation of the left femoral neck.  This report was finalized on  01/18/2022 18:29 by Dr. Geronimo Roldan MD.    FL C Arm During Surgery [[id number]] Collected: 01/18/22 1829     Updated: 01/19/22 0736    Narrative:      EXAMINATION: XR HIP W OR WO PELVIS 2-3 VIEW LEFT-     1/18/2022 5:41 PM CST     HISTORY: cannulated screw; S72.002A-Fracture of unspecified part of neck  of left femur, initial encounter for closed fracture     Number of fluoroscopic spot images obtained = 2.     Fluoroscopy dose in Air Kerma mGy = 15.565.     Fluoroscopy total exposure time = 41.1 seconds.     Spot images document screw fixation of the left femoral neck.  This report was finalized on 01/18/2022 18:29 by Dr. Geronimo Roldan MD.    XR Chest 1 View [[id number]] Collected: 01/18/22 1057     Updated: 01/18/22 1102    Narrative:      EXAM: XR CHEST 1 VW- 1/18/2022 10:47 AM CST     HISTORY: pre-op       COMPARISON: December 1, 2020.     TECHNIQUE: Frontal radiograph of the chest     FINDINGS:   The lungs are clear. A radiolucent patch projects over the right chest..  Cardiac silhouette is normal. Pacing device present soft tissues of the  left chest.      The osseous structures and surrounding soft tissues demonstrate no acute  abnormality.          Impression:      1. No radiographic evidence of acute cardiopulmonary process.        This report was finalized on 01/18/2022 10:59 by Dr. Damien Boland MD.    CT Pelvis Without Contrast [[id number]] Collected: 01/18/22 0957     Updated: 01/18/22 1005    Narrative:      EXAMINATION: CT pelvis without contrast 1/18/2022     HISTORY: Left hip pain. Evaluate for sacral insufficiency fracture     FINDINGS: Multiple contiguous axials are obtained through the bony  pelvis at 3 mm intervals with reformatted images obtained in the  sagittal and coronal projections from the original data set.     There is degenerative disc disease at L5-S1 with grade 1 anterolisthesis  of L5 on S1 likely representing subluxation at the level of the facets.  The patient has undergone  previous right total hip arthroplasty. There  is a mildly displaced impacted subcapital fracture of the left hip.  There is advanced osteoarthrosis of the left hip with near complete loss  of joint space and spurring of both the acetabular roof and femoral  head. No additional fractures are present.       Impression:      1.. Minimally displaced mildly impacted subcapital fracture of the left  hip. There is osteoarthrosis of the left hip.  2. The patient is status post previous right total hip arthroplasty. No  additional fractures are present.  3. There is facet overgrowth within the mid and lower lumbar spine.  There is grade 1 anterolisthesis of L5 on S1 felt to represent  subluxation at the level of the facets.  This report was finalized on 01/18/2022 10:01 by Dr. Obinna Luna MD.    CT Lumbar Spine Without Contrast [[id number]] Collected: 01/18/22 0957     Updated: 01/18/22 1004    Narrative:      EXAMINATION: CT LUMBAR SPINE WITHOUT IV CONTRAST 1/18/2022     COMPARISON: None.     HISTORY: Male, 87 years-old. Back pain, r/o sacral insufficiency  fracture     TECHNIQUE: Multiple CT images were obtained of the lumbar spine without  IV contrast. The images were formatted in the axial, coronal and  sagittal planes.     Radiation dose equals  mGy-cm.  Automated exposure control dose  reduction technique was implemented.     FINDINGS:   Severe demineralization of the skeleton limits bony details.  Grade 1 anterolisthesis of L5 on S1.No acute compression deformity. No  dislocation. No abnormal soft tissue density spinal canal.There is  degenerative disc disease at multiple levels, worst at L5-S1.There is no  significant spinal canal stenosis. Moderate to severe right and severe  left foraminal stenosis at L5-S1. Moderate bilateral foraminal stenosis  at L3-L4 and L4-5.          Impression:      1.  Demineralized skeleton limits bony details.  2.  No acute osseous post traumatic findings.  3.  Foraminal  stenosis, worst on the left at L5-S1.        This report was finalized on 01/18/2022 10:01 by Dr. Radha Jimenez MD.          Assessment:   1 Day Post-Op  Percutaneous screw fixation of  minimally displaced subcapital femoral neck fracture    Plan:      1:  DVT prophylaxis-may resume, ICE, elevate  2:  Pain control  3:  Physical therapy/Occupational therapy  4:  Anticipate discharge when pain controlled and medically stable-SNF placement  5: WBAT LLE   6:  Ortho F/U in 2 weeks.       Wilmer Lobo PA-C

## 2022-01-19 NOTE — THERAPY TREATMENT NOTE
Acute Care - Physical Therapy Treatment Note  [facility]     Patient Name: Dexter Suggs  : 1934  MRN: [id number]  Today's Date: 2022      Visit Dx:     ICD-10-CM ICD-9-CM   1. Closed fracture of left hip, initial encounter ([facility])  S72.002A 820.8   2. 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   • Actinic keratosis   • Benign prostatic hyperplasia with lower urinary tract symptoms   • Complete heart block (HCC)   • Sick sinus syndrome s/p pacemaker    • Sick sinus syndrome (HCC)   • Laceration of face without complication   • PAF (paroxysmal atrial fibrillation) (CMS/HCC) on Eliquis    • Paroxysmal atrial flutter (HCC)   • Pernicious anemia   • Alkaline phosphatase elevation   • Anemia   • Gastroesophageal reflux disease   • Hypothyroidism due to Hashimoto's thyroiditis   • Impaired fasting glucose   • Overlapping malignant melanoma of skin (HCC)   • Primary osteoarthritis of both hands   • COVID-19 virus infection   • Closed fracture of distal end of radius   • Arthritis of hand   • Closed fracture of right hip (HCC)   • Gross hematuria   • Chronic anticoagulation   • Postoperative anemia due to acute blood loss   • Closed nondisplaced fracture of lesser trochanter of right femur with routine healing   • Closed subcapital fracture of femur, left, initial encounter ([facility])     Past Medical History:   Diagnosis Date   • Abnormal nuclear stress test    • BPH (benign prostatic hyperplasia)    • Chest pain    • Coronary artery disease    • Disease of thyroid gland    • Hyperlipidemia    • Hypertension    • LV dysfunction    • Melanoma (HCC)    • Prostate CA (HCC)      Past Surgical History:   Procedure Laterality Date   • APPENDECTOMY      • CARDIAC CATHETERIZATION Left 12/10/2012   • CARDIAC ELECTROPHYSIOLOGY PROCEDURE N/A 11/23/2018    Procedure: Pacemaker DC new 11/23/2018;  Surgeon: Austin Granados MD;  Location:  [facility] CATH INVASIVE LOCATION;  Service: Cardiology   • CHOLECYSTECTOMY     • COLONOSCOPY N/A 6/9/2017    Procedure: COLONOSCOPY WITH ANESTHESIA;  Surgeon: Felice Marroquin MD;  Location:  [facility] ENDOSCOPY;  Service:    • COLONOSCOPY W/ POLYPECTOMY  02/16/2012    adenomatous polyp at 80cm   • HAND SURGERY Right    • HERNIA REPAIR     • HIP CANNULATED SCREW PLACEMENT Left 1/18/2022    Procedure: HIP CANNULATED SCREW PLACEMENT;  Surgeon: Isaiah Sheehan MD;  Location:  [facility] OR;  Service: Orthopedics;  Laterality: Left;   • INGUINAL HERNIA REPAIR     • KNEE SURGERY     • PROSTATE SURGERY     • SKIN CANCER EXCISION      face   • SKIN LESION EXCISION     • TOTAL HIP ARTHROPLASTY Right 8/27/2021    Procedure: TOTAL HIP REPLACEMENT;  Surgeon: rAik Magaña MD;  Location:  [facility] OR;  Service: Orthopedics;  Laterality: Right;     PT Assessment (last 12 hours)     PT Evaluation and Treatment     Row Name 01/19/22 1312          Physical Therapy Time and Intention    Subjective Information complains of; pain  -LC     Document Type therapy note (daily note)  -     Mode of Treatment physical therapy  -     Row Name 01/19/22 1312          General Information    Existing Precautions/Restrictions fall  WBAT L LE  -     Row Name 01/19/22 1312          Pain Scale: Numbers Pre/Post-Treatment    Pretreatment Pain Rating 0/10 - no pain  -LC     Posttreatment Pain Rating 3/10  -LC     Pain Location - Side Left  -LC     Pain Location hip  -LC     Row Name 01/19/22 1312          Pain Scale: Word Pre/Post-Treatment    Pain Intervention(s) Medication (See MAR); Ambulation/increased activity  -LC     Row Name 01/19/22 1312          Bed Mobility    Supine-Sit [place] (Bed Mobility) verbal cues; contact guard  -     Assistive Device (Bed  Mobility) bed rails; head of bed elevated  -LC     Row Name 01/19/22 1312          Transfers    Sit-Stand [place] (Transfers) verbal cues; minimum assist (75% patient effort)  -LC     Row Name 01/19/22 1312          Sit-Stand Transfer    Assistive Device (Sit-Stand Transfers) walker, front-wheeled  -LC     Row Name 01/19/22 1312          Gait/Stairs (Locomotion)    [place] Level (Gait) verbal cues; contact guard  -LC     Assistive Device (Gait) walker, front-wheeled  -LC     Distance in Feet (Gait) 40'  -LC     Pattern (Gait) step-to; step-through  -LC     Deviations/Abnormal Patterns (Gait) gait speed decreased; stride length decreased  -LC     Bilateral Gait Deviations forward flexed posture  -LC     Comment (Gait/Stairs) pt began with step-to progressed to step-through  -LC     Row Name             Wound 01/18/22 1745 Left anterior hip Incision    Wound - Properties Group Placement Date: 01/18/22  -RS Placement Time: 1745  -RS Side: Left  -RS Orientation: anterior  -RS Location: hip  -RS Primary Wound Type: Incision  -RS     Retired Wound - Properties Group Date first assessed: 01/18/22  -RS Time first assessed: 1745  -RS Side: Left  -RS Location: hip  -RS Primary Wound Type: Incision  -RS     Row Name             [REMOVED] Wound 08/28/21 1623 Left thoracic spine Skin Tear    Wound - Properties Group Placement Date: 08/28/21  -CW Placement Time: 1623  -CW Present on Hospital Admission: N  -CW Side: Left  -CW Location: thoracic spine  -CW Primary Wound Type: Skin tear  -CW Removal Date: 01/19/22  -BS Removal Time: 1048  -BS Wound Outcome: Healed  -BS     Retired Wound - Properties Group Date first assessed: 08/28/21  -CW Time first assessed: 1623  -CW Present on Hospital Admission: N  -CW Side: Left  -CW Location: thoracic spine  -CW Primary Wound Type: Skin tear  -CW Resolution Date: 01/19/22  -BS Resolution Time: 1048  -BS Wound Outcome: Healed  -BS     Row Name 01/19/22 1312          Positioning and  Restraints    Pre-Treatment Position in bed  -LC     Post Treatment Position chair  -LC     In Chair sitting; call light within reach; encouraged to call for assist  -LC           User Key  (r) = Recorded By, (t) = Taken By, (c) = Cosigned By    Initials Name Provider Type    BS Christina Remy, RN Registered Nurse    RS Rusty Flower RN Registered Nurse    LC Zunilda Carvalho, PTA Physical Therapy Assistant    CW Jordan Hoover RN Registered Nurse                Physical Therapy Education                 Title: PT OT SLP Therapies (In Progress)     Topic: Physical Therapy (In Progress)     Point: Mobility training (Done)     Learning Progress Summary           Patient Acceptance, E, VU by MS at 1/19/2022 0937    Comment: role of PT in his care, WBAT L LE                   Point: Home exercise program (Not Started)     Learner Progress:  Not documented in this visit.          Point: Body mechanics (Not Started)     Learner Progress:  Not documented in this visit.          Point: Precautions (Not Started)     Learner Progress:  Not documented in this visit.                      User Key     Initials Effective Dates Name Provider Type Discipline    MS 06/19/18 -  Keke Mitchell, PT, DPT, NCS Physical Therapist PT              PT Recommendation and Plan             Time Calculation:    PT Charges     Row Name 01/19/22 1341 01/19/22 0731          Time Calculation    Start Time 1312  -LC 0730  -MS     Stop Time 1337  -LC 0810  -MS     Time Calculation (min) 25 min  -LC 40 min  -MS     PT Received On 01/19/22  -LC 01/19/22  -MS     PT Goal Re-Cert Due Date -- 01/29/22  -MS            Time Calculation- PT    Total Timed Code Minutes- PT 25 minute(s)  -LC --            Timed Charges    90596 - Gait Training Minutes  15  -LC --     68579 - PT Therapeutic Activity Minutes 10  -LC --            Untimed Charges    PT Eval/Re-eval Minutes -- 40  -MS            Total Minutes    Timed Charges Total Minutes 25  -LC --      Untimed Charges Total Minutes -- 40  -MS      Total Minutes 25  -LC 40  -MS           User Key  (r) = Recorded By, (t) = Taken By, (c) = Cosigned By    Initials Name Provider Type    MS Mitchell Keke R, PT, DPT, NCS Physical Therapist    Zunilda Stone PTA Physical Therapy Assistant              Therapy Charges for Today     Code Description Service Date Service Provider Modifiers Qty    92663910276 HC GAIT TRAINING EA 15 MIN 1/19/2022 Zunilda Carvalho PTA GP 1    58224142035 HC PT THERAPEUTIC ACT EA 15 MIN 1/19/2022 Zunilda Carvalho PTA GP 1          PT G-Codes  Outcome Measure Options: AM-PAC 6 Clicks Basic Mobility (PT)  AM-PAC 6 Clicks Score (PT): 16  AM-PAC 6 Clicks Score (OT): 20    Zunilda Carvalho PTA  1/19/2022

## 2022-01-19 NOTE — PLAN OF CARE
Goal Outcome Evaluation:  Plan of Care Reviewed With: patient        Progress: no change  Outcome Summary: Pt a&ox4. Dressing CDI. Up x1 with walker. Up to chair today. Voiding dark bloody urine, no clots noted- MD aware. IVF infusing. IV ABX given. New IV this shift. VSS. Safety maintained.

## 2022-01-19 NOTE — CASE MANAGEMENT/SOCIAL WORK
Discharge Planning Assessment  [facility]     Patient Name: Dexter Suggs  MRN: [id number]  Today's Date: 1/19/2022    Admit Date: 1/18/2022     Discharge Needs Assessment     Row Name 01/19/22 1436       Living Environment    Lives With spouse    Name(s) of Who Lives With Patient Son is currently in town and staying at pt home    Current Living Arrangements home/apartment/condo    Primary Care Provided by self    Provides Primary Care For no one    Family Caregiver if Needed child(timothy), adult; spouse    Quality of Family Relationships helpful; involved    Able to Return to Prior Arrangements yes       Resource/Environmental Concerns    Resource/Environmental Concerns none    Transportation Concerns car, none       Transition Planning    Patient/Family Anticipates Transition to home with family; home with help/services    Patient/Family Anticipated Services at Transition home health care    Transportation Anticipated family or friend will provide       Discharge Needs Assessment    Readmission Within the Last 30 Days no previous admission in last 30 days    Equipment Currently Used at Home walker, rolling    Concerns to be Addressed denies needs/concerns at this time    Anticipated Changes Related to Illness none    Equipment Needed After Discharge none    Outpatient/Agency/Support Group Needs homecare agency    Discharge Facility/Level of Care Needs home with home health    Provided Post Acute Provider List? Yes    Post Acute Provider List Home Health    Delivered To Patient    Method of Delivery In person    Discharge Coordination/Progress Spoke to pt about dc planning and options. Discussed acute rehab vs home with HH. Pt states he prefers to go home with HH ([facility] ). Pt states his son from out of town arrived last night and will be staying for several days. Pt states he will have assist from son and spouse. Pt has a rolling walker at home. Pt denies any other dc needs except for HH               Discharge  Plan    No documentation.               Continued Care and Services - Admitted Since 1/18/2022    Coordination has not been started for this encounter.          Demographic Summary    No documentation.                Functional Status    No documentation.                Psychosocial    No documentation.                Abuse/Neglect    No documentation.                Legal    No documentation.                Substance Abuse    No documentation.                Patient Forms    No documentation.                   KARINE Parikh

## 2022-01-19 NOTE — BRIEF OP NOTE
HIP CANNULATED SCREW PLACEMENT  Progress Note    Dexter WILLIAMSON Ifeanyi  1/18/2022    Pre-op Diagnosis:   left FNFx       Post-Op Diagnosis Codes:     * Closed subcapital fracture of femur, left, initial encounter ([facility]) [S72.012A]    Procedure/CPT® Codes:  HI PERCUT FIX PROX/NECK FEMUR FX [99759]      Procedure(s):  HIP CANNULATED SCREW PLACEMENT    Surgeon(s):  Isaiah Sheehan MD    Anesthesia: General    Staff:   Circulator: Grisel Rodriguez RN; Rusty Flower RN  Scrub Person: Wilmer Bermudez  Assistant: Chi Alexis  Assistant: Chi Alexis      Estimated Blood Loss: minimal    Urine Voided: * No values recorded between 1/18/2022  5:30 PM and 1/18/2022  6:12 PM *    Specimens:                None          Drains:   Urethral Catheter Double-lumen; Silicone 16 Fr. (Active)       Findings: see op note    Complications: none    Isaiah Sheehan MD     Date: 1/18/2022  Time: 18:12 CST

## 2022-01-20 LAB
ANION GAP SERPL CALCULATED.3IONS-SCNC: 6 MMOL/L (ref 5–15)
BACTERIA SPEC AEROBE CULT: NORMAL
BUN SERPL-MCNC: 24 MG/DL (ref 8–23)
BUN/CREAT SERPL: 23.8 (ref 7–25)
CALCIUM SPEC-SCNC: 9.9 MG/DL (ref 8.6–10.5)
CHLORIDE SERPL-SCNC: 101 MMOL/L (ref 98–107)
CO2 SERPL-SCNC: 33 MMOL/L (ref 22–29)
CREAT SERPL-MCNC: 1.01 MG/DL (ref 0.76–1.27)
DEPRECATED RDW RBC AUTO: 57.1 FL (ref 37–54)
ERYTHROCYTE [DISTWIDTH] IN BLOOD BY AUTOMATED COUNT: 16.6 % (ref 12.3–15.4)
GFR SERPL CREATININE-BSD FRML MDRD: 70 ML/MIN/1.73
GLUCOSE SERPL-MCNC: 129 MG/DL (ref 65–99)
HCT VFR BLD AUTO: 28.9 % (ref 37.5–51)
HGB BLD-MCNC: 9.5 G/DL (ref 13–17.7)
MCH RBC QN AUTO: 31.4 PG (ref 26.6–33)
MCHC RBC AUTO-ENTMCNC: 32.9 G/DL (ref 31.5–35.7)
MCV RBC AUTO: 95.4 FL (ref 79–97)
PLATELET # BLD AUTO: 199 10*3/MM3 (ref 140–450)
PMV BLD AUTO: 9.7 FL (ref 6–12)
POTASSIUM SERPL-SCNC: 4.5 MMOL/L (ref 3.5–5.2)
RBC # BLD AUTO: 3.03 10*6/MM3 (ref 4.14–5.8)
SODIUM SERPL-SCNC: 140 MMOL/L (ref 136–145)
WBC NRBC COR # BLD: 9.37 10*3/MM3 (ref 3.4–10.8)

## 2022-01-20 PROCEDURE — 80048 BASIC METABOLIC PNL TOTAL CA: CPT | Performed by: FAMILY MEDICINE

## 2022-01-20 PROCEDURE — 97530 THERAPEUTIC ACTIVITIES: CPT

## 2022-01-20 PROCEDURE — 63710000001 PREDNISONE PER 5 MG: Performed by: FAMILY MEDICINE

## 2022-01-20 PROCEDURE — 25010000002 CEFTRIAXONE PER 250 MG: Performed by: FAMILY MEDICINE

## 2022-01-20 PROCEDURE — 85027 COMPLETE CBC AUTOMATED: CPT | Performed by: FAMILY MEDICINE

## 2022-01-20 PROCEDURE — 97116 GAIT TRAINING THERAPY: CPT

## 2022-01-20 PROCEDURE — 97110 THERAPEUTIC EXERCISES: CPT

## 2022-01-20 RX ADMIN — FERROUS SULFATE TAB 325 MG (65 MG ELEMENTAL FE) 325 MG: 325 (65 FE) TAB at 09:29

## 2022-01-20 RX ADMIN — FERROUS SULFATE TAB 325 MG (65 MG ELEMENTAL FE) 325 MG: 325 (65 FE) TAB at 17:25

## 2022-01-20 RX ADMIN — TRIAMTERENE AND HYDROCHLOROTHIAZIDE 0.5 TABLET: 75; 50 TABLET ORAL at 09:28

## 2022-01-20 RX ADMIN — SODIUM CHLORIDE 1 G: 900 INJECTION INTRAVENOUS at 15:33

## 2022-01-20 RX ADMIN — FERROUS SULFATE TAB 325 MG (65 MG ELEMENTAL FE) 325 MG: 325 (65 FE) TAB at 12:26

## 2022-01-20 RX ADMIN — FOLIC ACID 1 MG: 1 TABLET ORAL at 09:29

## 2022-01-20 RX ADMIN — SODIUM CHLORIDE, POTASSIUM CHLORIDE, SODIUM LACTATE AND CALCIUM CHLORIDE 100 ML/HR: 600; 310; 30; 20 INJECTION, SOLUTION INTRAVENOUS at 02:36

## 2022-01-20 RX ADMIN — METOPROLOL SUCCINATE 25 MG: 25 TABLET, EXTENDED RELEASE ORAL at 09:28

## 2022-01-20 RX ADMIN — ASPIRIN 81 MG: 81 TABLET, COATED ORAL at 09:29

## 2022-01-20 RX ADMIN — PANTOPRAZOLE SODIUM 40 MG: 40 TABLET, DELAYED RELEASE ORAL at 06:09

## 2022-01-20 RX ADMIN — LEVOTHYROXINE SODIUM 75 MCG: 75 TABLET ORAL at 06:09

## 2022-01-20 RX ADMIN — SODIUM CHLORIDE, PRESERVATIVE FREE 10 ML: 5 INJECTION INTRAVENOUS at 09:29

## 2022-01-20 RX ADMIN — HYDROCODONE BITARTRATE AND ACETAMINOPHEN 1 TABLET: 5; 325 TABLET ORAL at 15:15

## 2022-01-20 RX ADMIN — PREDNISONE 5 MG: 5 TABLET ORAL at 09:28

## 2022-01-20 RX ADMIN — ALLOPURINOL 300 MG: 300 TABLET ORAL at 09:30

## 2022-01-20 RX ADMIN — SODIUM CHLORIDE, PRESERVATIVE FREE 10 ML: 5 INJECTION INTRAVENOUS at 20:19

## 2022-01-20 RX ADMIN — SODIUM CHLORIDE, POTASSIUM CHLORIDE, SODIUM LACTATE AND CALCIUM CHLORIDE 100 ML/HR: 600; 310; 30; 20 INJECTION, SOLUTION INTRAVENOUS at 12:26

## 2022-01-20 NOTE — PROGRESS NOTES
[facility] Medicine Services  INPATIENT PROGRESS NOTE    Length of Stay: 2  Date of Admission: 1/18/2022  Primary Care Physician: Jarad Alexis MD    Subjective   Chief Complaint: Left hip fracture/hematuria    HPI   Possible discharge tomorrow discussed with patient if hematuria improved.  Blood pressure stable slight decrease in hemoglobin.  Hematuria is also improving today compared to yesterday.    Review of Systems   Constitutional: Positive for activity change, appetite change and fatigue. Negative for chills and fever.   HENT: Negative for hearing loss, nosebleeds, tinnitus and trouble swallowing.    Eyes: Negative for visual disturbance.   Respiratory: Negative for cough, chest tightness, shortness of breath and wheezing.    Cardiovascular: Negative for chest pain, palpitations and leg swelling.   Gastrointestinal: Negative for abdominal distention, abdominal pain, blood in stool, constipation, diarrhea, nausea and vomiting.   Endocrine: Negative for cold intolerance, heat intolerance, polydipsia, polyphagia and polyuria.   Genitourinary: Negative for decreased urine volume, difficulty urinating, dysuria, flank pain, frequency.  Hematuria.    Condom cath.  Musculoskeletal: Positive for arthralgias, gait problem and myalgias. Negative for joint swelling.        Left hip pain.   Skin: Negative for rash.   Allergic/Immunologic: Negative for immunocompromised state.   Neurological: Positive for weakness. Negative for dizziness, syncope, light-headedness and headaches.   Hematological: Negative for adenopathy. Does not bruise/bleed easily.   Psychiatric/Behavioral: Positive for confusion. Negative for sleep disturbance. The patient is not nervous/anxious.      All pertinent negatives and positives are as above. All other systems have been reviewed and are negative unless otherwise stated.     Objective    Temp:  [97.7 °F (36.5 °C)-98.7 °F (37.1 °C)] 98.2 °F (36.8  °C)  Heart Rate:  [53-97] 53  Resp:  [16-18] 16  BP: (103-158)/(58-79) 111/58    Intake/Output Summary (Last 24 hours) at 1/20/2022 1450  Last data filed at 1/20/2022 1300  Gross per 24 hour   Intake 1502 ml   Output 2200 ml   Net -698 ml     Physical Exam  Vitals and nursing note reviewed.   Constitutional:       Appearance: He is well-developed.      Comments: Advanced age.  Patient is presently joking around.   HENT:      Head: Normocephalic.   Eyes:      Conjunctiva/sclera: Conjunctivae normal.      Pupils: Pupils are equal, round, and reactive to light.   Neck:      Vascular: No JVD.   Cardiovascular:      Rate and Rhythm: Normal rate and regular rhythm.      Heart sounds: Normal heart sounds. No murmur heard.  No friction rub. No gallop.       Comments: Pacing.  Pulmonary:      Effort: No respiratory distress.      Breath sounds: No wheezing or rales.      Comments: Diminished breath sound, clear .  Chest:      Chest wall: No tenderness.   Abdominal:      General: Bowel sounds are normal. There is no distension.      Palpations: Abdomen is soft.      Tenderness: There is no abdominal tenderness. There is no guarding or rebound.   Musculoskeletal:         General: No tenderness or deformity.      Cervical back: Neck supple.      Comments: Left hip pain.  Left hip fracture-post surgery.   Skin:     General: Skin is warm and dry.      Findings: No rash.   Neurological:      Mental Status: He is alert and oriented to person, place, and time.      Cranial Nerves: No cranial nerve deficit.      Motor: Weakness present. No abnormal muscle tone.      Coordination: Coordination abnormal.      Gait: Gait abnormal.      Deep Tendon Reflexes: Reflexes normal.   Psychiatric:         Behavior: Behavior normal.   Results Review:  Lab Results (last 24 hours)     Procedure Component Value Units Date/Time    Urine Culture - Urine, Urine, Clean Catch [[id number]] Collected: 01/18/22 1154    Specimen: Urine, Clean Catch Updated:  01/20/22 1202     Urine Culture 50,000 CFU/mL Mixed Yakelin Isolated    Narrative:      Specimen contains mixed organisms of questionable pathogenicity which indicates contamination with commensal yakelin.  Further identification is unlikely to provide clinically useful information.  Suggest recollection.    Basic Metabolic Panel [[id number]]  (Abnormal) Collected: 01/20/22 0522    Specimen: Blood Updated: 01/20/22 0601     Glucose 129 mg/dL      BUN 24 mg/dL      Creatinine 1.01 mg/dL      Sodium 140 mmol/L      Potassium 4.5 mmol/L      Chloride 101 mmol/L      CO2 33.0 mmol/L      Calcium 9.9 mg/dL      eGFR Non African Amer 70 mL/min/1.73      BUN/Creatinine Ratio 23.8     Anion Gap 6.0 mmol/L     Narrative:      GFR Normal >60  Chronic Kidney Disease <60  Kidney Failure <15      CBC (No Diff) [[id number]]  (Abnormal) Collected: 01/20/22 0522    Specimen: Blood Updated: 01/20/22 0542     WBC 9.37 10*3/mm3      RBC 3.03 10*6/mm3      Hemoglobin 9.5 g/dL      Hematocrit 28.9 %      MCV 95.4 fL      MCH 31.4 pg      MCHC 32.9 g/dL      RDW 16.6 %      RDW-SD 57.1 fl      MPV 9.7 fL      Platelets 199 10*3/mm3     Hemoglobin & Hematocrit, Blood [[id number]]  (Abnormal) Collected: 01/19/22 1502    Specimen: Blood Updated: 01/19/22 1526     Hemoglobin 10.2 g/dL      Hematocrit 30.3 %            Cultures:  Urine Culture   Date Value Ref Range Status   01/18/2022 50,000 CFU/mL Mixed Yakelin Isolated  Final       Radiology Data:    Imaging Results (Last 24 Hours)     ** No results found for the last 24 hours. **          Allergies   Allergen Reactions   • Adhesive Tape    • Simvastatin Other (See Comments)     Abnormal LFT's   • Neosporin [Neomycin-Bacitracin Zn-Polymyx] Rash       Scheduled meds:   allopurinol, 300 mg, Oral, Daily  aspirin, 81 mg, Oral, Daily  cefTRIAXone, 1 g, Intravenous, Q24H  ferrous sulfate, 325 mg, Oral, TID With Meals  folic acid, 1 mg, Oral, Daily  levothyroxine, 75 mcg, Oral, Q AM  [START ON 1/26/2022]  methotrexate, 17.5 mg, Oral, Weekly  metoprolol succinate XL, 25 mg, Oral, Daily  pantoprazole, 40 mg, Oral, Q AM  predniSONE, 5 mg, Oral, Daily  sodium chloride, 10 mL, Intravenous, Q12H  triamterene-hydrochlorothiazide, 0.5 tablet, Oral, Daily        PRN meds:  docusate sodium  •  HYDROcodone-acetaminophen  •  HYDROcodone-acetaminophen  •  HYDROmorphone **AND** naloxone  •  LORazepam  •  magnesium hydroxide  •  nitroglycerin  •  ondansetron **OR** ondansetron  •  phenol  •  promethazine **OR** promethazine  •  sodium chloride  •  [COMPLETED] Insert peripheral IV **AND** sodium chloride  •  traMADol    Assessment/Plan       Closed subcapital fracture of femur, left, initial encounter ([facility])    Single vessel coronary artery disease    Essential hypertension    Mixed hyperlipidemia    Sick sinus syndrome s/p pacemaker     Sick sinus syndrome ([facility])    PAF (paroxysmal atrial fibrillation) (CMS/[facility]) on Eliquis     Pernicious anemia    Gastroesophageal reflux disease      Plan:  Left hip fracture/foraminal stenosis/demineralized skeleton.  Plan for surgery today.  Consulted orthopedics.  CT scan of the lumbar spine-demineralized skeleton limits bony details, No acute osseous post traumatic findings, Foraminal stenosis- worst on the left at L5-S1.  CT scan of pelvic- Minimally displaced mildly impacted subcapital fracture of the left hip- osteoarthrosis of the left hip, status post previous right total hip arthroplasty, facet overgrowth within the mid and lower lumbar spine, grade 1 anterolisthesis of L5 on S1 felt to represent subluxation at the level of the facets.  Status post 1/18/2022.- hip cannulated screw placement     Sick sinus syndrome/pacemaker in place/CAD/atrial fibrillation.  Hold anticoagulation-planning for surgery.  Continue aspirin.    Continue to hold Eliquis secondary to hematuria and status post surgery. Continue metoprolol.  Nitro as needed.  Continue Maxide.  Echocardiogram 3/5/2021- ejection  fraction 56 to 60%, mild concentric hypertrophy, diastolic function normal, tricuspid regurgitation.  Chest x-ray-no radiographic evidence of acute cardiopulmonary process.     UTI/hematuria.  Possible from his [place] cath.  Rocephin antibiotics x7 days 2022.  Check hemoglobin now about 2:00 PM.  Hematuria seems to be improving today.     Gout.  Continue allopurinol.     Constipation.  Colace.     Anemia.  Iron sulfate.    Slight decrease in hemoglobin.  No sign of acute bleed.     Arthritis/chronic pain.  Hold ibuprofen.    Continue methotrexate request per wife.  Continue prednisone.  Ultram as needed     Hypothyroidism.  Continue Synthroid.  TSH-normal     Reflux.  Protonix.     Anxiety.  Ativan as needed.     Nutrition.  Folic acid.    Deconditioning.  PT and OT consult.     Discharge Plannin-4 days.    Plan to discharge home with home health..  DNR.  DNI.     Electronically signed by Sg Ashton MD, 22, 2:50 PM CST.

## 2022-01-20 NOTE — PLAN OF CARE
Goal Outcome Evaluation:  Plan of Care Reviewed With: patient        Progress: improving  Outcome Summary: Pt a&ox4. Up x1 with walker ambulating in room and smith. Voiding per external catheter. Urine continues to be blood tinged but does appear to be lightening up in color. No c/o pain. IVF infusing. Dressing CDI- bruising noted around incision site. Pt anticipating being d/c home. VSS. Safety maintained.

## 2022-01-20 NOTE — PLAN OF CARE
Goal Outcome Evaluation:              Outcome Summary: Aox4. VSS. Room air. IVF infusing as ordered. Dressing to L hip CDI. Voiding frequently, light red urine noted at times. Pt incontinent due to urgency, purewick placed. Appears to be resting well.Denies pain. Safety maintained. Call light in reach.

## 2022-01-20 NOTE — PROGRESS NOTES
Orthopedic Surgery Progress Note    Dexter Suggs  1/20/2022      Subjective:     Systemic or Specific Complaints: doing well    Objective:     Patient Vitals for the past 24 hrs:   BP Temp Temp src Pulse Resp SpO2   01/20/22 1125 149/71 97.7 °F (36.5 °C) Oral 97 18 94 %   01/20/22 0742 103/79 97.9 °F (36.6 °C) Oral 70 18 97 %   01/20/22 0341 121/62 98.7 °F (37.1 °C) Oral 61 18 96 %   01/19/22 1900 158/58 98.2 °F (36.8 °C) Oral 79 16 96 %   01/19/22 1424 133/82 97.5 °F (36.4 °C) Oral 88 18 99 %   01/19/22 1159 118/73 97.4 °F (36.3 °C) Oral 86 18 97 %       left lower  General: alert, appears stated age and cooperative   Wound: covered             Dressing: Clean, dry, intact   Extremity: Distal NVI           DVT Exam: No evidence of DVT                   Data Review:  Lab Results (last 24 hours)     Procedure Component Value Units Date/Time    Basic Metabolic Panel [[id number]]  (Abnormal) Collected: 01/20/22 0522    Specimen: Blood Updated: 01/20/22 0601     Glucose 129 mg/dL      BUN 24 mg/dL      Creatinine 1.01 mg/dL      Sodium 140 mmol/L      Potassium 4.5 mmol/L      Chloride 101 mmol/L      CO2 33.0 mmol/L      Calcium 9.9 mg/dL      eGFR Non African Amer 70 mL/min/1.73      BUN/Creatinine Ratio 23.8     Anion Gap 6.0 mmol/L     Narrative:      GFR Normal >60  Chronic Kidney Disease <60  Kidney Failure <15      CBC (No Diff) [[id number]]  (Abnormal) Collected: 01/20/22 0522    Specimen: Blood Updated: 01/20/22 0542     WBC 9.37 10*3/mm3      RBC 3.03 10*6/mm3      Hemoglobin 9.5 g/dL      Hematocrit 28.9 %      MCV 95.4 fL      MCH 31.4 pg      MCHC 32.9 g/dL      RDW 16.6 %      RDW-SD 57.1 fl      MPV 9.7 fL      Platelets 199 10*3/mm3     Hemoglobin & Hematocrit, Blood [[id number]]  (Abnormal) Collected: 01/19/22 1502    Specimen: Blood Updated: 01/19/22 1526     Hemoglobin 10.2 g/dL      Hematocrit 30.3 %     Urine Culture - Urine, Urine, Clean Catch [[id number]]  (Normal) Collected: 01/18/22 1155     Specimen: Urine, Clean Catch Updated: 01/19/22 1143     Urine Culture Growth present, too young to evaluate        Imaging Results (Last 24 Hours)     ** No results found for the last 24 hours. **          Assessment:   2 Days Post-Op  Percutaneous screw fixation of  minimally displaced subcapital femoral neck fracture    Acute Post Op Blood Loss Anemia   Plan:      1:  DVT prophylaxis-may resume, ICE, elevate  2:  Pain control  3:  Physical therapy/Occupational therapy  4:  Anticipate discharge when pain controlled and medically stable-SNF placement vs Home with    5: WBAT LLE   6:  Ortho F/U in 2 weeks.   7: Ok TO D/C When medically stable.       MYRANDA DickersonC

## 2022-01-20 NOTE — PLAN OF CARE
Goal Outcome Evaluation:  Plan of Care Reviewed With: patient        Progress: improving  Outcome Summary: PT tx completed. SBA for supine to sit with HOB elevated and bedrails. Completed BLE AROM x20 reps in sitting. Pt stands from bed with CGA and RWX. Amb 80' with RWX and SBA, cues for increrased CHARBEL and B heel strike. Pt then amb 15' x2 reps in room to BR then back to chair. CGA for toilet t//f and SBA to stand from bed side chair. If pt d/c home, recommend assist and HH services. Will cont to follow

## 2022-01-20 NOTE — THERAPY TREATMENT NOTE
Acute Care - Physical Therapy Treatment Note  [facility]     Patient Name: Dexter Suggs  : 1934  MRN: [id number]  Today's Date: 2022      Visit Dx:     ICD-10-CM ICD-9-CM   1. Closed fracture of left hip, initial encounter ([facility])  S72.002A 820.8   2. 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   • Actinic keratosis   • Benign prostatic hyperplasia with lower urinary tract symptoms   • Complete heart block (HCC)   • Sick sinus syndrome s/p pacemaker    • Sick sinus syndrome (HCC)   • Laceration of face without complication   • PAF (paroxysmal atrial fibrillation) (CMS/HCC) on Eliquis    • Paroxysmal atrial flutter (HCC)   • Pernicious anemia   • Alkaline phosphatase elevation   • Anemia   • Gastroesophageal reflux disease   • Hypothyroidism due to Hashimoto's thyroiditis   • Impaired fasting glucose   • Overlapping malignant melanoma of skin (HCC)   • Primary osteoarthritis of both hands   • COVID-19 virus infection   • Closed fracture of distal end of radius   • Arthritis of hand   • Closed fracture of right hip (HCC)   • Gross hematuria   • Chronic anticoagulation   • Postoperative anemia due to acute blood loss   • Closed nondisplaced fracture of lesser trochanter of right femur with routine healing   • Closed subcapital fracture of femur, left, initial encounter ([facility])     Past Medical History:   Diagnosis Date   • Abnormal nuclear stress test    • BPH (benign prostatic hyperplasia)    • Chest pain    • Coronary artery disease    • Disease of thyroid gland    • Hyperlipidemia    • Hypertension    • LV dysfunction    • Melanoma (HCC)    • Prostate CA (HCC)      Past Surgical History:   Procedure Laterality Date   • APPENDECTOMY      • CARDIAC CATHETERIZATION Left 12/10/2012   • CARDIAC ELECTROPHYSIOLOGY PROCEDURE N/A 11/23/2018    Procedure: Pacemaker DC new 11/23/2018;  Surgeon: Austin Granados MD;  Location:  [facility] CATH INVASIVE LOCATION;  Service: Cardiology   • CHOLECYSTECTOMY     • COLONOSCOPY N/A 6/9/2017    Procedure: COLONOSCOPY WITH ANESTHESIA;  Surgeon: Felice Marroquin MD;  Location:  [facility] ENDOSCOPY;  Service:    • COLONOSCOPY W/ POLYPECTOMY  02/16/2012    adenomatous polyp at 80cm   • HAND SURGERY Right    • HERNIA REPAIR     • HIP CANNULATED SCREW PLACEMENT Left 1/18/2022    Procedure: HIP CANNULATED SCREW PLACEMENT;  Surgeon: Isaiah Sheehan MD;  Location:  [facility] OR;  Service: Orthopedics;  Laterality: Left;   • INGUINAL HERNIA REPAIR     • KNEE SURGERY     • PROSTATE SURGERY     • SKIN CANCER EXCISION      face   • SKIN LESION EXCISION     • TOTAL HIP ARTHROPLASTY Right 8/27/2021    Procedure: TOTAL HIP REPLACEMENT;  Surgeon: Arik Magaña MD;  Location:  [facility] OR;  Service: Orthopedics;  Laterality: Right;     PT Assessment (last 12 hours)     PT Evaluation and Treatment     Row Name 01/20/22 0840          Physical Therapy Time and Intention    Subjective Information complains of; pain  -LC     Document Type therapy note (daily note)  -     Mode of Treatment physical therapy  -     Row Name 01/20/22 0840          General Information    Existing Precautions/Restrictions fall  WBAT L LE  -     Row Name 01/20/22 0866          Pain Scale: Numbers Pre/Post-Treatment    Pretreatment Pain Rating 2/10  -LC     Posttreatment Pain Rating 5/10  -LC     Pain Location - Side Left  -LC     Pain Location hip  -LC     Row Name 01/20/22 0840          Pain Scale: Word Pre/Post-Treatment    Pain Intervention(s) Medication (See MAR); Ambulation/increased activity  -     Row Name 01/20/22 0840          Bed Mobility    Supine-Sit [place] (Bed Mobility) verbal cues; standby assist  -     Assistive Device (Bed Mobility) bed  rails; head of bed elevated  -     Row Name 01/20/22 0840          Transfers    Transfers toilet transfer  -     Sit-Stand [place] (Transfers) verbal cues; standby assist; contact guard  -     [place] Level (Toilet Transfer) verbal cues; standby assist; contact guard  -     Assistive Device (Toilet Transfer) commode; grab bars/safety frame; walker, front-wheeled  -     Row Name 01/20/22 0840          Sit-Stand Transfer    Assistive Device (Sit-Stand Transfers) walker, front-wheeled  -LC     Row Name 01/20/22 0840          Toilet Transfer    Type (Toilet Transfer) sit-stand; stand-sit  -     Row Name 01/20/22 0840          Gait/Stairs (Locomotion)    [place] Level (Gait) verbal cues; standby assist; contact guard  -     Assistive Device (Gait) walker, front-wheeled  -     Distance in Feet (Gait) 80' then 15' x2 in room  -     Pattern (Gait) step-through  -     Deviations/Abnormal Patterns (Gait) antalgic; base of support, narrow; gait speed decreased; stride length decreased  -     Bilateral Gait Deviations forward flexed posture; heel strike decreased  -     Row Name 01/20/22 0840          Motor Skills    Therapeutic Exercise aerobic  -     Row Name 01/20/22 0840          Aerobic Exercise    Comment, Aerobic Exercise (Therapeutic Exercise) BLE AROM x20 reps in sitting  -     Row Name             Wound 01/18/22 1745 Left anterior hip Incision    Wound - Properties Group Placement Date: 01/18/22  -RS Placement Time: 1745 -RS Side: Left  -RS Orientation: anterior  -RS Location: hip  -RS Primary Wound Type: Incision  -RS     Retired Wound - Properties Group Date first assessed: 01/18/22  -RS Time first assessed: 1745  -RS Side: Left  -RS Location: hip  -RS Primary Wound Type: Incision  -RS     Row Name 01/20/22 0840          Plan of Care Review    Plan of Care Reviewed With patient  -     Progress improving  -     Outcome Summary PT tx completed. SBA for supine to sit with  HOB elevated and bedrails. Completed BLE AROM x20 reps in sitting. Pt stands from bed with CGA and RWX. Amb 80' with RWX and SBA, cues for increrased CHARBEL and B heel strike. Pt then amb 15' x2 reps in room to BR then back to chair. CGA for toilet t//f and SBA to stand from bed side chair. If pt d/c home, recommennd assist and HH services. Will cont to follow  -     Row Name 01/20/22 0840          Positioning and Restraints    Pre-Treatment Position in bed  -     Post Treatment Position chair  -     In Chair reclined; call light within reach; encouraged to call for assist; with ns  -           User Key  (r) = Recorded By, (t) = Taken By, (c) = Cosigned By    Initials Name Provider Type    RS Rusty Flower, RN Registered Nurse    LC Zunilda Carvalho, PTA Physical Therapy Assistant                Physical Therapy Education                 Title: PT OT SLP Therapies (In Progress)     Topic: Physical Therapy (In Progress)     Point: Mobility training (Done)     Learning Progress Summary           Patient Acceptance, E, VU by MS at 1/19/2022 0937    Comment: role of PT in his care, WBAT L LE                   Point: Home exercise program (Not Started)     Learner Progress:  Not documented in this visit.          Point: Body mechanics (Not Started)     Learner Progress:  Not documented in this visit.          Point: Precautions (Not Started)     Learner Progress:  Not documented in this visit.                      User Key     Initials Effective Dates Name Provider Type Discipline    MS 06/19/18 -  Keke Mitchell, PT, DPT, NCS Physical Therapist PT              PT Recommendation and Plan     Plan of Care Reviewed With: patient  Progress: improving  Outcome Summary: PT tx completed. SBA for supine to sit with HOB elevated and bedrails. Completed BLE AROM x20 reps in sitting. Pt stands from bed with CGA and RWX. Amb 80' with RWX and SBA, cues for increrased CHARBEL and B heel strike. Pt then amb 15' x2 reps in room  to BR then back to chair. CGA for toilet t//f and SBA to stand from bed side chair. If pt d/c home, recommennd assist and HH services. Will cont to follow       Time Calculation:    PT Charges     Row Name 01/20/22 0933             Time Calculation    Start Time 0840  -LC      Stop Time 0928  -      Time Calculation (min) 48 min  -LC      PT Received On 01/20/22  -              Time Calculation- PT    Total Timed Code Minutes- PT 48 minute(s)  -LC              Timed Charges    92370 - PT Therapeutic Exercise Minutes 13  -LC      80545 - Gait Training Minutes  18  -LC      25048 - PT Therapeutic Activity Minutes 17  -LC              Total Minutes    Timed Charges Total Minutes 48  -LC       Total Minutes 48  -LC            User Key  (r) = Recorded By, (t) = Taken By, (c) = Cosigned By    Initials Name Provider Type    Zunilda Stone PTA Physical Therapy Assistant              Therapy Charges for Today     Code Description Service Date Service Provider Modifiers Qty    73358456540 HC GAIT TRAINING EA 15 MIN 1/19/2022 Zunilda Carvalho, PTA GP 1    53761630301 HC PT THERAPEUTIC ACT EA 15 MIN 1/19/2022 Zunilda Carvalho, PTA GP 1    78445940317 HC PT THER PROC EA 15 MIN 1/20/2022 Zunilda Carvalho, PTA GP 1    52522708430 HC GAIT TRAINING EA 15 MIN 1/20/2022 Zunilda Carvalho, PTA GP 1    13044376433 HC PT THERAPEUTIC ACT EA 15 MIN 1/20/2022 Zunilda Carvalho, PTA GP 1          PT G-Codes  Outcome Measure Options: AM-PAC 6 Clicks Basic Mobility (PT)  AM-PAC 6 Clicks Score (PT): 16  AM-PAC 6 Clicks Score (OT): 20    Zunilda Carvalho PTA  1/20/2022

## 2022-01-21 ENCOUNTER — APPOINTMENT (OUTPATIENT)
Dept: GENERAL RADIOLOGY | Facility: HOSPITAL | Age: 87
End: 2022-01-21

## 2022-01-21 LAB
ANION GAP SERPL CALCULATED.3IONS-SCNC: 6 MMOL/L (ref 5–15)
BUN SERPL-MCNC: 20 MG/DL (ref 8–23)
BUN/CREAT SERPL: 23.8 (ref 7–25)
CALCIUM SPEC-SCNC: 9.8 MG/DL (ref 8.6–10.5)
CHLORIDE SERPL-SCNC: 102 MMOL/L (ref 98–107)
CO2 SERPL-SCNC: 30 MMOL/L (ref 22–29)
CREAT SERPL-MCNC: 0.84 MG/DL (ref 0.76–1.27)
DEPRECATED RDW RBC AUTO: 63.7 FL (ref 37–54)
ERYTHROCYTE [DISTWIDTH] IN BLOOD BY AUTOMATED COUNT: 17 % (ref 12.3–15.4)
GFR SERPL CREATININE-BSD FRML MDRD: 86 ML/MIN/1.73
GLUCOSE SERPL-MCNC: 110 MG/DL (ref 65–99)
HCT VFR BLD AUTO: 30.8 % (ref 37.5–51)
HGB BLD-MCNC: 9.3 G/DL (ref 13–17.7)
MCH RBC QN AUTO: 31 PG (ref 26.6–33)
MCHC RBC AUTO-ENTMCNC: 30.2 G/DL (ref 31.5–35.7)
MCV RBC AUTO: 102.7 FL (ref 79–97)
PLATELET # BLD AUTO: 182 10*3/MM3 (ref 140–450)
PMV BLD AUTO: 10.1 FL (ref 6–12)
POTASSIUM SERPL-SCNC: 4.1 MMOL/L (ref 3.5–5.2)
RBC # BLD AUTO: 3 10*6/MM3 (ref 4.14–5.8)
SODIUM SERPL-SCNC: 138 MMOL/L (ref 136–145)
WBC NRBC COR # BLD: 7.7 10*3/MM3 (ref 3.4–10.8)

## 2022-01-21 PROCEDURE — 25010000002 CEFTRIAXONE PER 250 MG: Performed by: FAMILY MEDICINE

## 2022-01-21 PROCEDURE — 97116 GAIT TRAINING THERAPY: CPT

## 2022-01-21 PROCEDURE — 80048 BASIC METABOLIC PNL TOTAL CA: CPT | Performed by: FAMILY MEDICINE

## 2022-01-21 PROCEDURE — 97110 THERAPEUTIC EXERCISES: CPT

## 2022-01-21 PROCEDURE — 85027 COMPLETE CBC AUTOMATED: CPT | Performed by: FAMILY MEDICINE

## 2022-01-21 PROCEDURE — 97530 THERAPEUTIC ACTIVITIES: CPT

## 2022-01-21 PROCEDURE — 73501 X-RAY EXAM HIP UNI 1 VIEW: CPT

## 2022-01-21 PROCEDURE — 63710000001 PREDNISONE PER 5 MG: Performed by: FAMILY MEDICINE

## 2022-01-21 RX ADMIN — PREDNISONE 5 MG: 5 TABLET ORAL at 11:55

## 2022-01-21 RX ADMIN — DOCUSATE SODIUM 100 MG: 100 CAPSULE, LIQUID FILLED ORAL at 21:00

## 2022-01-21 RX ADMIN — FOLIC ACID 1 MG: 1 TABLET ORAL at 11:54

## 2022-01-21 RX ADMIN — MAGNESIUM HYDROXIDE 10 ML: 2400 SUSPENSION ORAL at 06:47

## 2022-01-21 RX ADMIN — LORAZEPAM 0.5 MG: 0.5 TABLET ORAL at 21:00

## 2022-01-21 RX ADMIN — METOPROLOL SUCCINATE 25 MG: 25 TABLET, EXTENDED RELEASE ORAL at 11:55

## 2022-01-21 RX ADMIN — FERROUS SULFATE TAB 325 MG (65 MG ELEMENTAL FE) 325 MG: 325 (65 FE) TAB at 11:55

## 2022-01-21 RX ADMIN — ASPIRIN 81 MG: 81 TABLET, COATED ORAL at 11:55

## 2022-01-21 RX ADMIN — SODIUM CHLORIDE 1 G: 900 INJECTION INTRAVENOUS at 17:07

## 2022-01-21 RX ADMIN — SODIUM CHLORIDE, PRESERVATIVE FREE 10 ML: 5 INJECTION INTRAVENOUS at 12:02

## 2022-01-21 RX ADMIN — HYDROCODONE BITARTRATE AND ACETAMINOPHEN 1 TABLET: 5; 325 TABLET ORAL at 04:11

## 2022-01-21 RX ADMIN — TRIAMTERENE AND HYDROCHLOROTHIAZIDE 0.5 TABLET: 75; 50 TABLET ORAL at 11:54

## 2022-01-21 RX ADMIN — PANTOPRAZOLE SODIUM 40 MG: 40 TABLET, DELAYED RELEASE ORAL at 06:47

## 2022-01-21 RX ADMIN — SODIUM CHLORIDE, POTASSIUM CHLORIDE, SODIUM LACTATE AND CALCIUM CHLORIDE 100 ML/HR: 600; 310; 30; 20 INJECTION, SOLUTION INTRAVENOUS at 01:42

## 2022-01-21 RX ADMIN — SODIUM CHLORIDE, PRESERVATIVE FREE 10 ML: 5 INJECTION INTRAVENOUS at 21:00

## 2022-01-21 RX ADMIN — LEVOTHYROXINE SODIUM 75 MCG: 75 TABLET ORAL at 06:47

## 2022-01-21 RX ADMIN — SODIUM CHLORIDE, POTASSIUM CHLORIDE, SODIUM LACTATE AND CALCIUM CHLORIDE 100 ML/HR: 600; 310; 30; 20 INJECTION, SOLUTION INTRAVENOUS at 21:00

## 2022-01-21 RX ADMIN — ALLOPURINOL 300 MG: 300 TABLET ORAL at 11:55

## 2022-01-21 RX ADMIN — FERROUS SULFATE TAB 325 MG (65 MG ELEMENTAL FE) 325 MG: 325 (65 FE) TAB at 17:07

## 2022-01-21 NOTE — DISCHARGE PLACEMENT REQUEST
"Diego Saini (87 y.o. Male)             Date of Birth Social Security Number Address Home Phone MRN    1934  [address]   [facility] [address] [phone] [id number]    Nondenominational Marital Status             [employer]        Admission Date Admission Type Admitting Provider Attending Provider Department, Room/Bed    1/18/22 Emergency Sg Ashton MD Truong, Khai C, MD [facility] 3A, 333/1    Discharge Date Discharge Disposition Discharge Destination                         Attending Provider: Sg Ashton MD    Allergies: Adhesive Tape, Simvastatin, Neosporin [Neomycin-bacitracin Zn-polymyx]    Isolation: None   Infection: None   Code Status: No CPR   Advance Care Planning Activity    Ht: 182.9 cm (72\")   Wt: 80.7 kg (177 lb 14.4 oz)    Admission Cmt: None   Principal Problem: Closed subcapital fracture of femur, left, initial encounter ([facility]) [S72.012A]                 Active Insurance as of 1/18/2022     Primary Coverage     Payor Plan Insurance Group Employer/Plan Group    MEDICARE MEDICARE A & B      Payor Plan Address Payor Plan Phone Number Payor Plan Fax Number Effective Dates    [address] [phone]  7/1/1999 - None Entered    [facility] [id number]       Subscriber Name Subscriber Birth Date Member ID       DIEGO SAINI TERI 1934 [id number]           Secondary Coverage     Payor Plan Insurance Group Employer/Plan Group    PRINCIPAL LIFE MC SUP PRINCIPAL LIFE MC SUP PLANF     Payor Plan Address Payor Plan Phone Number Payor Plan Fax Number Effective Dates    [address] [address] [phone]  7/1/1999 - None Entered    [facility] [id number]       Subscriber Name Subscriber Birth Date Member ID       DIEGO SAINI TERI 1934 [id number]                 Emergency Contacts      (Rel.) Home Phone Work Phone Mobile Phone    SainiAmirah hunt (Spouse) [phone] -- [phone]            Insurance Information                MEDICARE/MEDICARE A & B Phone: [phone]    " Subscriber: Dexter Suggs Subscriber#: [id number]    Group#: -- Precert#: --        PR[employer]IPAL LIFE  SUP/[facility] SUP Phone: [phone]    Subscriber: Dexter Suggs Subscriber#: [id number]    Group#: [id number] Precert#: --

## 2022-01-21 NOTE — PLAN OF CARE
Goal Outcome Evaluation:  Plan of Care Reviewed With: patient        Progress: improving  Outcome Summary: PT tx completed. SBA for supine to sit. Completed BLE AROM x20 reps in sitting. Sit to stands SBA with RWX. Pt amb 100' x2 reps with RWX and CGA.Pt requires cues for RWX placement and CHARBEL when fatgued. Will cont to follow.

## 2022-01-21 NOTE — PLAN OF CARE
Goal Outcome Evaluation:  Plan of Care Reviewed With: patient        Progress: improving  Outcome Summary: A/Ox4. C/o moderate pain that was relieved with PRN PO pain meds. Upx1 with walker to BR. Pt has tried multiple times to have a BM but just keeps passing gas; pt agrees to take PRN Milk of Mag this morning. VSS. Urine appears to be dark gerry with slight red tint to it. Drsg remains cdi. PPP. Safety maintained.

## 2022-01-21 NOTE — PROGRESS NOTES
Orthopedic Surgery Progress Note    Dexter Suggs  1/21/2022      Subjective:     Systemic or Specific Complaints: doing well    Objective:     Patient Vitals for the past 24 hrs:   BP Temp Temp src Pulse Resp SpO2 Weight   01/21/22 1154 127/99 97.5 °F (36.4 °C) Oral 70 16 -- --   01/21/22 0750 126/50 97.8 °F (36.6 °C) Oral 50 16 95 % --   01/20/22 2058 128/61 98.3 °F (36.8 °C) Oral 50 16 96 % 80.7 kg (177 lb 14.4 oz)   01/20/22 1429 111/58 98.2 °F (36.8 °C) Oral 53 16 96 % --       left lower  General: alert, appears stated age and cooperative   Wound: covered             Dressing: Clean, dry, intact   Extremity: Distal NVI           DVT Exam: No evidence of DVT                   Data Review:  Lab Results (last 24 hours)     Procedure Component Value Units Date/Time    CBC (No Diff) [[id number]]  (Abnormal) Collected: 01/21/22 0551    Specimen: Blood Updated: 01/21/22 0744     WBC 7.70 10*3/mm3      RBC 3.00 10*6/mm3      Hemoglobin 9.3 g/dL      Hematocrit 30.8 %      .7 fL      MCH 31.0 pg      MCHC 30.2 g/dL      RDW 17.0 %      RDW-SD 63.7 fl      MPV 10.1 fL      Platelets 182 10*3/mm3     Narrative:      Repeated to confirm results.      Basic Metabolic Panel [[id number]]  (Abnormal) Collected: 01/21/22 0551    Specimen: Blood Updated: 01/21/22 0643     Glucose 110 mg/dL      BUN 20 mg/dL      Creatinine 0.84 mg/dL      Sodium 138 mmol/L      Potassium 4.1 mmol/L      Chloride 102 mmol/L      CO2 30.0 mmol/L      Calcium 9.8 mg/dL      eGFR Non African Amer 86 mL/min/1.73      BUN/Creatinine Ratio 23.8     Anion Gap 6.0 mmol/L     Narrative:      GFR Normal >60  Chronic Kidney Disease <60  Kidney Failure <15          Imaging Results (Last 24 Hours)     ** No results found for the last 24 hours. **          Assessment:   3 Days Post-Op  Percutaneous screw fixation of  minimally displaced subcapital femoral neck fracture    Acute Post Op Blood Loss Anemia   Plan:      1:  DVT prophylaxis-may resume,  ICE, elevate  2:  Pain control  3:  Physical therapy/Occupational therapy  4:  Anticipate discharge when pain controlled and medically stable-SNF placement vs Home with HH   5: WBAT LLE   6:  Ortho F/U in 2 weeks.   7: Ok TO D/C When medically stable.   8: Patient states he slipped and bumped into bed-L Hip X ray      Wilmer Lobo PA-C

## 2022-01-21 NOTE — THERAPY TREATMENT NOTE
Patient Name: Dexter Suggs  : 1934    MRN: [id number]                              Today's Date: 2022       Admit Date: 2022    Visit Dx: Therapist utilized gait belt, applied non-slipped socks, provided fall risk education/prevention, & facilitated muscle strengthening PRN to reduce patient falls risk during this session.      ICD-10-CM ICD-9-CM   1. Closed fracture of left hip, initial encounter ([facility])  S72.002A 820.8   2. 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   • Actinic keratosis   • Benign prostatic hyperplasia with lower urinary tract symptoms   • Complete heart block (HCC)   • Sick sinus syndrome s/p pacemaker    • Sick sinus syndrome (HCC)   • Laceration of face without complication   • PAF (paroxysmal atrial fibrillation) (CMS/HCC) on Eliquis    • Paroxysmal atrial flutter (HCC)   • Pernicious anemia   • Alkaline phosphatase elevation   • Anemia   • Gastroesophageal reflux disease   • Hypothyroidism due to Hashimoto's thyroiditis   • Impaired fasting glucose   • Overlapping malignant melanoma of skin (HCC)   • Primary osteoarthritis of both hands   • COVID-19 virus infection   • Closed fracture of distal end of radius   • Arthritis of hand   • Closed fracture of right hip (HCC)   • Gross hematuria   • Chronic anticoagulation   • Postoperative anemia due to acute blood loss   • Closed nondisplaced fracture of lesser trochanter of right femur with routine healing   • Closed subcapital fracture of femur, left, initial encounter ([facility])     Past Medical History:   Diagnosis Date   • Abnormal nuclear stress test    • BPH (benign prostatic hyperplasia)    • Chest pain    • Coronary artery disease    • Disease of thyroid  gland    • Hyperlipidemia    • Hypertension    • LV dysfunction    • Melanoma (HCC)    • Prostate CA (HCC)      Past Surgical History:   Procedure Laterality Date   • APPENDECTOMY     • CARDIAC CATHETERIZATION Left 12/10/2012   • CARDIAC ELECTROPHYSIOLOGY PROCEDURE N/A 11/23/2018    Procedure: Pacemaker DC new 11/23/2018;  Surgeon: Austin Granados MD;  Location: [facility] CATH INVASIVE LOCATION;  Service: Cardiology   • CHOLECYSTECTOMY     • COLONOSCOPY N/A 6/9/2017    Procedure: COLONOSCOPY WITH ANESTHESIA;  Surgeon: Felice Marroquin MD;  Location: [facility] ENDOSCOPY;  Service:    • COLONOSCOPY W/ POLYPECTOMY  02/16/2012    adenomatous polyp at 80cm   • HAND SURGERY Right    • HERNIA REPAIR     • HIP CANNULATED SCREW PLACEMENT Left 1/18/2022    Procedure: HIP CANNULATED SCREW PLACEMENT;  Surgeon: Isaiah Sheehan MD;  Location: [facility] OR;  Service: Orthopedics;  Laterality: Left;   • INGUINAL HERNIA REPAIR     • KNEE SURGERY     • PROSTATE SURGERY     • SKIN CANCER EXCISION      face   • SKIN LESION EXCISION     • TOTAL HIP ARTHROPLASTY Right 8/27/2021    Procedure: TOTAL HIP REPLACEMENT;  Surgeon: Arik Magaña MD;  Location:  [facility] OR;  Service: Orthopedics;  Laterality: Right;      General Information     Row Name 01/21/22 1410          OT Time and Intention    Document Type therapy note (daily note)  -MT     Mode of Treatment occupational therapy  -MT     Row Name 01/21/22 1410          General Information    Patient Profile Reviewed yes  -MT     Existing Precautions/Restrictions fall  LLE WBAT  -MT     Row Name 01/21/22 1410          Cognition    Orientation Status (Cognition) oriented x 4  -MT     Row Name 01/21/22 1410          Safety Issues, Functional Mobility    Impairments Affecting Function (Mobility) balance; strength; pain  -MT           User Key  (r) = Recorded By, (t) = Taken By, (c) = Cosigned By    Initials Name Provider Type    MT Denisa Gates COTA Occupational Therapy Assistant                  Mobility/ADL's     [facility] Name 01/21/22 [facility]          Bed Mobility    Supine-Sit [place] (Bed Mobility) standby assist  -MT     Sit-Supine [place] (Bed Mobility) verbal cues; standby assist  -MT     Assistive Device (Bed Mobility) head of bed elevated  -[facility] Name 01/21/22 1410          Activities of Daily Living    BADL Assessment/Intervention lower body dressing; upper body dressing; toileting  -MT     Row Name 01/21/22 1410          Mobility    Extremity Weight-bearing Status left lower extremity  -MT     Left Lower Extremity (Weight-bearing Status) weight-bearing as tolerated (WBAT)  -MT     Row Name 01/21/22 1410          Lower Body Dressing Assessment/Training    Comment (Lower Body Dressing) simulated ModA or use of AE  -MT     [facility] Name 01/21/22 [facility]          Upper Body Dressing Assessment/Training    Comment (Upper Body Dressing) s/u  -MT     Row Name 01/21/22 [facility]          Toileting Assessment/Training    Comment (Toileting) urine hygiene s/u seated  -MT           User Key  (r) = Recorded By, (t) = Taken By, (c) = Cosigned By    Initials Name Provider Type    Denisa Boothe COTA Occupational Therapy Assistant               Obj/Interventions     [facility] Name 01/21/22 1410          Therapeutic Exercise    Therapeutic Exercise --  4.4 pound ball BUE ther ex multiple planes/ranges for increased UE strength for t/fs and ADLs. Pt demo's decreased endurance with task needing self directed RBs  -MT           User Key  (r) = Recorded By, (t) = Taken By, (c) = Cosigned By    Initials Name Provider Type    Denisa Boothe COTA Occupational Therapy Assistant               Goals/Plan    No documentation.                Clinical Impression     Row Name 01/21/22 1410          Pain Scale: Numbers Pre/Post-Treatment    Pretreatment Pain Rating 2/10  -MT     Posttreatment Pain Rating 5/10  -MT     Pain Location - Side Left  -MT     Pain Location hip  -MT     Pain Intervention(s) Medication (See MAR);  Repositioned  -MT     Row Name 01/21/22 1410          Plan of Care Review    Plan of Care Reviewed With patient  -MT     Progress improving  -MT     Row Name 01/21/22 1410          Therapy Plan Review/Discharge Plan (OT)    Anticipated Discharge Disposition (OT) home with assist; home with home health  -MT     Row Name 01/21/22 1410          Positioning and Restraints    Pre-Treatment Position in bed  -MT     In Bed fowlers; call light within reach; encouraged to call for assist; exit alarm on; side rails up x2  -MT           User Key  (r) = Recorded By, (t) = Taken By, (c) = Cosigned By    Initials Name Provider Type    Denisa Boothe COTA Occupational Therapy Assistant               Outcome Measures     Row Name 01/21/22 1410          How much help from another is currently needed...    Putting on and taking off regular lower body clothing? 2  -MT     Bathing (including washing, rinsing, and drying) 3  -MT     Toileting (which includes using toilet bed pan or urinal) 3  -MT     Putting on and taking off regular upper body clothing 4  -MT     Taking care of personal grooming (such as brushing teeth) 4  -MT     Eating meals 4  -MT     AM-PAC 6 Clicks Score (OT) 20  -MT           User Key  (r) = Recorded By, (t) = Taken By, (c) = Cosigned By    Initials Name Provider Type    Denisa Boothe COTA Occupational Therapy Assistant                Occupational Therapy Education                 Title: PT OT SLP Therapies (In Progress)     Topic: Occupational Therapy (Done)     Point: ADL training (Done)     Description:   Instruct learner(s) on proper safety adaptation and remediation techniques during self care or transfers.   Instruct in proper use of assistive devices.              Learning Progress Summary           Patient Acceptance, E, VU by MT at 1/21/2022 0849    Comment: long conversation on OT POC, home safety, purpose of rehab, options of HH vs SNF per pt request of d/c to rehab    Acceptance, E, VU,NR  by  at 1/19/2022 0903                   Point: Home exercise program (Done)     Description:   Instruct learner(s) on appropriate technique for monitoring, assisting and/or progressing therapeutic exercises/activities.              Learning Progress Summary           Patient Acceptance, E, VU,NR by  at 1/19/2022 0903                   Point: Precautions (Done)     Description:   Instruct learner(s) on prescribed precautions during self-care and functional transfers.              Learning Progress Summary           Patient Acceptance, E, VU by MT at 1/21/2022 1536    Comment: long conversation on OT POC, home safety, purpose of rehab, options of HH vs SNF per pt request of d/c to rehab    Acceptance, E, VU,NR by  at 1/19/2022 0903                   Point: Body mechanics (Done)     Description:   Instruct learner(s) on proper positioning and spine alignment during self-care, functional mobility activities and/or exercises.              Learning Progress Summary           Patient Acceptance, E, VU,NR by  at 1/19/2022 0903                               User Key     Initials Effective Dates Name Provider Type Discipline     06/16/21 -  Hilda Russell, OTR/L, CNT Occupational Therapist OT    MT 06/16/21 -  Denias Gates COTA Occupational Therapy Assistant OT              OT Recommendation and Plan     Plan of Care Review  Plan of Care Reviewed With: patient  Progress: improving  Outcome Summary: No OOB, waiting on XRAY per ortho d/t pt report of hitting L hip on bed rail. Pt does report new pain in groin towards hip and side of hip, which is different intensity and location than prior per his report. Pt bed mobility SBA with bed rail use. Sat EOB for simulated LB ADLs ModA or use of AE.4.4 pound ball BUE ther ex multiple planes/ranges for increased UE strength for t/fs and ADLs. Pt demo's decreased endurance with task needing self directed RBs. Educated and explained in length of HH with assist vs SNF. Pt  very fearful of falling, feels not back to his baseline and reports his wife works and is not home 24/7. Recommend continued OT POC and HH/assist vs SNF pending pain control and XRAY results.     Time Calculation:    Time Calculation- OT     Row Name 01/21/22 1410 01/21/22 0953          Time Calculation- OT    OT Start Time 1410  -MT --     OT Stop Time 1507  -MT --     OT Time Calculation (min) 57 min  -MT --     Total Timed Code Minutes- OT 57 minute(s)  -MT --     OT Received On 01/21/22  -MT --            Timed Charges    71743 - OT Therapeutic Exercise Minutes 17  -MT --     52453 - Gait Training Minutes  -- 18  -LC     45983 - OT Therapeutic Activity Minutes 40  -MT --            Total Minutes    Timed Charges Total Minutes 57  -MT 18  -LC      Total Minutes 57  -MT 18  -LC           User Key  (r) = Recorded By, (t) = Taken By, (c) = Cosigned By    Initials Name Provider Type    LC Zunilda Carvalho, PTA Physical Therapy Assistant    Denisa Boothe COTA Occupational Therapy Assistant              Therapy Charges for Today     Code Description Service Date Service Provider Modifiers Qty    85200162354 HC OT THER PROC EA 15 MIN 1/21/2022 Denisa Gates COTA GO 1    08457447414 HC OT THERAPEUTIC ACT EA 15 MIN 1/21/2022 Denisa Gates COTA GO 3               ERICA Rowell  1/21/2022

## 2022-01-21 NOTE — THERAPY TREATMENT NOTE
Acute Care - Physical Therapy Treatment Note  [facility]     Patient Name: Dexter Suggs  : 1934  MRN: [id number]  Today's Date: 2022      Visit Dx:     ICD-10-CM ICD-9-CM   1. Closed fracture of left hip, initial encounter ([facility])  S72.002A 820.8   2. 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   • Actinic keratosis   • Benign prostatic hyperplasia with lower urinary tract symptoms   • Complete heart block (HCC)   • Sick sinus syndrome s/p pacemaker    • Sick sinus syndrome (HCC)   • Laceration of face without complication   • PAF (paroxysmal atrial fibrillation) (CMS/HCC) on Eliquis    • Paroxysmal atrial flutter (HCC)   • Pernicious anemia   • Alkaline phosphatase elevation   • Anemia   • Gastroesophageal reflux disease   • Hypothyroidism due to Hashimoto's thyroiditis   • Impaired fasting glucose   • Overlapping malignant melanoma of skin (HCC)   • Primary osteoarthritis of both hands   • COVID-19 virus infection   • Closed fracture of distal end of radius   • Arthritis of hand   • Closed fracture of right hip (HCC)   • Gross hematuria   • Chronic anticoagulation   • Postoperative anemia due to acute blood loss   • Closed nondisplaced fracture of lesser trochanter of right femur with routine healing   • Closed subcapital fracture of femur, left, initial encounter ([facility])     Past Medical History:   Diagnosis Date   • Abnormal nuclear stress test    • BPH (benign prostatic hyperplasia)    • Chest pain    • Coronary artery disease    • Disease of thyroid gland    • Hyperlipidemia    • Hypertension    • LV dysfunction    • Melanoma (HCC)    • Prostate CA (HCC)      Past Surgical History:   Procedure Laterality Date   • APPENDECTOMY      • CARDIAC CATHETERIZATION Left 12/10/2012   • CARDIAC ELECTROPHYSIOLOGY PROCEDURE N/A 11/23/2018    Procedure: Pacemaker DC new 11/23/2018;  Surgeon: Austin Granados MD;  Location:  [facility] CATH INVASIVE LOCATION;  Service: Cardiology   • CHOLECYSTECTOMY     • COLONOSCOPY N/A 6/9/2017    Procedure: COLONOSCOPY WITH ANESTHESIA;  Surgeon: Felice Marroquin MD;  Location:  [facility] ENDOSCOPY;  Service:    • COLONOSCOPY W/ POLYPECTOMY  02/16/2012    adenomatous polyp at 80cm   • HAND SURGERY Right    • HERNIA REPAIR     • HIP CANNULATED SCREW PLACEMENT Left 1/18/2022    Procedure: HIP CANNULATED SCREW PLACEMENT;  Surgeon: Isaiah Sheehan MD;  Location:  [facility] OR;  Service: Orthopedics;  Laterality: Left;   • INGUINAL HERNIA REPAIR     • KNEE SURGERY     • PROSTATE SURGERY     • SKIN CANCER EXCISION      face   • SKIN LESION EXCISION     • TOTAL HIP ARTHROPLASTY Right 8/27/2021    Procedure: TOTAL HIP REPLACEMENT;  Surgeon: Arik Magaña MD;  Location:  [facility] OR;  Service: Orthopedics;  Laterality: Right;     PT Assessment (last 12 hours)     PT Evaluation and Treatment     Row Name 01/21/22 0912          Physical Therapy Time and Intention    Subjective Information complains of; pain  -LC     Document Type therapy note (daily note)  -     Mode of Treatment physical therapy  -     Row Name 01/21/22 0912          General Information    Existing Precautions/Restrictions fall  WBAT L LE  -     Row Name 01/21/22 0912          Pain Scale: Numbers Pre/Post-Treatment    Pretreatment Pain Rating 2/10  -LC     Posttreatment Pain Rating 5/10  -LC     Pain Location - Side Left  -LC     Pain Location hip  -LC     Row Name 01/21/22 0912          Pain Scale: Word Pre/Post-Treatment    Pain Intervention(s) Medication (See MAR); Ambulation/increased activity  -     Row Name 01/21/22 0912          Bed Mobility    Supine-Sit [place] (Bed Mobility) standby assist  -     Assistive Device (Bed Mobility) head of bed  elevated  -     Row Name 01/21/22 0912          Transfers    Sit-Stand [place] (Transfers) standby assist  -     [place] Level (Toilet Transfer) standby assist  -     Assistive Device (Toilet Transfer) commode; grab bars/safety frame  -     Row Name 01/21/22 0912          Sit-Stand Transfer    Assistive Device (Sit-Stand Transfers) walker, front-wheeled  -     Row Name 01/21/22 0912          Toilet Transfer    Type (Toilet Transfer) sit-stand; stand-sit  -     Row Name 01/21/22 0912          Gait/Stairs (Locomotion)    [place] Level (Gait) verbal cues; standby assist  -     Assistive Device (Gait) walker, front-wheeled  -     Distance in Feet (Gait) 100' x2  -     Pattern (Gait) step-through  -     Deviations/Abnormal Patterns (Gait) antalgic; base of support, narrow; gait speed decreased; stride length decreased  -     Bilateral Gait Deviations forward flexed posture; heel strike decreased  -     Row Name 01/21/22 0912          Aerobic Exercise    Comment, Aerobic Exercise (Therapeutic Exercise) BLE AROM x20 reps in sitting  -     Row Name             Wound 01/18/22 1745 Left anterior hip Incision    Wound - Properties Group Placement Date: 01/18/22  -RS Placement Time: 1745  -RS Side: Left  -RS Orientation: anterior  -RS Location: hip  -RS Primary Wound Type: Incision  -RS     Retired Wound - Properties Group Date first assessed: 01/18/22  -RS Time first assessed: 1745  -RS Side: Left  -RS Location: hip  -RS Primary Wound Type: Incision  -RS     Row Name 01/21/22 0912          Plan of Care Review    Plan of Care Reviewed With patient  -     Progress improving  -     Outcome Summary PT tx completed. SBA for supine to sit. Completed BLE AROM x20 reps in sitting. Sit to stands SBA with RWX. Pt amb 100' x2 reps with RWX and CGA.Pt requires cues for RWX placement and CHARBEL when fatgued. Will cont to follow.  -     Row Name 01/21/22 0912          Positioning and Restraints     Pre-Treatment Position in bed  -LC     Post Treatment Position chair  -LC     In Chair reclined; call light within reach; encouraged to call for assist  -LC           User Key  (r) = Recorded By, (t) = Taken By, (c) = Cosigned By    Initials Name Provider Type    RS Rusty Flower, RN Registered Nurse    Zunilda Stone, PTA Physical Therapy Assistant                Physical Therapy Education                 Title: PT OT SLP Therapies (In Progress)     Topic: Physical Therapy (In Progress)     Point: Mobility training (Done)     Learning Progress Summary           Patient Acceptance, E, VU by MS at 1/19/2022 0937    Comment: role of PT in his care, WBAT L LE                   Point: Home exercise program (Not Started)     Learner Progress:  Not documented in this visit.          Point: Body mechanics (Not Started)     Learner Progress:  Not documented in this visit.          Point: Precautions (Not Started)     Learner Progress:  Not documented in this visit.                      User Key     Initials Effective Dates Name Provider Type Discipline    MS 06/19/18 -  Keke Mitchell, PT, DPT, NCS Physical Therapist PT              PT Recommendation and Plan     Plan of Care Reviewed With: patient  Progress: improving  Outcome Summary: PT tx completed. SBA for supine to sit. Completed BLE AROM x20 reps in sitting. Sit to stands SBA with RWX. Pt amb 100' x2 reps with RWX and CGA.Pt requires cues for RWX placement and CHARBEL when fatgued. Will cont to follow.       Time Calculation:    PT Charges     Row Name 01/21/22 0953             Time Calculation    Start Time 0912  -      Stop Time 0942  -      Time Calculation (min) 30 min  -      PT Received On 01/21/22  -              Time Calculation- PT    Total Timed Code Minutes- PT 30 minute(s)  -              Timed Charges    43960 - PT Therapeutic Exercise Minutes 12  -LC      88066 - Gait Training Minutes  18  -LC              Total Minutes    Timed Charges  Total Minutes 30  -LC       Total Minutes 30  -LC            User Key  (r) = Recorded By, (t) = Taken By, (c) = Cosigned By    Initials Name Provider Type     Deven Zunilda CASTRO PTA Physical Therapy Assistant              Therapy Charges for Today     Code Description Service Date Service Provider Modifiers Qty    24808977832 HC PT THER PROC EA 15 MIN 1/20/2022 Zunilda Carvalho, PTA GP 1    97822973131 HC GAIT TRAINING EA 15 MIN 1/20/2022 Sal[place] Carvalhon GLORIA, PTA GP 1    84777309292 HC PT THERAPEUTIC ACT EA 15 MIN 1/20/2022 [place] Carvalhon GLORIA, PTA GP 1    17677761681 HC GAIT TRAINING EA 15 MIN 1/20/2022 Deven Zunilda GLORIA, PTA GP 2    59996688991 HC GAIT TRAINING EA 15 MIN 1/21/2022 [place] Carvalhon GLORIA, PTA GP 1    16728627106 HC PT THER PROC EA 15 MIN 1/21/2022 [place] Carvalhon GLORIA, PTA GP 1          PT G-Codes  Outcome Measure Options: AM-PAC 6 Clicks Basic Mobility (PT)  AM-PAC 6 Clicks Score (PT): 16  AM-PAC 6 Clicks Score (OT): 20    Zunilda Carvalho PTA  1/21/2022

## 2022-01-21 NOTE — CASE MANAGEMENT/SOCIAL WORK
Continued Stay Note   [place]     Patient Name: Dexter Suggs  MRN: [id number]  Today's Date: 1/21/2022    Admit Date: 1/18/2022     Discharge Plan     Row Name 01/21/22 1404       Plan    Plan Comments Pt is now requesting short term rehab and prefers [facility] acute rehab. Made referral to Jaylin at [facility], await answer.               Discharge Codes    No documentation.                     KARINE Parikh

## 2022-01-21 NOTE — PROGRESS NOTES
[facility] Medicine Services  INPATIENT PROGRESS NOTE    Length of Stay: 3  Date of Admission: 1/18/2022  Primary Care Physician: Jarad Alexis MD    Subjective   Chief Complaint: Left hip fracture/hematuria    HPI   Hematuria continue improved discussed with nursing staff.  Patient denies any chest pain.  Patient denies any shortness of breath.  Patient asking to go to rehab.    Review of Systems   Constitutional: Positive for activity change, appetite change and fatigue. Negative for chills and fever.   HENT: Negative for hearing loss, nosebleeds, tinnitus and trouble swallowing.    Eyes: Negative for visual disturbance.   Respiratory: Negative for cough, chest tightness, shortness of breath and wheezing.    Cardiovascular: Negative for chest pain, palpitations and leg swelling.   Gastrointestinal: Negative for abdominal distention, abdominal pain, blood in stool, constipation, diarrhea, nausea and vomiting.   Endocrine: Negative for cold intolerance, heat intolerance, polydipsia, polyphagia and polyuria.   Genitourinary: Negative for decreased urine volume, difficulty urinating, dysuria, flank pain, frequency.  Hematuria.    Condom cath.  Musculoskeletal: Positive for arthralgias, gait problem and myalgias. Negative for joint swelling.        Left hip pain.   Skin: Negative for rash.   Allergic/Immunologic: Negative for immunocompromised state.   Neurological: Positive for weakness. Negative for dizziness, syncope, light-headedness and headaches.   Hematological: Negative for adenopathy. Does not bruise/bleed easily.   Psychiatric/Behavioral: Positive for confusion. Negative for sleep disturbance. The patient is not nervous/anxious.      All pertinent negatives and positives are as above. All other systems have been reviewed and are negative unless otherwise stated.     Objective    Temp:  [97.5 °F (36.4 °C)-98.3 °F (36.8 °C)] 97.5 °F (36.4 °C)  Heart Rate:  [50-70]  70  Resp:  [16] 16  BP: (126-128)/(50-99) 127/99    Intake/Output Summary (Last 24 hours) at 1/21/2022 1629  Last data filed at 1/21/2022 1300  Gross per 24 hour   Intake 840 ml   Output 1400 ml   Net -560 ml     Physical Exam  Vitals and nursing note reviewed.   Constitutional:       Appearance: He is well-developed.      Comments: Advanced age.  Patient is presently joking around.   HENT:      Head: Normocephalic.   Eyes:      Conjunctiva/sclera: Conjunctivae normal.      Pupils: Pupils are equal, round, and reactive to light.   Neck:      Vascular: No JVD.   Cardiovascular:      Rate and Rhythm: Normal rate and regular rhythm.      Heart sounds: Normal heart sounds. No murmur heard.  No friction rub. No gallop.       Comments: Pacing.  Pulmonary:      Effort: No respiratory distress.      Breath sounds: No wheezing or rales.      Comments: Diminished breath sound, clear .  Chest:      Chest wall: No tenderness.   Abdominal:      General: Bowel sounds are normal. There is no distension.      Palpations: Abdomen is soft.      Tenderness: There is no abdominal tenderness. There is no guarding or rebound.   Musculoskeletal:         General: No tenderness or deformity.      Cervical back: Neck supple.      Comments: Left hip pain.  Left hip fracture-post surgery.   Skin:     General: Skin is warm and dry.      Findings: No rash.   Neurological:      Mental Status: He is alert and oriented to person, place, and time.      Cranial Nerves: No cranial nerve deficit.      Motor: Weakness present. No abnormal muscle tone.      Coordination: Coordination abnormal.      Gait: Gait abnormal.      Deep Tendon Reflexes: Reflexes normal.   Psychiatric:         Behavior: Behavior normal.   Results Review:  Lab Results (last 24 hours)     Procedure Component Value Units Date/Time    CBC (No Diff) [[id number]]  (Abnormal) Collected: 01/21/22 0551    Specimen: Blood Updated: 01/21/22 0744     WBC 7.70 10*3/mm3      RBC 3.00 10*6/mm3       Hemoglobin 9.3 g/dL      Hematocrit 30.8 %      .7 fL      MCH 31.0 pg      MCHC 30.2 g/dL      RDW 17.0 %      RDW-SD 63.7 fl      MPV 10.1 fL      Platelets 182 10*3/mm3     Narrative:      Repeated to confirm results.      Basic Metabolic Panel [[id number]]  (Abnormal) Collected: 01/21/22 0551    Specimen: Blood Updated: 01/21/22 0643     Glucose 110 mg/dL      BUN 20 mg/dL      Creatinine 0.84 mg/dL      Sodium 138 mmol/L      Potassium 4.1 mmol/L      Chloride 102 mmol/L      CO2 30.0 mmol/L      Calcium 9.8 mg/dL      eGFR Non African Amer 86 mL/min/1.73      BUN/Creatinine Ratio 23.8     Anion Gap 6.0 mmol/L     Narrative:      GFR Normal >60  Chronic Kidney Disease <60  Kidney Failure <15             Cultures:  Urine Culture   Date Value Ref Range Status   01/18/2022 50,000 CFU/mL Mixed Kelly Isolated  Final       Radiology Data:    Imaging Results (Last 24 Hours)     ** No results found for the last 24 hours. **          Allergies   Allergen Reactions   • Adhesive Tape    • Simvastatin Other (See Comments)     Abnormal LFT's   • Neosporin [Neomycin-Bacitracin Zn-Polymyx] Rash       Scheduled meds:   allopurinol, 300 mg, Oral, Daily  aspirin, 81 mg, Oral, Daily  cefTRIAXone, 1 g, Intravenous, Q24H  ferrous sulfate, 325 mg, Oral, TID With Meals  folic acid, 1 mg, Oral, Daily  levothyroxine, 75 mcg, Oral, Q AM  [START ON 1/26/2022] methotrexate, 17.5 mg, Oral, Weekly  metoprolol succinate XL, 25 mg, Oral, Daily  pantoprazole, 40 mg, Oral, Q AM  predniSONE, 5 mg, Oral, Daily  sodium chloride, 10 mL, Intravenous, Q12H  triamterene-hydrochlorothiazide, 0.5 tablet, Oral, Daily        PRN meds:  docusate sodium  •  HYDROcodone-acetaminophen  •  HYDROcodone-acetaminophen  •  HYDROmorphone **AND** naloxone  •  LORazepam  •  magnesium hydroxide  •  nitroglycerin  •  ondansetron **OR** ondansetron  •  phenol  •  promethazine **OR** promethazine  •  sodium chloride  •  [COMPLETED] Insert peripheral IV **AND**  sodium chloride  •  traMADol    Assessment/Plan       Closed subcapital fracture of femur, left, initial encounter ([facility])    Single vessel coronary artery disease    Essential hypertension    Mixed hyperlipidemia    Sick sinus syndrome s/p pacemaker     Sick sinus syndrome ([facility])    PAF (paroxysmal atrial fibrillation) (CMS/[facility]) on Eliquis     Pernicious anemia    Gastroesophageal reflux disease      Plan:  Left hip fracture/foraminal stenosis/demineralized skeleton.  Plan for surgery today.  Consulted orthopedics.  CT scan of the lumbar spine-demineralized skeleton limits bony details, No acute osseous post traumatic findings, Foraminal stenosis- worst on the left at L5-S1.  CT scan of pelvic- Minimally displaced mildly impacted subcapital fracture of the left hip- osteoarthrosis of the left hip, status post previous right total hip arthroplasty, facet overgrowth within the mid and lower lumbar spine, grade 1 anterolisthesis of L5 on S1 felt to represent subluxation at the level of the facets.  Status post 1/18/2022.- hip cannulated screw placement     Sick sinus syndrome/pacemaker in place/CAD/atrial fibrillation.  Hold anticoagulation-planning for surgery.  Continue aspirin.    Continue to hold Eliquis secondary to hematuria and status post surgery. Continue metoprolol.  Nitro as needed.  Continue Maxide.  Echocardiogram 3/5/2021- ejection fraction 56 to 60%, mild concentric hypertrophy, diastolic function normal, tricuspid regurgitation.  Chest x-ray-no radiographic evidence of acute cardiopulmonary process.     UTI/hematuria.  Possible from his [place] cath.  Rocephin antibiotics x7 days 1/19/2022.  Check hemoglobin now about 2:00 PM.  Hematuria seems to be improving today.     Gout.  Continue allopurinol.     Constipation.  Colace.     Anemia.  Iron sulfate.    Slight decrease in hemoglobin.  No sign of acute bleed.     Arthritis/chronic pain.  Hold ibuprofen.    Continue methotrexate request per wife.  Continue  prednisone.  Ultram as needed     Hypothyroidism.  Continue Synthroid.  TSH-normal     Reflux.  Protonix.     Anxiety.  Ativan as needed.     Nutrition.  Folic acid.     Deconditioning.  PT and OT consult.     Discharge Plannin-4 days.     Plan for [facility] placement..  DNR.  DNI.    Electronically signed by Sg Ashton MD, 22, 4:29 PM CST.

## 2022-01-21 NOTE — PLAN OF CARE
Problem: Adult Inpatient Plan of Care  Goal: Plan of Care Review  Flowsheets (Taken 1/21/2022 1410)  Progress: improving  Plan of Care Reviewed With: patient  Outcome Summary: No OOB, waiting on XRAY per ortho d/t pt report of hitting L hip on bed rail. Pt does report new pain in groin towards hip and side of hip, which is different intensity and location than prior per his report. Pt bed mobility SBA with bed rail use. Sat EOB for simulated LB ADLs ModA or use of AE.4.4 pound ball BUE ther ex multiple planes/ranges for increased UE strength for t/fs and ADLs. Pt demo's decreased endurance with task needing self directed RBs. Educated and explained in length of HH with assist vs SNF. Pt very fearful of falling, feels not back to his baseline and reports his wife works and is not home 24/7. Recommend continued OT POC and HH/assist vs SNF pending pain control and XRAY results.

## 2022-01-21 NOTE — CASE MANAGEMENT/SOCIAL WORK
Continued Stay Note  [facility]     Patient Name: Dexter Suggs  MRN: [id number]  Today's Date: 1/21/2022    Admit Date: 1/18/2022     Discharge Plan     Row Name 01/21/22 8112       Plan    Plan Comments [facility] rehab did not offer bed. Pt/wife informed and [facility] preferred. Referral made and bed offered. [facility] can accept pt tomorrow if ready for dc. Pt will need a new Covid test.    Final Discharge Disposition Code 03 - skilled nursing facility (SNF)    Row Name 01/21/22 1409       Plan    Plan Comments Pt is now requesting short term rehab and prefers [facility] acute rehab. Made referral to Jaylin at [facility], await answer.               Discharge Codes    No documentation.                     KARINE Parikh     None known

## 2022-01-22 VITALS
TEMPERATURE: 97.8 F | OXYGEN SATURATION: 96 % | WEIGHT: 177.9 LBS | SYSTOLIC BLOOD PRESSURE: 144 MMHG | RESPIRATION RATE: 18 BRPM | DIASTOLIC BLOOD PRESSURE: 67 MMHG | HEART RATE: 73 BPM | HEIGHT: 72 IN | BODY MASS INDEX: 24.09 KG/M2

## 2022-01-22 LAB
ANION GAP SERPL CALCULATED.3IONS-SCNC: 7 MMOL/L (ref 5–15)
BUN SERPL-MCNC: 18 MG/DL (ref 8–23)
BUN/CREAT SERPL: 20.2 (ref 7–25)
CALCIUM SPEC-SCNC: 9.9 MG/DL (ref 8.6–10.5)
CHLORIDE SERPL-SCNC: 101 MMOL/L (ref 98–107)
CO2 SERPL-SCNC: 32 MMOL/L (ref 22–29)
CREAT SERPL-MCNC: 0.89 MG/DL (ref 0.76–1.27)
DEPRECATED RDW RBC AUTO: 59.3 FL (ref 37–54)
ERYTHROCYTE [DISTWIDTH] IN BLOOD BY AUTOMATED COUNT: 16.8 % (ref 12.3–15.4)
GFR SERPL CREATININE-BSD FRML MDRD: 81 ML/MIN/1.73
GLUCOSE SERPL-MCNC: 114 MG/DL (ref 65–99)
HCT VFR BLD AUTO: 28.7 % (ref 37.5–51)
HGB BLD-MCNC: 8.8 G/DL (ref 13–17.7)
MCH RBC QN AUTO: 29.7 PG (ref 26.6–33)
MCHC RBC AUTO-ENTMCNC: 30.7 G/DL (ref 31.5–35.7)
MCV RBC AUTO: 97 FL (ref 79–97)
PLATELET # BLD AUTO: 227 10*3/MM3 (ref 140–450)
PMV BLD AUTO: 9.8 FL (ref 6–12)
POTASSIUM SERPL-SCNC: 4.1 MMOL/L (ref 3.5–5.2)
RBC # BLD AUTO: 2.96 10*6/MM3 (ref 4.14–5.8)
SARS-COV-2 RNA PNL SPEC NAA+PROBE: NOT DETECTED
SODIUM SERPL-SCNC: 140 MMOL/L (ref 136–145)
WBC NRBC COR # BLD: 7.33 10*3/MM3 (ref 3.4–10.8)

## 2022-01-22 PROCEDURE — 63710000001 PREDNISONE PER 5 MG: Performed by: FAMILY MEDICINE

## 2022-01-22 PROCEDURE — 97116 GAIT TRAINING THERAPY: CPT

## 2022-01-22 PROCEDURE — 85027 COMPLETE CBC AUTOMATED: CPT | Performed by: FAMILY MEDICINE

## 2022-01-22 PROCEDURE — 87635 SARS-COV-2 COVID-19 AMP PRB: CPT | Performed by: FAMILY MEDICINE

## 2022-01-22 PROCEDURE — 80048 BASIC METABOLIC PNL TOTAL CA: CPT | Performed by: FAMILY MEDICINE

## 2022-01-22 RX ORDER — LORAZEPAM 0.5 MG/1
0.5 TABLET ORAL DAILY PRN
Qty: 12 TABLET | Refills: 0 | Status: SHIPPED | OUTPATIENT
Start: 2022-01-22

## 2022-01-22 RX ORDER — BISACODYL 10 MG
10 SUPPOSITORY, RECTAL RECTAL DAILY
Status: DISCONTINUED | OUTPATIENT
Start: 2022-01-22 | End: 2022-01-22 | Stop reason: HOSPADM

## 2022-01-22 RX ORDER — TRAMADOL HYDROCHLORIDE 50 MG/1
50 TABLET ORAL EVERY 6 HOURS PRN
Qty: 24 TABLET | Refills: 0 | Status: SHIPPED | OUTPATIENT
Start: 2022-01-22 | End: 2022-03-28 | Stop reason: HOSPADM

## 2022-01-22 RX ORDER — BISACODYL 10 MG
10 SUPPOSITORY, RECTAL RECTAL DAILY
Start: 2022-01-22 | End: 2022-03-18

## 2022-01-22 RX ORDER — PSEUDOEPHEDRINE HCL 30 MG
100 TABLET ORAL 2 TIMES DAILY PRN
Start: 2022-01-22 | End: 2022-03-18

## 2022-01-22 RX ORDER — ONDANSETRON 4 MG/1
4 TABLET, FILM COATED ORAL EVERY 6 HOURS PRN
Start: 2022-01-22 | End: 2022-03-18

## 2022-01-22 RX ADMIN — SODIUM CHLORIDE, PRESERVATIVE FREE 10 ML: 5 INJECTION INTRAVENOUS at 09:19

## 2022-01-22 RX ADMIN — FERROUS SULFATE TAB 325 MG (65 MG ELEMENTAL FE) 325 MG: 325 (65 FE) TAB at 12:13

## 2022-01-22 RX ADMIN — TRIAMTERENE AND HYDROCHLOROTHIAZIDE 0.5 TABLET: 75; 50 TABLET ORAL at 10:15

## 2022-01-22 RX ADMIN — METOPROLOL SUCCINATE 25 MG: 25 TABLET, EXTENDED RELEASE ORAL at 09:19

## 2022-01-22 RX ADMIN — HYDROCODONE BITARTRATE AND ACETAMINOPHEN 1 TABLET: 5; 325 TABLET ORAL at 09:25

## 2022-01-22 RX ADMIN — SODIUM CHLORIDE, POTASSIUM CHLORIDE, SODIUM LACTATE AND CALCIUM CHLORIDE 100 ML/HR: 600; 310; 30; 20 INJECTION, SOLUTION INTRAVENOUS at 05:54

## 2022-01-22 RX ADMIN — ASPIRIN 81 MG: 81 TABLET, COATED ORAL at 09:19

## 2022-01-22 RX ADMIN — ALLOPURINOL 300 MG: 300 TABLET ORAL at 09:19

## 2022-01-22 RX ADMIN — FOLIC ACID 1 MG: 1 TABLET ORAL at 09:19

## 2022-01-22 RX ADMIN — MAGNESIUM HYDROXIDE 10 ML: 2400 SUSPENSION ORAL at 05:58

## 2022-01-22 RX ADMIN — DOCUSATE SODIUM 100 MG: 100 CAPSULE, LIQUID FILLED ORAL at 09:19

## 2022-01-22 RX ADMIN — HYDROCODONE BITARTRATE AND ACETAMINOPHEN 1 TABLET: 5; 325 TABLET ORAL at 14:57

## 2022-01-22 RX ADMIN — PREDNISONE 5 MG: 5 TABLET ORAL at 09:19

## 2022-01-22 RX ADMIN — MAGNESIUM HYDROXIDE 10 ML: 2400 SUSPENSION ORAL at 13:17

## 2022-01-22 RX ADMIN — LEVOTHYROXINE SODIUM 75 MCG: 75 TABLET ORAL at 05:52

## 2022-01-22 RX ADMIN — PANTOPRAZOLE SODIUM 40 MG: 40 TABLET, DELAYED RELEASE ORAL at 05:52

## 2022-01-22 RX ADMIN — Medication 10 MG: at 13:27

## 2022-01-22 RX ADMIN — FERROUS SULFATE TAB 325 MG (65 MG ELEMENTAL FE) 325 MG: 325 (65 FE) TAB at 09:19

## 2022-01-22 NOTE — CASE MANAGEMENT/SOCIAL WORK
Continued Stay Note   [facility]     Patient Name: Dexter Suggs  MRN: [id number]  Today's Date: 1/22/2022    Admit Date: 1/18/2022     Discharge Plan     Row Name 01/22/22 1410       Plan    Final Note Pt is being dcd to [facility] today. Pt will need a new covid test prior to dc. RN can call report to [phone]. Fax number is [phone]               Discharge Codes    No documentation.               Expected Discharge Date and Time     Expected Discharge Date Expected Discharge Time    Jan 22, 2022             KARINE Parikh

## 2022-01-22 NOTE — NURSING NOTE
Pt dc'd to [facility] via private vehicle with wife after iv's dcd with catheter tip intact. Tolerated well. Both verbalize understanding of DC instructions and follow up appts. Report called to aLla at [facility].

## 2022-01-22 NOTE — PLAN OF CARE
Goal Outcome Evaluation:  Plan of Care Reviewed With: patient        Progress: improving  Outcome Summary: PT tx completed. Pt denies pn at rest, but increased soreness L hip when mobilizing. Rates 5/10. X rays good after near fall yesterday. I bed mobility, S transfers, amb 50' x 4 with r wx CG/SBA. I with HEP. Recommend HH with discharge. or short term rehab.

## 2022-01-22 NOTE — DISCHARGE SUMMARY
[facility] Medicine Services  DISCHARGE SUMMARY       Date of Admission: 1/18/2022  Date of Discharge:  1/22/2022  Primary Care Physician: Jarad Alexis MD    Presenting Problem/History of Present Illness:  Hip fracture (Formerly [facility]) [S72.009A]     Final Discharge Diagnoses:  Active Hospital Problems    Diagnosis    • **Closed subcapital fracture of femur, left, initial encounter (Formerly [facility])    • Gastroesophageal reflux disease    • Pernicious anemia    • PAF (paroxysmal atrial fibrillation) (CMS/Formerly [facility]) on Eliquis       Chads vas score 3     • Sick sinus syndrome (Formerly [facility])    • Sick sinus syndrome s/p pacemaker     • Single vessel coronary artery disease      Heart cath in Jan. - single vessel CAD per Dr. Granados - med. mgmt. only     • Essential hypertension    • Mixed hyperlipidemia        Consults: Orthopedics .    Pertinent Test Results:   Results for orders placed during the hospital encounter of 03/05/21    Adult Transthoracic Echo Complete w/ Color, Spectral and Contrast if necessary per protocol    Interpretation Summary  · Left ventricular wall thickness is consistent with mild concentric hypertrophy.  · Left ventricular ejection fraction appears to be 56 - 60%. Left ventricular systolic function is normal.  · Abnormal global longitudinal LV strain (GLS) = -13%.  · Left ventricular diastolic function was normal.  · Estimated right ventricular systolic pressure from tricuspid regurgitation is normal (<35 mmHg).    .  Imaging Results (All)     Procedure Component Value Units Date/Time    XR Hip With or Without Pelvis 1 View Left [[id number]] Collected: 01/21/22 1927     Updated: 01/21/22 1933    Narrative:      XR HIP W OR WO PELVIS 1 VIEW LEFT- 1/21/2022 7:07 PM CST     HISTORY: hip pain, fall in bed.; S72.002A-Fracture of unspecified part  of neck of left femur, initial encounter for closed fracture;  Z74.09-Other reduced mobility     COMPARISON: 1/9/2022        Impression:       FINDINGS/IMPRESSION:   Single frontal view of the left hip were obtained.       3 cannulated screws are seen traversing the known left femoral neck  fracture. No new fracture identified. No subluxation at the hip joint.     This report was finalized on 01/21/2022 19:30 by Dr Phill Singh, .    XR Hip With or Without Pelvis 2 - 3 View Left [[id number]] Collected: 01/18/22 1829     Updated: 01/19/22 0736    Narrative:      EXAMINATION: XR HIP W OR WO PELVIS 2-3 VIEW LEFT-     1/18/2022 5:41 PM CST     HISTORY: cannulated screw; S72.002A-Fracture of unspecified part of neck  of left femur, initial encounter for closed fracture     Number of fluoroscopic spot images obtained = 2.     Fluoroscopy dose in Air Kerma mGy = 15.565.     Fluoroscopy total exposure time = 41.1 seconds.     Spot images document screw fixation of the left femoral neck.  This report was finalized on 01/18/2022 18:29 by Dr. Geronimo Roldan MD.    FL C Arm During Surgery [[id number]] Collected: 01/18/22 1829     Updated: 01/19/22 0736    Narrative:      EXAMINATION: XR HIP W OR WO PELVIS 2-3 VIEW LEFT-     1/18/2022 5:41 PM CST     HISTORY: cannulated screw; S72.002A-Fracture of unspecified part of neck  of left femur, initial encounter for closed fracture     Number of fluoroscopic spot images obtained = 2.     Fluoroscopy dose in Air Kerma mGy = 15.565.     Fluoroscopy total exposure time = 41.1 seconds.     Spot images document screw fixation of the left femoral neck.  This report was finalized on 01/18/2022 18:29 by Dr. Geronimo Roldan MD.    XR Chest 1 View [[id number]] Collected: 01/18/22 1057     Updated: 01/18/22 1102    Narrative:      EXAM: XR CHEST 1 VW- 1/18/2022 10:47 AM CST     HISTORY: pre-op       COMPARISON: December 1, 2020.     TECHNIQUE: Frontal radiograph of the chest     FINDINGS:   The lungs are clear. A radiolucent patch projects over the right chest..  Cardiac silhouette is normal. Pacing device present soft tissues of the  left  chest.      The osseous structures and surrounding soft tissues demonstrate no acute  abnormality.          Impression:      1. No radiographic evidence of acute cardiopulmonary process.        This report was finalized on 01/18/2022 10:59 by Dr. Damien Boland MD.    CT Pelvis Without Contrast [[id number]] Collected: 01/18/22 0957     Updated: 01/18/22 1005    Narrative:      EXAMINATION: CT pelvis without contrast 1/18/2022     HISTORY: Left hip pain. Evaluate for sacral insufficiency fracture     FINDINGS: Multiple contiguous axials are obtained through the bony  pelvis at 3 mm intervals with reformatted images obtained in the  sagittal and coronal projections from the original data set.     There is degenerative disc disease at L5-S1 with grade 1 anterolisthesis  of L5 on S1 likely representing subluxation at the level of the facets.  The patient has undergone previous right total hip arthroplasty. There  is a mildly displaced impacted subcapital fracture of the left hip.  There is advanced osteoarthrosis of the left hip with near complete loss  of joint space and spurring of both the acetabular roof and femoral  head. No additional fractures are present.       Impression:      1.. Minimally displaced mildly impacted subcapital fracture of the left  hip. There is osteoarthrosis of the left hip.  2. The patient is status post previous right total hip arthroplasty. No  additional fractures are present.  3. There is facet overgrowth within the mid and lower lumbar spine.  There is grade 1 anterolisthesis of L5 on S1 felt to represent  subluxation at the level of the facets.  This report was finalized on 01/18/2022 10:01 by Dr. Obinna Luna MD.    CT Lumbar Spine Without Contrast [[id number]] Collected: 01/18/22 0957     Updated: 01/18/22 1004    Narrative:      EXAMINATION: CT LUMBAR SPINE WITHOUT IV CONTRAST 1/18/2022     COMPARISON: None.     HISTORY: Male, 87 years-old. Back pain, r/o sacral  insufficiency  fracture     TECHNIQUE: Multiple CT images were obtained of the lumbar spine without  IV contrast. The images were formatted in the axial, coronal and  sagittal planes.     Radiation dose equals  mGy-cm.  Automated exposure control dose  reduction technique was implemented.     FINDINGS:   Severe demineralization of the skeleton limits bony details.  Grade 1 anterolisthesis of L5 on S1.No acute compression deformity. No  dislocation. No abnormal soft tissue density spinal canal.There is  degenerative disc disease at multiple levels, worst at L5-S1.There is no  significant spinal canal stenosis. Moderate to severe right and severe  left foraminal stenosis at L5-S1. Moderate bilateral foraminal stenosis  at L3-L4 and L4-5.          Impression:      1.  Demineralized skeleton limits bony details.  2.  No acute osseous post traumatic findings.  3.  Foraminal stenosis, worst on the left at L5-S1.        This report was finalized on 01/18/2022 10:01 by Dr. Radha Jimenez MD.        Chief Complaint on Day of Discharge: none    History of Present Illness on Day of Discharge:   Patient is a 89-year-old male presents ER with complaint of left hip pain.  Patient denies any history of falling.  Patient hip pain started engendered the night 2022.  Patient came to ER then and had x-ray and a CT scan of pelvic which was negative for fracture.  Patient was discharged home with anti-inflammatory.  Patient symptom has been progressing getting worse over time.  Patient presented here with similar symptoms.  CT scan today shows left hip fracture.  Dr. Leo has been notified possible plan for surgery today.  Patient on anticoagulation for atrial fibrillation.  N.p.o. and hold anticoagulation at this time.  Last time patient ate was banana and water at 945 this morning.    Hospital Course:  The patient is a 87 y.o. male who presented to [facility] with hematuria/left hip surgery/sick sinus  syndrome/pacemaker/atrial fibrillation/UTI ..      Left hip fracture/foraminal stenosis/demineralized skeleton.  Plan for surgery today.  Consulted orthopedics.  CT scan of the lumbar spine-demineralized skeleton limits bony details, No acute osseous post traumatic findings, Foraminal stenosis- worst on the left at L5-S1.  CT scan of pelvic- Minimally displaced mildly impacted subcapital fracture of the left hip- osteoarthrosis of the left hip, status post previous right total hip arthroplasty, facet overgrowth within the mid and lower lumbar spine, grade 1 anterolisthesis of L5 on S1 felt to represent subluxation at the level of the facets.  Status post 1/18/2022.- hip cannulated screw placement  Recommendation from orthopedics- DVT prophylaxis/high/elevation, pain control, PT OT, rehab placement, weightbearing as tolerated left lower extremity, follow back with orthopedics in 2 weeks.     Sick sinus syndrome/pacemaker in place/CAD/atrial fibrillation.  Hold anticoagulation-planning for surgery.  Continue aspirin.    Continue to hold Eliquis secondary to hematuria and status post surgery. Continue metoprolol.  Nitro as needed.  Continue Maxide.  Echocardiogram 3/5/2021- ejection fraction 56 to 60%, mild concentric hypertrophy, diastolic function normal, tricuspid regurgitation.  Chest x-ray-no radiographic evidence of acute cardiopulmonary process.     UTI/hematuria.  Possible from his [place] cath.  Rocephin antibiotics x7 days 1/19/2022.  Check hemoglobin now about 2:00 PM.  Hematuria seems to be improving today.     Gout.  Continue allopurinol.     Constipation.  Colace.     Anemia.  Iron sulfate.    Slight decrease in hemoglobin.  No sign of acute bleed.     Arthritis/chronic pain.  Hold ibuprofen.    Continue methotrexate request per wife.  Continue prednisone.  Ultram as needed     Hypothyroidism.  Continue Synthroid.  TSH-normal     Reflux.  Protonix.     Anxiety.  Ativan as needed.     Nutrition.  Folic  "acid.     Deconditioning.  PT and OT consult.     Vital signs stable, afebrile.  Plan to discharge patient to [facility] today.  Follow-up with [facility] physician as soon as able.  DNR.  DNI.  Follow-up with nursing home physician as soon as able.  Follow-up with orthopedics in 2 weeks.    Condition on Discharge: Stable.    Physical Exam on Discharge:  /67 (BP Location: Right arm, Patient Position: Sitting)   Pulse 73   Temp 97.8 °F (36.6 °C) (Oral)   Resp 18   Ht 182.9 cm (72\")   Wt 80.7 kg (177 lb 14.4 oz)   SpO2 96%   BMI 24.13 kg/m²   Physical Exam  Vitals and nursing note reviewed.   Constitutional:       Appearance: He is well-developed.      Comments: Advanced age.  Patient is presently joking around.   HENT:      Head: Normocephalic.   Eyes:      Conjunctiva/sclera: Conjunctivae normal.      Pupils: Pupils are equal, round, and reactive to light.   Neck:      Vascular: No JVD.   Cardiovascular:      Rate and Rhythm: Normal rate and regular rhythm.      Heart sounds: Normal heart sounds. No murmur heard.  No friction rub. No gallop.       Comments: Pacing.  Pulmonary:      Effort: No respiratory distress.      Breath sounds: No wheezing or rales.      Comments: Diminished breath sound, clear .  Chest:      Chest wall: No tenderness.   Abdominal:      General: Bowel sounds are normal. There is no distension.      Palpations: Abdomen is soft.      Tenderness: There is no abdominal tenderness. There is no guarding or rebound.   Musculoskeletal:         General: No tenderness or deformity.      Cervical back: Neck supple.      Comments: Left hip pain.  Left hip fracture-post surgery.   Skin:     General: Skin is warm and dry.      Findings: No rash.   Neurological:      Mental Status: He is alert and oriented to person, place, and time.      Cranial Nerves: No cranial nerve deficit.      Motor: Weakness present. No abnormal muscle tone.      Coordination: Coordination abnormal. "      Gait: Gait abnormal.      Deep Tendon Reflexes: Reflexes normal.   Psychiatric:         Behavior: Behavior normal.     Discharge Disposition: Discharge to [facility].  Skilled Nursing Facility (DC - External)    Discharge Medications:     Discharge Medications      New Medications      Instructions Start Date   bisacodyl 10 MG suppository  Commonly known as: DULCOLAX   10 mg, Rectal, Daily      docusate sodium 100 MG capsule   100 mg, Oral, 2 Times Daily PRN      magnesium hydroxide 2400 MG/10ML suspension suspension  Commonly known as: MILK OF MAGNESIA   10 mL, Oral, Daily PRN      ondansetron 4 MG tablet  Commonly known as: ZOFRAN   4 mg, Oral, Every 6 Hours PRN         Changes to Medications      Instructions Start Date   apixaban 2.5 MG tablet tablet  Commonly known as: ELIQUIS  What changed:   · medication strength  · how much to take  · when to take this   2.5 mg, Oral, 2 Times Daily      LORazepam 0.5 MG tablet  Commonly known as: ATIVAN  What changed:   · medication strength  · how much to take  · when to take this  · reasons to take this  · Another medication with the same name was removed. Continue taking this medication, and follow the directions you see here.   0.5 mg, Oral, Daily PRN         Continue These Medications      Instructions Start Date   allopurinol 300 MG tablet  Commonly known as: ZYLOPRIM   300 mg, Oral, Daily      aspirin 81 MG EC tablet   81 mg, Oral, Daily      clotrimazole-betamethasone 1-0.05 % cream  Commonly known as: Lotrisone   1 application, Topical, 2 Times Daily      Easy Touch Lancets 30G/Twist misc   TEST DAILY AS DIRECTED       folic acid 1 MG tablet  Commonly known as: FOLVITE   1 mg, Oral, Daily      iron polysaccharides 150 MG capsule  Commonly known as: NIFEREX   150 mg, Oral, Daily      levothyroxine 75 MCG tablet  Commonly known as: SYNTHROID, LEVOTHROID   75 mcg, Oral, Daily      methotrexate 2.5 MG tablet   17.5 mg, Oral, Weekly      metoprolol succinate  XL 25 MG 24 hr tablet  Commonly known as: TOPROL-XL   25 mg, Oral, Daily      pantoprazole 40 MG EC tablet  Commonly known as: PROTONIX   40 mg, Oral, Daily      predniSONE 5 MG tablet  Commonly known as: DELTASONE   5 mg, Oral, Daily      traMADol 50 MG tablet  Commonly known as: ULTRAM   50 mg, Oral, Every 6 Hours PRN      triamterene-hydrochlorothiazide 75-50 MG per tablet  Commonly known as: MAXZIDE   0.5 tablets, Oral, Daily         Stop These Medications    ibuprofen 200 MG tablet  Commonly known as: ADVIL,MOTRIN     True Metrix Blood Glucose Test test strip  Generic drug: glucose blood            Discharge Diet:   Diet Instructions     Advance Diet As Tolerated            Activity at Discharge:   Activity Instructions     Activity as Tolerated            Discharge Care Plan/Instructions: Discharge to rehab.    Follow-up Appointments:   Future Appointments   Date Time Provider Department Center   2/8/2022  8:45 AM [facility] CD PAD PAD   3/18/2022  9:00 AM Austin Granados MD [facility] CD  PAD   4/8/2022  9:50 AM Christopher Powell MD [facility] U PAD PAD   4/8/2022 10:00 AM PAD US 1 [facility] PAD US PAD   5/26/2022  2:00 PM Wen Potter DO [facility] PC VSQ PAD   Follow-up with nursing physician as soon as able.  Follow-up with orthopedics in 2 weeks.    Electronically signed by Sg Ashton MD, 01/22/22, 13:42 CST.    Time: Greater than 30 minutes.

## 2022-01-22 NOTE — THERAPY DISCHARGE NOTE
Acute Care - Physical Therapy Discharge Summary  [facility]       Patient Name: Dexter Suggs  : 1934  MRN: [id number]    Today's Date: 2022                 Admit Date: 2022      PT Recommendation and Plan    Visit Dx:    ICD-10-CM ICD-9-CM   1. Closed fracture of left hip, initial encounter ([facility])  S72.002A 820.8   2. Impaired mobility  Z74.09 799.89   3. Hip pain  M25.559 719.45   4. Closed subcapital fracture of femur, left, initial encounter ([facility])  S72.012A 820.09   5. Closed nondisplaced fracture of lesser trochanter of right femur with routine healing  S72.124D V54.13   6. Postoperative anemia due to acute blood loss  D62 285.1   7. Chronic anticoagulation  Z79.01 V58.61   8. Gross hematuria  R31.0 599.71   9. Closed fracture of right hip with routine healing, subsequent encounter  S72.001D V54.13   10. Arthritis of hand  M19.049 716.94   11. Closed fracture of distal end of radius with malunion, unspecified fracture morphology, unspecified laterality, subsequent encounter  S52.509P 733.81   12. COVID-19 virus infection  U07.1 079.89   13. Primary osteoarthritis of both hands  M19.041 715.14    M19.042    14. Overlapping malignant melanoma of skin (HCC)  C43.8 172.8   15. Impaired fasting glucose  R73.01 790.21   16. Hypothyroidism due to Hashimoto's thyroiditis  E03.8 244.8    E06.3 245.2   17. Gastroesophageal reflux disease, unspecified whether esophagitis present  K21.9 530.81   18. Anemia, unspecified type  D64.9 285.9   19. Alkaline phosphatase elevation  R74.8 790.5   20. Pernicious anemia  D51.0 281.0   21. Paroxysmal atrial flutter (HCC)  I48.92 427.32   22. PAF (paroxysmal atrial fibrillation) (CMS/HCC) on Eliquis   I48.0 427.31   23. Laceration of face without complication, subsequent encounter  S01.81XD V58.89     873.40   24. Sick sinus syndrome (HCC)  I49.5 427.81   25. Sick sinus syndrome s/p pacemaker   Z95.0 V45.01   26. Complete heart block ([facility])  I44.2 426.0   27. Benign  prostatic hyperplasia with lower urinary tract symptoms, symptom details unspecified  N40.1 600.01   28. Actinic keratosis  L57.0 702.0   29. 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 Malignant neoplasm of prostate (HCC)  C61 185   31. HTN (hypertension), benign  I10 401.1   32. Hx of colonic polyps  Z86.010 V12.72   33. Mixed hyperlipidemia  E78.2 272.2   34. Essential hypertension  I10 401.9   35. Single vessel coronary artery disease  I25.10 414.00            PT Charges     Row Name 01/22/22 0914 01/22/22 0911          Time Calculation    Start Time -- 0835  -KJ     Stop Time -- 0900  -KJ     Time Calculation (min) -- 25 min  -KJ     PT Received On 01/22/22  -KJ --     PT Goal Re-Cert Due Date 01/29/22  -KJ --           User Key  (r) = Recorded By, (t) = Taken By, (c) = Cosigned By    Initials Name Provider Type    Tyesha Conn, PTA Physical Therapy Assistant                 PT Rehab Goals     Row Name 01/22/22 1549             Bed Mobility Goal 1 (PT)    Activity/Assistive Device (Bed Mobility Goal 1, PT) bed mobility activities, all  -KJ      Denio Level/Cues Needed (Bed Mobility Goal 1, PT) independent  -KJ      Time Frame (Bed Mobility Goal 1, PT) long term goal (LTG); by discharge  -KJ      Progress/Outcomes (Bed Mobility Goal 1, PT) goal not met  -KJ              Transfer Goal 1 (PT)    Activity/Assistive Device (Transfer Goal 1, PT) sit-to-stand/stand-to-sit; bed-to-chair/chair-to-bed; walker, rolling  -KJ      Denio Level/Cues Needed (Transfer Goal 1, PT) modified independence  -KJ      Time Frame (Transfer Goal 1, PT) long term goal (LTG); by discharge  -KJ      Progress/Outcome (Transfer Goal 1, PT) goal not met  -KJ              Gait Training Goal 1 (PT)    Activity/Assistive Device (Gait Training Goal 1, PT) gait (walking locomotion); assistive device use; improve balance and speed; increase endurance/gait distance; walker, rolling  -KJ      Denio  Level (Gait Training Goal 1, PT) modified independence  -KJ      Distance (Gait Training Goal 1, PT) 75ft  -KJ      Time Frame (Gait Training Goal 1, PT) long term goal (LTG); by discharge  -KJ      Progress/Outcome (Gait Training Goal 1, PT) goal not met  -KJ              Stairs Goal 1 (PT)    Activity/Assistive Device (Stairs Goal 1, PT) ascending stairs; descending stairs; using handrail, right; step-to-step  -KJ      Ocean Level/Cues Needed (Stairs Goal 1, PT) contact guard assist  -KJ      Number of Stairs (Stairs Goal 1, PT) 3  -KJ      Time Frame (Stairs Goal 1, PT) long term goal (LTG); by discharge  -KJ      Progress/Outcome (Stairs Goal 1, PT) goal not met  -KJ            User Key  (r) = Recorded By, (t) = Taken By, (c) = Cosigned By    Initials Name Provider Type Discipline    Tyesha Conn PTA Physical Therapy Assistant PT                Therapy Charges for Today     Code Description Service Date Service Provider Modifiers Qty    [id number] HC GAIT TRAINING EA 15 MIN 1/22/2022 Tyesha Gomez PTA GP 2          PT Discharge Summary  Anticipated Discharge Disposition (PT): skilled nursing facility  Reason for Discharge: Discharge from facility  Outcomes Achieved: Refer to plan of care for updates on goals achieved  Discharge Destination: SNF      Tyesha Gomez PTA   1/22/2022

## 2022-01-22 NOTE — PLAN OF CARE
Goal Outcome Evaluation:  Plan of Care Reviewed With: patient        Progress: no change  Outcome Summary: Pt A&Ox4. , room air. Denies n/t. Denies pain. L hip drsg, CDI. CURRY. PPP. SCD. Pt sat up in chair for some of the day. upx1, walker. IV abx. SABA ashley - output still blood tinged, dark gerry. Call light within reach. Safety maintained.

## 2022-01-22 NOTE — PLAN OF CARE
Goal Outcome Evaluation:  Plan of Care Reviewed With: patient        Progress: improving  Outcome Summary: A/Ox4. Upx1 with walker. C/o pain but refused to take anything for it. Urine appears to be light gerry in color, less blood noted this morning. PRN meds given to help stimulate BM, no results yet. Drsg on hip cdi. PPP. IVF infusing as order. VSS. Pt appeared to be anxious and ready to leave at beginning of shift, PRN lorazepam given. Safety maintained.

## 2022-01-22 NOTE — THERAPY TREATMENT NOTE
Acute Care - Physical Therapy Treatment Note  [facility]     Patient Name: Dexter Suggs  : 1934  MRN: [id number]  Today's Date: 2022      Visit Dx:     ICD-10-CM ICD-9-CM   1. Closed fracture of left hip, initial encounter (Formerly [facility])  S72.002A 820.8   2. 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   • Actinic keratosis   • Benign prostatic hyperplasia with lower urinary tract symptoms   • Complete heart block (HCC)   • Sick sinus syndrome s/p pacemaker    • Sick sinus syndrome (HCC)   • Laceration of face without complication   • PAF (paroxysmal atrial fibrillation) (CMS/HCC) on Eliquis    • Paroxysmal atrial flutter (HCC)   • Pernicious anemia   • Alkaline phosphatase elevation   • Anemia   • Gastroesophageal reflux disease   • Hypothyroidism due to Hashimoto's thyroiditis   • Impaired fasting glucose   • Overlapping malignant melanoma of skin (HCC)   • Primary osteoarthritis of both hands   • COVID-19 virus infection   • Closed fracture of distal end of radius   • Arthritis of hand   • Closed fracture of right hip (HCC)   • Gross hematuria   • Chronic anticoagulation   • Postoperative anemia due to acute blood loss   • Closed nondisplaced fracture of lesser trochanter of right femur with routine healing   • Closed subcapital fracture of femur, left, initial encounter (Formerly [facility])     Past Medical History:   Diagnosis Date   • Abnormal nuclear stress test    • BPH (benign prostatic hyperplasia)    • Chest pain    • Coronary artery disease    • Disease of thyroid gland    • Hyperlipidemia    • Hypertension    • LV dysfunction    • Melanoma (HCC)    • Prostate CA (HCC)      Past Surgical History:   Procedure Laterality Date   • APPENDECTOMY      • CARDIAC CATHETERIZATION Left 12/10/2012   • CARDIAC ELECTROPHYSIOLOGY PROCEDURE N/A 11/23/2018    Procedure: Pacemaker DC new 11/23/2018;  Surgeon: Austin Granados MD;  Location: [facility] CATH INVASIVE LOCATION;  Service: Cardiology   • CHOLECYSTECTOMY     • COLONOSCOPY N/A 6/9/2017    Procedure: COLONOSCOPY WITH ANESTHESIA;  Surgeon: Felice Marroquin MD;  Location:  [facility] ENDOSCOPY;  Service:    • COLONOSCOPY W/ POLYPECTOMY  02/16/2012    adenomatous polyp at 80cm   • HAND SURGERY Right    • HERNIA REPAIR     • HIP CANNULATED SCREW PLACEMENT Left 1/18/2022    Procedure: HIP CANNULATED SCREW PLACEMENT;  Surgeon: Isaiah Sheehan MD;  Location:  [facility] OR;  Service: Orthopedics;  Laterality: Left;   • INGUINAL HERNIA REPAIR     • KNEE SURGERY     • PROSTATE SURGERY     • SKIN CANCER EXCISION      face   • SKIN LESION EXCISION     • TOTAL HIP ARTHROPLASTY Right 8/27/2021    Procedure: TOTAL HIP REPLACEMENT;  Surgeon: Arik Magaña MD;  Location:  [facility] OR;  Service: Orthopedics;  Laterality: Right;     PT Assessment (last 12 hours)     PT Evaluation and Treatment     Row Name 01/22/22 0835          Physical Therapy Time and Intention    Subjective Information complains of; pain  -KJ     Document Type therapy note (daily note)  -KJ     Mode of Treatment physical therapy  -KJ     Patient Effort good  -KJ     Row Name 01/22/22 0835          General Information    Existing Precautions/Restrictions fall  WBAT LLE; xrays good  -KJ     Row Name 01/22/22 0835          Pain Scale: Numbers Pre/Post-Treatment    Pretreatment Pain Rating 0/10 - no pain  -KJ     Posttreatment Pain Rating 4/10  -KJ     Pain Location - Side Left  -KJ     Pain Location hip  -KJ     Row Name 01/22/22 0835          Bed Mobility    Supine-Sit [place] (Bed Mobility) standby assist  -KJ     Sit-Supine [place] (Bed Mobility) standby assist  -KJ     Row Name 01/22/22 0835          Transfers    Sit-Stand [place] (Transfers)  supervision  -KJ     Row Name 01/22/22 0835          Sit-Stand Transfer    Assistive Device (Sit-Stand Transfers) walker, front-wheeled  -KJ     Row Name 01/22/22 0835          Gait/Stairs (Locomotion)    [place] Level (Gait) verbal cues; supervision  -KJ     Assistive Device (Gait) walker, front-wheeled  -KJ     Distance in Feet (Gait) 100' x 2  -KJ     Pattern (Gait) step-through  -KJ     Deviations/Abnormal Patterns (Gait) antalgic  -KJ     Bilateral Gait Deviations forward flexed posture  -KJ     Row Name             Wound 01/18/22 1745 Left anterior hip Incision    Wound - Properties Group Placement Date: 01/18/22  -RS Placement Time: 1745 -RS Side: Left  -RS Orientation: anterior  -RS Location: hip  -RS Primary Wound Type: Incision  -RS     Retired Wound - Properties Group Date first assessed: 01/18/22  -RS Time first assessed: 1745  -RS Side: Left  -RS Location: hip  -RS Primary Wound Type: Incision  -RS     Row Name 01/22/22 0835          Positioning and Restraints    Pre-Treatment Position in bed  -KJ     Post Treatment Position chair  -KJ     In Bed call light within reach  -KJ           User Key  (r) = Recorded By, (t) = Taken By, (c) = Cosigned By    Initials Name Provider Type    Tyesha Conn, KODI Physical Therapy Assistant    Rusty Espinoza, RN Registered Nurse                Physical Therapy Education                 Title: PT OT SLP Therapies (In Progress)     Topic: Physical Therapy (In Progress)     Point: Mobility training (Done)     Learning Progress Summary           Patient Acceptance, E, VU by MS at 1/19/2022 0930    Comment: role of PT in his care, WBAT L LE                   Point: Home exercise program (Not Started)     Learner Progress:  Not documented in this visit.          Point: Body mechanics (Not Started)     Learner Progress:  Not documented in this visit.          Point: Precautions (Not Started)     Learner Progress:  Not documented in this visit.                       User Key     Initials Effective Dates Name Provider Type Discipline    MS 06/19/18 -  Keke Mitchell R, PT, DPT, NCS Physical Therapist PT              PT Recommendation and Plan     Plan of Care Reviewed With: patient  Progress: improving  Outcome Summary: PT tx completed. Pt denies pn at rest, but increased soreness L hip when mobilizing. Rates 5/10. X rays good after near fall yesterday. I bed mobility, S transfers, amb 50' x 4 with r wx CG/SBA. I with HEP. Recommend HH with discharge.       Time Calculation:    PT Charges     Row Name 01/22/22 0914 01/22/22 0911          Time Calculation    Start Time -- 0835  -KJ     Stop Time -- 0900  -KJ     Time Calculation (min) -- 25 min  -KJ     PT Received On 01/22/22  -KJ --     PT Goal Re-Cert Due Date 01/29/22  -KJ --           User Key  (r) = Recorded By, (t) = Taken By, (c) = Cosigned By    Initials Name Provider Type    Tyesha Conn PTA Physical Therapy Assistant              Therapy Charges for Today     Code Description Service Date Service Provider Modifiers Qty    80453005207 HC GAIT TRAINING EA 15 MIN 1/22/2022 Tyesha Gomez PTA GP 2          PT G-Codes  Outcome Measure Options: AM-PAC 6 Clicks Basic Mobility (PT)  AM-PAC 6 Clicks Score (PT): 16  AM-PAC 6 Clicks Score (OT): 20    Tyesha Gomez PTA  1/22/2022

## 2022-01-23 NOTE — THERAPY DISCHARGE NOTE
Acute Care - Occupational Therapy Discharge Summary  [facility]     Patient Name: Dexter Suggs  : 1934  MRN: [id number]    Today's Date: 2022                 Admit Date: 2022        OT Recommendation and Plan    Visit Dx:    ICD-10-CM ICD-9-CM   1. Closed fracture of left hip, initial encounter ([facility])  S72.002A 820.8   2. Impaired mobility  Z74.09 799.89   3. Hip pain  M25.559 719.45   4. Closed subcapital fracture of femur, left, initial encounter ([facility])  S72.012A 820.09   5. Closed nondisplaced fracture of lesser trochanter of right femur with routine healing  S72.124D V54.13   6. Postoperative anemia due to acute blood loss  D62 285.1   7. Chronic anticoagulation  Z79.01 V58.61   8. Gross hematuria  R31.0 599.71   9. Closed fracture of right hip with routine healing, subsequent encounter  S72.001D V54.13   10. Arthritis of hand  M19.049 716.94   11. Closed fracture of distal end of radius with malunion, unspecified fracture morphology, unspecified laterality, subsequent encounter  S52.509P 733.81   12. COVID-19 virus infection  U07.1 079.89   13. Primary osteoarthritis of both hands  M19.041 715.14    M19.042    14. Overlapping malignant melanoma of skin (HCC)  C43.8 172.8   15. Impaired fasting glucose  R73.01 790.21   16. Hypothyroidism due to Hashimoto's thyroiditis  E03.8 244.8    E06.3 245.2   17. Gastroesophageal reflux disease, unspecified whether esophagitis present  K21.9 530.81   18. Anemia, unspecified type  D64.9 285.9   19. Alkaline phosphatase elevation  R74.8 790.5   20. Pernicious anemia  D51.0 281.0   21. Paroxysmal atrial flutter (HCC)  I48.92 427.32   22. PAF (paroxysmal atrial fibrillation) (CMS/HCC) on Eliquis   I48.0 427.31   23. Laceration of face without complication, subsequent encounter  S01.81XD V58.89     873.40   24. Sick sinus syndrome (HCC)  I49.5 427.81   25. Sick sinus syndrome s/p pacemaker   Z95.0 V45.01   26. Complete heart block ([facility])  I44.2 426.0   27.  Benign prostatic hyperplasia with lower urinary tract symptoms, symptom details unspecified  N40.1 600.01   28. Actinic keratosis  L57.0 702.0   29. 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 Malignant neoplasm of prostate (HCC)  C61 185   31. HTN (hypertension), benign  I10 401.1   32. Hx of colonic polyps  Z86.010 V12.72   33. Mixed hyperlipidemia  E78.2 272.2   34. Essential hypertension  I10 401.9   35. Single vessel coronary artery disease  I25.10 414.00                OT Rehab Goals     Row Name 01/23/22 0700             Transfer Goal 1 (OT)    Activity/Assistive Device (Transfer Goal 1, OT) toilet  -CS      [place] Level/Cues Needed (Transfer Goal 1, OT) supervision required  -CS      Time Frame (Transfer Goal 1, OT) long term goal (LTG)  -CS      Progress/Outcome (Transfer Goal 1, OT) goal not met  -CS              Dressing Goal 1 (OT)    Activity/Device (Dressing Goal 1, OT) lower body dressing  -CS      [place]/Cues Needed (Dressing Goal 1, OT) supervision required  -CS      Time Frame (Dressing Goal 1, OT) long term goal (LTG)  -CS      Progress/Outcome (Dressing Goal 1, OT) goal not met  -CS              Toileting Goal 1 (OT)    Activity/Device (Toileting Goal 1, OT) toileting skills, all  -CS      [place] Level/Cues Needed (Toileting Goal 1, OT) supervision required  -CS      Time Frame (Toileting Goal 1, OT) long term goal (LTG)  -CS      Progress/Outcome (Toileting Goal 1, OT) goal not met  -CS            User Key  (r) = Recorded By, (t) = Taken By, (c) = Cosigned By    Initials Name Provider Type Discipline    CS Hilda Russell S, OTR/L, CNT Occupational Therapist OT                    Timed Therapy Charges  Total Units: 4    Charges  Total Units: 4    Procedure Name Documented Minutes Units Code    HC OT THERAPEUTIC ACT EA 15 MIN 40  3    48250 (CPT®)      HC OT THER PROC EA 15 MIN 17  1    01042 (CPT®)               Documented Minutes  Total Minutes: 57     Therapy Provided Minutes    33444 - OT Therapeutic Activity Minutes 40    98885 - OT Therapeutic Exercise Minutes 17                    OT Discharge Summary  Anticipated Discharge Disposition (OT): home with assist, home with home health  Reason for Discharge: Discharge from facility  Outcomes Achieved: Refer to plan of care for updates on goals achieved  Discharge Destination: SNF      Hilda Russell, OTR/L, CNT  1/23/2022

## 2022-01-26 ENCOUNTER — LAB REQUISITION (OUTPATIENT)
Dept: LAB | Facility: HOSPITAL | Age: 87
End: 2022-01-26

## 2022-01-26 DIAGNOSIS — Z00.00 ENCOUNTER FOR GENERAL ADULT MEDICAL EXAMINATION WITHOUT ABNORMAL FINDINGS: ICD-10-CM

## 2022-01-26 LAB
ANION GAP SERPL CALCULATED.3IONS-SCNC: 7 MMOL/L (ref 5–15)
BASOPHILS # BLD AUTO: 0.06 10*3/MM3 (ref 0–0.2)
BASOPHILS NFR BLD AUTO: 0.6 % (ref 0–1.5)
BUN SERPL-MCNC: 24 MG/DL (ref 8–23)
BUN/CREAT SERPL: 20.3 (ref 7–25)
CALCIUM SPEC-SCNC: 9.7 MG/DL (ref 8.6–10.5)
CHLORIDE SERPL-SCNC: 99 MMOL/L (ref 98–107)
CO2 SERPL-SCNC: 30 MMOL/L (ref 22–29)
CREAT SERPL-MCNC: 1.18 MG/DL (ref 0.76–1.27)
DEPRECATED RDW RBC AUTO: 59.4 FL (ref 37–54)
EOSINOPHIL # BLD AUTO: 0.4 10*3/MM3 (ref 0–0.4)
EOSINOPHIL NFR BLD AUTO: 4.3 % (ref 0.3–6.2)
ERYTHROCYTE [DISTWIDTH] IN BLOOD BY AUTOMATED COUNT: 16.7 % (ref 12.3–15.4)
GFR SERPL CREATININE-BSD FRML MDRD: 58 ML/MIN/1.73
GLUCOSE SERPL-MCNC: 115 MG/DL (ref 65–99)
HCT VFR BLD AUTO: 29.5 % (ref 37.5–51)
HGB BLD-MCNC: 9.1 G/DL (ref 13–17.7)
IMM GRANULOCYTES # BLD AUTO: 0.1 10*3/MM3 (ref 0–0.05)
IMM GRANULOCYTES NFR BLD AUTO: 1.1 % (ref 0–0.5)
LYMPHOCYTES # BLD AUTO: 1.94 10*3/MM3 (ref 0.7–3.1)
LYMPHOCYTES NFR BLD AUTO: 20.6 % (ref 19.6–45.3)
MCH RBC QN AUTO: 30.1 PG (ref 26.6–33)
MCHC RBC AUTO-ENTMCNC: 30.8 G/DL (ref 31.5–35.7)
MCV RBC AUTO: 97.7 FL (ref 79–97)
MONOCYTES # BLD AUTO: 1.16 10*3/MM3 (ref 0.1–0.9)
MONOCYTES NFR BLD AUTO: 12.3 % (ref 5–12)
NEUTROPHILS NFR BLD AUTO: 5.75 10*3/MM3 (ref 1.7–7)
NEUTROPHILS NFR BLD AUTO: 61.1 % (ref 42.7–76)
NRBC BLD AUTO-RTO: 0 /100 WBC (ref 0–0.2)
PLATELET # BLD AUTO: 280 10*3/MM3 (ref 140–450)
PMV BLD AUTO: 9.9 FL (ref 6–12)
POTASSIUM SERPL-SCNC: 4.4 MMOL/L (ref 3.5–5.2)
RBC # BLD AUTO: 3.02 10*6/MM3 (ref 4.14–5.8)
SODIUM SERPL-SCNC: 136 MMOL/L (ref 136–145)
WBC NRBC COR # BLD: 9.41 10*3/MM3 (ref 3.4–10.8)

## 2022-01-26 PROCEDURE — 36415 COLL VENOUS BLD VENIPUNCTURE: CPT

## 2022-01-26 PROCEDURE — 85025 COMPLETE CBC W/AUTO DIFF WBC: CPT

## 2022-01-26 PROCEDURE — 80048 BASIC METABOLIC PNL TOTAL CA: CPT

## 2022-01-26 NOTE — PROGRESS NOTES
Subjective    Mr. Suggs is 87 y.o. male    Chief Complaint: Gross Hematuria    History of Present Illness  87-year-old male on Eliquis follow-up for recurrent gross hematuria.  His urine has now cleared after holding Eliquis since last Tuesday or Wednesday.  He had cystoscopy by me in October which was normal except for enlarged prostate.  He had radiation therapy approximately 70 years ago for Ebonie 6 prostate cancer.  He denies bothersome LUTS or hematuria.  Urine microscopy performed by me today shows 10-20 RBCs    Field, no bacteria.    The following portions of the patient's history were reviewed and updated as appropriate: allergies, current medications, past family history, past medical history, past social history, past surgical history and problem list.    Review of Systems      Current Outpatient Medications:   •  allopurinol (ZYLOPRIM) 300 MG tablet, Take 300 mg by mouth Daily., Disp: , Rfl:   •  apixaban (ELIQUIS) 2.5 MG tablet tablet, Take 1 tablet by mouth 2 (Two) Times a Day., Disp: 60 tablet, Rfl:   •  aspirin 81 MG EC tablet, Take 81 mg by mouth Daily., Disp: , Rfl:   •  bisacodyl (DULCOLAX) 10 MG suppository, Insert 1 suppository into the rectum Daily., Disp: , Rfl:   •  clotrimazole-betamethasone (Lotrisone) 1-0.05 % cream, Apply 1 application topically to the appropriate area as directed 2 (Two) Times a Day., Disp: 45 g, Rfl: 2  •  docusate sodium 100 MG capsule, Take 1 capsule by mouth 2 (Two) Times a Day As Needed for Constipation., Disp: , Rfl:   •  Easy Touch Lancets 30G/Twist misc, TEST DAILY AS DIRECTED , Disp: 100 each, Rfl: 3  •  folic acid (FOLVITE) 1 MG tablet, Take 1 mg by mouth Daily., Disp: , Rfl:   •  iron polysaccharides (NIFEREX) 150 MG capsule, Take 150 mg by mouth Daily., Disp: , Rfl:   •  levothyroxine (SYNTHROID, LEVOTHROID) 75 MCG tablet, Take 75 mcg by mouth Daily., Disp: , Rfl:   •  LORazepam (ATIVAN) 0.5 MG tablet, Take 1 tablet by mouth Daily As Needed for Anxiety.,  Disp: 12 tablet, Rfl: 0  •  magnesium hydroxide (MILK OF MAGNESIA) 2400 MG/10ML suspension suspension, Take 10 mL by mouth Daily As Needed (Constipation)., Disp: 300 mL, Rfl:   •  methotrexate 2.5 MG tablet, Take 17.5 mg by mouth 1 (One) Time Per Week., Disp: , Rfl:   •  metoprolol succinate XL (TOPROL-XL) 25 MG 24 hr tablet, Take 25 mg by mouth Daily., Disp: , Rfl:   •  ondansetron (ZOFRAN) 4 MG tablet, Take 1 tablet by mouth Every 6 (Six) Hours As Needed for Nausea or Vomiting., Disp: , Rfl:   •  pantoprazole (PROTONIX) 40 MG EC tablet, Take 40 mg by mouth Daily., Disp: , Rfl:   •  predniSONE (DELTASONE) 5 MG tablet, Take 5 mg by mouth Daily., Disp: , Rfl:   •  traMADol (ULTRAM) 50 MG tablet, Take 1 tablet by mouth Every 6 (Six) Hours As Needed for Moderate Pain ., Disp: 24 tablet, Rfl: 0  •  triamterene-hydrochlorothiazide (MAXZIDE) 75-50 MG per tablet, Take 0.5 tablets by mouth Daily., Disp: , Rfl:     Current Facility-Administered Medications:   •  cyanocobalamin injection 1,000 mcg, 1,000 mcg, Intramuscular, Q28 Days, Jarad Alexis MD, 1,000 mcg at 11/24/21 0901    Past Medical History:   Diagnosis Date   • Abnormal nuclear stress test    • BPH (benign prostatic hyperplasia)    • Chest pain    • Coronary artery disease    • Disease of thyroid gland    • Hyperlipidemia    • Hypertension    • LV dysfunction    • Melanoma (HCC)    • Prostate CA (HCC)        Past Surgical History:   Procedure Laterality Date   • APPENDECTOMY     • CARDIAC CATHETERIZATION Left 12/10/2012   • CARDIAC ELECTROPHYSIOLOGY PROCEDURE N/A 11/23/2018    Procedure: Pacemaker DC new 11/23/2018;  Surgeon: Austin Granados MD;  Location: [facility] CATH INVASIVE LOCATION;  Service: Cardiology   • CHOLECYSTECTOMY     • COLONOSCOPY N/A 6/9/2017    Procedure: COLONOSCOPY WITH ANESTHESIA;  Surgeon: Felice Marroquin MD;  Location: [facility] ENDOSCOPY;  Service:    • COLONOSCOPY W/ POLYPECTOMY  02/16/2012    adenomatous polyp at 80cm   • HAND SURGERY Right   "  • HERNIA REPAIR     • HIP CANNULATED SCREW PLACEMENT Left 2022    Procedure: HIP CANNULATED SCREW PLACEMENT;  Surgeon: Isaaih Sheehan MD;  Location:  [facility] OR;  Service: Orthopedics;  Laterality: Left;   • INGUINAL HERNIA REPAIR     • KNEE SURGERY     • PROSTATE SURGERY     • SKIN CANCER EXCISION      face   • SKIN LESION EXCISION     • TOTAL HIP ARTHROPLASTY Right 2021    Procedure: TOTAL HIP REPLACEMENT;  Surgeon: Arik Magaña MD;  Location:  [facility] OR;  Service: Orthopedics;  Laterality: Right;       Social History     Socioeconomic History   • Marital status:    Tobacco Use   • Smoking status: Former Smoker     Years: 30.00     Types: Cigarettes     Quit date:      Years since quittin.1   • Smokeless tobacco: Never Used   Vaping Use   • Vaping Use: Never used   Substance and Sexual Activity   • Alcohol use: No   • Drug use: No   • Sexual activity: Not Currently     Partners: Female       Family History   Problem Relation Age of Onset   • Alzheimer's disease Mother    • Cancer Father    • Cancer Sister        Objective    Temp 97.2 °F (36.2 °C)   Ht 182.9 cm (72\")   Wt 73 kg (161 lb)   BMI 21.84 kg/m²     Physical Exam      I personally performed and interpreted microscopic urinalysis in the clinic today and discussed the findings with the patient.  Urine microscopy performed by me today shows 10-20 RBCs per high-power field, no bacteria      Results for orders placed or performed in visit on 22   POC Urinalysis Dipstick, Multipro    Specimen: Urine   Result Value Ref Range    Color Yellow Yellow, Straw, Dark Yellow, Ariana    Clarity, UA Clear Clear    Glucose, UA Negative Negative, 1000 mg/dL (3+) mg/dL    Bilirubin Negative Negative    Ketones, UA Negative Negative    Specific Gravity  1.015 1.005 - 1.030    Blood, UA Large (A) Negative    pH, Urine 6.0 5.0 - 8.0    Protein,  mg/dL (A) Negative mg/dL    Urobilinogen, UA Normal Normal    Nitrite, UA " Negative Negative    Leukocytes Negative Negative     Assessment and Plan    Diagnoses and all orders for this visit:    1. Gross hematuria (Primary)  -     POC Urinalysis Dipstick, Multipro  -     CT Abdomen Pelvis With & Without Contrast; Future    2. Malignant neoplasm of prostate (HCC)      Urine now clear after holding Eliquis.  I recommended getting a CT urogram for complete hematuria evaluation.  He will try to restart his Eliquis this week and will hold if his hematuria returns and contact me back as needed.  He will otherwise follow-up with Femi Nieves in 3 months with preclinic PSA or sooner as needed.      This document has been signed by ESTELLE Powell MD on January 31, 2022 22:55 CST

## 2022-01-31 ENCOUNTER — HOSPITAL ENCOUNTER (OUTPATIENT)
Dept: CT IMAGING | Facility: HOSPITAL | Age: 87
Discharge: HOME | End: 2022-01-31
Admitting: UROLOGY

## 2022-01-31 ENCOUNTER — OFFICE VISIT (OUTPATIENT)
Dept: UROLOGY | Facility: CLINIC | Age: 87
End: 2022-01-31

## 2022-01-31 VITALS — TEMPERATURE: 97.2 F | HEIGHT: 72 IN | WEIGHT: 161 LBS | BODY MASS INDEX: 21.81 KG/M2

## 2022-01-31 DIAGNOSIS — R31.0 GROSS HEMATURIA: Primary | ICD-10-CM

## 2022-01-31 DIAGNOSIS — C61 MALIGNANT NEOPLASM OF PROSTATE: ICD-10-CM

## 2022-01-31 DIAGNOSIS — R31.0 GROSS HEMATURIA: ICD-10-CM

## 2022-01-31 LAB
BILIRUB BLD-MCNC: NEGATIVE MG/DL
CLARITY, POC: CLEAR
COLOR UR: YELLOW
CREAT BLDA-MCNC: 1.2 MG/DL (ref 0.6–1.3)
GLUCOSE UR STRIP-MCNC: NEGATIVE MG/DL
KETONES UR QL: NEGATIVE
LEUKOCYTE EST, POC: NEGATIVE
NITRITE UR-MCNC: NEGATIVE MG/ML
PH UR: 6 [PH] (ref 5–8)
PROT UR STRIP-MCNC: ABNORMAL MG/DL
RBC # UR STRIP: ABNORMAL /UL
SP GR UR: 1.01 (ref 1–1.03)
UROBILINOGEN UR QL: NORMAL

## 2022-01-31 PROCEDURE — 81001 URINALYSIS AUTO W/SCOPE: CPT | Performed by: UROLOGY

## 2022-01-31 PROCEDURE — 74178 CT ABD&PLV WO CNTR FLWD CNTR: CPT

## 2022-01-31 PROCEDURE — 25010000002 IOPAMIDOL 61 % SOLUTION: Performed by: UROLOGY

## 2022-01-31 PROCEDURE — 99213 OFFICE O/P EST LOW 20 MIN: CPT | Performed by: UROLOGY

## 2022-01-31 PROCEDURE — 82565 ASSAY OF CREATININE: CPT

## 2022-01-31 RX ADMIN — IOPAMIDOL 100 ML: 612 INJECTION, SOLUTION INTRAVENOUS at 15:20

## 2022-02-02 ENCOUNTER — TELEPHONE (OUTPATIENT)
Dept: UROLOGY | Facility: CLINIC | Age: 87
End: 2022-02-02

## 2022-02-02 NOTE — TELEPHONE ENCOUNTER
----- Message from Jnenifer Rai MA sent at 2/2/2022  4:05 PM CST -----  Regarding: results  Patents wife called wanting test results, she stated that she already saw the results but now is knowing the options

## 2022-02-02 NOTE — TELEPHONE ENCOUNTER
I called earlier today and left a message on the wife's cell phone regarding his reassuring results.  Also sent him a my chart message.  I tried again calling his wife's phone number and got the voicemail again.  I then called his cell phone and discussed the reassuring findings with Mr. 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  They will follow-up as scheduled in April with PSA.     ----- Message from Izzy Amezquita MA sent at 2/2/2022  4:07 PM CST -----  Pt wife called to get his test results

## 2022-02-08 ENCOUNTER — CLINICAL SUPPORT NO REQUIREMENTS (OUTPATIENT)
Dept: CARDIOLOGY | Facility: CLINIC | Age: 87
End: 2022-02-08

## 2022-02-08 DIAGNOSIS — I44.2 COMPLETE HEART BLOCK: ICD-10-CM

## 2022-02-08 PROCEDURE — 93280 PM DEVICE PROGR EVAL DUAL: CPT | Performed by: INTERNAL MEDICINE

## 2022-02-16 NOTE — PROGRESS NOTES
Dual Chamber Pacemaker Evaluation Report  Clinic Interrogation    February 18, 2022    Primary Cardiologist: Roberta  : [employer] Model: Assurity MRI  Implant date: 11/23/18    Reason for evaluation: routine  Indication for pacemaker: sick sinus syndrome    Measurements  Atrial sensing - P wave: 3.3 mV  Atrial threshold: 0.5 V@ 0.4 ms  Atrial lead impedance: 430 ohms  Ventricular sensing - R wave: 8.0 mV  Ventricular threshold: 0.75 V @ 0.4 ms  Ventricular lead impedance:   490 ohms     Diagnostic Data  Atrial paced: 16 %  Ventricular paced: >99 %  Other: 3 episodes of AF noted.  Longest is 10 seconds.  V rates are controlled.  Pt is anticoagulated.  Battery status: satisfactory   95% remaining      Final Parameters  Mode:  DDDR  Lower rate: 60 bpm   Upper rate: 125 bpm  AV Delay: paced- 200 ms  Sensed-175 ms  Atrial - Amplitude: 1.5 V   Pulse width: 0.4 ms   Sensitivity: auto     Ventricular - Amplitude: 2.0 V  Pulse width: 0.4 ms  Sensitivity: auto    Changes made: A pulse amplitude was changed to 1.5 V.  Conclusions: normal pacemaker function    Follow up: every 3 months via merlin, annually in office

## 2022-02-21 RX ORDER — ALLOPURINOL 300 MG/1
TABLET ORAL
Qty: 90 TABLET | Refills: 3 | Status: SHIPPED | OUTPATIENT
Start: 2022-02-21

## 2022-02-25 ENCOUNTER — TELEPHONE (OUTPATIENT)
Dept: INTERNAL MEDICINE | Facility: CLINIC | Age: 87
End: 2022-02-25

## 2022-03-01 ENCOUNTER — TELEPHONE (OUTPATIENT)
Dept: INTERNAL MEDICINE | Facility: CLINIC | Age: 87
End: 2022-03-01

## 2022-03-01 NOTE — TELEPHONE ENCOUNTER
CALLED TO LET OFFICE KNOW HE IS BEING DISCHARGED FROM [facility]  AND GOING TO GO TO THE REHAB AT [facility]     IF NEED TO TALK [phone]

## 2022-03-15 RX ORDER — METOPROLOL SUCCINATE 25 MG/1
TABLET, EXTENDED RELEASE ORAL
Qty: 90 TABLET | Refills: 3 | Status: SHIPPED | OUTPATIENT
Start: 2022-03-15 | End: 2023-04-04

## 2022-03-18 ENCOUNTER — OFFICE VISIT (OUTPATIENT)
Dept: CARDIOLOGY | Facility: CLINIC | Age: 87
End: 2022-03-18

## 2022-03-18 VITALS
WEIGHT: 165 LBS | BODY MASS INDEX: 22.35 KG/M2 | HEART RATE: 72 BPM | SYSTOLIC BLOOD PRESSURE: 130 MMHG | OXYGEN SATURATION: 94 % | DIASTOLIC BLOOD PRESSURE: 70 MMHG | HEIGHT: 72 IN

## 2022-03-18 DIAGNOSIS — E78.2 MIXED HYPERLIPIDEMIA: ICD-10-CM

## 2022-03-18 DIAGNOSIS — I25.10 SINGLE VESSEL CORONARY ARTERY DISEASE: ICD-10-CM

## 2022-03-18 DIAGNOSIS — Z01.810 PREOP CARDIOVASCULAR EXAM: ICD-10-CM

## 2022-03-18 DIAGNOSIS — S72.012A CLOSED SUBCAPITAL FRACTURE OF FEMUR, LEFT, INITIAL ENCOUNTER: ICD-10-CM

## 2022-03-18 DIAGNOSIS — R06.09 DOE (DYSPNEA ON EXERTION): ICD-10-CM

## 2022-03-18 DIAGNOSIS — I10 HTN (HYPERTENSION), BENIGN: ICD-10-CM

## 2022-03-18 DIAGNOSIS — I48.0 PAF (PAROXYSMAL ATRIAL FIBRILLATION): Primary | ICD-10-CM

## 2022-03-18 PROCEDURE — 99214 OFFICE O/P EST MOD 30 MIN: CPT | Performed by: INTERNAL MEDICINE

## 2022-03-18 RX ORDER — NITROGLYCERIN 0.4 MG/1
TABLET SUBLINGUAL
COMMUNITY

## 2022-03-18 NOTE — PROGRESS NOTES
Dexter Suggs  [id number]  1934  87 y.o.  male    Referring Provider: Ashish Callejas MD    Reason for Follow-up Visit:  Routine follow up.  coronary artery disease   sick sinus syndrome s/p pacemaker   Recent accidental fall and fracture left foot in walking boot   Pain left hip and left hip surgery, due to see Dr Monsivais    Subjective    As above   Moderate pain left hip     Effort tolerance limited more by orthopedic rather than cardiac related issues, therefore difficult to assess functional capacity.     Has not done the myocardial perfusion scan ordered     Prior Covid   paroxysmal atrial fibrillation on device check   Mild chronic exertional shortness of breath on exertion relieved with rest  No significant cough or wheezing    No palpitations  No associated chest pain  No significant pedal edema    No fever or chills  No significant expectoration    No hemoptysis  No presyncope or syncope    Tolerating current medications well with no untoward side effects   Compliant with prescribed medication regimen. Tries to adhere to cardiac diet.     No bleeding, excessive bruising, gait instability or fall risks    BP well controlled at home.     Currently on Eliquis to 2.5 mg BID   Dose was reduced after prior right hip surgery     History of present illness:  Dexter Suggs is a 87 y.o. yo male with paroxysmal atrial fibrillation coronary artery disease  sick sinus syndrome s/p pacemaker who presents today for   Chief Complaint   Patient presents with   • Hypertension   • Medical Clearance     Left hip replacement -  March 30.    .    History  Past Medical History:   Diagnosis Date   • Abnormal nuclear stress test    • BPH (benign prostatic hyperplasia)    • Chest pain    • Coronary artery disease    • Disease of thyroid gland    • Hyperlipidemia    • Hypertension    • LV dysfunction    • Melanoma (HCC)    • Prostate CA (HCC)    ,   Past Surgical History:   Procedure Laterality Date   •  APPENDECTOMY     • CARDIAC CATHETERIZATION Left 12/10/2012   • CARDIAC ELECTROPHYSIOLOGY PROCEDURE N/A 2018    Procedure: Pacemaker DC new 2018;  Surgeon: Austin Granados MD;  Location: [facility] CATH INVASIVE LOCATION;  Service: Cardiology   • CHOLECYSTECTOMY     • COLONOSCOPY N/A 2017    Procedure: COLONOSCOPY WITH ANESTHESIA;  Surgeon: Felice Marroquin MD;  Location: [facility] ENDOSCOPY;  Service:    • COLONOSCOPY W/ POLYPECTOMY  2012    adenomatous polyp at 80cm   • HAND SURGERY Right    • HERNIA REPAIR     • HIP CANNULATED SCREW PLACEMENT Left 2022    Procedure: HIP CANNULATED SCREW PLACEMENT;  Surgeon: Isaiah Sheehan MD;  Location:  [facility] OR;  Service: Orthopedics;  Laterality: Left;   • INGUINAL HERNIA REPAIR     • KNEE SURGERY     • PROSTATE SURGERY     • SKIN CANCER EXCISION      face   • SKIN LESION EXCISION     • TOTAL HIP ARTHROPLASTY Right 2021    Procedure: TOTAL HIP REPLACEMENT;  Surgeon: Arik Magaña MD;  Location:  [facility] OR;  Service: Orthopedics;  Laterality: Right;   ,   Family History   Problem Relation Age of Onset   • Alzheimer's disease Mother    • Cancer Father    • Cancer Sister    ,   Social History     Tobacco Use   • Smoking status: Former Smoker     Years: 30.00     Types: Cigarettes     Quit date:      Years since quittin.2   • Smokeless tobacco: Never Used   Vaping Use   • Vaping Use: Never used   Substance Use Topics   • Alcohol use: No   • Drug use: No   ,     Medications  Current Outpatient Medications   Medication Sig Dispense Refill   • allopurinol (ZYLOPRIM) 300 MG tablet TAKE 1 TABLET BY MOUTH EVERY DAY 90 tablet 3   • aspirin 81 MG EC tablet Take 81 mg by mouth Daily.     • clotrimazole-betamethasone (Lotrisone) 1-0.05 % cream Apply 1 application topically to the appropriate area as directed 2 (Two) Times a Day. 45 g 2   • Easy Touch Lancets 30G/Twist misc TEST DAILY AS DIRECTED  100 each 3   • folic acid (FOLVITE) 1 MG tablet Take  1 mg by mouth Daily.     • iron polysaccharides (NIFEREX) 150 MG capsule Take 150 mg by mouth Daily.     • levothyroxine (SYNTHROID, LEVOTHROID) 75 MCG tablet Take 75 mcg by mouth Daily.     • LORazepam (ATIVAN) 0.5 MG tablet Take 1 tablet by mouth Daily As Needed for Anxiety. 12 tablet 0   • methotrexate 2.5 MG tablet Take 17.5 mg by mouth 1 (One) Time Per Week.     • metoprolol succinate XL (TOPROL-XL) 25 MG 24 hr tablet TAKE 1 TABLET BY MOUTH EVERY DAY 90 tablet 3   • nitroglycerin (NITROSTAT) 0.4 MG SL tablet Nitrostat 0.4 mg sublingual tablet   Place 1 tablet as needed by sublingual route.     • pantoprazole (PROTONIX) 40 MG EC tablet Take 40 mg by mouth Daily.     • predniSONE (DELTASONE) 5 MG tablet Take 5 mg by mouth Daily. Takes 5 every other day     • traMADol (ULTRAM) 50 MG tablet Take 1 tablet by mouth Every 6 (Six) Hours As Needed for Moderate Pain . (Patient taking differently: Take 50 mg by mouth Every 6 (Six) Hours As Needed for Moderate Pain . As needed) 24 tablet 0   • triamterene-hydrochlorothiazide (MAXZIDE) 75-50 MG per tablet Take 0.5 tablets by mouth Daily.     • apixaban (ELIQUIS) 5 MG tablet tablet Take 1 tablet by mouth Every 12 (Twelve) Hours. 180 tablet 3     Current Facility-Administered Medications   Medication Dose Route Frequency Provider Last Rate Last Admin   • cyanocobalamin injection 1,000 mcg  1,000 mcg Intramuscular Q28 Days Jraad Alexis MD   1,000 mcg at 11/24/21 0901       Allergies:  Adhesive tape, Simvastatin, and Neosporin [neomycin-bacitracin zn-polymyx]    Review of Systems  Review of Systems   Constitutional: Negative.   HENT: Negative.    Eyes: Negative.    Cardiovascular: Positive for dyspnea on exertion. Negative for chest pain, claudication, cyanosis, irregular heartbeat, leg swelling, near-syncope, orthopnea, palpitations, paroxysmal nocturnal dyspnea and syncope.   Respiratory: Negative.    Endocrine: Negative.    Hematologic/Lymphatic: Negative.    Skin:  "Negative.    Musculoskeletal: Positive for arthritis and back pain.   Gastrointestinal: Negative for anorexia.   Genitourinary: Negative.    Neurological: Negative.    Psychiatric/Behavioral: Negative.        Objective     Physical Exam:  /70   Pulse 72   Ht 182.9 cm (72\")   Wt 74.8 kg (165 lb)   SpO2 94%   BMI 22.38 kg/m²   Physical Exam   Constitutional: He appears well-developed.   HENT:   Head: Normocephalic.   Neck: Normal carotid pulses and no JVD present. No tracheal tenderness present. Carotid bruit is not present. No tracheal deviation present.   Cardiovascular: Regular rhythm, normal heart sounds and normal pulses.   Pulmonary/Chest: Effort normal. No stridor.   Abdominal: Soft. He exhibits no distension. There is no abdominal tenderness.   Neurological: He is alert. No cranial nerve deficit or sensory deficit.   Skin: Skin is warm.   Psychiatric: His speech is normal and behavior is normal.       Results Review:  Results for orders placed during the hospital encounter of 03/05/21    Adult Transthoracic Echo Complete w/ Color, Spectral and Contrast if necessary per protocol    Interpretation Summary  · Left ventricular wall thickness is consistent with mild concentric hypertrophy.  · Left ventricular ejection fraction appears to be 56 - 60%. Left ventricular systolic function is normal.  · Abnormal global longitudinal LV strain (GLS) = -13%.  · Left ventricular diastolic function was normal.  · Estimated right ventricular systolic pressure from tricuspid regurgitation is normal (<35 mmHg).         myocardial perfusion scan  1/2019       · Left ventricular ejection fraction is normal (Calculated EF = 63%).  · Myocardial perfusion imaging indicates a medium-sized infarct located in the inferior wall, septal wall and apex with no significant ischemia noted.  · Abnormal LV wall motion consistent with mild hypokinesis of the inferior wall and septal wall.  Raw images reviewed with the following " abnormalities noted: vertical motion.       Procedures  ____________________________________________________________________________________________________________________________________________  Health maintenance and recommendations    Low salt/ HTN/ Heart healthy carbohydrate restricted cardiac diet (printed dietary and general instructions provided for home review )  The patient is advised to reduce or avoid caffeine or other cardiac stimulants.     The patient was advised to avoid long term NSAIDS , use Tylenol PRN instead  Avoid cardiac stimulants including common drugs like Pseudoephedrine or excessive amounts of caffeine  Monitor for any signs of bleeding including red or dark stools. Fall precautions.   Advised staying uptodate with immunizations per established standard guidelines.  Offered to give patient  a copy      Questions were encouraged, asked and answered to the patient's  understanding and satisfaction. Questions if any regarding current medications and side effects, need for refills and importance of compliance to medications stressed.    Reviewed available prior notes, consults, prior visits, laboratory findings, radiology and cardiology relevant reports. Updated chart as applicable. I have reviewed the patient's medical history in detail and updated the computerized patient record as relevant.      Updated patient regarding any new or relevant abnormalities on review of records or any new findings on physical exam. Mentioned to patient about purpose of visit and desirable health short and long term goals and objectives.    Primary to monitor CBC CMP Lipid panel and TSH as applicable    ___________________________________________________________________________________________________________________________________________       Diagnoses and all orders for this visit:    1. PAF (paroxysmal atrial fibrillation) (CMS/HCC) on Eliquis  (Primary)    2. Single vessel coronary artery disease  -      Stress Test With Myocardial Perfusion (1 Day); Future    3. Mixed hyperlipidemia    4. HTN (hypertension), benign    5. Closed subcapital fracture of femur, left, initial encounter ([facility])    6. Preop cardiovascular exam left hip replacement Dr Monsivais    7. CLEMENT (dyspnea on exertion)  -     Stress Test With Myocardial Perfusion (1 Day); Future    Other orders  -     apixaban (ELIQUIS) 5 MG tablet tablet; Take 1 tablet by mouth Every 12 (Twelve) Hours.  Dispense: 180 tablet; Refill: 3            Plan      Orders Placed This Encounter   Procedures   • Stress Test With Myocardial Perfusion (1 Day)     Standing Status:   Future     Standing Expiration Date:   3/18/2023     Order Specific Question:   What stress agent will be used?     Answer:   Regadenoson (Lexiscan)     Order Specific Question:   Difficulty walking criteria?     Answer:   Musculoskeletal (hips, knees, feet, back, amputee)     Order Specific Question:   Reason for exam?     Answer:   Known Coronary Artery Disease     Order Specific Question:   Release to patient     Answer:   Immediate      Call for results of cardiac tests after done for clearance for surgery     Patient expressed understanding  Encouraged and answered all questions   Discussed with the patient and all questioned fully answered. He will call me if any problems arise.   Discussed results of prior testing with patient : echo and prior myocardial perfusion scan       The current medical regimen is effective;  continue present plan and medications.   Check BP and heart rates twice daily initially till blood pressures and heart rates under good control and then at least 3x / week,   If blood pressures continue to be well-controlled then can check week a month  at home and bring a recording for review next visit  If BP >130/85 or < 100/60 persistently over 3 reading 30 mins apart or if heart rates persistently above 100 bpm or less than 55 bpm call sooner for evaluation and advise     Monitor  for any signs of bleeding including red or dark stools as well as easy bruisabilty. Fall precautions.   Monitor cardiac rhythm device function regularly per established schedule or PRN      I support the patient's decision to take the Covid -19 vaccine   Had required complete course   No major issues   Now fully immunized    Had booster too      Increase Eliquis dose to 5 mg BID   Requested Prescriptions     Signed Prescriptions Disp Refills   • apixaban (ELIQUIS) 5 MG tablet tablet 180 tablet 3     Sig: Take 1 tablet by mouth Every 12 (Twelve) Hours.        Follow up with Deborah CARVAJAL , Charly CARVAJAL  or myself        Return in about 3 months (around 6/18/2022).

## 2022-03-23 ENCOUNTER — APPOINTMENT (OUTPATIENT)
Dept: CT IMAGING | Facility: HOSPITAL | Age: 87
End: 2022-03-23

## 2022-03-23 ENCOUNTER — HOSPITAL ENCOUNTER (INPATIENT)
Facility: HOSPITAL | Age: 87
LOS: 5 days | Discharge: NURSING, REHAB OR LONG-TERM CARE | End: 2022-03-28
Attending: STUDENT IN AN ORGANIZED HEALTH CARE EDUCATION/TRAINING PROGRAM | Admitting: FAMILY MEDICINE

## 2022-03-23 ENCOUNTER — APPOINTMENT (OUTPATIENT)
Dept: GENERAL RADIOLOGY | Facility: HOSPITAL | Age: 87
End: 2022-03-23

## 2022-03-23 DIAGNOSIS — R41.0 CONFUSION: ICD-10-CM

## 2022-03-23 DIAGNOSIS — S72.012A CLOSED SUBCAPITAL FRACTURE OF FEMUR, LEFT, INITIAL ENCOUNTER: Primary | ICD-10-CM

## 2022-03-23 DIAGNOSIS — M25.552 LEFT HIP PAIN: ICD-10-CM

## 2022-03-23 DIAGNOSIS — R26.9 GAIT ABNORMALITY: ICD-10-CM

## 2022-03-23 DIAGNOSIS — S72.124D CLOSED NONDISPLACED FRACTURE OF LESSER TROCHANTER OF RIGHT FEMUR WITH ROUTINE HEALING: ICD-10-CM

## 2022-03-23 DIAGNOSIS — S72.002A HIP FRACTURE, LEFT: ICD-10-CM

## 2022-03-23 LAB
ALBUMIN SERPL-MCNC: 3.4 G/DL (ref 3.5–5.2)
ALBUMIN/GLOB SERPL: 1.1 G/DL
ALP SERPL-CCNC: 116 U/L (ref 39–117)
ALT SERPL W P-5'-P-CCNC: 7 U/L (ref 1–41)
ANION GAP SERPL CALCULATED.3IONS-SCNC: 11 MMOL/L (ref 5–15)
ANION GAP SERPL CALCULATED.3IONS-SCNC: 12 MMOL/L (ref 5–15)
APTT PPP: 37.1 SECONDS (ref 24.1–35)
AST SERPL-CCNC: 12 U/L (ref 1–40)
BACTERIA UR QL AUTO: ABNORMAL /HPF
BASOPHILS # BLD AUTO: 0.06 10*3/MM3 (ref 0–0.2)
BASOPHILS # BLD AUTO: 0.07 10*3/MM3 (ref 0–0.2)
BASOPHILS NFR BLD AUTO: 0.7 % (ref 0–1.5)
BASOPHILS NFR BLD AUTO: 0.9 % (ref 0–1.5)
BILIRUB SERPL-MCNC: 0.2 MG/DL (ref 0–1.2)
BILIRUB UR QL STRIP: NEGATIVE
BUN SERPL-MCNC: 21 MG/DL (ref 8–23)
BUN SERPL-MCNC: 21 MG/DL (ref 8–23)
BUN/CREAT SERPL: 22.3 (ref 7–25)
BUN/CREAT SERPL: 23.3 (ref 7–25)
CALCIUM SPEC-SCNC: 10.2 MG/DL (ref 8.6–10.5)
CALCIUM SPEC-SCNC: 10.8 MG/DL (ref 8.6–10.5)
CHLORIDE SERPL-SCNC: 100 MMOL/L (ref 98–107)
CHLORIDE SERPL-SCNC: 96 MMOL/L (ref 98–107)
CLARITY UR: CLEAR
CO2 SERPL-SCNC: 26 MMOL/L (ref 22–29)
CO2 SERPL-SCNC: 27 MMOL/L (ref 22–29)
COLOR UR: YELLOW
CREAT SERPL-MCNC: 0.9 MG/DL (ref 0.76–1.27)
CREAT SERPL-MCNC: 0.94 MG/DL (ref 0.76–1.27)
D DIMER PPP FEU-MCNC: 3.27 MG/L (FEU) (ref 0–0.5)
DEPRECATED RDW RBC AUTO: 53.6 FL (ref 37–54)
DEPRECATED RDW RBC AUTO: 55.5 FL (ref 37–54)
EGFRCR SERPLBLD CKD-EPI 2021: 78.5 ML/MIN/1.73
EGFRCR SERPLBLD CKD-EPI 2021: 82.7 ML/MIN/1.73
EOSINOPHIL # BLD AUTO: 0.16 10*3/MM3 (ref 0–0.4)
EOSINOPHIL # BLD AUTO: 0.22 10*3/MM3 (ref 0–0.4)
EOSINOPHIL NFR BLD AUTO: 1.9 % (ref 0.3–6.2)
EOSINOPHIL NFR BLD AUTO: 2.8 % (ref 0.3–6.2)
ERYTHROCYTE [DISTWIDTH] IN BLOOD BY AUTOMATED COUNT: 15.8 % (ref 12.3–15.4)
ERYTHROCYTE [DISTWIDTH] IN BLOOD BY AUTOMATED COUNT: 15.9 % (ref 12.3–15.4)
FOLATE SERPL-MCNC: >20 NG/ML (ref 4.78–24.2)
GLOBULIN UR ELPH-MCNC: 3.1 GM/DL
GLUCOSE SERPL-MCNC: 105 MG/DL (ref 65–99)
GLUCOSE SERPL-MCNC: 120 MG/DL (ref 65–99)
GLUCOSE UR STRIP-MCNC: NEGATIVE MG/DL
HCT VFR BLD AUTO: 34.3 % (ref 37.5–51)
HCT VFR BLD AUTO: 34.4 % (ref 37.5–51)
HGB BLD-MCNC: 10.9 G/DL (ref 13–17.7)
HGB BLD-MCNC: 10.9 G/DL (ref 13–17.7)
HGB UR QL STRIP.AUTO: NEGATIVE
HYALINE CASTS UR QL AUTO: ABNORMAL /LPF
IMM GRANULOCYTES # BLD AUTO: 0.09 10*3/MM3 (ref 0–0.05)
IMM GRANULOCYTES # BLD AUTO: 0.1 10*3/MM3 (ref 0–0.05)
IMM GRANULOCYTES NFR BLD AUTO: 1.2 % (ref 0–0.5)
IMM GRANULOCYTES NFR BLD AUTO: 1.2 % (ref 0–0.5)
INR PPP: 1.32 (ref 0.91–1.09)
IRON 24H UR-MRATE: 33 MCG/DL (ref 59–158)
IRON SATN MFR SERPL: 15 % (ref 20–50)
KETONES UR QL STRIP: NEGATIVE
LEUKOCYTE ESTERASE UR QL STRIP.AUTO: ABNORMAL
LYMPHOCYTES # BLD AUTO: 1.69 10*3/MM3 (ref 0.7–3.1)
LYMPHOCYTES # BLD AUTO: 1.71 10*3/MM3 (ref 0.7–3.1)
LYMPHOCYTES NFR BLD AUTO: 20.2 % (ref 19.6–45.3)
LYMPHOCYTES NFR BLD AUTO: 21.7 % (ref 19.6–45.3)
MAGNESIUM SERPL-MCNC: 1.6 MG/DL (ref 1.6–2.4)
MAGNESIUM SERPL-MCNC: 1.9 MG/DL (ref 1.6–2.4)
MCH RBC QN AUTO: 29.8 PG (ref 26.6–33)
MCH RBC QN AUTO: 30 PG (ref 26.6–33)
MCHC RBC AUTO-ENTMCNC: 31.7 G/DL (ref 31.5–35.7)
MCHC RBC AUTO-ENTMCNC: 31.8 G/DL (ref 31.5–35.7)
MCV RBC AUTO: 94 FL (ref 79–97)
MCV RBC AUTO: 94.5 FL (ref 79–97)
MONOCYTES # BLD AUTO: 0.77 10*3/MM3 (ref 0.1–0.9)
MONOCYTES # BLD AUTO: 0.82 10*3/MM3 (ref 0.1–0.9)
MONOCYTES NFR BLD AUTO: 10.5 % (ref 5–12)
MONOCYTES NFR BLD AUTO: 9.1 % (ref 5–12)
NEUTROPHILS NFR BLD AUTO: 4.91 10*3/MM3 (ref 1.7–7)
NEUTROPHILS NFR BLD AUTO: 5.66 10*3/MM3 (ref 1.7–7)
NEUTROPHILS NFR BLD AUTO: 62.9 % (ref 42.7–76)
NEUTROPHILS NFR BLD AUTO: 66.9 % (ref 42.7–76)
NITRITE UR QL STRIP: NEGATIVE
NRBC BLD AUTO-RTO: 0 /100 WBC (ref 0–0.2)
NRBC BLD AUTO-RTO: 0.2 /100 WBC (ref 0–0.2)
PH UR STRIP.AUTO: 6 [PH] (ref 5–8)
PLATELET # BLD AUTO: 258 10*3/MM3 (ref 140–450)
PLATELET # BLD AUTO: 270 10*3/MM3 (ref 140–450)
PMV BLD AUTO: 9.1 FL (ref 6–12)
PMV BLD AUTO: 9.5 FL (ref 6–12)
POTASSIUM SERPL-SCNC: 4.1 MMOL/L (ref 3.5–5.2)
POTASSIUM SERPL-SCNC: 4.3 MMOL/L (ref 3.5–5.2)
PROT SERPL-MCNC: 6.5 G/DL (ref 6–8.5)
PROT UR QL STRIP: ABNORMAL
PROTHROMBIN TIME: 15.9 SECONDS (ref 11.9–14.6)
QT INTERVAL: 426 MS
QTC INTERVAL: 518 MS
RBC # BLD AUTO: 3.63 10*6/MM3 (ref 4.14–5.8)
RBC # BLD AUTO: 3.66 10*6/MM3 (ref 4.14–5.8)
RBC # UR STRIP: ABNORMAL /HPF
REF LAB TEST METHOD: ABNORMAL
SARS-COV-2 RNA PNL SPEC NAA+PROBE: NOT DETECTED
SODIUM SERPL-SCNC: 135 MMOL/L (ref 136–145)
SODIUM SERPL-SCNC: 137 MMOL/L (ref 136–145)
SP GR UR STRIP: >1.03 (ref 1–1.03)
SQUAMOUS #/AREA URNS HPF: ABNORMAL /HPF
T4 FREE SERPL-MCNC: 1.24 NG/DL (ref 0.93–1.7)
TIBC SERPL-MCNC: 226 MCG/DL (ref 298–536)
TRANSFERRIN SERPL-MCNC: 152 MG/DL (ref 200–360)
TROPONIN T SERPL-MCNC: 0.02 NG/ML (ref 0–0.03)
TROPONIN T SERPL-MCNC: 0.02 NG/ML (ref 0–0.03)
TSH SERPL DL<=0.05 MIU/L-ACNC: 2.28 UIU/ML (ref 0.27–4.2)
UROBILINOGEN UR QL STRIP: ABNORMAL
WBC # UR STRIP: ABNORMAL /HPF
WBC NRBC COR # BLD: 7.8 10*3/MM3 (ref 3.4–10.8)
WBC NRBC COR # BLD: 8.46 10*3/MM3 (ref 3.4–10.8)

## 2022-03-23 PROCEDURE — 84425 ASSAY OF VITAMIN B-1: CPT | Performed by: FAMILY MEDICINE

## 2022-03-23 PROCEDURE — 63710000001 PREDNISONE PER 5 MG: Performed by: FAMILY MEDICINE

## 2022-03-23 PROCEDURE — 83540 ASSAY OF IRON: CPT | Performed by: FAMILY MEDICINE

## 2022-03-23 PROCEDURE — 84443 ASSAY THYROID STIM HORMONE: CPT | Performed by: STUDENT IN AN ORGANIZED HEALTH CARE EDUCATION/TRAINING PROGRAM

## 2022-03-23 PROCEDURE — 83735 ASSAY OF MAGNESIUM: CPT | Performed by: STUDENT IN AN ORGANIZED HEALTH CARE EDUCATION/TRAINING PROGRAM

## 2022-03-23 PROCEDURE — 84446 ASSAY OF VITAMIN E: CPT | Performed by: PHYSICIAN ASSISTANT

## 2022-03-23 PROCEDURE — 87635 SARS-COV-2 COVID-19 AMP PRB: CPT | Performed by: STUDENT IN AN ORGANIZED HEALTH CARE EDUCATION/TRAINING PROGRAM

## 2022-03-23 PROCEDURE — 71045 X-RAY EXAM CHEST 1 VIEW: CPT

## 2022-03-23 PROCEDURE — 80053 COMPREHEN METABOLIC PANEL: CPT | Performed by: STUDENT IN AN ORGANIZED HEALTH CARE EDUCATION/TRAINING PROGRAM

## 2022-03-23 PROCEDURE — 84484 ASSAY OF TROPONIN QUANT: CPT | Performed by: STUDENT IN AN ORGANIZED HEALTH CARE EDUCATION/TRAINING PROGRAM

## 2022-03-23 PROCEDURE — 84439 ASSAY OF FREE THYROXINE: CPT | Performed by: STUDENT IN AN ORGANIZED HEALTH CARE EDUCATION/TRAINING PROGRAM

## 2022-03-23 PROCEDURE — 83735 ASSAY OF MAGNESIUM: CPT | Performed by: FAMILY MEDICINE

## 2022-03-23 PROCEDURE — 85025 COMPLETE CBC W/AUTO DIFF WBC: CPT | Performed by: STUDENT IN AN ORGANIZED HEALTH CARE EDUCATION/TRAINING PROGRAM

## 2022-03-23 PROCEDURE — 25010000002 ENOXAPARIN PER 10 MG: Performed by: FAMILY MEDICINE

## 2022-03-23 PROCEDURE — 82607 VITAMIN B-12: CPT | Performed by: PHYSICIAN ASSISTANT

## 2022-03-23 PROCEDURE — 99285 EMERGENCY DEPT VISIT HI MDM: CPT

## 2022-03-23 PROCEDURE — 93005 ELECTROCARDIOGRAM TRACING: CPT | Performed by: STUDENT IN AN ORGANIZED HEALTH CARE EDUCATION/TRAINING PROGRAM

## 2022-03-23 PROCEDURE — 74177 CT ABD & PELVIS W/CONTRAST: CPT

## 2022-03-23 PROCEDURE — 0 IOPAMIDOL PER 1 ML: Performed by: STUDENT IN AN ORGANIZED HEALTH CARE EDUCATION/TRAINING PROGRAM

## 2022-03-23 PROCEDURE — 93010 ELECTROCARDIOGRAM REPORT: CPT | Performed by: INTERNAL MEDICINE

## 2022-03-23 PROCEDURE — 73502 X-RAY EXAM HIP UNI 2-3 VIEWS: CPT

## 2022-03-23 PROCEDURE — 84466 ASSAY OF TRANSFERRIN: CPT | Performed by: FAMILY MEDICINE

## 2022-03-23 PROCEDURE — 81001 URINALYSIS AUTO W/SCOPE: CPT | Performed by: STUDENT IN AN ORGANIZED HEALTH CARE EDUCATION/TRAINING PROGRAM

## 2022-03-23 PROCEDURE — 70450 CT HEAD/BRAIN W/O DYE: CPT

## 2022-03-23 PROCEDURE — 85025 COMPLETE CBC W/AUTO DIFF WBC: CPT | Performed by: FAMILY MEDICINE

## 2022-03-23 PROCEDURE — 99223 1ST HOSP IP/OBS HIGH 75: CPT | Performed by: PSYCHIATRY & NEUROLOGY

## 2022-03-23 PROCEDURE — 85379 FIBRIN DEGRADATION QUANT: CPT | Performed by: STUDENT IN AN ORGANIZED HEALTH CARE EDUCATION/TRAINING PROGRAM

## 2022-03-23 PROCEDURE — 85730 THROMBOPLASTIN TIME PARTIAL: CPT | Performed by: STUDENT IN AN ORGANIZED HEALTH CARE EDUCATION/TRAINING PROGRAM

## 2022-03-23 PROCEDURE — 82746 ASSAY OF FOLIC ACID SERUM: CPT | Performed by: FAMILY MEDICINE

## 2022-03-23 PROCEDURE — 85610 PROTHROMBIN TIME: CPT | Performed by: STUDENT IN AN ORGANIZED HEALTH CARE EDUCATION/TRAINING PROGRAM

## 2022-03-23 PROCEDURE — 71275 CT ANGIOGRAPHY CHEST: CPT

## 2022-03-23 RX ORDER — POLYETHYLENE GLYCOL 3350 17 G/17G
17 POWDER, FOR SOLUTION ORAL DAILY PRN
Status: DISCONTINUED | OUTPATIENT
Start: 2022-03-23 | End: 2022-03-25

## 2022-03-23 RX ORDER — ONDANSETRON 2 MG/ML
4 INJECTION INTRAMUSCULAR; INTRAVENOUS EVERY 6 HOURS PRN
Status: DISCONTINUED | OUTPATIENT
Start: 2022-03-23 | End: 2022-03-25

## 2022-03-23 RX ORDER — NITROGLYCERIN 0.4 MG/1
0.4 TABLET SUBLINGUAL
Status: DISCONTINUED | OUTPATIENT
Start: 2022-03-23 | End: 2022-03-25

## 2022-03-23 RX ORDER — ACETAMINOPHEN 325 MG/1
650 TABLET ORAL EVERY 4 HOURS PRN
Status: DISCONTINUED | OUTPATIENT
Start: 2022-03-23 | End: 2022-03-25

## 2022-03-23 RX ORDER — BISACODYL 5 MG/1
5 TABLET, DELAYED RELEASE ORAL DAILY PRN
Status: DISCONTINUED | OUTPATIENT
Start: 2022-03-23 | End: 2022-03-25

## 2022-03-23 RX ORDER — PANTOPRAZOLE SODIUM 40 MG/1
40 TABLET, DELAYED RELEASE ORAL DAILY
Status: DISCONTINUED | OUTPATIENT
Start: 2022-03-23 | End: 2022-03-25

## 2022-03-23 RX ORDER — HYDROCODONE BITARTRATE AND ACETAMINOPHEN 10; 325 MG/1; MG/1
1 TABLET ORAL EVERY 4 HOURS PRN
Status: DISCONTINUED | OUTPATIENT
Start: 2022-03-23 | End: 2022-03-25

## 2022-03-23 RX ORDER — FOLIC ACID 1 MG/1
1 TABLET ORAL DAILY
Status: DISCONTINUED | OUTPATIENT
Start: 2022-03-23 | End: 2022-03-25

## 2022-03-23 RX ORDER — SODIUM CHLORIDE 0.9 % (FLUSH) 0.9 %
10 SYRINGE (ML) INJECTION EVERY 12 HOURS SCHEDULED
Status: DISCONTINUED | OUTPATIENT
Start: 2022-03-23 | End: 2022-03-25

## 2022-03-23 RX ORDER — ONDANSETRON 4 MG/1
4 TABLET, FILM COATED ORAL EVERY 6 HOURS PRN
Status: DISCONTINUED | OUTPATIENT
Start: 2022-03-23 | End: 2022-03-25

## 2022-03-23 RX ORDER — METOPROLOL SUCCINATE 25 MG/1
25 TABLET, EXTENDED RELEASE ORAL DAILY
Status: DISCONTINUED | OUTPATIENT
Start: 2022-03-23 | End: 2022-03-25

## 2022-03-23 RX ORDER — ALLOPURINOL 300 MG/1
300 TABLET ORAL DAILY
Status: DISCONTINUED | OUTPATIENT
Start: 2022-03-23 | End: 2022-03-25

## 2022-03-23 RX ORDER — IRON POLYSACCHARIDE COMPLEX 150 MG
150 CAPSULE ORAL DAILY
Status: DISCONTINUED | OUTPATIENT
Start: 2022-03-23 | End: 2022-03-24

## 2022-03-23 RX ORDER — PREDNISONE 1 MG/1
5 TABLET ORAL DAILY
Status: DISCONTINUED | OUTPATIENT
Start: 2022-03-23 | End: 2022-03-25

## 2022-03-23 RX ORDER — SODIUM CHLORIDE 0.9 % (FLUSH) 0.9 %
10 SYRINGE (ML) INJECTION AS NEEDED
Status: DISCONTINUED | OUTPATIENT
Start: 2022-03-23 | End: 2022-03-25

## 2022-03-23 RX ORDER — LORAZEPAM 0.5 MG/1
0.5 TABLET ORAL DAILY PRN
Status: DISCONTINUED | OUTPATIENT
Start: 2022-03-23 | End: 2022-03-25

## 2022-03-23 RX ORDER — ASPIRIN 81 MG/1
81 TABLET ORAL DAILY
Status: DISCONTINUED | OUTPATIENT
Start: 2022-03-23 | End: 2022-03-25

## 2022-03-23 RX ORDER — HYDROCODONE BITARTRATE AND ACETAMINOPHEN 5; 325 MG/1; MG/1
1 TABLET ORAL EVERY 4 HOURS PRN
Status: DISCONTINUED | OUTPATIENT
Start: 2022-03-23 | End: 2022-03-25

## 2022-03-23 RX ORDER — BISACODYL 10 MG
10 SUPPOSITORY, RECTAL RECTAL DAILY PRN
Status: DISCONTINUED | OUTPATIENT
Start: 2022-03-23 | End: 2022-03-25

## 2022-03-23 RX ORDER — TRIAMTERENE AND HYDROCHLOROTHIAZIDE 75; 50 MG/1; MG/1
0.5 TABLET ORAL DAILY
Status: DISCONTINUED | OUTPATIENT
Start: 2022-03-23 | End: 2022-03-25

## 2022-03-23 RX ORDER — AMOXICILLIN 250 MG
2 CAPSULE ORAL 2 TIMES DAILY
Status: DISCONTINUED | OUTPATIENT
Start: 2022-03-23 | End: 2022-03-25

## 2022-03-23 RX ORDER — NITROGLYCERIN 0.4 MG/1
0.4 TABLET SUBLINGUAL
Status: DISCONTINUED | OUTPATIENT
Start: 2022-03-23 | End: 2022-03-23 | Stop reason: SDUPTHER

## 2022-03-23 RX ADMIN — Medication 10 ML: at 12:15

## 2022-03-23 RX ADMIN — HYDROCODONE BITARTRATE AND ACETAMINOPHEN 1 TABLET: 10; 325 TABLET ORAL at 16:50

## 2022-03-23 RX ADMIN — IOPAMIDOL 100 ML: 755 INJECTION, SOLUTION INTRAVENOUS at 03:18

## 2022-03-23 RX ADMIN — HYDROCODONE BITARTRATE AND ACETAMINOPHEN 1 TABLET: 10; 325 TABLET ORAL at 11:59

## 2022-03-23 RX ADMIN — DOCUSATE SODIUM 50 MG AND SENNOSIDES 8.6 MG 2 TABLET: 8.6; 5 TABLET, FILM COATED ORAL at 20:54

## 2022-03-23 RX ADMIN — METOPROLOL SUCCINATE 25 MG: 25 TABLET, EXTENDED RELEASE ORAL at 12:03

## 2022-03-23 RX ADMIN — ASPIRIN 81 MG: 81 TABLET, COATED ORAL at 12:15

## 2022-03-23 RX ADMIN — ENOXAPARIN SODIUM 70 MG: 100 INJECTION SUBCUTANEOUS at 12:04

## 2022-03-23 RX ADMIN — TRIAMTERENE AND HYDROCHLOROTHIAZIDE 0.5 TABLET: 75; 50 TABLET ORAL at 12:00

## 2022-03-23 RX ADMIN — POLYSACCHARIDE-IRON COMPLEX 150 MG: 150 CAPSULE ORAL at 17:57

## 2022-03-23 RX ADMIN — ALLOPURINOL 300 MG: 300 TABLET ORAL at 11:59

## 2022-03-23 RX ADMIN — Medication 10 ML: at 20:54

## 2022-03-23 RX ADMIN — FOLIC ACID 1 MG: 1 TABLET ORAL at 12:03

## 2022-03-23 RX ADMIN — PANTOPRAZOLE SODIUM 40 MG: 40 TABLET, DELAYED RELEASE ORAL at 11:58

## 2022-03-23 RX ADMIN — DOCUSATE SODIUM 50 MG AND SENNOSIDES 8.6 MG 2 TABLET: 8.6; 5 TABLET, FILM COATED ORAL at 12:01

## 2022-03-23 RX ADMIN — PREDNISONE 5 MG: 5 TABLET ORAL at 12:02

## 2022-03-24 ENCOUNTER — APPOINTMENT (OUTPATIENT)
Dept: CARDIOLOGY | Facility: HOSPITAL | Age: 87
End: 2022-03-24

## 2022-03-24 LAB
25(OH)D3 SERPL-MCNC: 13.3 NG/ML (ref 30–100)
ABO GROUP BLD: NORMAL
ALBUMIN SERPL-MCNC: 3.1 G/DL (ref 3.5–5.2)
ALBUMIN/GLOB SERPL: 1 G/DL
ALP SERPL-CCNC: 104 U/L (ref 39–117)
ALT SERPL W P-5'-P-CCNC: 5 U/L (ref 1–41)
ANION GAP SERPL CALCULATED.3IONS-SCNC: 8 MMOL/L (ref 5–15)
AST SERPL-CCNC: 12 U/L (ref 1–40)
BASOPHILS # BLD AUTO: 0.05 10*3/MM3 (ref 0–0.2)
BASOPHILS NFR BLD AUTO: 0.5 % (ref 0–1.5)
BH CV STRESS BP STAGE 1: NORMAL
BH CV STRESS BP STAGE 2: NORMAL
BH CV STRESS BP STAGE 3: NORMAL
BH CV STRESS DOSE DOBUTAMINE STAGE 1: 10
BH CV STRESS DOSE DOBUTAMINE STAGE 2: 20
BH CV STRESS DOSE DOBUTAMINE STAGE 3: 30
BH CV STRESS DURATION MIN STAGE 1: 3
BH CV STRESS DURATION MIN STAGE 2: 3
BH CV STRESS DURATION MIN STAGE 3: 3
BH CV STRESS DURATION SEC STAGE 1: 0
BH CV STRESS DURATION SEC STAGE 2: 0
BH CV STRESS DURATION SEC STAGE 3: 30
BH CV STRESS HR STAGE 1: 71
BH CV STRESS HR STAGE 2: 99
BH CV STRESS HR STAGE 3: 119
BH CV STRESS PROTOCOL 1: NORMAL
BH CV STRESS RECOVERY BP: NORMAL MMHG
BH CV STRESS RECOVERY HR: 91 BPM
BH CV STRESS STAGE 1: 1
BH CV STRESS STAGE 2: 2
BH CV STRESS STAGE 3: 3
BILIRUB SERPL-MCNC: 0.3 MG/DL (ref 0–1.2)
BLD GP AB SCN SERPL QL: NEGATIVE
BUN SERPL-MCNC: 22 MG/DL (ref 8–23)
BUN/CREAT SERPL: 23.7 (ref 7–25)
CALCIUM SPEC-SCNC: 10.9 MG/DL (ref 8.6–10.5)
CHLORIDE SERPL-SCNC: 98 MMOL/L (ref 98–107)
CO2 SERPL-SCNC: 28 MMOL/L (ref 22–29)
CREAT SERPL-MCNC: 0.93 MG/DL (ref 0.76–1.27)
DEPRECATED RDW RBC AUTO: 54.9 FL (ref 37–54)
EGFRCR SERPLBLD CKD-EPI 2021: 79.5 ML/MIN/1.73
EOSINOPHIL # BLD AUTO: 0.28 10*3/MM3 (ref 0–0.4)
EOSINOPHIL NFR BLD AUTO: 2.8 % (ref 0.3–6.2)
ERYTHROCYTE [DISTWIDTH] IN BLOOD BY AUTOMATED COUNT: 15.9 % (ref 12.3–15.4)
GLOBULIN UR ELPH-MCNC: 3.2 GM/DL
GLUCOSE SERPL-MCNC: 155 MG/DL (ref 65–99)
HCT VFR BLD AUTO: 31.8 % (ref 37.5–51)
HGB BLD-MCNC: 10 G/DL (ref 13–17.7)
IMM GRANULOCYTES # BLD AUTO: 0.08 10*3/MM3 (ref 0–0.05)
IMM GRANULOCYTES NFR BLD AUTO: 0.8 % (ref 0–0.5)
LYMPHOCYTES # BLD AUTO: 1.72 10*3/MM3 (ref 0.7–3.1)
LYMPHOCYTES NFR BLD AUTO: 17 % (ref 19.6–45.3)
MAGNESIUM SERPL-MCNC: 1.9 MG/DL (ref 1.6–2.4)
MAXIMAL PREDICTED HEART RATE: 133 BPM
MCH RBC QN AUTO: 29.9 PG (ref 26.6–33)
MCHC RBC AUTO-ENTMCNC: 31.4 G/DL (ref 31.5–35.7)
MCV RBC AUTO: 94.9 FL (ref 79–97)
MONOCYTES # BLD AUTO: 0.83 10*3/MM3 (ref 0.1–0.9)
MONOCYTES NFR BLD AUTO: 8.2 % (ref 5–12)
NEUTROPHILS NFR BLD AUTO: 7.15 10*3/MM3 (ref 1.7–7)
NEUTROPHILS NFR BLD AUTO: 70.7 % (ref 42.7–76)
NRBC BLD AUTO-RTO: 0 /100 WBC (ref 0–0.2)
PERCENT MAX PREDICTED HR: 89.47 %
PLATELET # BLD AUTO: 281 10*3/MM3 (ref 140–450)
PMV BLD AUTO: 9.2 FL (ref 6–12)
POTASSIUM SERPL-SCNC: 4.2 MMOL/L (ref 3.5–5.2)
PROT SERPL-MCNC: 6.3 G/DL (ref 6–8.5)
RBC # BLD AUTO: 3.35 10*6/MM3 (ref 4.14–5.8)
RH BLD: POSITIVE
SODIUM SERPL-SCNC: 134 MMOL/L (ref 136–145)
STRESS BASELINE BP: NORMAL MMHG
STRESS BASELINE HR: 73 BPM
STRESS PERCENT HR: 105 %
STRESS POST EXERCISE DUR MIN: 9 MIN
STRESS POST EXERCISE DUR SEC: 30 SEC
STRESS POST PEAK BP: NORMAL MMHG
STRESS POST PEAK HR: 119 BPM
STRESS TARGET HR: 113 BPM
T&S EXPIRATION DATE: NORMAL
VIT B12 BLD-MCNC: 459 PG/ML (ref 211–946)
WBC NRBC COR # BLD: 10.11 10*3/MM3 (ref 3.4–10.8)

## 2022-03-24 PROCEDURE — 25010000002 PERFLUTREN 6.52 MG/ML SUSPENSION: Performed by: EMERGENCY MEDICINE

## 2022-03-24 PROCEDURE — 86901 BLOOD TYPING SEROLOGIC RH(D): CPT | Performed by: ORTHOPAEDIC SURGERY

## 2022-03-24 PROCEDURE — 80053 COMPREHEN METABOLIC PANEL: CPT | Performed by: PHYSICIAN ASSISTANT

## 2022-03-24 PROCEDURE — 93017 CV STRESS TEST TRACING ONLY: CPT

## 2022-03-24 PROCEDURE — 93350 STRESS TTE ONLY: CPT

## 2022-03-24 PROCEDURE — 99232 SBSQ HOSP IP/OBS MODERATE 35: CPT | Performed by: PSYCHIATRY & NEUROLOGY

## 2022-03-24 PROCEDURE — 93018 CV STRESS TEST I&R ONLY: CPT | Performed by: EMERGENCY MEDICINE

## 2022-03-24 PROCEDURE — 86923 COMPATIBILITY TEST ELECTRIC: CPT

## 2022-03-24 PROCEDURE — 86850 RBC ANTIBODY SCREEN: CPT | Performed by: ORTHOPAEDIC SURGERY

## 2022-03-24 PROCEDURE — 0 DOBUTAMINE PER 250 MG: Performed by: EMERGENCY MEDICINE

## 2022-03-24 PROCEDURE — 82306 VITAMIN D 25 HYDROXY: CPT | Performed by: FAMILY MEDICINE

## 2022-03-24 PROCEDURE — 93350 STRESS TTE ONLY: CPT | Performed by: EMERGENCY MEDICINE

## 2022-03-24 PROCEDURE — 99222 1ST HOSP IP/OBS MODERATE 55: CPT | Performed by: NURSE PRACTITIONER

## 2022-03-24 PROCEDURE — 93352 ADMIN ECG CONTRAST AGENT: CPT | Performed by: EMERGENCY MEDICINE

## 2022-03-24 PROCEDURE — 63710000001 PREDNISONE PER 5 MG: Performed by: FAMILY MEDICINE

## 2022-03-24 PROCEDURE — 25010000002 IRON DEXTRAN PER 50 MG: Performed by: FAMILY MEDICINE

## 2022-03-24 PROCEDURE — 86900 BLOOD TYPING SEROLOGIC ABO: CPT | Performed by: ORTHOPAEDIC SURGERY

## 2022-03-24 PROCEDURE — 83735 ASSAY OF MAGNESIUM: CPT | Performed by: FAMILY MEDICINE

## 2022-03-24 PROCEDURE — 85025 COMPLETE CBC W/AUTO DIFF WBC: CPT | Performed by: FAMILY MEDICINE

## 2022-03-24 PROCEDURE — 36415 COLL VENOUS BLD VENIPUNCTURE: CPT | Performed by: FAMILY MEDICINE

## 2022-03-24 RX ORDER — DOBUTAMINE HYDROCHLORIDE 100 MG/100ML
10-50 INJECTION INTRAVENOUS CONTINUOUS
Status: DISCONTINUED | OUTPATIENT
Start: 2022-03-24 | End: 2022-03-24

## 2022-03-24 RX ADMIN — PERFLUTREN 8.48 MG: 6.52 INJECTION, SUSPENSION INTRAVENOUS at 09:15

## 2022-03-24 RX ADMIN — IRON DEXTRAN 1000 MG: 50 INJECTION INTRAMUSCULAR; INTRAVENOUS at 17:38

## 2022-03-24 RX ADMIN — PANTOPRAZOLE SODIUM 40 MG: 40 TABLET, DELAYED RELEASE ORAL at 11:21

## 2022-03-24 RX ADMIN — ASPIRIN 81 MG: 81 TABLET, COATED ORAL at 11:21

## 2022-03-24 RX ADMIN — Medication 10 MCG/KG/MIN: at 09:16

## 2022-03-24 RX ADMIN — SODIUM CHLORIDE 25 MG: 9 INJECTION, SOLUTION INTRAMUSCULAR; INTRAVENOUS; SUBCUTANEOUS at 14:07

## 2022-03-24 RX ADMIN — TRIAMTERENE AND HYDROCHLOROTHIAZIDE 0.5 TABLET: 75; 50 TABLET ORAL at 11:21

## 2022-03-24 RX ADMIN — LORAZEPAM 0.5 MG: 0.5 TABLET ORAL at 21:11

## 2022-03-24 RX ADMIN — PREDNISONE 5 MG: 5 TABLET ORAL at 11:21

## 2022-03-24 RX ADMIN — HYDROCODONE BITARTRATE AND ACETAMINOPHEN 1 TABLET: 5; 325 TABLET ORAL at 05:30

## 2022-03-24 RX ADMIN — DOCUSATE SODIUM 50 MG AND SENNOSIDES 8.6 MG 2 TABLET: 8.6; 5 TABLET, FILM COATED ORAL at 21:11

## 2022-03-24 RX ADMIN — DOCUSATE SODIUM 50 MG AND SENNOSIDES 8.6 MG 2 TABLET: 8.6; 5 TABLET, FILM COATED ORAL at 11:21

## 2022-03-24 RX ADMIN — FOLIC ACID 1 MG: 1 TABLET ORAL at 11:21

## 2022-03-24 RX ADMIN — ALLOPURINOL 300 MG: 300 TABLET ORAL at 11:21

## 2022-03-24 RX ADMIN — Medication 10 ML: at 11:25

## 2022-03-24 RX ADMIN — METOPROLOL SUCCINATE 25 MG: 25 TABLET, EXTENDED RELEASE ORAL at 11:21

## 2022-03-25 ENCOUNTER — APPOINTMENT (OUTPATIENT)
Dept: GENERAL RADIOLOGY | Facility: HOSPITAL | Age: 87
End: 2022-03-25

## 2022-03-25 ENCOUNTER — HOSPITAL ENCOUNTER (OUTPATIENT)
Dept: CARDIOLOGY | Facility: HOSPITAL | Age: 87
Discharge: HOME | End: 2022-03-25

## 2022-03-25 ENCOUNTER — APPOINTMENT (OUTPATIENT)
Dept: CARDIOLOGY | Facility: HOSPITAL | Age: 87
End: 2022-03-25

## 2022-03-25 ENCOUNTER — ANESTHESIA (OUTPATIENT)
Dept: PERIOP | Facility: HOSPITAL | Age: 87
End: 2022-03-25

## 2022-03-25 ENCOUNTER — ANESTHESIA EVENT (OUTPATIENT)
Dept: PERIOP | Facility: HOSPITAL | Age: 87
End: 2022-03-25

## 2022-03-25 LAB
ANION GAP SERPL CALCULATED.3IONS-SCNC: 10 MMOL/L (ref 5–15)
ANION GAP SERPL CALCULATED.3IONS-SCNC: 11 MMOL/L (ref 5–15)
BASOPHILS # BLD AUTO: 0.03 10*3/MM3 (ref 0–0.2)
BASOPHILS NFR BLD AUTO: 0.4 % (ref 0–1.5)
BUN SERPL-MCNC: 19 MG/DL (ref 8–23)
BUN SERPL-MCNC: 19 MG/DL (ref 8–23)
BUN/CREAT SERPL: 18.1 (ref 7–25)
BUN/CREAT SERPL: 20.2 (ref 7–25)
CALCIUM SPEC-SCNC: 10.7 MG/DL (ref 8.6–10.5)
CALCIUM SPEC-SCNC: 11.1 MG/DL (ref 8.6–10.5)
CHLORIDE SERPL-SCNC: 100 MMOL/L (ref 98–107)
CHLORIDE SERPL-SCNC: 98 MMOL/L (ref 98–107)
CO2 SERPL-SCNC: 27 MMOL/L (ref 22–29)
CO2 SERPL-SCNC: 28 MMOL/L (ref 22–29)
CREAT SERPL-MCNC: 0.94 MG/DL (ref 0.76–1.27)
CREAT SERPL-MCNC: 1.05 MG/DL (ref 0.76–1.27)
DEPRECATED RDW RBC AUTO: 54.5 FL (ref 37–54)
EGFRCR SERPLBLD CKD-EPI 2021: 68.7 ML/MIN/1.73
EGFRCR SERPLBLD CKD-EPI 2021: 78.5 ML/MIN/1.73
EOSINOPHIL # BLD AUTO: 0.19 10*3/MM3 (ref 0–0.4)
EOSINOPHIL NFR BLD AUTO: 2.4 % (ref 0.3–6.2)
ERYTHROCYTE [DISTWIDTH] IN BLOOD BY AUTOMATED COUNT: 15.8 % (ref 12.3–15.4)
GLUCOSE SERPL-MCNC: 109 MG/DL (ref 65–99)
GLUCOSE SERPL-MCNC: 142 MG/DL (ref 65–99)
HCT VFR BLD AUTO: 34.6 % (ref 37.5–51)
HCT VFR BLD AUTO: 35.5 % (ref 37.5–51)
HGB BLD-MCNC: 11.2 G/DL (ref 13–17.7)
HGB BLD-MCNC: 11.3 G/DL (ref 13–17.7)
IMM GRANULOCYTES # BLD AUTO: 0.04 10*3/MM3 (ref 0–0.05)
IMM GRANULOCYTES NFR BLD AUTO: 0.5 % (ref 0–0.5)
LYMPHOCYTES # BLD AUTO: 1.7 10*3/MM3 (ref 0.7–3.1)
LYMPHOCYTES NFR BLD AUTO: 21.2 % (ref 19.6–45.3)
MAGNESIUM SERPL-MCNC: 2 MG/DL (ref 1.6–2.4)
MCH RBC QN AUTO: 30.2 PG (ref 26.6–33)
MCHC RBC AUTO-ENTMCNC: 31.8 G/DL (ref 31.5–35.7)
MCV RBC AUTO: 94.9 FL (ref 79–97)
MONOCYTES # BLD AUTO: 0.8 10*3/MM3 (ref 0.1–0.9)
MONOCYTES NFR BLD AUTO: 10 % (ref 5–12)
NEUTROPHILS NFR BLD AUTO: 5.27 10*3/MM3 (ref 1.7–7)
NEUTROPHILS NFR BLD AUTO: 65.5 % (ref 42.7–76)
NRBC BLD AUTO-RTO: 0 /100 WBC (ref 0–0.2)
PLATELET # BLD AUTO: 305 10*3/MM3 (ref 140–450)
PMV BLD AUTO: 9.3 FL (ref 6–12)
POTASSIUM SERPL-SCNC: 3.5 MMOL/L (ref 3.5–5.2)
POTASSIUM SERPL-SCNC: 4 MMOL/L (ref 3.5–5.2)
RBC # BLD AUTO: 3.74 10*6/MM3 (ref 4.14–5.8)
SODIUM SERPL-SCNC: 137 MMOL/L (ref 136–145)
SODIUM SERPL-SCNC: 137 MMOL/L (ref 136–145)
WBC NRBC COR # BLD: 8.03 10*3/MM3 (ref 3.4–10.8)

## 2022-03-25 PROCEDURE — 87075 CULTR BACTERIA EXCEPT BLOOD: CPT | Performed by: ORTHOPAEDIC SURGERY

## 2022-03-25 PROCEDURE — 76000 FLUOROSCOPY <1 HR PHYS/QHP: CPT

## 2022-03-25 PROCEDURE — 0QS704Z REPOSITION LEFT UPPER FEMUR WITH INTERNAL FIXATION DEVICE, OPEN APPROACH: ICD-10-PCS | Performed by: ORTHOPAEDIC SURGERY

## 2022-03-25 PROCEDURE — 25010000002 PROPOFOL 10 MG/ML EMULSION: Performed by: NURSE ANESTHETIST, CERTIFIED REGISTERED

## 2022-03-25 PROCEDURE — 87070 CULTURE OTHR SPECIMN AEROBIC: CPT | Performed by: FAMILY MEDICINE

## 2022-03-25 PROCEDURE — C1776 JOINT DEVICE (IMPLANTABLE): HCPCS | Performed by: ORTHOPAEDIC SURGERY

## 2022-03-25 PROCEDURE — 25010000002 MORPHINE PER 10 MG: Performed by: ORTHOPAEDIC SURGERY

## 2022-03-25 PROCEDURE — 25010000002 ONDANSETRON PER 1 MG: Performed by: NURSE ANESTHETIST, CERTIFIED REGISTERED

## 2022-03-25 PROCEDURE — 94799 UNLISTED PULMONARY SVC/PX: CPT

## 2022-03-25 PROCEDURE — 25010000002 ROPIVACAINE PER 1 MG: Performed by: ORTHOPAEDIC SURGERY

## 2022-03-25 PROCEDURE — 87205 SMEAR GRAM STAIN: CPT | Performed by: FAMILY MEDICINE

## 2022-03-25 PROCEDURE — 85018 HEMOGLOBIN: CPT | Performed by: ORTHOPAEDIC SURGERY

## 2022-03-25 PROCEDURE — 87176 TISSUE HOMOGENIZATION CULTR: CPT | Performed by: FAMILY MEDICINE

## 2022-03-25 PROCEDURE — 25010000002 FENTANYL CITRATE (PF) 100 MCG/2ML SOLUTION: Performed by: NURSE ANESTHETIST, CERTIFIED REGISTERED

## 2022-03-25 PROCEDURE — 85025 COMPLETE CBC W/AUTO DIFF WBC: CPT | Performed by: FAMILY MEDICINE

## 2022-03-25 PROCEDURE — 83735 ASSAY OF MAGNESIUM: CPT | Performed by: FAMILY MEDICINE

## 2022-03-25 PROCEDURE — 85014 HEMATOCRIT: CPT | Performed by: ORTHOPAEDIC SURGERY

## 2022-03-25 PROCEDURE — 73501 X-RAY EXAM HIP UNI 1 VIEW: CPT

## 2022-03-25 PROCEDURE — C1713 ANCHOR/SCREW BN/BN,TIS/BN: HCPCS | Performed by: ORTHOPAEDIC SURGERY

## 2022-03-25 PROCEDURE — 25010000002 CEFAZOLIN PER 500 MG: Performed by: NURSE ANESTHETIST, CERTIFIED REGISTERED

## 2022-03-25 PROCEDURE — 80048 BASIC METABOLIC PNL TOTAL CA: CPT | Performed by: FAMILY MEDICINE

## 2022-03-25 PROCEDURE — 73502 X-RAY EXAM HIP UNI 2-3 VIEWS: CPT

## 2022-03-25 PROCEDURE — 80048 BASIC METABOLIC PNL TOTAL CA: CPT | Performed by: ORTHOPAEDIC SURGERY

## 2022-03-25 DEVICE — IMPLANTABLE DEVICE: Type: IMPLANTABLE DEVICE | Site: HIP | Status: FUNCTIONAL

## 2022-03-25 RX ORDER — FENTANYL CITRATE 50 UG/ML
25 INJECTION, SOLUTION INTRAMUSCULAR; INTRAVENOUS
Status: DISCONTINUED | OUTPATIENT
Start: 2022-03-25 | End: 2022-03-25 | Stop reason: HOSPADM

## 2022-03-25 RX ORDER — PROPOFOL 10 MG/ML
VIAL (ML) INTRAVENOUS AS NEEDED
Status: DISCONTINUED | OUTPATIENT
Start: 2022-03-25 | End: 2022-03-25 | Stop reason: SURG

## 2022-03-25 RX ORDER — MAGNESIUM HYDROXIDE 1200 MG/15ML
LIQUID ORAL AS NEEDED
Status: DISCONTINUED | OUTPATIENT
Start: 2022-03-25 | End: 2022-03-25 | Stop reason: HOSPADM

## 2022-03-25 RX ORDER — SODIUM CHLORIDE 0.9 % (FLUSH) 0.9 %
3-10 SYRINGE (ML) INJECTION AS NEEDED
Status: DISCONTINUED | OUTPATIENT
Start: 2022-03-25 | End: 2022-03-25 | Stop reason: HOSPADM

## 2022-03-25 RX ORDER — ROCURONIUM BROMIDE 10 MG/ML
INJECTION, SOLUTION INTRAVENOUS AS NEEDED
Status: DISCONTINUED | OUTPATIENT
Start: 2022-03-25 | End: 2022-03-25 | Stop reason: SURG

## 2022-03-25 RX ORDER — ONDANSETRON 2 MG/ML
4 INJECTION INTRAMUSCULAR; INTRAVENOUS AS NEEDED
Status: DISCONTINUED | OUTPATIENT
Start: 2022-03-25 | End: 2022-03-25 | Stop reason: HOSPADM

## 2022-03-25 RX ORDER — DROPERIDOL 2.5 MG/ML
0.62 INJECTION, SOLUTION INTRAMUSCULAR; INTRAVENOUS ONCE AS NEEDED
Status: DISCONTINUED | OUTPATIENT
Start: 2022-03-25 | End: 2022-03-25 | Stop reason: HOSPADM

## 2022-03-25 RX ORDER — PROMETHAZINE HYDROCHLORIDE 25 MG/1
12.5 TABLET ORAL EVERY 6 HOURS PRN
Status: DISCONTINUED | OUTPATIENT
Start: 2022-03-25 | End: 2022-03-28 | Stop reason: HOSPADM

## 2022-03-25 RX ORDER — HYDROMORPHONE HYDROCHLORIDE 1 MG/ML
0.5 INJECTION, SOLUTION INTRAMUSCULAR; INTRAVENOUS; SUBCUTANEOUS
Status: DISCONTINUED | OUTPATIENT
Start: 2022-03-25 | End: 2022-03-25 | Stop reason: HOSPADM

## 2022-03-25 RX ORDER — TRANEXAMIC ACID 100 MG/ML
INJECTION, SOLUTION INTRAVENOUS AS NEEDED
Status: DISCONTINUED | OUTPATIENT
Start: 2022-03-25 | End: 2022-03-25 | Stop reason: SURG

## 2022-03-25 RX ORDER — FENTANYL CITRATE 50 UG/ML
INJECTION, SOLUTION INTRAMUSCULAR; INTRAVENOUS AS NEEDED
Status: DISCONTINUED | OUTPATIENT
Start: 2022-03-25 | End: 2022-03-25 | Stop reason: SURG

## 2022-03-25 RX ORDER — LABETALOL HYDROCHLORIDE 5 MG/ML
5 INJECTION, SOLUTION INTRAVENOUS
Status: DISCONTINUED | OUTPATIENT
Start: 2022-03-25 | End: 2022-03-25 | Stop reason: HOSPADM

## 2022-03-25 RX ORDER — OXYCODONE HYDROCHLORIDE AND ACETAMINOPHEN 5; 325 MG/1; MG/1
2 TABLET ORAL EVERY 4 HOURS PRN
Status: DISCONTINUED | OUTPATIENT
Start: 2022-03-25 | End: 2022-03-28 | Stop reason: HOSPADM

## 2022-03-25 RX ORDER — ONDANSETRON 2 MG/ML
INJECTION INTRAMUSCULAR; INTRAVENOUS AS NEEDED
Status: DISCONTINUED | OUTPATIENT
Start: 2022-03-25 | End: 2022-03-25 | Stop reason: SURG

## 2022-03-25 RX ORDER — ROPIVACAINE HYDROCHLORIDE 5 MG/ML
INJECTION, SOLUTION EPIDURAL; INFILTRATION; PERINEURAL AS NEEDED
Status: DISCONTINUED | OUTPATIENT
Start: 2022-03-25 | End: 2022-03-25 | Stop reason: HOSPADM

## 2022-03-25 RX ORDER — SODIUM CHLORIDE, SODIUM LACTATE, POTASSIUM CHLORIDE, CALCIUM CHLORIDE 600; 310; 30; 20 MG/100ML; MG/100ML; MG/100ML; MG/100ML
125 INJECTION, SOLUTION INTRAVENOUS CONTINUOUS
Status: DISCONTINUED | OUTPATIENT
Start: 2022-03-25 | End: 2022-03-27

## 2022-03-25 RX ORDER — LEVOTHYROXINE SODIUM 0.07 MG/1
75 TABLET ORAL DAILY
Status: DISCONTINUED | OUTPATIENT
Start: 2022-03-25 | End: 2022-03-25

## 2022-03-25 RX ORDER — SODIUM CHLORIDE 0.9 % (FLUSH) 0.9 %
3 SYRINGE (ML) INJECTION EVERY 12 HOURS SCHEDULED
Status: DISCONTINUED | OUTPATIENT
Start: 2022-03-25 | End: 2022-03-25 | Stop reason: HOSPADM

## 2022-03-25 RX ORDER — SODIUM CHLORIDE, SODIUM LACTATE, POTASSIUM CHLORIDE, CALCIUM CHLORIDE 600; 310; 30; 20 MG/100ML; MG/100ML; MG/100ML; MG/100ML
100 INJECTION, SOLUTION INTRAVENOUS CONTINUOUS
Status: DISCONTINUED | OUTPATIENT
Start: 2022-03-25 | End: 2022-03-25

## 2022-03-25 RX ORDER — NALOXONE HCL 0.4 MG/ML
0.4 VIAL (ML) INJECTION
Status: DISCONTINUED | OUTPATIENT
Start: 2022-03-25 | End: 2022-03-27 | Stop reason: ALTCHOICE

## 2022-03-25 RX ORDER — LIDOCAINE HYDROCHLORIDE 20 MG/ML
INJECTION, SOLUTION EPIDURAL; INFILTRATION; INTRACAUDAL; PERINEURAL AS NEEDED
Status: DISCONTINUED | OUTPATIENT
Start: 2022-03-25 | End: 2022-03-25 | Stop reason: SURG

## 2022-03-25 RX ORDER — FLUMAZENIL 0.1 MG/ML
0.2 INJECTION INTRAVENOUS AS NEEDED
Status: DISCONTINUED | OUTPATIENT
Start: 2022-03-25 | End: 2022-03-25 | Stop reason: HOSPADM

## 2022-03-25 RX ORDER — PHENYLEPHRINE HCL IN 0.9% NACL 1 MG/10 ML
SYRINGE (ML) INTRAVENOUS AS NEEDED
Status: DISCONTINUED | OUTPATIENT
Start: 2022-03-25 | End: 2022-03-25 | Stop reason: SURG

## 2022-03-25 RX ORDER — OXYCODONE AND ACETAMINOPHEN 10; 325 MG/1; MG/1
1 TABLET ORAL ONCE AS NEEDED
Status: COMPLETED | OUTPATIENT
Start: 2022-03-25 | End: 2022-03-25

## 2022-03-25 RX ORDER — CEFAZOLIN SODIUM 1 G/3ML
INJECTION, POWDER, FOR SOLUTION INTRAMUSCULAR; INTRAVENOUS AS NEEDED
Status: DISCONTINUED | OUTPATIENT
Start: 2022-03-25 | End: 2022-03-25 | Stop reason: SURG

## 2022-03-25 RX ORDER — LIDOCAINE HYDROCHLORIDE 10 MG/ML
0.5 INJECTION, SOLUTION EPIDURAL; INFILTRATION; INTRACAUDAL; PERINEURAL ONCE AS NEEDED
Status: DISCONTINUED | OUTPATIENT
Start: 2022-03-25 | End: 2022-03-25 | Stop reason: HOSPADM

## 2022-03-25 RX ORDER — NALOXONE HCL 0.4 MG/ML
0.04 VIAL (ML) INJECTION AS NEEDED
Status: DISCONTINUED | OUTPATIENT
Start: 2022-03-25 | End: 2022-03-25 | Stop reason: HOSPADM

## 2022-03-25 RX ADMIN — TRANEXAMIC ACID 1000 MG: 100 INJECTION, SOLUTION INTRAVENOUS at 16:27

## 2022-03-25 RX ADMIN — SODIUM CHLORIDE, POTASSIUM CHLORIDE, SODIUM LACTATE AND CALCIUM CHLORIDE 125 ML/HR: 600; 310; 30; 20 INJECTION, SOLUTION INTRAVENOUS at 20:37

## 2022-03-25 RX ADMIN — PROPOFOL 70 MG: 10 INJECTION, EMULSION INTRAVENOUS at 16:03

## 2022-03-25 RX ADMIN — OXYCODONE HYDROCHLORIDE AND ACETAMINOPHEN 2 TABLET: 5; 325 TABLET ORAL at 21:59

## 2022-03-25 RX ADMIN — OXYCODONE AND ACETAMINOPHEN 1 TABLET: 325; 10 TABLET ORAL at 18:13

## 2022-03-25 RX ADMIN — Medication 300 MCG: at 16:21

## 2022-03-25 RX ADMIN — ROCURONIUM BROMIDE 50 MG: 10 SOLUTION INTRAVENOUS at 16:03

## 2022-03-25 RX ADMIN — TRANEXAMIC ACID 1000 MG: 100 INJECTION, SOLUTION INTRAVENOUS at 17:28

## 2022-03-25 RX ADMIN — Medication 100 MCG: at 17:06

## 2022-03-25 RX ADMIN — SODIUM CHLORIDE, POTASSIUM CHLORIDE, SODIUM LACTATE AND CALCIUM CHLORIDE: 600; 310; 30; 20 INJECTION, SOLUTION INTRAVENOUS at 17:26

## 2022-03-25 RX ADMIN — FENTANYL CITRATE 100 MCG: 50 INJECTION, SOLUTION INTRAMUSCULAR; INTRAVENOUS at 16:55

## 2022-03-25 RX ADMIN — MORPHINE SULFATE 2 MG: 4 INJECTION, SOLUTION INTRAMUSCULAR; INTRAVENOUS at 20:22

## 2022-03-25 RX ADMIN — FENTANYL CITRATE 100 MCG: 50 INJECTION, SOLUTION INTRAMUSCULAR; INTRAVENOUS at 16:03

## 2022-03-25 RX ADMIN — LIDOCAINE HYDROCHLORIDE 100 MG: 20 INJECTION, SOLUTION EPIDURAL; INFILTRATION; INTRACAUDAL; PERINEURAL at 16:03

## 2022-03-25 RX ADMIN — CEFAZOLIN 2 G: 330 INJECTION, POWDER, FOR SOLUTION INTRAMUSCULAR; INTRAVENOUS at 16:22

## 2022-03-25 RX ADMIN — Medication 200 MCG: at 16:19

## 2022-03-25 RX ADMIN — Medication 100 MCG: at 16:06

## 2022-03-25 RX ADMIN — ONDANSETRON 4 MG: 2 INJECTION INTRAMUSCULAR; INTRAVENOUS at 17:28

## 2022-03-25 RX ADMIN — SODIUM CHLORIDE, POTASSIUM CHLORIDE, SODIUM LACTATE AND CALCIUM CHLORIDE: 600; 310; 30; 20 INJECTION, SOLUTION INTRAVENOUS at 15:54

## 2022-03-25 NOTE — ANESTHESIA PREPROCEDURE EVALUATION
Anesthesia Evaluation     Patient summary reviewed   no history of anesthetic complications:  NPO Solid Status: > 6 hours  NPO Liquid Status: > 8 hours           Airway   Mallampati: IV  TM distance: <3 FB  Neck ROM: limited  Difficult intubation highly probable  Dental    (+) upper dentures    Pulmonary    (+) a smoker (quit 1985) Former,   Cardiovascular   Exercise tolerance: poor (<4 METS)    PT is on anticoagulation therapy  Patient on routine beta blocker and Beta blocker given within 24 hours of surgery    (+) pacemaker (complete heart block ) pacemaker interrogated 1-3 months ago, hypertension, CAD, dysrhythmias (complete heart block) Atrial Fib, Atrial Flutter, hyperlipidemia,     ROS comment: · Left ventricular wall thickness is consistent with mild concentric hypertrophy.  · Left ventricular ejection fraction appears to be 56 - 60%. Left ventricular systolic function is normal.  · Abnormal global longitudinal LV strain (GLS) = -13%.  · Left ventricular diastolic function was normal.  · Estimated right ventricular systolic pressure from tricuspid regurgitation is normal (<35 mmHg).        Neuro/Psych  (-) seizures, TIA, CVA  GI/Hepatic/Renal/Endo    (+)  GERD,  renal disease CRI, thyroid problem hypothyroidism  (-) liver disease, diabetes    Musculoskeletal     Abdominal    Substance History      OB/GYN          Other   arthritis (ankylosing spondylitis), blood dyscrasia anemia,   history of cancer (prostate cancer) remission      Other Comment: 5 mg prednisone daily                    Anesthesia Plan    ASA 3     general   (Pt familiar to me. Pacemaker interrogation in recovery. Minimal c-spine extension; perioperative suspension of DNR after clear conversation with pt--he expressed understanding well )  intravenous induction     Anesthetic plan, all risks, benefits, and alternatives have been provided, discussed and informed consent has been obtained with: patient.

## 2022-03-25 NOTE — ANESTHESIA PROCEDURE NOTES
Airway  Urgency: elective    Airway not difficult    General Information and Staff    Patient location during procedure: OR  CRNA: Sherri Chavira CRNA    Indications and Patient Condition  Indications for airway management: airway protection    Preoxygenated: yes  Mask difficulty assessment: 1 - vent by mask    Final Airway Details  Final airway type: endotracheal airway      Successful airway: ETT  Cuffed: yes   Successful intubation technique: direct laryngoscopy  Facilitating devices/methods: intubating stylet  Endotracheal tube insertion site: oral  Blade: Jesse  Blade size: 3.5  ETT size (mm): 7.5  Cormack-Lehane Classification: grade I - full view of glottis  Placement verified by: chest auscultation and capnometry   Cuff volume (mL): 5  Measured from: lips  ETT/EBT  to lips (cm): 23  Number of attempts at approach: 1  Assessment: lips, teeth, and gum same as pre-op and atraumatic intubation

## 2022-03-26 ENCOUNTER — APPOINTMENT (OUTPATIENT)
Dept: GENERAL RADIOLOGY | Facility: HOSPITAL | Age: 87
End: 2022-03-26

## 2022-03-26 LAB
ANION GAP SERPL CALCULATED.3IONS-SCNC: 10 MMOL/L (ref 5–15)
BUN SERPL-MCNC: 18 MG/DL (ref 8–23)
BUN/CREAT SERPL: 15.8 (ref 7–25)
CALCIUM SPEC-SCNC: 10.1 MG/DL (ref 8.6–10.5)
CHLORIDE SERPL-SCNC: 95 MMOL/L (ref 98–107)
CO2 SERPL-SCNC: 27 MMOL/L (ref 22–29)
CREAT SERPL-MCNC: 1.14 MG/DL (ref 0.76–1.27)
EGFRCR SERPLBLD CKD-EPI 2021: 62.2 ML/MIN/1.73
GLUCOSE SERPL-MCNC: 128 MG/DL (ref 65–99)
HCT VFR BLD AUTO: 28.6 % (ref 37.5–51)
HGB BLD-MCNC: 9.2 G/DL (ref 13–17.7)
POTASSIUM SERPL-SCNC: 4.1 MMOL/L (ref 3.5–5.2)
SODIUM SERPL-SCNC: 132 MMOL/L (ref 136–145)

## 2022-03-26 PROCEDURE — 97535 SELF CARE MNGMENT TRAINING: CPT | Performed by: OCCUPATIONAL THERAPIST

## 2022-03-26 PROCEDURE — 97166 OT EVAL MOD COMPLEX 45 MIN: CPT | Performed by: OCCUPATIONAL THERAPIST

## 2022-03-26 PROCEDURE — 63710000001 PREDNISONE PER 5 MG: Performed by: INTERNAL MEDICINE

## 2022-03-26 PROCEDURE — 73502 X-RAY EXAM HIP UNI 2-3 VIEWS: CPT

## 2022-03-26 PROCEDURE — 25010000002 CEFAZOLIN PER 500 MG: Performed by: ORTHOPAEDIC SURGERY

## 2022-03-26 PROCEDURE — 85018 HEMOGLOBIN: CPT | Performed by: ORTHOPAEDIC SURGERY

## 2022-03-26 PROCEDURE — 97162 PT EVAL MOD COMPLEX 30 MIN: CPT

## 2022-03-26 PROCEDURE — 80048 BASIC METABOLIC PNL TOTAL CA: CPT | Performed by: ORTHOPAEDIC SURGERY

## 2022-03-26 PROCEDURE — 85014 HEMATOCRIT: CPT | Performed by: ORTHOPAEDIC SURGERY

## 2022-03-26 RX ORDER — METOPROLOL SUCCINATE 25 MG/1
25 TABLET, EXTENDED RELEASE ORAL DAILY
Status: DISCONTINUED | OUTPATIENT
Start: 2022-03-26 | End: 2022-03-28 | Stop reason: HOSPADM

## 2022-03-26 RX ORDER — ASPIRIN 81 MG/1
81 TABLET ORAL DAILY
Status: DISCONTINUED | OUTPATIENT
Start: 2022-03-26 | End: 2022-03-28 | Stop reason: HOSPADM

## 2022-03-26 RX ORDER — NYSTATIN AND TRIAMCINOLONE ACETONIDE 100000; 1 [USP'U]/G; MG/G
1 OINTMENT TOPICAL 2 TIMES DAILY
COMMUNITY
End: 2022-05-06

## 2022-03-26 RX ORDER — LEVOTHYROXINE SODIUM 0.07 MG/1
75 TABLET ORAL DAILY
Status: DISCONTINUED | OUTPATIENT
Start: 2022-03-26 | End: 2022-03-28 | Stop reason: HOSPADM

## 2022-03-26 RX ORDER — PREDNISONE 1 MG/1
5 TABLET ORAL DAILY
Status: DISCONTINUED | OUTPATIENT
Start: 2022-03-26 | End: 2022-03-28 | Stop reason: HOSPADM

## 2022-03-26 RX ORDER — ALLOPURINOL 300 MG/1
300 TABLET ORAL DAILY
Status: DISCONTINUED | OUTPATIENT
Start: 2022-03-26 | End: 2022-03-28 | Stop reason: HOSPADM

## 2022-03-26 RX ORDER — IRON POLYSACCHARIDE COMPLEX 150 MG
150 CAPSULE ORAL DAILY
Status: DISCONTINUED | OUTPATIENT
Start: 2022-03-26 | End: 2022-03-28 | Stop reason: HOSPADM

## 2022-03-26 RX ORDER — LORAZEPAM 0.5 MG/1
0.5 TABLET ORAL NIGHTLY
Status: DISCONTINUED | OUTPATIENT
Start: 2022-03-26 | End: 2022-03-28 | Stop reason: HOSPADM

## 2022-03-26 RX ORDER — PANTOPRAZOLE SODIUM 40 MG/1
40 TABLET, DELAYED RELEASE ORAL DAILY
Status: DISCONTINUED | OUTPATIENT
Start: 2022-03-26 | End: 2022-03-28 | Stop reason: HOSPADM

## 2022-03-26 RX ORDER — FOLIC ACID 1 MG/1
1 TABLET ORAL DAILY
Status: DISCONTINUED | OUTPATIENT
Start: 2022-03-26 | End: 2022-03-28 | Stop reason: HOSPADM

## 2022-03-26 RX ADMIN — FOLIC ACID 1 MG: 1 TABLET ORAL at 14:09

## 2022-03-26 RX ADMIN — SODIUM CHLORIDE, POTASSIUM CHLORIDE, SODIUM LACTATE AND CALCIUM CHLORIDE 125 ML/HR: 600; 310; 30; 20 INJECTION, SOLUTION INTRAVENOUS at 18:08

## 2022-03-26 RX ADMIN — METOPROLOL SUCCINATE 25 MG: 25 TABLET, EXTENDED RELEASE ORAL at 14:09

## 2022-03-26 RX ADMIN — LORAZEPAM 0.5 MG: 0.5 TABLET ORAL at 20:14

## 2022-03-26 RX ADMIN — PREDNISONE 5 MG: 5 TABLET ORAL at 14:09

## 2022-03-26 RX ADMIN — SODIUM CHLORIDE, POTASSIUM CHLORIDE, SODIUM LACTATE AND CALCIUM CHLORIDE 125 ML/HR: 600; 310; 30; 20 INJECTION, SOLUTION INTRAVENOUS at 05:43

## 2022-03-26 RX ADMIN — APIXABAN 2.5 MG: 2.5 TABLET, FILM COATED ORAL at 20:14

## 2022-03-26 RX ADMIN — OXYCODONE HYDROCHLORIDE AND ACETAMINOPHEN 2 TABLET: 5; 325 TABLET ORAL at 23:11

## 2022-03-26 RX ADMIN — ASPIRIN 81 MG: 81 TABLET, COATED ORAL at 16:28

## 2022-03-26 RX ADMIN — Medication 150 MG: at 14:09

## 2022-03-26 RX ADMIN — APIXABAN 2.5 MG: 2.5 TABLET, FILM COATED ORAL at 08:16

## 2022-03-26 RX ADMIN — PANTOPRAZOLE SODIUM 40 MG: 40 TABLET, DELAYED RELEASE ORAL at 14:09

## 2022-03-26 RX ADMIN — OXYCODONE HYDROCHLORIDE AND ACETAMINOPHEN 2 TABLET: 5; 325 TABLET ORAL at 10:03

## 2022-03-26 RX ADMIN — ALLOPURINOL 300 MG: 300 TABLET ORAL at 17:24

## 2022-03-26 RX ADMIN — CEFAZOLIN SODIUM 2 G: 10 INJECTION, POWDER, FOR SOLUTION INTRAVENOUS at 00:39

## 2022-03-26 RX ADMIN — CEFAZOLIN SODIUM 2 G: 10 INJECTION, POWDER, FOR SOLUTION INTRAVENOUS at 08:17

## 2022-03-26 RX ADMIN — LEVOTHYROXINE SODIUM 75 MCG: 75 TABLET ORAL at 14:09

## 2022-03-26 RX ADMIN — OXYCODONE HYDROCHLORIDE AND ACETAMINOPHEN 2 TABLET: 5; 325 TABLET ORAL at 04:42

## 2022-03-27 LAB
ANION GAP SERPL CALCULATED.3IONS-SCNC: 7 MMOL/L (ref 5–15)
BUN SERPL-MCNC: 15 MG/DL (ref 8–23)
BUN/CREAT SERPL: 16.9 (ref 7–25)
CALCIUM SPEC-SCNC: 10.2 MG/DL (ref 8.6–10.5)
CHLORIDE SERPL-SCNC: 98 MMOL/L (ref 98–107)
CO2 SERPL-SCNC: 29 MMOL/L (ref 22–29)
CREAT SERPL-MCNC: 0.89 MG/DL (ref 0.76–1.27)
EGFRCR SERPLBLD CKD-EPI 2021: 82.9 ML/MIN/1.73
GLUCOSE SERPL-MCNC: 117 MG/DL (ref 65–99)
HCT VFR BLD AUTO: 25 % (ref 37.5–51)
HGB BLD-MCNC: 8.1 G/DL (ref 13–17.7)
POTASSIUM SERPL-SCNC: 4.1 MMOL/L (ref 3.5–5.2)
SODIUM SERPL-SCNC: 134 MMOL/L (ref 136–145)

## 2022-03-27 PROCEDURE — 97110 THERAPEUTIC EXERCISES: CPT

## 2022-03-27 PROCEDURE — 85018 HEMOGLOBIN: CPT | Performed by: ORTHOPAEDIC SURGERY

## 2022-03-27 PROCEDURE — 97116 GAIT TRAINING THERAPY: CPT

## 2022-03-27 PROCEDURE — 80048 BASIC METABOLIC PNL TOTAL CA: CPT | Performed by: ORTHOPAEDIC SURGERY

## 2022-03-27 PROCEDURE — 85014 HEMATOCRIT: CPT | Performed by: ORTHOPAEDIC SURGERY

## 2022-03-27 PROCEDURE — 63710000001 PREDNISONE PER 5 MG: Performed by: INTERNAL MEDICINE

## 2022-03-27 RX ADMIN — SODIUM CHLORIDE, POTASSIUM CHLORIDE, SODIUM LACTATE AND CALCIUM CHLORIDE 125 ML/HR: 600; 310; 30; 20 INJECTION, SOLUTION INTRAVENOUS at 02:56

## 2022-03-27 RX ADMIN — FOLIC ACID 1 MG: 1 TABLET ORAL at 10:56

## 2022-03-27 RX ADMIN — APIXABAN 2.5 MG: 2.5 TABLET, FILM COATED ORAL at 20:32

## 2022-03-27 RX ADMIN — OXYCODONE HYDROCHLORIDE AND ACETAMINOPHEN 2 TABLET: 5; 325 TABLET ORAL at 16:02

## 2022-03-27 RX ADMIN — ASPIRIN 81 MG: 81 TABLET, COATED ORAL at 10:56

## 2022-03-27 RX ADMIN — PANTOPRAZOLE SODIUM 40 MG: 40 TABLET, DELAYED RELEASE ORAL at 10:56

## 2022-03-27 RX ADMIN — PREDNISONE 5 MG: 5 TABLET ORAL at 10:57

## 2022-03-27 RX ADMIN — ALLOPURINOL 300 MG: 300 TABLET ORAL at 10:56

## 2022-03-27 RX ADMIN — LORAZEPAM 0.5 MG: 0.5 TABLET ORAL at 20:32

## 2022-03-27 RX ADMIN — LEVOTHYROXINE SODIUM 75 MCG: 75 TABLET ORAL at 06:01

## 2022-03-27 RX ADMIN — Medication 150 MG: at 10:56

## 2022-03-27 RX ADMIN — METOPROLOL SUCCINATE 25 MG: 25 TABLET, EXTENDED RELEASE ORAL at 10:56

## 2022-03-27 RX ADMIN — OXYCODONE HYDROCHLORIDE AND ACETAMINOPHEN 2 TABLET: 5; 325 TABLET ORAL at 10:56

## 2022-03-27 RX ADMIN — APIXABAN 2.5 MG: 2.5 TABLET, FILM COATED ORAL at 10:56

## 2022-03-28 ENCOUNTER — TELEPHONE (OUTPATIENT)
Dept: UROLOGY | Facility: CLINIC | Age: 87
End: 2022-03-28

## 2022-03-28 VITALS
WEIGHT: 169.5 LBS | RESPIRATION RATE: 18 BRPM | HEIGHT: 72 IN | SYSTOLIC BLOOD PRESSURE: 115 MMHG | HEART RATE: 69 BPM | BODY MASS INDEX: 22.96 KG/M2 | DIASTOLIC BLOOD PRESSURE: 50 MMHG | OXYGEN SATURATION: 97 % | TEMPERATURE: 98.1 F

## 2022-03-28 PROBLEM — S72.002A HIP FRACTURE, LEFT: Status: ACTIVE | Noted: 2021-08-24

## 2022-03-28 PROBLEM — M25.552 LEFT HIP PAIN: Status: RESOLVED | Noted: 2022-03-23 | Resolved: 2022-03-28

## 2022-03-28 LAB
BACTERIA SPEC AEROBE CULT: NORMAL
BH BB BLOOD EXPIRATION DATE: NORMAL
BH BB BLOOD EXPIRATION DATE: NORMAL
BH BB BLOOD TYPE BARCODE: 5100
BH BB BLOOD TYPE BARCODE: 5100
BH BB DISPENSE STATUS: NORMAL
BH BB DISPENSE STATUS: NORMAL
BH BB PRODUCT CODE: NORMAL
BH BB PRODUCT CODE: NORMAL
BH BB UNIT NUMBER: NORMAL
BH BB UNIT NUMBER: NORMAL
CROSSMATCH INTERPRETATION: NORMAL
CROSSMATCH INTERPRETATION: NORMAL
GRAM STN SPEC: NORMAL
GRAM STN SPEC: NORMAL
UNIT  ABO: NORMAL
UNIT  ABO: NORMAL
UNIT  RH: NORMAL
UNIT  RH: NORMAL

## 2022-03-28 PROCEDURE — 97535 SELF CARE MNGMENT TRAINING: CPT

## 2022-03-28 PROCEDURE — 97116 GAIT TRAINING THERAPY: CPT

## 2022-03-28 PROCEDURE — 97110 THERAPEUTIC EXERCISES: CPT

## 2022-03-28 PROCEDURE — 63710000001 PREDNISONE PER 5 MG: Performed by: INTERNAL MEDICINE

## 2022-03-28 RX ORDER — OXYCODONE HYDROCHLORIDE AND ACETAMINOPHEN 5; 325 MG/1; MG/1
1 TABLET ORAL EVERY 4 HOURS PRN
Qty: 20 TABLET | Refills: 0
Start: 2022-03-28 | End: 2022-04-02

## 2022-03-28 RX ADMIN — LEVOTHYROXINE SODIUM 75 MCG: 75 TABLET ORAL at 06:18

## 2022-03-28 RX ADMIN — FOLIC ACID 1 MG: 1 TABLET ORAL at 08:02

## 2022-03-28 RX ADMIN — ASPIRIN 81 MG: 81 TABLET, COATED ORAL at 08:02

## 2022-03-28 RX ADMIN — Medication 150 MG: at 08:02

## 2022-03-28 RX ADMIN — APIXABAN 2.5 MG: 2.5 TABLET, FILM COATED ORAL at 08:02

## 2022-03-28 RX ADMIN — PANTOPRAZOLE SODIUM 40 MG: 40 TABLET, DELAYED RELEASE ORAL at 08:02

## 2022-03-28 RX ADMIN — PREDNISONE 5 MG: 5 TABLET ORAL at 08:02

## 2022-03-28 RX ADMIN — OXYCODONE HYDROCHLORIDE AND ACETAMINOPHEN 2 TABLET: 5; 325 TABLET ORAL at 10:47

## 2022-03-28 RX ADMIN — METOPROLOL SUCCINATE 25 MG: 25 TABLET, EXTENDED RELEASE ORAL at 08:02

## 2022-03-28 RX ADMIN — ALLOPURINOL 300 MG: 300 TABLET ORAL at 08:02

## 2022-03-28 RX ADMIN — OXYCODONE HYDROCHLORIDE AND ACETAMINOPHEN 2 TABLET: 5; 325 TABLET ORAL at 03:02

## 2022-03-28 NOTE — TELEPHONE ENCOUNTER
Caller: Dexter Suggs    Relationship: Self    Best call back number:[phone]    What orders are you requesting (i.e. lab or imaging): US    In what timeframe would the patient need to come in: LEAVE ORDERS OPEN    Where will you receive your lab/imaging services:  [facility]    Additional notes: EVE FROM [facility] CALLED AND SAID THEY ARE NOT SURE WHEN PT WILL BE DISCHARGED BUT WANTED TO MAKE SURE THERE WAS A STANDING ORDER FOR US FOR WHEN PT IS DISCHARGED HE CAN SCHEDULE APPT.

## 2022-03-30 LAB — BACTERIA SPEC ANAEROBE CULT: NORMAL

## 2022-03-31 LAB
A-TOCOPHEROL VIT E SERPL-MCNC: 8.7 MG/L (ref 9–29)
GAMMA TOCOPHEROL SERPL-MCNC: 2.2 MG/L (ref 0.5–4.9)

## 2022-04-04 LAB — VIT B1 BLD-SCNC: 130.8 NMOL/L (ref 66.5–200)

## 2022-04-08 ENCOUNTER — APPOINTMENT (OUTPATIENT)
Dept: ULTRASOUND IMAGING | Facility: HOSPITAL | Age: 87
End: 2022-04-08

## 2022-04-08 ENCOUNTER — TELEPHONE (OUTPATIENT)
Dept: UROLOGY | Facility: CLINIC | Age: 87
End: 2022-04-08

## 2022-04-08 NOTE — TELEPHONE ENCOUNTER
Caller: [facility]FORM PT'S DISCHARGED DATE IS 4/9/22 AND OFFICE CAN GO AHEAD AND SCHEDULED THE PRECLINIC U/S  AT [facility].              What orders are you requesting (i.e. lab or imaging): U/S        [facility]

## 2022-04-25 NOTE — PROGRESS NOTES
Agree with assessment and plan of mid level provider as below with any changes if made as noted by Sheridan Yeager PA-C   of Dexter Suggs.    
Dual Chamber Pacemaker Evaluation Report  Merlin    February 5, 2021    Primary Cardiologist: Roberta  : [employer] Model: Assurity  Implant date: 11/23/18    Reason for evaluation: routine  Indication for pacemaker: complete heart block    Measurements  Atrial sensing - P wave: 2.7 mV  Atrial threshold: 0.5 V@ 0.4 ms  Atrial lead impedance: 430 ohms  Ventricular sensing - R wave: none  Ventricular threshold: 0.75 V @ 0.4 ms  Ventricular lead impedance:   510 ohms     Diagnostic Data  Atrial paced: 21 %  Ventricular paced: >99 %  Other: Few AMS entries noted.  Longest is 14 sec.  Pt is anticoagulated.  Battery status: satisfactory         Final Parameters  Mode:  DDDR  Lower rate: 60 bpm   Upper rate: 125 bpm  AV Delay: paced- 200 ms  Sensed-170 ms  Atrial - Amplitude: 2.0 V   Pulse width: 0.4 ms   Sensitivity: auto    Ventricular - Amplitude: 2.0 V  Pulse width: 0.4 ms  Sensitivity: auto   Changes made: none  Conclusions: normal pacemaker function    Follow up: 3 months via Merlin, annually in office    
verbal instruction

## 2022-05-05 NOTE — PROGRESS NOTES
Subjective    Mr. Suggs is 87 y.o. male    Chief Complaint: Follow up for Gross Hematuria.    History of Present Illness  Patient is a 87-year-old gentleman who returns for 3-month follow-up he is have a history of Ebonie 6 prostate cancer treated with radiation therapy.  His most recent PSA was 0.046 compared to 0.023 last year.  Prostate cancer was treated or diagnosed around 2005.    Also with history of gross hematuria.  He has had no hematuria since last occurrence around January 2022.  He is on Eliquis he has resumed Eliquis cystoscopy done 10/18/2021 which was normal except for enlarged prostate.  Patient then had a CT urogram January 2022 which showed no evidence of cancer, stable left-sided kidney stones and stable known right upper pole moiety hydro and's and a cyst.  Patient had a renal ultrasound prior to his appointment today which shows no hydronephrosis in either kidney it does show bilateral benign-appearing cysts.    The following portions of the patient's history were reviewed and updated as appropriate: allergies, current medications, past family history, past medical history, past social history, past surgical history and problem list.    Review of Systems      Current Outpatient Medications:   •  allopurinol (ZYLOPRIM) 300 MG tablet, TAKE 1 TABLET BY MOUTH EVERY DAY, Disp: 90 tablet, Rfl: 3  •  apixaban (ELIQUIS) 5 MG tablet tablet, Take 1 tablet by mouth Every 12 (Twelve) Hours., Disp: 180 tablet, Rfl: 3  •  aspirin 81 MG EC tablet, Take 81 mg by mouth Daily., Disp: , Rfl:   •  folic acid (FOLVITE) 1 MG tablet, Take 1 mg by mouth Daily., Disp: , Rfl:   •  iron polysaccharides (NIFEREX) 150 MG capsule, Take 150 mg by mouth Daily., Disp: , Rfl:   •  levothyroxine (SYNTHROID, LEVOTHROID) 75 MCG tablet, Take 75 mcg by mouth Daily., Disp: , Rfl:   •  LORazepam (ATIVAN) 0.5 MG tablet, Take 1 tablet by mouth Daily As Needed for Anxiety., Disp: 12 tablet, Rfl: 0  •  methotrexate 2.5 MG tablet, Take  17.5 mg by mouth 1 (One) Time Per Week., Disp: , Rfl:   •  metoprolol succinate XL (TOPROL-XL) 25 MG 24 hr tablet, TAKE 1 TABLET BY MOUTH EVERY DAY, Disp: 90 tablet, Rfl: 3  •  nitroglycerin (NITROSTAT) 0.4 MG SL tablet, Nitrostat 0.4 mg sublingual tablet  Place 1 tablet as needed by sublingual route., Disp: , Rfl:   •  pantoprazole (PROTONIX) 40 MG EC tablet, Take 40 mg by mouth Daily., Disp: , Rfl:   •  predniSONE (DELTASONE) 5 MG tablet, Take 5 mg by mouth Every Other Day., Disp: , Rfl:   •  triamterene-hydrochlorothiazide (MAXZIDE) 75-50 MG per tablet, Take 0.5 tablets by mouth Daily., Disp: , Rfl:     Current Facility-Administered Medications:   •  cyanocobalamin injection 1,000 mcg, 1,000 mcg, Intramuscular, Q28 Days, Jarad Alexis MD, 1,000 mcg at 11/24/21 0901    Past Medical History:   Diagnosis Date   • Abnormal nuclear stress test    • BPH (benign prostatic hyperplasia)    • Chest pain    • Coronary artery disease    • Disease of thyroid gland    • Hyperlipidemia    • Hypertension    • LV dysfunction    • Melanoma (HCC)    • Prostate CA (HCC)        Past Surgical History:   Procedure Laterality Date   • APPENDECTOMY     • CARDIAC CATHETERIZATION Left 12/10/2012   • CARDIAC ELECTROPHYSIOLOGY PROCEDURE N/A 11/23/2018    Procedure: Pacemaker DC new 11/23/2018;  Surgeon: Austin Granados MD;  Location: [facility] CATH INVASIVE LOCATION;  Service: Cardiology   • CHOLECYSTECTOMY     • COLONOSCOPY N/A 6/9/2017    Procedure: COLONOSCOPY WITH ANESTHESIA;  Surgeon: Felice Marroquin MD;  Location: [facility] ENDOSCOPY;  Service:    • COLONOSCOPY W/ POLYPECTOMY  02/16/2012    adenomatous polyp at 80cm   • HAND SURGERY Right    • HERNIA REPAIR     • HIP CANNULATED SCREW PLACEMENT Left 1/18/2022    Procedure: HIP CANNULATED SCREW PLACEMENT;  Surgeon: Isaiah Sheehan MD;  Location: [facility] OR;  Service: Orthopedics;  Laterality: Left;   • INGUINAL HERNIA REPAIR     • KNEE SURGERY     • PROSTATE SURGERY     • SKIN CANCER  "EXCISION      face   • SKIN LESION EXCISION     • TOTAL HIP ARTHROPLASTY Right 2021    Procedure: TOTAL HIP REPLACEMENT;  Surgeon: Arik Magaña MD;  Location:  [facility] OR;  Service: Orthopedics;  Laterality: Right;   • TOTAL HIP ARTHROPLASTY Left 3/25/2022    Procedure: LEFT HIP SCREW REMOVAL / LEFT TOTAL HIP ARTHROPLASTY;  Surgeon: BASHIR Monsivais MD;  Location:  [facility] OR;  Service: Orthopedics;  Laterality: Left;       Social History     Socioeconomic History   • Marital status:    Tobacco Use   • Smoking status: Former Smoker     Years: 30.00     Types: Cigarettes     Quit date:      Years since quittin.3   • Smokeless tobacco: Never Used   Vaping Use   • Vaping Use: Never used   Substance and Sexual Activity   • Alcohol use: No   • Drug use: No   • Sexual activity: Not Currently     Partners: Female       Family History   Problem Relation Age of Onset   • Alzheimer's disease Mother    • Cancer Father    • Cancer Sister        Objective    Ht 182.9 cm (72\")   Wt 70.3 kg (155 lb)   BMI 21.02 kg/m²     Physical Exam  Vitals reviewed.   Constitutional:       General: He is not in acute distress.     Appearance: Normal appearance.   HENT:      Head: Normocephalic and atraumatic.      Nose: No congestion.   Pulmonary:      Effort: Pulmonary effort is normal.   Skin:     Coloration: Skin is not pale.   Neurological:      Mental Status: He is alert and oriented to person, place, and time.   Psychiatric:         Behavior: Behavior normal.             Results for orders placed or performed in visit on 22   POC Urinalysis Dipstick, Multipro    Specimen: Urine   Result Value Ref Range    Color Yellow Yellow, Straw, Dark Yellow, Ariana    Clarity, UA Clear Clear    Glucose, UA Negative Negative, 1000 mg/dL (3+) mg/dL    Bilirubin Negative Negative    Ketones, UA Negative Negative    Specific Gravity  1.015 1.005 - 1.030    Blood, UA Negative Negative    pH, Urine 5.5 5.0 - 8.0    Protein, " POC Negative Negative mg/dL    Urobilinogen, UA Normal Normal    Nitrite, UA Negative Negative    Leukocytes Negative Negative     Assessment and Plan    Diagnoses and all orders for this visit:    1. Malignant neoplasm of prostate (HCC) (Primary)  -     PSA DIAGNOSTIC; Future    2. Gross hematuria  -     POC Urinalysis Dipstick, Multipro    3. Bilateral renal cysts    He has had no further episodes of hematuria he remains on Eliquis.  CT urogram done January 2022 revealed no evidence of cancer showed a stable kidney stones in the left kidney and as well as cysts.  Renal ultrasound done at [facility] showed bilateral benign-appearing cysts no hydronephrosis in either kidney.  He has had no pain or any lower urinary tract symptoms.  His PSA is 0.046.  This is excellent since he had prostate cancer diagnosis in the early 2000's.  He did have previous radiation.  We just recommend follow-up 1 year PSA prior

## 2022-05-06 ENCOUNTER — OFFICE VISIT (OUTPATIENT)
Dept: UROLOGY | Facility: CLINIC | Age: 87
End: 2022-05-06

## 2022-05-06 VITALS — BODY MASS INDEX: 20.99 KG/M2 | HEIGHT: 72 IN | WEIGHT: 155 LBS

## 2022-05-06 DIAGNOSIS — R31.0 GROSS HEMATURIA: ICD-10-CM

## 2022-05-06 DIAGNOSIS — C61 MALIGNANT NEOPLASM OF PROSTATE: Primary | ICD-10-CM

## 2022-05-06 DIAGNOSIS — N28.1 BILATERAL RENAL CYSTS: ICD-10-CM

## 2022-05-06 PROCEDURE — 81001 URINALYSIS AUTO W/SCOPE: CPT | Performed by: PHYSICIAN ASSISTANT

## 2022-05-06 PROCEDURE — 99214 OFFICE O/P EST MOD 30 MIN: CPT | Performed by: PHYSICIAN ASSISTANT

## 2022-06-23 ENCOUNTER — OFFICE VISIT (OUTPATIENT)
Dept: CARDIOLOGY | Facility: CLINIC | Age: 87
End: 2022-06-23

## 2022-06-23 VITALS
HEART RATE: 89 BPM | OXYGEN SATURATION: 99 % | SYSTOLIC BLOOD PRESSURE: 138 MMHG | DIASTOLIC BLOOD PRESSURE: 72 MMHG | WEIGHT: 167.4 LBS | HEIGHT: 72 IN | BODY MASS INDEX: 22.67 KG/M2

## 2022-06-23 DIAGNOSIS — I51.7 LVH (LEFT VENTRICULAR HYPERTROPHY): ICD-10-CM

## 2022-06-23 DIAGNOSIS — E78.5 HYPERLIPIDEMIA LDL GOAL <70: ICD-10-CM

## 2022-06-23 DIAGNOSIS — Z95.0 PACEMAKER: ICD-10-CM

## 2022-06-23 DIAGNOSIS — I49.5 SICK SINUS SYNDROME: ICD-10-CM

## 2022-06-23 DIAGNOSIS — I25.9 ISCHEMIC HEART DISEASE: Primary | ICD-10-CM

## 2022-06-23 DIAGNOSIS — I48.0 PAF (PAROXYSMAL ATRIAL FIBRILLATION): ICD-10-CM

## 2022-06-23 DIAGNOSIS — I10 PRIMARY HYPERTENSION: ICD-10-CM

## 2022-06-23 DIAGNOSIS — K92.1 HEMATOCHEZIA: ICD-10-CM

## 2022-06-23 DIAGNOSIS — Z79.01 CHRONIC ANTICOAGULATION: ICD-10-CM

## 2022-06-23 PROCEDURE — 99214 OFFICE O/P EST MOD 30 MIN: CPT | Performed by: NURSE PRACTITIONER

## 2022-06-23 PROCEDURE — 93000 ELECTROCARDIOGRAM COMPLETE: CPT | Performed by: NURSE PRACTITIONER

## 2022-06-23 NOTE — PROGRESS NOTES
"Chief Complaint  Atrial Fibrillation (3 MTH.  Paroxysmal. )    Subjective          Dexter Suggs presents to [facility] CARDIOLOGY for routine follow-up of outpatient testing.  Dobutamine stress echo on 3/24/2022 was low risk for ischemia.  He has ischemic heart disease (abnormal Zenobia scan in the past with no history of coronary angiography), paroxysmal atrial fibrillation on chronic anticoagulation, sick sinus syndrome status post pacemaker, hypertension, hyperlipidemia, left ventricular hypertrophy, hypothyroidism and GERD.  Patient denies chest pain, shortness of breath, palpitations, dizziness, syncope, orthopnea, PND, edema or decreased stamina.  He reports an episode of GI bleeding several weeks ago. He describes \"clots\". He states that this only occurred once with no recurrence since that time.     Hypertension  This is a chronic problem. The current episode started more than 1 year ago. The problem is controlled. Pertinent negatives include no anxiety, blurred vision, chest pain, headaches, malaise/fatigue, neck pain, orthopnea, palpitations, peripheral edema, PND, shortness of breath or sweats. Risk factors for coronary artery disease include male gender and dyslipidemia. Current antihypertension treatment includes beta blockers and diuretics. The current treatment provides significant improvement. Hypertensive end-organ damage includes left ventricular hypertrophy. Identifiable causes of hypertension include a thyroid problem.   Hyperlipidemia  This is a chronic problem. The current episode started more than 1 year ago. Pertinent negatives include no chest pain or shortness of breath. Current antihyperlipidemic treatment includes statins. Risk factors for coronary artery disease include male sex, hypertension and dyslipidemia.   Atrial Fibrillation  Presents for follow-up visit. Symptoms are negative for an AICD problem, bradycardia, chest pain, dizziness, hemodynamic instability, " "hypertension, hypotension, pacemaker problem, palpitations, shortness of breath, syncope, tachycardia and weakness. The symptoms have been stable. Past medical history includes atrial fibrillation and hyperlipidemia.       Objective     Current Outpatient Medications:   •  allopurinol (ZYLOPRIM) 300 MG tablet, TAKE 1 TABLET BY MOUTH EVERY DAY, Disp: 90 tablet, Rfl: 3  •  apixaban (ELIQUIS) 5 MG tablet tablet, Take 1 tablet by mouth Every 12 (Twelve) Hours., Disp: 180 tablet, Rfl: 3  •  aspirin 81 MG EC tablet, Take 81 mg by mouth Daily., Disp: , Rfl:   •  folic acid (FOLVITE) 1 MG tablet, Take 1 mg by mouth Daily., Disp: , Rfl:   •  iron polysaccharides (NIFEREX) 150 MG capsule, Take 150 mg by mouth Daily., Disp: , Rfl:   •  levothyroxine (SYNTHROID, LEVOTHROID) 75 MCG tablet, Take 75 mcg by mouth Daily., Disp: , Rfl:   •  LORazepam (ATIVAN) 0.5 MG tablet, Take 1 tablet by mouth Daily As Needed for Anxiety. (Patient taking differently: Take 0.5 mg by mouth Every Night.), Disp: 12 tablet, Rfl: 0  •  methotrexate 2.5 MG tablet, Take 17.5 mg by mouth 1 (One) Time Per Week., Disp: , Rfl:   •  metoprolol succinate XL (TOPROL-XL) 25 MG 24 hr tablet, TAKE 1 TABLET BY MOUTH EVERY DAY, Disp: 90 tablet, Rfl: 3  •  nitroglycerin (NITROSTAT) 0.4 MG SL tablet, Nitrostat 0.4 mg sublingual tablet  Place 1 tablet as needed by sublingual route., Disp: , Rfl:   •  pantoprazole (PROTONIX) 40 MG EC tablet, Take 40 mg by mouth Daily., Disp: , Rfl:   •  predniSONE (DELTASONE) 5 MG tablet, Take 2 mg by mouth Every Other Day., Disp: , Rfl:   •  triamterene-hydrochlorothiazide (MAXZIDE) 75-50 MG per tablet, Take 0.5 tablets by mouth Daily., Disp: , Rfl:     Current Facility-Administered Medications:   •  cyanocobalamin injection 1,000 mcg, 1,000 mcg, Intramuscular, Q28 Days, Jarad Alexis MD, 1,000 mcg at 11/24/21 0901  Vital Signs:   /72   Pulse 89   Ht 182.9 cm (72\")   Wt 75.9 kg (167 lb 6.4 oz)   SpO2 99%   BMI 22.70 kg/m² "     Vitals and nursing note reviewed.   Constitutional:       General: Awake.      Appearance: Normal and healthy appearance. Well-developed, normal weight and not in distress.   Eyes:      General: Lids are normal.      Conjunctiva/sclera: Conjunctivae normal.      Pupils: Pupils are equal, round, and reactive to light.   HENT:      Head: Normocephalic and atraumatic.      Nose: Nose normal.   Neck:      Vascular: No JVR. JVD normal.   Pulmonary:      Effort: Pulmonary effort is normal.      Breath sounds: Examination of the right-lower field reveals decreased breath sounds. Examination of the left-lower field reveals decreased breath sounds. Decreased breath sounds present. No wheezing. No rhonchi. No rales.   Chest:      Chest wall: Not tender to palpatation.   Cardiovascular:      PMI at left midclavicular line. Normal rate. Regular rhythm. Normal S1. Normal S2.      Murmurs: There is no murmur.      No gallop. No click. No rub.   Pulses:     Intact distal pulses.   Edema:     Peripheral edema present.     Pretibial: bilateral 1+ edema of the pretibial area.     Ankle: bilateral 1+ edema of the ankle.     Feet: bilateral 1+ edema of the feet.  Abdominal:      General: Bowel sounds are normal.      Palpations: Abdomen is soft.      Tenderness: There is no abdominal tenderness.   Musculoskeletal: Normal range of motion.         General: No tenderness.      Cervical back: Normal range of motion. Skin:     General: Skin is warm and dry.   Neurological:      General: No focal deficit present.      Mental Status: Alert, oriented to person, place, and time and oriented to person, place and time.   Psychiatric:         Attention and Perception: Attention and perception normal.         Mood and Affect: Mood and affect normal.         Speech: Speech normal.         Behavior: Behavior normal. Behavior is cooperative.         Thought Content: Thought content normal.         Cognition and Memory: Cognition and memory normal.          Judgment: Judgment normal.        Result Review :   The following data was reviewed by: ALENA Sharp on 06/23/2022:  Common labs    Common Labsle 4/5/22 4/7/22 5/4/22   WBC 8.4 7.8    Hemoglobin 9.0 (A) 9.4 (A)    Hematocrit 29.6 (A) 30.4 (A)    Platelets 337 333    PSA   0.046   (A) Abnormal value       Comments are available for some flowsheets but are not being displayed.           Data reviewed: Cardiology studies 2d echo 3/7/21 and dobutamine stress echo 3/24/22     ECG 12 Lead    Date/Time: 6/23/2022 2:14 PM  Performed by: Deborah Whitmore APRN  Authorized by: Deborah Whitmore APRN   Comparison: compared with previous ECG from 3/23/2022  Rhythm: paced  Rate: normal  BPM: 85    Clinical impression: abnormal EKG              Assessment and Plan    Diagnoses and all orders for this visit:    1. Ischemic heart disease (Primary)- abnormal Zenobia scans in the past with no history of coronary angiography.  No current signs or symptoms of ischemia.  Stable.    2. PAF (paroxysmal atrial fibrillation) (HCC)-in sinus rhythm today.  1% burden on most recent device interrogation.  Anticoagulated.  Continue metoprolol succinate.  Stable.    3. Chronic anticoagulation-patient continues on Eliquis.      4. Sick sinus syndrome (HCC)-status post pacemaker.  Stable.    5. Pacemaker-device interrogated 5/10/2022.  17% a paced, 99% V-paced, 1% A. fib burden.  16 episodes of A. fib with longest duration of 44 seconds.  Normal functions, stable thresholds and adequate battery.    6. Primary hypertension-blood pressure is elevated in office today. Pt has not been monitoring routinely at home. Continue metoprolol succinate and Maxzide.  Monitor and record daily blood pressure. Report readings consistently higher than 130/90 or consistently lower than 100/60.     7. Hyperlipidemia LDL goal <70-management per PCP.  Patient is not on statin.    8. LVH (left ventricular hypertrophy)-mild on 2D echo 3/7/2021.  No  clinical signs or symptoms of congestive heart failure.Reviewed signs and symptoms of CHF and what to report with the patient. Patient instructed to restrict sodium and weigh daily. Report weight gain of greater than 2 lbs overnight or 5 lbs in 1 week. Pt verbalized understanding of instructions and plan of care.     9. Hematochezia- one occurrence several weeks ago with no recurrence. Pt states he is scheduled to see his PCP tomorrow and will discuss this with him. Pt advised to report any recurrence immediately. He verbalized understanding.       Follow Up   Return in about 6 months (around 12/23/2022) for Next scheduled follow up with Dr. Granados.  Patient was given instructions and counseling regarding his condition or for health maintenance advice. Please see specific information pulled into the AVS if appropriate.

## 2022-08-16 ENCOUNTER — OFFICE VISIT (OUTPATIENT)
Dept: PODIATRY | Facility: CLINIC | Age: 87
End: 2022-08-16

## 2022-08-16 VITALS
WEIGHT: 164 LBS | HEART RATE: 88 BPM | DIASTOLIC BLOOD PRESSURE: 80 MMHG | OXYGEN SATURATION: 99 % | BODY MASS INDEX: 22.21 KG/M2 | HEIGHT: 72 IN | SYSTOLIC BLOOD PRESSURE: 142 MMHG

## 2022-08-16 DIAGNOSIS — R54 ADVANCED AGE: ICD-10-CM

## 2022-08-16 DIAGNOSIS — Z79.01 ANTICOAGULANT LONG-TERM USE: ICD-10-CM

## 2022-08-16 DIAGNOSIS — E11.40 TYPE 2 DIABETES MELLITUS WITH DIABETIC NEUROPATHY, WITHOUT LONG-TERM CURRENT USE OF INSULIN: ICD-10-CM

## 2022-08-16 DIAGNOSIS — B35.1 ONYCHOMYCOSIS: Primary | ICD-10-CM

## 2022-08-16 DIAGNOSIS — E11.9 ENCOUNTER FOR DIABETIC FOOT EXAM: ICD-10-CM

## 2022-08-16 DIAGNOSIS — M20.11 VALGUS DEFORMITY OF BOTH GREAT TOES: ICD-10-CM

## 2022-08-16 DIAGNOSIS — M20.12 VALGUS DEFORMITY OF BOTH GREAT TOES: ICD-10-CM

## 2022-08-16 PROBLEM — Z85.828 HISTORY OF MALIGNANT NEOPLASM OF SKIN: Status: ACTIVE | Noted: 2022-02-18

## 2022-08-16 PROBLEM — M80.052A PATHOLOGICAL FRACTURE OF LEFT FEMUR DUE TO OSTEOPOROSIS (HCC): Status: ACTIVE | Noted: 2022-04-19

## 2022-08-16 PROBLEM — E53.8 DISORDER OF VITAMIN B12: Status: ACTIVE | Noted: 2022-02-18

## 2022-08-16 PROBLEM — M06.9 RHEUMATOID ARTHRITIS: Status: ACTIVE | Noted: 2022-02-18

## 2022-08-16 PROBLEM — I49.5 SICK SINUS SYNDROME (HCC): Status: ACTIVE | Noted: 2022-03-28

## 2022-08-16 PROBLEM — M10.9 GOUT: Status: ACTIVE | Noted: 2022-02-18

## 2022-08-16 PROBLEM — D64.9 ANEMIA: Status: ACTIVE | Noted: 2022-02-18

## 2022-08-16 PROBLEM — R26.9 GAIT ABNORMALITY: Status: ACTIVE | Noted: 2022-03-28

## 2022-08-16 PROBLEM — I44.2 COMPLETE ATRIOVENTRICULAR BLOCK (HCC): Status: ACTIVE | Noted: 2022-02-18

## 2022-08-16 PROBLEM — H40.9 GLAUCOMA: Status: ACTIVE | Noted: 2022-02-18

## 2022-08-16 PROBLEM — N40.0 BPH (BENIGN PROSTATIC HYPERPLASIA): Status: ACTIVE | Noted: 2022-02-18

## 2022-08-16 PROBLEM — I25.10 ATHEROSCLEROSIS OF CORONARY ARTERY WITHOUT ANGINA PECTORIS: Status: ACTIVE | Noted: 2022-02-18

## 2022-08-16 PROBLEM — IMO0002 EXCESSIVE ANTICOAGULATION: Status: ACTIVE | Noted: 2022-02-18

## 2022-08-16 PROBLEM — R73.01 IMPAIRED FASTING GLUCOSE: Status: ACTIVE | Noted: 2022-02-18

## 2022-08-16 PROBLEM — I10 ESSENTIAL HYPERTENSION: Status: ACTIVE | Noted: 2022-02-18

## 2022-08-16 PROBLEM — I48.91 ATRIAL FIBRILLATION (HCC): Status: ACTIVE | Noted: 2022-02-18

## 2022-08-16 PROBLEM — L89.159 PRESSURE ULCER OF SACRAL REGION: Status: ACTIVE | Noted: 2022-02-18

## 2022-08-16 PROBLEM — F41.9 ANXIETY: Status: ACTIVE | Noted: 2022-02-18

## 2022-08-16 PROBLEM — K21.9 GASTROESOPHAGEAL REFLUX DISEASE WITHOUT ESOPHAGITIS: Status: ACTIVE | Noted: 2022-02-18

## 2022-08-16 PROBLEM — E03.9 ACQUIRED HYPOTHYROIDISM: Status: ACTIVE | Noted: 2022-02-18

## 2022-08-16 PROBLEM — Z86.16 HISTORY OF COVID-19: Status: ACTIVE | Noted: 2022-03-28

## 2022-08-16 PROBLEM — Z87.81 S/P LEFT HIP FRACTURE: Status: ACTIVE | Noted: 2022-03-28

## 2022-08-16 PROBLEM — Z95.0 PACEMAKER: Status: ACTIVE | Noted: 2022-02-18

## 2022-08-16 PROBLEM — C61 PROSTATE CANCER: Status: ACTIVE | Noted: 2022-03-28

## 2022-08-16 PROBLEM — M25.559 HIP PAIN: Status: ACTIVE | Noted: 2022-03-28

## 2022-08-16 PROCEDURE — 11721 DEBRIDE NAIL 6 OR MORE: CPT | Performed by: PODIATRIST

## 2022-08-16 PROCEDURE — 99203 OFFICE O/P NEW LOW 30 MIN: CPT | Performed by: PODIATRIST

## 2022-08-16 NOTE — PROGRESS NOTES
[facility] - PODIATRY    Today's Date: 08/16/2022     Patient Name: Dexter Suggs  MRN: [id number]  CSN: [id number]  PCP: Ashish Callejas MD  Referring Provider: Ashish Callejas MD    SUBJECTIVE     Chief Complaint   Patient presents with   • Establish Care     Ashish Callejas MD 06/24/2022 NEW PATIENT / TYPE 2 DIABETIC - BILATERAL FEET TINEA  UNGUIUM NAIL FUNGUS- pt states has had issues with ingrown great nails, right worse, cleared up at present. Fungus under nails  and to hard to cut now- pt denies pain- pt presents with long thick yellowed nails   • Diabetes     Average 99mg/dl BG over last week     HPI: Dexter Suggs, a 88 y.o.male, comes to clinic as a(n) new patient presenting for diabetic foot exam, complaining of painful toenails and complaining of thickened, irregular toenails. Patient has h/o BPH, thyroid disease, HLD, Glaucoma, LVH, melanoma, PAF, HTN, Prostate CA. Patient is NIDDM with last stated BG level of 99mg/dl. Patient presents with complaints of thickened, irregular toenails of both feet. Spouse notes that when appointment was originally made that patient had IGTN of the right hallux with redness and pain but that it has since resolved. Notes that he is diabetic but does not have to take any medications. Does take Eliquis daily. States that his toenails are thickened, dystrophic, and irregular and that he and spouse are unable and comfortable trying to care for nails at home. Notes mildly diminished sensation in feet but no over numbness. Denies pain. Denies previous treatment. Denies any constitutional symptoms. No other pedal complaints at this time.    Past Medical History:   Diagnosis Date   • Abnormal nuclear stress test    • BPH (benign prostatic hyperplasia)    • Disease of thyroid gland    • Glaucoma 2/18/2022   • Hyperlipidemia LDL goal <70 11/14/2016   • Ischemic heart disease 6/23/2022   • LVH (left ventricular hypertrophy) 6/23/2022   •  Melanoma (HCC)    • Pacemaker 2019   • PAF (paroxysmal atrial fibrillation) (HCC) 10/29/2019    Chads vas score 3   • Primary hypertension 2016   • Prostate CA (HCC)    • Sick sinus syndrome (HCC) 3/1/2019     Past Surgical History:   Procedure Laterality Date   • APPENDECTOMY     • CARDIAC CATHETERIZATION Left 12/10/2012   • CARDIAC ELECTROPHYSIOLOGY PROCEDURE N/A 2018    Procedure: Pacemaker DC new 2018;  Surgeon: Austin Granados MD;  Location:  [facility] CATH [facility]ASIVE [facility];  Service: Cardiology   • CHOLECYSTECTOMY     • COLONOSCOPY N/A 2017    Tics, hemorrhoids repeat exam prn   • COLONOSCOPY W/ POLYPECTOMY  2012    adenomatous polyp at 80cm   • HAND SURGERY Right    • HERNIA REPAIR     • HIP CANNULATED SCREW PLACEMENT Left 2022    Procedure: HIP CANNULATED SCREW PLACEMENT;  Surgeon: Isaiah Sheehan MD;  Location:  [facility] OR;  Service: Orthopedics;  Laterality: Left;   • INGUINAL HERNIA REPAIR     • KNEE SURGERY     • PROSTATE SURGERY     • SKIN CANCER EXCISION      face   • SKIN LESION EXCISION     • TOTAL HIP ARTHROPLASTY Right 2021    Procedure: TOTAL HIP REPLACEMENT;  Surgeon: Arik Magaña MD;  Location:  [facility] OR;  Service: Orthopedics;  Laterality: Right;   • TOTAL HIP ARTHROPLASTY Left 2022    Procedure: LEFT HIP SCREW REMOVAL / LEFT TOTAL HIP ARTHROPLASTY;  Surgeon: BASHIR Monsivais MD;  Location:  [facility] OR;  Service: Orthopedics;  Laterality: Left;     Family History   Problem Relation Age of Onset   • Alzheimer's disease Mother    • Cancer Father    • Cancer Sister      Social History     Socioeconomic History   • Marital status:    Tobacco Use   • Smoking status: Former Smoker     Years: 30.00     Types: Cigarettes     Quit date:      Years since quittin.6   • Smokeless tobacco: Never Used   Vaping Use   • Vaping Use: Never used   Substance and Sexual Activity   • Alcohol use: No   • Drug use: No   • Sexual activity: Not  Currently     Partners: Female     Allergies   Allergen Reactions   • Adhesive Tape    • Simvastatin Other (See Comments)     Abnormal LFT's   • Neosporin [Neomycin-Bacitracin Zn-Polymyx] Rash     Current Outpatient Medications   Medication Sig Dispense Refill   • allopurinol (ZYLOPRIM) 300 MG tablet TAKE 1 TABLET BY MOUTH EVERY DAY 90 tablet 3   • apixaban (ELIQUIS) 5 MG tablet tablet Take 1 tablet by mouth Every 12 (Twelve) Hours. 180 tablet 3   • aspirin 81 MG EC tablet Take 81 mg by mouth Daily.     • folic acid (FOLVITE) 1 MG tablet Take 1 mg by mouth Daily.     • iron polysaccharides (NIFEREX) 150 MG capsule Take 150 mg by mouth Daily.     • levothyroxine (SYNTHROID, LEVOTHROID) 75 MCG tablet Take 75 mcg by mouth Daily.     • LORazepam (ATIVAN) 0.5 MG tablet Take 1 tablet by mouth Daily As Needed for Anxiety. (Patient taking differently: Take 0.5 mg by mouth Every Night.) 12 tablet 0   • methotrexate 2.5 MG tablet Take 17.5 mg by mouth 1 (One) Time Per Week.     • metoprolol succinate XL (TOPROL-XL) 25 MG 24 hr tablet TAKE 1 TABLET BY MOUTH EVERY DAY 90 tablet 3   • nitroglycerin (NITROSTAT) 0.4 MG SL tablet Nitrostat 0.4 mg sublingual tablet   Place 1 tablet as needed by sublingual route.     • pantoprazole (PROTONIX) 40 MG EC tablet Take 40 mg by mouth Daily.     • predniSONE (DELTASONE) 5 MG tablet Take 1 mg by mouth Every Other Day.     • triamterene-hydrochlorothiazide (MAXZIDE) 75-50 MG per tablet Take 0.5 tablets by mouth Daily.       Current Facility-Administered Medications   Medication Dose Route Frequency Provider Last Rate Last Admin   • cyanocobalamin injection 1,000 mcg  1,000 mcg Intramuscular Q28 Days Jarad Alexis MD   1,000 mcg at 11/24/21 0901     Review of Systems   Constitutional: Negative for chills and fever.   HENT: Positive for hearing loss. Negative for congestion.    Respiratory: Negative for shortness of breath.    Cardiovascular: Positive for leg swelling. Negative for chest  pain.   Gastrointestinal: Negative for constipation, diarrhea, nausea and vomiting.   Musculoskeletal: Positive for arthralgias.        Foot pain   Skin: Negative for wound.        Thickened, irregular nails   Neurological: Negative for numbness.   Hematological: Bruises/bleeds easily.       OBJECTIVE     Vitals:    08/16/22 1330   BP: 142/80   Pulse: 88   SpO2: 99%       PHYSICAL EXAM  GEN:   Accompanied by spouse.     Foot/Ankle Exam:       General:   Diabetic Foot Exam Performed    Appearance: appears stated age and healthy and elderly    Orientation: AAOx3    Affect: appropriate    Gait: unimpaired    Assistance: cane    Shoe Gear:  Casual shoes    VASCULAR      Right Foot Vascularity   Dorsalis pedis:  2+  Posterior tibial:  2+  Skin Temperature: warm    Edema Grading:  Trace  CFT:  3  Pedal Hair Growth:  Present  Varicosities: moderate varicosities       Left Foot Vascularity   Dorsalis pedis:  2+  Posterior tibial:  2+  Skin Temperature: warm    Edema Grading:  Trace  CFT:  3  Pedal Hair Growth:  Present  Varicosities: mild varicosities        NEUROLOGIC     Right Foot Neurologic   Light touch sensation:  Diminished  Vibratory sensation:  Diminished  Hot/Cold sensation: diminished    Protective Sensation using [place] Monofilament:  8     Left Foot Neurologic   Light touch sensation:  Diminished  Vibratory sensation:  Diminished  Hot/cold sensation: diminished    Protective Sensation using [place]-Ling Monofilament:  8     MUSCULOSKELETAL      Right Foot Musculoskeletal   Ecchymosis:  None  Tenderness: toenails    Arch:  Normal  Hallux valgus: Yes       Left Foot Musculoskeletal   Ecchymosis:  None  Tenderness: toenails    Arch:  Normal  Hallux valgus: Yes       MUSCLE STRENGTH     Right Foot Muscle Strength   Foot dorsiflexion:  5  Foot plantar flexion:  5  Foot inversion:  5  Foot eversion:  5     Left Foot Muscle Strength   Foot dorsiflexion:  5  Foot plantar flexion:  5  Foot inversion:   5  Foot eversion:  5     RANGE OF MOTION      Right Foot Range of Motion   Foot and ankle ROM within normal limits       Left Foot Range of Motion   Foot and ankle ROM within normal limits       DERMATOLOGIC     Right Foot Dermatologic   Skin: skin intact    Nails: onychomycosis, abnormally thick, subungual debris and dystrophic nails    Nails comment:  1-5     Left Foot Dermatologic   Skin: skin intact    Nails: onychomycosis, abnormally thick, subungual debris and dystrophic nails    Nails comment:  1-5      RADIOLOGY/NUCLEAR:  No results found.    LABORATORY/CULTURE RESULTS:      PATHOLOGY RESULTS:       ASSESSMENT/PLAN     Diagnoses and all orders for this visit:    1. Onychomycosis (Primary)    2. Anticoagulant long-term use    3. Type 2 diabetes mellitus with diabetic neuropathy, without long-term current use of insulin (HCC)    4. Encounter for diabetic foot exam (formerly [facility])    5. Valgus deformity of both great toes    6. Advanced age      Comprehensive lower extremity examination and evaluation was performed.  Discussed findings and treatment plan including risks, benefits, and treatment options with patient in detail. Patient agreed with treatment plan.  DFE performed.   After verbal consent obtained, nail(s) x10 debrided of length and thickness with nail nipper without incidence  Patient may maintain nails and calluses at home utilizing emery board or pumice stone between visits as needed  Reviewed at home diabetic foot care including daily foot checks   Continue to follow with PCP for diabetic management.   An After Visit Summary was printed and given to the patient at discharge, including (if requested) any available informative/educational handouts regarding diagnosis, treatment, or medications. All questions were answered to patient/family satisfaction. Should symptoms fail to improve or worsen they agree to call or return to clinic or to go to the Emergency Department. Discussed the importance of following  up with any needed screening tests/labs/specialist appointments and any requested follow-up recommended by me today. Importance of maintaining follow-up discussed and patient accepts that missed appointments can delay diagnosis and potentially lead to worsening of conditions.  Return in about 3 months (around 11/16/2022) for Follow-up with Podiatry Provider., or sooner if acute issues arise.    Lab Frequency Next Occurrence   Stress Test With Myocardial Perfusion (1 Day) Once 03/25/2022   PSA DIAGNOSTIC Once 05/06/2023       This document has been electronically signed by [place] Clemente DPM on August 23, 2022 14:09 CDT

## 2022-08-29 ENCOUNTER — APPOINTMENT (OUTPATIENT)
Dept: CT IMAGING | Facility: HOSPITAL | Age: 87
End: 2022-08-29

## 2022-08-29 ENCOUNTER — HOSPITAL ENCOUNTER (EMERGENCY)
Facility: HOSPITAL | Age: 87
Discharge: HOME | End: 2022-08-29
Attending: EMERGENCY MEDICINE | Admitting: EMERGENCY MEDICINE

## 2022-08-29 VITALS
HEIGHT: 72 IN | RESPIRATION RATE: 20 BRPM | SYSTOLIC BLOOD PRESSURE: 154 MMHG | HEART RATE: 61 BPM | DIASTOLIC BLOOD PRESSURE: 75 MMHG | TEMPERATURE: 97.5 F | WEIGHT: 160 LBS | OXYGEN SATURATION: 100 % | BODY MASS INDEX: 21.67 KG/M2

## 2022-08-29 DIAGNOSIS — S02.2XXA CLOSED FRACTURE OF NASAL BONE, INITIAL ENCOUNTER: ICD-10-CM
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
## 2022-08-29 DIAGNOSIS — S22.088A OTHER CLOSED FRACTURE OF TWELFTH THORACIC VERTEBRA, INITIAL ENCOUNTER: ICD-10-CM

## 2022-08-29 PROCEDURE — 70450 CT HEAD/BRAIN W/O DYE: CPT

## 2022-08-29 PROCEDURE — 99283 EMERGENCY DEPT VISIT LOW MDM: CPT

## 2022-08-29 PROCEDURE — 72131 CT LUMBAR SPINE W/O DYE: CPT

## 2022-08-29 PROCEDURE — 72128 CT CHEST SPINE W/O DYE: CPT

## 2022-08-29 PROCEDURE — 70486 CT MAXILLOFACIAL W/O DYE: CPT

## 2022-08-29 PROCEDURE — 72125 CT NECK SPINE W/O DYE: CPT

## 2022-08-29 RX ORDER — OXYCODONE HYDROCHLORIDE AND ACETAMINOPHEN 5; 325 MG/1; MG/1
1 TABLET ORAL ONCE
Status: COMPLETED | OUTPATIENT
Start: 2022-08-29 | End: 2022-08-29

## 2022-08-29 RX ORDER — OXYCODONE HYDROCHLORIDE AND ACETAMINOPHEN 5; 325 MG/1; MG/1
1 TABLET ORAL EVERY 6 HOURS PRN
Qty: 15 TABLET | Refills: 0 | Status: SHIPPED | OUTPATIENT
Start: 2022-08-29 | End: 2022-11-11

## 2022-08-29 RX ADMIN — OXYCODONE HYDROCHLORIDE AND ACETAMINOPHEN 1 TABLET: 5; 325 TABLET ORAL at 12:46

## 2022-09-07 ENCOUNTER — OFFICE VISIT (OUTPATIENT)
Dept: GASTROENTEROLOGY | Facility: CLINIC | Age: 87
End: 2022-09-07

## 2022-09-07 ENCOUNTER — LAB (OUTPATIENT)
Dept: LAB | Facility: HOSPITAL | Age: 87
End: 2022-09-07

## 2022-09-07 ENCOUNTER — TELEPHONE (OUTPATIENT)
Dept: CARDIOLOGY | Facility: CLINIC | Age: 87
End: 2022-09-07

## 2022-09-07 VITALS
TEMPERATURE: 97.7 F | BODY MASS INDEX: 21.13 KG/M2 | DIASTOLIC BLOOD PRESSURE: 80 MMHG | OXYGEN SATURATION: 94 % | HEART RATE: 88 BPM | HEIGHT: 72 IN | SYSTOLIC BLOOD PRESSURE: 130 MMHG | WEIGHT: 156 LBS

## 2022-09-07 DIAGNOSIS — I10 HTN (HYPERTENSION), BENIGN: ICD-10-CM

## 2022-09-07 DIAGNOSIS — Z79.01 CHRONIC ANTICOAGULATION: ICD-10-CM

## 2022-09-07 DIAGNOSIS — Z78.9 NONSMOKER: ICD-10-CM

## 2022-09-07 DIAGNOSIS — R10.13 DYSPEPSIA: ICD-10-CM

## 2022-09-07 DIAGNOSIS — D50.9 IRON DEFICIENCY ANEMIA, UNSPECIFIED IRON DEFICIENCY ANEMIA TYPE: ICD-10-CM

## 2022-09-07 DIAGNOSIS — K92.1 MELENA: Primary | ICD-10-CM

## 2022-09-07 LAB
ALBUMIN SERPL-MCNC: 4 G/DL (ref 3.5–5.2)
ALBUMIN/GLOB SERPL: 1.2 G/DL
ALP SERPL-CCNC: 151 U/L (ref 39–117)
ALT SERPL W P-5'-P-CCNC: 10 U/L (ref 1–41)
ANION GAP SERPL CALCULATED.3IONS-SCNC: 11 MMOL/L (ref 5–15)
AST SERPL-CCNC: 16 U/L (ref 1–40)
BASOPHILS # BLD AUTO: 0.07 10*3/MM3 (ref 0–0.2)
BASOPHILS NFR BLD AUTO: 0.8 % (ref 0–1.5)
BILIRUB SERPL-MCNC: 0.5 MG/DL (ref 0–1.2)
BUN SERPL-MCNC: 27 MG/DL (ref 8–23)
BUN/CREAT SERPL: 25.2 (ref 7–25)
CALCIUM SPEC-SCNC: 11 MG/DL (ref 8.6–10.5)
CHLORIDE SERPL-SCNC: 96 MMOL/L (ref 98–107)
CO2 SERPL-SCNC: 29 MMOL/L (ref 22–29)
CREAT SERPL-MCNC: 1.07 MG/DL (ref 0.76–1.27)
DEPRECATED RDW RBC AUTO: 56.7 FL (ref 37–54)
EGFRCR SERPLBLD CKD-EPI 2021: 66.7 ML/MIN/1.73
EOSINOPHIL # BLD AUTO: 0.28 10*3/MM3 (ref 0–0.4)
EOSINOPHIL NFR BLD AUTO: 3.4 % (ref 0.3–6.2)
ERYTHROCYTE [DISTWIDTH] IN BLOOD BY AUTOMATED COUNT: 15.9 % (ref 12.3–15.4)
GLOBULIN UR ELPH-MCNC: 3.4 GM/DL
GLUCOSE SERPL-MCNC: 132 MG/DL (ref 65–99)
HCT VFR BLD AUTO: 34.9 % (ref 37.5–51)
HGB BLD-MCNC: 10.9 G/DL (ref 13–17.7)
IMM GRANULOCYTES # BLD AUTO: 0.08 10*3/MM3 (ref 0–0.05)
IMM GRANULOCYTES NFR BLD AUTO: 1 % (ref 0–0.5)
INR PPP: 1.5 (ref 0.91–1.09)
LYMPHOCYTES # BLD AUTO: 1.07 10*3/MM3 (ref 0.7–3.1)
LYMPHOCYTES NFR BLD AUTO: 12.9 % (ref 19.6–45.3)
MCH RBC QN AUTO: 31 PG (ref 26.6–33)
MCHC RBC AUTO-ENTMCNC: 31.2 G/DL (ref 31.5–35.7)
MCV RBC AUTO: 99.1 FL (ref 79–97)
MONOCYTES # BLD AUTO: 0.75 10*3/MM3 (ref 0.1–0.9)
MONOCYTES NFR BLD AUTO: 9.1 % (ref 5–12)
NEUTROPHILS NFR BLD AUTO: 6.03 10*3/MM3 (ref 1.7–7)
NEUTROPHILS NFR BLD AUTO: 72.8 % (ref 42.7–76)
NRBC BLD AUTO-RTO: 0 /100 WBC (ref 0–0.2)
PLATELET # BLD AUTO: 283 10*3/MM3 (ref 140–450)
PMV BLD AUTO: 9.3 FL (ref 6–12)
POTASSIUM SERPL-SCNC: 3.9 MMOL/L (ref 3.5–5.2)
PROT SERPL-MCNC: 7.4 G/DL (ref 6–8.5)
PROTHROMBIN TIME: 17.5 SECONDS (ref 11.9–14.6)
RBC # BLD AUTO: 3.52 10*6/MM3 (ref 4.14–5.8)
SODIUM SERPL-SCNC: 136 MMOL/L (ref 136–145)
WBC NRBC COR # BLD: 8.28 10*3/MM3 (ref 3.4–10.8)

## 2022-09-07 PROCEDURE — 36415 COLL VENOUS BLD VENIPUNCTURE: CPT | Performed by: CLINICAL NURSE SPECIALIST

## 2022-09-07 PROCEDURE — 80053 COMPREHEN METABOLIC PANEL: CPT | Performed by: CLINICAL NURSE SPECIALIST

## 2022-09-07 PROCEDURE — 99214 OFFICE O/P EST MOD 30 MIN: CPT | Performed by: CLINICAL NURSE SPECIALIST

## 2022-09-07 PROCEDURE — 85025 COMPLETE CBC W/AUTO DIFF WBC: CPT | Performed by: CLINICAL NURSE SPECIALIST

## 2022-09-07 PROCEDURE — 85610 PROTHROMBIN TIME: CPT | Performed by: CLINICAL NURSE SPECIALIST

## 2022-09-07 NOTE — PROGRESS NOTES
Dexter Suggs  1934 9/7/2022  Chief Complaint   Patient presents with   • GI Problem     colonosocpy     Subjective   HPI  Dexter Suggs is a 88 y.o. male who presents with a complaint a few months ago of BRBPR with anemia on 6/24/22 11.1/33. he says that he was constipated at that time as well and is taking stool softeners for this and it has improved. He is on chronic pain medications which contribute. He has lost 25 lbs since January 2022.     He says he has had some black stool for several months, sticky and hard to clean. This has been ongoing the past week. He is on aspirin and is taking Eliquis as well for PAF. No nausea or vomiting. No abdominal pain. He does have acid reflux as well ongoing he has had for some time. He takes Protonix for this.  He is on Iron supplements as well which could make his stool black but this started a week ago and he has been on iron for a year. Iron is low at 33 Iron Sat 14%.     Past Medical History:   Diagnosis Date   • Abnormal nuclear stress test    • BPH (benign prostatic hyperplasia)    • Disease of thyroid gland    • Glaucoma 2/18/2022   • Hyperlipidemia LDL goal <70 11/14/2016   • Ischemic heart disease 6/23/2022   • LVH (left ventricular hypertrophy) 6/23/2022   • Melanoma (HCC)    • Pacemaker 1/18/2019   • PAF (paroxysmal atrial fibrillation) (HCC) 10/29/2019    Chads vas score 3   • Primary hypertension 11/14/2016   • Prostate CA (HCC)    • Sick sinus syndrome (HCC) 3/1/2019     Past Surgical History:   Procedure Laterality Date   • APPENDECTOMY     • CARDIAC CATHETERIZATION Left 12/10/2012   • CARDIAC ELECTROPHYSIOLOGY PROCEDURE N/A 11/23/2018    Procedure: Pacemaker DC new 11/23/2018;  Surgeon: Austin Granados MD;  Location: [facility] CATH INVASIVE LOCATION;  Service: Cardiology   • CHOLECYSTECTOMY     • COLONOSCOPY N/A 06/09/2017    Tics, hemorrhoids repeat exam prn   • COLONOSCOPY W/ POLYPECTOMY  02/16/2012    adenomatous polyp at 80cm   • HAND SURGERY  Right    • HERNIA REPAIR     • HIP CANNULATED SCREW PLACEMENT Left 01/18/2022    Procedure: HIP CANNULATED SCREW PLACEMENT;  Surgeon: Isaiah Sheehan MD;  Location:  [facility] OR;  Service: Orthopedics;  Laterality: Left;   • INGUINAL HERNIA REPAIR     • KNEE SURGERY     • PROSTATE SURGERY     • SKIN CANCER EXCISION      face   • SKIN LESION EXCISION     • TOTAL HIP ARTHROPLASTY Right 08/27/2021    Procedure: TOTAL HIP REPLACEMENT;  Surgeon: Arik Magaña MD;  Location:  [facility] OR;  Service: Orthopedics;  Laterality: Right;   • TOTAL HIP ARTHROPLASTY Left 03/25/2022    Procedure: LEFT HIP SCREW REMOVAL / LEFT TOTAL HIP ARTHROPLASTY;  Surgeon: BASHIR Monsivais MD;  Location:  [facility] OR;  Service: Orthopedics;  Laterality: Left;       Outpatient Medications Marked as Taking for the 9/7/22 encounter (Office Visit) with Emma Burns APRN   Medication Sig Dispense Refill   • allopurinol (ZYLOPRIM) 300 MG tablet TAKE 1 TABLET BY MOUTH EVERY DAY 90 tablet 3   • apixaban (ELIQUIS) 5 MG tablet tablet Take 1 tablet by mouth Every 12 (Twelve) Hours. 180 tablet 3   • aspirin 81 MG EC tablet Take 81 mg by mouth Daily.     • folic acid (FOLVITE) 1 MG tablet Take 1 mg by mouth Daily.     • iron polysaccharides (NIFEREX) 150 MG capsule Take 150 mg by mouth Daily.     • levothyroxine (SYNTHROID, LEVOTHROID) 75 MCG tablet Take 75 mcg by mouth Daily.     • LORazepam (ATIVAN) 0.5 MG tablet Take 1 tablet by mouth Daily As Needed for Anxiety. (Patient taking differently: Take 0.5 mg by mouth Every Night.) 12 tablet 0   • methotrexate 2.5 MG tablet Take 17.5 mg by mouth 1 (One) Time Per Week.     • metoprolol succinate XL (TOPROL-XL) 25 MG 24 hr tablet TAKE 1 TABLET BY MOUTH EVERY DAY 90 tablet 3   • nitroglycerin (NITROSTAT) 0.4 MG SL tablet Nitrostat 0.4 mg sublingual tablet   Place 1 tablet as needed by sublingual route.     • oxyCODONE-acetaminophen (PERCOCET) 5-325 MG per tablet Take 1 tablet by mouth Every 6 (Six)  Hours As Needed for Moderate Pain . 15 tablet 0   • pantoprazole (PROTONIX) 40 MG EC tablet Take 40 mg by mouth Daily.     • predniSONE (DELTASONE) 5 MG tablet Take 1 mg by mouth Every Other Day.     • triamterene-hydrochlorothiazide (MAXZIDE) 75-50 MG per tablet Take 0.5 tablets by mouth Daily.       Current Facility-Administered Medications for the 22 encounter (Office Visit) with Emma Burns APRN   Medication Dose Route Frequency Provider Last Rate Last Admin   • cyanocobalamin injection 1,000 mcg  1,000 mcg Intramuscular Q28 Days Jarad Alexis MD   1,000 mcg at 21 0901     Allergies   Allergen Reactions   • Adhesive Tape    • Simvastatin Other (See Comments)     Abnormal LFT's   • Neosporin [Neomycin-Bacitracin Zn-Polymyx] Rash     Social History     Socioeconomic History   • Marital status:    Tobacco Use   • Smoking status: Former Smoker     Years: 30.00     Types: Cigarettes     Quit date:      Years since quittin.7   • Smokeless tobacco: Never Used   Vaping Use   • Vaping Use: Never used   Substance and Sexual Activity   • Alcohol use: No   • Drug use: No   • Sexual activity: Not Currently     Partners: Female     Family History   Problem Relation Age of Onset   • Alzheimer's disease Mother    • Cancer Father    • Cancer Sister      Health Maintenance   Topic Date Due   • DXA SCAN  Never done   • Pneumococcal Vaccine 65+ (2 - PCV) 2013   • URINE MICROALBUMIN  2021   • COVID-19 Vaccine (3 - Booster for Moderna series) 2021   • DIABETIC EYE EXAM  2022   • HEMOGLOBIN A1C  2022   • INFLUENZA VACCINE  10/01/2022   • ANNUAL WELLNESS VISIT  2022   • LIPID PANEL  2023   • TDAP/TD VACCINES (2 - Td or Tdap) 03/15/2029   • ZOSTER VACCINE  Completed     Review of Systems   Constitutional: Negative for activity change, appetite change, chills, diaphoresis, fatigue, fever and unexpected weight change.   HENT: Negative for ear pain, hearing  "loss, mouth sores, sore throat, trouble swallowing and voice change.    Eyes: Negative.    Respiratory: Negative for cough, choking, shortness of breath and wheezing.    Cardiovascular: Negative for chest pain and palpitations.   Gastrointestinal: Positive for blood in stool. Negative for abdominal pain, constipation, diarrhea, nausea and vomiting.   Endocrine: Negative for cold intolerance and heat intolerance.   Genitourinary: Negative for decreased urine volume, dysuria, frequency, hematuria and urgency.   Musculoskeletal: Positive for back pain and gait problem. Negative for myalgias.   Skin: Negative for color change, pallor and rash.   Allergic/Immunologic: Negative for food allergies and immunocompromised state.   Neurological: Negative for dizziness, tremors, seizures, syncope, weakness, light-headedness, numbness and headaches.   Hematological: Negative for adenopathy. Does not bruise/bleed easily.   Psychiatric/Behavioral: Negative for agitation and confusion. The patient is not nervous/anxious.    All other systems reviewed and are negative.    Objective   Vitals:    09/07/22 0937   BP: 130/80   Pulse: 88   Temp: 97.7 °F (36.5 °C)   SpO2: 94%   Weight: 70.8 kg (156 lb)   Height: 182.9 cm (72\")     Body mass index is 21.16 kg/m².  Physical Exam  Constitutional:       Appearance: He is well-developed.   HENT:      Head: Normocephalic and atraumatic.   Eyes:      Pupils: Pupils are equal, round, and reactive to light.   Neck:      Trachea: No tracheal deviation.   Cardiovascular:      Rate and Rhythm: Normal rate and regular rhythm.      Heart sounds: Normal heart sounds. No murmur heard.    No friction rub. No gallop.   Pulmonary:      Effort: Pulmonary effort is normal. No respiratory distress.      Breath sounds: Normal breath sounds. No wheezing or rales.   Chest:      Chest wall: No tenderness.   Abdominal:      General: Bowel sounds are normal. There is no distension.      Palpations: Abdomen is soft. " Abdomen is not rigid.      Tenderness: There is abdominal tenderness (epigastric region). There is no guarding or rebound.   Musculoskeletal:         General: No tenderness or deformity. Normal range of motion.      Cervical back: Normal range of motion and neck supple.   Skin:     General: Skin is warm and dry.      Coloration: Skin is not pale.      Findings: No rash.   Neurological:      Mental Status: He is alert and oriented to person, place, and time.      Motor: Weakness present.      Gait: Gait abnormal (recent back fracture walks with a walker).      Deep Tendon Reflexes: Reflexes are normal and symmetric.   Psychiatric:         Behavior: Behavior normal.         Thought Content: Thought content normal.         Judgment: Judgment normal.       Assessment & Plan   Diagnoses and all orders for this visit:    1. Melena (Primary)  -     Case Request; Standing  -     Follow Anesthesia Guidelines / Standing Orders; Future  -     Obtain Informed Consent; Future  -     Case Request  -     CBC & Differential  -     Comprehensive Metabolic Panel  -     Protime-INR    2. Iron deficiency anemia, unspecified iron deficiency anemia type    3. Nonsmoker    4. HTN (hypertension), benign  Comments:  cont BP medication the day of procedure    5. Chronic anticoagulation  Comments:  Erik to hold per Dr Granados he takes for PAF and hx of pacemaker     6. Dyspepsia    I discussed non pharmaceutical treatment of gerd.  This includes gradually losing weight to achieve ideal body wt., elevation of the head of bed by 4-6 inches, nothing to eat or drink 3 hours prior to lying down, avoiding tight clothing, stress reduction, tobacco cessation, reduction of alcohol intake, and dietary restrictions (avoiding caffeine, coffee, fatty foods, mints, chocolate, spicy foods and tomato based sauces as much as possible).    Gaviscon as needed  Rule out upper GI bleed. Does not want colonoscopy at this time due to age and difficulty with prep.    He has a current back fracture and he is wearing a brace for this. No intervention planned for this.     ESOPHAGOGASTRODUODENOSCOPY WITH ANESTHESIA (N/A)  Part of this note may be an electronic transcription/translation of spoken language to printed text using the Dragon Dictation System.  Body mass index is 21.16 kg/m².  Return in about 2 months (around 11/7/2022).    BMI is within normal parameters. No other follow-up for BMI required.      All risks, benefits, alternatives, and indications of colonoscopy and/or Endoscopy procedure have been discussed with the patient. Risks to include perforation of the colon requiring possible surgery or colostomy, risk of bleeding from biopsies or removal of colon tissue, possibility of missing a colon polyp or cancer, or adverse drug reaction.  Benefits to include the diagnosis and management of disease of the colon and rectum. Alternatives to include barium enema, radiographic evaluation, lab testing or no intervention. Pt verbalizes understanding and agrees.     Emma Burns, APRN  9/7/2022  10:09 CDT          If you smoke or use tobacco, 4 minutes reading provided  Steps to Quit Smoking  Smoking tobacco can be harmful to your health and can affect almost every organ in your body. Smoking puts you, and those around you, at risk for developing many serious chronic diseases. Quitting smoking is difficult, but it is one of the best things that you can do for your health. It is never too late to quit.  What are the benefits of quitting smoking?  When you quit smoking, you lower your risk of developing serious diseases and conditions, such as:  · Lung cancer or lung disease, such as COPD.  · Heart disease.  · Stroke.  · Heart attack.  · Infertility.  · Osteoporosis and bone fractures.  Additionally, symptoms such as coughing, wheezing, and shortness of breath may get better when you quit. You may also find that you get sick less often because your body is stronger at  fighting off colds and infections. If you are pregnant, quitting smoking can help to reduce your chances of having a baby of low birth weight.  How do I get ready to quit?  When you decide to quit smoking, create a plan to make sure that you are successful. Before you quit:  · Pick a date to quit. Set a date within the next two weeks to give you time to prepare.  · Write down the reasons why you are quitting. Keep this list in places where you will see it often, such as on your bathroom mirror or in your car or wallet.  · Identify the people, places, things, and activities that make you want to smoke (triggers) and avoid them. Make sure to take these actions:  ¨ Throw away all cigarettes at home, at work, and in your car.  ¨ Throw away smoking accessories, such as ashtrays and lighters.  ¨ Clean your car and make sure to empty the ashtray.  ¨ Clean your home, including curtains and carpets.  · Tell your family, friends, and coworkers that you are quitting. Support from your loved ones can make quitting easier.  · Talk with your health care provider about your options for quitting smoking.  · Find out what treatment options are covered by your health insurance.  What strategies can I use to quit smoking?  Talk with your healthcare provider about different strategies to quit smoking. Some strategies include:  · Quitting smoking altogether instead of gradually lessening how much you smoke over a period of time. Research shows that quitting “cold turkey” is more successful than gradually quitting.  · Attending in-person counseling to help you build problem-solving skills. You are more likely to have success in quitting if you attend several counseling sessions. Even short sessions of 10 minutes can be effective.  · Finding resources and support systems that can help you to quit smoking and remain smoke-free after you quit. These resources are most helpful when you use them often. They can include:  ¨ Online chats with a  counselor.  ¨ Telephone quitlines.  ¨ Printed self-help materials.  ¨ Support groups or group counseling.  ¨ Text messaging programs.  ¨ Mobile phone applications.  · Taking medicines to help you quit smoking. (If you are pregnant or breastfeeding, talk with your health care provider first.) Some medicines contain nicotine and some do not. Both types of medicines help with cravings, but the medicines that include nicotine help to relieve withdrawal symptoms. Your health care provider may recommend:  ¨ Nicotine patches, gum, or lozenges.  ¨ Nicotine inhalers or sprays.  ¨ Non-nicotine medicine that is taken by mouth.  Talk with your health care provider about combining strategies, such as taking medicines while you are also receiving in-person counseling. Using these two strategies together makes you more likely to succeed in quitting than if you used either strategy on its own.  If you are pregnant or breastfeeding, talk with your health care provider about finding counseling or other support strategies to quit smoking. Do not take medicine to help you quit smoking unless told to do so by your health care provider.  What things can I do to make it easier to quit?  Quitting smoking might feel overwhelming at first, but there is a lot that you can do to make it easier. Take these important actions:  · Reach out to your family and friends and ask that they support and encourage you during this time. Call telephone quitlines, reach out to support groups, or work with a counselor for support.  · Ask people who smoke to avoid smoking around you.  · Avoid places that trigger you to smoke, such as bars, parties, or smoke-break areas at work.  · Spend time around people who do not smoke.  · Lessen stress in your life, because stress can be a smoking trigger for some people. To lessen stress, try:  ¨ Exercising regularly.  ¨ Deep-breathing exercises.  ¨ Yoga.  ¨ Meditating.  ¨ Performing a body scan. This involves closing  your eyes, scanning your body from head to toe, and noticing which parts of your body are particularly tense. Purposefully relax the muscles in those areas.  · Download or purchase mobile phone or tablet apps (applications) that can help you stick to your quit plan by providing reminders, tips, and encouragement. There are many free apps, such as QuitGuide from the CDC (Centers for Disease Control and Prevention). You can find other support for quitting smoking (smoking cessation) through smokefree.gov and other websites.  How will I feel when I quit smoking?  Within the first 24 hours of quitting smoking, you may start to feel some withdrawal symptoms. These symptoms are usually most noticeable 2-3 days after quitting, but they usually do not last beyond 2-3 weeks. Changes or symptoms that you might experience include:  · Mood swings.  · Restlessness, anxiety, or irritation.  · Difficulty concentrating.  · Dizziness.  · Strong cravings for sugary foods in addition to nicotine.  · Mild weight gain.  · Constipation.  · Nausea.  · Coughing or a sore throat.  · Changes in how your medicines work in your body.  · A depressed mood.  · Difficulty sleeping (insomnia).  After the first 2-3 weeks of quitting, you may start to notice more positive results, such as:  · Improved sense of smell and taste.  · Decreased coughing and sore throat.  · Slower heart rate.  · Lower blood pressure.  · Clearer skin.  · The ability to breathe more easily.  · Fewer sick days.  Quitting smoking is very challenging for most people. Do not get discouraged if you are not successful the first time. Some people need to make many attempts to quit before they achieve long-term success. Do your best to stick to your quit plan, and talk with your health care provider if you have any questions or concerns.  This information is not intended to replace advice given to you by your health care provider. Make sure you discuss any questions you have with  your health care provider.  Document Released: 12/12/2002 Document Revised: 08/15/2017 Document Reviewed: 05/03/2016  Elsevier Interactive Patient Education © 2017 [employer]

## 2022-09-07 NOTE — TELEPHONE ENCOUNTER
PATIENT IS BEING CONSIDERED FOR A EGD FOR MELENA. HE IS TENTATIVELY SCHEDULED FOR 09/13/2022, THEY NEED HIM TO BE OFF ELIQUIS X 2 DAYS.     PLEASE ADVISE.

## 2022-09-07 NOTE — H&P (VIEW-ONLY)
Dexter Suggs  1934 9/7/2022  Chief Complaint   Patient presents with   • GI Problem     colonosocpy     Subjective   HPI  Dexter Suggs is a 88 y.o. male who presents with a complaint a few months ago of BRBPR with anemia on 6/24/22 11.1/33. he says that he was constipated at that time as well and is taking stool softeners for this and it has improved. He is on chronic pain medications which contribute. He has lost 25 lbs since January 2022.     He says he has had some black stool for several months, sticky and hard to clean. This has been ongoing the past week. He is on aspirin and is taking Eliquis as well for PAF. No nausea or vomiting. No abdominal pain. He does have acid reflux as well ongoing he has had for some time. He takes Protonix for this.  He is on Iron supplements as well which could make his stool black but this started a week ago and he has been on iron for a year. Iron is low at 33 Iron Sat 14%.     Past Medical History:   Diagnosis Date   • Abnormal nuclear stress test    • BPH (benign prostatic hyperplasia)    • Disease of thyroid gland    • Glaucoma 2/18/2022   • Hyperlipidemia LDL goal <70 11/14/2016   • Ischemic heart disease 6/23/2022   • LVH (left ventricular hypertrophy) 6/23/2022   • Melanoma (HCC)    • Pacemaker 1/18/2019   • PAF (paroxysmal atrial fibrillation) (HCC) 10/29/2019    Chads vas score 3   • Primary hypertension 11/14/2016   • Prostate CA (HCC)    • Sick sinus syndrome (HCC) 3/1/2019     Past Surgical History:   Procedure Laterality Date   • APPENDECTOMY     • CARDIAC CATHETERIZATION Left 12/10/2012   • CARDIAC ELECTROPHYSIOLOGY PROCEDURE N/A 11/23/2018    Procedure: Pacemaker DC new 11/23/2018;  Surgeon: Austin Granados MD;  Location: [facility] CATH INVASIVE LOCATION;  Service: Cardiology   • CHOLECYSTECTOMY     • COLONOSCOPY N/A 06/09/2017    Tics, hemorrhoids repeat exam prn   • COLONOSCOPY W/ POLYPECTOMY  02/16/2012    adenomatous polyp at 80cm   • HAND SURGERY  Right    • HERNIA REPAIR     • HIP CANNULATED SCREW PLACEMENT Left 01/18/2022    Procedure: HIP CANNULATED SCREW PLACEMENT;  Surgeon: Isaiah Sheehan MD;  Location:  [facility] OR;  Service: Orthopedics;  Laterality: Left;   • INGUINAL HERNIA REPAIR     • KNEE SURGERY     • PROSTATE SURGERY     • SKIN CANCER EXCISION      face   • SKIN LESION EXCISION     • TOTAL HIP ARTHROPLASTY Right 08/27/2021    Procedure: TOTAL HIP REPLACEMENT;  Surgeon: Arik Magaña MD;  Location:  [facility] OR;  Service: Orthopedics;  Laterality: Right;   • TOTAL HIP ARTHROPLASTY Left 03/25/2022    Procedure: LEFT HIP SCREW REMOVAL / LEFT TOTAL HIP ARTHROPLASTY;  Surgeon: BASHIR Monsivais MD;  Location:  [facility] OR;  Service: Orthopedics;  Laterality: Left;       Outpatient Medications Marked as Taking for the 9/7/22 encounter (Office Visit) with Emma Burns APRN   Medication Sig Dispense Refill   • allopurinol (ZYLOPRIM) 300 MG tablet TAKE 1 TABLET BY MOUTH EVERY DAY 90 tablet 3   • apixaban (ELIQUIS) 5 MG tablet tablet Take 1 tablet by mouth Every 12 (Twelve) Hours. 180 tablet 3   • aspirin 81 MG EC tablet Take 81 mg by mouth Daily.     • folic acid (FOLVITE) 1 MG tablet Take 1 mg by mouth Daily.     • iron polysaccharides (NIFEREX) 150 MG capsule Take 150 mg by mouth Daily.     • levothyroxine (SYNTHROID, LEVOTHROID) 75 MCG tablet Take 75 mcg by mouth Daily.     • LORazepam (ATIVAN) 0.5 MG tablet Take 1 tablet by mouth Daily As Needed for Anxiety. (Patient taking differently: Take 0.5 mg by mouth Every Night.) 12 tablet 0   • methotrexate 2.5 MG tablet Take 17.5 mg by mouth 1 (One) Time Per Week.     • metoprolol succinate XL (TOPROL-XL) 25 MG 24 hr tablet TAKE 1 TABLET BY MOUTH EVERY DAY 90 tablet 3   • nitroglycerin (NITROSTAT) 0.4 MG SL tablet Nitrostat 0.4 mg sublingual tablet   Place 1 tablet as needed by sublingual route.     • oxyCODONE-acetaminophen (PERCOCET) 5-325 MG per tablet Take 1 tablet by mouth Every 6 (Six)  Hours As Needed for Moderate Pain . 15 tablet 0   • pantoprazole (PROTONIX) 40 MG EC tablet Take 40 mg by mouth Daily.     • predniSONE (DELTASONE) 5 MG tablet Take 1 mg by mouth Every Other Day.     • triamterene-hydrochlorothiazide (MAXZIDE) 75-50 MG per tablet Take 0.5 tablets by mouth Daily.       Current Facility-Administered Medications for the 22 encounter (Office Visit) with Emma Burns APRN   Medication Dose Route Frequency Provider Last Rate Last Admin   • cyanocobalamin injection 1,000 mcg  1,000 mcg Intramuscular Q28 Days Jarad Alexis MD   1,000 mcg at 21 0901     Allergies   Allergen Reactions   • Adhesive Tape    • Simvastatin Other (See Comments)     Abnormal LFT's   • Neosporin [Neomycin-Bacitracin Zn-Polymyx] Rash     Social History     Socioeconomic History   • Marital status:    Tobacco Use   • Smoking status: Former Smoker     Years: 30.00     Types: Cigarettes     Quit date:      Years since quittin.7   • Smokeless tobacco: Never Used   Vaping Use   • Vaping Use: Never used   Substance and Sexual Activity   • Alcohol use: No   • Drug use: No   • Sexual activity: Not Currently     Partners: Female     Family History   Problem Relation Age of Onset   • Alzheimer's disease Mother    • Cancer Father    • Cancer Sister      Health Maintenance   Topic Date Due   • DXA SCAN  Never done   • Pneumococcal Vaccine 65+ (2 - PCV) 2013   • URINE MICROALBUMIN  2021   • COVID-19 Vaccine (3 - Booster for Moderna series) 2021   • DIABETIC EYE EXAM  2022   • HEMOGLOBIN A1C  2022   • INFLUENZA VACCINE  10/01/2022   • ANNUAL WELLNESS VISIT  2022   • LIPID PANEL  2023   • TDAP/TD VACCINES (2 - Td or Tdap) 03/15/2029   • ZOSTER VACCINE  Completed     Review of Systems   Constitutional: Negative for activity change, appetite change, chills, diaphoresis, fatigue, fever and unexpected weight change.   HENT: Negative for ear pain, hearing  "loss, mouth sores, sore throat, trouble swallowing and voice change.    Eyes: Negative.    Respiratory: Negative for cough, choking, shortness of breath and wheezing.    Cardiovascular: Negative for chest pain and palpitations.   Gastrointestinal: Positive for blood in stool. Negative for abdominal pain, constipation, diarrhea, nausea and vomiting.   Endocrine: Negative for cold intolerance and heat intolerance.   Genitourinary: Negative for decreased urine volume, dysuria, frequency, hematuria and urgency.   Musculoskeletal: Positive for back pain and gait problem. Negative for myalgias.   Skin: Negative for color change, pallor and rash.   Allergic/Immunologic: Negative for food allergies and immunocompromised state.   Neurological: Negative for dizziness, tremors, seizures, syncope, weakness, light-headedness, numbness and headaches.   Hematological: Negative for adenopathy. Does not bruise/bleed easily.   Psychiatric/Behavioral: Negative for agitation and confusion. The patient is not nervous/anxious.    All other systems reviewed and are negative.    Objective   Vitals:    09/07/22 0937   BP: 130/80   Pulse: 88   Temp: 97.7 °F (36.5 °C)   SpO2: 94%   Weight: 70.8 kg (156 lb)   Height: 182.9 cm (72\")     Body mass index is 21.16 kg/m².  Physical Exam  Constitutional:       Appearance: He is well-developed.   HENT:      Head: Normocephalic and atraumatic.   Eyes:      Pupils: Pupils are equal, round, and reactive to light.   Neck:      Trachea: No tracheal deviation.   Cardiovascular:      Rate and Rhythm: Normal rate and regular rhythm.      Heart sounds: Normal heart sounds. No murmur heard.    No friction rub. No gallop.   Pulmonary:      Effort: Pulmonary effort is normal. No respiratory distress.      Breath sounds: Normal breath sounds. No wheezing or rales.   Chest:      Chest wall: No tenderness.   Abdominal:      General: Bowel sounds are normal. There is no distension.      Palpations: Abdomen is soft. " Abdomen is not rigid.      Tenderness: There is abdominal tenderness (epigastric region). There is no guarding or rebound.   Musculoskeletal:         General: No tenderness or deformity. Normal range of motion.      Cervical back: Normal range of motion and neck supple.   Skin:     General: Skin is warm and dry.      Coloration: Skin is not pale.      Findings: No rash.   Neurological:      Mental Status: He is alert and oriented to person, place, and time.      Motor: Weakness present.      Gait: Gait abnormal (recent back fracture walks with a walker).      Deep Tendon Reflexes: Reflexes are normal and symmetric.   Psychiatric:         Behavior: Behavior normal.         Thought Content: Thought content normal.         Judgment: Judgment normal.       Assessment & Plan   Diagnoses and all orders for this visit:    1. Melena (Primary)  -     Case Request; Standing  -     Follow Anesthesia Guidelines / Standing Orders; Future  -     Obtain Informed Consent; Future  -     Case Request  -     CBC & Differential  -     Comprehensive Metabolic Panel  -     Protime-INR    2. Iron deficiency anemia, unspecified iron deficiency anemia type    3. Nonsmoker    4. HTN (hypertension), benign  Comments:  cont BP medication the day of procedure    5. Chronic anticoagulation  Comments:  Erik to hold per Dr Granados he takes for PAF and hx of pacemaker     6. Dyspepsia    I discussed non pharmaceutical treatment of gerd.  This includes gradually losing weight to achieve ideal body wt., elevation of the head of bed by 4-6 inches, nothing to eat or drink 3 hours prior to lying down, avoiding tight clothing, stress reduction, tobacco cessation, reduction of alcohol intake, and dietary restrictions (avoiding caffeine, coffee, fatty foods, mints, chocolate, spicy foods and tomato based sauces as much as possible).    Gaviscon as needed  Rule out upper GI bleed. Does not want colonoscopy at this time due to age and difficulty with prep.    He has a current back fracture and he is wearing a brace for this. No intervention planned for this.     ESOPHAGOGASTRODUODENOSCOPY WITH ANESTHESIA (N/A)  Part of this note may be an electronic transcription/translation of spoken language to printed text using the Dragon Dictation System.  Body mass index is 21.16 kg/m².  Return in about 2 months (around 11/7/2022).    BMI is within normal parameters. No other follow-up for BMI required.      All risks, benefits, alternatives, and indications of colonoscopy and/or Endoscopy procedure have been discussed with the patient. Risks to include perforation of the colon requiring possible surgery or colostomy, risk of bleeding from biopsies or removal of colon tissue, possibility of missing a colon polyp or cancer, or adverse drug reaction.  Benefits to include the diagnosis and management of disease of the colon and rectum. Alternatives to include barium enema, radiographic evaluation, lab testing or no intervention. Pt verbalizes understanding and agrees.     Emma Burns, APRN  9/7/2022  10:09 CDT          If you smoke or use tobacco, 4 minutes reading provided  Steps to Quit Smoking  Smoking tobacco can be harmful to your health and can affect almost every organ in your body. Smoking puts you, and those around you, at risk for developing many serious chronic diseases. Quitting smoking is difficult, but it is one of the best things that you can do for your health. It is never too late to quit.  What are the benefits of quitting smoking?  When you quit smoking, you lower your risk of developing serious diseases and conditions, such as:  · Lung cancer or lung disease, such as COPD.  · Heart disease.  · Stroke.  · Heart attack.  · Infertility.  · Osteoporosis and bone fractures.  Additionally, symptoms such as coughing, wheezing, and shortness of breath may get better when you quit. You may also find that you get sick less often because your body is stronger at  fighting off colds and infections. If you are pregnant, quitting smoking can help to reduce your chances of having a baby of low birth weight.  How do I get ready to quit?  When you decide to quit smoking, create a plan to make sure that you are successful. Before you quit:  · Pick a date to quit. Set a date within the next two weeks to give you time to prepare.  · Write down the reasons why you are quitting. Keep this list in places where you will see it often, such as on your bathroom mirror or in your car or wallet.  · Identify the people, places, things, and activities that make you want to smoke (triggers) and avoid them. Make sure to take these actions:  ¨ Throw away all cigarettes at home, at work, and in your car.  ¨ Throw away smoking accessories, such as ashtrays and lighters.  ¨ Clean your car and make sure to empty the ashtray.  ¨ Clean your home, including curtains and carpets.  · Tell your family, friends, and coworkers that you are quitting. Support from your loved ones can make quitting easier.  · Talk with your health care provider about your options for quitting smoking.  · Find out what treatment options are covered by your health insurance.  What strategies can I use to quit smoking?  Talk with your healthcare provider about different strategies to quit smoking. Some strategies include:  · Quitting smoking altogether instead of gradually lessening how much you smoke over a period of time. Research shows that quitting “cold turkey” is more successful than gradually quitting.  · Attending in-person counseling to help you build problem-solving skills. You are more likely to have success in quitting if you attend several counseling sessions. Even short sessions of 10 minutes can be effective.  · Finding resources and support systems that can help you to quit smoking and remain smoke-free after you quit. These resources are most helpful when you use them often. They can include:  ¨ Online chats with a  counselor.  ¨ Telephone quitlines.  ¨ Printed self-help materials.  ¨ Support groups or group counseling.  ¨ Text messaging programs.  ¨ Mobile phone applications.  · Taking medicines to help you quit smoking. (If you are pregnant or breastfeeding, talk with your health care provider first.) Some medicines contain nicotine and some do not. Both types of medicines help with cravings, but the medicines that include nicotine help to relieve withdrawal symptoms. Your health care provider may recommend:  ¨ Nicotine patches, gum, or lozenges.  ¨ Nicotine inhalers or sprays.  ¨ Non-nicotine medicine that is taken by mouth.  Talk with your health care provider about combining strategies, such as taking medicines while you are also receiving in-person counseling. Using these two strategies together makes you more likely to succeed in quitting than if you used either strategy on its own.  If you are pregnant or breastfeeding, talk with your health care provider about finding counseling or other support strategies to quit smoking. Do not take medicine to help you quit smoking unless told to do so by your health care provider.  What things can I do to make it easier to quit?  Quitting smoking might feel overwhelming at first, but there is a lot that you can do to make it easier. Take these important actions:  · Reach out to your family and friends and ask that they support and encourage you during this time. Call telephone quitlines, reach out to support groups, or work with a counselor for support.  · Ask people who smoke to avoid smoking around you.  · Avoid places that trigger you to smoke, such as bars, parties, or smoke-break areas at work.  · Spend time around people who do not smoke.  · Lessen stress in your life, because stress can be a smoking trigger for some people. To lessen stress, try:  ¨ Exercising regularly.  ¨ Deep-breathing exercises.  ¨ Yoga.  ¨ Meditating.  ¨ Performing a body scan. This involves closing  your eyes, scanning your body from head to toe, and noticing which parts of your body are particularly tense. Purposefully relax the muscles in those areas.  · Download or purchase mobile phone or tablet apps (applications) that can help you stick to your quit plan by providing reminders, tips, and encouragement. There are many free apps, such as QuitGuide from the CDC (Centers for Disease Control and Prevention). You can find other support for quitting smoking (smoking cessation) through smokefree.gov and other websites.  How will I feel when I quit smoking?  Within the first 24 hours of quitting smoking, you may start to feel some withdrawal symptoms. These symptoms are usually most noticeable 2-3 days after quitting, but they usually do not last beyond 2-3 weeks. Changes or symptoms that you might experience include:  · Mood swings.  · Restlessness, anxiety, or irritation.  · Difficulty concentrating.  · Dizziness.  · Strong cravings for sugary foods in addition to nicotine.  · Mild weight gain.  · Constipation.  · Nausea.  · Coughing or a sore throat.  · Changes in how your medicines work in your body.  · A depressed mood.  · Difficulty sleeping (insomnia).  After the first 2-3 weeks of quitting, you may start to notice more positive results, such as:  · Improved sense of smell and taste.  · Decreased coughing and sore throat.  · Slower heart rate.  · Lower blood pressure.  · Clearer skin.  · The ability to breathe more easily.  · Fewer sick days.  Quitting smoking is very challenging for most people. Do not get discouraged if you are not successful the first time. Some people need to make many attempts to quit before they achieve long-term success. Do your best to stick to your quit plan, and talk with your health care provider if you have any questions or concerns.  This information is not intended to replace advice given to you by your health care provider. Make sure you discuss any questions you have with  your health care provider.  Document Released: 12/12/2002 Document Revised: 08/15/2017 Document Reviewed: 05/03/2016  Elsevier Interactive Patient Education © 2017 [employer]

## 2022-09-13 ENCOUNTER — ANESTHESIA EVENT (OUTPATIENT)
Dept: GASTROENTEROLOGY | Facility: HOSPITAL | Age: 87
End: 2022-09-13

## 2022-09-13 ENCOUNTER — ANESTHESIA (OUTPATIENT)
Dept: GASTROENTEROLOGY | Facility: HOSPITAL | Age: 87
End: 2022-09-13

## 2022-09-13 ENCOUNTER — HOSPITAL ENCOUNTER (OUTPATIENT)
Facility: HOSPITAL | Age: 87
Setting detail: HOSPITAL OUTPATIENT SURGERY
Discharge: HOME | End: 2022-09-13
Attending: INTERNAL MEDICINE | Admitting: INTERNAL MEDICINE

## 2022-09-13 VITALS
BODY MASS INDEX: 20.45 KG/M2 | HEART RATE: 73 BPM | RESPIRATION RATE: 19 BRPM | OXYGEN SATURATION: 97 % | WEIGHT: 151 LBS | HEIGHT: 72 IN | TEMPERATURE: 98 F | DIASTOLIC BLOOD PRESSURE: 54 MMHG | SYSTOLIC BLOOD PRESSURE: 124 MMHG

## 2022-09-13 DIAGNOSIS — K92.1 MELENA: ICD-10-CM

## 2022-09-13 PROCEDURE — 25010000002 PROPOFOL 10 MG/ML EMULSION: Performed by: NURSE ANESTHETIST, CERTIFIED REGISTERED

## 2022-09-13 PROCEDURE — 43235 EGD DIAGNOSTIC BRUSH WASH: CPT | Performed by: INTERNAL MEDICINE

## 2022-09-13 RX ORDER — SODIUM CHLORIDE 9 MG/ML
500 INJECTION, SOLUTION INTRAVENOUS CONTINUOUS PRN
Status: DISCONTINUED | OUTPATIENT
Start: 2022-09-13 | End: 2022-09-13 | Stop reason: HOSPADM

## 2022-09-13 RX ORDER — LIDOCAINE HYDROCHLORIDE 10 MG/ML
0.5 INJECTION, SOLUTION EPIDURAL; INFILTRATION; INTRACAUDAL; PERINEURAL ONCE AS NEEDED
Status: CANCELLED | OUTPATIENT
Start: 2022-09-13

## 2022-09-13 RX ORDER — SODIUM CHLORIDE 0.9 % (FLUSH) 0.9 %
10 SYRINGE (ML) INJECTION AS NEEDED
Status: DISCONTINUED | OUTPATIENT
Start: 2022-09-13 | End: 2022-09-13 | Stop reason: HOSPADM

## 2022-09-13 RX ORDER — PROPOFOL 10 MG/ML
VIAL (ML) INTRAVENOUS AS NEEDED
Status: DISCONTINUED | OUTPATIENT
Start: 2022-09-13 | End: 2022-09-13 | Stop reason: SURG

## 2022-09-13 RX ADMIN — PROPOFOL 20 MG: 10 INJECTION, EMULSION INTRAVENOUS at 12:23

## 2022-09-13 RX ADMIN — SODIUM CHLORIDE 500 ML: 9 INJECTION, SOLUTION INTRAVENOUS at 10:53

## 2022-09-13 RX ADMIN — PROPOFOL 50 MG: 10 INJECTION, EMULSION INTRAVENOUS at 12:21

## 2022-09-13 NOTE — ANESTHESIA PREPROCEDURE EVALUATION
Anesthesia Evaluation     Patient summary reviewed   no history of anesthetic complications:  NPO Solid Status: > 8 hours             Airway   Mallampati: I  TM distance: >3 FB  Dental          Pulmonary - negative pulmonary ROS   Cardiovascular     (+) pacemaker ([employer]) pacemaker, hypertension, CAD, dysrhythmias Atrial Fib, hyperlipidemia,   (-) past MI, cardiac stents    ROS comment: Stress echo:   · Equivocal ECG evidence of myocardial ischemia. Negative clinical evidence of myocardial ischemia.  · Left ventricular ejection fraction appears to be 56 - 60%.  · Significant wall motion abnormalities in the inferior, septal and apical regions in both rest and stress imaging consistent with previous infarction.  · Normal stress echo consistent with a low risk study for myocardial ischemia.  · Overall, this is a low risk test for ischemia.         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        CODE STATUS:

## 2022-09-13 NOTE — INTERVAL H&P NOTE
H&P reviewed. The patient was examined and there are no changes to the H&P.      The following major R/B/A were discussed with the patient, however the list is not all inclusive . Risk:  Bleeding (immediate and delayed), perforation (rupture or tear), reaction to medication, missed lesion/cancer, pain during the procedure, infection, need for surgery, need for ostomy, need for mechanical ventilation (breathing machine), death.  Benefits: removal of polyp/tissue, burn/clip/or inject to stop bleeding, removal of foreign body, dilate any stricture.  Alternatives: Xray or CT, surgery, do nothing with associated risk   The patient was given time to ask question and received explanation, and agrees to proceed as per History and Physical.   No guarantee given or expressed.

## 2022-09-13 NOTE — ANESTHESIA POSTPROCEDURE EVALUATION
Patient: Dexter SAINI    Procedure Summary     Date: 09/13/22 Room / Location: [facility] ENDOSCOPY 2 / [facility]    Anesthesia Start: 1219 Anesthesia Stop: 1227    Procedure: ESOPHAGOGASTRODUODENOSCOPY WITH ANESTHESIA (N/A ) Diagnosis:       Melena      (Melena [K92.1])    Surgeons: Felice Marroquin MD Provider: Tejal Myles CRNA    Anesthesia Type: MAC ASA Status: 3          Anesthesia Type: MAC    Vitals  Vitals Value Taken Time   /56 09/13/22 1227   Temp 98 °F (36.7 °C) 09/13/22 1227   Pulse 76 09/13/22 1227   Resp 16 09/13/22 1227   SpO2 99 % 09/13/22 1227           Post Anesthesia Care and Evaluation    Patient location during evaluation: PHASE II  Patient participation: complete - patient participated  Pain score: 0  Pain management: adequate  Anesthetic complications: No anesthetic complications  PONV Status: none  Cardiovascular status: acceptable and stable  Respiratory status: acceptable and nasal cannula  Hydration status: acceptable  No anesthesia care post op

## 2022-10-03 PROCEDURE — 93296 REM INTERROG EVL PM/IDS: CPT | Performed by: INTERNAL MEDICINE

## 2022-10-03 PROCEDURE — 93294 REM INTERROG EVL PM/LDLS PM: CPT | Performed by: INTERNAL MEDICINE

## 2022-11-07 ENCOUNTER — TELEPHONE (OUTPATIENT)
Dept: UROLOGY | Facility: CLINIC | Age: 87
End: 2022-11-07

## 2022-11-07 NOTE — PROGRESS NOTES
Subjective    Mr. SAINI is 88 y.o. male    Chief Complaint: Right kidney tenderness    History of Present Illness  Patient is an 88-year-old gentleman who is here with chief complaint of worsening back pain he states he has pain worse on the right than left he has been going to a chiropractor who thought he needed to get this checked out due to his history of kidney stones.  Also has history of Ebonie 6 prostate cancer treated with radiation.  His PSAs have been low or nondetectable.  He is not seen any further gross hematuria he has been evaluated with cystoscopy and CT at that time.  He is on Eliquis so is at risk for hematuria.  He did get a KUB prior to his appointment today which shows 2 stones in the left kidney and 1 calcification in the left sacral area which may or may not be a stone although he is worse on the right side no obvious calcifications on the right.    The following portions of the patient's history were reviewed and updated as appropriate: allergies, current medications, past family history, past medical history, past social history, past surgical history and problem list.    Review of Systems   Constitutional: Positive for chills. Negative for fever.   Genitourinary: Positive for flank pain.   Musculoskeletal: Positive for back pain.   All other systems reviewed and are negative.        Current Outpatient Medications:   •  allopurinol (ZYLOPRIM) 300 MG tablet, TAKE 1 TABLET BY MOUTH EVERY DAY, Disp: 90 tablet, Rfl: 3  •  apixaban (ELIQUIS) 5 MG tablet tablet, Take 1 tablet by mouth Every 12 (Twelve) Hours., Disp: 180 tablet, Rfl: 3  •  aspirin 81 MG EC tablet, Take 81 mg by mouth Daily., Disp: , Rfl:   •  folic acid (FOLVITE) 1 MG tablet, Take 1 mg by mouth Daily., Disp: , Rfl:   •  iron polysaccharides (NIFEREX) 150 MG capsule, Take 150 mg by mouth Daily., Disp: , Rfl:   •  levothyroxine (SYNTHROID, LEVOTHROID) 75 MCG tablet, Take 75 mcg by mouth Daily., Disp: , Rfl:   •  LORazepam  (ATIVAN) 0.5 MG tablet, Take 1 tablet by mouth Daily As Needed for Anxiety. (Patient taking differently: Take 0.5 mg by mouth Every Night.), Disp: 12 tablet, Rfl: 0  •  methotrexate 2.5 MG tablet, Take 17.5 mg by mouth 1 (One) Time Per Week., Disp: , Rfl:   •  metoprolol succinate XL (TOPROL-XL) 25 MG 24 hr tablet, TAKE 1 TABLET BY MOUTH EVERY DAY, Disp: 90 tablet, Rfl: 3  •  nitroglycerin (NITROSTAT) 0.4 MG SL tablet, Nitrostat 0.4 mg sublingual tablet  Place 1 tablet as needed by sublingual route., Disp: , Rfl:   •  pantoprazole (PROTONIX) 40 MG EC tablet, Take 40 mg by mouth Daily., Disp: , Rfl:   •  predniSONE (DELTASONE) 5 MG tablet, Take 1 mg by mouth Every Other Day., Disp: , Rfl:   •  triamterene-hydrochlorothiazide (MAXZIDE) 75-50 MG per tablet, Take 0.5 tablets by mouth Daily., Disp: , Rfl:     Current Facility-Administered Medications:   •  cyanocobalamin injection 1,000 mcg, 1,000 mcg, Intramuscular, Q28 Days, Jarad Alexis MD, 1,000 mcg at 11/24/21 0901    Past Medical History:   Diagnosis Date   • Abnormal nuclear stress test    • Arthritis    • BPH (benign prostatic hyperplasia)    • Disease of thyroid gland    • GERD (gastroesophageal reflux disease)    • Glaucoma 02/18/2022   • Hyperlipidemia LDL goal <70 11/14/2016   • Ischemic heart disease 06/23/2022   • LVH (left ventricular hypertrophy) 06/23/2022   • Melanoma (HCC)    • Pacemaker 01/18/2019   • PAF (paroxysmal atrial fibrillation) (HCC) 10/29/2019    Chads vas score 3   • Primary hypertension 11/14/2016   • Prostate CA (HCC)    • Sick sinus syndrome (HCC) 03/01/2019       Past Surgical History:   Procedure Laterality Date   • APPENDECTOMY     • CARDIAC CATHETERIZATION Left 12/10/2012   • CARDIAC ELECTROPHYSIOLOGY PROCEDURE N/A 11/23/2018    Procedure: Pacemaker DC new 11/23/2018;  Surgeon: Austin Granados MD;  Location:  [facility];  Service: Cardiology   • CHOLECYSTECTOMY     • COLONOSCOPY N/A 06/09/2017    Jeff,  "hemorrhoids repeat exam prn   • COLONOSCOPY W/ POLYPECTOMY  2012    adenomatous polyp at 80cm   • ENDOSCOPY N/A 2022    Procedure: ESOPHAGOGASTRODUODENOSCOPY WITH ANESTHESIA;  Surgeon: Felice Marroquin MD;  Location:  [facility] ENDOSCOPY;  Service: Gastroenterology;  Laterality: N/A;  pre hematochezia  post; normal   Ashish Callejas MD   • HAND SURGERY Right    • HERNIA REPAIR     • HIP CANNULATED SCREW PLACEMENT Left 2022    Procedure: HIP CANNULATED SCREW PLACEMENT;  Surgeon: Isaiah Sheehan MD;  Location:  [facility] OR;  Service: Orthopedics;  Laterality: Left;   • INGUINAL HERNIA REPAIR     • KNEE SURGERY     • PROSTATE SURGERY     • SKIN CANCER EXCISION      face   • SKIN LESION EXCISION     • TOTAL HIP ARTHROPLASTY Right 2021    Procedure: TOTAL HIP REPLACEMENT;  Surgeon: Arik Magaña MD;  Location:  [facility] OR;  Service: Orthopedics;  Laterality: Right;   • TOTAL HIP ARTHROPLASTY Left 2022    Procedure: LEFT HIP SCREW REMOVAL / LEFT TOTAL HIP ARTHROPLASTY;  Surgeon: BASHIR Monsivais MD;  Location:  [facility] OR;  Service: Orthopedics;  Laterality: Left;       Social History     Socioeconomic History   • Marital status:    Tobacco Use   • Smoking status: Former     Years: 30.00     Types: Cigarettes     Quit date:      Years since quittin.8   • Smokeless tobacco: Never   Vaping Use   • Vaping Use: Never used   Substance and Sexual Activity   • Alcohol use: No   • Drug use: No   • Sexual activity: Defer       Family History   Problem Relation Age of Onset   • Alzheimer's disease Mother    • Cancer Father    • Cancer Sister        Objective    Temp 97.2 °F (36.2 °C)   Ht 182.9 cm (72\")   Wt 67.7 kg (149 lb 3.2 oz)   BMI 20.24 kg/m²     Physical Exam  Vitals reviewed.   Constitutional:       General: He is not in acute distress.     Appearance: Normal appearance. He is not toxic-appearing.   HENT:      Head: Normocephalic and atraumatic.   Pulmonary:      " Effort: Pulmonary effort is normal.   Abdominal:      Tenderness: There is right CVA tenderness and left CVA tenderness.   Musculoskeletal:         General: Tenderness present.   Skin:     Coloration: Skin is not pale.   Neurological:      Mental Status: He is alert and oriented to person, place, and time.   Psychiatric:         Mood and Affect: Mood normal.         Behavior: Behavior normal.             Results for orders placed or performed in visit on 11/11/22   POC Urinalysis Dipstick, Multipro    Specimen: Urine   Result Value Ref Range    Color Yellow Yellow, Straw, Dark Yellow, Ariana    Clarity, UA Clear Clear    Glucose, UA Negative Negative mg/dL    Bilirubin Negative Negative    Ketones, UA Negative Negative    Specific Gravity  1.015 1.005 - 1.030    Blood, UA Negative Negative    pH, Urine 5.5 5.0 - 8.0    Protein, POC Negative Negative mg/dL    Urobilinogen, UA 0.2 E.U./dL Normal, 0.2 E.U./dL    Nitrite, UA Negative Negative    Leukocytes Negative Negative       KUB independent review    A KUB is available for me to review today.  The image is inspected for a bowel gas pattern and the general bone structure of the spine and pelvis. The kidneys are then inspected closely.  Renal outline is noted if identifiable. The kidney, collecting system, and anticipated path of the ureter are examined for calcifications including those in the true pelvis.  This film reveals:    On the right there are no calcificaitons seen in the kidney or the expected course of the ureter. .    On the left there are multiple renal stones.  There are 2 stones in the left kidney.  Also a calcification in the vicinity of the left sacral area which may or may not be a stone.      Assessment and Plan    Diagnoses and all orders for this visit:    1. Gross hematuria (Primary)  -     XR abdomen kub; Future  -     POC Urinalysis Dipstick, Multipro    2. Bilateral flank pain  -     CT Abdomen Pelvis Without Contrast; Future    3. Kidney  stone on left side  -     CT Abdomen Pelvis Without Contrast; Future    Patient been having worsening bilateral flank pain he had seen a chiropractor who recommended he come does due to his history of kidney stones.  KUB shows stones in the left kidney and a small calcification in the left sacral area no obvious stones on the right side.  His urine is clear.  We will have the patient get a CT renal stone protocol and return to discuss findings.    Has history of prostate cancer he is due to follow-up May 2023 also has not had any further episodes of gross hematuria.

## 2022-11-09 NOTE — PROGRESS NOTES
[facility] - PODIATRY    Today's Date: 11/18/2022     Patient Name: Dexter SAINI  MRN: [id number]  CSN: [id number]  PCP: Ashish Callejas MD  Referring Provider: No ref. provider found    SUBJECTIVE     Chief Complaint   Patient presents with   • Follow-up     Ashish Callejas MD-10/07/2022-3 MO FU DIABETIC NAIL CARE-pt state he is here for diabetic foot care-pt denies pain-pt presents with thick yellow nails, L foot swollen compared to R    • Diabetes     Didn't check     HPI: Dexter SAINI, a 88 y.o.male, comes to clinic as a(n) established patient presenting for diabetic foot exam, complaining of foot pain, complaining of painful toenails and complaining of thickened, irregular toenails and swelling of left fot. Patient has h/o BPH, thyroid disease, HLD, Glaucoma, LVH, melanoma, PAF, HTN, Prostate CA. Patient is NIDDM and unsure of last BG level. Patient presents with complaints of thickened, irregular toenails of both feet. States that over the last few days the left foot has been swollen and notes that he has had some pain are the MTPJs. Denies any known injury or trauma. Continues Eliquis daily. States that his toenails are thickened, dystrophic, and irregular and that he and spouse are unable and comfortable trying to care for nails at home. Notes mildly diminished sensation in feet but no over numbness. Notes intermittent pain in the left foot. Relates previous treatment(s) including care by podiatry. Denies any constitutional symptoms. No other pedal complaints at this time.    Past Medical History:   Diagnosis Date   • Abnormal nuclear stress test    • Arthritis    • BPH (benign prostatic hyperplasia)    • Disease of thyroid gland    • Fracture     spinal t12   • GERD (gastroesophageal reflux disease)    • Glaucoma 02/18/2022   • Hyperlipidemia LDL goal <70 11/14/2016   • Ischemic heart disease 06/23/2022   • LVH (left ventricular hypertrophy) 06/23/2022   • Melanoma  (HCC)    • Pacemaker 01/18/2019   • PAF (paroxysmal atrial fibrillation) (HCC) 10/29/2019    Chads vas score 3   • Primary hypertension 11/14/2016   • Prostate CA (HCC)    • Sick sinus syndrome (HCC) 03/01/2019     Past Surgical History:   Procedure Laterality Date   • APPENDECTOMY     • CARDIAC CATHETERIZATION Left 12/10/2012   • CARDIAC ELECTROPHYSIOLOGY PROCEDURE N/A 11/23/2018    Procedure: Pacemaker DC new 11/23/2018;  Surgeon: Austin Granados MD;  Location:  [facility];  Service: Cardiology   • CHOLECYSTECTOMY     • COLONOSCOPY N/A 06/09/2017    Tics, hemorrhoids repeat exam prn   • COLONOSCOPY W/ POLYPECTOMY  02/16/2012    adenomatous polyp at 80cm   • ENDOSCOPY N/A 9/13/2022    Procedure: ESOPHAGOGASTRODUODENOSCOPY WITH ANESTHESIA;  Surgeon: Felice Marroquin MD;  Location: [facility] ENDOSCOPY;  Service: Gastroenterology;  Laterality: N/A;  pre hematochezia  post; normal   Ashish Callejas MD   • HAND SURGERY Right    • HERNIA REPAIR     • HIP CANNULATED SCREW PLACEMENT Left 01/18/2022    Procedure: HIP CANNULATED SCREW PLACEMENT;  Surgeon: Isaiah Sheehan MD;  Location: [facility] OR;  Service: Orthopedics;  Laterality: Left;   • INGUINAL HERNIA REPAIR     • KNEE SURGERY     • PROSTATE SURGERY     • SKIN CANCER EXCISION      face   • SKIN LESION EXCISION     • TOTAL HIP ARTHROPLASTY Right 08/27/2021    Procedure: TOTAL HIP REPLACEMENT;  Surgeon: Arik Magaña MD;  Location:  [facility] OR;  Service: Orthopedics;  Laterality: Right;   • TOTAL HIP ARTHROPLASTY Left 03/25/2022    Procedure: LEFT HIP SCREW REMOVAL / LEFT TOTAL HIP ARTHROPLASTY;  Surgeon: BASHIR Monsivais MD;  Location:  [facility] OR;  Service: Orthopedics;  Laterality: Left;     Family History   Problem Relation Age of Onset   • Alzheimer's disease Mother    • Cancer Father    • Cancer Sister      Social History     Socioeconomic History   • Marital status:    Tobacco Use   • Smoking status: Former     Years: 30.00      Types: Cigarettes     Quit date:      Years since quittin.9     Passive exposure: Past   • Smokeless tobacco: Never   Vaping Use   • Vaping Use: Never used   Substance and Sexual Activity   • Alcohol use: No   • Drug use: No   • Sexual activity: Defer     Allergies   Allergen Reactions   • Adhesive Tape    • Simvastatin Other (See Comments)     Abnormal LFT's   • Tramadol Hallucinations   • Neosporin [Neomycin-Bacitracin Zn-Polymyx] Rash     Current Outpatient Medications   Medication Sig Dispense Refill   • allopurinol (ZYLOPRIM) 300 MG tablet TAKE 1 TABLET BY MOUTH EVERY DAY 90 tablet 3   • apixaban (ELIQUIS) 5 MG tablet tablet Take 1 tablet by mouth Every 12 (Twelve) Hours. 180 tablet 3   • aspirin 81 MG EC tablet Take 81 mg by mouth Daily.     • folic acid (FOLVITE) 1 MG tablet Take 1 mg by mouth Daily.     • iron polysaccharides (NIFEREX) 150 MG capsule Take 150 mg by mouth Daily.     • levothyroxine (SYNTHROID, LEVOTHROID) 75 MCG tablet Take 75 mcg by mouth Daily.     • LORazepam (ATIVAN) 0.5 MG tablet Take 1 tablet by mouth Daily As Needed for Anxiety. (Patient taking differently: Take 0.5 mg by mouth Every Night.) 12 tablet 0   • methotrexate 2.5 MG tablet Take 17.5 mg by mouth 1 (One) Time Per Week.     • metoprolol succinate XL (TOPROL-XL) 25 MG 24 hr tablet TAKE 1 TABLET BY MOUTH EVERY DAY 90 tablet 3   • nitroglycerin (NITROSTAT) 0.4 MG SL tablet Nitrostat 0.4 mg sublingual tablet   Place 1 tablet as needed by sublingual route.     • pantoprazole (PROTONIX) 40 MG EC tablet Take 40 mg by mouth Daily.     • predniSONE (DELTASONE) 5 MG tablet Take 1 mg by mouth Every Other Day.     • triamterene-hydrochlorothiazide (MAXZIDE) 75-50 MG per tablet Take 0.5 tablets by mouth Daily.       Current Facility-Administered Medications   Medication Dose Route Frequency Provider Last Rate Last Admin   • cyanocobalamin injection 1,000 mcg  1,000 mcg Intramuscular Q28 Days Jarad Alexis MD   1,000 mcg at  21 0901     Review of Systems   Constitutional: Negative for chills and fever.   HENT: Positive for hearing loss. Negative for congestion.    Respiratory: Negative for shortness of breath.    Cardiovascular: Positive for leg swelling. Negative for chest pain.   Gastrointestinal: Negative for constipation, diarrhea, nausea and vomiting.   Musculoskeletal: Positive for arthralgias and gait problem.        Foot pain   Skin: Negative for wound.        Thickened, irregular nails   Neurological: Positive for numbness.   Hematological: Bruises/bleeds easily.       OBJECTIVE     Vitals:    22 0837   BP: 120/60   Pulse: 83   SpO2: 99%       PHYSICAL EXAM  GEN:   Accompanied by spouse.     Foot/Ankle Exam:       General:   Appearance: appears stated age and healthy and elderly    Orientation: AAOx3    Affect: appropriate    Gait comment:  Unsteady  Assistance: cane    Shoe Gear:  Casual shoes    VASCULAR      Right Foot Vascularity   Dorsalis pedis:  2+  Posterior tibial:  2+  Skin Temperature: warm    Edema Grading:  Trace  CFT:  3  Pedal Hair Growth:  Present  Varicosities: moderate varicosities       Left Foot Vascularity   Dorsalis pedis:  2+  Posterior tibial:  2+  Skin Temperature: warm    Edema Gradin+ and pitting  CFT:  3  Pedal Hair Growth:  Present  Varicosities: mild varicosities        NEUROLOGIC     Right Foot Neurologic   Light touch sensation:  Diminished  Vibratory sensation:  Diminished  Hot/Cold sensation: diminished    Protective Sensation using Preston-Ling Monofilament:  8     Left Foot Neurologic   Light touch sensation:  Diminished  Vibratory sensation:  Diminished  Hot/cold sensation: diminished    Protective Sensation using Preston-Ling Monofilament:  8     MUSCULOSKELETAL      Right Foot Musculoskeletal   Ecchymosis:  None  Tenderness: toenails    Arch:  Normal  Hallux valgus: Yes       Left Foot Musculoskeletal   Ecchymosis:  None  Tenderness: metatarsals and toenails     Tenderness comment:  2nd and 3rd met  Arch:  Normal  Hallux valgus: Yes       MUSCLE STRENGTH     Right Foot Muscle Strength   Foot dorsiflexion:  4+  Foot plantar flexion:  4+  Foot inversion:  4+  Foot eversion:  4+     Left Foot Muscle Strength   Foot dorsiflexion:  4+  Foot plantar flexion:  4+  Foot inversion:  4+  Foot eversion:  4+     RANGE OF MOTION      Right Foot Range of Motion   Foot and ankle ROM within normal limits       Left Foot Range of Motion   Foot and ankle ROM within normal limits       DERMATOLOGIC     Right Foot Dermatologic   Skin: skin intact    Nails: onychomycosis, abnormally thick, subungual debris and dystrophic nails    Nails comment:  1-5     Left Foot Dermatologic   Skin: skin intact    Nails: onychomycosis, abnormally thick, subungual debris and dystrophic nails    Nails comment:  1-5     Image:       RADIOLOGY/NUCLEAR:  CT Abdomen Pelvis Without Contrast    Result Date: 11/11/2022  Narrative: CT ABDOMEN PELVIS WO CONTRAST- 11/11/2022 9:56 AM CST  HISTORY: Bilateral flank pain, and left kidney stones; R10.9-Unspecified abdominal pain; N20.0-Calculus of kidney  COMPARISON: CT scan dated 3/23/2022  DOSE LENGTH PRODUCT: 250 mGy cm. Automated exposure control was also utilized to decrease patient radiation dose.  TECHNIQUE: Noncontrast enhanced images of the abdomen and pelvis obtained without oral contrast. Multiplanar reformatted images were provided for review.  FINDINGS: LOWER CHEST: Lung bases are clear. Right heart pacer wires.  LIVER: No focal liver lesion within limits of a noncontrast study.  BILIARY SYSTEM: The gallbladder has been removed. There is no evidence of biliary ductal dilation.  PANCREAS: Diffuse atrophy.  SPLEEN: Unremarkable.  KIDNEYS: Bilateral renal cortical thinning. There are bilateral renal cysts. Bilateral urolithiasis including a 7 mm left inferior pole stone. Ureters are decompressed.  ADRENALS: Unremarkable.  RETROPERITONEUM: No mass, lymphadenopathy  or hemorrhage.  GI TRACT: The stomach is decompressed. Small bowel loops are nondilated. Colon is nondistended. No inflammatory changes along the bowel.  OTHER: There is no mesenteric mass, lymphadenopathy or fluid collection. Atheromatous vascular calcification. Diffuse osteopenia. T12 burst fracture with interval increased loss of height, now severe loss of height in the anterior column.  PELVIS: No mass lesion, fluid collection or significant lymphadenopathy is seen in the pelvis. The urinary bladder is normal in appearance.      Impression: 1. Diffuse osteopenia. There is a T12 fracture which was seen on the CT scan back in August 2022. There has been interval collapse of this T12 vertebral body, now with severe loss of height in the anterior column. This is a 3 column fracture and there is a distracted fracture line across the spinous process as well. No subluxation. 2. Bilateral urolithiasis. No obstructing stones are identified. No hydronephrosis. 3. Multiple bilateral renal cysts. This report was finalized on 11/11/2022 13:58 by Dr Phill Singh, .    XR Abdomen KUB    Result Date: 11/11/2022  Narrative: HISTORY: Back pain; kidney/flank tenderness  KUB: Frontal view the abdomen obtained.  COMPARISON: CT abdomen pelvis 3/23/2022  FINDINGS: There or through the calcifications projecting over the left kidney, largest measuring 8 mm suggestive for intrarenal stones. A 4 mm calcification projects over the left sacrum which may be vascular. Distal ureteral stone difficult to exclude. Vascular calcification seen throughout the lower pelvis. Cholecystectomy clips. Nonobstructive bowel gas pattern. Visualization of a cardiac pacer lead projecting over the right ventricle. Bilateral hip prosthesis. Osteopenia degenerative change of the regional skeleton. Surgical clips left inguinal region.      Impression: 1. Left intrarenal stones, largest measuring 8 mm. A 4 mm calcification projects over the left sacrum which may  be vascular in etiology. Distal left ureteral stone difficult to exclude. This report was finalized on 11/11/2022 08:03 by Dr. Emma Hill MD.      LABORATORY/CULTURE RESULTS:      PATHOLOGY RESULTS:       ASSESSMENT/PLAN     Diagnoses and all orders for this visit:    1. Onychomycosis (Primary)    2. Foot pain, left  -     XR Foot 3+ View Left    3. Anticoagulant long-term use    4. Type 2 diabetes mellitus with diabetic neuropathy, without long-term current use of insulin (HCC)    5. Valgus deformity of both great toes    6. Advanced age      Comprehensive lower extremity examination and evaluation was performed.  Discussed findings and treatment plan including risks, benefits, and treatment options with patient in detail. Patient agreed with treatment plan.   After verbal consent obtained, nail(s) x10 debrided of length and thickness with nail nipper without incidence  Patient may maintain nails and calluses at home utilizing emery board or pumice stone between visits as needed  Reviewed at home diabetic foot care including daily foot checks   Continue to follow with PCP for diabetic management.   X-rays of left foot to assess for any bony abnormalities. Concern for stress fx/stress reaction.  Ideally would place patient in CAM boot foot protection, however, given gait abnormality will defer this to prevent issues with safety.  Remain conservative with foot deformities.   Continue diabetic monitoring and control under direction of PCP.  An After Visit Summary was printed and given to the patient at discharge, including (if requested) any available informative/educational handouts regarding diagnosis, treatment, or medications. All questions were answered to patient/family satisfaction. Should symptoms fail to improve or worsen they agree to call or return to clinic or to go to the Emergency Department. Discussed the importance of following up with any needed screening tests/labs/specialist appointments and  any requested follow-up recommended by me today. Importance of maintaining follow-up discussed and patient accepts that missed appointments can delay diagnosis and potentially lead to worsening of conditions.  Return in about 3 months (around 2/18/2023) for Follow-up with Oliver MANCINI, or sooner if acute issues arise.    Lab Frequency Next Occurrence   Stress Test With Myocardial Perfusion (1 Day) Once 03/25/2022   PSA DIAGNOSTIC Once 05/06/2023       This document has been electronically signed by Forest Clemente DPM on November 18, 2022 08:55 CST

## 2022-11-11 ENCOUNTER — HOSPITAL ENCOUNTER (OUTPATIENT)
Dept: GENERAL RADIOLOGY | Facility: HOSPITAL | Age: 87
Discharge: HOME | End: 2022-11-11

## 2022-11-11 ENCOUNTER — OFFICE VISIT (OUTPATIENT)
Dept: UROLOGY | Facility: CLINIC | Age: 87
End: 2022-11-11

## 2022-11-11 ENCOUNTER — HOSPITAL ENCOUNTER (OUTPATIENT)
Dept: CT IMAGING | Facility: HOSPITAL | Age: 87
Discharge: HOME | End: 2022-11-11

## 2022-11-11 VITALS — HEIGHT: 72 IN | TEMPERATURE: 97.2 F | BODY MASS INDEX: 20.21 KG/M2 | WEIGHT: 149.2 LBS

## 2022-11-11 DIAGNOSIS — R31.0 GROSS HEMATURIA: ICD-10-CM

## 2022-11-11 DIAGNOSIS — R31.0 GROSS HEMATURIA: Primary | ICD-10-CM

## 2022-11-11 DIAGNOSIS — R10.9 BILATERAL FLANK PAIN: ICD-10-CM

## 2022-11-11 DIAGNOSIS — N20.0 KIDNEY STONE ON LEFT SIDE: ICD-10-CM

## 2022-11-11 PROCEDURE — 99214 OFFICE O/P EST MOD 30 MIN: CPT | Performed by: PHYSICIAN ASSISTANT

## 2022-11-11 PROCEDURE — 81001 URINALYSIS AUTO W/SCOPE: CPT | Performed by: PHYSICIAN ASSISTANT

## 2022-11-11 PROCEDURE — 74176 CT ABD & PELVIS W/O CONTRAST: CPT

## 2022-11-11 PROCEDURE — 74018 RADEX ABDOMEN 1 VIEW: CPT

## 2022-11-11 NOTE — PROGRESS NOTES
Subjective    Mr. SAINI is 88 y.o. male    Chief Complaint: Follow up for Ct results    History of Present Illness  Patient returns I saw the patient 11/11/2022 with complaint of worsening back pain.  He has a history of Ebonie 6 prostate cancer treated with radiation and his PSAs have been low or nondetectable.  KUB done showed stones in the kidneys and a calcification in the sacral area.  Patient got a CT scan which showed no ureteral stones it showed bilateral benign-appearing cyst bilateral nonobstructing stones.  Patient had a previous thoracic spinal injury and fracture.  CT shows T12 fracture as seen on previous CT but also collapse of the T12 vertebral body.  Other changes as noted by radiologist.  Patient's PCP is aware and patient is being referred to Dr. Rehman.    The following portions of the patient's history were reviewed and updated as appropriate: allergies, current medications, past family history, past medical history, past social history, past surgical history and problem list.    Review of Systems   Constitutional: Negative.    Gastrointestinal: Negative.    Genitourinary: Positive for flank pain.   Musculoskeletal: Positive for back pain.         Current Outpatient Medications:   •  allopurinol (ZYLOPRIM) 300 MG tablet, TAKE 1 TABLET BY MOUTH EVERY DAY, Disp: 90 tablet, Rfl: 3  •  apixaban (ELIQUIS) 5 MG tablet tablet, Take 1 tablet by mouth Every 12 (Twelve) Hours., Disp: 180 tablet, Rfl: 3  •  aspirin 81 MG EC tablet, Take 81 mg by mouth Daily., Disp: , Rfl:   •  folic acid (FOLVITE) 1 MG tablet, Take 1 mg by mouth Daily., Disp: , Rfl:   •  iron polysaccharides (NIFEREX) 150 MG capsule, Take 150 mg by mouth Daily., Disp: , Rfl:   •  levothyroxine (SYNTHROID, LEVOTHROID) 75 MCG tablet, Take 75 mcg by mouth Daily., Disp: , Rfl:   •  LORazepam (ATIVAN) 0.5 MG tablet, Take 1 tablet by mouth Daily As Needed for Anxiety. (Patient taking differently: Take 0.5 mg by mouth Every Night.), Disp: 12  tablet, Rfl: 0  •  methotrexate 2.5 MG tablet, Take 17.5 mg by mouth 1 (One) Time Per Week., Disp: , Rfl:   •  metoprolol succinate XL (TOPROL-XL) 25 MG 24 hr tablet, TAKE 1 TABLET BY MOUTH EVERY DAY, Disp: 90 tablet, Rfl: 3  •  nitroglycerin (NITROSTAT) 0.4 MG SL tablet, Nitrostat 0.4 mg sublingual tablet  Place 1 tablet as needed by sublingual route., Disp: , Rfl:   •  pantoprazole (PROTONIX) 40 MG EC tablet, Take 40 mg by mouth Daily., Disp: , Rfl:   •  predniSONE (DELTASONE) 5 MG tablet, Take 1 mg by mouth Every Other Day., Disp: , Rfl:   •  triamterene-hydrochlorothiazide (MAXZIDE) 75-50 MG per tablet, Take 0.5 tablets by mouth Daily., Disp: , Rfl:     Current Facility-Administered Medications:   •  cyanocobalamin injection 1,000 mcg, 1,000 mcg, Intramuscular, Q28 Days, Jarad Alexis MD, 1,000 mcg at 11/24/21 0901    Past Medical History:   Diagnosis Date   • Abnormal nuclear stress test    • Arthritis    • BPH (benign prostatic hyperplasia)    • Disease of thyroid gland    • GERD (gastroesophageal reflux disease)    • Glaucoma 02/18/2022   • Hyperlipidemia LDL goal <70 11/14/2016   • Ischemic heart disease 06/23/2022   • LVH (left ventricular hypertrophy) 06/23/2022   • Melanoma (HCC)    • Pacemaker 01/18/2019   • PAF (paroxysmal atrial fibrillation) (HCC) 10/29/2019    Chads vas score 3   • Primary hypertension 11/14/2016   • Prostate CA (HCC)    • Sick sinus syndrome (HCC) 03/01/2019       Past Surgical History:   Procedure Laterality Date   • APPENDECTOMY     • CARDIAC CATHETERIZATION Left 12/10/2012   • CARDIAC ELECTROPHYSIOLOGY PROCEDURE N/A 11/23/2018    Procedure: Pacemaker DC new 11/23/2018;  Surgeon: Austin Granados MD;  Location: [facility] CATH INVASIVE LOCATION;  Service: Cardiology   • CHOLECYSTECTOMY     • COLONOSCOPY N/A 06/09/2017    Tics, hemorrhoids repeat exam prn   • COLONOSCOPY W/ POLYPECTOMY  02/16/2012    adenomatous polyp at 80cm   • ENDOSCOPY N/A 9/13/2022    Procedure:  "ESOPHAGOGASTRODUODENOSCOPY WITH ANESTHESIA;  Surgeon: Felice Marroquin MD;  Location:  [facility] ENDOSCOPY;  Service: Gastroenterology;  Laterality: N/A;  pre hematochezia  post; normal   Ashish Callejas MD   • HAND SURGERY Right    • HERNIA REPAIR     • HIP CANNULATED SCREW PLACEMENT Left 2022    Procedure: HIP CANNULATED SCREW PLACEMENT;  Surgeon: Isaiah Sheehan MD;  Location:  [facility] OR;  Service: Orthopedics;  Laterality: Left;   • INGUINAL HERNIA REPAIR     • KNEE SURGERY     • PROSTATE SURGERY     • SKIN CANCER EXCISION      face   • SKIN LESION EXCISION     • TOTAL HIP ARTHROPLASTY Right 2021    Procedure: TOTAL HIP REPLACEMENT;  Surgeon: Arik Magaña MD;  Location:  [facility] OR;  Service: Orthopedics;  Laterality: Right;   • TOTAL HIP ARTHROPLASTY Left 2022    Procedure: LEFT HIP SCREW REMOVAL / LEFT TOTAL HIP ARTHROPLASTY;  Surgeon: BASHIR Monsivais MD;  Location:  [facility] OR;  Service: Orthopedics;  Laterality: Left;       Social History     Socioeconomic History   • Marital status:    Tobacco Use   • Smoking status: Former     Years: 30.00     Types: Cigarettes     Quit date:      Years since quittin.8   • Smokeless tobacco: Never   Vaping Use   • Vaping Use: Never used   Substance and Sexual Activity   • Alcohol use: No   • Drug use: No   • Sexual activity: Defer       Family History   Problem Relation Age of Onset   • Alzheimer's disease Mother    • Cancer Father    • Cancer Sister        Objective    Temp 97.2 °F (36.2 °C)   Ht 182.9 cm (72\")   Wt 68.2 kg (150 lb 6.4 oz)   BMI 20.40 kg/m²     Physical Exam  Vitals reviewed.   Constitutional:       Appearance: Normal appearance.   HENT:      Head: Normocephalic and atraumatic.   Pulmonary:      Effort: Pulmonary effort is normal.   Skin:     Coloration: Skin is not pale.   Neurological:      Mental Status: He is alert and oriented to person, place, and time.   Psychiatric:         Mood and Affect: Mood " normal.         Behavior: Behavior normal.             Results for orders placed or performed in visit on 11/15/22   POC Urinalysis Dipstick, Multipro    Specimen: Urine   Result Value Ref Range    Color Yellow Yellow, Straw, Dark Yellow, Ariana    Clarity, UA Clear Clear    Glucose, UA Negative Negative mg/dL    Bilirubin Negative Negative    Ketones, UA Negative Negative    Specific Gravity  1.015 1.005 - 1.030    Blood, UA Negative Negative    pH, Urine 6.0 5.0 - 8.0    Protein, POC Negative Negative mg/dL    Urobilinogen, UA 0.2 E.U./dL Normal, 0.2 E.U./dL    Nitrite, UA Negative Negative    Leukocytes Negative Negative     Independent review of a CT scan of abdomen and pelvis without contrast  The CT scan of the abdomen/pelvis done without contrast is available for me to review.  Treatment recommendations require an independent review.  First I scanned the liver, spleen, and bowel pattern.  The retroperitoneum including the major vessels and lymphatic packages are briefly reviewed.  This film as been reviewed by the radiologist to determine any non urologic abnormalities that are present.  The kidneys are closely inspected for size, symmetry, contour, parenchymal thickness, perinephric reaction, presence of calcifications, and intrarenal dilation of the collecting system.  The ureters are inspected for their course, caliber, and any calcifications.  The bladder is inspected for its thickness, size, and presence of any calcifications.  This scan shows bilateral nonobstructing kidney stones and bilateral renal cysts.  There is no hydronephrosis no ureteral stone did show as seen previously in CT in August 22 T12 fracture but there has been interval collapse of the T12 vertebral body with loss of height also radiologist describes a 3 column fracture and distracted fracture line across the spinous process.        Assessment and Plan    Diagnoses and all orders for this visit:    1. Bilateral kidney stones  (Primary)  -     POC Urinalysis Dipstick, Multipro    2. Bilateral renal cysts    3. Bilateral thoracic back pain, unspecified chronicity    Discussed findings of the CT with the patient I believe his pain is related to the fractures in his thoracic spine.  There are bilateral nonobstructing stones and renal cyst but no ureteral stone or hydronephrosis therefore I believe it is pain is musculoskeletal related not urologic.    Patient has follow-up for upcoming appoint with Dr. Rehman that was arranged by his PCP Dr. Callejas.  He can return here as needed.

## 2022-11-15 ENCOUNTER — OFFICE VISIT (OUTPATIENT)
Dept: UROLOGY | Facility: CLINIC | Age: 87
End: 2022-11-15

## 2022-11-15 VITALS — HEIGHT: 72 IN | WEIGHT: 150.4 LBS | BODY MASS INDEX: 20.37 KG/M2 | TEMPERATURE: 97.2 F

## 2022-11-15 DIAGNOSIS — N28.1 BILATERAL RENAL CYSTS: ICD-10-CM

## 2022-11-15 DIAGNOSIS — N20.0 BILATERAL KIDNEY STONES: Primary | ICD-10-CM

## 2022-11-15 DIAGNOSIS — M54.6 BILATERAL THORACIC BACK PAIN, UNSPECIFIED CHRONICITY: ICD-10-CM

## 2022-11-15 LAB
BILIRUB BLD-MCNC: NEGATIVE MG/DL
CLARITY, POC: CLEAR
COLOR UR: YELLOW
GLUCOSE UR STRIP-MCNC: NEGATIVE MG/DL
KETONES UR QL: NEGATIVE
LEUKOCYTE EST, POC: NEGATIVE
NITRITE UR-MCNC: NEGATIVE MG/ML
PH UR: 6 [PH] (ref 5–8)
PROT UR STRIP-MCNC: NEGATIVE MG/DL
RBC # UR STRIP: NEGATIVE /UL
SP GR UR: 1.01 (ref 1–1.03)
UROBILINOGEN UR QL: NORMAL

## 2022-11-15 PROCEDURE — 81001 URINALYSIS AUTO W/SCOPE: CPT | Performed by: PHYSICIAN ASSISTANT

## 2022-11-15 PROCEDURE — 99213 OFFICE O/P EST LOW 20 MIN: CPT | Performed by: PHYSICIAN ASSISTANT

## 2022-11-17 ENCOUNTER — TELEPHONE (OUTPATIENT)
Dept: PODIATRY | Facility: CLINIC | Age: 87
End: 2022-11-17

## 2022-11-17 NOTE — TELEPHONE ENCOUNTER
Called patient to confirmed appointment for 11/18/2022 with Dr. Clemente. I had to leave a voicemail for patient to call back to confirm or to reschedule if needed.

## 2022-11-18 ENCOUNTER — TELEPHONE (OUTPATIENT)
Dept: PODIATRY | Facility: CLINIC | Age: 87
End: 2022-11-18

## 2022-11-18 ENCOUNTER — HOSPITAL ENCOUNTER (OUTPATIENT)
Dept: GENERAL RADIOLOGY | Facility: HOSPITAL | Age: 87
Discharge: HOME | End: 2022-11-18
Admitting: PODIATRIST

## 2022-11-18 ENCOUNTER — OFFICE VISIT (OUTPATIENT)
Dept: PODIATRY | Facility: CLINIC | Age: 87
End: 2022-11-18

## 2022-11-18 VITALS
OXYGEN SATURATION: 99 % | WEIGHT: 150 LBS | DIASTOLIC BLOOD PRESSURE: 60 MMHG | SYSTOLIC BLOOD PRESSURE: 120 MMHG | HEART RATE: 83 BPM | BODY MASS INDEX: 20.32 KG/M2 | HEIGHT: 72 IN

## 2022-11-18 DIAGNOSIS — M20.12 VALGUS DEFORMITY OF BOTH GREAT TOES: ICD-10-CM

## 2022-11-18 DIAGNOSIS — M20.11 VALGUS DEFORMITY OF BOTH GREAT TOES: ICD-10-CM

## 2022-11-18 DIAGNOSIS — E11.40 TYPE 2 DIABETES MELLITUS WITH DIABETIC NEUROPATHY, WITHOUT LONG-TERM CURRENT USE OF INSULIN: ICD-10-CM

## 2022-11-18 DIAGNOSIS — Z79.01 ANTICOAGULANT LONG-TERM USE: ICD-10-CM

## 2022-11-18 DIAGNOSIS — B35.1 ONYCHOMYCOSIS: Primary | ICD-10-CM

## 2022-11-18 DIAGNOSIS — M79.672 FOOT PAIN, LEFT: ICD-10-CM

## 2022-11-18 DIAGNOSIS — R54 ADVANCED AGE: ICD-10-CM

## 2022-11-18 PROCEDURE — 11721 DEBRIDE NAIL 6 OR MORE: CPT | Performed by: PODIATRIST

## 2022-11-18 PROCEDURE — 99213 OFFICE O/P EST LOW 20 MIN: CPT | Performed by: PODIATRIST

## 2022-11-18 PROCEDURE — 73630 X-RAY EXAM OF FOOT: CPT

## 2022-11-18 NOTE — TELEPHONE ENCOUNTER
Called patient with xray results. He does not have a broken foot. Pt's wife asked if she should fu with his pcp to see about swelling I instructed her that it would be a good idea to consult with them incase its a different issue. Pt verbalized understanding.

## 2022-12-16 ENCOUNTER — OFFICE VISIT (OUTPATIENT)
Dept: CARDIOLOGY | Facility: CLINIC | Age: 87
End: 2022-12-16

## 2022-12-16 VITALS
SYSTOLIC BLOOD PRESSURE: 106 MMHG | OXYGEN SATURATION: 97 % | DIASTOLIC BLOOD PRESSURE: 68 MMHG | WEIGHT: 146 LBS | BODY MASS INDEX: 19.77 KG/M2 | HEART RATE: 84 BPM | HEIGHT: 72 IN

## 2022-12-16 DIAGNOSIS — E78.5 HYPERLIPIDEMIA LDL GOAL <70: ICD-10-CM

## 2022-12-16 DIAGNOSIS — I25.10 SINGLE VESSEL CORONARY ARTERY DISEASE: ICD-10-CM

## 2022-12-16 DIAGNOSIS — I51.7 LVH (LEFT VENTRICULAR HYPERTROPHY): Primary | ICD-10-CM

## 2022-12-16 DIAGNOSIS — I48.92 PAROXYSMAL ATRIAL FLUTTER: ICD-10-CM

## 2022-12-16 DIAGNOSIS — I48.0 PAF (PAROXYSMAL ATRIAL FIBRILLATION): ICD-10-CM

## 2022-12-16 DIAGNOSIS — Z95.0 PACEMAKER: ICD-10-CM

## 2022-12-16 DIAGNOSIS — I25.9 ISCHEMIC HEART DISEASE: ICD-10-CM

## 2022-12-16 PROCEDURE — 99214 OFFICE O/P EST MOD 30 MIN: CPT | Performed by: INTERNAL MEDICINE

## 2022-12-16 PROCEDURE — 93000 ELECTROCARDIOGRAM COMPLETE: CPT | Performed by: INTERNAL MEDICINE
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
## 2022-12-22 ENCOUNTER — APPOINTMENT (OUTPATIENT)
Dept: CT IMAGING | Facility: HOSPITAL | Age: 87
End: 2022-12-22

## 2022-12-22 ENCOUNTER — HOSPITAL ENCOUNTER (OUTPATIENT)
Facility: HOSPITAL | Age: 87
Setting detail: OBSERVATION
Discharge: HOME | End: 2022-12-23
Attending: INTERNAL MEDICINE | Admitting: FAMILY MEDICINE

## 2022-12-22 DIAGNOSIS — R41.0 CONFUSION: Primary | ICD-10-CM

## 2022-12-22 LAB
ALBUMIN SERPL-MCNC: 3.4 G/DL (ref 3.5–5.2)
ALBUMIN/GLOB SERPL: 1 G/DL
ALP SERPL-CCNC: 97 U/L (ref 39–117)
ALT SERPL W P-5'-P-CCNC: <5 U/L (ref 1–41)
ANION GAP SERPL CALCULATED.3IONS-SCNC: 11 MMOL/L (ref 5–15)
AST SERPL-CCNC: 15 U/L (ref 1–40)
B PARAPERT DNA SPEC QL NAA+PROBE: NOT DETECTED
B PERT DNA SPEC QL NAA+PROBE: NOT DETECTED
BASOPHILS # BLD AUTO: 0.04 10*3/MM3 (ref 0–0.2)
BASOPHILS NFR BLD AUTO: 0.5 % (ref 0–1.5)
BILIRUB SERPL-MCNC: 0.3 MG/DL (ref 0–1.2)
BILIRUB UR QL STRIP: NEGATIVE
BUN SERPL-MCNC: 28 MG/DL (ref 8–23)
BUN/CREAT SERPL: 30.4 (ref 7–25)
C PNEUM DNA NPH QL NAA+NON-PROBE: NOT DETECTED
CALCIUM SPEC-SCNC: 10.1 MG/DL (ref 8.6–10.5)
CHLORIDE SERPL-SCNC: 99 MMOL/L (ref 98–107)
CLARITY UR: ABNORMAL
CO2 SERPL-SCNC: 27 MMOL/L (ref 22–29)
COLOR UR: YELLOW
CREAT SERPL-MCNC: 0.92 MG/DL (ref 0.76–1.27)
DEPRECATED RDW RBC AUTO: 54.6 FL (ref 37–54)
EGFRCR SERPLBLD CKD-EPI 2021: 80 ML/MIN/1.73
EOSINOPHIL # BLD AUTO: 0.2 10*3/MM3 (ref 0–0.4)
EOSINOPHIL NFR BLD AUTO: 2.7 % (ref 0.3–6.2)
ERYTHROCYTE [DISTWIDTH] IN BLOOD BY AUTOMATED COUNT: 15.7 % (ref 12.3–15.4)
FLUAV SUBTYP SPEC NAA+PROBE: NOT DETECTED
FLUBV RNA ISLT QL NAA+PROBE: NOT DETECTED
GLOBULIN UR ELPH-MCNC: 3.5 GM/DL
GLUCOSE SERPL-MCNC: 127 MG/DL (ref 65–99)
GLUCOSE UR STRIP-MCNC: NEGATIVE MG/DL
HADV DNA SPEC NAA+PROBE: NOT DETECTED
HCOV 229E RNA SPEC QL NAA+PROBE: NOT DETECTED
HCOV HKU1 RNA SPEC QL NAA+PROBE: NOT DETECTED
HCOV NL63 RNA SPEC QL NAA+PROBE: NOT DETECTED
HCOV OC43 RNA SPEC QL NAA+PROBE: NOT DETECTED
HCT VFR BLD AUTO: 31.7 % (ref 37.5–51)
HGB BLD-MCNC: 9.8 G/DL (ref 13–17.7)
HGB UR QL STRIP.AUTO: NEGATIVE
HMPV RNA NPH QL NAA+NON-PROBE: NOT DETECTED
HPIV1 RNA ISLT QL NAA+PROBE: NOT DETECTED
HPIV2 RNA SPEC QL NAA+PROBE: NOT DETECTED
HPIV3 RNA NPH QL NAA+PROBE: NOT DETECTED
HPIV4 P GENE NPH QL NAA+PROBE: NOT DETECTED
IMM GRANULOCYTES # BLD AUTO: 0.04 10*3/MM3 (ref 0–0.05)
IMM GRANULOCYTES NFR BLD AUTO: 0.5 % (ref 0–0.5)
KETONES UR QL STRIP: NEGATIVE
LEUKOCYTE ESTERASE UR QL STRIP.AUTO: NEGATIVE
LYMPHOCYTES # BLD AUTO: 0.68 10*3/MM3 (ref 0.7–3.1)
LYMPHOCYTES NFR BLD AUTO: 9.1 % (ref 19.6–45.3)
M PNEUMO IGG SER IA-ACNC: NOT DETECTED
MCH RBC QN AUTO: 29.8 PG (ref 26.6–33)
MCHC RBC AUTO-ENTMCNC: 30.9 G/DL (ref 31.5–35.7)
MCV RBC AUTO: 96.4 FL (ref 79–97)
MONOCYTES # BLD AUTO: 0.49 10*3/MM3 (ref 0.1–0.9)
MONOCYTES NFR BLD AUTO: 6.6 % (ref 5–12)
NEUTROPHILS NFR BLD AUTO: 5.99 10*3/MM3 (ref 1.7–7)
NEUTROPHILS NFR BLD AUTO: 80.6 % (ref 42.7–76)
NITRITE UR QL STRIP: NEGATIVE
NRBC BLD AUTO-RTO: 0 /100 WBC (ref 0–0.2)
PH UR STRIP.AUTO: 5.5 [PH] (ref 5–8)
PLATELET # BLD AUTO: 225 10*3/MM3 (ref 140–450)
PMV BLD AUTO: 9 FL (ref 6–12)
POTASSIUM SERPL-SCNC: 3.9 MMOL/L (ref 3.5–5.2)
PROT SERPL-MCNC: 6.9 G/DL (ref 6–8.5)
PROT UR QL STRIP: ABNORMAL
RBC # BLD AUTO: 3.29 10*6/MM3 (ref 4.14–5.8)
RHINOVIRUS RNA SPEC NAA+PROBE: NOT DETECTED
RSV RNA NPH QL NAA+NON-PROBE: NOT DETECTED
SARS-COV-2 RNA NPH QL NAA+NON-PROBE: NOT DETECTED
SODIUM SERPL-SCNC: 137 MMOL/L (ref 136–145)
SP GR UR STRIP: 1.02 (ref 1–1.03)
TROPONIN T SERPL-MCNC: 0.03 NG/ML (ref 0–0.03)
UROBILINOGEN UR QL STRIP: ABNORMAL
WBC NRBC COR # BLD: 7.44 10*3/MM3 (ref 3.4–10.8)

## 2022-12-22 PROCEDURE — 99285 EMERGENCY DEPT VISIT HI MDM: CPT

## 2022-12-22 PROCEDURE — G0378 HOSPITAL OBSERVATION PER HR: HCPCS

## 2022-12-22 PROCEDURE — 84484 ASSAY OF TROPONIN QUANT: CPT | Performed by: INTERNAL MEDICINE

## 2022-12-22 PROCEDURE — 70450 CT HEAD/BRAIN W/O DYE: CPT

## 2022-12-22 PROCEDURE — 81003 URINALYSIS AUTO W/O SCOPE: CPT | Performed by: INTERNAL MEDICINE

## 2022-12-22 PROCEDURE — 80053 COMPREHEN METABOLIC PANEL: CPT | Performed by: INTERNAL MEDICINE

## 2022-12-22 PROCEDURE — 0202U NFCT DS 22 TRGT SARS-COV-2: CPT | Performed by: INTERNAL MEDICINE

## 2022-12-22 PROCEDURE — 96360 HYDRATION IV INFUSION INIT: CPT

## 2022-12-22 PROCEDURE — 85025 COMPLETE CBC W/AUTO DIFF WBC: CPT | Performed by: INTERNAL MEDICINE

## 2022-12-22 PROCEDURE — 96361 HYDRATE IV INFUSION ADD-ON: CPT

## 2022-12-22 PROCEDURE — 36415 COLL VENOUS BLD VENIPUNCTURE: CPT

## 2022-12-22 RX ORDER — CALCIUM CARBONATE 200(500)MG
1 TABLET,CHEWABLE ORAL 3 TIMES DAILY PRN
Status: DISCONTINUED | OUTPATIENT
Start: 2022-12-22 | End: 2022-12-23 | Stop reason: HOSPADM

## 2022-12-22 RX ORDER — LORAZEPAM 0.5 MG/1
0.5 TABLET ORAL DAILY PRN
Status: DISCONTINUED | OUTPATIENT
Start: 2022-12-22 | End: 2022-12-23 | Stop reason: HOSPADM

## 2022-12-22 RX ORDER — ASPIRIN 81 MG/1
81 TABLET ORAL DAILY
Status: DISCONTINUED | OUTPATIENT
Start: 2022-12-23 | End: 2022-12-23 | Stop reason: HOSPADM

## 2022-12-22 RX ORDER — PREDNISONE 1 MG/1
1 TABLET ORAL EVERY OTHER DAY
Status: DISCONTINUED | OUTPATIENT
Start: 2022-12-23 | End: 2022-12-23 | Stop reason: HOSPADM

## 2022-12-22 RX ORDER — LANOLIN ALCOHOL/MO/W.PET/CERES
6 CREAM (GRAM) TOPICAL NIGHTLY PRN
Status: DISCONTINUED | OUTPATIENT
Start: 2022-12-22 | End: 2022-12-23 | Stop reason: HOSPADM

## 2022-12-22 RX ORDER — METOPROLOL SUCCINATE 25 MG/1
25 TABLET, EXTENDED RELEASE ORAL DAILY
Status: DISCONTINUED | OUTPATIENT
Start: 2022-12-23 | End: 2022-12-23 | Stop reason: HOSPADM

## 2022-12-22 RX ORDER — SODIUM CHLORIDE, SODIUM LACTATE, POTASSIUM CHLORIDE, CALCIUM CHLORIDE 600; 310; 30; 20 MG/100ML; MG/100ML; MG/100ML; MG/100ML
100 INJECTION, SOLUTION INTRAVENOUS CONTINUOUS
Status: DISCONTINUED | OUTPATIENT
Start: 2022-12-22 | End: 2022-12-23 | Stop reason: HOSPADM

## 2022-12-22 RX ORDER — SODIUM CHLORIDE 0.9 % (FLUSH) 0.9 %
10 SYRINGE (ML) INJECTION EVERY 12 HOURS SCHEDULED
Status: DISCONTINUED | OUTPATIENT
Start: 2022-12-22 | End: 2022-12-23 | Stop reason: HOSPADM

## 2022-12-22 RX ORDER — ONDANSETRON 2 MG/ML
4 INJECTION INTRAMUSCULAR; INTRAVENOUS EVERY 6 HOURS PRN
Status: DISCONTINUED | OUTPATIENT
Start: 2022-12-22 | End: 2022-12-23 | Stop reason: HOSPADM

## 2022-12-22 RX ORDER — SODIUM CHLORIDE 0.9 % (FLUSH) 0.9 %
10 SYRINGE (ML) INJECTION AS NEEDED
Status: DISCONTINUED | OUTPATIENT
Start: 2022-12-22 | End: 2022-12-23 | Stop reason: HOSPADM

## 2022-12-22 RX ORDER — NITROGLYCERIN 0.4 MG/1
0.4 TABLET SUBLINGUAL
Status: DISCONTINUED | OUTPATIENT
Start: 2022-12-22 | End: 2022-12-23 | Stop reason: HOSPADM

## 2022-12-22 RX ORDER — LEVOTHYROXINE SODIUM 0.07 MG/1
75 TABLET ORAL
Status: DISCONTINUED | OUTPATIENT
Start: 2022-12-23 | End: 2022-12-23 | Stop reason: HOSPADM

## 2022-12-22 RX ORDER — PANTOPRAZOLE SODIUM 40 MG/1
40 TABLET, DELAYED RELEASE ORAL
Status: DISCONTINUED | OUTPATIENT
Start: 2022-12-23 | End: 2022-12-23 | Stop reason: HOSPADM

## 2022-12-22 RX ORDER — ACETAMINOPHEN 325 MG/1
650 TABLET ORAL EVERY 4 HOURS PRN
Status: DISCONTINUED | OUTPATIENT
Start: 2022-12-22 | End: 2022-12-23 | Stop reason: HOSPADM

## 2022-12-22 RX ORDER — SODIUM CHLORIDE 9 MG/ML
40 INJECTION, SOLUTION INTRAVENOUS AS NEEDED
Status: DISCONTINUED | OUTPATIENT
Start: 2022-12-22 | End: 2022-12-23 | Stop reason: HOSPADM

## 2022-12-22 RX ORDER — IRON POLYSACCHARIDE COMPLEX 150 MG
150 CAPSULE ORAL DAILY
Status: DISCONTINUED | OUTPATIENT
Start: 2022-12-23 | End: 2022-12-23 | Stop reason: HOSPADM

## 2022-12-22 RX ORDER — ALLOPURINOL 300 MG/1
300 TABLET ORAL DAILY
Status: DISCONTINUED | OUTPATIENT
Start: 2022-12-23 | End: 2022-12-23 | Stop reason: HOSPADM

## 2022-12-22 RX ORDER — FOLIC ACID 1 MG/1
1 TABLET ORAL DAILY
Status: DISCONTINUED | OUTPATIENT
Start: 2022-12-23 | End: 2022-12-23 | Stop reason: HOSPADM

## 2022-12-22 RX ADMIN — Medication 10 ML: at 22:25

## 2022-12-22 RX ADMIN — SODIUM CHLORIDE, POTASSIUM CHLORIDE, SODIUM LACTATE AND CALCIUM CHLORIDE 100 ML/HR: 600; 310; 30; 20 INJECTION, SOLUTION INTRAVENOUS at 22:24

## 2022-12-22 RX ADMIN — LORAZEPAM 0.5 MG: 0.5 TABLET ORAL at 22:49

## 2022-12-22 RX ADMIN — APIXABAN 5 MG: 5 TABLET, FILM COATED ORAL at 22:48
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
## 2022-12-23 VITALS
RESPIRATION RATE: 20 BRPM | SYSTOLIC BLOOD PRESSURE: 117 MMHG | BODY MASS INDEX: 21.4 KG/M2 | OXYGEN SATURATION: 96 % | WEIGHT: 158 LBS | HEART RATE: 91 BPM | TEMPERATURE: 99.1 F | DIASTOLIC BLOOD PRESSURE: 66 MMHG | HEIGHT: 72 IN

## 2022-12-23 PROBLEM — R41.0 CONFUSION: Status: RESOLVED | Noted: 2022-12-22 | Resolved: 2022-12-23

## 2022-12-23 LAB
ANION GAP SERPL CALCULATED.3IONS-SCNC: 8 MMOL/L (ref 5–15)
BASOPHILS # BLD AUTO: 0.04 10*3/MM3 (ref 0–0.2)
BASOPHILS NFR BLD AUTO: 0.6 % (ref 0–1.5)
BUN SERPL-MCNC: 24 MG/DL (ref 8–23)
BUN/CREAT SERPL: 25.8 (ref 7–25)
CALCIUM SPEC-SCNC: 9.8 MG/DL (ref 8.6–10.5)
CHLORIDE SERPL-SCNC: 101 MMOL/L (ref 98–107)
CO2 SERPL-SCNC: 27 MMOL/L (ref 22–29)
CREAT SERPL-MCNC: 0.93 MG/DL (ref 0.76–1.27)
DEPRECATED RDW RBC AUTO: 55.6 FL (ref 37–54)
EGFRCR SERPLBLD CKD-EPI 2021: 79 ML/MIN/1.73
EOSINOPHIL # BLD AUTO: 0.09 10*3/MM3 (ref 0–0.4)
EOSINOPHIL NFR BLD AUTO: 1.3 % (ref 0.3–6.2)
ERYTHROCYTE [DISTWIDTH] IN BLOOD BY AUTOMATED COUNT: 15.9 % (ref 12.3–15.4)
FERRITIN SERPL-MCNC: 729.1 NG/ML (ref 30–400)
GLUCOSE SERPL-MCNC: 126 MG/DL (ref 65–99)
HCT VFR BLD AUTO: 29.7 % (ref 37.5–51)
HGB BLD-MCNC: 9.3 G/DL (ref 13–17.7)
IMM GRANULOCYTES # BLD AUTO: 0.05 10*3/MM3 (ref 0–0.05)
IMM GRANULOCYTES NFR BLD AUTO: 0.7 % (ref 0–0.5)
IRON 24H UR-MRATE: 15 MCG/DL (ref 59–158)
IRON SATN MFR SERPL: 8 % (ref 20–50)
LYMPHOCYTES # BLD AUTO: 0.74 10*3/MM3 (ref 0.7–3.1)
LYMPHOCYTES NFR BLD AUTO: 10.9 % (ref 19.6–45.3)
MCH RBC QN AUTO: 30.3 PG (ref 26.6–33)
MCHC RBC AUTO-ENTMCNC: 31.3 G/DL (ref 31.5–35.7)
MCV RBC AUTO: 96.7 FL (ref 79–97)
MONOCYTES # BLD AUTO: 0.67 10*3/MM3 (ref 0.1–0.9)
MONOCYTES NFR BLD AUTO: 9.9 % (ref 5–12)
NEUTROPHILS NFR BLD AUTO: 5.17 10*3/MM3 (ref 1.7–7)
NEUTROPHILS NFR BLD AUTO: 76.6 % (ref 42.7–76)
NRBC BLD AUTO-RTO: 0 /100 WBC (ref 0–0.2)
PLATELET # BLD AUTO: 207 10*3/MM3 (ref 140–450)
PMV BLD AUTO: 9.2 FL (ref 6–12)
POTASSIUM SERPL-SCNC: 3.9 MMOL/L (ref 3.5–5.2)
RBC # BLD AUTO: 3.07 10*6/MM3 (ref 4.14–5.8)
SODIUM SERPL-SCNC: 136 MMOL/L (ref 136–145)
TIBC SERPL-MCNC: 194 MCG/DL (ref 298–536)
TRANSFERRIN SERPL-MCNC: 130 MG/DL (ref 200–360)
WBC NRBC COR # BLD: 6.76 10*3/MM3 (ref 3.4–10.8)

## 2022-12-23 PROCEDURE — 82728 ASSAY OF FERRITIN: CPT | Performed by: STUDENT IN AN ORGANIZED HEALTH CARE EDUCATION/TRAINING PROGRAM

## 2022-12-23 PROCEDURE — G0378 HOSPITAL OBSERVATION PER HR: HCPCS

## 2022-12-23 PROCEDURE — 80048 BASIC METABOLIC PNL TOTAL CA: CPT | Performed by: STUDENT IN AN ORGANIZED HEALTH CARE EDUCATION/TRAINING PROGRAM

## 2022-12-23 PROCEDURE — 99214 OFFICE O/P EST MOD 30 MIN: CPT | Performed by: PSYCHIATRY & NEUROLOGY

## 2022-12-23 PROCEDURE — 83540 ASSAY OF IRON: CPT | Performed by: STUDENT IN AN ORGANIZED HEALTH CARE EDUCATION/TRAINING PROGRAM

## 2022-12-23 PROCEDURE — 96361 HYDRATE IV INFUSION ADD-ON: CPT

## 2022-12-23 PROCEDURE — 85025 COMPLETE CBC W/AUTO DIFF WBC: CPT | Performed by: STUDENT IN AN ORGANIZED HEALTH CARE EDUCATION/TRAINING PROGRAM

## 2022-12-23 PROCEDURE — 84466 ASSAY OF TRANSFERRIN: CPT | Performed by: STUDENT IN AN ORGANIZED HEALTH CARE EDUCATION/TRAINING PROGRAM

## 2022-12-23 RX ADMIN — SODIUM CHLORIDE, POTASSIUM CHLORIDE, SODIUM LACTATE AND CALCIUM CHLORIDE 100 ML/HR: 600; 310; 30; 20 INJECTION, SOLUTION INTRAVENOUS at 05:41

## 2022-12-23 RX ADMIN — LEVOTHYROXINE SODIUM 75 MCG: 75 TABLET ORAL at 06:50

## 2022-12-23 RX ADMIN — PANTOPRAZOLE SODIUM 40 MG: 40 TABLET, DELAYED RELEASE ORAL at 06:50

## 2022-12-23 NOTE — ED NOTES
Pt sleeping. Wife updated. I have spoke to Dr Callejas who is coming in to see patient and potentially discharge pt

## 2022-12-23 NOTE — DISCHARGE SUMMARY
Date of Discharge:  12/23/2022    Discharge Diagnosis:   Confusion likely exacerbated by febrile episode  Dementia  Hypothyroidism  Hypertension    Problem List:  Active Hospital Problems    Diagnosis  POA   • Acquired hypothyroidism [E03.9]  Yes   • Anemia [D64.9]  Yes   • Atherosclerosis of coronary artery without angina pectoris [I25.10]  Yes   • Atrial fibrillation (HCC) [I48.91]  Yes   • Complete atrioventricular block (HCC) [I44.2]  Yes   • Disorder of vitamin B12 [E53.8]  Yes   • Essential hypertension [I10]  Yes   • Gout [M10.9]  Yes   • Hypothyroidism due to Hashimoto's thyroiditis [E03.8, E06.3]  Yes      Resolved Hospital Problems    Diagnosis Date Resolved POA   • **Confusion [R41.0] 12/23/2022 Yes       Presenting Problem/History of Present Illness  Confusion [R41.0]        Hospital Course  Patient is a 88 y.o. male presented with subacute confusion starting a few days prior to admission.  His wife noticed change in behavior.  He did feel hot and using a temporal thermometer measured out at 104.  She was concerned for possible UTI given his behavior.  He was brought into the emergency room by EMS.  By time he was evaluated here he was afebrile and almost back to his baseline.  Full work-up including viral panels and urinalysis were negative.  Decision was for observation for possible stroke work-up.  Was seen and evaluated by neurology.  It is felt this is all dementia and no further work-up recommended.  Plan is for discharge home out of the hospital environment as he will likely do better.  We will follow him up in the office in short order to review his overnight stay in the ER.  Further work-up will be determined in that setting.      Procedures Performed         Consults:   Consults     Date and Time Order Name Status Description    12/23/2022  8:29 AM Inpatient Neurology Consult General            Pertinent Test Results: Please refer to results review tab in epic.    Condition on Discharge:  Stable    Vital Signs  Temp:  [98 °F (36.7 °C)-98.4 °F (36.9 °C)] 98.4 °F (36.9 °C)  Heart Rate:  [1-102] 85  Resp:  [16-20] 20  BP: (100-132)/(56-78) 109/59    Discharge Disposition  Home or Self Care    Discharge Medications     Discharge Medications      Changes to Medications      Instructions Start Date   LORazepam 0.5 MG tablet  Commonly known as: ATIVAN  What changed: when to take this   0.5 mg, Oral, Daily PRN         Continue These Medications      Instructions Start Date   allopurinol 300 MG tablet  Commonly known as: ZYLOPRIM   TAKE 1 TABLET BY MOUTH EVERY DAY      apixaban 5 MG tablet tablet  Commonly known as: ELIQUIS   5 mg, Oral, Every 12 Hours Scheduled      aspirin 81 MG EC tablet   81 mg, Oral, Daily      folic acid 1 MG tablet  Commonly known as: FOLVITE   1 mg, Oral, Daily      iron polysaccharides 150 MG capsule  Commonly known as: NIFEREX   150 mg, Oral, Daily      levothyroxine 75 MCG tablet  Commonly known as: SYNTHROID, LEVOTHROID   75 mcg, Oral, Daily      methotrexate 2.5 MG tablet   17.5 mg, Oral, Weekly      metoprolol succinate XL 25 MG 24 hr tablet  Commonly known as: TOPROL-XL   TAKE 1 TABLET BY MOUTH EVERY DAY      nitroglycerin 0.4 MG SL tablet  Commonly known as: NITROSTAT   Nitrostat 0.4 mg sublingual tablet   Place 1 tablet as needed by sublingual route.      pantoprazole 40 MG EC tablet  Commonly known as: PROTONIX   40 mg, Oral, Daily      predniSONE 5 MG tablet  Commonly known as: DELTASONE   1 mg, Oral, Every Other Day, 1 mg every two days      triamterene-hydrochlorothiazide 75-50 MG per tablet  Commonly known as: MAXZIDE   0.5 tablets, Oral, Daily, 37.5-25             Discharge Diet   Regular    Activity at Discharge  Activity Instructions    Max stimulation during daytime and min stimulation during night time    Follow up with Dr Callejas for new medication, Aricept and outpatient treatment           Follow-up Appointments  Future Appointments   Date Time Provider Department  [place]   12/29/2022  2:00 PM Pola Helm APRN [facility]   2/14/2023  9:15 AM [facility] DEVICE CHECK [facility]   2/23/2023  3:30 PM Grant Melendez APRN [facility]   6/16/2023  9:30 AM Austin Granados MD [facility]         Test Results Pending at Discharge  None    Ashish Callejas MD    Time: Discharge 25 min

## 2022-12-23 NOTE — ED NOTES
The following fall interventions were initiated:    [x] Patient and/or family given education   [x] Call light within reach and educated on how to use   [x] Bed rails up per protocol    [x] Bed locked and in the lowest position   [x] Bed alarm set and on loudest setting   [x] Fall wrist band applied   [x] Non skid footwear applied   [x] Room free of clutter   [x] Patient items within reach   [x] Adequate lighting provided  [x] Falls sign present    [] Patient moved closer to nursing station   [] Restraints applied
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
## 2022-12-23 NOTE — H&P
Patient admitted to observation status to the ER.  Was seen by neurology and discussed.  He was discharged in less than 24 hours from arrival to the hospital.  Please refer to discharge summary for for hospital course.    Electronically signed by Ashish Callejas MD, 12/23/22, 12:01 PM CST.

## 2022-12-23 NOTE — DISCHARGE INSTR - ACTIVITY
Max stimulation during daytime and min stimulation during night time    Follow up with Dr Callejas for new medication, Aricept and outpatient treatment

## 2022-12-24 ENCOUNTER — READMISSION MANAGEMENT (OUTPATIENT)
Dept: CALL CENTER | Facility: HOSPITAL | Age: 87
End: 2022-12-24

## 2022-12-24 PROCEDURE — 96361 HYDRATE IV INFUSION ADD-ON: CPT

## 2022-12-24 NOTE — OUTREACH NOTE
Prep Survey    Flowsheet Row Responses   [facility] patient discharged from? [place]   Is LACE score < 7 ? No   Eligibility Readm Mgmt   Discharge diagnosis Confusion likely exacerbated by febrile episode   Does the patient have one of the following disease processes/diagnoses(primary or secondary)? Other   Does the patient have Home health ordered? No   Is there a DME ordered? No   Prep survey completed? Yes          GEORGE DOUGLAS - Registered Nurse

## 2022-12-28 ENCOUNTER — READMISSION MANAGEMENT (OUTPATIENT)
Dept: CALL CENTER | Facility: HOSPITAL | Age: 87
End: 2022-12-28

## 2022-12-28 NOTE — OUTREACH NOTE
Medical Week 1 Survey    Flowsheet Row Responses   [facility] patient discharged from? [place]   Does the patient have one of the following disease processes/diagnoses(primary or secondary)? Other   Week 1 attempt successful? Yes   Call start time 1536   Call end time 1538   Discharge diagnosis Confusion likely exacerbated by febrile episode   Person spoke with today (if not patient) and relationship Patient   Meds reviewed with patient/caregiver? Yes   Is the patient having any side effects they believe may be caused by any medication additions or changes? No   Does the patient have all medications ordered at discharge? N/A   Is the patient taking all medications as directed (includes completed medication regime)? Yes   Does the patient have a primary care provider?  Yes   Does the patient have an appointment with their PCP within 7 days of discharge? Yes   Has the patient kept scheduled appointments due by today? N/A   Psychosocial issues? No   Did the patient receive a copy of their discharge instructions? Yes   Nursing interventions Reviewed instructions with patient   What is the patient's perception of their health status since discharge? Improving   Is the patient/caregiver able to teach back signs and symptoms related to disease process for when to call PCP? Yes   Is the patient/caregiver able to teach back signs and symptoms related to disease process for when to call 911? Yes   Is the patient/caregiver able to teach back the hierarchy of who to call/visit for symptoms/problems? PCP, Specialist, Home health nurse, Urgent Care, ED, 911 Yes   If the patient is a current smoker, are they able to teach back resources for cessation? Not a smoker   Week 1 call completed? Yes   Wrap up additional comments Spouse states pt is doing better. Reviewed AVS/medications with spouse. Pt has a PCP fu appt next week per spouse.          ALICE KNIGHT - Registered Nurse

## 2022-12-29 ENCOUNTER — OFFICE VISIT (OUTPATIENT)
Dept: NEUROSURGERY | Facility: CLINIC | Age: 87
End: 2022-12-29

## 2022-12-29 VITALS
BODY MASS INDEX: 21.4 KG/M2 | WEIGHT: 158 LBS | SYSTOLIC BLOOD PRESSURE: 117 MMHG | HEIGHT: 72 IN | DIASTOLIC BLOOD PRESSURE: 64 MMHG

## 2022-12-29 DIAGNOSIS — Z78.9 NONSMOKER: ICD-10-CM

## 2022-12-29 DIAGNOSIS — S22.080A COMPRESSION FRACTURE OF T12 VERTEBRA, INITIAL ENCOUNTER: Primary | ICD-10-CM

## 2022-12-29 PROCEDURE — 99213 OFFICE O/P EST LOW 20 MIN: CPT | Performed by: NURSE PRACTITIONER
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
## 2022-12-30 ENCOUNTER — TELEPHONE (OUTPATIENT)
Dept: NEUROSURGERY | Facility: CLINIC | Age: 87
End: 2022-12-30

## 2022-12-30 DIAGNOSIS — S22.080A COMPRESSION FRACTURE OF T12 VERTEBRA, INITIAL ENCOUNTER: Primary | ICD-10-CM

## 2022-12-30 NOTE — TELEPHONE ENCOUNTER
Placed a call to Dr Granados office spoke with Helen to get information on pt pace maker to see if pace maker is MRI compatible. Per Helen pt has MRI conditional device. Helen stated to put pts order in and on order add [facility] [employer] pace maker. Ended call with Helen understanding.

## 2023-01-02 PROCEDURE — 93294 REM INTERROG EVL PM/LDLS PM: CPT | Performed by: INTERNAL MEDICINE

## 2023-01-02 PROCEDURE — 93296 REM INTERROG EVL PM/IDS: CPT | Performed by: INTERNAL MEDICINE

## 2023-01-03 ENCOUNTER — TELEPHONE (OUTPATIENT)
Dept: CARDIOLOGY | Facility: CLINIC | Age: 88
End: 2023-01-03
Payer: MEDICARE

## 2023-01-03 RX ORDER — APIXABAN 5 MG/1
TABLET, FILM COATED ORAL
Qty: 180 TABLET | Refills: 4 | Status: SHIPPED | OUTPATIENT
Start: 2023-01-03

## 2023-01-03 NOTE — TELEPHONE ENCOUNTER
Patient is in need of MRI at [facility].  Orders obtained and faxed to radiology.  Scheduling is aware.

## 2023-01-06 ENCOUNTER — READMISSION MANAGEMENT (OUTPATIENT)
Dept: CALL CENTER | Facility: HOSPITAL | Age: 88
End: 2023-01-06
Payer: MEDICARE

## 2023-01-06 NOTE — OUTREACH NOTE
Medical Week 2 Survey    Flowsheet Row Responses   [facility] patient discharged from? [place]   Does the patient have one of the following disease processes/diagnoses(primary or secondary)? Other   Week 2 attempt successful? Yes   Call start time 1559   Discharge diagnosis Confusion likely exacerbated by febrile episode   Call end time 1605   Person spoke with today (if not patient) and relationship Wife   Medication alerts for this patient ELIQUIS   Meds reviewed with patient/caregiver? Yes   Is the patient having any side effects they believe may be caused by any medication additions or changes? No   Does the patient have all medications ordered at discharge? N/A   Is the patient taking all medications as directed (includes completed medication regime)? Yes   Medication comments Wife reports she assists with medications   Comments regarding appointments Neurology on 1/26/23   Does the patient have a primary care provider?  Yes   Comments regarding PCP PCP f/u 1/4/23   Has the patient kept scheduled appointments due by today? Yes   Has home health visited the patient within 72 hours of discharge? N/A   Psychosocial issues? No   Did the patient receive a copy of their discharge instructions? Yes   Nursing interventions Reviewed instructions with patient  [with wife]   What is the patient's perception of their health status since discharge? Same  [wife reports that he is still having some memory issues---has been diagnosed with moderate dementia.  Wife adapting to patient needs.  NO immediate concerns at time of call]   Is the patient/caregiver able to teach back signs and symptoms related to disease process for when to call PCP? Yes   Is the patient/caregiver able to teach back signs and symptoms related to disease process for when to call 911? Yes   If the patient is a current smoker, are they able to teach back resources for cessation? Not a smoker   Week 2 Call Completed? Yes   Graduated Yes  [Compliant  with f/u's---no immediate needs]          HELENA CASTRO - Registered Nurse

## 2023-01-19 ENCOUNTER — TELEPHONE (OUTPATIENT)
Dept: CARDIOLOGY | Facility: CLINIC | Age: 88
End: 2023-01-19
Payer: COMMERCIAL

## 2023-01-19 NOTE — TELEPHONE ENCOUNTER
Patients wife reports that patients blood pressure has been running low the last few nights. Patient is taking Metoprolol succinate 25 mg, BP is in 108/59. I asked about patients heart rate however the wife didn't have their vitals sheet with her but reports that its been low too. Dexter is having headaches and dizziness.    Please advise.

## 2023-01-26 ENCOUNTER — HOSPITAL ENCOUNTER (OUTPATIENT)
Dept: MRI IMAGING | Facility: HOSPITAL | Age: 88
Discharge: HOME | End: 2023-01-26
Payer: COMMERCIAL

## 2023-01-26 ENCOUNTER — APPOINTMENT (OUTPATIENT)
Dept: MRI IMAGING | Facility: HOSPITAL | Age: 88
End: 2023-01-26
Payer: COMMERCIAL

## 2023-02-02 ENCOUNTER — HOSPITAL ENCOUNTER (OUTPATIENT)
Dept: MRI IMAGING | Facility: HOSPITAL | Age: 88
Discharge: HOME | End: 2023-02-02
Admitting: NURSE PRACTITIONER
Payer: COMMERCIAL

## 2023-02-02 ENCOUNTER — OFFICE VISIT (OUTPATIENT)
Dept: NEUROSURGERY | Facility: CLINIC | Age: 88
End: 2023-02-02
Payer: MEDICARE

## 2023-02-02 VITALS — HEIGHT: 72 IN | WEIGHT: 158 LBS | BODY MASS INDEX: 21.4 KG/M2

## 2023-02-02 DIAGNOSIS — Z78.9 NONSMOKER: ICD-10-CM

## 2023-02-02 DIAGNOSIS — S22.080D COMPRESSION FRACTURE OF T12 VERTEBRA WITH ROUTINE HEALING, SUBSEQUENT ENCOUNTER: Primary | ICD-10-CM

## 2023-02-02 DIAGNOSIS — S22.080A COMPRESSION FRACTURE OF T12 VERTEBRA, INITIAL ENCOUNTER: ICD-10-CM

## 2023-02-02 DIAGNOSIS — M51.37 DEGENERATION OF LUMBAR OR LUMBOSACRAL INTERVERTEBRAL DISC: ICD-10-CM

## 2023-02-02 PROBLEM — M51.379 DEGENERATION OF LUMBAR OR LUMBOSACRAL INTERVERTEBRAL DISC: Status: ACTIVE | Noted: 2023-02-02

## 2023-02-02 PROCEDURE — 72148 MRI LUMBAR SPINE W/O DYE: CPT

## 2023-02-02 PROCEDURE — 99213 OFFICE O/P EST LOW 20 MIN: CPT | Performed by: NURSE PRACTITIONER

## 2023-02-02 RX ORDER — BENZONATATE 100 MG/1
CAPSULE ORAL EVERY 8 HOURS SCHEDULED
COMMUNITY

## 2023-02-02 RX ORDER — GUAIFENESIN 600 MG/1
TABLET, EXTENDED RELEASE ORAL EVERY 12 HOURS
COMMUNITY
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
## 2023-02-06 DIAGNOSIS — S22.080A COMPRESSION FRACTURE OF T12 VERTEBRA, INITIAL ENCOUNTER: ICD-10-CM

## 2023-02-06 DIAGNOSIS — M51.37 DEGENERATION OF LUMBAR OR LUMBOSACRAL INTERVERTEBRAL DISC: Primary | ICD-10-CM

## 2023-02-22 ENCOUNTER — TELEPHONE (OUTPATIENT)
Dept: PODIATRY | Facility: CLINIC | Age: 88
End: 2023-02-22
Payer: COMMERCIAL

## 2023-02-23 ENCOUNTER — OFFICE VISIT (OUTPATIENT)
Dept: PODIATRY | Facility: CLINIC | Age: 88
End: 2023-02-23
Payer: MEDICARE

## 2023-02-23 VITALS
SYSTOLIC BLOOD PRESSURE: 138 MMHG | WEIGHT: 148 LBS | BODY MASS INDEX: 20.05 KG/M2 | DIASTOLIC BLOOD PRESSURE: 72 MMHG | HEIGHT: 72 IN | HEART RATE: 66 BPM | OXYGEN SATURATION: 100 %

## 2023-02-23 DIAGNOSIS — M20.12 VALGUS DEFORMITY OF BOTH GREAT TOES: ICD-10-CM

## 2023-02-23 DIAGNOSIS — E11.9 ENCOUNTER FOR DIABETIC FOOT EXAM: ICD-10-CM

## 2023-02-23 DIAGNOSIS — E11.40 TYPE 2 DIABETES MELLITUS WITH DIABETIC NEUROPATHY, WITHOUT LONG-TERM CURRENT USE OF INSULIN: ICD-10-CM

## 2023-02-23 DIAGNOSIS — Z79.01 ANTICOAGULANT LONG-TERM USE: ICD-10-CM

## 2023-02-23 DIAGNOSIS — R54 ADVANCED AGE: ICD-10-CM

## 2023-02-23 DIAGNOSIS — B35.1 ONYCHOMYCOSIS OF TOENAIL: Primary | ICD-10-CM

## 2023-02-23 DIAGNOSIS — M20.11 VALGUS DEFORMITY OF BOTH GREAT TOES: ICD-10-CM

## 2023-02-23 PROCEDURE — 11721 DEBRIDE NAIL 6 OR MORE: CPT | Performed by: NURSE PRACTITIONER

## 2023-02-23 PROCEDURE — 99213 OFFICE O/P EST LOW 20 MIN: CPT | Performed by: NURSE PRACTITIONER

## 2023-03-01 ENCOUNTER — TELEPHONE (OUTPATIENT)
Dept: PODIATRY | Facility: CLINIC | Age: 88
End: 2023-03-01
Payer: COMMERCIAL

## 2023-03-28 ENCOUNTER — OFFICE VISIT (OUTPATIENT)
Dept: NEUROSURGERY | Facility: CLINIC | Age: 88
End: 2023-03-28
Payer: MEDICARE

## 2023-03-28 VITALS — WEIGHT: 148 LBS | HEIGHT: 72 IN | BODY MASS INDEX: 20.05 KG/M2

## 2023-03-28 DIAGNOSIS — M51.37 DEGENERATION OF LUMBAR OR LUMBOSACRAL INTERVERTEBRAL DISC: ICD-10-CM

## 2023-03-28 DIAGNOSIS — S22.080D COMPRESSION FRACTURE OF T12 VERTEBRA WITH ROUTINE HEALING, SUBSEQUENT ENCOUNTER: Primary | ICD-10-CM

## 2023-03-28 DIAGNOSIS — Z78.9 NONSMOKER: ICD-10-CM

## 2023-03-28 PROCEDURE — 1160F RVW MEDS BY RX/DR IN RCRD: CPT | Performed by: NURSE PRACTITIONER

## 2023-03-28 PROCEDURE — 1159F MED LIST DOCD IN RCRD: CPT | Performed by: NURSE PRACTITIONER

## 2023-03-28 PROCEDURE — 99213 OFFICE O/P EST LOW 20 MIN: CPT | Performed by: NURSE PRACTITIONER

## 2023-03-28 RX ORDER — PREDNISONE 1 MG/1
TABLET ORAL
COMMUNITY
Start: 2023-03-17
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
## 2023-04-04 RX ORDER — METOPROLOL SUCCINATE 25 MG/1
TABLET, EXTENDED RELEASE ORAL
Qty: 90 TABLET | Refills: 11 | Status: SHIPPED | OUTPATIENT
Start: 2023-04-04

## 2023-05-12 ENCOUNTER — TELEPHONE (OUTPATIENT)
Dept: CARDIOLOGY | Facility: CLINIC | Age: 88
End: 2023-05-12
Payer: COMMERCIAL

## 2023-05-12 NOTE — TELEPHONE ENCOUNTER
PATIENT IS HAVING SOME DENTAL SURGERY DONE THEY ARE HAVING TO GRIND DOWN SOME BONE SO HE CAN GET HIS PARTIAL PLACED.     THEY WOULD LIKE HIM TO BE OFF OF HIS ELIQUIS X3 DAYS PRIOR TO SURGERY.      HE IS ON ELIQUIS AND ASA [address]

## 2023-07-21 PROBLEM — I49.5 SICK SINUS SYNDROME: Status: RESOLVED | Noted: 2019-03-01 | Resolved: 2023-07-21

## 2023-07-21 PROBLEM — E11.9 CONTROLLED TYPE 2 DIABETES MELLITUS WITHOUT COMPLICATION, WITHOUT LONG-TERM CURRENT USE OF INSULIN: Status: ACTIVE | Noted: 2023-07-21

## 2023-07-26 ENCOUNTER — HOSPITAL ENCOUNTER (OUTPATIENT)
Facility: HOSPITAL | Age: 88
Setting detail: OBSERVATION
Discharge: HOME | End: 2023-07-27
Attending: STUDENT IN AN ORGANIZED HEALTH CARE EDUCATION/TRAINING PROGRAM | Admitting: FAMILY MEDICINE
Payer: MEDICARE

## 2023-07-26 ENCOUNTER — APPOINTMENT (OUTPATIENT)
Dept: CT IMAGING | Facility: HOSPITAL | Age: 88
End: 2023-07-26
Payer: MEDICARE

## 2023-07-26 ENCOUNTER — APPOINTMENT (OUTPATIENT)
Dept: GENERAL RADIOLOGY | Facility: HOSPITAL | Age: 88
End: 2023-07-26
Payer: MEDICARE

## 2023-07-26 DIAGNOSIS — R77.8 ELEVATED TROPONIN: ICD-10-CM

## 2023-07-26 DIAGNOSIS — R06.02 SHORTNESS OF BREATH: Primary | ICD-10-CM

## 2023-07-26 LAB
ALBUMIN SERPL-MCNC: 3.9 G/DL (ref 3.5–5.2)
ALBUMIN/GLOB SERPL: 1.2 G/DL
ALP SERPL-CCNC: 135 U/L (ref 39–117)
ALT SERPL W P-5'-P-CCNC: 18 U/L (ref 1–41)
ANION GAP SERPL CALCULATED.3IONS-SCNC: 14 MMOL/L (ref 5–15)
AST SERPL-CCNC: 20 U/L (ref 1–40)
BASOPHILS # BLD AUTO: 0.07 10*3/MM3 (ref 0–0.2)
BASOPHILS NFR BLD AUTO: 0.6 % (ref 0–1.5)
BILIRUB SERPL-MCNC: 0.5 MG/DL (ref 0–1.2)
BUN SERPL-MCNC: 18 MG/DL (ref 8–23)
BUN/CREAT SERPL: 17.1 (ref 7–25)
CALCIUM SPEC-SCNC: 10.1 MG/DL (ref 8.6–10.5)
CHLORIDE SERPL-SCNC: 98 MMOL/L (ref 98–107)
CO2 SERPL-SCNC: 23 MMOL/L (ref 22–29)
CREAT SERPL-MCNC: 1.05 MG/DL (ref 0.76–1.27)
D-LACTATE SERPL-SCNC: 1.3 MMOL/L (ref 0.5–2)
DEPRECATED RDW RBC AUTO: 55.6 FL (ref 37–54)
EGFRCR SERPLBLD CKD-EPI 2021: 67.9 ML/MIN/1.73
EOSINOPHIL # BLD AUTO: 0.41 10*3/MM3 (ref 0–0.4)
EOSINOPHIL NFR BLD AUTO: 3.5 % (ref 0.3–6.2)
ERYTHROCYTE [DISTWIDTH] IN BLOOD BY AUTOMATED COUNT: 16.1 % (ref 12.3–15.4)
GLOBULIN UR ELPH-MCNC: 3.2 GM/DL
GLUCOSE SERPL-MCNC: 127 MG/DL (ref 65–99)
HCT VFR BLD AUTO: 34.7 % (ref 37.5–51)
HGB BLD-MCNC: 10.9 G/DL (ref 13–17.7)
HOLD SPECIMEN: NORMAL
IMM GRANULOCYTES # BLD AUTO: 0.05 10*3/MM3 (ref 0–0.05)
IMM GRANULOCYTES NFR BLD AUTO: 0.4 % (ref 0–0.5)
LYMPHOCYTES # BLD AUTO: 1.12 10*3/MM3 (ref 0.7–3.1)
LYMPHOCYTES NFR BLD AUTO: 9.5 % (ref 19.6–45.3)
MCH RBC QN AUTO: 29.9 PG (ref 26.6–33)
MCHC RBC AUTO-ENTMCNC: 31.4 G/DL (ref 31.5–35.7)
MCV RBC AUTO: 95.3 FL (ref 79–97)
MONOCYTES # BLD AUTO: 0.68 10*3/MM3 (ref 0.1–0.9)
MONOCYTES NFR BLD AUTO: 5.8 % (ref 5–12)
NEUTROPHILS NFR BLD AUTO: 80.2 % (ref 42.7–76)
NEUTROPHILS NFR BLD AUTO: 9.46 10*3/MM3 (ref 1.7–7)
NRBC BLD AUTO-RTO: 0 /100 WBC (ref 0–0.2)
PLATELET # BLD AUTO: 233 10*3/MM3 (ref 140–450)
PMV BLD AUTO: 9.5 FL (ref 6–12)
POTASSIUM SERPL-SCNC: 4 MMOL/L (ref 3.5–5.2)
PROCALCITONIN SERPL-MCNC: 0.16 NG/ML (ref 0–0.25)
PROT SERPL-MCNC: 7.1 G/DL (ref 6–8.5)
RBC # BLD AUTO: 3.64 10*6/MM3 (ref 4.14–5.8)
SODIUM SERPL-SCNC: 135 MMOL/L (ref 136–145)
TROPONIN T SERPL HS-MCNC: 52 NG/L
WBC NRBC COR # BLD: 11.79 10*3/MM3 (ref 3.4–10.8)
WHOLE BLOOD HOLD COAG: NORMAL
WHOLE BLOOD HOLD SPECIMEN: NORMAL

## 2023-07-26 PROCEDURE — 93005 ELECTROCARDIOGRAM TRACING: CPT | Performed by: STUDENT IN AN ORGANIZED HEALTH CARE EDUCATION/TRAINING PROGRAM

## 2023-07-26 PROCEDURE — 36415 COLL VENOUS BLD VENIPUNCTURE: CPT

## 2023-07-26 PROCEDURE — 71275 CT ANGIOGRAPHY CHEST: CPT

## 2023-07-26 PROCEDURE — 84484 ASSAY OF TROPONIN QUANT: CPT | Performed by: STUDENT IN AN ORGANIZED HEALTH CARE EDUCATION/TRAINING PROGRAM

## 2023-07-26 PROCEDURE — 99284 EMERGENCY DEPT VISIT MOD MDM: CPT

## 2023-07-26 PROCEDURE — 71045 X-RAY EXAM CHEST 1 VIEW: CPT

## 2023-07-26 PROCEDURE — 87040 BLOOD CULTURE FOR BACTERIA: CPT | Performed by: STUDENT IN AN ORGANIZED HEALTH CARE EDUCATION/TRAINING PROGRAM

## 2023-07-26 PROCEDURE — 80053 COMPREHEN METABOLIC PANEL: CPT | Performed by: STUDENT IN AN ORGANIZED HEALTH CARE EDUCATION/TRAINING PROGRAM

## 2023-07-26 PROCEDURE — 84145 PROCALCITONIN (PCT): CPT | Performed by: STUDENT IN AN ORGANIZED HEALTH CARE EDUCATION/TRAINING PROGRAM

## 2023-07-26 PROCEDURE — 25510000001 IOPAMIDOL PER 1 ML: Performed by: STUDENT IN AN ORGANIZED HEALTH CARE EDUCATION/TRAINING PROGRAM

## 2023-07-26 PROCEDURE — 87507 IADNA-DNA/RNA PROBE TQ 12-25: CPT | Performed by: STUDENT IN AN ORGANIZED HEALTH CARE EDUCATION/TRAINING PROGRAM

## 2023-07-26 PROCEDURE — 83605 ASSAY OF LACTIC ACID: CPT | Performed by: STUDENT IN AN ORGANIZED HEALTH CARE EDUCATION/TRAINING PROGRAM

## 2023-07-26 PROCEDURE — 85025 COMPLETE CBC W/AUTO DIFF WBC: CPT | Performed by: STUDENT IN AN ORGANIZED HEALTH CARE EDUCATION/TRAINING PROGRAM

## 2023-07-26 RX ORDER — ALBUTEROL SULFATE 2.5 MG/3ML
2.5 SOLUTION RESPIRATORY (INHALATION) ONCE
Status: COMPLETED | OUTPATIENT
Start: 2023-07-26 | End: 2023-07-27

## 2023-07-26 RX ADMIN — IOPAMIDOL 100 ML: 755 INJECTION, SOLUTION INTRAVENOUS at 23:34

## 2023-07-27 VITALS
HEIGHT: 72 IN | HEART RATE: 75 BPM | OXYGEN SATURATION: 97 % | SYSTOLIC BLOOD PRESSURE: 131 MMHG | DIASTOLIC BLOOD PRESSURE: 61 MMHG | WEIGHT: 151.19 LBS | TEMPERATURE: 98 F | BODY MASS INDEX: 20.48 KG/M2 | RESPIRATION RATE: 16 BRPM

## 2023-07-27 PROBLEM — R79.89 ELEVATED TROPONIN: Status: ACTIVE | Noted: 2023-07-27

## 2023-07-27 PROBLEM — R77.8 ELEVATED TROPONIN: Status: ACTIVE | Noted: 2023-07-27

## 2023-07-27 LAB
ADV 40+41 DNA STL QL NAA+NON-PROBE: NOT DETECTED
ASTRO TYP 1-8 RNA STL QL NAA+NON-PROBE: NOT DETECTED
BILIRUB UR QL STRIP: NEGATIVE
C CAYETANENSIS DNA STL QL NAA+NON-PROBE: NOT DETECTED
C COLI+JEJ+UPSA DNA STL QL NAA+NON-PROBE: NOT DETECTED
CLARITY UR: CLEAR
COLOR UR: YELLOW
CRYPTOSP DNA STL QL NAA+NON-PROBE: NOT DETECTED
E HISTOLYT DNA STL QL NAA+NON-PROBE: NOT DETECTED
EAEC PAA PLAS AGGR+AATA ST NAA+NON-PRB: NOT DETECTED
EC STX1+STX2 GENES STL QL NAA+NON-PROBE: NOT DETECTED
EPEC EAE GENE STL QL NAA+NON-PROBE: NOT DETECTED
ETEC LTA+ST1A+ST1B TOX ST NAA+NON-PROBE: NOT DETECTED
G LAMBLIA DNA STL QL NAA+NON-PROBE: NOT DETECTED
GEN 5 2HR TROPONIN T REFLEX: 50 NG/L
GLUCOSE UR STRIP-MCNC: NEGATIVE MG/DL
HGB UR QL STRIP.AUTO: NEGATIVE
HOLD SPECIMEN: NORMAL
KETONES UR QL STRIP: NEGATIVE
LEUKOCYTE ESTERASE UR QL STRIP.AUTO: NEGATIVE
NITRITE UR QL STRIP: NEGATIVE
NOROVIRUS GI+II RNA STL QL NAA+NON-PROBE: NOT DETECTED
P SHIGELLOIDES DNA STL QL NAA+NON-PROBE: NOT DETECTED
PH UR STRIP.AUTO: 6 [PH] (ref 5–8)
PROT UR QL STRIP: NEGATIVE
QT INTERVAL: 360 MS
QTC INTERVAL: 478 MS
RVA RNA STL QL NAA+NON-PROBE: NOT DETECTED
S ENT+BONG DNA STL QL NAA+NON-PROBE: NOT DETECTED
SAPO I+II+IV+V RNA STL QL NAA+NON-PROBE: NOT DETECTED
SARS-COV-2 AG RESP QL IA.RAPID: NORMAL
SHIGELLA SP+EIEC IPAH ST NAA+NON-PROBE: NOT DETECTED
SP GR UR STRIP: 1.02 (ref 1–1.03)
TROPONIN T DELTA: -1 NG/L
TROPONIN T SERPL HS-MCNC: 46 NG/L
TROPONIN T SERPL HS-MCNC: 51 NG/L
UROBILINOGEN UR QL STRIP: NORMAL
V CHOL+PARA+VUL DNA STL QL NAA+NON-PROBE: NOT DETECTED
V CHOLERAE DNA STL QL NAA+NON-PROBE: NOT DETECTED
Y ENTEROCOL DNA STL QL NAA+NON-PROBE: NOT DETECTED

## 2023-07-27 PROCEDURE — G0378 HOSPITAL OBSERVATION PER HR: HCPCS

## 2023-07-27 PROCEDURE — G0379 DIRECT REFER HOSPITAL OBSERV: HCPCS

## 2023-07-27 PROCEDURE — 94664 DEMO&/EVAL PT USE INHALER: CPT

## 2023-07-27 PROCEDURE — 94761 N-INVAS EAR/PLS OXIMETRY MLT: CPT

## 2023-07-27 PROCEDURE — 87426 SARSCOV CORONAVIRUS AG IA: CPT | Performed by: FAMILY MEDICINE

## 2023-07-27 PROCEDURE — 81003 URINALYSIS AUTO W/O SCOPE: CPT | Performed by: STUDENT IN AN ORGANIZED HEALTH CARE EDUCATION/TRAINING PROGRAM

## 2023-07-27 PROCEDURE — 94640 AIRWAY INHALATION TREATMENT: CPT

## 2023-07-27 PROCEDURE — 84484 ASSAY OF TROPONIN QUANT: CPT | Performed by: STUDENT IN AN ORGANIZED HEALTH CARE EDUCATION/TRAINING PROGRAM

## 2023-07-27 PROCEDURE — 84484 ASSAY OF TROPONIN QUANT: CPT | Performed by: FAMILY MEDICINE

## 2023-07-27 RX ORDER — BENZONATATE 100 MG/1
100 CAPSULE ORAL 3 TIMES DAILY PRN
Status: DISCONTINUED | OUTPATIENT
Start: 2023-07-27 | End: 2023-07-27 | Stop reason: HOSPADM

## 2023-07-27 RX ORDER — DOCUSATE SODIUM 100 MG/1
100 CAPSULE, LIQUID FILLED ORAL 2 TIMES DAILY
Status: DISCONTINUED | OUTPATIENT
Start: 2023-07-27 | End: 2023-07-27 | Stop reason: HOSPADM

## 2023-07-27 RX ORDER — CEFDINIR 300 MG/1
300 CAPSULE ORAL 2 TIMES DAILY
Qty: 14 CAPSULE | Refills: 0 | Status: SHIPPED | OUTPATIENT
Start: 2023-07-27 | End: 2023-08-03

## 2023-07-27 RX ORDER — LOSARTAN POTASSIUM 25 MG/1
25 TABLET ORAL DAILY
Status: DISCONTINUED | OUTPATIENT
Start: 2023-07-27 | End: 2023-07-27 | Stop reason: HOSPADM

## 2023-07-27 RX ORDER — IRON POLYSACCHARIDE COMPLEX 150 MG
150 CAPSULE ORAL DAILY
Status: DISCONTINUED | OUTPATIENT
Start: 2023-07-27 | End: 2023-07-27 | Stop reason: HOSPADM

## 2023-07-27 RX ORDER — ASPIRIN 81 MG/1
81 TABLET ORAL NIGHTLY
Status: DISCONTINUED | OUTPATIENT
Start: 2023-07-27 | End: 2023-07-27 | Stop reason: HOSPADM

## 2023-07-27 RX ORDER — PREDNISONE 2.5 MG/1
2.5 TABLET ORAL DAILY
COMMUNITY

## 2023-07-27 RX ORDER — TRIAMTERENE AND HYDROCHLOROTHIAZIDE 75; 50 MG/1; MG/1
0.5 TABLET ORAL DAILY
Status: DISCONTINUED | OUTPATIENT
Start: 2023-07-27 | End: 2023-07-27 | Stop reason: HOSPADM

## 2023-07-27 RX ORDER — DOCUSATE SODIUM 100 MG/1
100 CAPSULE, LIQUID FILLED ORAL 2 TIMES DAILY
COMMUNITY

## 2023-07-27 RX ORDER — LEVOTHYROXINE SODIUM 0.07 MG/1
75 TABLET ORAL
Status: DISCONTINUED | OUTPATIENT
Start: 2023-07-27 | End: 2023-07-27 | Stop reason: HOSPADM

## 2023-07-27 RX ORDER — METOPROLOL SUCCINATE 25 MG/1
25 TABLET, EXTENDED RELEASE ORAL DAILY
Status: DISCONTINUED | OUTPATIENT
Start: 2023-07-27 | End: 2023-07-27 | Stop reason: HOSPADM

## 2023-07-27 RX ORDER — FOLIC ACID 1 MG/1
1 TABLET ORAL DAILY
Status: DISCONTINUED | OUTPATIENT
Start: 2023-07-27 | End: 2023-07-27 | Stop reason: HOSPADM

## 2023-07-27 RX ORDER — ALLOPURINOL 300 MG/1
300 TABLET ORAL DAILY
COMMUNITY

## 2023-07-27 RX ORDER — LORAZEPAM 0.5 MG/1
0.5 TABLET ORAL DAILY PRN
Status: DISCONTINUED | OUTPATIENT
Start: 2023-07-27 | End: 2023-07-27 | Stop reason: HOSPADM

## 2023-07-27 RX ORDER — METOPROLOL SUCCINATE 25 MG/1
25 TABLET, EXTENDED RELEASE ORAL DAILY
COMMUNITY

## 2023-07-27 RX ORDER — GUAIFENESIN 600 MG/1
600 TABLET, EXTENDED RELEASE ORAL EVERY 12 HOURS SCHEDULED
Status: DISCONTINUED | OUTPATIENT
Start: 2023-07-27 | End: 2023-07-27

## 2023-07-27 RX ORDER — ASPIRIN 81 MG/1
81 TABLET ORAL DAILY
Status: DISCONTINUED | OUTPATIENT
Start: 2023-07-27 | End: 2023-07-27

## 2023-07-27 RX ORDER — ALLOPURINOL 300 MG/1
300 TABLET ORAL DAILY
Status: DISCONTINUED | OUTPATIENT
Start: 2023-07-27 | End: 2023-07-27 | Stop reason: HOSPADM

## 2023-07-27 RX ORDER — GUAIFENESIN 600 MG/1
600 TABLET, EXTENDED RELEASE ORAL EVERY 12 HOURS SCHEDULED
Status: DISCONTINUED | OUTPATIENT
Start: 2023-07-27 | End: 2023-07-27 | Stop reason: HOSPADM

## 2023-07-27 RX ORDER — PANTOPRAZOLE SODIUM 40 MG/1
40 TABLET, DELAYED RELEASE ORAL DAILY
Status: DISCONTINUED | OUTPATIENT
Start: 2023-07-27 | End: 2023-07-27 | Stop reason: HOSPADM

## 2023-07-27 RX ADMIN — Medication 150 MG: at 09:35

## 2023-07-27 RX ADMIN — TRIAMTERENE AND HYDROCHLOROTHIAZIDE 0.5 TABLET: 75; 50 TABLET ORAL at 09:34

## 2023-07-27 RX ADMIN — PANTOPRAZOLE SODIUM 40 MG: 40 TABLET, DELAYED RELEASE ORAL at 09:35

## 2023-07-27 RX ADMIN — GUAIFENESIN 600 MG: 600 TABLET, EXTENDED RELEASE ORAL at 01:12

## 2023-07-27 RX ADMIN — APIXABAN 5 MG: 5 TABLET, FILM COATED ORAL at 09:35

## 2023-07-27 RX ADMIN — FOLIC ACID 1 MG: 1 TABLET ORAL at 09:35

## 2023-07-27 RX ADMIN — LOSARTAN POTASSIUM 25 MG: 25 TABLET, FILM COATED ORAL at 09:35

## 2023-07-27 RX ADMIN — ALLOPURINOL 300 MG: 300 TABLET ORAL at 09:35

## 2023-07-27 RX ADMIN — METOPROLOL SUCCINATE 25 MG: 25 TABLET, EXTENDED RELEASE ORAL at 09:35

## 2023-07-27 RX ADMIN — GUAIFENESIN 600 MG: 600 TABLET, EXTENDED RELEASE ORAL at 09:35

## 2023-07-27 RX ADMIN — LEVOTHYROXINE SODIUM 75 MCG: 75 TABLET ORAL at 09:35

## 2023-07-27 RX ADMIN — DOCUSATE SODIUM 100 MG: 100 CAPSULE, LIQUID FILLED ORAL at 09:35

## 2023-07-27 RX ADMIN — ALBUTEROL SULFATE 2.5 MG: 2.5 SOLUTION RESPIRATORY (INHALATION) at 00:18

## 2023-07-27 NOTE — DISCHARGE SUMMARY
Date of Discharge:  7/27/2023    Discharge Diagnosis:   Febrile illness    Problem List:  Active Hospital Problems    Diagnosis  POA    **Elevated troponin [R77.8]  Yes    Controlled type 2 diabetes mellitus without complication, without long-term current use of insulin [E11.9]  Yes    Degeneration of lumbar or lumbosacral intervertebral disc [M51.37]  Yes    Ischemic heart disease [I25.9]  Yes    Rheumatoid arthritis [M06.9]  Yes    Essential hypertension [I10]  Yes    Chronic anticoagulation [Z79.01]  Not Applicable    Paroxysmal atrial flutter [I48.92]  Yes    Pacemaker [Z95.0]  Yes    Complete heart block [I44.2]  Yes    Malignant neoplasm of prostate [C61]  Yes    Primary hypertension [I10]  Yes    Single vessel coronary artery disease [I25.10]  Yes      Resolved Hospital Problems   No resolved problems to display.       Presenting Problem/History of Present Illness  Elevated troponin [R77.8]        Hospital Course  Patient is a 89 y.o. male presented with fever and cough.  Work up in the ER only positive for elevated troponin.  No Covid test done until arrived to the floor, which was negative.  Seen by cardiology primarily because of the elevated troponin, which they dismissed as not a problem and no further work up planned.  There for will discharge home with imperic antibiotic due to fever and follow up in the office in short order.      Procedures Performed         Consults:   Consults       Date and Time Order Name Status Description    7/27/2023  7:05 AM Inpatient Cardiology Consult Completed             Pertinent Test Results: labs: abnormal troponin as mentioned.  Normal imaging.    Condition on Discharge:  stable    Vital Signs  Temp:  [97.7 °F (36.5 °C)-99 °F (37.2 °C)] 98 °F (36.7 °C)  Heart Rate:  [] 75  Resp:  [12-26] 16  BP: (105-133)/(54-91) 131/61    Discharge Disposition  Home or Self Care    Discharge Medications     Discharge Medications        New Medications        Instructions  Start Date   cefdinir 300 MG capsule  Commonly known as: OMNICEF   300 mg, Oral, 2 Times Daily             Changes to Medications        Instructions Start Date   LORazepam 0.5 MG tablet  Commonly known as: ATIVAN  What changed: when to take this   0.5 mg, Oral, Daily PRN             Continue These Medications        Instructions Start Date   allopurinol 300 MG tablet  Commonly known as: ZYLOPRIM   300 mg, Oral, Daily      apixaban 5 MG tablet tablet  Commonly known as: ELIQUIS   5 mg, Oral, Every 12 Hours Scheduled      aspirin 81 MG EC tablet   81 mg, Oral, Daily      benzonatate 100 MG capsule  Commonly known as: TESSALON   100 mg, Oral, As Needed      docusate sodium 100 MG capsule  Commonly known as: COLACE   100 mg, Oral, 2 Times Daily      folic acid 1 MG tablet  Commonly known as: FOLVITE   1 mg, Oral, Daily      guaiFENesin 600 MG 12 hr tablet  Commonly known as: MUCINEX   600 mg, Oral, Every 12 Hours      iron polysaccharides 150 MG capsule  Commonly known as: NIFEREX   150 mg, Oral, Daily      levothyroxine 75 MCG tablet  Commonly known as: SYNTHROID, LEVOTHROID   75 mcg, Oral, Daily      losartan 25 MG tablet  Commonly known as: Cozaar   25 mg, Oral, Daily      methotrexate 2.5 MG tablet   17.5 mg, Oral, Weekly      metoprolol succinate XL 25 MG 24 hr tablet  Commonly known as: TOPROL-XL   25 mg, Oral, Daily      nitroglycerin 0.4 MG SL tablet  Commonly known as: NITROSTAT   Place 1 tablet under the tongue Every 5 (Five) Minutes As Needed.      pantoprazole 40 MG EC tablet  Commonly known as: PROTONIX   40 mg, Oral, Daily      predniSONE 2.5 MG tablet  Commonly known as: DELTASONE   2.5 mg, Oral, Daily      triamterene-hydrochlorothiazide 37.5-25 MG per tablet  Commonly known as: MAXZIDE-25   1 tablet, Oral, Daily               Discharge Diet   cardiac    Activity at Discharge  ad nicole    Follow-up Appointments  Future Appointments   Date Time Provider Department Center   10/26/2023  3:30 PM Nora  ALENA Thurman [facility]   1/26/2024  9:00 AM Austin Granados MD [facility]  [facility]   2/16/2024  9:00 AM [facility] DEVICE CHECK [facility]         Test Results Pending at Discharge  Pending Labs       Order Current Status    Blood Culture - Blood, Arm, Left In process    Blood Culture - Blood, Arm, Right In process            Ashish Callejas MD    Time: Discharge 26 min

## 2023-07-27 NOTE — PLAN OF CARE
Goal Outcome Evaluation:  Plan of Care Reviewed With: patient        Progress: no change  Outcome Evaluation: Pt is alert, on RA, up with stand-by assist with a cane, VSS. No complaints of pain this shift. Pt was admitted through the ER with c/o fever, cough, and frequent urination. His Trop was elevated. Pt wife thought he might have a UTI. UA was negative. I paged Dr. Callejas to let him know the patient was here and to get orders. No call back as of yet. Pt is sleeping comfortably. I did place orders for code status after discussing this with the patient and his wife, who is also his POA. Pt is a full code. Tele shows , HR 71-81 with PVC's. Safety maintained.

## 2023-07-27 NOTE — ED PROVIDER NOTES
Subjective   History of Present Illness  Patient states that he has been having a cough and some shortness of breath for the past few days.  States that he also had a slight fever earlier today.  Patient's wife states that the patient had some dysuria and so she was concerned he could have a UTI.  States that he has not had anything productive with his cough.  States that he just feels dry.  Denies any new abdominal pain or hematuria or hematochezia.  States that he tried some Mucinex earlier helped.    Review of Systems   All other systems reviewed and are negative.    Past Medical History:   Diagnosis Date    Abnormal nuclear stress test     Anemia 2021    Arthritis     BPH (benign prostatic hyperplasia)     Coronary artery disease     Diabetes mellitus     Disease of thyroid gland     Fracture     spinal t12    GERD (gastroesophageal reflux disease)     Glaucoma 02/18/2022    Gout     Hyperlipidemia LDL goal <70 11/14/2016    Ischemic heart disease 06/23/2022    Low back pain     LVH (left ventricular hypertrophy) 06/23/2022    Melanoma     Pacemaker 01/18/2019    PAF (paroxysmal atrial fibrillation) 10/29/2019    Chads vas score 3    Primary hypertension 11/14/2016    Prostate CA     Sick sinus syndrome 03/01/2019    Stress fracture Aug. 2021       Allergies   Allergen Reactions    Adhesive Tape     Simvastatin Other (See Comments)     Abnormal LFT's    Tramadol Hallucinations    Neosporin [Neomycin-Bacitracin Zn-Polymyx] Rash       Past Surgical History:   Procedure Laterality Date    APPENDECTOMY      CARDIAC CATHETERIZATION Left 12/10/2012    CARDIAC ELECTROPHYSIOLOGY PROCEDURE N/A 11/23/2018    Procedure: Pacemaker DC new 11/23/2018;  Surgeon: Austin Granados MD;  Location: [facility] CATH INVASIVE LOCATION;  Service: Cardiology    CHOLECYSTECTOMY      COLONOSCOPY N/A 06/09/2017    Tics, hemorrhoids repeat exam prn    COLONOSCOPY W/ POLYPECTOMY  02/16/2012    adenomatous polyp at 80cm    ENDOSCOPY N/A 09/13/2022     Procedure: ESOPHAGOGASTRODUODENOSCOPY WITH ANESTHESIA;  Surgeon: Felice Marroquin MD;  Location: [facility] ENDOSCOPY;  Service: Gastroenterology;  Laterality: N/A;  pre hematochezia  post; normal   Ashish Callejas MD    HAND SURGERY Right     HERNIA REPAIR      HIP CANNULATED SCREW PLACEMENT Left 2022    Procedure: HIP CANNULATED SCREW PLACEMENT;  Surgeon: Isaiah Sheehan MD;  Location:  [facility] OR;  Service: Orthopedics;  Laterality: Left;    INGUINAL HERNIA REPAIR      INSERT / REPLACE / REMOVE PACEMAKER      KNEE SURGERY      PROSTATE SURGERY      SKIN CANCER EXCISION      face    SKIN LESION EXCISION      TOTAL HIP ARTHROPLASTY Right 2021    Procedure: TOTAL HIP REPLACEMENT;  Surgeon: Arik Magaña MD;  Location:  [facility] OR;  Service: Orthopedics;  Laterality: Right;    TOTAL HIP ARTHROPLASTY Left 2022    Procedure: LEFT HIP SCREW REMOVAL / LEFT TOTAL HIP ARTHROPLASTY;  Surgeon: BASHIR Monsivais MD;  Location:  [facility] OR;  Service: Orthopedics;  Laterality: Left;       Family History   Problem Relation Age of Onset    Alzheimer's disease Mother     Cancer Father     Cancer Sister        Social History     Socioeconomic History    Marital status:    Tobacco Use    Smoking status: Former     Years: 30.00     Types: Cigarettes     Quit date: 1985     Years since quittin.5     Passive exposure: Past    Smokeless tobacco: Never   Vaping Use    Vaping Use: Never used   Substance and Sexual Activity    Alcohol use: No    Drug use: No    Sexual activity: Not Currently     Partners: Female           Objective   Physical Exam  Vitals and nursing note reviewed.   Constitutional:       General: He is not in acute distress.     Appearance: Normal appearance. He is not toxic-appearing or diaphoretic.   HENT:      Head: Normocephalic and atraumatic.      Right Ear: External ear normal.      Left Ear: External ear normal.      Nose: Nose normal.      Mouth/Throat:      Mouth:  Mucous membranes are moist.   Eyes:      General:         Right eye: No discharge.         Left eye: No discharge.      Extraocular Movements: Extraocular movements intact.      Conjunctiva/sclera: Conjunctivae normal.   Cardiovascular:      Rate and Rhythm: Normal rate.      Pulses: Normal pulses.   Pulmonary:      Effort: Pulmonary effort is normal. No respiratory distress.      Breath sounds: Rhonchi present.   Abdominal:      General: Abdomen is flat.      Tenderness: There is no abdominal tenderness. There is no guarding or rebound.   Musculoskeletal:         General: No deformity or signs of injury.   Skin:     General: Skin is warm.      Coloration: Skin is not jaundiced.   Neurological:      Mental Status: He is alert and oriented to person, place, and time. Mental status is at baseline.   Psychiatric:         Mood and Affect: Mood normal.         Behavior: Behavior normal.         Thought Content: Thought content normal.         Judgment: Judgment normal.       Procedures           ED Course  ED Course as of 07/27/23 0310   Thu Jul 27, 2023   0011 Temp src: Oral [NP]      ED Course User Index  [NP] Cristy Fournier MD                                           Medical Decision Making  Dexter SAINI is a very pleasant 89 y.o. male who presents to the ED for shortness of breath.     Patient was non-toxic and not-ill appearing on arrival.     Vital signs stable on arrival.     Patient's presentation raises suspicion for differentials including, but not limited to, pneumonia,.     External (non-ED) record review: none    Given this, Dexter was placed on the monitor. Laboratory studies and imaging studies were ordered including cbc, cmp, EKG, troponin, CTA chest.     Dexter patient was given mucinex.    Labs were reviewed and pertinent for elevated troponin with delta of 6.     Decision rules/scores evaluated: HEART score 7     On re-evaluation, patient remained hemodynamically stable and appeared to be  comfortable and in no acute distress.    Given findings described above, patient's presentation is most likely related to possible underlying ACS and possibly a viral illness.     At this point, after reviewing the workup, I have a low suspicion for PE given reassuring CT scan.    I reassessed the patient and discussed the findings of the work up so far. I said that the next step in treatment would be admission to the hospital for further workup and care. I also said that there is always some diagnostic uncertainty in the ER, that symptoms may change, and new things may be found after being admitted.     The hospitalist service was consulted for evaluation and admission. The hospitalist service assumed primary care of the patient and admitted the patient in stable condition.          Signed by:   Cristy Fournier MD 7/27/2023 03:05 CDT   Emergency Medicine Physician    Dragon disclaimer:  Part of this note may be an electronic transcription/translation of spoken language to printed text using the Dragon Dictation System.         Problems Addressed:  Elevated troponin: complicated acute illness or injury  Shortness of breath: complicated acute illness or injury    Amount and/or Complexity of Data Reviewed  Labs: ordered.  Radiology: ordered.  ECG/medicine tests: ordered.    Risk  OTC drugs.  Prescription drug management.  Decision regarding hospitalization.        Final diagnoses:   Shortness of breath   Elevated troponin       ED Disposition  ED Disposition       ED Disposition   Decision to Admit    Condition   --    Comment   Level of Care: Remote Telemetry [26]   Diagnosis: Elevated troponin [211650]   Admitting Physician: KATHERINE WINSTON [[id number]]   Attending Physician: KATHERINE WINSTON [[id number]]                 No follow-up provider specified.       Medication List      No changes were made to your prescriptions during this visit.            Cristy Fournier MD  07/27/23 0310

## 2023-07-27 NOTE — CONSULTS
[facility] HEART GROUP CONSULT NOTE    Referring Provider: Dr. Callejas    Reason for Consultation: elevated troponin    Chief Complaint   Patient presents with    Shortness of Breath    Cough    Fever       Subjective .     History of present illness:  Dexter SAINI is a 89 y.o. male with a known PMH significant for coronary artery disease, paroxysmal atrial fibrillation on anticoagulation, complete heart block status post previous permanent pacemaker, hypertension, hyperlipidemia, type 2 diabetes mellitus who presented to the emergency department with complaints of shortness of breath, cough, recent fevers.  During work-up in the emergency department troponin lab value was checked and subsequently rechecked.  He had a consistently elevated but flat trending troponin.  He was admitted to his primary care doctor.     During work-up in the emergency department the patient denied any chest pain, chest pressure, chest tightness.  He had actually recently been evaluated by his primary cardiologist without complaints at that time.  No further testing was ordered at his outpatient visit.    He had a CT angiogram of the chest as well as chest x-ray.  No significant signs of infection or volume overload, chronic emphysematous lung changes noted.  He has tested negative for COVID-19 since admission.    He is currently resting in bed eager for discharge home.  Denies any chest pain or shortness of breath currently.  He does report productive cough and recent fevers.  He is currently afebrile.  Vital signs of been stable.  ECG was at baseline for the patient.    Cardiology was asked to further evaluate the patient due to the findings of elevated troponin in the ER.      History  Past Medical History:   Diagnosis Date    Abnormal nuclear stress test     Anemia 2021    Arthritis     BPH (benign prostatic hyperplasia)     Coronary artery disease     Diabetes mellitus     Disease of thyroid gland     Fracture      spinal t12    GERD (gastroesophageal reflux disease)     Glaucoma 02/18/2022    Gout     Hyperlipidemia LDL goal <70 11/14/2016    Ischemic heart disease 06/23/2022    Low back pain     LVH (left ventricular hypertrophy) 06/23/2022    Melanoma     Pacemaker 01/18/2019    PAF (paroxysmal atrial fibrillation) 10/29/2019    Chads vas score 3    Primary hypertension 11/14/2016    Prostate CA     Sick sinus syndrome 03/01/2019    Stress fracture Aug. 2021   ,   Past Surgical History:   Procedure Laterality Date    APPENDECTOMY      CARDIAC CATHETERIZATION Left 12/10/2012    CARDIAC ELECTROPHYSIOLOGY PROCEDURE N/A 11/23/2018    Procedure: Pacemaker DC new 11/23/2018;  Surgeon: Austin Granados MD;  Location:  [facility] CATH INVASIVE LOC[facility];  Service: Cardiology    CHOLECYSTECTOMY      COLONOSCOPY N/A 06/09/2017    Tics, hemorrhoids repeat exam prn    COLONOSCOPY W/ POLYPECTOMY  02/16/2012    adenomatous polyp at 80cm    ENDOSCOPY N/A 09/13/2022    Procedure: ESOPHAGOGASTRODUODENOSCOPY WITH ANESTHESIA;  Surgeon: Felice Marroquin MD;  Location:  [facility] ENDOSCOPY;  Service: Gastroenterology;  Laterality: N/A;  pre hematochezia  post; normal   Ashish Callejas MD    HAND SURGERY Right     HERNIA REPAIR      HIP CANNULATED SCREW PLACEMENT Left 01/18/2022    Procedure: HIP CANNULATED SCREW PLACEMENT;  Surgeon: Isaiah Sheehan MD;  Location:  [facility] OR;  Service: Orthopedics;  Laterality: Left;    INGUINAL HERNIA REPAIR      INSERT / REPLACE / REMOVE PACEMAKER      KNEE SURGERY      PROSTATE SURGERY      SKIN CANCER EXCISION      face    SKIN LESION EXCISION      TOTAL HIP ARTHROPLASTY Right 08/27/2021    Procedure: TOTAL HIP REPLACEMENT;  Surgeon: Arik Magaña MD;  Location:  [facility] OR;  Service: Orthopedics;  Laterality: Right;    TOTAL HIP ARTHROPLASTY Left 03/25/2022    Procedure: LEFT HIP SCREW REMOVAL / LEFT TOTAL HIP ARTHROPLASTY;  Surgeon: BASHIR Monsivais MD;  Location:  [facility] OR;  Service: Orthopedics;   Laterality: Left;   ,   Family History   Problem Relation Age of Onset    Alzheimer's disease Mother     Cancer Father     Cancer Sister    ,   Social History     Tobacco Use    Smoking status: Former     Years: 30.00     Types: Cigarettes     Quit date: 1985     Years since quittin.5     Passive exposure: Past    Smokeless tobacco: Never   Vaping Use    Vaping Use: Never used   Substance Use Topics    Alcohol use: No    Drug use: No   ,     Medications  Current Facility-Administered Medications   Medication Dose Route Frequency Provider Last Rate Last Admin    allopurinol (ZYLOPRIM) tablet 300 mg  300 mg Oral Daily Ashish Callejas MD   300 mg at 23 0935    apixaban (ELIQUIS) tablet 5 mg  5 mg Oral Q12H Ashish Callejas MD   5 mg at 23 09    aspirin EC tablet 81 mg  81 mg Oral Nightly Ashish Callejas MD        benzonatate (TESSALON) capsule 100 mg  100 mg Oral TID PRN Ashish Callejas MD        docusate sodium (COLACE) capsule 100 mg  100 mg Oral BID Ashish Callejas MD   100 mg at 23 0935    folic acid (FOLVITE) tablet 1 mg  1 mg Oral Daily Ashish Callejas MD   1 mg at 23 0935    guaiFENesin (MUCINEX) 12 hr tablet 600 mg  600 mg Oral Q12H Ashish Callejas MD   600 mg at 23 0935    iron polysaccharides (NIFEREX) capsule 150 mg  150 mg Oral Daily Ashish Callejas MD   150 mg at 23 09    levothyroxine (SYNTHROID, LEVOTHROID) tablet 75 mcg  75 mcg Oral Q AM Ashish Callejas MD   75 mcg at 23 0935    LORazepam (ATIVAN) tablet 0.5 mg  0.5 mg Oral Daily PRN Ashish Callejas MD        losartan (COZAAR) tablet 25 mg  25 mg Oral Daily Ashish Callejas MD   25 mg at 23 0935    metoprolol succinate XL (TOPROL-XL) 24 hr tablet 25 mg  25 mg Oral Daily Ashish Callejas MD   25 mg at 23 0935    pantoprazole (PROTONIX) EC tablet 40 mg  40 mg Oral Daily Ashish Callejas MD   40 mg at 23 0934     "triamterene-hydrochlorothiazide (MAXZIDE) 75-50 MG per tablet 0.5 tablet  0.5 tablet Oral Daily Ashish Callejas MD   0.5 tablet at 07/27/23 0941       Allergies:  Adhesive tape, Simvastatin, Tramadol, and Neosporin [neomycin-bacitracin zn-polymyx]    Review of Systems  Review of Systems   Constitutional: Positive for fever.   HENT:  Positive for hearing loss.    Cardiovascular:  Negative for chest pain, dyspnea on exertion, leg swelling, near-syncope, orthopnea, palpitations, paroxysmal nocturnal dyspnea and syncope.   Respiratory:  Positive for cough and shortness of breath.    Hematologic/Lymphatic: Negative for bleeding problem. Bruises/bleeds easily.   Gastrointestinal:  Negative for bloating, abdominal pain and nausea.   Neurological:  Negative for dizziness, focal weakness, light-headedness and weakness.     Objective     Physical Exam:  Patient Vitals for the past 24 hrs:   BP Temp Temp src Pulse Resp SpO2 Height Weight   07/27/23 1131 131/61 98 °F (36.7 °C) Oral 75 16 97 % -- --   07/27/23 0728 124/54 97.9 °F (36.6 °C) Oral 60 16 96 % -- --   07/27/23 0405 129/60 97.7 °F (36.5 °C) Oral 79 16 97 % -- --   07/27/23 0203 133/62 97.9 °F (36.6 °C) Oral 81 16 97 % 182.9 cm (72.01\") 68.6 kg (151 lb 3 oz)   07/27/23 0129 -- 97.7 °F (36.5 °C) Oral -- 16 -- -- --   07/27/23 0128 -- -- -- 84 -- 92 % -- --   07/27/23 0101 116/67 -- -- 85 -- 92 % -- --   07/27/23 0052 117/91 -- -- 84 -- 96 % -- --   07/27/23 0024 -- -- -- 76 12 100 % -- --   07/27/23 0018 -- -- -- 81 15 92 % -- --   07/26/23 2201 109/68 -- -- 93 20 93 % -- --   07/26/23 2120 105/70 -- -- 97 20 94 % -- --   07/26/23 2053 123/67 99 °F (37.2 °C) Oral (!) 122 26 93 % 182.9 cm (72\") 68 kg (150 lb)     Vitals reviewed.   Constitutional:       General: Awake.      Appearance: Normal appearance. Well-developed, well-groomed, normal weight and not in distress. Chronically ill-appearing.   HENT:      Head: Normocephalic and atraumatic.   Pulmonary:      " Effort: Pulmonary effort is normal.      Breath sounds: Rhonchi present.   Cardiovascular:      Normal rate. Regular rhythm.   Edema:     Peripheral edema absent.   Musculoskeletal:      Cervical back: Normal range of motion and neck supple. Neurological:      Mental Status: Alert, oriented to person, place, and time and oriented to person, place and time.   Psychiatric:         Attention and Perception: Attention normal.         Mood and Affect: Mood normal.         Speech: Speech normal.         Behavior: Behavior normal. Behavior is cooperative.       Results Review:   I reviewed the patient's new clinical results.    Lab Results (last 72 hours)       Procedure Component Value Units Date/Time    COVID-19 RAPID AG,VERITOR,COR/PENNY/[facility]/GIUSEPPE/MAD/JONATHAN/LAG/TASHIA/ IN-HOUSE,DRY SWAB, 1-2 HR TAT - Swab, Nasal Cavity [[id number]]  (Normal) Collected: 07/27/23 0945    Specimen: Swab from Nasal Cavity Updated: 07/27/23 1032     COVID19 Presumptive Negative    Narrative:      Fact sheets for providers: https://www.fda.gov/media/337192/download    Fact sheets for patients: https://www.fda.gov/media/485700/download    High Sensitivity Troponin T 2Hr [[id number]]  (Abnormal) Collected: 07/27/23 0930    Specimen: Blood Updated: 07/27/23 1021     HS Troponin T 50 ng/L      Troponin T Delta -1 ng/L     Narrative:      High Sensitive Troponin T Reference Range:  <10.0 ng/L- Negative Female for AMI  <15.0 ng/L- Negative Male for AMI  >=10 - Abnormal Female indicating possible myocardial injury.  >=15 - Abnormal Male indicating possible myocardial injury.   Clinicians would have to utilize clinical acumen, EKG, Troponin, and serial changes to determine if it is an Acute Myocardial Infarction or myocardial injury due to an underlying chronic condition.         High Sensitivity Troponin T [[id number]]  (Abnormal) Collected: 07/27/23 0643    Specimen: Blood Updated: 07/27/23 0804     HS Troponin T 51 ng/L     Narrative:      High Sensitive  Troponin T Reference Range:  <10.0 ng/L- Negative Female for AMI  <15.0 ng/L- Negative Male for AMI  >=10 - Abnormal Female indicating possible myocardial injury.  >=15 - Abnormal Male indicating possible myocardial injury.   Clinicians would have to utilize clinical acumen, EKG, Troponin, and serial changes to determine if it is an Acute Myocardial Infarction or myocardial injury due to an underlying chronic condition.         Gastrointestinal Panel, PCR - Stool, Per Rectum [[id number]]  (Normal) Collected: 07/26/23 2462    Specimen: Stool from Per Rectum Updated: 07/27/23 0114     Campylobacter Not Detected     Plesiomonas shigelloides Not Detected     Salmonella Not Detected     Vibrio Not Detected     Vibrio cholerae Not Detected     Yersinia enterocolitica Not Detected     Enteroaggregative E. coli (EAEC) Not Detected     Enteropathogenic E. coli (EPEC) Not Detected     Enterotoxigenic E. coli (ETEC) lt/st Not Detected     Shiga-like toxin-producing E. coli (STEC) stx1/stx2 Not Detected     Shigella/Enteroinvasive E. coli (EIEC) Not Detected     Cryptosporidium Not Detected     Cyclospora cayetanensis Not Detected     Entamoeba histolytica Not Detected     Giardia lamblia Not Detected     Adenovirus F40/41 Not Detected     Astrovirus Not Detected     Norovirus GI/GII Not Detected     Rotavirus A Not Detected     Sapovirus (I, II, IV or V) Not Detected    Narrative:      If Aeromonas, Staphylococcus aureus or Bacillus cereus are suspected, please order [id number]: Stool Culture, Aeromonas, S aureus, B Cereus.    Urinalysis With Microscopic If Indicated (No Culture) - Urine, Clean Catch [[id number]]  (Normal) Collected: 07/27/23 0049    Specimen: Urine, Clean Catch Updated: 07/27/23 0058     Color, UA Yellow     Appearance, UA Clear     pH, UA 6.0     Specific Gravity, UA 1.023     Glucose, UA Negative     Ketones, UA Negative     Bilirubin, UA Negative     Blood, UA Negative     Protein, UA Negative     Leuk  "Esterase, UA Negative     Nitrite, UA Negative     Urobilinogen, UA 0.2 E.U./dL    Narrative:      Urine microscopic not indicated.    Single High Sensitivity Troponin T [[id number]]  (Abnormal) Collected: 07/27/23 0008    Specimen: Blood Updated: 07/27/23 0031     HS Troponin T 46 ng/L     Narrative:      High Sensitive Troponin T Reference Range:  <10.0 ng/L- Negative Female for AMI  <15.0 ng/L- Negative Male for AMI  >=10 - Abnormal Female indicating possible myocardial injury.  >=15 - Abnormal Male indicating possible myocardial injury.   Clinicians would have to utilize clinical acumen, EKG, Troponin, and serial changes to determine if it is an Acute Myocardial Infarction or myocardial injury due to an underlying chronic condition.         Procalcitonin [[id number]]  (Normal) Collected: 07/26/23 2114    Specimen: Blood Updated: 07/26/23 2309     Procalcitonin 0.16 ng/mL     Narrative:      As a Marker for Sepsis (Non-Neonates):    1. <0.5 ng/mL represents a low risk of severe sepsis and/or septic shock.  2. >2 ng/mL represents a high risk of severe sepsis and/or septic shock.    As a Marker for Lower Respiratory Tract Infections that require antibiotic therapy:    PCT on Admission    Antibiotic Therapy       6-12 Hrs later    >0.5                Strongly Recommended  >0.25 - <0.5        Recommended   0.1 - 0.25          Discouraged              Remeasure/reassess PCT  <0.1                Strongly Discouraged     Remeasure/reassess PCT    As 28 day mortality risk marker: \"Change in Procalcitonin Result\" (>80% or <=80%) if Day 0 (or Day 1) and Day 4 values are available. Refer to http://www.[employer]-pct-calculator.com    Change in PCT <=80%  A decrease of PCT levels below or equal to 80% defines a positive change in PCT test result representing a higher risk for 28-day all-cause mortality of patients diagnosed with severe sepsis for septic shock.    Change in PCT >80%  A decrease of PCT levels of more than 80% " defines a negative change in PCT result representing a lower risk for 28-day all-cause mortality of patients diagnosed with severe sepsis or septic shock.       Comprehensive Metabolic Panel [[id number]]  (Abnormal) Collected: 07/26/23 2114    Specimen: Blood Updated: 07/26/23 2303     Glucose 127 mg/dL      BUN 18 mg/dL      Creatinine 1.05 mg/dL      Sodium 135 mmol/L      Potassium 4.0 mmol/L      Chloride 98 mmol/L      CO2 23.0 mmol/L      Calcium 10.1 mg/dL      Total Protein 7.1 g/dL      Albumin 3.9 g/dL      ALT (SGPT) 18 U/L      AST (SGOT) 20 U/L      Alkaline Phosphatase 135 U/L      Total Bilirubin 0.5 mg/dL      Globulin 3.2 gm/dL      A/G Ratio 1.2 g/dL      BUN/Creatinine Ratio 17.1     Anion Gap 14.0 mmol/L      eGFR 67.9 mL/min/1.73     Narrative:      GFR Normal >60  Chronic Kidney Disease <60  Kidney Failure <15    The GFR formula is only valid for adults with stable renal function between ages 18 and 70.    Single High Sensitivity Troponin T [[id number]]  (Abnormal) Collected: 07/26/23 2114    Specimen: Blood Updated: 07/26/23 2301     HS Troponin T 52 ng/L     Narrative:      High Sensitive Troponin T Reference Range:  <10.0 ng/L- Negative Female for AMI  <15.0 ng/L- Negative Male for AMI  >=10 - Abnormal Female indicating possible myocardial injury.  >=15 - Abnormal Male indicating possible myocardial injury.   Clinicians would have to utilize clinical acumen, EKG, Troponin, and serial changes to determine if it is an Acute Myocardial Infarction or myocardial injury due to an underlying chronic condition.         Lactic Acid, Plasma [[id number]]  (Normal) Collected: 07/26/23 2114    Specimen: Blood Updated: 07/26/23 2301     Lactate 1.3 mmol/L     Blood Culture - Blood, Arm, Right [[id number]] Collected: 07/26/23 2115    Specimen: Blood from Arm, Right Updated: 07/26/23 2258    Blood Culture - Blood, Arm, Left [[id number]] Collected: 07/26/23 2114    Specimen: Blood from Arm, Left Updated: 07/26/23  2251    CBC & Differential [[id number]]  (Abnormal) Collected: 07/26/23 2114    Specimen: Blood Updated: 07/26/23 2248    Narrative:      The following orders were created for panel order CBC & Differential.  Procedure                               Abnormality         Status                     ---------                               -----------         ------                     CBC Auto Differential[[id number]]        Abnormal            Final result                 Please view results for these tests on the individual orders.    CBC Auto Differential [[id number]]  (Abnormal) Collected: 07/26/23 2114    Specimen: Blood Updated: 07/26/23 2248     WBC 11.79 10*3/mm3      RBC 3.64 10*6/mm3      Hemoglobin 10.9 g/dL      Hematocrit 34.7 %      MCV 95.3 fL      MCH 29.9 pg      MCHC 31.4 g/dL      RDW 16.1 %      RDW-SD 55.6 fl      MPV 9.5 fL      Platelets 233 10*3/mm3      Neutrophil % 80.2 %      Lymphocyte % 9.5 %      Monocyte % 5.8 %      Eosinophil % 3.5 %      Basophil % 0.6 %      Immature Grans % 0.4 %      Neutrophils, Absolute 9.46 10*3/mm3      Lymphocytes, Absolute 1.12 10*3/mm3      Monocytes, Absolute 0.68 10*3/mm3      Eosinophils, Absolute 0.41 10*3/mm3      Basophils, Absolute 0.07 10*3/mm3      Immature Grans, Absolute 0.05 10*3/mm3      nRBC 0.0 /100 WBC             Lab Results   Component Value Date    ECHOEFEST 65 11/22/2018       Imaging Results (Last 72 Hours)       Procedure Component Value Units Date/Time    CT Angiogram Chest [[id number]] Collected: 07/27/23 0618     Updated: 07/27/23 0739    Narrative:      EXAMINATION: CT ANGIOGRAM CHEST-      7/26/2023 11:23 PM CDT     HISTORY: sob, cough, fever     In order to have a CT radiation dose as low as reasonably achievable  Automated Exposure Control was utilized for adjustment of the mA and/or  KV according to patient size.     DLP in mGycm= 149     The CT angiography of the chest is performed after intravenous contrast  enhancement.     Images  are acquired in axial plane and subsequent 2-D reconstruction in  coronal and sagittal planes and 3-D maximum intensity projection  reconstruction.     The Comparison is made with the previous study dated 03/23/2022.     There is normal opacification of pulmonary arteries and branches  bilaterally. No filling defects in the opacified pulmonary arterial bed.     RV/LV ratio is 32/41 which is normal. No finding to suggest right heart  strain.     Atheromatous changes thoracic aorta is seen. No aneurysmal dilatation.     Severe atheromatous changes of coronary arteries are noted.     There are nonspecific subcarinal and right hilar moderately prominent  lymph nodes similar to the previous study.     The limited visualized soft tissues of the neck are unremarkable. No  mass or lymphadenopathy.     The lungs are well expanded.     Chronic emphysematous lung changes are seen.     No lung nodules. No infiltrate or consolidation.     The limited visualized abdomen is unremarkable. An exophytic nodule from  the left kidney was noted and evaluated with CT scan of the abdomen  dated 11/11/2022 without significant change.     The images reviewed in bone window show a compression fracture of  vertebra T12 is similar to the previous study and was evaluated with MR  imaging of the lumbar spine dated 02/02/2023. No additional bony  abnormalities. Chronic degenerative changes of the thoracic spine is  seen with accentuated thoracic kyphosis.       Impression:      1. No evidence of pulmonary embolism. No aortic aneurysm or dissection.  2. Chronic emphysematous lung changes. No evidence of a mass or  lymphadenopathy.  3. A stable compression fracture of vertebra T12.     The central airway is patent.  This report was finalized on 07/27/2023 07:36 by Dr. Charisma Awad MD.    XR Chest 1 View [[id number]] Collected: 07/27/23 0533     Updated: 07/27/23 0537    Narrative:      EXAMINATION: XR CHEST 1 VW-     7/26/2023 10:48 PM CDT      HISTORY: sob, cough     A frontal projection of the chest is compared with the previous study  dated 03/22/2022.     The lungs are moderately well-expanded.     There is mild elevation right diaphragm similar to the previous study.     Linear parenchymal densities are seen in the parahilar area bilaterally  and left lower lung representing scarring and/or discoid atelectasis.     There is no pleural effusion, pulmonary congestion or pneumothorax.     The heart size is not optimally evaluated in this study due to the AP  projection. Atheromatous changes of the thoracic aorta noted.     A dual chamber cardiac pacer is in place. No change.     There is no acute bony abnormality.       Impression:      1. No active cardiopulmonary disease.  This report was finalized on 07/27/2023 05:34 by Dr. Charisma Awad MD.            Assessment     1.  Febrile illness: Cough/shortness of breath/fever likely related to an acute upper respiratory infection  2.  Leukocytosis  3.  Elevated troponin: Likely secondary to #1.  The patient has had no recent chest pain or shortness of breath suggestive of acute coronary syndrome.  This is felt to be representative myocardial injury rather than concern for ischemia.  4.  Coronary artery disease  5.  Paroxysmal atrial fibrillation on anticoagulation  6.  Hypertension  7.  Hyperlipidemia  8.  Complete heart block status post permanent pacemaker placement    Plan       Cardiology was consulted due to findings of elevated troponin with work-up in the emergency department.  The patient has a known history of coronary artery disease but no recent symptoms.  He denies any chest pain or chest pressure.  His symptoms this presentation are consistent with acute  respiratory infection.  His troponin elevation is flat without rise and fall suggestive of myocardial injury likely related to underlying infection.  Historically, the patient has a normal left ventricular ejection fraction with normal  systolic function.  The patient has no symptoms consistent with acute coronary syndrome and no follow up evaluation of left ventricular systolic function is necessary. no further cardiac work-up is indicated at this time.  He has recently been evaluated by his primary cardiologist he can follow-up with him as scheduled.        Cardiology will sign off, please recall if needed.      Electronically signed by ALENA Issa, 07/27/23, 11:48 AM CDT.      Please note this cardiology consultation note is the result of a face to face consultation with the patient, in addition to reviewing medical records at length by myself, ALENA Issa.       Time: 35 minutes

## 2023-07-28 ENCOUNTER — READMISSION MANAGEMENT (OUTPATIENT)
Dept: CALL CENTER | Facility: HOSPITAL | Age: 88
End: 2023-07-28
Payer: MEDICARE

## 2023-07-28 NOTE — OUTREACH NOTE
Prep Survey      Flowsheet Row Responses   [facility] facility patient discharged from? [facility]   Is LACE score < 7 ? No   Eligibility Readm Mgmt   Discharge diagnosis Febrile illness   Does the patient have one of the following disease processes/diagnoses(primary or secondary)? Other   Does the patient have Home health ordered? No   Is there a DME ordered? No   Prep survey completed? Yes            Ana DOUGLAS - Registered Nurse

## 2023-07-31 LAB
BACTERIA SPEC AEROBE CULT: NORMAL
BACTERIA SPEC AEROBE CULT: NORMAL

## 2023-08-01 ENCOUNTER — READMISSION MANAGEMENT (OUTPATIENT)
Dept: CALL CENTER | Facility: HOSPITAL | Age: 88
End: 2023-08-01
Payer: MEDICARE

## 2023-08-02 LAB
QT INTERVAL: 402 MS
QTC INTERVAL: 541 MS

## 2023-08-27 LAB
QT INTERVAL: 402 MS
QTC INTERVAL: 541 MS

## 2023-09-05 ENCOUNTER — APPOINTMENT (OUTPATIENT)
Dept: GENERAL RADIOLOGY | Facility: HOSPITAL | Age: 88
DRG: 185 | End: 2023-09-05
Payer: MEDICARE

## 2023-09-05 ENCOUNTER — APPOINTMENT (OUTPATIENT)
Dept: CT IMAGING | Facility: HOSPITAL | Age: 88
DRG: 185 | End: 2023-09-05
Payer: MEDICARE

## 2023-09-05 ENCOUNTER — HOSPITAL ENCOUNTER (INPATIENT)
Facility: HOSPITAL | Age: 88
LOS: 1 days | Discharge: HOME | DRG: 185 | End: 2023-09-06
Attending: EMERGENCY MEDICINE | Admitting: FAMILY MEDICINE
Payer: MEDICARE

## 2023-09-05 DIAGNOSIS — S22.42XA CLOSED FRACTURE OF MULTIPLE RIBS OF LEFT SIDE, INITIAL ENCOUNTER: Primary | ICD-10-CM
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
## 2023-09-05 PROBLEM — S22.39XA RIB FRACTURE: Status: ACTIVE | Noted: 2023-09-05

## 2023-09-05 LAB
ANION GAP SERPL CALCULATED.3IONS-SCNC: 8 MMOL/L (ref 5–15)
BASOPHILS # BLD AUTO: 0.03 10*3/MM3 (ref 0–0.2)
BASOPHILS NFR BLD AUTO: 0.4 % (ref 0–1.5)
BUN SERPL-MCNC: 18 MG/DL (ref 8–23)
BUN/CREAT SERPL: 21.4 (ref 7–25)
CALCIUM SPEC-SCNC: 10.2 MG/DL (ref 8.6–10.5)
CHLORIDE SERPL-SCNC: 98 MMOL/L (ref 98–107)
CO2 SERPL-SCNC: 28 MMOL/L (ref 22–29)
CREAT SERPL-MCNC: 0.84 MG/DL (ref 0.76–1.27)
DEPRECATED RDW RBC AUTO: 58.1 FL (ref 37–54)
EGFRCR SERPLBLD CKD-EPI 2021: 83.4 ML/MIN/1.73
EOSINOPHIL # BLD AUTO: 0.17 10*3/MM3 (ref 0–0.4)
EOSINOPHIL NFR BLD AUTO: 2.2 % (ref 0.3–6.2)
ERYTHROCYTE [DISTWIDTH] IN BLOOD BY AUTOMATED COUNT: 16.9 % (ref 12.3–15.4)
GLUCOSE SERPL-MCNC: 116 MG/DL (ref 65–99)
HCT VFR BLD AUTO: 33 % (ref 37.5–51)
HGB BLD-MCNC: 10.5 G/DL (ref 13–17.7)
IMM GRANULOCYTES # BLD AUTO: 0.04 10*3/MM3 (ref 0–0.05)
IMM GRANULOCYTES NFR BLD AUTO: 0.5 % (ref 0–0.5)
INR PPP: 1 (ref 0.91–1.09)
LYMPHOCYTES # BLD AUTO: 1.06 10*3/MM3 (ref 0.7–3.1)
LYMPHOCYTES NFR BLD AUTO: 13.7 % (ref 19.6–45.3)
MCH RBC QN AUTO: 30 PG (ref 26.6–33)
MCHC RBC AUTO-ENTMCNC: 31.8 G/DL (ref 31.5–35.7)
MCV RBC AUTO: 94.3 FL (ref 79–97)
MONOCYTES # BLD AUTO: 0.56 10*3/MM3 (ref 0.1–0.9)
MONOCYTES NFR BLD AUTO: 7.2 % (ref 5–12)
NEUTROPHILS NFR BLD AUTO: 5.88 10*3/MM3 (ref 1.7–7)
NEUTROPHILS NFR BLD AUTO: 76 % (ref 42.7–76)
NRBC BLD AUTO-RTO: 0 /100 WBC (ref 0–0.2)
PLATELET # BLD AUTO: 201 10*3/MM3 (ref 140–450)
PMV BLD AUTO: 9 FL (ref 6–12)
POTASSIUM SERPL-SCNC: 4.6 MMOL/L (ref 3.5–5.2)
PROTHROMBIN TIME: 13.3 SECONDS (ref 11.8–14.8)
RBC # BLD AUTO: 3.5 10*6/MM3 (ref 4.14–5.8)
SODIUM SERPL-SCNC: 134 MMOL/L (ref 136–145)
WBC NRBC COR # BLD: 7.74 10*3/MM3 (ref 3.4–10.8)

## 2023-09-05 PROCEDURE — 73030 X-RAY EXAM OF SHOULDER: CPT

## 2023-09-05 PROCEDURE — 99285 EMERGENCY DEPT VISIT HI MDM: CPT

## 2023-09-05 PROCEDURE — 63710000001 PREDNISONE PER 5 MG: Performed by: FAMILY MEDICINE

## 2023-09-05 PROCEDURE — 25510000001 IOPAMIDOL 61 % SOLUTION: Performed by: EMERGENCY MEDICINE

## 2023-09-05 PROCEDURE — 71260 CT THORAX DX C+: CPT

## 2023-09-05 PROCEDURE — 74177 CT ABD & PELVIS W/CONTRAST: CPT

## 2023-09-05 PROCEDURE — 80048 BASIC METABOLIC PNL TOTAL CA: CPT | Performed by: EMERGENCY MEDICINE

## 2023-09-05 PROCEDURE — 36415 COLL VENOUS BLD VENIPUNCTURE: CPT

## 2023-09-05 PROCEDURE — 73502 X-RAY EXAM HIP UNI 2-3 VIEWS: CPT

## 2023-09-05 PROCEDURE — 85025 COMPLETE CBC W/AUTO DIFF WBC: CPT | Performed by: EMERGENCY MEDICINE

## 2023-09-05 PROCEDURE — 85610 PROTHROMBIN TIME: CPT | Performed by: EMERGENCY MEDICINE

## 2023-09-05 RX ORDER — HYDROCODONE BITARTRATE AND ACETAMINOPHEN 10; 325 MG/1; MG/1
1 TABLET ORAL EVERY 4 HOURS PRN
Status: DISCONTINUED | OUTPATIENT
Start: 2023-09-05 | End: 2023-09-06 | Stop reason: HOSPADM

## 2023-09-05 RX ORDER — ALLOPURINOL 300 MG/1
300 TABLET ORAL DAILY
Status: DISCONTINUED | OUTPATIENT
Start: 2023-09-05 | End: 2023-09-06 | Stop reason: HOSPADM

## 2023-09-05 RX ORDER — PREDNISONE 5 MG/1
2.5 TABLET ORAL DAILY
Status: DISCONTINUED | OUTPATIENT
Start: 2023-09-05 | End: 2023-09-06 | Stop reason: HOSPADM

## 2023-09-05 RX ORDER — LORAZEPAM 0.5 MG/1
0.5 TABLET ORAL DAILY PRN
Status: DISCONTINUED | OUTPATIENT
Start: 2023-09-05 | End: 2023-09-06 | Stop reason: HOSPADM

## 2023-09-05 RX ORDER — IRON POLYSACCHARIDE COMPLEX 150 MG
150 CAPSULE ORAL DAILY
Status: DISCONTINUED | OUTPATIENT
Start: 2023-09-05 | End: 2023-09-06 | Stop reason: HOSPADM

## 2023-09-05 RX ORDER — ASPIRIN 81 MG/1
81 TABLET ORAL DAILY
Status: DISCONTINUED | OUTPATIENT
Start: 2023-09-05 | End: 2023-09-06 | Stop reason: HOSPADM

## 2023-09-05 RX ORDER — SODIUM CHLORIDE 9 MG/ML
40 INJECTION, SOLUTION INTRAVENOUS AS NEEDED
Status: DISCONTINUED | OUTPATIENT
Start: 2023-09-05 | End: 2023-09-06 | Stop reason: HOSPADM

## 2023-09-05 RX ORDER — MONTELUKAST SODIUM 10 MG/1
10 TABLET ORAL NIGHTLY
COMMUNITY

## 2023-09-05 RX ORDER — BISACODYL 5 MG/1
5 TABLET, DELAYED RELEASE ORAL DAILY PRN
Status: DISCONTINUED | OUTPATIENT
Start: 2023-09-05 | End: 2023-09-06 | Stop reason: HOSPADM

## 2023-09-05 RX ORDER — POLYETHYLENE GLYCOL 3350 17 G/17G
17 POWDER, FOR SOLUTION ORAL DAILY PRN
Status: DISCONTINUED | OUTPATIENT
Start: 2023-09-05 | End: 2023-09-06 | Stop reason: HOSPADM

## 2023-09-05 RX ORDER — FOLIC ACID 1 MG/1
1 TABLET ORAL DAILY
Status: DISCONTINUED | OUTPATIENT
Start: 2023-09-05 | End: 2023-09-06 | Stop reason: HOSPADM

## 2023-09-05 RX ORDER — AMOXICILLIN 250 MG
2 CAPSULE ORAL 2 TIMES DAILY
Status: DISCONTINUED | OUTPATIENT
Start: 2023-09-05 | End: 2023-09-06 | Stop reason: HOSPADM

## 2023-09-05 RX ORDER — GUAIFENESIN 600 MG/1
600 TABLET, EXTENDED RELEASE ORAL EVERY 12 HOURS
Status: DISCONTINUED | OUTPATIENT
Start: 2023-09-05 | End: 2023-09-06 | Stop reason: HOSPADM

## 2023-09-05 RX ORDER — BISACODYL 10 MG
10 SUPPOSITORY, RECTAL RECTAL DAILY PRN
Status: DISCONTINUED | OUTPATIENT
Start: 2023-09-05 | End: 2023-09-06 | Stop reason: HOSPADM

## 2023-09-05 RX ORDER — LOSARTAN POTASSIUM 50 MG/1
25 TABLET ORAL DAILY
Status: DISCONTINUED | OUTPATIENT
Start: 2023-09-05 | End: 2023-09-06 | Stop reason: HOSPADM

## 2023-09-05 RX ORDER — HYDROCODONE BITARTRATE AND ACETAMINOPHEN 5; 325 MG/1; MG/1
1 TABLET ORAL EVERY 4 HOURS PRN
Status: DISCONTINUED | OUTPATIENT
Start: 2023-09-05 | End: 2023-09-06 | Stop reason: HOSPADM

## 2023-09-05 RX ORDER — ACETAMINOPHEN 325 MG/1
650 TABLET ORAL EVERY 4 HOURS PRN
Status: DISCONTINUED | OUTPATIENT
Start: 2023-09-05 | End: 2023-09-06 | Stop reason: HOSPADM

## 2023-09-05 RX ORDER — BENZONATATE 100 MG/1
100 CAPSULE ORAL EVERY 8 HOURS PRN
Status: DISCONTINUED | OUTPATIENT
Start: 2023-09-05 | End: 2023-09-06 | Stop reason: HOSPADM

## 2023-09-05 RX ORDER — DOCUSATE SODIUM 100 MG/1
100 CAPSULE, LIQUID FILLED ORAL 2 TIMES DAILY
Status: DISCONTINUED | OUTPATIENT
Start: 2023-09-05 | End: 2023-09-06 | Stop reason: HOSPADM

## 2023-09-05 RX ORDER — SODIUM CHLORIDE 0.9 % (FLUSH) 0.9 %
10 SYRINGE (ML) INJECTION AS NEEDED
Status: DISCONTINUED | OUTPATIENT
Start: 2023-09-05 | End: 2023-09-06 | Stop reason: HOSPADM

## 2023-09-05 RX ORDER — SODIUM CHLORIDE 0.9 % (FLUSH) 0.9 %
10 SYRINGE (ML) INJECTION EVERY 12 HOURS SCHEDULED
Status: DISCONTINUED | OUTPATIENT
Start: 2023-09-05 | End: 2023-09-06 | Stop reason: HOSPADM

## 2023-09-05 RX ORDER — METOPROLOL SUCCINATE 25 MG/1
25 TABLET, EXTENDED RELEASE ORAL DAILY
Status: DISCONTINUED | OUTPATIENT
Start: 2023-09-05 | End: 2023-09-06 | Stop reason: HOSPADM

## 2023-09-05 RX ORDER — PANTOPRAZOLE SODIUM 40 MG/1
40 TABLET, DELAYED RELEASE ORAL DAILY
Status: DISCONTINUED | OUTPATIENT
Start: 2023-09-05 | End: 2023-09-06 | Stop reason: HOSPADM

## 2023-09-05 RX ORDER — TRIAMTERENE AND HYDROCHLOROTHIAZIDE 37.5; 25 MG/1; MG/1
1 TABLET ORAL DAILY
Status: DISCONTINUED | OUTPATIENT
Start: 2023-09-05 | End: 2023-09-06 | Stop reason: HOSPADM

## 2023-09-05 RX ORDER — LEVOTHYROXINE SODIUM 0.07 MG/1
75 TABLET ORAL DAILY
Status: DISCONTINUED | OUTPATIENT
Start: 2023-09-06 | End: 2023-09-06 | Stop reason: HOSPADM

## 2023-09-05 RX ADMIN — TRIAMTERENE AND HYDROCHLOROTHIAZIDE 1 TABLET: 37.5; 25 TABLET ORAL at 18:20

## 2023-09-05 RX ADMIN — GUAIFENESIN 600 MG: 600 TABLET, EXTENDED RELEASE ORAL at 18:20

## 2023-09-05 RX ADMIN — Medication 150 MG: at 18:20

## 2023-09-05 RX ADMIN — IOPAMIDOL 100 ML: 612 INJECTION, SOLUTION INTRAVENOUS at 10:58

## 2023-09-05 RX ADMIN — APIXABAN 5 MG: 5 TABLET, FILM COATED ORAL at 20:16

## 2023-09-05 RX ADMIN — LOSARTAN POTASSIUM 25 MG: 50 TABLET, FILM COATED ORAL at 18:20

## 2023-09-05 RX ADMIN — Medication 10 ML: at 20:16

## 2023-09-05 RX ADMIN — BENZONATATE 100 MG: 100 CAPSULE ORAL at 18:20

## 2023-09-05 RX ADMIN — HYDROCODONE BITARTRATE AND ACETAMINOPHEN 1 TABLET: 5; 325 TABLET ORAL at 18:20

## 2023-09-05 RX ADMIN — DOCUSATE SODIUM 100 MG: 100 CAPSULE, LIQUID FILLED ORAL at 20:16

## 2023-09-05 RX ADMIN — PANTOPRAZOLE SODIUM 40 MG: 40 TABLET, DELAYED RELEASE ORAL at 18:20

## 2023-09-05 RX ADMIN — FOLIC ACID 1 MG: 1 TABLET ORAL at 18:20

## 2023-09-05 RX ADMIN — LORAZEPAM 0.5 MG: 0.5 TABLET ORAL at 20:16

## 2023-09-05 RX ADMIN — METOPROLOL SUCCINATE 25 MG: 25 TABLET, EXTENDED RELEASE ORAL at 18:20

## 2023-09-05 RX ADMIN — ASPIRIN 81 MG: 81 TABLET, COATED ORAL at 18:20

## 2023-09-05 RX ADMIN — PREDNISONE 2.5 MG: 5 TABLET ORAL at 18:20

## 2023-09-05 RX ADMIN — HYDROCODONE BITARTRATE AND ACETAMINOPHEN 1 TABLET: 5; 325 TABLET ORAL at 22:29

## 2023-09-05 NOTE — H&P
Patient Care Team:  Ashish Callejas MD as PCP - General (Sports Medicine)  Austin Granados MD as Cardiologist (Cardiology)  Chi Hunter MD as Consulting Physician (Urology)  Deborah Whitmore APRN as Nurse Practitioner (Family Medicine)  Pola Helm APRN as Nurse Practitioner (Nurse Practitioner)    Chief complaint chest pain after trauma    Subjective     Patient is a 89 y.o. male presents with chest pain starting Saturday after a fall from a seated position. Onset of symptoms was abrupt starting 4 days ago.  Symptoms are associated with pain with inspiration, cough, or movement.  Symptoms are aggravated by anything that initiates a deep breath or sudden quick movements.   Symptoms improve with nothing at home and pain seem to be getting worse. Severity significant and worsening.  Context fall while sitting on his rollator using a drill to fix a threshold in his garage.  The rollator rolled out from under him and he landed on his left side, thinking maybe he landed on the drill.  Quality is described as sharp.    Review of Systems   Pertinent items are noted in HPI    History  Past Medical History:   Diagnosis Date    Abnormal nuclear stress test     Anemia 2021    Arthritis     BPH (benign prostatic hyperplasia)     Coronary artery disease     Diabetes mellitus     Disease of thyroid gland     Fracture     spinal t12    GERD (gastroesophageal reflux disease)     Glaucoma 02/18/2022    Gout     Hyperlipidemia LDL goal <70 11/14/2016    Ischemic heart disease 06/23/2022    Low back pain     LVH (left ventricular hypertrophy) 06/23/2022    Melanoma     Pacemaker 01/18/2019    PAF (paroxysmal atrial fibrillation) 10/29/2019    Chads vas score 3    Primary hypertension 11/14/2016    Prostate CA     Sick sinus syndrome 03/01/2019    Stress fracture Aug. 2021     Past Surgical History:   Procedure Laterality Date    APPENDECTOMY      CARDIAC CATHETERIZATION Left 12/10/2012    CARDIAC  ELECTROPHYSIOLOGY PROCEDURE N/A 2018    Procedure: Pacemaker DC new 2018;  Surgeon: Austin Granados MD;  Location: [facility] CATH INVASIVE LOCATION;  Service: Cardiology    CHOLECYSTECTOMY      COLONOSCOPY N/A 2017    Tics, hemorrhoids repeat exam prn    COLONOSCOPY W/ POLYPECTOMY  2012    adenomatous polyp at 80cm    ENDOSCOPY N/A 2022    Procedure: ESOPHAGOGASTRODUODENOSCOPY WITH ANESTHESIA;  Surgeon: Felice Marroquin MD;  Location: [facility] ENDOSCOPY;  Service: Gastroenterology;  Laterality: N/A;  pre hematochezia  post; normal   Ashish Callejas MD    HAND SURGERY Right     HERNIA REPAIR      HIP CANNULATED SCREW PLACEMENT Left 2022    Procedure: HIP CANNULATED SCREW PLACEMENT;  Surgeon: Isaiah Sheehan MD;  Location: [facility] OR;  Service: Orthopedics;  Laterality: Left;    INGUINAL HERNIA REPAIR      INSERT / REPLACE / REMOVE PACEMAKER      KNEE SURGERY      PROSTATE SURGERY      SKIN CANCER EXCISION      face    SKIN LESION EXCISION      TOTAL HIP ARTHROPLASTY Right 2021    Procedure: TOTAL HIP REPLACEMENT;  Surgeon: Arik Magaña MD;  Location: [facility] OR;  Service: Orthopedics;  Laterality: Right;    TOTAL HIP ARTHROPLASTY Left 2022    Procedure: LEFT HIP SCREW REMOVAL / LEFT TOTAL HIP ARTHROPLASTY;  Surgeon: BASHIR Monsivais MD;  Location: [facility] OR;  Service: Orthopedics;  Laterality: Left;     Family History   Problem Relation Age of Onset    Alzheimer's disease Mother     Cancer Father     Cancer Sister      Social History     Tobacco Use    Smoking status: Former     Years: 30.00     Types: Cigarettes     Quit date: 1985     Years since quittin.7     Passive exposure: Past    Smokeless tobacco: Never   Vaping Use    Vaping Use: Never used   Substance Use Topics    Alcohol use: No    Drug use: No     E-cigarette/Vaping    E-cigarette/Vaping Use Never User     Passive Exposure No     Counseling Given No      E-cigarette/Vaping Substances     Nicotine No     THC No     CBD No     Flavoring No      Facility-Administered Medications Prior to Admission   Medication Dose Route Frequency Provider Last Rate Last Admin    cyanocobalamin injection 1,000 mcg  1,000 mcg Intramuscular Q28 Days Jarad Alexis MD   1,000 mcg at 11/24/21 0901     Medications Prior to Admission   Medication Sig Dispense Refill Last Dose    allopurinol (ZYLOPRIM) 300 MG tablet Take 1 tablet by mouth Daily.   Past Week    apixaban (ELIQUIS) 5 MG tablet tablet Take 1 tablet by mouth Every 12 (Twelve) Hours.   Past Week    aspirin 81 MG EC tablet Take 1 tablet by mouth Daily.   Past Week    levothyroxine (SYNTHROID, LEVOTHROID) 75 MCG tablet Take 1 tablet by mouth Daily.   Past Month    losartan (Cozaar) 25 MG tablet Take 1 tablet by mouth Daily. 90 tablet 3 Past Week    methotrexate 2.5 MG tablet Take 7 tablets by mouth 1 (One) Time Per Week.   Past Month    metoprolol succinate XL (TOPROL-XL) 25 MG 24 hr tablet Take 1 tablet by mouth Daily.   Past Week    montelukast (SINGULAIR) 10 MG tablet Take 1 tablet by mouth Every Night.   Past Month    pantoprazole (PROTONIX) 40 MG EC tablet Take 1 tablet by mouth Daily.   Past Week    predniSONE (DELTASONE) 2.5 MG tablet Take 1 tablet by mouth Daily.   Past Week    triamterene-hydrochlorothiazide (MAXZIDE-25) 37.5-25 MG per tablet Take 1 tablet by mouth Daily.   Past Week    docusate sodium (COLACE) 100 MG capsule Take 1 capsule by mouth 2 (Two) Times a Day.   Unknown    folic acid (FOLVITE) 1 MG tablet Take 1 tablet by mouth Daily.   Unknown    iron polysaccharides (NIFEREX) 150 MG capsule Take 1 capsule by mouth Daily.   Unknown    LORazepam (ATIVAN) 0.5 MG tablet Take 1 tablet by mouth Daily As Needed for Anxiety. 12 tablet 0     nitroglycerin (NITROSTAT) 0.4 MG SL tablet Place 1 tablet under the tongue Every 5 (Five) Minutes As Needed.        Allergies:  Adhesive tape, Simvastatin, Tramadol, and Neosporin [neomycin-bacitracin  zn-polymyx]    Objective     Vital Signs  Temp:  [97.6 °F (36.4 °C)-97.9 °F (36.6 °C)] 97.9 °F (36.6 °C)  Heart Rate:  [60-76] 65  Resp:  [18] 18  BP: (156-168)/(64-78) 156/64    Physical Exam:    General Appearance alert, appears stated age, hard of hearing, and cooperative  Head normocephalic, without obvious abnormality and atraumatic  Eyes lids and lashes normal, conjunctivae and sclerae normal, and no icterus  Neck no adenopathy, supple, and trachea midline  Lungs clear to auscultation, respirations regular, respirations even, and tender along the left rib cage limiting some of his inspiratory ability  Heart regular rhythm & normal rate, normal S1, S2, and no murmur, no gallop, no rub  Abdomen normal bowel sounds, no masses, soft non-tender, and no guarding  Extremities no edema, no cyanosis, and no redness  Skin turgor normal and color normal  Neurologic Mental Status orientated to person, place, time and situation, Cranial Nerves cranial nerves 2 - 12 grossly intact as examined    Results Review:    I reviewed the patient's new clinical results.    Assessment & Plan       Rib fracture    Pacemaker    Anemia    Chronic anticoagulation    Essential hypertension    Body mass index (BMI) of 20.0 to 20.9 in adult    Controlled type 2 diabetes mellitus without complication, without long-term current use of insulin      Admit patient observation to try to gauge need for pain control.  Has had some issues with pain medications in the past causing confusion.  Try to use minimal amount of opioid analgesics to avoid confusion but yet still offer adequate pain control.  PT evaluation ordered to see how he is able to ambulate.  Unfortunately he would likely be dealing with pain for an extended period of time while his ribs heal.    I discussed the patients findings and my recommendations with patient and family.     Ashish Callejas MD  09/05/23  18:28 CDT    Time:  At least 40 minutes total including writing  orders

## 2023-09-05 NOTE — ED NOTES
"Nursing report ED to floor  Dexter SAINI  89 y.o.  male    HPI:   Chief Complaint   Patient presents with    Fall     Fall from sitting on Saturday. Left sided rib/shoulder/hip pain.       Admitting doctor:   Ashish Callejas MD    Consulting provider(s):  Consults       No orders found from 8/7/2023 to 9/6/2023.             Admitting diagnosis:   The primary encounter diagnosis was Closed fracture of multiple ribs of left side, initial encounter. A diagnosis of Fall, initial encounter was also pertinent to this visit.    Code status:   Current Code Status       Date Active Code Status Order ID Comments User Context       Prior            Allergies:   Adhesive tape, Simvastatin, Tramadol, and Neosporin [neomycin-bacitracin zn-polymyx]    Intake and Output  No intake or output data in the 24 hours ending 09/05/23 1233    Weight:       09/05/23  0856   Weight: 71.7 kg (158 lb)       Most recent vitals:   Vitals:    09/05/23 0856   BP: 168/78   Pulse: 76   Resp: 18   Temp: 97.6 °F (36.4 °C)   SpO2: 96%   Weight: 71.7 kg (158 lb)   Height: 182.9 cm (72\")     Oxygen Therapy: .    Active LDAs/IV Access:   Lines, Drains & Airways       Active LDAs       Name Placement date Placement time Site Days    Peripheral IV 07/26/23 2112 Anterior;Distal;Right;Upper Arm 07/26/23 2112  Arm  40                    Labs (abnormal labs have a star):   Labs Reviewed   BASIC METABOLIC PANEL - Abnormal; Notable for the following components:       Result Value    Glucose 116 (*)     Sodium 134 (*)     All other components within normal limits    Narrative:     GFR Normal >60  Chronic Kidney Disease <60  Kidney Failure <15    The GFR formula is only valid for adults with stable renal function between ages 18 and 70.   CBC WITH AUTO DIFFERENTIAL - Abnormal; Notable for the following components:    RBC 3.50 (*)     Hemoglobin 10.5 (*)     Hematocrit 33.0 (*)     RDW 16.9 (*)     RDW-SD 58.1 (*)     Lymphocyte % 13.7 (*)     All other " components within normal limits   PROTIME-INR - Normal   CBC AND DIFFERENTIAL    Narrative:     The following orders were created for panel order CBC & Differential.  Procedure                               Abnormality         Status                     ---------                               -----------         ------                     CBC Auto Differential[[id number]]        Abnormal            Final result                 Please view results for these tests on the individual orders.       Meds given in ED:   Medications   iopamidol (ISOVUE-300) 61 % injection 100 mL (100 mL Intravenous Given 9/5/23 1058)           NIH Stroke Scale:       Isolation/Infection(s):  No active isolations   No active infections     COVID Testing  Collected .  Resulted .    Nursing report ED to floor:  Mental status: .  Ambulatory status: .  Precautions: .    ED nurse phone extentsion- ..

## 2023-09-05 NOTE — ED PROVIDER NOTES
Subjective   History of Present Illness  Patient is a 89-year-old who came the ER after a fall.  Patient was sitting on rolling chair trying to drilling a screw in the chair went back and he fell down landing on his left side is been complaining of left-sided chest pain and abdominal pain came to the ED today for evaluation there is no back pain associate with this.  No focal neurological deficits.  No nausea no shortness of breath.    Fall  Mechanism of injury: fall    Injury location:  Torso  Torso injury location:  L chest and L flank  Incident location:  Home  Arrived directly from scene: no    Fall:     Fall occurred: From a stool.    Height of fall:  Floor level    Impact surface:  Hard floor    Point of impact: Left chest and left shoulder.    Entrapped after fall: no    Suspicion of alcohol use: no    Suspicion of drug use: no    Tetanus status:  Up to date  Prior to arrival data:     Bystander interventions:  None    Patient ambulatory at scene: no      Responsiveness at scene:  Alert    Orientation at scene:  Person, place, situation and time    Loss of consciousness: no      Amnesic to event: no      Airway interventions:  None    Breathing interventions:  None    IV access status:  None    IO access:  None    Fluids administered:  None    Cardiac interventions:  None    Medications administered:  None    Immobilization:  None    Airway condition since incident:  Stable    Breathing condition since incident:  Stable    Circulation condition since incident:  Stable    Mental status condition since incident:  Stable    Disability condition since incident:  Stable  Associated symptoms: no abdominal pain, no back pain, no blindness, no headaches, no hearing loss, no loss of consciousness, no nausea, no neck pain and no seizures    Risk factors: no AICD, no COPD, no diabetes, no dialysis, no hemophilia, no kidney disease and no pacemaker      Review of Systems   Constitutional: Negative.    HENT: Negative.   Negative for hearing loss.    Eyes:  Negative for blindness.   Gastrointestinal: Negative.  Negative for abdominal distention, abdominal pain and nausea.   Endocrine: Negative.    Genitourinary: Negative.    Musculoskeletal: Negative.  Negative for back pain and neck pain.   Skin:  Negative for color change and pallor.   Neurological: Negative.  Negative for seizures, loss of consciousness, syncope, weakness, light-headedness, numbness and headaches.   Hematological: Negative.  Does not bruise/bleed easily.   All other systems reviewed and are negative.    Past Medical History:   Diagnosis Date    Abnormal nuclear stress test     Anemia 2021    Arthritis     BPH (benign prostatic hyperplasia)     Coronary artery disease     Diabetes mellitus     Disease of thyroid gland     Fracture     spinal t12    GERD (gastroesophageal reflux disease)     Glaucoma 02/18/2022    Gout     Hyperlipidemia LDL goal <70 11/14/2016    Ischemic heart disease 06/23/2022    Low back pain     LVH (left ventricular hypertrophy) 06/23/2022    Melanoma     Pacemaker 01/18/2019    PAF (paroxysmal atrial fibrillation) 10/29/2019    Chads vas score 3    Primary hypertension 11/14/2016    Prostate CA     Sick sinus syndrome 03/01/2019    Stress fracture Aug. 2021       Allergies   Allergen Reactions    Adhesive Tape     Simvastatin Other (See Comments)     Abnormal LFT's    Tramadol Hallucinations    Neosporin [Neomycin-Bacitracin Zn-Polymyx] Rash       Past Surgical History:   Procedure Laterality Date    APPENDECTOMY      CARDIAC CATHETERIZATION Left 12/10/2012    CARDIAC ELECTROPHYSIOLOGY PROCEDURE N/A 11/23/2018    Procedure: Pacemaker DC new 11/23/2018;  Surgeon: Austin Granados MD;  Location: [facility] CATH INVASIVE LOCATION;  Service: Cardiology    CHOLECYSTECTOMY      COLONOSCOPY N/A 06/09/2017    Tics, hemorrhoids repeat exam prn    COLONOSCOPY W/ POLYPECTOMY  02/16/2012    adenomatous polyp at 80cm    ENDOSCOPY N/A 09/13/2022    Procedure:  ESOPHAGOGASTRODUODENOSCOPY WITH ANESTHESIA;  Surgeon: Felice Marroquin MD;  Location:  [facility] ENDOSCOPY;  Service: Gastroenterology;  Laterality: N/A;  pre hematochezia  post; normal   Ashish Callejas MD    HAND SURGERY Right     HERNIA REPAIR      HIP CANNULATED SCREW PLACEMENT Left 2022    Procedure: HIP CANNULATED SCREW PLACEMENT;  Surgeon: Isaiah Sheehan MD;  Location:  [facility] OR;  Service: Orthopedics;  Laterality: Left;    INGUINAL HERNIA REPAIR      INSERT / REPLACE / REMOVE PACEMAKER      KNEE SURGERY      PROSTATE SURGERY      SKIN CANCER EXCISION      face    SKIN LESION EXCISION      TOTAL HIP ARTHROPLASTY Right 2021    Procedure: TOTAL HIP REPLACEMENT;  Surgeon: Arik Magaña MD;  Location:  [facility] OR;  Service: Orthopedics;  Laterality: Right;    TOTAL HIP ARTHROPLASTY Left 2022    Procedure: LEFT HIP SCREW REMOVAL / LEFT TOTAL HIP ARTHROPLASTY;  Surgeon: BASHIR Monsivais MD;  Location:  [facility] OR;  Service: Orthopedics;  Laterality: Left;       Family History   Problem Relation Age of Onset    Alzheimer's disease Mother     Cancer Father     Cancer Sister        Social History     Socioeconomic History    Marital status:    Tobacco Use    Smoking status: Former     Years: 30.00     Types: Cigarettes     Quit date: 1985     Years since quittin.7     Passive exposure: Past    Smokeless tobacco: Never   Vaping Use    Vaping Use: Never used   Substance and Sexual Activity    Alcohol use: No    Drug use: No    Sexual activity: Not Currently     Partners: Female           Objective   Physical Exam  Vitals and nursing note reviewed. Exam conducted with a chaperone present.   Constitutional:       General: He is not in acute distress.     Appearance: Normal appearance. He is not toxic-appearing or diaphoretic.   HENT:      Head: Normocephalic and atraumatic. No raccoon eyes, Alicia's sign, contusion, right periorbital erythema or left periorbital erythema.       Jaw: There is normal jaw occlusion.      Right Ear: Tympanic membrane normal. No hemotympanum.      Left Ear: Tympanic membrane normal. No hemotympanum.      Nose: Nose normal.      Mouth/Throat:      Mouth: Mucous membranes are moist.      Pharynx: No oropharyngeal exudate.   Eyes:      General: Lids are normal.      Extraocular Movements: Extraocular movements intact.      Right eye: No nystagmus.      Left eye: No nystagmus.      Conjunctiva/sclera: Conjunctivae normal.      Right eye: Right conjunctiva is not injected.      Left eye: Left conjunctiva is not injected.      Pupils: Pupils are equal, round, and reactive to light.   Neck:      Thyroid: No thyroid mass.      Vascular: No carotid bruit.      Trachea: Trachea and phonation normal. No tracheal tenderness or tracheal deviation.   Cardiovascular:      Rate and Rhythm: Normal rate and regular rhythm.      Chest Wall: PMI is not displaced.      Pulses: Normal pulses.           Carotid pulses are 2+ on the right side and 2+ on the left side.       Radial pulses are 2+ on the right side and 2+ on the left side.        Femoral pulses are 2+ on the right side and 2+ on the left side.       Dorsalis pedis pulses are 2+ on the right side and 2+ on the left side.        Posterior tibial pulses are 2+ on the right side and 2+ on the left side.      Heart sounds: Normal heart sounds. No murmur heard.    No gallop.   Pulmonary:      Effort: Pulmonary effort is normal. No tachypnea, respiratory distress or retractions.      Breath sounds: Normal breath sounds. No stridor, decreased air movement or transmitted upper airway sounds.   Chest:      Chest wall: Tenderness present. No deformity, swelling, crepitus or edema.   Abdominal:      General: Abdomen is flat. Bowel sounds are normal. There is no distension or abdominal bruit.      Palpations: Abdomen is soft. There is no mass or pulsatile mass.      Tenderness: There is no abdominal tenderness. There is no right CVA  tenderness or left CVA tenderness.      Hernia: No hernia is present.   Musculoskeletal:         General: Normal range of motion.      Right shoulder: Normal.      Left shoulder: Normal.      Cervical back: Neck supple. No signs of trauma, rigidity or crepitus. No spinous process tenderness or muscular tenderness.      Thoracic back: Normal.      Lumbar back: Normal.      Right hip: Normal.      Left hip: Normal.      Comments: Axial skeletal examination reveals no obvious deformity of the upper or lower extremity    Neurovascular examination is unremarkable patient is tender in the left shoulder and also tenderness left hip area.  But no deformities noted.    Cervical spine T-spine and L-spine are negative for any step-off laxity or tenderness.   Skin:     General: Skin is warm.      Capillary Refill: Capillary refill takes less than 2 seconds.      Coloration: Skin is not ashen, cyanotic or pale.   Neurological:      General: No focal deficit present.      Mental Status: He is alert and oriented to person, place, and time.      GCS: GCS eye subscore is 4. GCS verbal subscore is 5. GCS motor subscore is 6.      Cranial Nerves: No cranial nerve deficit.      Sensory: Sensation is intact. No sensory deficit.      Motor: Motor function is intact. No weakness or abnormal muscle tone.      Deep Tendon Reflexes:      Reflex Scores:       Bicep reflexes are 2+ on the right side and 2+ on the left side.       Patellar reflexes are 2+ on the right side and 2+ on the left side.  Psychiatric:         Attention and Perception: Attention normal.         Mood and Affect: Mood normal.         Speech: Speech normal.         Behavior: Behavior normal.       Procedures           ED Course  ED Course as of 09/05/23 1217   Tue Sep 05, 2023   1215 Shows fractures of the left 10-12 ribs.  Will admit to medicine service. [TS]      ED Course User Index  [TS] Bert Humphrey MD                                           Medical Decision  Making  Patient with fall complaining of rib and chest wall pain.    Amount and/or Complexity of Data Reviewed  Labs: ordered.  Radiology: ordered.     Details: CT scans reviewed  Discussion of management or test interpretation with external provider(s): Discussed with Dr. Davis    Risk  Prescription drug management.  Risk Details: Will be admitted to the hospital for pain control.        Final diagnoses:   Closed fracture of multiple ribs of left side, initial encounter   Fall, initial encounter       ED Disposition  ED Disposition       ED Disposition   Decision to Admit    Condition   --    Comment   Level of Care: Telemetry [5]   Diagnosis: Rib fracture [[id number]]   Admitting Physician: KATHERINE WINSTON [[id number]]   Attending Physician: KATHERINE WINSTON [[id number]]   Certification: I Certify That Inpatient Hospital Services Are Medically Necessary For Greater Than 2 Midnights                 No follow-up provider specified.       Medication List      No changes were made to your prescriptions during this visit.            Bert Humphrey MD  09/05/23 2259

## 2023-09-06 ENCOUNTER — READMISSION MANAGEMENT (OUTPATIENT)
Dept: CALL CENTER | Facility: HOSPITAL | Age: 88
End: 2023-09-06
Payer: MEDICARE

## 2023-09-06 VITALS
WEIGHT: 145.2 LBS | BODY MASS INDEX: 19.67 KG/M2 | RESPIRATION RATE: 18 BRPM | HEART RATE: 66 BPM | HEIGHT: 72 IN | TEMPERATURE: 97.8 F | DIASTOLIC BLOOD PRESSURE: 58 MMHG | OXYGEN SATURATION: 100 % | SYSTOLIC BLOOD PRESSURE: 121 MMHG

## 2023-09-06 LAB
ANION GAP SERPL CALCULATED.3IONS-SCNC: 10 MMOL/L (ref 5–15)
BASOPHILS # BLD AUTO: 0.04 10*3/MM3 (ref 0–0.2)
BASOPHILS NFR BLD AUTO: 0.5 % (ref 0–1.5)
BUN SERPL-MCNC: 20 MG/DL (ref 8–23)
BUN/CREAT SERPL: 21.3 (ref 7–25)
CALCIUM SPEC-SCNC: 10.1 MG/DL (ref 8.6–10.5)
CHLORIDE SERPL-SCNC: 96 MMOL/L (ref 98–107)
CO2 SERPL-SCNC: 27 MMOL/L (ref 22–29)
CREAT SERPL-MCNC: 0.94 MG/DL (ref 0.76–1.27)
DEPRECATED RDW RBC AUTO: 59.5 FL (ref 37–54)
EGFRCR SERPLBLD CKD-EPI 2021: 77.5 ML/MIN/1.73
EOSINOPHIL # BLD AUTO: 0.34 10*3/MM3 (ref 0–0.4)
EOSINOPHIL NFR BLD AUTO: 4.2 % (ref 0.3–6.2)
ERYTHROCYTE [DISTWIDTH] IN BLOOD BY AUTOMATED COUNT: 16.9 % (ref 12.3–15.4)
GLUCOSE BLDC GLUCOMTR-MCNC: 111 MG/DL (ref 70–130)
GLUCOSE SERPL-MCNC: 125 MG/DL (ref 65–99)
HCT VFR BLD AUTO: 35.4 % (ref 37.5–51)
HGB BLD-MCNC: 11 G/DL (ref 13–17.7)
IMM GRANULOCYTES # BLD AUTO: 0.04 10*3/MM3 (ref 0–0.05)
IMM GRANULOCYTES NFR BLD AUTO: 0.5 % (ref 0–0.5)
LYMPHOCYTES # BLD AUTO: 1.43 10*3/MM3 (ref 0.7–3.1)
LYMPHOCYTES NFR BLD AUTO: 17.5 % (ref 19.6–45.3)
MCH RBC QN AUTO: 30 PG (ref 26.6–33)
MCHC RBC AUTO-ENTMCNC: 31.1 G/DL (ref 31.5–35.7)
MCV RBC AUTO: 96.5 FL (ref 79–97)
MONOCYTES # BLD AUTO: 0.54 10*3/MM3 (ref 0.1–0.9)
MONOCYTES NFR BLD AUTO: 6.6 % (ref 5–12)
NEUTROPHILS NFR BLD AUTO: 5.77 10*3/MM3 (ref 1.7–7)
NEUTROPHILS NFR BLD AUTO: 70.7 % (ref 42.7–76)
NRBC BLD AUTO-RTO: 0 /100 WBC (ref 0–0.2)
PLATELET # BLD AUTO: 245 10*3/MM3 (ref 140–450)
PMV BLD AUTO: 9.4 FL (ref 6–12)
POTASSIUM SERPL-SCNC: 4.8 MMOL/L (ref 3.5–5.2)
RBC # BLD AUTO: 3.67 10*6/MM3 (ref 4.14–5.8)
SODIUM SERPL-SCNC: 133 MMOL/L (ref 136–145)
WBC NRBC COR # BLD: 8.16 10*3/MM3 (ref 3.4–10.8)

## 2023-09-06 PROCEDURE — 63710000001 PREDNISONE PER 5 MG: Performed by: FAMILY MEDICINE

## 2023-09-06 PROCEDURE — 80048 BASIC METABOLIC PNL TOTAL CA: CPT | Performed by: FAMILY MEDICINE

## 2023-09-06 PROCEDURE — 82948 REAGENT STRIP/BLOOD GLUCOSE: CPT

## 2023-09-06 PROCEDURE — 85025 COMPLETE CBC W/AUTO DIFF WBC: CPT | Performed by: FAMILY MEDICINE

## 2023-09-06 PROCEDURE — 97161 PT EVAL LOW COMPLEX 20 MIN: CPT | Performed by: PHYSICAL THERAPIST

## 2023-09-06 RX ORDER — BENZONATATE 100 MG/1
100 CAPSULE ORAL 2 TIMES DAILY PRN
Qty: 60 CAPSULE | Refills: 2 | Status: SHIPPED | OUTPATIENT
Start: 2023-09-06

## 2023-09-06 RX ORDER — HYDROCODONE BITARTRATE AND ACETAMINOPHEN 5; 325 MG/1; MG/1
1 TABLET ORAL EVERY 4 HOURS PRN
Qty: 24 TABLET | Refills: 0 | Status: SHIPPED | OUTPATIENT
Start: 2023-09-06 | End: 2023-09-12

## 2023-09-06 RX ORDER — GUAIFENESIN 600 MG/1
600 TABLET, EXTENDED RELEASE ORAL EVERY 12 HOURS
Qty: 60 TABLET | Refills: 2 | Status: SHIPPED | OUTPATIENT
Start: 2023-09-06

## 2023-09-06 RX ADMIN — HYDROCODONE BITARTRATE AND ACETAMINOPHEN 1 TABLET: 10; 325 TABLET ORAL at 14:43

## 2023-09-06 RX ADMIN — SENNOSIDES AND DOCUSATE SODIUM 2 TABLET: 50; 8.6 TABLET ORAL at 08:11

## 2023-09-06 RX ADMIN — LOSARTAN POTASSIUM 25 MG: 50 TABLET, FILM COATED ORAL at 08:12

## 2023-09-06 RX ADMIN — ALLOPURINOL 300 MG: 300 TABLET ORAL at 08:12

## 2023-09-06 RX ADMIN — APIXABAN 5 MG: 5 TABLET, FILM COATED ORAL at 08:12

## 2023-09-06 RX ADMIN — Medication 150 MG: at 08:12

## 2023-09-06 RX ADMIN — ASPIRIN 81 MG: 81 TABLET, COATED ORAL at 08:12

## 2023-09-06 RX ADMIN — HYDROCODONE BITARTRATE AND ACETAMINOPHEN 1 TABLET: 10; 325 TABLET ORAL at 08:11

## 2023-09-06 RX ADMIN — GUAIFENESIN 600 MG: 600 TABLET, EXTENDED RELEASE ORAL at 05:21

## 2023-09-06 RX ADMIN — PREDNISONE 2.5 MG: 5 TABLET ORAL at 08:12

## 2023-09-06 RX ADMIN — DOCUSATE SODIUM 100 MG: 100 CAPSULE, LIQUID FILLED ORAL at 08:12

## 2023-09-06 RX ADMIN — Medication 10 ML: at 08:11

## 2023-09-06 RX ADMIN — FOLIC ACID 1 MG: 1 TABLET ORAL at 08:12

## 2023-09-06 RX ADMIN — LEVOTHYROXINE SODIUM 75 MCG: 75 TABLET ORAL at 08:12

## 2023-09-06 RX ADMIN — PANTOPRAZOLE SODIUM 40 MG: 40 TABLET, DELAYED RELEASE ORAL at 08:12

## 2023-09-06 RX ADMIN — GUAIFENESIN 600 MG: 600 TABLET, EXTENDED RELEASE ORAL at 13:59

## 2023-09-06 RX ADMIN — TRIAMTERENE AND HYDROCHLOROTHIAZIDE 1 TABLET: 37.5; 25 TABLET ORAL at 08:12

## 2023-09-06 NOTE — OUTREACH NOTE
Prep Survey      Flowsheet Row Responses   [facility] facility patient discharged from? [facility]   Is LACE score < 7 ? No   Eligibility Readm Mgmt   Discharge diagnosis **Rib fracture   Does the patient have one of the following disease processes/diagnoses(primary or secondary)? Other   Does the patient have Home health ordered? No   Is there a DME ordered? No   Prep survey completed? Yes            FABIAN GRIMES - Registered Nurse

## 2023-09-06 NOTE — THERAPY DISCHARGE NOTE
Patient Name: Dexter SAINI  : 1934    MRN: [id number]                              Today's Date: 2023       Admit Date: 2023    Visit Dx:     ICD-10-CM ICD-9-CM   1. Closed fracture of multiple ribs of left side, initial encounter  S22.42XA 807.09   2. 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    Actinic keratosis    Benign prostatic hyperplasia with lower urinary tract symptoms    Complete heart block    Pacemaker    Laceration of face without complication    PAF (paroxysmal atrial fibrillation)    Paroxysmal atrial flutter    Pernicious anemia    Alkaline phosphatase elevation    Anemia    Gastroesophageal reflux disease    Hypothyroidism due to Hashimoto's thyroiditis    Impaired fasting glucose    Overlapping malignant melanoma of skin    Primary osteoarthritis of both hands    COVID-19 virus infection    Closed fracture of distal end of radius    Arthritis of hand    Hip fracture, left    Gross hematuria    Chronic anticoagulation    Postoperative anemia due to acute blood loss    Closed nondisplaced fracture of lesser trochanter of right femur with routine healing    Closed subcapital fracture of femur, left, initial encounter    Confusion and disorientation    Ischemic heart disease    LVH (left ventricular hypertrophy)    Hematochezia    Acquired hypothyroidism    Anxiety    Atherosclerosis of coronary artery without angina pectoris    Disorder of vitamin B12    Excessive anticoagulation    Gait abnormality    Glaucoma    Gout    History of COVID-19    History of malignant neoplasm of skin    Onychomycosis of toenail    Pathological fracture of left femur due to osteoporosis    Pressure ulcer of sacral region    S/p left hip fracture    Anemia     Rheumatoid arthritis    BPH (benign prostatic hyperplasia)    Chronic anticoagulation    Complete atrioventricular block    Gastroesophageal reflux disease without esophagitis    Impaired fasting glucose    Prostate cancer    Hip pain    Atrial fibrillation    Essential hypertension    Sick sinus syndrome    Compression fracture of T12 vertebra    Body mass index (BMI) of 20.0 to 20.9 in adult    Nonsmoker    Degeneration of lumbar or lumbosacral intervertebral disc    Controlled type 2 diabetes mellitus without complication, without long-term current use of insulin    Elevated troponin    Rib fracture     Past Medical History:   Diagnosis Date    Abnormal nuclear stress test     Anemia 2021    Arthritis     BPH (benign prostatic hyperplasia)     Coronary artery disease     Diabetes mellitus     Disease of thyroid gland     Fracture     spinal t12    GERD (gastroesophageal reflux disease)     Glaucoma 02/18/2022    Gout     Hyperlipidemia LDL goal <70 11/14/2016    Ischemic heart disease 06/23/2022    Low back pain     LVH (left ventricular hypertrophy) 06/23/2022    Melanoma     Pacemaker 01/18/2019    PAF (paroxysmal atrial fibrillation) 10/29/2019    Chads vas score 3    Primary hypertension 11/14/2016    Prostate CA     Sick sinus syndrome 03/01/2019    Stress fracture Aug. 2021     Past Surgical History:   Procedure Laterality Date    APPENDECTOMY      CARDIAC CATHETERIZATION Left 12/10/2012    CARDIAC ELECTROPHYSIOLOGY PROCEDURE N/A 11/23/2018    Procedure: Pacemaker DC new 11/23/2018;  Surgeon: Austin Granados MD;  Location: [facility] CATH INVASIVE LOCATION;  Service: Cardiology    CHOLECYSTECTOMY      COLONOSCOPY N/A 06/09/2017    Tics, hemorrhoids repeat exam prn    COLONOSCOPY W/ POLYPECTOMY  02/16/2012    adenomatous polyp at 80cm    ENDOSCOPY N/A 09/13/2022    Procedure: ESOPHAGOGASTRODUODENOSCOPY WITH ANESTHESIA;  Surgeon: Felice Marroquin MD;  Location: [facility] ENDOSCOPY;  Service: Gastroenterology;   Laterality: N/A;  pre hematochezia  post; normal   Ashish Callejas MD    HAND SURGERY Right     HERNIA REPAIR      HIP CANNULATED SCREW PLACEMENT Left 01/18/2022    Procedure: HIP CANNULATED SCREW PLACEMENT;  Surgeon: Isaiah Sheehan MD;  Location:  [facility] OR;  Service: Orthopedics;  Laterality: Left;    INGUINAL HERNIA REPAIR      INSERT / REPLACE / REMOVE PACEMAKER      KNEE SURGERY      PROSTATE SURGERY      SKIN CANCER EXCISION      face    SKIN LESION EXCISION      TOTAL HIP ARTHROPLASTY Right 08/27/2021    Procedure: TOTAL HIP REPLACEMENT;  Surgeon: Arik Magaña MD;  Location:  [facility] OR;  Service: Orthopedics;  Laterality: Right;    TOTAL HIP ARTHROPLASTY Left 03/25/2022    Procedure: LEFT HIP SCREW REMOVAL / LEFT TOTAL HIP ARTHROPLASTY;  Surgeon: BASHIR Monsivais MD;  Location:  [facility] OR;  Service: Orthopedics;  Laterality: Left;      General Information       Row Name 09/06/23 1304          Physical Therapy Time and Intention    Document Type evaluation  multiple rib fxs L 10-12, small L pleural effusion, fall from seated position on rollator  -MS     Mode of Treatment physical therapy;individual therapy  -MS       Row Name 09/06/23 1304          General Information    Patient Profile Reviewed yes  -MS     Prior Level of Function independent:;all household mobility;community mobility;ADL's;cooking;yard work  used rollator outside the home and cane inside the home  -MS     Existing Precautions/Restrictions fall  -MS     Barriers to Rehab hearing deficit  -MS       Row Name 09/06/23 1304          Living Environment    People in Home spouse  -MS       Row Name 09/06/23 1304          Home Main Entrance    Number of Stairs, Main Entrance three  -MS     Stair Railings, Main Entrance railings on both sides of stairs  -MS       Row Name 09/06/23 1304          Stairs Within Home, Primary    Stair Railings, Within Home, Primary none  -MS       Row Name 09/06/23 1304          Cognition     Orientation Status (Cognition) oriented x 4  -MS               User Key  (r) = Recorded By, (t) = Taken By, (c) = Cosigned By      Initials Name Provider Type    MS MitchellKeke, PT, DPT, NCS Physical Therapist                   Mobility       Row Name 09/06/23 1305          Bed Mobility    Bed Mobility supine-sit  -MS     Supine-Sit [place] (Bed Mobility) modified independence  -MS     Assistive Device (Bed Mobility) head of bed elevated;bed rails  -MS     Comment, (Bed Mobility) pt plans to sleep in a recliner at home  -MS       Row Name 09/06/23 1305          Sit-Stand Transfer    Sit-Stand [place] (Transfers) modified independence  -MS     Assistive Device (Sit-Stand Transfers) cane, straight  -MS       Row Name 09/06/23 1305          Gait/Stairs (Locomotion)    [place] Level (Gait) modified independence  -MS     Assistive Device (Gait) cane, straight  -MS     Distance in Feet (Gait) 200ft with forward flexed posture  -MS               User Key  (r) = Recorded By, (t) = Taken By, (c) = Cosigned By      Initials Name Provider Type    MS Keke Mitchell, PT, DPT, NCS Physical Therapist                   Obj/Interventions       Row Name 09/06/23 1305          Range of Motion Comprehensive    General Range of Motion bilateral upper extremity ROM WFL;bilateral lower extremity ROM WFL  -MS       Row Name 09/06/23 1305          Strength Comprehensive (MMT)    General Manual Muscle Testing (MMT) Assessment no strength deficits identified  -MS       Row Name 09/06/23 1305          Balance    Balance Assessment sitting static balance;sitting dynamic balance;standing static balance;standing dynamic balance  -MS     Static Sitting Balance independent  -MS     Dynamic Sitting Balance independent  -MS     Position, Sitting Balance unsupported;sitting edge of bed  -MS     Static Standing Balance modified independence  -MS     Dynamic Standing Balance modified independence  -MS     Position/Device Used,  Standing Balance cane, straight  -MS               User Key  (r) = Recorded By, (t) = Taken By, (c) = Cosigned By      Initials Name Provider Type    Keke Baron, PT, DPT, NCS Physical Therapist                   Goals/Plan    No documentation.                  Clinical Impression       Row Name 09/06/23 1307          Pain    Pretreatment Pain Rating 2/10  -MS     Posttreatment Pain Rating 6/10  back to a 2/10 with sitting  -MS     Pain Location - chest  -MS     Pain Intervention(s) Repositioned;Ambulation/increased activity  -MS       Row Name 09/06/23 1307          Plan of Care Review    Plan of Care Reviewed With patient  -MS     Progress no change  -MS     Outcome Evaluation The patient presents alert and oriented x4 eating lunch. He is [place] and tangental in his converation. He reports a low pain level at rest, but has increased pain during transitional movements or when he coughs. I explained to him that this is to be expected and will improve over the next few weeks as his bones heal. He demonstrates ability to ambulate with use of straight cane safely with little to no pain. He has no needs for PT at this time. Recommend discharge home with assist.  -MS       Row Name 09/06/23 1307          Therapy Assessment/Plan (PT)    Criteria for Skilled Interventions Met (PT) no problems identified which require skilled intervention;does not meet criteria for skilled intervention  -MS     Therapy Frequency (PT) evaluation only  -MS       Row Name 09/06/23 1307          Positioning and Restraints    Post Treatment Position chair  -MS     In Chair sitting;call light within reach;encouraged to call for assist  -MS               User Key  (r) = Recorded By, (t) = Taken By, (c) = Cosigned By      Initials Name Provider Type    Keke Baron, PT, DPT, NCS Physical Therapist                   Outcome Measures       Row Name 09/06/23 1305 09/06/23 0811       How much help from another person do you currently  need...    Turning from your back to your side while in flat bed without using bedrails? 3  -MS 4  -KP    Moving from lying on back to sitting on the side of a flat bed without bedrails? 4  -MS 3  -KP    Moving to and from a bed to a chair (including a wheelchair)? 4  -MS 3  -KP    Standing up from a chair using your arms (e.g., wheelchair, bedside chair)? 4  -MS 3  -KP    Climbing 3-5 steps with a railing? 4  -MS 3  -KP    To walk in hospital room? 4  -MS 3  -KP    AM-PAC 6 Clicks Score (PT) 23  -MS 19  -KP    Highest level of mobility 7 --> Walked 25 feet or more  -MS 6 --> Walked 10 steps or more  -KP      Row Name 09/06/23 1305          Functional Assessment    Outcome Measure Options AM-PAC 6 Clicks Basic Mobility (PT)  -MS               User Key  (r) = Recorded By, (t) = Taken By, (c) = Cosigned By      Initials Name Provider Type    Alec Baronanda MARTÍN, PT, DPT, NCS Physical Therapist     Karly Cobian, RN Registered Nurse                    PT Recommendation and Plan     Plan of Care Reviewed With: patient  Progress: no change  Outcome Evaluation: The patient presents alert and oriented x4 eating lunch. He is [place] and tangental in his converation. He reports a low pain level at rest, but has increased pain during transitional movements or when he coughs. I explained to him that this is to be expected and will improve over the next few weeks as his bones heal. He demonstrates ability to ambulate with use of straight cane safely with little to no pain. He has no needs for PT at this time. Recommend discharge home with assist.     Time Calculation:         PT Charges       Row Name 09/06/23 7943             Time Calculation    Start Time 1305  9 min chart review  -MS      Stop Time 1332  -MS      Time Calculation (min) 27 min  -MS      PT Received On 09/06/23  -MS         Untimed Charges    PT Eval/Re-eval Minutes 38  -MS         Total Minutes    Untimed Charges Total Minutes 38  -MS       Total Minutes 38   -MS                User Key  (r) = Recorded By, (t) = Taken By, (c) = Cosigned By      Initials Name Provider Type    Keke Baron, PT, DPT, NCS Physical Therapist                      PT G-Codes  Outcome Measure Options: AM-PAC 6 Clicks Basic Mobility (PT)  AM-PAC 6 Clicks Score (PT): 23    PT Discharge Summary  Anticipated Discharge Disposition (PT): home with assist    Keke Mitchell, PT, DPT, NCS  9/6/2023

## 2023-09-06 NOTE — PLAN OF CARE
Goal Outcome Evaluation:      Pt Aox4. VSS on RA. C/o pain in left truncal area and left shoulder/hip. Prn pain meds given. PPP. Edema present in ankles bilat. Pt up x 1 to brm/chair with cane and gait belt. States movement worsens pain. Reddened on sacral area, topical cream applied and photos in LDA. Bruising on left abd. Upper dentures on, lowers at home. Pt [place]. Wife at bedside and attentive to pt. Oriented to environment, educated on safety. Bed alarm set.

## 2023-09-06 NOTE — PLAN OF CARE
Problem: Adult Inpatient Plan of Care  Goal: Plan of Care Review  Outcome: Ongoing, Progressing  Flowsheets (Taken 9/6/2023 0421)  Progress: improving  Plan of Care Reviewed With:   patient   spouse  Outcome Evaluation: Pt A&Ox4, VSS. C/o pain to L side with some L shoulder pain. Pt reports some improvement with PRN PO meds, medicated x1 this shift. Up with stand by assist to bathroom with cane. Pt voiding. Room air, CPOX. Safety maintained, will continue to monitor.

## 2023-09-06 NOTE — PROGRESS NOTES
Family Medicine Progress Note    Patient:  Dexter SAINI  YOB: 1934    MRN: [id number]     Acct: [id number]     Admit date: 9/5/2023    Patient Seen, Chart, Consults notes, Labs, Radiology studies reviewed.    Subjective: Day 1 of stay with multiple rib fractures and most recent (in last 24 hours) has had reasonable control of pain on oral medications.  Has been able to get up and go to the bathroom.  No physical therapy evaluation yet.  Was able to sleep through the night.    Past, Family, Social History unchanged from admission.    Diet: Diet: Cardiac Diets; Healthy Heart (2-3 Na+); Texture: Regular Texture (IDDSI 7); Fluid Consistency: Thin (IDDSI 0)    Medications:  Scheduled Meds:allopurinol, 300 mg, Oral, Daily  apixaban, 5 mg, Oral, Q12H  aspirin, 81 mg, Oral, Daily  docusate sodium, 100 mg, Oral, BID  folic acid, 1 mg, Oral, Daily  guaiFENesin, 600 mg, Oral, Q12H  iron polysaccharides, 150 mg, Oral, Daily  levothyroxine, 75 mcg, Oral, Daily  losartan, 25 mg, Oral, Daily  metoprolol succinate XL, 25 mg, Oral, Daily  pantoprazole, 40 mg, Oral, Daily  predniSONE, 2.5 mg, Oral, Daily  senna-docusate sodium, 2 tablet, Oral, BID  sodium chloride, 10 mL, Intravenous, Q12H  triamterene-hydrochlorothiazide, 1 tablet, Oral, Daily      Continuous Infusions:   PRN Meds:  acetaminophen    benzonatate    senna-docusate sodium **AND** polyethylene glycol **AND** bisacodyl **AND** bisacodyl    HYDROcodone-acetaminophen    HYDROcodone-acetaminophen    LORazepam    sodium chloride    sodium chloride    Objective:    Lab Results (last 24 hours)       Procedure Component Value Units Date/Time    Basic Metabolic Panel [[id number]]  (Abnormal) Collected: 09/05/23 1002    Specimen: Blood Updated: 09/05/23 1029     Glucose 116 mg/dL      BUN 18 mg/dL      Creatinine 0.84 mg/dL      Sodium 134 mmol/L      Potassium 4.6 mmol/L      Chloride 98 mmol/L      CO2 28.0 mmol/L      Calcium 10.2 mg/dL      BUN/Creatinine  Ratio 21.4     Anion Gap 8.0 mmol/L      eGFR 83.4 mL/min/1.73     Narrative:      GFR Normal >60  Chronic Kidney Disease <60  Kidney Failure <15    The GFR formula is only valid for adults with stable renal function between ages 18 and 70.    Protime-INR [[id number]]  (Normal) Collected: 09/05/23 1002    Specimen: Blood Updated: 09/05/23 1021     Protime 13.3 Seconds      INR 1.00    CBC & Differential [[id number]]  (Abnormal) Collected: 09/05/23 1002    Specimen: Blood Updated: 09/05/23 1010    Narrative:      The following orders were created for panel order CBC & Differential.  Procedure                               Abnormality         Status                     ---------                               -----------         ------                     CBC Auto Differential[[id number]]        Abnormal            Final result                 Please view results for these tests on the individual orders.    CBC Auto Differential [[id number]]  (Abnormal) Collected: 09/05/23 1002    Specimen: Blood Updated: 09/05/23 1010     WBC 7.74 10*3/mm3      RBC 3.50 10*6/mm3      Hemoglobin 10.5 g/dL      Hematocrit 33.0 %      MCV 94.3 fL      MCH 30.0 pg      MCHC 31.8 g/dL      RDW 16.9 %      RDW-SD 58.1 fl      MPV 9.0 fL      Platelets 201 10*3/mm3      Neutrophil % 76.0 %      Lymphocyte % 13.7 %      Monocyte % 7.2 %      Eosinophil % 2.2 %      Basophil % 0.4 %      Immature Grans % 0.5 %      Neutrophils, Absolute 5.88 10*3/mm3      Lymphocytes, Absolute 1.06 10*3/mm3      Monocytes, Absolute 0.56 10*3/mm3      Eosinophils, Absolute 0.17 10*3/mm3      Basophils, Absolute 0.03 10*3/mm3      Immature Grans, Absolute 0.04 10*3/mm3      nRBC 0.0 /100 WBC              Imaging Results (Last 72 Hours)       Procedure Component Value Units Date/Time    CT Abdomen Pelvis With Contrast [[id number]] Collected: 09/05/23 1106     Updated: 09/05/23 1131    Narrative:      CT ABDOMEN PELVIS W CONTRAST- 9/5/2023 10:42 AM CDT     HISTORY:  Abdominal trauma       COMPARISON: CT scan dated 11/11/2022.      DOSE LENGTH PRODUCT: 292 mGy cm. Automated exposure control was also  utilized to decrease patient radiation dose.     TECHNIQUE: Following the intravenous administration of contrast, helical  CT tomographic images of the abdomen and pelvis were acquired.  Multiplanar reformatted images were provided for review.      FINDINGS:      LIVER: No suspicious liver lesion. The portal veins are patent..      BILIARY SYSTEM: The gallbladder has been removed. There is no evidence  of biliary ductal dilation.      PANCREAS: Mildly atrophic. Otherwise unremarkable. Main duct is  nondilated.      SPLEEN: Normal size.      KIDNEYS AND ADRENALS: Adrenal glands are unremarkable. Diffuse renal  cortical thinning. There are numerous bilateral benign renal cysts which  require no future surveillance. There are 2 x 9 mm nonobstructing left  renal stones. Right-sided duplicated collecting system.     RETROPERITONEUM: No mass, lymphadenopathy or hemorrhage.      GI TRACT: The stomach is nondistended. Small bowel is nondilated.  Moderate colonic stool. No inflammatory changes along the colon.     OTHER: No intraperitoneal free fluid or free air. Moderate atheromatous  vascular calcification. The abdominopelvic vasculature is patent.  Previous bilateral total hip arthroplasties. Normal alignment across the  components. Advanced lumbar spine osteoarthritis change. There is a  pre-existing T12 compression fracture which is stable. No traumatic  subluxation.      PELVIS: No mass lesion, fluid collection or significant lymphadenopathy  is seen in the pelvis. The urinary bladder is normal in appearance.       Impression:      1. No acute process in the abdomen or pelvis.        This report was finalized on 09/05/2023 11:27 by Dr Phill Singh, .    CT Chest With Contrast Diagnostic [[id number]] Collected: 09/05/23 1105     Updated: 09/05/23 1117    Narrative:      EXAM: CT CHEST W  CONTRAST DIAGNOSTIC- - 9/5/2023 10:42 AM CDT     HISTORY: Chest trauma, blunt       COMPARISON: 07/26/2023.      DOSE LENGTH PRODUCT: 343 mGy cm. Automatic exposure control was utilized  to make radiation dose as low as reasonably achievable.     TECHNIQUE: Enhanced CT images of the chest obtained with multiplanar  reformats.     FINDINGS:      AIRWAYS/PULMONARY PARENCHYMA: The central airways are midline and  patent. No pneumothorax.      Moderate emphysematous changes. Small LEFT pleural effusion with small  LEFT lower lobe dependent atelectasis.     VASCULATURE: Thoracic aorta is normal in course and caliber. Normal  caliber pulmonary artery. No large central pulmonary artery filling  defect on this non-CTA exam.     CARDIAC:  Borderline heart size. Heavily calcified coronary arteries  verses stent material. No pericardial effusion.       MEDIASTINUM: Esophagus has normal course, caliber and wall thickness.  There is no mediastinal or hilar lymphadenopathy by CT size criteria.      EXTRATHORACIC: The visualized portions of the thyroid gland are  unremarkable. No thoracic inlet or axillary adenopathy. Cardiac pulse  generator at the LEFT chest wall.      INCLUDED UPPER ABDOMEN: Cholecystectomy clips. Visualized portion of the  liver, atrophic pancreas, spleen, adrenal glands appear within normal  limits. Bilateral exophytic renal lesions. Some are too small to further  characterize. LEFT kidney with a 2.4 cm exophytic lesion measuring 42  Hounsfield units, previously 37 Hounsfield units on prior unenhanced  exam 07/26/2023, consistent with a hemorrhagic or proteinaceous cyst.     BONES: Acute appearing posterior LEFT 10th-12th rib fractures. Similar  severe height loss of T12. Ankylosis throughout the visualized spine.  Diffuse bone demineralization and technique partially limited evaluation  of the spine.           Impression:      1. Acute appearing LEFT posterior 10th-12th rib fractures. Similar  chronic severe  "height loss of T12. Ankylosis throughout the thoracic  spine. Diffuse bone demineralization.  2. Small LEFT pleural effusion with small LEFT lower lobe dependent  atelectasis.  3. Moderate emphysematous changes.  4. Borderline heart size with heavily calcified coronary arteries verses  stent material.  This report was finalized on 09/05/2023 11:14 by Dr Shelby Day MD.    XR Shoulder 2+ View Left [[id number]] Collected: 09/05/23 1033     Updated: 09/05/23 1038    Narrative:      EXAM: XR SHOULDER 2+ VW LEFT-      INDICATION: Trauma      COMPARISON: 7/26/2023.     TECHNIQUE: 2 views of the left shoulder was obtained.     FINDINGS:     Osteopenia, most notably involving the scapula limits evaluation.     No definite acute fracture or malalignment. Mild AC joint arthrosis.       Impression:         No definite acute fracture or malalignment.     This report was finalized on 09/05/2023 10:35 by  Parish Faustin DO.    XR Hip With or Without Pelvis 2 - 3 View Left [[id number]] Collected: 09/05/23 1027     Updated: 09/05/23 1032    Narrative:      EXAM: XR HIP W OR WO PELVIS 2-3 VIEW LEFT-      INDICATION: Trauma      COMPARISON: 3/26/2022.     TECHNIQUE: Single AP pelvis with 2 additional views of the left hip.     FINDINGS:     Diffuse osteopenia limits evaluation for nondisplaced fracture.     Bilateral hip arthroplasties. Hardware appears intact.     No visible displaced fracture.     Degenerative changes in the lumbar spine.       Impression:         No visible displaced fracture.     This report was finalized on 09/05/2023 10:29 by  Parish Faustin DO.             Physical Exam:    Vitals: /55 (BP Location: Left arm, Patient Position: Lying)   Pulse 60   Temp 97.1 °F (36.2 °C) (Oral)   Resp 18   Ht 182.9 cm (72\")   Wt 65.9 kg (145 lb 3.2 oz)   SpO2 99%   BMI 19.69 kg/m²   24 hour intake/output:  Intake/Output Summary (Last 24 hours) at 9/6/2023 0722  Last data filed at 9/5/2023 2016  Gross per 24 hour "   Intake 360 ml   Output 600 ml   Net -240 ml     Last 3 weights:  Wt Readings from Last 3 Encounters:   09/05/23 65.9 kg (145 lb 3.2 oz)   07/27/23 68.6 kg (151 lb 3 oz)   07/21/23 70.3 kg (155 lb)       General Appearance alert, appears stated age, hard of hearing, and cooperative  Head normocephalic, without obvious abnormality and atraumatic  Eyes lids and lashes normal, conjunctivae and sclerae normal, and no icterus  Neck no adenopathy, supple, and trachea midline  Lungs clear to auscultation, respirations regular, respirations even, respirations unlabored, and tender left side particularly in the posterior aspect of the chest wall  Heart regular rhythm & normal rate, normal S1, S2, and no murmur, no gallop, no rub  Abdomen normal bowel sounds, no masses, soft non-tender, and no guarding  Extremities no edema, no cyanosis, and no redness  Skin turgor normal and color normal  Neurologic Mental Status orientated to person, place, time and situation        Assessment:      Rib fracture    Pacemaker    PAF (paroxysmal atrial fibrillation)    Anemia    Chronic anticoagulation    Essential hypertension    Body mass index (BMI) of 20.0 to 20.9 in adult    Controlled type 2 diabetes mellitus without complication, without long-term current use of insulin          Plan:  Continue evaluation through the day.  See how he does as far as ambulation status.  Judicious use of pain medication to keep him comfortable but not interfere with his mentation.  Possibly discharge as early as this afternoon if he is doing okay and pain is adequately controlled on oral medication.      Electronically signed by Ashish Callejas MD on 9/6/2023 at 07:22 CDT

## 2023-09-06 NOTE — DISCHARGE SUMMARY
Date of Discharge:  9/6/2023    Discharge Diagnosis:   Multiple closed fracture ribs    Problem List:  Active Hospital Problems    Diagnosis  POA    **Rib fracture [S22.39XA]  Yes    Controlled type 2 diabetes mellitus without complication, without long-term current use of insulin [E11.9]  Yes    Body mass index (BMI) of 20.0 to 20.9 in adult [Z68.20]  Not Applicable    Essential hypertension [I10]  Yes    Chronic anticoagulation [Z79.01]  Not Applicable    Anemia [D64.9]  Yes    PAF (paroxysmal atrial fibrillation) [I48.0]  Yes    Pacemaker [Z95.0]  Yes      Resolved Hospital Problems   No resolved problems to display.       Presenting Problem/History of Present Illness  Rib fracture [S22.39XA]        Hospital Course  Patient is a 89 y.o. male presented with fall at home 3 days prior to going to ER.  Found to have rib fracture 10-12 on the left.  Admitted for pain control.  Did well with therapy and control of pain with oral medication.  Felt safe to go home.      Procedures Performed         Consults:   Consults       No orders found for last 30 day(s).            Pertinent Test Results: radiology: CT scan: fracture of ribs 10-12 on the left    Condition on Discharge:  stable    Vital Signs  Temp:  [97.1 °F (36.2 °C)-98 °F (36.7 °C)] 97.8 °F (36.6 °C)  Heart Rate:  [59-88] 66  Resp:  [18] 18  BP: (110-146)/(52-70) 121/58    Discharge Disposition  Home or Self Care    Discharge Medications     Discharge Medications        New Medications        Instructions Start Date   HYDROcodone-acetaminophen 5-325 MG per tablet  Commonly known as: NORCO   1 tablet, Oral, Every 4 Hours PRN             Changes to Medications        Instructions Start Date   benzonatate 100 MG capsule  Commonly known as: TESSALON  What changed:   when to take this  reasons to take this   100 mg, Oral, 2 Times Daily PRN             Continue These Medications        Instructions Start Date   allopurinol 300 MG tablet  Commonly known as: ZYLOPRIM    300 mg, Oral, Daily      apixaban 5 MG tablet tablet  Commonly known as: ELIQUIS   5 mg, Oral, Every 12 Hours Scheduled      aspirin 81 MG EC tablet   81 mg, Oral, Daily      docusate sodium 100 MG capsule  Commonly known as: COLACE   100 mg, Oral, 2 Times Daily      folic acid 1 MG tablet  Commonly known as: FOLVITE   1 mg, Oral, Daily      guaiFENesin 600 MG 12 hr tablet  Commonly known as: MUCINEX   600 mg, Oral, Every 12 Hours      iron polysaccharides 150 MG capsule  Commonly known as: NIFEREX   150 mg, Oral, Daily      levothyroxine 75 MCG tablet  Commonly known as: SYNTHROID, LEVOTHROID   75 mcg, Oral, Daily      LORazepam 0.5 MG tablet  Commonly known as: ATIVAN   0.5 mg, Oral, Daily PRN      losartan 25 MG tablet  Commonly known as: Cozaar   25 mg, Oral, Daily      methotrexate 2.5 MG tablet   17.5 mg, Oral, Weekly      metoprolol succinate XL 25 MG 24 hr tablet  Commonly known as: TOPROL-XL   25 mg, Oral, Daily      montelukast 10 MG tablet  Commonly known as: SINGULAIR   10 mg, Oral, Nightly      nitroglycerin 0.4 MG SL tablet  Commonly known as: NITROSTAT   Place 1 tablet under the tongue Every 5 (Five) Minutes As Needed.      pantoprazole 40 MG EC tablet  Commonly known as: PROTONIX   40 mg, Oral, Daily      predniSONE 2.5 MG tablet  Commonly known as: DELTASONE   2.5 mg, Oral, Daily      triamterene-hydrochlorothiazide 37.5-25 MG per tablet  Commonly known as: MAXZIDE-25   1 tablet, Oral, Daily               Discharge Diet   regular    Activity at Discharge  as tolerated    Follow-up Appointments  Future Appointments   Date Time Provider Department Center   10/26/2023  3:30 PM Grant Melendez APRN [facility] POD PAD PAD   1/26/2024  9:00 AM Austin Granados MD [facility] CD  PAD   2/16/2024  9:00 AM [facility] DEVICE CHECK [facility]         Test Results Pending at Discharge      Ashish Callejas MD    Time: Discharge 25 min

## 2023-09-06 NOTE — PLAN OF CARE
Goal Outcome Evaluation:  Plan of Care Reviewed With: patient        Progress: no change  Outcome Evaluation: The patient presents alert and oriented x4 eating lunch. He is [place] and tangental in his converation. He reports a low pain level at rest, but has increased pain during transitional movements or when he coughs. I explained to him that this is to be expected and will improve over the next few weeks as his bones heal. He demonstrates ability to ambulate with use of straight cane safely with little to no pain. He has no needs for PT at this time. Recommend discharge home with assist.      Anticipated Discharge Disposition (PT): home with assist

## 2023-09-06 NOTE — CASE MANAGEMENT/SOCIAL WORK
Discharge Planning Assessment  [facility]     Patient Name: Dexter SAINI  MRN: [id number]  Today's Date: 9/6/2023    Admit Date: 9/5/2023        Discharge Needs Assessment       Row Name 09/06/23 0938       Living Environment    People in Home spouse    Name(s) of People in Home Amirah    Current Living Arrangements home    Primary Care Provided by spouse/significant other    Provides Primary Care For no one    Family Caregiver if Needed spouse    Family Caregiver Names Amirah    Able to Return to Prior Arrangements yes       Resource/Environmental Concerns    Resource/Environmental Concerns none       Transition Planning    Patient/Family Anticipates Transition to home with family    Transportation Anticipated family or friend will provide       Discharge Needs Assessment    Readmission Within the Last 30 Days no previous admission in last 30 days    Equipment Currently Used at Home walker, rolling    Concerns to be Addressed no discharge needs identified    Equipment Needed After Discharge none    Discharge Coordination/Progress spoke to patient who lives with spouse; has RX coverage and PCP; does go to [facility]; spouse is caregiver will follow for DC needs                   Discharge Plan    No documentation.                 Continued Care and Services - Admitted Since 9/5/2023    Coordination has not been started for this encounter.          Demographic Summary    No documentation.                  Functional Status    No documentation.                  Psychosocial    No documentation.                  Abuse/Neglect    No documentation.                  Legal    No documentation.                  Substance Abuse    No documentation.                  Patient Forms    No documentation.                     Tyesha Saini RN

## 2023-09-12 ENCOUNTER — READMISSION MANAGEMENT (OUTPATIENT)
Dept: CALL CENTER | Facility: HOSPITAL | Age: 88
End: 2023-09-12
Payer: MEDICARE

## 2023-09-12 NOTE — OUTREACH NOTE
Medical Week 1 Survey      Flowsheet Row Responses   [facility] patient discharged from? [place]   Does the patient have one of the following disease processes/diagnoses(primary or secondary)? Other   Week 1 attempt successful? Yes   Call start time 1538   Call end time 1544   Discharge diagnosis **Rib fracture   Is patient permission given to speak with other caregiver? Yes   List who call center can speak with wife   Person spoke with today (if not patient) and relationship wife   Meds reviewed with patient/caregiver? Yes   Is the patient having any side effects they believe may be caused by any medication additions or changes? No   Does the patient have all medications ordered at discharge? Yes   Is the patient taking all medications as directed (includes completed medication regime)? Yes   Does the patient have a primary care provider?  Yes   Does the patient have an appointment with their PCP within 7 days of discharge? Yes   DME comments using walker for ambulation instead of cane per wife.   Psychosocial issues? No   Comments Per wife the pt continues having some pain, today he has been more active.   Did the patient receive a copy of their discharge instructions? Yes   Nursing interventions Reviewed instructions with patient   What is the patient's perception of their health status since discharge? Improving   Is the patient/caregiver able to teach back signs and symptoms related to disease process for when to call PCP? Yes   Is the patient/caregiver able to teach back signs and symptoms related to disease process for when to call 911? Yes   Week 1 call completed? Yes   Revoked No further contact(revokes)-requires comment   Is the patient interested in additional calls from an ambulatory ? No   Call end time 1544            Luanne HAWKINS - Registered Nurse

## 2024-01-26 ENCOUNTER — OFFICE VISIT (OUTPATIENT)
Dept: CARDIOLOGY | Facility: CLINIC | Age: 89
End: 2024-01-26
Payer: MEDICARE

## 2024-01-26 VITALS
HEART RATE: 96 BPM | HEIGHT: 72 IN | BODY MASS INDEX: 21.26 KG/M2 | SYSTOLIC BLOOD PRESSURE: 129 MMHG | WEIGHT: 157 LBS | DIASTOLIC BLOOD PRESSURE: 71 MMHG

## 2024-01-26 DIAGNOSIS — R06.09 DOE (DYSPNEA ON EXERTION): ICD-10-CM

## 2024-01-26 DIAGNOSIS — I44.2 COMPLETE ATRIOVENTRICULAR BLOCK: ICD-10-CM

## 2024-01-26 DIAGNOSIS — I48.0 PAROXYSMAL ATRIAL FIBRILLATION: ICD-10-CM

## 2024-01-26 DIAGNOSIS — I48.0 PAF (PAROXYSMAL ATRIAL FIBRILLATION): ICD-10-CM

## 2024-01-26 DIAGNOSIS — I25.10 ATHEROSCLEROSIS OF NATIVE CORONARY ARTERY OF NATIVE HEART WITHOUT ANGINA PECTORIS: Primary | ICD-10-CM

## 2024-01-26 DIAGNOSIS — E78.5 HYPERLIPIDEMIA LDL GOAL <70: ICD-10-CM

## 2024-01-26 PROCEDURE — 93000 ELECTROCARDIOGRAM COMPLETE: CPT | Performed by: INTERNAL MEDICINE

## 2024-01-26 PROCEDURE — 1160F RVW MEDS BY RX/DR IN RCRD: CPT | Performed by: INTERNAL MEDICINE

## 2024-01-26 PROCEDURE — 1159F MED LIST DOCD IN RCRD: CPT | Performed by: INTERNAL MEDICINE

## 2024-01-26 PROCEDURE — 99214 OFFICE O/P EST MOD 30 MIN: CPT | Performed by: INTERNAL MEDICINE
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
## 2024-01-29 ENCOUNTER — TELEPHONE (OUTPATIENT)
Dept: PODIATRY | Facility: CLINIC | Age: 89
End: 2024-01-29
Payer: MEDICARE

## 2024-01-29 RX ORDER — APIXABAN 5 MG/1
5 TABLET, FILM COATED ORAL EVERY 12 HOURS
Qty: 180 TABLET | Refills: 4 | Status: SHIPPED | OUTPATIENT
Start: 2024-01-29

## 2024-01-29 NOTE — TELEPHONE ENCOUNTER
Caller: ALYSON SAINI    Relationship: WIFE    Best call back number: [phone]    What is the best time to reach you: NO CALLBACK NEEDED    Who are you requesting to speak with (clinical staff, provider,  specific staff member): STAFF    Do you know the name of the person who called: NO    What was the call regarding:  SCHEDULING APPT. WIFE SAYS THEY DID NOT WANT TO WAIT MONTHS FOR AN APPT. SO THEY ARE GOING SOMEWHERE ELSE.    Is it okay if the provider responds through MyChart:  NOT NEEDED

## 2024-03-01 ENCOUNTER — HOSPITAL ENCOUNTER (OUTPATIENT)
Dept: CARDIOLOGY | Facility: HOSPITAL | Age: 89
Discharge: HOME | End: 2024-03-01
Payer: MEDICARE

## 2024-03-01 VITALS
SYSTOLIC BLOOD PRESSURE: 128 MMHG | WEIGHT: 157 LBS | BODY MASS INDEX: 21.26 KG/M2 | DIASTOLIC BLOOD PRESSURE: 73 MMHG | HEIGHT: 72 IN

## 2024-03-01 DIAGNOSIS — R06.09 DOE (DYSPNEA ON EXERTION): ICD-10-CM

## 2024-03-01 PROCEDURE — 93306 TTE W/DOPPLER COMPLETE: CPT

## 2024-03-01 PROCEDURE — 93356 MYOCRD STRAIN IMG SPCKL TRCK: CPT

## 2024-03-03 LAB
BH CV ECHO MEAS - AO MAX PG: 5.2 MMHG
BH CV ECHO MEAS - AO MEAN PG: 3 MMHG
BH CV ECHO MEAS - AO V2 MAX: 114 CM/SEC
BH CV ECHO MEAS - AO V2 VTI: 23.6 CM
BH CV ECHO MEAS - AVA(I,D): 3.7 CM2
BH CV ECHO MEAS - EDV(CUBED): 125 ML
BH CV ECHO MEAS - EDV(MOD-SP2): 57.1 ML
BH CV ECHO MEAS - EDV(MOD-SP4): 54 ML
BH CV ECHO MEAS - EF(MOD-SP2): 59.9 %
BH CV ECHO MEAS - EF(MOD-SP4): 63.3 %
BH CV ECHO MEAS - ESV(CUBED): 24.4 ML
BH CV ECHO MEAS - ESV(MOD-SP2): 22.9 ML
BH CV ECHO MEAS - ESV(MOD-SP4): 19.8 ML
BH CV ECHO MEAS - FS: 42 %
BH CV ECHO MEAS - IVS/LVPW: 0.9 CM
BH CV ECHO MEAS - IVSD: 0.9 CM
BH CV ECHO MEAS - LA DIMENSION: 3.7 CM
BH CV ECHO MEAS - LAT PEAK E' VEL: 19.1 CM/SEC
BH CV ECHO MEAS - LV DIASTOLIC VOL/BSA (35-75): 28.1 CM2
BH CV ECHO MEAS - LV MASS(C)D: 169.9 GRAMS
BH CV ECHO MEAS - LV MAX PG: 3.8 MMHG
BH CV ECHO MEAS - LV MEAN PG: 2 MMHG
BH CV ECHO MEAS - LV SYSTOLIC VOL/BSA (12-30): 10.3 CM2
BH CV ECHO MEAS - LV V1 MAX: 97.5 CM/SEC
BH CV ECHO MEAS - LV V1 VTI: 19.1 CM
BH CV ECHO MEAS - LVIDD: 5 CM
BH CV ECHO MEAS - LVIDS: 2.9 CM
BH CV ECHO MEAS - LVOT AREA: 4.5 CM2
BH CV ECHO MEAS - LVOT DIAM: 2.4 CM
BH CV ECHO MEAS - LVPWD: 1 CM
BH CV ECHO MEAS - MED PEAK E' VEL: 12.7 CM/SEC
BH CV ECHO MEAS - MR MAX PG: 42.8 MMHG
BH CV ECHO MEAS - MR MAX VEL: 327 CM/SEC
BH CV ECHO MEAS - MV DEC SLOPE: 605 CM/SEC2
BH CV ECHO MEAS - MV E MAX VEL: 121 CM/SEC
BH CV ECHO MEAS - MV P1/2T: 64.9 MSEC
BH CV ECHO MEAS - MVA(P1/2T): 3.4 CM2
BH CV ECHO MEAS - PA V2 MAX: 90.5 CM/SEC
BH CV ECHO MEAS - RAP SYSTOLE: 5 MMHG
BH CV ECHO MEAS - RV MAX PG: 1.74 MMHG
BH CV ECHO MEAS - RV V1 MAX: 66 CM/SEC
BH CV ECHO MEAS - RVDD: 2.4 CM
BH CV ECHO MEAS - RVSP: 27.8 MMHG
BH CV ECHO MEAS - SI(MOD-SP2): 17.8 ML/M2
BH CV ECHO MEAS - SI(MOD-SP4): 17.8 ML/M2
BH CV ECHO MEAS - SV(LVOT): 86.4 ML
BH CV ECHO MEAS - SV(MOD-SP2): 34.2 ML
BH CV ECHO MEAS - SV(MOD-SP4): 34.2 ML
BH CV ECHO MEAS - TAPSE (>1.6): 1.09 CM
BH CV ECHO MEAS - TR MAX PG: 22.8 MMHG
BH CV ECHO MEAS - TR MAX VEL: 239 CM/SEC
BH CV ECHO MEASUREMENTS AVERAGE E/E' RATIO: 7.61
BH CV XLRA - RV BASE: 3.2 CM
LEFT ATRIUM VOLUME: 39.6 ML

## 2024-07-12 RX ORDER — METOPROLOL SUCCINATE 25 MG/1
25 TABLET, EXTENDED RELEASE ORAL DAILY
Qty: 90 TABLET | Refills: 11 | Status: SHIPPED | OUTPATIENT
Start: 2024-07-12

## 2024-10-03 NOTE — PROGRESS NOTES
Chief Complaint  Coronary Artery Disease (9mo F/U) and Paroxysmal Afib    Subjective          Dexter Suggs presents to [facility] for routine follow-up.  He was last seen in our office on 1/26/2024 at which time 2D echo was ordered.  This was completed on 3/1/2024 and revealed normal left ventricular systolic function with ejection fraction 56 to 60%, normal left ventricular diastolic function, no significant valvular heart disease and akinetic apical anterior, apical lateral, apical inferior, apical septal and apex wall segments.  He has ischemic heart disease (abnormal Zenobia scan in the past with no history of coronary angiography), paroxysmal atrial fibrillation on chronic anticoagulation, sick sinus syndrome status post pacemaker, hypertension, hyperlipidemia, left ventricular hypertrophy, hypothyroidism and GERD.  Patient denies chest pain, shortness of breath, palpitations, dizziness, syncope, orthopnea, PND, edema or decreased stamina.  He denies any signs of bleeding.    Hypertension  This is a chronic problem. The current episode started more than 1 year ago. The problem is controlled. Pertinent negatives include no anxiety, blurred vision, chest pain, headaches, malaise/fatigue, neck pain, orthopnea, palpitations, peripheral edema, PND, shortness of breath or sweats. Risk factors for coronary artery disease include male gender and dyslipidemia. Current antihypertension treatment includes beta blockers and angiotensin blockers. The current treatment provides significant improvement. Hypertensive end-organ damage includes left ventricular hypertrophy. Identifiable causes of hypertension include a thyroid problem.   Hyperlipidemia  This is a chronic problem. The current episode started more than 1 year ago. Pertinent negatives include no chest pain or shortness of breath. Current antihyperlipidemic treatment includes statins. Risk factors for coronary artery disease include  male sex, hypertension and dyslipidemia.   Atrial Fibrillation  Presents for follow-up visit. Symptoms are negative for an AICD problem, bradycardia, chest pain, dizziness, hemodynamic instability, hypertension, hypotension, pacemaker problem, palpitations, shortness of breath, syncope, tachycardia and weakness. The symptoms have been stable. Past medical history includes atrial fibrillation and hyperlipidemia.     I have reviewed and confirmed the accuracy of the ROS  ALENA Sharp      Objective     Current Outpatient Medications:     allopurinol (ZYLOPRIM) 300 MG tablet, Take 1 tablet by mouth Daily., Disp: , Rfl:     aspirin 81 MG EC tablet, Take 1 tablet by mouth Daily., Disp: , Rfl:     benzonatate (TESSALON) 100 MG capsule, Take 1 capsule by mouth 2 (Two) Times a Day As Needed for Cough., Disp: 60 capsule, Rfl: 2    Eliquis 5 MG tablet tablet, TAKE 1 TABLET BY MOUTH EVERY 12 HOURS, Disp: 180 tablet, Rfl: 4    folic acid (FOLVITE) 1 MG tablet, Take 1 tablet by mouth Daily., Disp: , Rfl:     iron polysaccharides (NIFEREX) 150 MG capsule, Take 1 capsule by mouth Daily., Disp: , Rfl:     levothyroxine (SYNTHROID, LEVOTHROID) 75 MCG tablet, Take 1 tablet by mouth Daily., Disp: , Rfl:     LORazepam (ATIVAN) 0.5 MG tablet, Take 1 tablet by mouth Daily As Needed for Anxiety. (Patient taking differently: Take 1 tablet by mouth Every Night.), Disp: 12 tablet, Rfl: 0    losartan (Cozaar) 25 MG tablet, Take 1 tablet by mouth Daily., Disp: 90 tablet, Rfl: 3    methotrexate 2.5 MG tablet, Take 6 tablets by mouth 1 (One) Time Per Week., Disp: , Rfl:     metoprolol succinate XL (TOPROL-XL) 25 MG 24 hr tablet, TAKE 1 TABLET BY MOUTH EVERY DAY, Disp: 90 tablet, Rfl: 11    montelukast (SINGULAIR) 10 MG tablet, Take 1 tablet by mouth Every Night., Disp: , Rfl:     nitroglycerin (NITROSTAT) 0.4 MG SL tablet, Place 1 tablet under the tongue Every 5 (Five) Minutes As Needed., Disp: , Rfl:     pantoprazole (PROTONIX) 40 MG  "EC tablet, Take 1 tablet by mouth Daily., Disp: , Rfl:     predniSONE (DELTASONE) 2.5 MG tablet, Take 1 tablet by mouth Daily., Disp: , Rfl:     triamterene-hydrochlorothiazide (MAXZIDE-25) 37.5-25 MG per tablet, Take 1 tablet by mouth Daily., Disp: , Rfl:   No current facility-administered medications for this visit.  Vital Signs:   /81   Pulse 90   Ht 182.9 cm (72\")   Wt 70.3 kg (155 lb)   SpO2 100%   BMI 21.02 kg/m²     Vitals and nursing note reviewed.   Constitutional:       General: Awake.      Appearance: Normal and healthy appearance. Well-developed, normal weight and not in distress.   Eyes:      General: Lids are normal.      Conjunctiva/sclera: Conjunctivae normal.      Pupils: Pupils are equal, round, and reactive to light.   HENT:      Head: Normocephalic and atraumatic.      Nose: Nose normal.   Neck:      Vascular: No JVR. JVD normal.   Pulmonary:      Effort: Pulmonary effort is normal.      Breath sounds: Examination of the right-lower field reveals decreased breath sounds. Examination of the left-lower field reveals decreased breath sounds. Decreased breath sounds present. No wheezing. No rhonchi. No rales.   Chest:      Chest wall: Not tender to palpatation.   Cardiovascular:      PMI at left midclavicular line. Normal rate. Regular rhythm. Normal S1. Normal S2.       Murmurs: There is no murmur.      No gallop.  No click. No rub.   Pulses:     Intact distal pulses.   Edema:     Peripheral edema present.     Pretibial: bilateral trace edema of the pretibial area.     Ankle: bilateral trace edema of the ankle.     Feet: bilateral trace edema of the feet.  Abdominal:      General: Bowel sounds are normal.      Palpations: Abdomen is soft.      Tenderness: There is no abdominal tenderness.   Musculoskeletal: Normal range of motion.         General: No tenderness.      Cervical back: Normal range of motion. Skin:     General: Skin is warm and dry.   Neurological:      General: No focal " deficit present.      Mental Status: Alert, oriented to person, place, and time and oriented to person, place and time.   Psychiatric:         Attention and Perception: Attention and perception normal.         Mood and Affect: Mood and affect normal.         Speech: Speech normal.         Behavior: Behavior normal. Behavior is cooperative.         Thought Content: Thought content normal.         Cognition and Memory: Cognition and memory normal.         Judgment: Judgment normal.        Result Review :   The following data was reviewed by: ALENA Sharp on 10/4/2024:      Data reviewed: Cardiology studies 2d echo 3/7/21 and 3/1/2024 and dobutamine stress echo 3/24/22      ECG 12 Lead    Date/Time: 10/4/2024 11:47 AM  Performed by: Deborah Whitmore APRN    Authorized by: Deborah Whitmore APRN  Comparison: compared with previous ECG from 1/26/2024  Rhythm: paced  Rate: normal  BPM: 89  Pacing: ventricular paced rhythm  Clinical impression: abnormal EKG            Assessment and Plan    Diagnoses and all orders for this visit:    1. Ischemic heart disease (Primary)- abnormal Zenobia scans in the past with no history of coronary angiography.  No current signs or symptoms of ischemia.  Stable.    2. PAF (paroxysmal atrial fibrillation) (HCC)-in sinus rhythm today.  1% burden on most recent device interrogation.  Anticoagulated.  Continue metoprolol succinate.  Stable.    3. Chronic anticoagulation-patient continues on Eliquis.  Denies bleeding.    4. Sick sinus syndrome (HCC)-status post pacemaker.  Stable.    5. Pacemaker-device interrogated 8/6/2024.  8.9 % a paced, 98 % V-paced, 1% A. fib burden.  6 episodes of A. fib with longest duration of 12 seconds.  Normal function, stable thresholds and adequate battery.    6. Primary hypertension-blood pressure is mildly elevated in office today. Pt has not been monitoring routinely at home. Continue metoprolol succinate and losartan.  Monitor and record daily  blood pressure. Report readings consistently higher than 130/80 or consistently lower than 100/60.     7. Hyperlipidemia LDL goal <70-management per PCP.  Patient is not on statin.    8. LVH (left ventricular hypertrophy)-mild on 2D echo 3/7/2021.  No clinical signs or symptoms of congestive heart failure.Reviewed signs and symptoms of CHF and what to report with the patient. Patient instructed to restrict sodium and weigh daily. Report weight gain of greater than 2 lbs overnight or 5 lbs in 1 week. Pt verbalized understanding of instructions and plan of care.       Follow Up   Return in about 6 months (around 4/4/2025) for Next scheduled follow up.  Patient was given instructions and counseling regarding his condition or for health maintenance advice. Please see specific information pulled into the AVS if appropriate.

## 2024-10-04 ENCOUNTER — OFFICE VISIT (OUTPATIENT)
Dept: CARDIOLOGY | Facility: CLINIC | Age: 89
End: 2024-10-04
Payer: MEDICARE

## 2024-10-04 VITALS
SYSTOLIC BLOOD PRESSURE: 135 MMHG | OXYGEN SATURATION: 100 % | HEIGHT: 72 IN | WEIGHT: 155 LBS | DIASTOLIC BLOOD PRESSURE: 81 MMHG | BODY MASS INDEX: 20.99 KG/M2 | HEART RATE: 90 BPM

## 2024-10-04 DIAGNOSIS — Z79.01 CHRONIC ANTICOAGULATION: ICD-10-CM

## 2024-10-04 DIAGNOSIS — I48.0 PAF (PAROXYSMAL ATRIAL FIBRILLATION): ICD-10-CM

## 2024-10-04 DIAGNOSIS — I10 PRIMARY HYPERTENSION: ICD-10-CM

## 2024-10-04 DIAGNOSIS — Z95.0 PACEMAKER: ICD-10-CM

## 2024-10-04 DIAGNOSIS — I25.9 ISCHEMIC HEART DISEASE: Primary | ICD-10-CM

## 2024-10-04 DIAGNOSIS — I49.5 SICK SINUS SYNDROME: ICD-10-CM

## 2024-10-04 DIAGNOSIS — I51.7 LVH (LEFT VENTRICULAR HYPERTROPHY): ICD-10-CM

## 2024-10-04 DIAGNOSIS — E78.5 HYPERLIPIDEMIA LDL GOAL <70: ICD-10-CM

## 2024-10-24 ENCOUNTER — TRANSCRIBE ORDERS (OUTPATIENT)
Dept: ADMINISTRATIVE | Facility: HOSPITAL | Age: 89
End: 2024-10-24
Payer: MEDICARE

## 2024-10-24 DIAGNOSIS — R13.10 DYSPHAGIA, UNSPECIFIED TYPE: Primary | ICD-10-CM

## 2024-11-06 RX ORDER — LOSARTAN POTASSIUM 25 MG/1
25 TABLET ORAL DAILY
Qty: 90 TABLET | Refills: 3 | Status: SHIPPED | OUTPATIENT
Start: 2024-11-06

## 2024-11-08 ENCOUNTER — HOSPITAL ENCOUNTER (OUTPATIENT)
Dept: GENERAL RADIOLOGY | Facility: HOSPITAL | Age: 89
Discharge: HOME | End: 2024-11-08
Admitting: FAMILY MEDICINE
Payer: MEDICARE

## 2024-11-08 DIAGNOSIS — R13.10 DYSPHAGIA, UNSPECIFIED TYPE: ICD-10-CM

## 2024-11-08 PROCEDURE — A9270 NON-COVERED ITEM OR SERVICE: HCPCS | Performed by: FAMILY MEDICINE

## 2024-11-08 PROCEDURE — 92611 MOTION FLUOROSCOPY/SWALLOW: CPT

## 2024-11-08 PROCEDURE — 63710000001 BARIUM SULFATE 40 % PASTE: Performed by: FAMILY MEDICINE

## 2024-11-08 PROCEDURE — 63710000001 BARIUM SULFATE 40 % SUSPENSION: Performed by: FAMILY MEDICINE

## 2024-11-08 PROCEDURE — 63710000001 BARIUM SULFATE 40 % RECONSTITUTED SUSPENSION: Performed by: FAMILY MEDICINE

## 2024-11-08 PROCEDURE — 74230 X-RAY XM SWLNG FUNCJ C+: CPT

## 2024-11-08 RX ADMIN — BARIUM SULFATE 20 ML: 400 PASTE ORAL at 08:43

## 2024-11-08 RX ADMIN — BARIUM SULFATE 20 ML: 400 SUSPENSION ORAL at 08:43

## 2024-11-08 RX ADMIN — BARIUM SULFATE 20 ML: 0.81 POWDER, FOR SUSPENSION ORAL at 08:43

## 2024-11-08 NOTE — MBS/VFSS/FEES
Speech Language Pathology   [facility] [place] / Discharge Summary  [facility]       Patient Name: Dexter Suggs  : 1934  MRN: [id number]    Today's Date: 2024      Visit Date: 2024   SPEECH-LANGUAGE PATHOLOGY EVALUTION - VFSS  Subjective: The patient was seen on this date for a VFSS(Videofluoroscopic Swallowing Study).  Patient was alert and cooperative.    Significant history: Pt reports choking with food and pills occasionally. Hx heart disease, PAF s/p pacemaker.   Objective: Risks/benefits were reviewed with the patient and family, and consent was obtained. The study was completed with SLP present and Radiologist review. The patient was seen in lateral view(s). Textures given included  pudding thick, thin liquid, nectar thick liquid, honey thick liquid, mechanical soft consistency, and regular consistency.  Assessment: Consistencies were presented in the following order with the following results:   Trial 1: Honey Thick - Spoon  Anterior cervical osteophytes noted at C4 and below. Decreased anterior excursion of the hyoid and relaxation of the upper esophageal sphincter (UES) with retrograde flow to the pyriform sinuses. No definite laryngeal penetration or aspiration observed. Minimal residue remained in the vallecula as well as a mild amount in the pyriform sinuses.      Trial 3: Nectar Thick - Single Straw  Decreased anterior excursion of the hyoid and sluggish epiglottic inversion. Mild silent laryngeal penetration observed during the swallow. No definite aspiration. Decreased relaxation of the UES with minimal retrograde flow to the pyriform sinuses. Brief esophageal screen performed with possible slow clearance of the upper/mid esophagus. Minimal residue remained in the vallecula and pyriform sinuses.      Trial 4: Thin Liquid - Single Straw  Decreased anterior excursion of the hyoid and sluggish epiglottic inversion. Silent laryngeal penetration and aspiration observed during the swallow.  Decreased relaxation of the UES with minimal retrograde flow to the pyriform sinuses. Mild oropharyngeal residue remained.      Trial 7: Pudding Thick Liquid - Spoon  Decreased anterior excursion of the hyoid and relaxation of the UES with retrograde flow to the pyriform sinuses. No definite laryngeal penetration or aspiration observed. Mild pharyngeal residue remained.      Trial 8: Soft Solids  Vallecular aggregation during mastication. Decreased anterior excursion of the hyoid and relaxation of the UES with retrograde flow to the pyriform sinuses. No definite laryngeal penetration or aspiration observed.      Trial 9: Regular Solids  Vallecular aggregation during mastication. Decreased anterior excursion of the hyoid and relaxation of the UES with retrograde flow to the pyriform sinuses. No definite laryngeal penetration or aspiration observed.      Trial 10: Thin Liquid - Single Cup  Decreased anterior excursion of the hyoid and sluggish epiglottic inversion. Mild silent laryngeal penetration to the level of the vocal folds observed during the swallow. No definite aspiration. Decreased relaxation of the UES. Mild oropharyngeal residue remained.      Trial 12: Thin Liquid - Single Straw with chin tuck  Decreased anterior excursion of the hyoid and sluggish epiglottic inversion. Mild silent laryngeal penetration observed during the swallow. No definite aspiration. Decreased relaxation of the UES with minimal retrograde flow to the pyriform sinuses.  Mild pharyngeal residue remained.      Trial 13: Thin Liquid - Single Straw with chin tuck  Silent laryngeal penetration and aspiration of thin residue from previous trial before initiation of the swallow with current trial. Decreased anterior excursion of the hyoid and sluggish epiglottic inversion. Further flash laryngeal penetration observed during the swallow. Decreased relaxation of the UES with minimal retrograde flow to the pyriform sinuses. Mild to moderate  oropharyngeal residue remained.      Trial 14: Cued cough after thin  Unable to completely clear residue from airway.      Trial 15: Nectar Thick - Single Straw  Decreased anterior excursion of the hyoid and sluggish epiglottic inversion.   Decreased relaxation of the UES with minimal retrograde flow to the pyriform sinuses. No definite laryngeal penetration or aspiration observed. Mild oropharyngeal residue remained.     SLP Findings: Patient presents with mild to moderate oropharyngeal dysphagia.   Recommendations: Diet Textures: nectar thick liquid, regular consistency food. Medications should be taken whole or crushed with puree. May have water and Ice between meals after oral care, under staff or family supervision and with the recommended strategies for safe swallowing. Pt and spouse were provided with a sample kit of nectar thick gel thickener and a handout re: thickening liquids. SLP verbally reviewed education materials.   Recommended Strategies: Upright for PO, small bites and sips, and alternate liquids and solids. Oral care before breakfast, after all meals and PRN.  Dysphagia therapy is recommended.   Isaac Joseph CCC-SLP 11/8/2024 14:46 CST     Visit Dx:     ICD-10-CM ICD-9-CM   1. 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    Actinic keratosis    Benign prostatic hyperplasia with lower urinary tract symptoms    Complete heart block    Pacemaker    Laceration of face without complication    PAF (paroxysmal atrial fibrillation)    Paroxysmal atrial flutter    Pernicious anemia    Alkaline phosphatase elevation    Anemia    Gastroesophageal reflux disease    Hypothyroidism due to Hashimoto's thyroiditis    Impaired fasting glucose    Overlapping  malignant melanoma of skin    Primary osteoarthritis of both hands    COVID-19 virus infection    Closed fracture of distal end of radius    Arthritis of hand    Hip fracture, left    Gross hematuria    Chronic anticoagulation    Postoperative anemia due to acute blood loss    Closed nondisplaced fracture of lesser trochanter of right femur with routine healing    Closed subcapital fracture of femur, left, initial encounter    Confusion and disorientation    Ischemic heart disease    LVH (left ventricular hypertrophy)    Hematochezia    Acquired hypothyroidism    Anxiety    Atherosclerosis of coronary artery without angina pectoris    Disorder of vitamin B12    Excessive anticoagulation    Gait abnormality    Glaucoma    Gout    History of COVID-19    History of malignant neoplasm of skin    Onychomycosis of toenail    Pathological fracture of left femur due to osteoporosis    Pressure ulcer of sacral region    S/p left hip fracture    Anemia    Rheumatoid arthritis    BPH (benign prostatic hyperplasia)    Chronic anticoagulation    Complete atrioventricular block    Gastroesophageal reflux disease without esophagitis    Impaired fasting glucose    Prostate cancer    Hip pain    Atrial fibrillation    Essential hypertension    Sick sinus syndrome    Compression fracture of T12 vertebra    Body mass index (BMI) of 20.0 to 20.9 in adult    Nonsmoker    Degeneration of lumbar or lumbosacral intervertebral disc    Controlled type 2 diabetes mellitus without complication, without long-term current use of insulin    Elevated troponin    Rib fracture        Past Medical History:   Diagnosis Date    Abnormal nuclear stress test     Anemia 2021    Arthritis     BPH (benign prostatic hyperplasia)     Coronary artery disease     Diabetes mellitus     Disease of thyroid gland     Fracture     spinal t12    GERD (gastroesophageal reflux disease)     Glaucoma 02/18/2022    Gout     Hyperlipidemia LDL goal <70 11/14/2016     Ischemic heart disease 06/23/2022    Ischemic heart disease 06/23/2022    Low back pain     LVH (left ventricular hypertrophy) 06/23/2022    Melanoma     Onychomycosis of toenail 06/24/2022    Pacemaker 01/18/2019    PAF (paroxysmal atrial fibrillation) 10/29/2019    Chads vas score 3    Primary hypertension 11/14/2016    Prostate CA     Sick sinus syndrome 03/01/2019    Stress fracture Aug. 2021        Past Surgical History:   Procedure Laterality Date    APPENDECTOMY      CARDIAC CATHETERIZATION Left 12/10/2012    CARDIAC ELECTROPHYSIOLOGY PROCEDURE N/A 11/23/2018    Procedure: Pacemaker DC new 11/23/2018;  Surgeon: Austin Granados MD;  Location:  [facility];  Service: Cardiology    CHOLECYSTECTOMY      COLONOSCOPY N/A 06/09/2017    Tics, hemorrhoids repeat exam prn    COLONOSCOPY W/ POLYPECTOMY  02/16/2012    adenomatous polyp at 80cm    ENDOSCOPY N/A 09/13/2022    Procedure: ESOPHAGOGASTRODUODENOSCOPY WITH ANESTHESIA;  Surgeon: Felice Marroquin MD;  Location:  [facility];  Service: Gastroenterology;  Laterality: N/A;  pre hematochezia  post; normal   Ashish Callejas MD    HAND SURGERY Right     HERNIA REPAIR      HIP CANNULATED SCREW PLACEMENT Left 01/18/2022    Procedure: HIP CANNULATED SCREW PLACEMENT;  Surgeon: Isaiah Sheehan MD;  Location:  [facility] OR;  Service: Orthopedics;  Laterality: Left;    INGUINAL HERNIA REPAIR      INSERT / REPLACE / REMOVE PACEMAKER      KNEE SURGERY      PROSTATE SURGERY      SKIN CANCER EXCISION      face    SKIN LESION EXCISION      TOTAL HIP ARTHROPLASTY Right 08/27/2021    Procedure: TOTAL HIP REPLACEMENT;  Surgeon: Arik Magaña MD;  Location:  [facility] OR;  Service: Orthopedics;  Laterality: Right;    TOTAL HIP ARTHROPLASTY Left 03/25/2022    Procedure: LEFT HIP SCREW REMOVAL / LEFT TOTAL HIP ARTHROPLASTY;  Surgeon: BASHIR Monsivais MD;  Location:  [facility] OR;  Service: Orthopedics;  Laterality: Left;                      SLP Adult Swallow  Evaluation       Row Name 11/08/24 0823       Rehab Evaluation    Document Type evaluation  -MB    Subjective Information no complaints  -MB    Patient Observations alert;cooperative  -MB    Patient/Family/Caregiver Comments/Observations Wife present for education after assessment  -MB       General Information    Patient Profile Reviewed yes  -MB    Pertinent History Of Current Problem Pt reports choking with food and pills occasionally. Hx heart disease, PAF s/p pacemaker.  -MB    Current Method of Nutrition regular textures;thin liquids  -MB    Precautions/Limitations, Vision WFL;for purposes of eval  -MB    Precautions/Limitations, Hearing hearing impairment, bilaterally  -MB    Prior Level of Function-Communication WFL  -MB    Prior Level of Function-Swallowing no diet consistency restrictions  -MB    Plans/Goals Discussed with patient and family  -MB    Barriers to Rehab hearing deficit  -MB    Patient's Goals for Discharge patient did not state  -MB    Family Goals for Discharge family did not state  -MB       Pain    Additional Documentation Pain Scale: FACES Pre/Post-Treatment (Group)  -MB       Pain Scale: FACES Pre/Post-Treatment    Pain: FACES Scale, Pretreatment 0-->no hurt  -MB       Oral Motor Structure and Function    Dentition Assessment upper dentures/partial in place;missing teeth  -MB    Secretion Management WNL/WFL  -MB    Mucosal Quality moist, healthy  -MB       Oral Musculature and Cranial Nerve Assessment    Oral Motor General Assessment WFL  -MB       General Eating/Swallowing Observations    Respiratory Support Currently in Use room air  -MB       MBS/VFSS    Utensils Used spoon;cup;straw  -MB    Consistencies Trialed regular textures;soft to chew textures;thin liquids;nectar/syrup-thick liquids;honey-thick liquids;pudding thick  -MB       MBS/VFSS Interpretation    Oral Prep Phase WFL  -MB    Oral Transit Phase WFL  -MB    Oral Residue impaired  -MB    VFSS Summary See note  -MB        Oral Residue    Impaired Oral Residue diffuse residue throughout oral cavity  -MB       Initiation of Pharyngeal Swallow    Initiation of Pharyngeal Swallow bolus in valleculae  -MB    Pharyngeal Phase impaired pharyngeal phase of swallowing  -MB    Penetration During the Swallow thin liquids  -MB    Aspiration During the Swallow thin liquids  -MB    Penetration After the Swallow thin liquids  -MB    Aspiration After the Swallow thin liquids  -MB    Depth of Penetration deep;shallow  -MB    Response to Penetration No  -MB    No spontaneous response to penetration and non-effective laryngeal clearance with cue (see comments)  -MB    Response to Aspiration No  -MB    No spontaneous response to aspiration and non-effective subglottic clearance with cue (see comments)  -MB    Pharyngeal Residue thin liquids;nectar-thick liquids;honey-thick liquids  -MB    Attempted Compensatory Maneuvers chin tuck  -MB    Response to Attempted Compensatory Maneuvers did not prevent penetration;did not prevent aspiration  -MB       Esophageal Phase    Esophageal Phase see radiology report for further details  -MB       SLP Evaluation Clinical Impression    SLP Swallowing Diagnosis functional oral phase;mild-moderate;pharyngeal dysphagia  -MB    Functional Impact risk of aspiration/pneumonia;risk of malnutrition;risk of dehydration  -MB    Rehab Potential/Prognosis, Swallowing adequate, monitor progress closely  -MB    Swallow Criteria for Skilled Therapeutic Interventions Met demonstrates skilled criteria  -MB       Recommendations    Therapy Frequency (Swallow) 1 day per week  -MB    Predicted Duration Therapy Intervention (Days) until discharge  -MB    SLP Diet Recommendation regular textures;nectar thick liquids;water between meals after oral care, with supervision;ice chips between meals after oral care, with supervision  -MB    Recommended Precautions and Strategies upright posture during/after eating;small bites of food and sips of  liquid;alternate between small bites of food and sips of liquid;general aspiration precautions  -MB    Oral Care Recommendations Oral Care BID/PRN  -MB    SLP Rec. for Method of Medication Administration meds whole;meds crushed;with puree  -MB    Monitor for Signs of Aspiration yes;cough;gurgly voice;throat clearing;pneumonia  -MB    Anticipated Discharge Disposition (SLP) home with OP services  -MB              User Key  (r) = Recorded By, (t) = Taken By, (c) = Cosigned By      Initials Name Provider Type    Isaac Duarte CCC-SLP Speech and Language Pathologist                                    OP SLP Education       Row Name 11/08/24 1442       Education    Barriers to Learning Hearing deficit  -MB    Education Provided Described results of evaluation;Family/caregivers expressed understanding of evaluation;Patient requires further education on strategies, risks  -MB    Assessed Learning needs;Learning motivation;Learning preferences;Learning readiness  -MB    Learning Motivation Moderate  -MB    Learning Method Explanation  -MB    Teaching Response Reinforcement needed  -MB              User Key  (r) = Recorded By, (t) = Taken By, (c) = Cosigned By      Initials Name Effective Dates    Isaac Duarte CCC-SLP 02/03/23 -                                          Time Calculation:   Untimed Charges  18014-XS Motion Fluoro Eval Swallow Minutes: 78  Total Minutes  Untimed Charges Total Minutes: 78   Total Minutes: 78    Therapy Charges for Today       Code Description Service Date Service Provider Modifiers Qty    [id number] HC ST MOTION FLUORO EVAL SWALLOW 5 11/8/2024 Isaac Joseph CCC-SLP GN 1                    MALCOM Gallagher  11/8/2024

## 2024-11-14 ENCOUNTER — TRANSCRIBE ORDERS (OUTPATIENT)
Dept: PHYSICAL THERAPY | Facility: HOSPITAL | Age: 89
End: 2024-11-14
Payer: MEDICARE

## 2024-11-14 DIAGNOSIS — R13.10 DYSPHAGIA, UNSPECIFIED TYPE: Primary | ICD-10-CM

## 2024-11-19 ENCOUNTER — HOSPITAL ENCOUNTER (OUTPATIENT)
Facility: HOSPITAL | Age: 89
Discharge: HOME | End: 2024-11-19
Admitting: FAMILY MEDICINE
Payer: MEDICARE

## 2024-11-19 DIAGNOSIS — R13.12 OROPHARYNGEAL DYSPHAGIA: Primary | ICD-10-CM

## 2024-11-19 DIAGNOSIS — R13.14 PHARYNGOESOPHAGEAL DYSPHAGIA: ICD-10-CM

## 2024-11-19 PROCEDURE — 92610 EVALUATE SWALLOWING FUNCTION: CPT | Performed by: SPEECH-LANGUAGE PATHOLOGIST

## 2024-11-19 NOTE — PROGRESS NOTES
Speech/Language Pathology Initial Evaluation and Plan of Care  [address] [place] [address]    Patient: Dexter Suggs               : 1934  Visit Date: 2024  Referring practitioner: Ashish Callejas MD  Date of Initial Visit: 2024      Visit Diagnoses:    ICD-10-CM ICD-9-CM   1. Oropharyngeal dysphagia  R13.12 787.22   2. Pharyngoesophageal dysphagia  R13.14 787.24     Past Medical History:   Diagnosis Date    Abnormal nuclear stress test     Anemia     Arthritis     BPH (benign prostatic hyperplasia)     Coronary artery disease     Diabetes mellitus     Disease of thyroid gland     Fracture     spinal t12    GERD (gastroesophageal reflux disease)     Glaucoma 2022    Gout     Hyperlipidemia LDL goal <70 2016    Ischemic heart disease 2022    Ischemic heart disease 2022    Low back pain     LVH (left ventricular hypertrophy) 2022    Melanoma     Onychomycosis of toenail 2022    Pacemaker 2019    PAF (paroxysmal atrial fibrillation) 10/29/2019    Chads vas score 3    Primary hypertension 2016    Prostate CA     Sick sinus syndrome 2019    Stress fracture Aug. 2021     Past Surgical History:   Procedure Laterality Date    APPENDECTOMY      CARDIAC CATHETERIZATION Left 12/10/2012    CARDIAC ELECTROPHYSIOLOGY PROCEDURE N/A 2018    Procedure: Pacemaker DC new 2018;  Surgeon: Austin Granados MD;  Location: [facility] CATH INVASIVE LOCATION;  Service: Cardiology    CHOLECYSTECTOMY      COLONOSCOPY N/A 2017    Tics, hemorrhoids repeat exam prn    COLONOSCOPY W/ POLYPECTOMY  2012    adenomatous polyp at 80cm    ENDOSCOPY N/A 2022    Procedure: ESOPHAGOGASTRODUODENOSCOPY WITH ANESTHESIA;  Surgeon: Felice Marroquin MD;  Location: [facility] ENDOSCOPY;  Service: Gastroenterology;  Laterality: N/A;  pre hematochezia  post; normal   Ashish Callejas MD     HAND SURGERY Right     HERNIA REPAIR      HIP CANNULATED SCREW PLACEMENT Left 01/18/2022    Procedure: HIP CANNULATED SCREW PLACEMENT;  Surgeon: Isaiah Sheehan MD;  Location:  [facility] OR;  Service: Orthopedics;  Laterality: Left;    INGUINAL HERNIA REPAIR      INSERT / REPLACE / REMOVE PACEMAKER      KNEE SURGERY      PROSTATE SURGERY      SKIN CANCER EXCISION      face    SKIN LESION EXCISION      TOTAL HIP ARTHROPLASTY Right 08/27/2021    Procedure: TOTAL HIP REPLACEMENT;  Surgeon: Arik Magaña MD;  Location:  [facility] OR;  Service: Orthopedics;  Laterality: Right;    TOTAL HIP ARTHROPLASTY Left 03/25/2022    Procedure: LEFT HIP SCREW REMOVAL / LEFT TOTAL HIP ARTHROPLASTY;  Surgeon: BASHIR Monsivais MD;  Location:  [facility] OR;  Service: Orthopedics;  Laterality: Left;       SUBJECTIVE     Subjective: Patient was seen today for assessment of swallow. He was accompanied by his wife.   Patient presents with the following symptoms: coughing, difficulty swallowing liquids, difficulty swallowing pills, difficulty chewing, history of aspiration, globus, and esophageal symptoms   Date of Onset: for a while, progressing  History of present problems: Patient has been having difficulty swallowing pills for some time. He states that pills get stuck and he has to cough them back up. Small pills get stuck in his upper plate and sometimes go up into his nose. He coughs up liquids when drinking.   The functional impact on the patient: Risk of aspiration and pneumonia, risk of inadequate hydration.    Prior level of function: WNL  Pertinent Medical History Related to this Problem: GERD and thyroid disease. Patient has a pacemaker.   Current Diet Level:   Solid: 6 - Soft & Bite-Sized  Liquid: 2 - Mildly Thick    Non-oral Feeding: N/A    OBJECTIVE     CLINICAL OBSERVATION  Respiratory/Swallow Coordination: Mildly impaired  Position During Evaluation: Upright   Anatomy/Physiology: Patient presents with kyphotic posture .  Oral motor WNL  Dentition: Patient is partially endentulous, Patient has dentures/partials, Dentures were worn for this evaluation, and Dentures are typically worn for meals. Upper denture worn, has lower partial but did not wear today.   Oral Health: Poor oral health and Dry oral mucosa  Awareness/Control of Secretions: There are excess secretions and patient coughs up and spits secretions. He complains of excess phlegm.     Method of Food Administration: Cup with thin water.    Oral Symptoms: None noted  Pharyngeal Symptoms: multiple swallows with consistencies of thin water, coughing with consistencies of thin water, throat clearing with consistencies of thin water, and coughed up and spit phlegm/water after swallowing.       INSTRUMENTAL ASSESSMENT  Instrumental Exam Completed?: Yes, [facility] 11/8/2024  Severity Level of Dysphagia: moderate dysphagia  Consistencies Aspirated/Penetrated: Aspirated and Penetrated: thin  SLP Findings: Patient presents with mild to moderate oropharyngeal dysphagia.   Recommendations: Diet Textures: nectar thick liquid, regular consistency food. Medications should be taken whole or crushed with puree. May have water and Ice between meals after oral care, under staff or family supervision and with the recommended strategies for safe swallowing. Pt and spouse were provided with a sample kit of nectar thick gel thickener and a handout re: thickening liquids. SLP verbally reviewed education materials.   Recommended Strategies: Upright for PO, small bites and sips, and alternate liquids and solids. Oral care before breakfast, after all meals and PRN.  Dysphagia therapy is recommended.   Isaac Joseph CCC-SLP 11/8/2024 14:46 CST  Please see SLP report of [facility]    IMPRESSION/RECOMMENDATIONS  Factors Affecting Performance: no difficulty participating in study  Learning Motivation: strong  Education/Learning Comments: Patient and wife demonstrated understanding of  evaluation results and plan of care.     PATIENT SELF ASSESSMENT    EAT 10  0= No problem 4= Severe problem  My swallowing has caused me to lose weight 0   My Swallowing problem interferes with my ability to go out for meals  0   Swallowing liquids takes extra effort 0   Swallowing solids takes extra effort 0   Swallowing pills takes extra effort 4   Swallowing is painful 0   The pleasure of eating is affected by my swallowing 0   When I swallow, food sticks in my throat 1   I cough when I eat 0   Swallowing is stressful 0                                                                                                  Total Score 5     0-9 Minimal  10-19 Mild  20-29 Moderate 30-40 Severe      Clinical Impression:   Moderate: oropharyngeal phase dysphagia and esophageal dysphagia  Impact on Function: risk for inadequate nutrition, inadequate hydration, aspiration, pneumonia, and social aspects of eating  Prognosis Comment: Fair due to age and structure      Therapy Education/Self Care    Details: Evaluation results and POC   Given Home Exercise Program  handouts on oral care and aspiration  Access Code: [email or web address]  URL: [email or web address]  Date: 11/19/2024  Prepared by: Yumiko Lindquist    Exercises  - Effortful Swallow  - 2-3 x daily - 7 x weekly - 3 sets - 10 reps    Patient Education  - Oral Care: How to Take Care of Your Mouth  - Normal Swallowing & Aspiration   Progress: New   Education provided to:  Patient and Spouse/SO   Level of understanding Verbalized           Total Time of Visit:            75   mins    ASSESSMENT/PLAN     Goals                                            STG  Comments Status   Patient will improve oral skills to enhance safety and increase eating efficiency and bolus control as measured by increased lip closure, decreased oral residue, improved posterior propulsion of the bolus, and improved management of secretions.  New   Patient will improve hyolaryngeal elevation, improve  laryngeal closure, and increase efficiency of cough to clear residue from the airway as measured by decreased overt signs and symptoms of aspiration and decreased penetration and aspiration on repeat instrumental swallow study, if applicable.  Instructed patient on effortful swallow exercise. Will continue to address.  New   Patient will report decreased difficulty with swallowing.  New   Patient will improve oral hygiene to enhance safety and decrease risk of aspiration pneumonia Discussed oral hygiene and aspiration with patient and wife. Will continue to address.  New   LTG      Patient will safely consume full PO diet of regular consistency food and thin liquids without difficulty or complications such as aspiration or pneumonia.   New     ASSESSMENT:   Patient is indicated for skilled care by a Speech/Language Pathologist.     Summary of Assessment: Patient presents with moderate oral pharyngeal dysphagia with esophageal component.     Recommendations for Diet/Nutrition: aggressive oral care, Drinks: 2 - Mildly Thick, Solids: 6 - Soft & Bite-Sized, and OK for thin water between meals only after thorough oral care.   Swallowing Precautions: reduce distractions, upright position for at least 30 minutes after meals, slow rate; empty mouth between bites, behavior changes to manage reflux, and multiple swallows to clear residue.   Therapy Recommendations: dysphagia therapy to address swallowing deficits      PLAN:  Details of Plan of Care: Initiate therapy and home exercises.     Frequency: 1 time per week  Duration: 12 visits  Estimated Length of Session: 45 minutes    SPEECH/LANGUAGE PATHOLOGIST SIGNATURE: Yumiko Lindquist, CCC-SLP, [place] License #: [id number]  Electronically Signed on 11/19/2024        Initial Certification  Certification Period: 11/19/2024 through 2/16/2025  I certify that the therapy services are furnished while this patient is under my care.  The services outlined above are required by this patient,  and will be reviewed every 90 days.     PHYSICIAN: Ashish Callejas MD (NPI: [id number])    Signature:____________________________________________DATE: _________     Please sign and return via fax to [phone].   Thank you so much for letting us work with Dexter. I appreciate your letting us work with your patients. If you have any questions or concerns, please don't hesitate to contact me.          Outpatient Therapy Services  [address] [address]  [phone]

## 2024-12-12 ENCOUNTER — HOSPITAL ENCOUNTER (OUTPATIENT)
Facility: HOSPITAL | Age: 89
Discharge: HOME | End: 2024-12-12
Admitting: FAMILY MEDICINE
Payer: MEDICARE

## 2024-12-12 DIAGNOSIS — R13.12 OROPHARYNGEAL DYSPHAGIA: Primary | ICD-10-CM

## 2024-12-12 DIAGNOSIS — R13.14 PHARYNGOESOPHAGEAL DYSPHAGIA: ICD-10-CM

## 2024-12-12 PROCEDURE — 92526 ORAL FUNCTION THERAPY: CPT | Performed by: SPEECH-LANGUAGE PATHOLOGIST

## 2024-12-12 NOTE — PROGRESS NOTES
Speech Language Pathology Treatment Note  [address] [place] [address]    Patient: Dexter Suggs                                                                                     Visit Date: 2024  :     1934    Referring practitioner:    Ashish Callejas MD  Date of Initial Visit:          Type: THERAPY  Episode: Swallowing      Visit Diagnoses:    ICD-10-CM ICD-9-CM   1. Oropharyngeal dysphagia  R13.12 787.22   2. Pharyngoesophageal dysphagia  R13.14 787.24     SUBJECTIVE     Patient alert and ready for therapy. He was accompanied by his wife.     Pain: Pain rating not applicable to this diagnosis.    OBJECTIVE   GOALS  Goals                                            STG  Comments Status   Patient will improve oral skills to enhance safety and increase eating efficiency and bolus control as measured by increased lip closure, decreased oral residue, improved posterior propulsion of the bolus, and improved management of secretions.  Patient given instruction on tongue press up and tongue press out exercises. Patient able to demonstrate with consistent cuing.  New   Patient will improve hyolaryngeal elevation, improve laryngeal closure, and increase efficiency of cough to clear residue from the airway as measured by decreased overt signs and symptoms of aspiration and decreased penetration and aspiration on repeat instrumental swallow study, if applicable.  Reviewed effortful swallow exercise. Introduced CTAR. Patient able to demonstrate after instruction and consistent cuing.  New   Patient will report decreased difficulty with swallowing.  Patient reports no changes  New   Patient will improve oral hygiene to enhance safety and decrease risk of aspiration pneumonia Discussed oral hygiene and aspiration with patient and wife. Will continue to address.  New   LTG        Patient will safely consume full PO diet of regular  consistency food and thin liquids without difficulty or complications such as aspiration or pneumonia.   Patient able to demonstrate exercises with consistent cuing.  New       Therapy Education/Self Care    Details: POC   Given Home Exercise Program  Access Code: [email or web address]  URL: [email or web address]  Date: 12/12/2024  Prepared by: Yumiko Lindquist    Exercises  - Effortful Swallow  - 2-3 x daily - 7 x weekly - 3 sets - 10 reps  - Tongue Resistance with Front of Tongue   - 1 x daily - 7 x weekly - 3 sets - 10 reps  - Tongue Resistance with Top of Tongue   - 1 x daily - 7 x weekly - 3 sets - 10 reps  - Chin Tuck Against Resistance with Towel  - 1 x daily - 7 x weekly    Patient Education  - Oral Care: How to Take Care of Your Mouth  - Normal Swallowing & Aspiration   Progress: New   Education provided to:  Patient and Spouse/SO   Level of understanding Verbalized and Demonstrated           CPT Code:   46361 Swallow Treatment  Total Time of Visit:             40   mins         ASSESSMENT/PLAN     ASSESSMENT: Patient able to demonstrate exercises with cues. Wife will continue to cue him at home.     PLAN: Continue therapy and home exercises.     Progress Note Due Date:12/16/2024   Recertification Due Date:2/16/2025     SIGNATURE: Yumiko Lindquist, CCC-SLP, [place] License #: [id number]  Electronically Signed on 12/12/2024          [facility] [place] [address]  [phone]

## 2024-12-19 ENCOUNTER — APPOINTMENT (OUTPATIENT)
Dept: CT IMAGING | Facility: HOSPITAL | Age: 89
End: 2024-12-19
Payer: MEDICARE

## 2024-12-19 ENCOUNTER — HOSPITAL ENCOUNTER (EMERGENCY)
Facility: HOSPITAL | Age: 89
Discharge: HOME | End: 2024-12-19
Attending: FAMILY MEDICINE
Payer: MEDICARE

## 2024-12-19 ENCOUNTER — HOSPITAL ENCOUNTER (OUTPATIENT)
Facility: HOSPITAL | Age: 89
Discharge: HOME | End: 2024-12-19
Admitting: FAMILY MEDICINE
Payer: MEDICARE

## 2024-12-19 VITALS
SYSTOLIC BLOOD PRESSURE: 144 MMHG | HEART RATE: 99 BPM | OXYGEN SATURATION: 98 % | BODY MASS INDEX: 20.99 KG/M2 | TEMPERATURE: 97.9 F | DIASTOLIC BLOOD PRESSURE: 79 MMHG | WEIGHT: 155 LBS | RESPIRATION RATE: 16 BRPM | HEIGHT: 72 IN

## 2024-12-19 DIAGNOSIS — R31.0 GROSS HEMATURIA: Primary | ICD-10-CM

## 2024-12-19 DIAGNOSIS — R13.14 PHARYNGOESOPHAGEAL DYSPHAGIA: ICD-10-CM

## 2024-12-19 DIAGNOSIS — R13.12 OROPHARYNGEAL DYSPHAGIA: Primary | ICD-10-CM

## 2024-12-19 LAB
ALBUMIN SERPL-MCNC: 3.7 G/DL (ref 3.5–5.2)
ALBUMIN/GLOB SERPL: 1.2 G/DL
ALP SERPL-CCNC: 128 U/L (ref 39–117)
ALT SERPL W P-5'-P-CCNC: 12 U/L (ref 1–41)
ANION GAP SERPL CALCULATED.3IONS-SCNC: 12 MMOL/L (ref 5–15)
APTT PPP: 30.8 SECONDS (ref 24.5–36)
AST SERPL-CCNC: 19 U/L (ref 1–40)
BACTERIA UR QL AUTO: ABNORMAL /HPF
BASOPHILS # BLD AUTO: 0.04 10*3/MM3 (ref 0–0.2)
BASOPHILS NFR BLD AUTO: 0.6 % (ref 0–1.5)
BILIRUB SERPL-MCNC: 0.3 MG/DL (ref 0–1.2)
BILIRUB UR QL STRIP: NEGATIVE
BUN SERPL-MCNC: 30 MG/DL (ref 8–23)
BUN/CREAT SERPL: 26.3 (ref 7–25)
CALCIUM SPEC-SCNC: 10.3 MG/DL (ref 8.2–9.6)
CHLORIDE SERPL-SCNC: 103 MMOL/L (ref 98–107)
CLARITY UR: CLEAR
CO2 SERPL-SCNC: 24 MMOL/L (ref 22–29)
COLOR UR: YELLOW
CREAT SERPL-MCNC: 1.14 MG/DL (ref 0.76–1.27)
CRP SERPL-MCNC: 0.49 MG/DL (ref 0–0.5)
DEPRECATED RDW RBC AUTO: 55.6 FL (ref 37–54)
EGFRCR SERPLBLD CKD-EPI 2021: 61.1 ML/MIN/1.73
EOSINOPHIL # BLD AUTO: 0.17 10*3/MM3 (ref 0–0.4)
EOSINOPHIL NFR BLD AUTO: 2.6 % (ref 0.3–6.2)
ERYTHROCYTE [DISTWIDTH] IN BLOOD BY AUTOMATED COUNT: 15.9 % (ref 12.3–15.4)
ERYTHROCYTE [SEDIMENTATION RATE] IN BLOOD: 67 MM/HR (ref 0–20)
GLOBULIN UR ELPH-MCNC: 3.1 GM/DL
GLUCOSE SERPL-MCNC: 138 MG/DL (ref 65–99)
GLUCOSE UR STRIP-MCNC: NEGATIVE MG/DL
HCT VFR BLD AUTO: 33.6 % (ref 37.5–51)
HGB BLD-MCNC: 10.9 G/DL (ref 13–17.7)
HGB UR QL STRIP.AUTO: ABNORMAL
HYALINE CASTS UR QL AUTO: ABNORMAL /LPF
IMM GRANULOCYTES # BLD AUTO: 0.03 10*3/MM3 (ref 0–0.05)
IMM GRANULOCYTES NFR BLD AUTO: 0.5 % (ref 0–0.5)
INR PPP: 1.15 (ref 0.91–1.09)
KETONES UR QL STRIP: NEGATIVE
LEUKOCYTE ESTERASE UR QL STRIP.AUTO: NEGATIVE
LYMPHOCYTES # BLD AUTO: 1.12 10*3/MM3 (ref 0.7–3.1)
LYMPHOCYTES NFR BLD AUTO: 17 % (ref 19.6–45.3)
MAGNESIUM SERPL-MCNC: 1.8 MG/DL (ref 1.6–2.4)
MCH RBC QN AUTO: 30.8 PG (ref 26.6–33)
MCHC RBC AUTO-ENTMCNC: 32.4 G/DL (ref 31.5–35.7)
MCV RBC AUTO: 94.9 FL (ref 79–97)
MONOCYTES # BLD AUTO: 0.64 10*3/MM3 (ref 0.1–0.9)
MONOCYTES NFR BLD AUTO: 9.7 % (ref 5–12)
NEUTROPHILS NFR BLD AUTO: 4.59 10*3/MM3 (ref 1.7–7)
NEUTROPHILS NFR BLD AUTO: 69.6 % (ref 42.7–76)
NITRITE UR QL STRIP: NEGATIVE
NRBC BLD AUTO-RTO: 0 /100 WBC (ref 0–0.2)
PH UR STRIP.AUTO: 5.5 [PH] (ref 5–8)
PLATELET # BLD AUTO: 198 10*3/MM3 (ref 140–450)
PMV BLD AUTO: 9.6 FL (ref 6–12)
POTASSIUM SERPL-SCNC: 4.2 MMOL/L (ref 3.5–5.2)
PROT SERPL-MCNC: 6.8 G/DL (ref 6–8.5)
PROT UR QL STRIP: ABNORMAL
PROTHROMBIN TIME: 15.2 SECONDS (ref 11.8–14.8)
RBC # BLD AUTO: 3.54 10*6/MM3 (ref 4.14–5.8)
RBC # UR STRIP: ABNORMAL /HPF
REF LAB TEST METHOD: ABNORMAL
SODIUM SERPL-SCNC: 139 MMOL/L (ref 136–145)
SP GR UR STRIP: 1.02 (ref 1–1.03)
SQUAMOUS #/AREA URNS HPF: ABNORMAL /HPF
UROBILINOGEN UR QL STRIP: ABNORMAL
WBC # UR STRIP: ABNORMAL /HPF
WBC NRBC COR # BLD AUTO: 6.59 10*3/MM3 (ref 3.4–10.8)

## 2024-12-19 PROCEDURE — 85610 PROTHROMBIN TIME: CPT | Performed by: FAMILY MEDICINE

## 2024-12-19 PROCEDURE — 99284 EMERGENCY DEPT VISIT MOD MDM: CPT

## 2024-12-19 PROCEDURE — 81001 URINALYSIS AUTO W/SCOPE: CPT | Performed by: FAMILY MEDICINE

## 2024-12-19 PROCEDURE — 51798 US URINE CAPACITY MEASURE: CPT

## 2024-12-19 PROCEDURE — 85730 THROMBOPLASTIN TIME PARTIAL: CPT | Performed by: FAMILY MEDICINE

## 2024-12-19 PROCEDURE — 92526 ORAL FUNCTION THERAPY: CPT | Performed by: SPEECH-LANGUAGE PATHOLOGIST

## 2024-12-19 PROCEDURE — 86140 C-REACTIVE PROTEIN: CPT | Performed by: FAMILY MEDICINE

## 2024-12-19 PROCEDURE — 85025 COMPLETE CBC W/AUTO DIFF WBC: CPT | Performed by: FAMILY MEDICINE

## 2024-12-19 PROCEDURE — 83735 ASSAY OF MAGNESIUM: CPT | Performed by: FAMILY MEDICINE

## 2024-12-19 PROCEDURE — 80053 COMPREHEN METABOLIC PANEL: CPT | Performed by: FAMILY MEDICINE

## 2024-12-19 PROCEDURE — P9612 CATHETERIZE FOR URINE SPEC: HCPCS

## 2024-12-19 PROCEDURE — 74176 CT ABD & PELVIS W/O CONTRAST: CPT

## 2024-12-19 PROCEDURE — 85652 RBC SED RATE AUTOMATED: CPT | Performed by: FAMILY MEDICINE

## 2024-12-19 RX ORDER — SODIUM CHLORIDE 0.9 % (FLUSH) 0.9 %
10 SYRINGE (ML) INJECTION AS NEEDED
Status: DISCONTINUED | OUTPATIENT
Start: 2024-12-19 | End: 2024-12-19 | Stop reason: HOSPADM

## 2024-12-19 NOTE — PROGRESS NOTES
Speech Language Pathology Treatment Note and 30 Day Progress Note  [address] [place] [address]    Patient: Dexter Suggs                                                                                     Visit Date: 2024  :     1934    Referring practitioner:    Ashish Callejas MD  Date of Initial Visit:          Type: THERAPY  Episode: Swallowing      Visit Diagnoses:    ICD-10-CM ICD-9-CM   1. Oropharyngeal dysphagia  R13.12 787.22   2. Pharyngoesophageal dysphagia  R13.14 787.24     SUBJECTIVE     Patient alert and ready for therapy. He was accompanied by his wife.     Pain: Pain rating not applicable to this diagnosis.    OBJECTIVE   GOALS  Goals                                            STG  Comments Status   Patient will improve oral skills to enhance safety and increase eating efficiency and bolus control as measured by increased lip closure, decreased oral residue, improved posterior propulsion of the bolus, and improved management of secretions.  Patient given instruction on tongue press up and tongue press out exercises. Patient able to demonstrate with consistent cuing.  Ongoing   Patient will improve hyolaryngeal elevation, improve laryngeal closure, and increase efficiency of cough to clear residue from the airway as measured by decreased overt signs and symptoms of aspiration and decreased penetration and aspiration on repeat instrumental swallow study, if applicable.  Reviewed effortful swallow exercise Patient able to complete 30 swallows with cues.  Reviewed CTAR. Patient able to demonstrate with consistent cuing.  Ongoing   Patient will report decreased difficulty with swallowing.  Patient reports no changes. He is taking pills with applesauce with no problems.  Ongoing   Patient will improve oral hygiene to enhance safety and decrease risk of aspiration pneumonia Discussed oral hygiene and aspiration  with patient and wife. Will continue to address.  [place]   LTG        Patient will safely consume full PO diet of regular consistency food and thin liquids without difficulty or complications such as aspiration or pneumonia.   Patient able to demonstrate exercises with consistent cuing.  Ongoing       Therapy Education/Self Care    Details: POC   Given Home Exercise Program  Access Code: [email or web address]  URL: [email or web address]  Date: 12/12/2024  Prepared by: Yumiko Lindquist    Exercises  - Effortful Swallow  - 2-3 x daily - 7 x weekly - 3 sets - 10 reps  - Tongue Resistance with Front of Tongue   - 1 x daily - 7 x weekly - 3 sets - 10 reps  - Tongue Resistance with Top of Tongue   - 1 x daily - 7 x weekly - 3 sets - 10 reps  - Chin Tuck Against Resistance with Towel  - 1 x daily - 7 x weekly    Patient Education  - Oral Care: How to Take Care of Your Mouth  - Normal Swallowing & Aspiration   Progress: Reinforced   Education provided to:  Patient and Spouse/SO   Level of understanding Verbalized and Demonstrated           CPT Code:   45764 Swallow Treatment  Total Time of Visit:             40   mins         ASSESSMENT/PLAN     ASSESSMENT: Patient able to demonstrate exercises with cues. Wife will continue to cue him at home.     PLAN: Continue therapy and home exercises.     Progress Note Due Date:1/16/2025  Recertification Due Date:2/16/2025     SIGNATURE: Yumiko Lindquist, CCC-SLP, [place] License #: [id number]  Electronically Signed on 12/19/2024          [address] [place] [address]  [place] [address]  [phone]

## 2024-12-20 NOTE — DISCHARGE INSTRUCTIONS
You have opted not to get a Martinez catheter and irrigation of your bladder to see if the blood resolves in your urine.  Would highly recommend getting a repeat CBC to see how your blood count goes over the next 3 days.  You can call your urologist or primary care provider to place the order.  I would recommend calling your urologist Dr. Powell tomorrow to inform them about your blood in your urine even though your hemoglobin was stable at 10.9 which is your baseline.  Also advised you if you start developing remaining clots in your urine since you are going home to return back here to the emergency room due to this being a change in your current symptoms.  If you start developing a fever please also return back here to the emergency room.

## 2024-12-20 NOTE — ED PROVIDER NOTES
HPI:    Patient is a 89-year-old white male who presents to the emergency room with hematuria.  Yesterday he states he was at he went to the bathroom late at night and noted that the toilet was full of blood.  However he was able to go back to bed and then today around 1730 he had another urination which had a lot of blood in it and streaking of blood but no large clots.  He denies any trauma.  He has not had any blood in his urine since he had a Martinez catheter placed after he had hip surgery.  Patient does currently take Eliquis.  He states when he urinates it does cause some pain 5 out of 10.  However he states his chronic decubitus ulcers causes more pain.    REVIEW OF SYSTEMS    CONSTITUTIONAL:  No complaints of fever, chills,or weakness  EYES:  No complaints of discharge   ENT: No complaints of sore throat or ear pain  CARDIOVASCULAR:  No complaints of chest pain, palpitations, or swelling  RESPIRATORY:  No complaints of cough or shortness of breath  GI:  No complaints of abdominal pain, nausea, vomiting, or diarrhea  MUSCULOSKELETAL:  No complaints of back pain  SKIN:  No complaints of rash  NEUROLOGIC:  No complaints of headache, focal weakness, or sensory changes  ENDOCRINE:  No complaints of polyuria or polydipsia  LYMPHATIC:  No complaints of swollen glands  GENITOURINARY: Positive for gross hematuria and dysuria    PAST MEDICAL HISTORY  Past Medical History:   Diagnosis Date    Abnormal nuclear stress test     Anemia 2021    Arthritis     BPH (benign prostatic hyperplasia)     Coronary artery disease     Diabetes mellitus     Disease of thyroid gland     Fracture     spinal t12    GERD (gastroesophageal reflux disease)     Glaucoma 02/18/2022    Gout     Hyperlipidemia LDL goal <70 11/14/2016    Ischemic heart disease 06/23/2022    Ischemic heart disease 06/23/2022    Low back pain     LVH (left ventricular hypertrophy) 06/23/2022    Melanoma     Onychomycosis of toenail 06/24/2022    Pacemaker 01/18/2019     PAF (paroxysmal atrial fibrillation) 10/29/2019    Chads vas score 3    Primary hypertension 2016    Prostate CA     Sick sinus syndrome 2019    Stress fracture Aug. 2021       FAMILY HISTORY  Family History   Problem Relation Age of Onset    Alzheimer's disease Mother     Cancer Father     Cancer Sister        SOCIAL HISTORY  Social History     Socioeconomic History    Marital status:    Tobacco Use    Smoking status: Former     Current packs/day: 0.00     Types: Cigarettes     Start date: 1955     Quit date: 1985     Years since quittin.9     Passive exposure: Past    Smokeless tobacco: Never   Vaping Use    Vaping status: Never Used   Substance and Sexual Activity    Alcohol use: No    Drug use: No    Sexual activity: Not Currently     Partners: Female       IMMUNIZATION HISTORY  Deferred to primary care physician.    SURGICAL HISTORY  Past Surgical History:   Procedure Laterality Date    APPENDECTOMY      CARDIAC CATHETERIZATION Left 12/10/2012    CARDIAC ELECTROPHYSIOLOGY PROCEDURE N/A 2018    Procedure: Pacemaker DC new 2018;  Surgeon: Austin Granados MD;  Location: [facility] CATH INVASIVE LOCATION;  Service: Cardiology    CHOLECYSTECTOMY      COLONOSCOPY N/A 2017    Tics, hemorrhoids repeat exam prn    COLONOSCOPY W/ POLYPECTOMY  2012    adenomatous polyp at 80cm    ENDOSCOPY N/A 2022    Procedure: ESOPHAGOGASTRODUODENOSCOPY WITH ANESTHESIA;  Surgeon: Felice Marroquin MD;  Location: [facility] ENDOSCOPY;  Service: Gastroenterology;  Laterality: N/A;  pre hematochezia  post; normal   Ashish Callejas MD    HAND SURGERY Right     HERNIA REPAIR      HIP CANNULATED SCREW PLACEMENT Left 2022    Procedure: HIP CANNULATED SCREW PLACEMENT;  Surgeon: Isaiah Sheehan MD;  Location: [facility] OR;  Service: Orthopedics;  Laterality: Left;    INGUINAL HERNIA REPAIR      INSERT / REPLACE / REMOVE PACEMAKER      KNEE SURGERY      PROSTATE SURGERY       SKIN CANCER EXCISION      face    SKIN LESION EXCISION      TOTAL HIP ARTHROPLASTY Right 08/27/2021    Procedure: TOTAL HIP REPLACEMENT;  Surgeon: Arik Magaña MD;  Location:  [facility] OR;  Service: Orthopedics;  Laterality: Right;    TOTAL HIP ARTHROPLASTY Left 03/25/2022    Procedure: LEFT HIP SCREW REMOVAL / LEFT TOTAL HIP ARTHROPLASTY;  Surgeon: BASHIR Monsivais MD;  Location:  [facility] OR;  Service: Orthopedics;  Laterality: Left;       CURRENT MEDICATIONS    Current Facility-Administered Medications:     [COMPLETED] Insert Peripheral IV, , , Once **AND** sodium chloride 0.9 % flush 10 mL, 10 mL, Intravenous, PRN, Grant Faustin Jr., MD    [COMPLETED] Insert Peripheral IV, , , Once **AND** sodium chloride 0.9 % flush 10 mL, 10 mL, Intravenous, PRN, Grant Faustin Jr., MD    Current Outpatient Medications:     allopurinol (ZYLOPRIM) 300 MG tablet, Take 1 tablet by mouth Daily., Disp: , Rfl:     aspirin 81 MG EC tablet, Take 1 tablet by mouth Daily., Disp: , Rfl:     benzonatate (TESSALON) 100 MG capsule, Take 1 capsule by mouth 2 (Two) Times a Day As Needed for Cough., Disp: 60 capsule, Rfl: 2    Eliquis 5 MG tablet tablet, TAKE 1 TABLET BY MOUTH EVERY 12 HOURS, Disp: 180 tablet, Rfl: 4    folic acid (FOLVITE) 1 MG tablet, Take 1 tablet by mouth Daily., Disp: , Rfl:     iron polysaccharides (NIFEREX) 150 MG capsule, Take 1 capsule by mouth Daily., Disp: , Rfl:     levothyroxine (SYNTHROID, LEVOTHROID) 75 MCG tablet, Take 1 tablet by mouth Daily., Disp: , Rfl:     LORazepam (ATIVAN) 0.5 MG tablet, Take 1 tablet by mouth Daily As Needed for Anxiety. (Patient taking differently: Take 1 tablet by mouth Every Night.), Disp: 12 tablet, Rfl: 0    losartan (COZAAR) 25 MG tablet, TAKE 1 TABLET BY MOUTH DAILY., Disp: 90 tablet, Rfl: 3    methotrexate 2.5 MG tablet, Take 6 tablets by mouth 1 (One) Time Per Week., Disp: , Rfl:     metoprolol succinate XL (TOPROL-XL) 25 MG 24 hr tablet, TAKE 1 TABLET BY MOUTH  "EVERY DAY, Disp: 90 tablet, Rfl: 11    montelukast (SINGULAIR) 10 MG tablet, Take 1 tablet by mouth Every Night., Disp: , Rfl:     nitroglycerin (NITROSTAT) 0.4 MG SL tablet, Place 1 tablet under the tongue Every 5 (Five) Minutes As Needed., Disp: , Rfl:     pantoprazole (PROTONIX) 40 MG EC tablet, Take 1 tablet by mouth Daily., Disp: , Rfl:     predniSONE (DELTASONE) 2.5 MG tablet, Take 1 tablet by mouth Daily., Disp: , Rfl:     triamterene-hydrochlorothiazide (MAXZIDE-25) 37.5-25 MG per tablet, Take 1 tablet by mouth Daily., Disp: , Rfl:     ALLERGIES  Allergies   Allergen Reactions    Adhesive Tape     Simvastatin Other (See Comments)     Abnormal LFT's    Tramadol Hallucinations    Neosporin [Neomycin-Bacitracin Zn-Polymyx] Rash       Genitourinary EXAM    VITAL SIGNS:   /84   Pulse 118   Temp 97.4 °F (36.3 °C) (Oral)   Resp 16   Ht 182.9 cm (72\")   Wt 70.3 kg (155 lb)   SpO2 99%   BMI 21.02 kg/m²     Constitutional: Patient is alert and in no distress.  Patient with moderate urinary discomfort.    Cardiovascular: S1-S2 regular rate and rhythm.  No murmurs, rubs, or gallops.  Pulses are equal bilaterally and there is no pitting edema    Respiratory: Patient is clear to auscultation bilaterally with no wheezing or rhonchi.  Chest wall is nontender.  There are no external lesions on the chest.  There is no crepitance    Gastrointestinal: Normal bowel sounds x4.  No rebound or guarding.  No abdominal distention or fluid wave    Genitourinary: Positive for hematuria and dysuria mild suprapubic tenderness to palpation.  No costovertebral angle tenderness to percussion.    Integument: No acute lesions noted.  Color appears to be normal.    Adamaris Coma Scale: Total score 15    Neurological: Patient is alert and oriented x4 and no acute findings noted.  Speech is fluent and cognition is normal.  No evidence of acute CVA.  Cranial nerves II through XII intact.  Patient with normal motor function as well as " reflexes and sensation    Psychiatric: Normal affect and mood      RADIOLOGY/PROCEDURES        CT Abdomen Pelvis Stone Protocol   Final Result   1. No acute intra-abdominal or pelvic abnormality. Nonobstructing   nephrolithiasis are present at the inferior pole of the left kidney and   are unchanged, the largest stone measures 9 mm. There is no   hydronephrosis.   2. Mild dilation of the proximal right ureter is a chronic finding and   no right-sided ureterolithiasis or hydronephrosis is seen. Probable   moderate prostate enlargement, difficult to visualize due to streak   artifact in the pelvis. Correlation with PSA values is recommended   3. Multiple bilateral nephrogenic cysts there is a stable probable   proteinaceous cyst arising from the upper pole of the left kidney   situated between the kidney and spleen.               This report was signed and finalized on 12/19/2024 7:57 PM by Dr. Dexter Medrano MD.                 FUTURE APPOINTMENTS     Future Appointments   Date Time Provider Department Center   1/2/2025  2:45 PM Yumiko Lindquist [facility]   1/9/2025  2:45 PM Yumiko Lindquist [facility]   2/21/2025  9:00 AM [facility] DEVICE CHECK [facility]   4/18/2025 11:00 AM Deborah Whitmore APRN [facility]  [facility]             Recent Results (from the past 24 hours)   Comprehensive Metabolic Panel    Collection Time: 12/19/24  7:32 PM    Specimen: Blood   Result Value Ref Range    Glucose 138 (H) 65 - 99 mg/dL    BUN 30 (H) 8 - 23 mg/dL    Creatinine 1.14 0.76 - 1.27 mg/dL    Sodium 139 136 - 145 mmol/L    Potassium 4.2 3.5 - 5.2 mmol/L    Chloride 103 98 - 107 mmol/L    CO2 24.0 22.0 - 29.0 mmol/L    Calcium 10.3 (H) 8.2 - 9.6 mg/dL    Total Protein 6.8 6.0 - 8.5 g/dL    Albumin 3.7 3.5 - 5.2 g/dL    ALT (SGPT) 12 1 - 41 U/L    AST (SGOT) 19 1 - 40 U/L    Alkaline Phosphatase 128 (H) 39 - 117 U/L    Total Bilirubin 0.3 0.0 - 1.2 mg/dL    Globulin 3.1 gm/dL    A/G Ratio  1.2 g/dL    BUN/Creatinine Ratio 26.3 (H) 7.0 - 25.0    Anion Gap 12.0 5.0 - 15.0 mmol/L    eGFR 61.1 >60.0 mL/min/1.73   Protime-INR    Collection Time: 12/19/24  7:32 PM    Specimen: Blood   Result Value Ref Range    Protime 15.2 (H) 11.8 - 14.8 Seconds    INR 1.15 (H) 0.91 - 1.09   aPTT    Collection Time: 12/19/24  7:32 PM    Specimen: Blood   Result Value Ref Range    PTT 30.8 24.5 - 36.0 seconds   CBC Auto Differential    Collection Time: 12/19/24  7:32 PM    Specimen: Blood   Result Value Ref Range    WBC 6.59 3.40 - 10.80 10*3/mm3    RBC 3.54 (L) 4.14 - 5.80 10*6/mm3    Hemoglobin 10.9 (L) 13.0 - 17.7 g/dL    Hematocrit 33.6 (L) 37.5 - 51.0 %    MCV 94.9 79.0 - 97.0 fL    MCH 30.8 26.6 - 33.0 pg    MCHC 32.4 31.5 - 35.7 g/dL    RDW 15.9 (H) 12.3 - 15.4 %    RDW-SD 55.6 (H) 37.0 - 54.0 fl    MPV 9.6 6.0 - 12.0 fL    Platelets 198 140 - 450 10*3/mm3    Neutrophil % 69.6 42.7 - 76.0 %    Lymphocyte % 17.0 (L) 19.6 - 45.3 %    Monocyte % 9.7 5.0 - 12.0 %    Eosinophil % 2.6 0.3 - 6.2 %    Basophil % 0.6 0.0 - 1.5 %    Immature Grans % 0.5 0.0 - 0.5 %    Neutrophils, Absolute 4.59 1.70 - 7.00 10*3/mm3    Lymphocytes, Absolute 1.12 0.70 - 3.10 10*3/mm3    Monocytes, Absolute 0.64 0.10 - 0.90 10*3/mm3    Eosinophils, Absolute 0.17 0.00 - 0.40 10*3/mm3    Basophils, Absolute 0.04 0.00 - 0.20 10*3/mm3    Immature Grans, Absolute 0.03 0.00 - 0.05 10*3/mm3    nRBC 0.0 0.0 - 0.2 /100 WBC   Magnesium    Collection Time: 12/19/24  7:32 PM    Specimen: Blood   Result Value Ref Range    Magnesium 1.8 1.6 - 2.4 mg/dL   Sedimentation Rate    Collection Time: 12/19/24  7:32 PM    Specimen: Blood   Result Value Ref Range    Sed Rate 67 (H) 0 - 20 mm/hr   C-reactive Protein    Collection Time: 12/19/24  7:32 PM    Specimen: Blood   Result Value Ref Range    C-Reactive Protein 0.49 0.00 - 0.50 mg/dL   Urinalysis With Culture If Indicated - Straight Cath    Collection Time: 12/19/24  8:02 PM    Specimen: Straight Cath; Urine    Result Value Ref Range    Color, UA Yellow Yellow, Straw    Appearance, UA Clear Clear    pH, UA 5.5 5.0 - 8.0    Specific Gravity, UA 1.020 1.005 - 1.030    Glucose, UA Negative Negative    Ketones, UA Negative Negative    Bilirubin, UA Negative Negative    Blood, UA Large (3+) (A) Negative    Protein, UA Trace (A) Negative    Leuk Esterase, UA Negative Negative    Nitrite, UA Negative Negative    Urobilinogen, UA 1.0 E.U./dL 0.2 - 1.0 E.U./dL   Urinalysis, Microscopic Only - Straight Cath    Collection Time: 12/19/24  8:02 PM    Specimen: Straight Cath; Urine   Result Value Ref Range    RBC, UA Too Numerous to Count (A) None Seen, 0-2 /HPF    WBC, UA 0-2 None Seen, 0-2 /HPF    Bacteria, UA None Seen None Seen /HPF    Squamous Epithelial Cells, UA 0-2 None Seen, 0-2 /HPF    Hyaline Casts, UA 0-2 None Seen /LPF    Methodology Automated Microscopy          COURSE & MEDICAL DECISION MAKING     Patient's partial differential diagnosis can include:    Bladder cancer, ureteral stone, kidney stone, ureter cancer, kidney cancer, and others    Discussed with the patient results of the laboratory and radiological test.  There is no ureteral stones and there is no notable mass in the bladder.  Patient is urinary bleeding is likely related to his use of blood thinners.  I advised him recommended a Martinez catheter and irrigation to see if it would clear however the patient states that he would rather wait since his hemoglobin is stable his white blood cell count is normal then I advised him that he should follow-up with his PCP and urologist Dr. Powell and at least have a hemoglobin repeated in the next 3 days to make sure that it is not dropping.  Also advised him that if he starts developing a lot of clots in his urine to return back here to the emergency room.  He and his wife voices understanding and will wait for paperwork to be discharged and feels comfortable with discharging and going home.    Patient's level of risk:  Moderate        CRITICAL CARE    CRITICAL CARE: No CRITICAL CARE TIME: None        Also Old charts were reviewed per [facility] EMR.  Pertinent details are summarized above.  All laboratory, radiologic, and EKG studies that were performed in the Emergency Department were a necessary part of the evaluation needed to exclude unstable or  emergent medical conditions:       Patient was hemodynamically and neurologically stable in the ED.   Pertinent studies were reviewed as above.     The patient received:  Medications   sodium chloride 0.9 % flush 10 mL (has no administration in time range)   sodium chloride 0.9 % flush 10 mL (has no administration in time range)              ED Disposition       ED Disposition   Discharge    Condition   Stable    Comment   --                 Dragon disclaimer:  Part of this note may be an electronic transcription/translation of spoken language to printed text using the Dragon Dictation System.     I have reviewed the patient’s prescription history via a prescription monitoring program.  This information is consistent with my knowledge of the patient’s controlled substance use history.      FINAL IMPRESSION   Diagnosis Plan   1. Gross hematuria              MD Ramin Varela Jr, Thomas Mark Jr., MD  12/19/24 5510

## 2024-12-27 ENCOUNTER — TELEPHONE (OUTPATIENT)
Dept: UROLOGY | Facility: CLINIC | Age: 89
End: 2024-12-27

## 2024-12-27 NOTE — TELEPHONE ENCOUNTER
Caller: ALYSON SAINI (NO BH VERBAL)    Relationship: SPOUSE    Best call back number: [phone]    What is your medical concern? INCOMING CALL FROM PT SPOUSE. PT SPOUSE WANTED TO SPEAK WITH SOMEONE IN CLINICAL REGARDING PT'S ER VISIT ON 12/19/24 FOR HEMATURIA. WANTING TO KNOW IF PT NEEDS FOLLOW UP. SAYS SHE CALLED EARLIER WANTING TO SPEAK WITH NURSE AND WAS NOT ALLOWED TO DUE TO NO BH VERBAL ON FILE. PT SPOUSE SAYS PT GETS VERY CONFUSED WHEN HANDLING MEDICAL INFORMATION.    How long has this issue been going on? N/A    Is your provider already aware of this issue? N/A    Have you been treated for this issue? N/A

## 2024-12-30 NOTE — PROGRESS NOTES
Subjective    Mr. Suggs is 89 y.o. male    Chief Complaint: Gross hematuria/ER follow up    History of Present Illness  Patient with previous history of Ebonie 6 prostate cancer treated with radiation PSAs have been nondetectable.  Has had stones in the kidneys in the past he was seen in the emergency department for gross hematuria.  1 time he had catheter in place.  Patient is on Eliquis so is at risk of developing hematuria.  His urine is now grossly clear.  It initially occurred according to the patient and his spouse the urine for the commode was red but he did pass some clots.    The following portions of the patient's history were reviewed and updated as appropriate: allergies, current medications, past family history, past medical history, past social history, past surgical history and problem list.    Review of Systems   Genitourinary:  Positive for hematuria. Negative for difficulty urinating and flank pain.         Current Outpatient Medications:     allopurinol (ZYLOPRIM) 300 MG tablet, Take 1 tablet by mouth Daily., Disp: , Rfl:     aspirin 81 MG EC tablet, Take 1 tablet by mouth Daily., Disp: , Rfl:     Eliquis 5 MG tablet tablet, TAKE 1 TABLET BY MOUTH EVERY 12 HOURS, Disp: 180 tablet, Rfl: 4    folic acid (FOLVITE) 1 MG tablet, Take 1 tablet by mouth Daily., Disp: , Rfl:     iron polysaccharides (NIFEREX) 150 MG capsule, Take 1 capsule by mouth Daily., Disp: , Rfl:     levothyroxine (SYNTHROID, LEVOTHROID) 75 MCG tablet, Take 1 tablet by mouth Daily., Disp: , Rfl:     LORazepam (ATIVAN) 0.5 MG tablet, Take 1 tablet by mouth Daily As Needed for Anxiety. (Patient taking differently: Take 1 tablet by mouth Every Night.), Disp: 12 tablet, Rfl: 0    losartan (COZAAR) 25 MG tablet, TAKE 1 TABLET BY MOUTH DAILY., Disp: 90 tablet, Rfl: 3    methotrexate 2.5 MG tablet, Take 6 tablets by mouth 1 (One) Time Per Week., Disp: , Rfl:     metoprolol succinate XL (TOPROL-XL) 25 MG 24 hr tablet, TAKE 1 TABLET BY  MOUTH EVERY DAY, Disp: 90 tablet, Rfl: 11    montelukast (SINGULAIR) 10 MG tablet, Take 1 tablet by mouth Every Night., Disp: , Rfl:     nitroglycerin (NITROSTAT) 0.4 MG SL tablet, Place 1 tablet under the tongue Every 5 (Five) Minutes As Needed., Disp: , Rfl:     pantoprazole (PROTONIX) 40 MG EC tablet, Take 1 tablet by mouth Daily., Disp: , Rfl:     predniSONE (DELTASONE) 2.5 MG tablet, Take 1 tablet by mouth Daily., Disp: , Rfl:     triamterene-hydrochlorothiazide (MAXZIDE-25) 37.5-25 MG per tablet, Take 1 tablet by mouth Daily., Disp: , Rfl:     benzonatate (TESSALON) 100 MG capsule, Take 1 capsule by mouth 2 (Two) Times a Day As Needed for Cough. (Patient not taking: Reported on 1/3/2025), Disp: 60 capsule, Rfl: 2    Past Medical History:   Diagnosis Date    Abnormal nuclear stress test     Anemia 2021    Arthritis     BPH (benign prostatic hyperplasia)     Coronary artery disease     Diabetes mellitus     Disease of thyroid gland     Fracture     spinal t12    GERD (gastroesophageal reflux disease)     Glaucoma 02/18/2022    Gout     Hyperlipidemia LDL goal <70 11/14/2016    Ischemic heart disease 06/23/2022    Ischemic heart disease 06/23/2022    Low back pain     LVH (left ventricular hypertrophy) 06/23/2022    Melanoma     Onychomycosis of toenail 06/24/2022    Pacemaker 01/18/2019    PAF (paroxysmal atrial fibrillation) 10/29/2019    Chads vas score 3    Primary hypertension 11/14/2016    Prostate CA     Sick sinus syndrome 03/01/2019    Stress fracture Aug. 2021       Past Surgical History:   Procedure Laterality Date    APPENDECTOMY      CARDIAC CATHETERIZATION Left 12/10/2012    CARDIAC ELECTROPHYSIOLOGY PROCEDURE N/A 11/23/2018    Procedure: Pacemaker DC new 11/23/2018;  Surgeon: Austin Granados MD;  Location: [facility] CATH INVASIVE LOCATION;  Service: Cardiology    CHOLECYSTECTOMY      COLONOSCOPY N/A 06/09/2017    Tics, hemorrhoids repeat exam prn    COLONOSCOPY W/ POLYPECTOMY  02/16/2012    adenomatous  "polyp at 80cm    ENDOSCOPY N/A 2022    Procedure: ESOPHAGOGASTRODUODENOSCOPY WITH ANESTHESIA;  Surgeon: Felice Marroquin MD;  Location:  [facility] ENDOSCOPY;  Service: Gastroenterology;  Laterality: N/A;  pre hematochezia  post; normal   Ashish Callejas MD    HAND SURGERY Right     HERNIA REPAIR      HIP CANNULATED SCREW PLACEMENT Left 2022    Procedure: HIP CANNULATED SCREW PLACEMENT;  Surgeon: Isaiah Sheehan MD;  Location:  [facility] OR;  Service: Orthopedics;  Laterality: Left;    INGUINAL HERNIA REPAIR      INSERT / REPLACE / REMOVE PACEMAKER      KNEE SURGERY      PROSTATE SURGERY      SKIN CANCER EXCISION      face    SKIN LESION EXCISION      TOTAL HIP ARTHROPLASTY Right 2021    Procedure: TOTAL HIP REPLACEMENT;  Surgeon: Arik Magaña MD;  Location:  [facility] OR;  Service: Orthopedics;  Laterality: Right;    TOTAL HIP ARTHROPLASTY Left 2022    Procedure: LEFT HIP SCREW REMOVAL / LEFT TOTAL HIP ARTHROPLASTY;  Surgeon: BASHIR Monsivais MD;  Location:  [facility] OR;  Service: Orthopedics;  Laterality: Left;       Social History     Socioeconomic History    Marital status:    Tobacco Use    Smoking status: Former     Current packs/day: 0.00     Types: Cigarettes     Start date: 1955     Quit date: 1985     Years since quittin.0     Passive exposure: Past    Smokeless tobacco: Never   Vaping Use    Vaping status: Never Used   Substance and Sexual Activity    Alcohol use: No    Drug use: No    Sexual activity: Not Currently     Partners: Female       Family History   Problem Relation Age of Onset    Alzheimer's disease Mother     Cancer Father     Cancer Sister        Objective    Temp 97.5 °F (36.4 °C)   Ht 182.9 cm (72.01\")   Wt 69.9 kg (154 lb)   BMI 20.88 kg/m²     Physical Exam  Vitals reviewed.   Constitutional:       Appearance: Normal appearance.   HENT:      Head: Normocephalic and atraumatic.   Pulmonary:      Effort: Pulmonary effort is normal. "   Neurological:      Mental Status: He is alert.   Psychiatric:         Mood and Affect: Mood normal.         Behavior: Behavior normal.             Results for orders placed or performed in visit on 01/03/25   POC Urinalysis Dipstick, Multipro    Collection Time: 01/03/25  1:37 PM    Specimen: Urine   Result Value Ref Range    Color Yellow Yellow, Straw, Dark Yellow, Ariana    Clarity, UA Clear Clear    Glucose, UA Negative Negative mg/dL    Bilirubin Negative Negative    Ketones, UA Negative Negative    Specific Gravity  1.020 1.005 - 1.030    Blood, UA Small (A) Negative    pH, Urine 5.5 5.0 - 8.0    Protein, POC 30 mg/dL (A) Negative mg/dL    Urobilinogen, UA 0.2 E.U./dL Normal, 0.2 E.U./dL    Nitrite, UA Negative Negative    Leukocytes Negative Negative     Assessment and Plan    Diagnoses and all orders for this visit:    1. Gross hematuria (Primary)  -     POC Urinalysis Dipstick, Multipro  -     CT Abdomen Pelvis With & Without Contrast; Future      Does not have risk factors for hematuria taking Eliquis has a history of Ebonie 6 prostate cancer which was diagnosed years ago.  He has had nondetectable PSAs.  Urine is grossly clear at this time    However given the previous episode of gross hematuria that ceased further evaluation with CT urogram and then cystoscopy to better evaluate.

## 2025-01-02 ENCOUNTER — HOSPITAL ENCOUNTER (OUTPATIENT)
Facility: HOSPITAL | Age: OVER 89
Discharge: HOME | End: 2025-01-02
Admitting: FAMILY MEDICINE
Payer: MEDICARE

## 2025-01-02 DIAGNOSIS — R13.12 OROPHARYNGEAL DYSPHAGIA: Primary | ICD-10-CM

## 2025-01-02 DIAGNOSIS — R13.14 PHARYNGOESOPHAGEAL DYSPHAGIA: ICD-10-CM

## 2025-01-02 PROCEDURE — 92526 ORAL FUNCTION THERAPY: CPT | Performed by: SPEECH-LANGUAGE PATHOLOGIST

## 2025-01-02 NOTE — PROGRESS NOTES
Speech Language Pathology Treatment Note and Discharge Note  [address] [place] [address]    Patient: Dexter Suggs                                                                                     Visit Date: 2025  :     1934    Referring practitioner:    Ashish Callejas MD  Date of Initial Visit:          Type: THERAPY  Episode: Swallowing      Visit Diagnoses:    ICD-10-CM ICD-9-CM   1. Oropharyngeal dysphagia  R13.12 787.22   2. Pharyngoesophageal dysphagia  R13.14 787.24     SUBJECTIVE     Patient alert and ready for therapy. He was accompanied by his wife. They inquired about discontinuing therapy at this time.     Pain: Patient expressed no pain.     OBJECTIVE   GOALS  Goals                                            STG  Comments Status   Patient will improve oral skills to enhance safety and increase eating efficiency and bolus control as measured by increased lip closure, decreased oral residue, improved posterior propulsion of the bolus, and improved management of secretions.  Patient has trouble with this exercise and asked to discontinue.   Discontinued.    Patient will improve hyolaryngeal elevation, improve laryngeal closure, and increase efficiency of cough to clear residue from the airway as measured by decreased overt signs and symptoms of aspiration and decreased penetration and aspiration on repeat instrumental swallow study, if applicable.  Reviewed effortful swallow exercise Patient able to complete 30 swallows with cues.  Reviewed CTAR. Patient able to demonstrate with consistent cuing. Patient will continue to do these exercises at home.  Discontinued, continue home exercises.    Patient will report decreased difficulty with swallowing.  Patient reports improvement He is taking pills with applesauce with no problems. He does not wish to continue with thickened liquids.  Discontinued, continue home  exercises   Patient will improve oral hygiene to enhance safety and decrease risk of aspiration pneumonia Discussed oral hygiene and aspiration with patient and wife. Patient aware of importance of oral hygiene and will continue.  Discontinued, continue home oral care   LTG        Patient will safely consume full PO diet of regular consistency food and thin liquids without difficulty or complications such as aspiration or pneumonia.   Patient given prioritized list of recommendations for home exercises and oral care.  Discontinued, continue home exercises       Therapy Education/Self Care    Details: POC   Given Home Exercise Program  Prioritized list   Progress: Reinforced   Education provided to:  Patient and Spouse/SO   Level of understanding Verbalized and Demonstrated           CPT Code:   58550 Swallow Treatment  Total Time of Visit:             40   mins         ASSESSMENT/PLAN     ASSESSMENT: Patient feels his problem is resolved with using applesauce to take his pills. He understands the importance of oral care. He will continue with his effortful swallow exercise. Patient requested discharge.     PLAN: Continue home exercises and oral care.     Thank you for this referral and for allowing us to participate in the care of this patient. He is certainly welcome to return for therapy should conditions warrant.       SIGNATURE: Yumiko Lindquist CCC-SLP, [place] License #: [id number]  Electronically Signed on 1/2/2025          [address] [place] [address] Amber  [place] [address]  [phone]

## 2025-01-03 ENCOUNTER — OFFICE VISIT (OUTPATIENT)
Dept: UROLOGY | Facility: CLINIC | Age: OVER 89
End: 2025-01-03
Payer: MEDICARE

## 2025-01-03 VITALS — HEIGHT: 72 IN | BODY MASS INDEX: 20.86 KG/M2 | WEIGHT: 154 LBS | TEMPERATURE: 97.5 F

## 2025-01-03 DIAGNOSIS — R31.0 GROSS HEMATURIA: Primary | ICD-10-CM

## 2025-01-03 LAB
BILIRUB BLD-MCNC: NEGATIVE MG/DL
CLARITY, POC: CLEAR
COLOR UR: YELLOW
GLUCOSE UR STRIP-MCNC: NEGATIVE MG/DL
KETONES UR QL: NEGATIVE
LEUKOCYTE EST, POC: NEGATIVE
NITRITE UR-MCNC: NEGATIVE MG/ML
PH UR: 5.5 [PH] (ref 5–8)
PROT UR STRIP-MCNC: ABNORMAL MG/DL
RBC # UR STRIP: ABNORMAL /UL
SP GR UR: 1.02 (ref 1–1.03)
UROBILINOGEN UR QL: ABNORMAL

## 2025-01-03 PROCEDURE — 81001 URINALYSIS AUTO W/SCOPE: CPT | Performed by: PHYSICIAN ASSISTANT

## 2025-01-03 PROCEDURE — 1160F RVW MEDS BY RX/DR IN RCRD: CPT | Performed by: PHYSICIAN ASSISTANT

## 2025-01-03 PROCEDURE — 99214 OFFICE O/P EST MOD 30 MIN: CPT | Performed by: PHYSICIAN ASSISTANT

## 2025-01-03 PROCEDURE — 1159F MED LIST DOCD IN RCRD: CPT | Performed by: PHYSICIAN ASSISTANT

## 2025-01-06 RX ORDER — METOPROLOL SUCCINATE 25 MG/1
25 TABLET, EXTENDED RELEASE ORAL DAILY
Qty: 90 TABLET | Refills: 11 | Status: SHIPPED | OUTPATIENT
Start: 2025-01-06

## 2025-01-06 RX ORDER — LOSARTAN POTASSIUM 25 MG/1
25 TABLET ORAL DAILY
Qty: 90 TABLET | Refills: 3 | Status: SHIPPED | OUTPATIENT
Start: 2025-01-06

## 2025-01-06 NOTE — TELEPHONE ENCOUNTER
Per the patients wife they switched pharmacy's so they needed them all sent to the new pharmacy.    [facility]  [address] [place] [id number] [address]  [phone]

## 2025-01-13 ENCOUNTER — TELEPHONE (OUTPATIENT)
Dept: UROLOGY | Facility: CLINIC | Age: OVER 89
End: 2025-01-13
Payer: MEDICARE

## 2025-01-13 NOTE — TELEPHONE ENCOUNTER
Hub staff attempted to follow warm transfer process and was unsuccessful     Caller: Alyson Suggs    Relationship to patient: Emergency Contact    Best call back number: [phone]    Patient is needing: PATIENT'S WIFE ALYSON CALLED WANTING TO R/S CYSTO ON 01/15/25 DUE TO AN EMERGENCY. SHE WOULD LIKE TO RESCHEDULE IT FOR A THURSDAY AFTERNOON OR ANYTIME FRIDAY. PLEASE CALL HER BACK FOR ASSISTANCE.

## 2025-01-14 NOTE — TELEPHONE ENCOUNTER
Called patient and spoke to wife.  She had originally requested a Thursday afternoon or Friday, but unfortunately, Dr. Powell' is normally only here on Mondays or Wednesdays.  She agreed to do a Monday afternoon on 1/27/25 at 3:00pm with Dr. Powell.

## 2025-01-23 ENCOUNTER — HOSPITAL ENCOUNTER (OUTPATIENT)
Dept: CT IMAGING | Facility: HOSPITAL | Age: OVER 89
Discharge: HOME | End: 2025-01-23
Admitting: PHYSICIAN ASSISTANT
Payer: MEDICARE

## 2025-01-23 DIAGNOSIS — R31.0 GROSS HEMATURIA: ICD-10-CM

## 2025-01-23 LAB — CREAT BLDA-MCNC: 1.2 MG/DL (ref 0.6–1.3)

## 2025-01-23 PROCEDURE — 74178 CT ABD&PLV WO CNTR FLWD CNTR: CPT

## 2025-01-23 PROCEDURE — 25510000001 IOPAMIDOL 61 % SOLUTION: Performed by: PHYSICIAN ASSISTANT

## 2025-01-23 PROCEDURE — 82565 ASSAY OF CREATININE: CPT

## 2025-01-23 RX ORDER — IOPAMIDOL 612 MG/ML
100 INJECTION, SOLUTION INTRAVASCULAR
Status: COMPLETED | OUTPATIENT
Start: 2025-01-23 | End: 2025-01-23

## 2025-01-23 RX ADMIN — IOPAMIDOL 100 ML: 612 INJECTION, SOLUTION INTRAVENOUS at 12:15

## 2025-01-24 ENCOUNTER — TELEPHONE (OUTPATIENT)
Dept: UROLOGY | Facility: CLINIC | Age: OVER 89
End: 2025-01-24
Payer: MEDICARE

## 2025-01-24 NOTE — TELEPHONE ENCOUNTER
Called pt., spoke to spouse. She v/u of results. Pt. To f/u Monday 1/27 for cysto with Dr. Powell as scheduled.

## 2025-01-24 NOTE — TELEPHONE ENCOUNTER
----- Message from Femi Moore sent at 1/24/2025 12:23 PM CST -----  Regarding: ct  Bilateral nonobstructing stones no evidence of ureteral stone no evidence of hydronephrosis or obstruction.  No other source of hematuria he will follow-up as scheduled with Dr. Powell  ----- Message -----  From: Susy Rad Results [place] In  Sent: 1/23/2025   1:27 PM CST  To: ROMELIA Jane

## 2025-01-27 ENCOUNTER — PROCEDURE VISIT (OUTPATIENT)
Dept: UROLOGY | Facility: CLINIC | Age: OVER 89
End: 2025-01-27
Payer: MEDICARE

## 2025-01-27 DIAGNOSIS — R31.0 GROSS HEMATURIA: Primary | ICD-10-CM

## 2025-01-27 LAB
BILIRUB BLD-MCNC: NEGATIVE MG/DL
CLARITY, POC: CLEAR
COLOR UR: YELLOW
GLUCOSE UR STRIP-MCNC: NEGATIVE MG/DL
KETONES UR QL: NEGATIVE
LEUKOCYTE EST, POC: NEGATIVE
NITRITE UR-MCNC: NEGATIVE MG/ML
PH UR: 5.5 [PH] (ref 5–8)
PROT UR STRIP-MCNC: ABNORMAL MG/DL
RBC # UR STRIP: NEGATIVE /UL
SP GR UR: 1.02 (ref 1–1.03)
UROBILINOGEN UR QL: NORMAL

## 2025-01-27 PROCEDURE — 52000 CYSTOURETHROSCOPY: CPT | Performed by: UROLOGY

## 2025-01-27 PROCEDURE — 81001 URINALYSIS AUTO W/SCOPE: CPT | Performed by: UROLOGY

## 2025-01-27 RX ORDER — FINASTERIDE 5 MG/1
5 TABLET, FILM COATED ORAL DAILY
Qty: 90 TABLET | Refills: 3 | Status: SHIPPED | OUTPATIENT
Start: 2025-01-27

## 2025-01-27 NOTE — PROGRESS NOTES
Pre- operative diagnosis:  Hematuria after radiation therapy for prostate cancer nearly 20 years ago, on Eliquis.  Previous evaluation by me in 2021 revealed likely prostatic source with enlarged prostate.  CT urogram this year shows 1 cm nonobstructing left lower pole kidney stone, asymptomatic    Post operative diagnosis:  Enlarged prostate and left lower pole kidney stone    Procedure:  The patient was prepped and draped in a normal sterile fashion.  The urethra was anesthetized with 2% lidocaine jelly.  A flexible cystoscope was introduced per urethra.      Urethra:  Normal    Bladder:  No bladder tumor and No bladder neck contracture, with multiple telangiectasias consistent with radiation changes    Ureteral orifices:  Difficult to visualize due to very large prostate    Prostate:  Trilobar prostatic hypertrophy with multiple telangiectasias consistent with radiation change.  Small pinpoint bleeding vessel on intravesical lobe noted on retroflexed view.    Patient tolerated the procedure well    Complications: none    Blood loss: minimal    Follow up:    Routine follow up-6 months with urology [facility].  I recommended adding finasteride to help reduce risk of prostatic bleeding going forward.      This document has been signed by ESTELLE Powell MD on January 27, 2025 16:01 CST

## 2025-02-26 ENCOUNTER — APPOINTMENT (OUTPATIENT)
Dept: CT IMAGING | Facility: HOSPITAL | Age: OVER 89
End: 2025-02-26
Payer: MEDICARE

## 2025-02-26 ENCOUNTER — APPOINTMENT (OUTPATIENT)
Dept: GENERAL RADIOLOGY | Facility: HOSPITAL | Age: OVER 89
End: 2025-02-26
Payer: MEDICARE

## 2025-02-26 ENCOUNTER — HOSPITAL ENCOUNTER (EMERGENCY)
Facility: HOSPITAL | Age: OVER 89
Discharge: HOME | End: 2025-02-26
Attending: FAMILY MEDICINE | Admitting: FAMILY MEDICINE
Payer: MEDICARE

## 2025-02-26 VITALS
WEIGHT: 166.3 LBS | HEIGHT: 72 IN | TEMPERATURE: 98.2 F | SYSTOLIC BLOOD PRESSURE: 111 MMHG | RESPIRATION RATE: 16 BRPM | HEART RATE: 97 BPM | BODY MASS INDEX: 22.52 KG/M2 | OXYGEN SATURATION: 96 % | DIASTOLIC BLOOD PRESSURE: 74 MMHG
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
## 2025-02-26 DIAGNOSIS — S42.291A CLOSED FRACTURE OF HEAD OF RIGHT HUMERUS, INITIAL ENCOUNTER: Primary | ICD-10-CM

## 2025-02-26 LAB
ALBUMIN SERPL-MCNC: 3.2 G/DL (ref 3.5–5.2)
ALBUMIN/GLOB SERPL: 1.2 G/DL
ALP SERPL-CCNC: 98 U/L (ref 39–117)
ALT SERPL W P-5'-P-CCNC: 13 U/L (ref 1–41)
ANION GAP SERPL CALCULATED.3IONS-SCNC: 10 MMOL/L (ref 5–15)
AST SERPL-CCNC: 24 U/L (ref 1–40)
BASOPHILS # BLD AUTO: 0.04 10*3/MM3 (ref 0–0.2)
BASOPHILS NFR BLD AUTO: 0.4 % (ref 0–1.5)
BILIRUB SERPL-MCNC: 0.4 MG/DL (ref 0–1.2)
BUN SERPL-MCNC: 24 MG/DL (ref 8–23)
BUN/CREAT SERPL: 21.2 (ref 7–25)
CALCIUM SPEC-SCNC: 8.7 MG/DL (ref 8.2–9.6)
CHLORIDE SERPL-SCNC: 105 MMOL/L (ref 98–107)
CO2 SERPL-SCNC: 22 MMOL/L (ref 22–29)
CREAT SERPL-MCNC: 1.13 MG/DL (ref 0.76–1.27)
DEPRECATED RDW RBC AUTO: 57.1 FL (ref 37–54)
EGFRCR SERPLBLD CKD-EPI 2021: 61.7 ML/MIN/1.73
EOSINOPHIL # BLD AUTO: 0.02 10*3/MM3 (ref 0–0.4)
EOSINOPHIL NFR BLD AUTO: 0.2 % (ref 0.3–6.2)
ERYTHROCYTE [DISTWIDTH] IN BLOOD BY AUTOMATED COUNT: 16.2 % (ref 12.3–15.4)
GLOBULIN UR ELPH-MCNC: 2.7 GM/DL
GLUCOSE SERPL-MCNC: 126 MG/DL (ref 65–99)
HCT VFR BLD AUTO: 28.5 % (ref 37.5–51)
HGB BLD-MCNC: 9.5 G/DL (ref 13–17.7)
HOLD SPECIMEN: NORMAL
IMM GRANULOCYTES # BLD AUTO: 0.06 10*3/MM3 (ref 0–0.05)
IMM GRANULOCYTES NFR BLD AUTO: 0.7 % (ref 0–0.5)
LYMPHOCYTES # BLD AUTO: 0.3 10*3/MM3 (ref 0.7–3.1)
LYMPHOCYTES NFR BLD AUTO: 3.4 % (ref 19.6–45.3)
MCH RBC QN AUTO: 32.2 PG (ref 26.6–33)
MCHC RBC AUTO-ENTMCNC: 33.3 G/DL (ref 31.5–35.7)
MCV RBC AUTO: 96.6 FL (ref 79–97)
MONOCYTES # BLD AUTO: 0.52 10*3/MM3 (ref 0.1–0.9)
MONOCYTES NFR BLD AUTO: 5.8 % (ref 5–12)
NEUTROPHILS NFR BLD AUTO: 7.99 10*3/MM3 (ref 1.7–7)
NEUTROPHILS NFR BLD AUTO: 89.5 % (ref 42.7–76)
NRBC BLD AUTO-RTO: 0 /100 WBC (ref 0–0.2)
PLATELET # BLD AUTO: 171 10*3/MM3 (ref 140–450)
PMV BLD AUTO: 9.5 FL (ref 6–12)
POTASSIUM SERPL-SCNC: 3.7 MMOL/L (ref 3.5–5.2)
PROT SERPL-MCNC: 5.9 G/DL (ref 6–8.5)
RBC # BLD AUTO: 2.95 10*6/MM3 (ref 4.14–5.8)
SODIUM SERPL-SCNC: 137 MMOL/L (ref 136–145)
WBC NRBC COR # BLD AUTO: 8.93 10*3/MM3 (ref 3.4–10.8)
WHOLE BLOOD HOLD COAG: NORMAL
WHOLE BLOOD HOLD SPECIMEN: NORMAL

## 2025-02-26 PROCEDURE — 96374 THER/PROPH/DIAG INJ IV PUSH: CPT

## 2025-02-26 PROCEDURE — 80053 COMPREHEN METABOLIC PANEL: CPT | Performed by: FAMILY MEDICINE

## 2025-02-26 PROCEDURE — 99284 EMERGENCY DEPT VISIT MOD MDM: CPT

## 2025-02-26 PROCEDURE — 85025 COMPLETE CBC W/AUTO DIFF WBC: CPT | Performed by: FAMILY MEDICINE

## 2025-02-26 PROCEDURE — 72125 CT NECK SPINE W/O DYE: CPT

## 2025-02-26 PROCEDURE — 73502 X-RAY EXAM HIP UNI 2-3 VIEWS: CPT

## 2025-02-26 PROCEDURE — 25010000002 FENTANYL CITRATE (PF) 50 MCG/ML SOLUTION: Performed by: FAMILY MEDICINE

## 2025-02-26 PROCEDURE — 70450 CT HEAD/BRAIN W/O DYE: CPT

## 2025-02-26 PROCEDURE — 71045 X-RAY EXAM CHEST 1 VIEW: CPT

## 2025-02-26 PROCEDURE — 73030 X-RAY EXAM OF SHOULDER: CPT

## 2025-02-26 RX ORDER — FENTANYL CITRATE 50 UG/ML
25 INJECTION, SOLUTION INTRAMUSCULAR; INTRAVENOUS ONCE
Status: COMPLETED | OUTPATIENT
Start: 2025-02-26 | End: 2025-02-26

## 2025-02-26 RX ORDER — SODIUM CHLORIDE 0.9 % (FLUSH) 0.9 %
10 SYRINGE (ML) INJECTION AS NEEDED
Status: DISCONTINUED | OUTPATIENT
Start: 2025-02-26 | End: 2025-02-26 | Stop reason: HOSPADM

## 2025-02-26 RX ORDER — HYDROCODONE BITARTRATE AND ACETAMINOPHEN 5; 325 MG/1; MG/1
1 TABLET ORAL EVERY 6 HOURS PRN
Qty: 10 TABLET | Refills: 0 | Status: ON HOLD | OUTPATIENT
Start: 2025-02-26 | End: 2025-03-01

## 2025-02-26 RX ADMIN — FENTANYL CITRATE 25 MCG: 50 INJECTION, SOLUTION INTRAMUSCULAR; INTRAVENOUS at 15:49

## 2025-02-26 NOTE — ED PROVIDER NOTES
Subjective     History of presenting illness:  Patient is a 89-year-old male that is brought to the emergency room via EMS after being found down in the bathroom.  Wife was not at home when this event happened, she called and he did not  so she came home to check up on him.  He was found down in the bathtub.  Patient is complaining of tenderness with any movement of the right shoulder.  Patient does have a history of coronary artery disease, diabetes, hyperlipidemia, pacemaker, hypertension.    Medical Decision Making:  Patient will have a CBC CMP urinalysis completed along with a CT head, cervical spine, chest x-ray hip x-rays and right shoulder x-ray.    Patient found to have anemia with a hemoglobin of 9.5, he is usually around 10.5 at his baseline.  Shoulder x-ray does show, Nondisplaced lateral humeral head fracture.    Past Medical History:   Diagnosis Date    Abnormal nuclear stress test     Anemia 2021    Arthritis     BPH (benign prostatic hyperplasia)     Coronary artery disease     Diabetes mellitus     Disease of thyroid gland     Fracture     spinal t12    GERD (gastroesophageal reflux disease)     Glaucoma 02/18/2022    Gout     Hyperlipidemia LDL goal <70 11/14/2016    Ischemic heart disease 06/23/2022    Ischemic heart disease 06/23/2022    Low back pain     LVH (left ventricular hypertrophy) 06/23/2022    Melanoma     Onychomycosis of toenail 06/24/2022    Pacemaker 01/18/2019    PAF (paroxysmal atrial fibrillation) 10/29/2019    Chads vas score 3    Primary hypertension 11/14/2016    Prostate CA     Sick sinus syndrome 03/01/2019    Stress fracture Aug. 2021       Allergies   Allergen Reactions    Adhesive Tape     Simvastatin Other (See Comments)     Abnormal LFT's    Tramadol Hallucinations    Neosporin [Neomycin-Bacitracin Zn-Polymyx] Rash       Past Surgical History:   Procedure Laterality Date    APPENDECTOMY      CARDIAC CATHETERIZATION Left 12/10/2012    CARDIAC ELECTROPHYSIOLOGY  PROCEDURE N/A 2018    Procedure: Pacemaker DC new 2018;  Surgeon: Austin Granados MD;  Location:  [facility];  Service: Cardiology    CHOLECYSTECTOMY      COLONOSCOPY N/A 2017    Tics, hemorrhoids repeat exam prn    COLONOSCOPY W/ POLYPECTOMY  2012    adenomatous polyp at 80cm    ENDOSCOPY N/A 2022    Procedure: ESOPHAGOGASTRODUODENOSCOPY WITH ANESTHESIA;  Surgeon: Felice Marroquin MD;  Location: [facility] ENDOSCOPY;  Service: Gastroenterology;  Laterality: N/A;  pre hematochezia  post; normal   Ashish Callejas MD    HAND SURGERY Right     HERNIA REPAIR      HIP CANNULATED SCREW PLACEMENT Left 2022    Procedure: HIP CANNULATED SCREW PLACEMENT;  Surgeon: Isaiah Sheehan MD;  Location: [facility] OR;  Service: Orthopedics;  Laterality: Left;    INGUINAL HERNIA REPAIR      INSERT / REPLACE / REMOVE PACEMAKER      KNEE SURGERY      PROSTATE SURGERY      SKIN CANCER EXCISION      face    SKIN LESION EXCISION      TOTAL HIP ARTHROPLASTY Right 2021    Procedure: TOTAL HIP REPLACEMENT;  Surgeon: Arik Magaña MD;  Location:  [facility] OR;  Service: Orthopedics;  Laterality: Right;    TOTAL HIP ARTHROPLASTY Left 2022    Procedure: LEFT HIP SCREW REMOVAL / LEFT TOTAL HIP ARTHROPLASTY;  Surgeon: BASHIR Monsivais MD;  Location: [facility] OR;  Service: Orthopedics;  Laterality: Left;       Family History   Problem Relation Age of Onset    Alzheimer's disease Mother     Cancer Father     Cancer Sister        Social History     Socioeconomic History    Marital status:    Tobacco Use    Smoking status: Former     Current packs/day: 0.00     Types: Cigarettes     Start date: 1955     Quit date: 1985     Years since quittin.1     Passive exposure: Past    Smokeless tobacco: Never   Vaping Use    Vaping status: Never Used   Substance and Sexual Activity    Alcohol use: No    Drug use: No    Sexual activity: Not Currently     Partners: Female            Objective   Physical Exam  Vitals reviewed.   Constitutional:       Appearance: He is normal weight.   HENT:      Head: Normocephalic and atraumatic.      Mouth/Throat:      Mouth: Mucous membranes are moist.   Pulmonary:      Effort: Pulmonary effort is normal.   Musculoskeletal:      Cervical back: Normal range of motion.      Comments: Her on palpation on the right shoulder   Skin:     General: Skin is warm and dry.   Neurological:      General: No focal deficit present.      Mental Status: He is alert and oriented to person, place, and time. Mental status is at baseline.   Psychiatric:         Mood and Affect: Mood normal.         Procedures           ED Course  ED Course as of 02/26/25 1539   Wed Feb 26, 2025   1443 Hemoglobin(!): 9.5 [MA]   1443 Hematocrit(!): 28.5 [MA]   1443 IMPRESSION:  1.  Lung emphysema with mild interstitial fibrosis. Lungs are otherwise  clear. No consolidation or pneumothorax. [MA]   1443 IMPRESSION:  1. Detail is limited.  2. No acute fracture is seen. [MA]   1443 IMPRESSION:  1. Nondisplaced lateral humeral head fracture. [MA]   1523 IMPRESSION: 1. No evidence of acute osseous injury.2. Osteopenia with arthritic change throughout the cervical spine. Central and foraminal stenosis at multiple levels. [MA]   1526 IMPRESSION:  1. No evidence of acute osseous injury.  2. Osteopenia with arthritic change throughout the cervical spine.  Central and foraminal stenosis at multiple levels. [MA]      ED Course User Index  [MA] Lopez Richardson MD                                                       Medical Decision Making  Problems Addressed:  Closed fracture of head of right humerus, initial encounter: complicated acute illness or injury    Amount and/or Complexity of Data Reviewed  Labs: ordered. Decision-making details documented in ED Course.  Radiology: ordered.    Risk  Prescription drug management.        Final diagnoses:   Closed fracture of head of right humerus, initial  encounter   Fall, initial encounter       ED Disposition  ED Disposition       ED Disposition   Discharge    Condition   Stable    Comment   --               Ashish Callejas MD  [address]  [place] [id number]  [phone]    Schedule an appointment as soon as possible for a visit       [facility]  [address] [facility] [address] [phone]             Medication List        New Prescriptions      HYDROcodone-acetaminophen 5-325 MG per tablet  Commonly known as: NORCO  Take 1 tablet by mouth Every 6 (Six) Hours As Needed for Severe Pain.     naloxone 4 MG/0.1ML nasal spray  Commonly known as: NARCAN  Call 911. Don't prime. [place] in 1 nostril for overdose. Repeat in 2-3 minutes in other nostril if no or minimal breathing/responsiveness.            Changed      LORazepam 0.5 MG tablet  Commonly known as: ATIVAN  Take 1 tablet by mouth Daily As Needed for Anxiety.  What changed: when to take this               Where to Get Your Medications        These medications were sent to [facility] - [place], [place] - [address] - [phone] [facility] [phone]   [address] [place] [address]      Phone: [phone]   HYDROcodone-acetaminophen 5-325 MG per tablet  naloxone 4 MG/0.1ML nasal spray            Lopez Richardson MD  02/26/25 [id number]

## 2025-02-28 ENCOUNTER — APPOINTMENT (OUTPATIENT)
Dept: GENERAL RADIOLOGY | Facility: HOSPITAL | Age: OVER 89
DRG: 948 | End: 2025-02-28
Payer: MEDICARE

## 2025-02-28 ENCOUNTER — HOSPITAL ENCOUNTER (INPATIENT)
Facility: HOSPITAL | Age: OVER 89
LOS: 4 days | Discharge: NURSING, REHAB OR LONG-TERM CARE | DRG: 948 | End: 2025-03-04
Attending: FAMILY MEDICINE | Admitting: FAMILY MEDICINE
Payer: MEDICARE

## 2025-02-28 DIAGNOSIS — M19.042 PRIMARY OSTEOARTHRITIS OF BOTH HANDS: ICD-10-CM

## 2025-02-28 DIAGNOSIS — S01.81XD LACERATION OF FACE WITHOUT COMPLICATION, SUBSEQUENT ENCOUNTER: ICD-10-CM

## 2025-02-28 DIAGNOSIS — R31.0 GROSS HEMATURIA: ICD-10-CM

## 2025-02-28 DIAGNOSIS — E78.2 MIXED HYPERLIPIDEMIA: ICD-10-CM

## 2025-02-28 DIAGNOSIS — Z79.01 CHRONIC ANTICOAGULATION: ICD-10-CM

## 2025-02-28 DIAGNOSIS — D62 POSTOPERATIVE ANEMIA DUE TO ACUTE BLOOD LOSS: ICD-10-CM

## 2025-02-28 DIAGNOSIS — I49.5 SICK SINUS SYNDROME: ICD-10-CM

## 2025-02-28 DIAGNOSIS — S72.012A CLOSED SUBCAPITAL FRACTURE OF FEMUR, LEFT, INITIAL ENCOUNTER: ICD-10-CM

## 2025-02-28 DIAGNOSIS — Z74.09 IMPAIRED MOBILITY: ICD-10-CM

## 2025-02-28 DIAGNOSIS — I10 HTN (HYPERTENSION), BENIGN: ICD-10-CM

## 2025-02-28 DIAGNOSIS — D04.0 SQUAMOUS CELL CARCINOMA IN SITU OF SKIN OF LIP: ICD-10-CM

## 2025-02-28 DIAGNOSIS — R74.8 ALKALINE PHOSPHATASE ELEVATION: ICD-10-CM

## 2025-02-28 DIAGNOSIS — S72.002A CLOSED FRACTURE OF LEFT HIP, INITIAL ENCOUNTER: ICD-10-CM

## 2025-02-28 DIAGNOSIS — K21.9 GASTROESOPHAGEAL REFLUX DISEASE, UNSPECIFIED WHETHER ESOPHAGITIS PRESENT: ICD-10-CM

## 2025-02-28 DIAGNOSIS — I48.0 PAF (PAROXYSMAL ATRIAL FIBRILLATION): ICD-10-CM

## 2025-02-28 DIAGNOSIS — E06.3 HYPOTHYROIDISM DUE TO HASHIMOTO'S THYROIDITIS: ICD-10-CM

## 2025-02-28 DIAGNOSIS — N40.1 BENIGN PROSTATIC HYPERPLASIA WITH LOWER URINARY TRACT SYMPTOMS, SYMPTOM DETAILS UNSPECIFIED: ICD-10-CM

## 2025-02-28 DIAGNOSIS — M25.559 HIP PAIN: ICD-10-CM

## 2025-02-28 DIAGNOSIS — D51.0 PERNICIOUS ANEMIA: ICD-10-CM

## 2025-02-28 DIAGNOSIS — I25.10 SINGLE VESSEL CORONARY ARTERY DISEASE: ICD-10-CM

## 2025-02-28 DIAGNOSIS — I10 ESSENTIAL HYPERTENSION: ICD-10-CM

## 2025-02-28 DIAGNOSIS — L57.0 ACTINIC KERATOSIS: ICD-10-CM

## 2025-02-28 DIAGNOSIS — Z78.9 UNABLE TO CARE FOR SELF: ICD-10-CM

## 2025-02-28 DIAGNOSIS — D64.9 ANEMIA, UNSPECIFIED TYPE: ICD-10-CM

## 2025-02-28 DIAGNOSIS — I44.2 COMPLETE HEART BLOCK: ICD-10-CM

## 2025-02-28 DIAGNOSIS — I48.92 PAROXYSMAL ATRIAL FLUTTER: ICD-10-CM

## 2025-02-28 DIAGNOSIS — S72.124D CLOSED NONDISPLACED FRACTURE OF LESSER TROCHANTER OF RIGHT FEMUR WITH ROUTINE HEALING: ICD-10-CM

## 2025-02-28 DIAGNOSIS — C43.8 OVERLAPPING MALIGNANT MELANOMA OF SKIN: ICD-10-CM

## 2025-02-28 DIAGNOSIS — Z95.0 PACEMAKER: ICD-10-CM

## 2025-02-28 DIAGNOSIS — U07.1 COVID-19 VIRUS INFECTION: ICD-10-CM

## 2025-02-28 DIAGNOSIS — Z86.0100 HX OF COLONIC POLYPS: ICD-10-CM

## 2025-02-28 DIAGNOSIS — S52.509P: ICD-10-CM

## 2025-02-28 DIAGNOSIS — C61 MALIGNANT NEOPLASM OF PROSTATE: ICD-10-CM

## 2025-02-28 DIAGNOSIS — R73.01 IMPAIRED FASTING GLUCOSE: ICD-10-CM

## 2025-02-28 DIAGNOSIS — M19.041 PRIMARY OSTEOARTHRITIS OF BOTH HANDS: ICD-10-CM

## 2025-02-28 DIAGNOSIS — R53.1 WEAKNESS: Primary | ICD-10-CM

## 2025-02-28 DIAGNOSIS — M19.049 ARTHRITIS OF HAND: ICD-10-CM

## 2025-02-28 DIAGNOSIS — N17.9 ACUTE KIDNEY INJURY: ICD-10-CM

## 2025-02-28 DIAGNOSIS — S72.001D CLOSED FRACTURE OF RIGHT HIP WITH ROUTINE HEALING, SUBSEQUENT ENCOUNTER: ICD-10-CM

## 2025-02-28 LAB
ANION GAP SERPL CALCULATED.3IONS-SCNC: 12 MMOL/L (ref 5–15)
BASOPHILS # BLD AUTO: 0.03 10*3/MM3 (ref 0–0.2)
BASOPHILS NFR BLD AUTO: 0.3 % (ref 0–1.5)
BUN SERPL-MCNC: 32 MG/DL (ref 8–23)
BUN/CREAT SERPL: 24.4 (ref 7–25)
CALCIUM SPEC-SCNC: 9.2 MG/DL (ref 8.2–9.6)
CHLORIDE SERPL-SCNC: 103 MMOL/L (ref 98–107)
CO2 SERPL-SCNC: 24 MMOL/L (ref 22–29)
CREAT SERPL-MCNC: 1.31 MG/DL (ref 0.76–1.27)
DEPRECATED RDW RBC AUTO: 58.8 FL (ref 37–54)
EGFRCR SERPLBLD CKD-EPI 2021: 51.7 ML/MIN/1.73
EOSINOPHIL # BLD AUTO: 0.15 10*3/MM3 (ref 0–0.4)
EOSINOPHIL NFR BLD AUTO: 1.7 % (ref 0.3–6.2)
ERYTHROCYTE [DISTWIDTH] IN BLOOD BY AUTOMATED COUNT: 16.6 % (ref 12.3–15.4)
GEN 5 1HR TROPONIN T REFLEX: 45 NG/L
GLUCOSE SERPL-MCNC: 117 MG/DL (ref 65–99)
HCT VFR BLD AUTO: 27.9 % (ref 37.5–51)
HGB BLD-MCNC: 9.1 G/DL (ref 13–17.7)
HOLD SPECIMEN: NORMAL
IMM GRANULOCYTES # BLD AUTO: 0.05 10*3/MM3 (ref 0–0.05)
IMM GRANULOCYTES NFR BLD AUTO: 0.6 % (ref 0–0.5)
LYMPHOCYTES # BLD AUTO: 0.88 10*3/MM3 (ref 0.7–3.1)
LYMPHOCYTES NFR BLD AUTO: 10.1 % (ref 19.6–45.3)
MAGNESIUM SERPL-MCNC: 1.7 MG/DL (ref 1.6–2.4)
MCH RBC QN AUTO: 31.8 PG (ref 26.6–33)
MCHC RBC AUTO-ENTMCNC: 32.6 G/DL (ref 31.5–35.7)
MCV RBC AUTO: 97.6 FL (ref 79–97)
MONOCYTES # BLD AUTO: 0.68 10*3/MM3 (ref 0.1–0.9)
MONOCYTES NFR BLD AUTO: 7.8 % (ref 5–12)
NEUTROPHILS NFR BLD AUTO: 6.96 10*3/MM3 (ref 1.7–7)
NEUTROPHILS NFR BLD AUTO: 79.5 % (ref 42.7–76)
NRBC BLD AUTO-RTO: 0 /100 WBC (ref 0–0.2)
PLATELET # BLD AUTO: 179 10*3/MM3 (ref 140–450)
PMV BLD AUTO: 9.9 FL (ref 6–12)
POTASSIUM SERPL-SCNC: 4.6 MMOL/L (ref 3.5–5.2)
RBC # BLD AUTO: 2.86 10*6/MM3 (ref 4.14–5.8)
SODIUM SERPL-SCNC: 139 MMOL/L (ref 136–145)
TROPONIN T % DELTA: -12
TROPONIN T NUMERIC DELTA: -6 NG/L
TROPONIN T SERPL HS-MCNC: 51 NG/L
WBC NRBC COR # BLD AUTO: 8.75 10*3/MM3 (ref 3.4–10.8)
WHOLE BLOOD HOLD COAG: NORMAL
WHOLE BLOOD HOLD SPECIMEN: NORMAL

## 2025-02-28 PROCEDURE — 93010 ELECTROCARDIOGRAM REPORT: CPT | Performed by: EMERGENCY MEDICINE

## 2025-02-28 PROCEDURE — 99285 EMERGENCY DEPT VISIT HI MDM: CPT

## 2025-02-28 PROCEDURE — 36415 COLL VENOUS BLD VENIPUNCTURE: CPT

## 2025-02-28 PROCEDURE — 80048 BASIC METABOLIC PNL TOTAL CA: CPT | Performed by: FAMILY MEDICINE

## 2025-02-28 PROCEDURE — 93005 ELECTROCARDIOGRAM TRACING: CPT | Performed by: FAMILY MEDICINE

## 2025-02-28 PROCEDURE — 25810000003 SODIUM CHLORIDE 0.9 % SOLUTION: Performed by: FAMILY MEDICINE

## 2025-02-28 PROCEDURE — 83735 ASSAY OF MAGNESIUM: CPT | Performed by: FAMILY MEDICINE

## 2025-02-28 PROCEDURE — 74018 RADEX ABDOMEN 1 VIEW: CPT

## 2025-02-28 PROCEDURE — 85025 COMPLETE CBC W/AUTO DIFF WBC: CPT | Performed by: FAMILY MEDICINE

## 2025-02-28 PROCEDURE — 84484 ASSAY OF TROPONIN QUANT: CPT | Performed by: FAMILY MEDICINE

## 2025-02-28 RX ORDER — FOLIC ACID 1 MG/1
1 TABLET ORAL DAILY
Status: DISCONTINUED | OUTPATIENT
Start: 2025-03-01 | End: 2025-03-04 | Stop reason: HOSPADM

## 2025-02-28 RX ORDER — LORAZEPAM 0.5 MG/1
0.5 TABLET ORAL NIGHTLY PRN
Status: DISCONTINUED | OUTPATIENT
Start: 2025-02-28 | End: 2025-03-04 | Stop reason: HOSPADM

## 2025-02-28 RX ORDER — HYDROCODONE BITARTRATE AND ACETAMINOPHEN 5; 325 MG/1; MG/1
1 TABLET ORAL EVERY 6 HOURS PRN
Status: DISCONTINUED | OUTPATIENT
Start: 2025-02-28 | End: 2025-03-01

## 2025-02-28 RX ORDER — IRON POLYSACCHARIDE COMPLEX 150 MG
150 CAPSULE ORAL DAILY
Status: DISCONTINUED | OUTPATIENT
Start: 2025-03-01 | End: 2025-03-04 | Stop reason: HOSPADM

## 2025-02-28 RX ORDER — PANTOPRAZOLE SODIUM 40 MG/1
40 TABLET, DELAYED RELEASE ORAL DAILY
Status: DISCONTINUED | OUTPATIENT
Start: 2025-03-01 | End: 2025-03-04 | Stop reason: HOSPADM

## 2025-02-28 RX ORDER — METOPROLOL SUCCINATE 25 MG/1
25 TABLET, EXTENDED RELEASE ORAL DAILY
Status: DISCONTINUED | OUTPATIENT
Start: 2025-03-01 | End: 2025-03-04 | Stop reason: HOSPADM

## 2025-02-28 RX ORDER — LOSARTAN POTASSIUM 25 MG/1
25 TABLET ORAL DAILY
Status: DISCONTINUED | OUTPATIENT
Start: 2025-03-01 | End: 2025-03-04 | Stop reason: HOSPADM

## 2025-02-28 RX ORDER — FINASTERIDE 5 MG/1
5 TABLET, FILM COATED ORAL DAILY
Status: DISCONTINUED | OUTPATIENT
Start: 2025-03-01 | End: 2025-03-04 | Stop reason: HOSPADM

## 2025-02-28 RX ORDER — LEVOTHYROXINE SODIUM 75 UG/1
75 TABLET ORAL
Status: DISCONTINUED | OUTPATIENT
Start: 2025-03-01 | End: 2025-03-04 | Stop reason: HOSPADM

## 2025-02-28 RX ORDER — SODIUM CHLORIDE 0.9 % (FLUSH) 0.9 %
10 SYRINGE (ML) INJECTION AS NEEDED
Status: DISCONTINUED | OUTPATIENT
Start: 2025-02-28 | End: 2025-03-04 | Stop reason: HOSPADM

## 2025-02-28 RX ORDER — NITROGLYCERIN 0.4 MG/1
0.4 TABLET SUBLINGUAL
Status: DISCONTINUED | OUTPATIENT
Start: 2025-02-28 | End: 2025-03-04 | Stop reason: HOSPADM

## 2025-02-28 RX ORDER — MONTELUKAST SODIUM 10 MG/1
10 TABLET ORAL NIGHTLY
Status: DISCONTINUED | OUTPATIENT
Start: 2025-03-01 | End: 2025-03-04 | Stop reason: HOSPADM

## 2025-02-28 RX ORDER — ASPIRIN 81 MG/1
81 TABLET ORAL DAILY
Status: DISCONTINUED | OUTPATIENT
Start: 2025-03-01 | End: 2025-03-04 | Stop reason: HOSPADM

## 2025-02-28 RX ORDER — ALLOPURINOL 300 MG/1
300 TABLET ORAL DAILY
Status: DISCONTINUED | OUTPATIENT
Start: 2025-03-01 | End: 2025-03-04 | Stop reason: HOSPADM

## 2025-02-28 RX ADMIN — SODIUM CHLORIDE 1000 ML: 9 INJECTION, SOLUTION INTRAVENOUS at 17:18

## 2025-02-28 NOTE — ED PROVIDER NOTES
Subjective   History of Present Illness  89-year-old male presents emergency room with weakness.  Progressively getting weaker.  Having trouble getting out of bed.  Wife lives with him.  She is having trouble caring for him.  He has not had a bowel movement in a few days.  No abdominal pain.  No fevers.  He does have a productive cough.  Patient denies any other symptoms at this time.  He states that he did recently break his arm.  He is having trouble caring for himself.  He is requesting nursing home placement.      Review of Systems   Respiratory:  Positive for cough.    Neurological:  Positive for weakness.   All other systems reviewed and are negative.      Past Medical History:   Diagnosis Date    Abnormal nuclear stress test     Anemia 2021    Arthritis     BPH (benign prostatic hyperplasia)     Coronary artery disease     Diabetes mellitus     Disease of thyroid gland     Fracture     spinal t12    GERD (gastroesophageal reflux disease)     Glaucoma 02/18/2022    Gout     Hyperlipidemia LDL goal <70 11/14/2016    Ischemic heart disease 06/23/2022    Ischemic heart disease 06/23/2022    Low back pain     LVH (left ventricular hypertrophy) 06/23/2022    Melanoma     Onychomycosis of toenail 06/24/2022    Pacemaker 01/18/2019    PAF (paroxysmal atrial fibrillation) 10/29/2019    Chads vas score 3    Primary hypertension 11/14/2016    Prostate CA     Sick sinus syndrome 03/01/2019    Stress fracture Aug. 2021       Allergies   Allergen Reactions    Adhesive Tape     Simvastatin Other (See Comments)     Abnormal LFT's    Tramadol Hallucinations    Neosporin [Neomycin-Bacitracin Zn-Polymyx] Rash       Past Surgical History:   Procedure Laterality Date    APPENDECTOMY      CARDIAC CATHETERIZATION Left 12/10/2012    CARDIAC ELECTROPHYSIOLOGY PROCEDURE N/A 11/23/2018    Procedure: Pacemaker DC new 11/23/2018;  Surgeon: Austin Granados MD;  Location:  [facility];  Service: Cardiology    CHOLECYSTECTOMY       COLONOSCOPY N/A 2017    Tics, hemorrhoids repeat exam prn    COLONOSCOPY W/ POLYPECTOMY  2012    adenomatous polyp at 80cm    ENDOSCOPY N/A 2022    Procedure: ESOPHAGOGASTRODUODENOSCOPY WITH ANESTHESIA;  Surgeon: Felice Marroquin MD;  Location:  [facility] END[facility];  Service: Gastroenterology;  Laterality: N/A;  pre hematochezia  post; normal   Ashish Callejas MD    HAND SURGERY Right     HERNIA REPAIR      HIP CANNULATED SCREW PLACEMENT Left 2022    Procedure: HIP CANNULATED SCREW PLACEMENT;  Surgeon: Isaiah Sheehan MD;  Location:  [facility] OR;  Service: Orthopedics;  Laterality: Left;    INGUINAL HERNIA REPAIR      INSERT / REPLACE / REMOVE PACEMAKER      KNEE SURGERY      PROSTATE SURGERY      SKIN CANCER EXCISION      face    SKIN LESION EXCISION      TOTAL HIP ARTHROPLASTY Right 2021    Procedure: TOTAL HIP REPLACEMENT;  Surgeon: Arik Magaña MD;  Location:  [facility] OR;  Service: Orthopedics;  Laterality: Right;    TOTAL HIP ARTHROPLASTY Left 2022    Procedure: LEFT HIP SCREW REMOVAL / LEFT TOTAL HIP ARTHROPLASTY;  Surgeon: BASHIR Monsivais MD;  Location:  [facility] OR;  Service: Orthopedics;  Laterality: Left;       Family History   Problem Relation Age of Onset    Alzheimer's disease Mother     Cancer Father     Cancer Sister        Social History     Socioeconomic History    Marital status:    Tobacco Use    Smoking status: Former     Current packs/day: 0.00     Types: Cigarettes     Start date: 1955     Quit date: 1985     Years since quittin.1     Passive exposure: Past    Smokeless tobacco: Never   Vaping Use    Vaping status: Never Used   Substance and Sexual Activity    Alcohol use: No    Drug use: No    Sexual activity: Not Currently     Partners: Female           Objective   Physical Exam  Vitals and nursing note reviewed.   Constitutional:       Appearance: He is well-developed.   HENT:      Head: Normocephalic and atraumatic.       Mouth/Throat:      Mouth: Mucous membranes are moist.   Eyes:      Extraocular Movements: Extraocular movements intact.      Pupils: Pupils are equal, round, and reactive to light.   Cardiovascular:      Rate and Rhythm: Normal rate and regular rhythm.      Heart sounds: Normal heart sounds.   Pulmonary:      Effort: Pulmonary effort is normal.      Breath sounds: Normal breath sounds.   Abdominal:      General: Bowel sounds are normal.      Palpations: Abdomen is soft.      Tenderness: There is no abdominal tenderness.   Skin:     General: Skin is warm and dry.   Neurological:      General: No focal deficit present.      Mental Status: He is alert and oriented to person, place, and time.   Psychiatric:         Mood and Affect: Mood normal.         Behavior: Behavior normal.         Procedures           ED Course                                                     Lab Results (last 24 hours)       Procedure Component Value Units Date/Time    CBC & Differential [[id number]]  (Abnormal) Collected: 02/28/25 1716    Specimen: Blood from Arm, Left Updated: 02/28/25 1730    Narrative:      The following orders were created for panel order CBC & Differential.  Procedure                               Abnormality         Status                     ---------                               -----------         ------                     CBC Auto Differential[[id number]]        Abnormal            Final result                 Please view results for these tests on the individual orders.    Basic Metabolic Panel [[id number]]  (Abnormal) Collected: 02/28/25 1716    Specimen: Blood from Arm, Left Updated: 02/28/25 1803     Glucose 117 mg/dL      BUN 32 mg/dL      Creatinine 1.31 mg/dL      Sodium 139 mmol/L      Potassium 4.6 mmol/L      Comment: Slight hemolysis detected by analyzer. Result may be falsely elevated.        Chloride 103 mmol/L      CO2 24.0 mmol/L      Calcium 9.2 mg/dL      BUN/Creatinine Ratio 24.4     Anion Gap 12.0  mmol/L      eGFR 51.7 mL/min/1.73     Narrative:      GFR Categories in Chronic Kidney Disease (CKD)      GFR Category          GFR (mL/min/1.73)    Interpretation  G1                     90 or greater         Normal or high (1)  G2                      60-89                Mild decrease (1)  G3a                   45-59                Mild to moderate decrease  G3b                   30-44                Moderate to severe decrease  G4                    15-29                Severe decrease  G5                    14 or less           Kidney failure          (1)In the absence of evidence of kidney disease, neither GFR category G1 or G2 fulfill the criteria for CKD.    eGFR calculation 2021 CKD-EPI creatinine equation, which does not include race as a factor    CBC Auto Differential [[id number]]  (Abnormal) Collected: 02/28/25 1716    Specimen: Blood from Arm, Left Updated: 02/28/25 1730     WBC 8.75 10*3/mm3      RBC 2.86 10*6/mm3      Hemoglobin 9.1 g/dL      Hematocrit 27.9 %      MCV 97.6 fL      MCH 31.8 pg      MCHC 32.6 g/dL      RDW 16.6 %      RDW-SD 58.8 fl      MPV 9.9 fL      Platelets 179 10*3/mm3      Neutrophil % 79.5 %      Lymphocyte % 10.1 %      Monocyte % 7.8 %      Eosinophil % 1.7 %      Basophil % 0.3 %      Immature Grans % 0.6 %      Neutrophils, Absolute 6.96 10*3/mm3      Lymphocytes, Absolute 0.88 10*3/mm3      Monocytes, Absolute 0.68 10*3/mm3      Eosinophils, Absolute 0.15 10*3/mm3      Basophils, Absolute 0.03 10*3/mm3      Immature Grans, Absolute 0.05 10*3/mm3      nRBC 0.0 /100 WBC     Magnesium [[id number]]  (Normal) Collected: 02/28/25 1716    Specimen: Blood from Arm, Left Updated: 02/28/25 1803     Magnesium 1.7 mg/dL     High Sensitivity Troponin T [[id number]]  (Abnormal) Collected: 02/28/25 1716    Specimen: Blood from Arm, Left Updated: 02/28/25 1752     HS Troponin T 51 ng/L      Comment: Specimen hemolyzed.  Results may be falsely decreased.       Narrative:      High Sensitive  Troponin T Reference Range:  <14.0 ng/L- Negative Female for AMI  <22.0 ng/L- Negative Male for AMI  >=14 - Abnormal Female indicating possible myocardial injury.  >=22 - Abnormal Male indicating possible myocardial injury.   Clinicians would have to utilize clinical acumen, EKG, Troponin, and serial changes to determine if it is an Acute Myocardial Infarction or myocardial injury due to an underlying chronic condition.         High Sensitivity Troponin T 1Hr [[id number]]  (Abnormal) Collected: 02/28/25 1849    Specimen: Blood from Arm, Left Updated: 02/28/25 1919     HS Troponin T 45 ng/L      Troponin T Numeric Delta -6 ng/L      Troponin T % Delta -12    Narrative:      High Sensitive Troponin T Reference Range:  <14.0 ng/L- Negative Female for AMI  <22.0 ng/L- Negative Male for AMI  >=14 - Abnormal Female indicating possible myocardial injury.  >=22 - Abnormal Male indicating possible myocardial injury.   Clinicians would have to utilize clinical acumen, EKG, Troponin, and serial changes to determine if it is an Acute Myocardial Infarction or myocardial injury due to an underlying chronic condition.               Medications   sodium chloride 0.9 % flush 10 mL (has no administration in time range)   sodium chloride 0.9 % flush 10 mL (has no administration in time range)   sodium chloride 0.9 % bolus 1,000 mL (0 mL Intravenous Stopped 2/28/25 1748)     XR Abdomen KUB   Final Result       1. No large stool in the colon to suggest significant constipation.   2. Bowel gas pattern is nonspecific with borderline air-filled loops of   small bowel but nonobstructive.   3. Probable stable nonobstructing stones in the left kidney.       This report was signed and finalized on 2/28/2025 5:05 PM by Montana Fountain.                Medical Decision Making  89-year-old male here for weakness.  Unable to care for self at home now.  Case was discussed with Dr. Payne.  Patient will be admitted under the services of   Ryan.    Problems Addressed:  Acute kidney injury: complicated acute illness or injury  Unable to care for self: complicated acute illness or injury  Weakness: complicated acute illness or injury    Amount and/or Complexity of Data Reviewed  Independent Historian: spouse  Labs: ordered. Decision-making details documented in ED Course.  Radiology: ordered. Decision-making details documented in ED Course.  ECG/medicine tests: ordered. Decision-making details documented in ED Course.    Risk  Prescription drug management.  Decision regarding hospitalization.        Final diagnoses:   Weakness   Unable to care for self   Acute kidney injury       ED Disposition  ED Disposition       ED Disposition   Decision to Admit    Condition   --    Comment   Level of Care: Remote Telemetry [26]   Diagnosis: Weakness [388285]   Admitting Physician: KATHERINE WINSTON [[id number]]   Attending Physician: KATHERINE WINSTON [[id number]]   Certification: I Certify That Inpatient Hospital Services Are Medically Necessary For Greater Than 2 Midnights                 No follow-up provider specified.       Medication List      No changes were made to your prescriptions during this visit.            Rashid Thomas MD  02/28/25 6831

## 2025-03-01 LAB
ANION GAP SERPL CALCULATED.3IONS-SCNC: 10 MMOL/L (ref 5–15)
BASOPHILS # BLD AUTO: 0.02 10*3/MM3 (ref 0–0.2)
BASOPHILS NFR BLD AUTO: 0.3 % (ref 0–1.5)
BUN SERPL-MCNC: 29 MG/DL (ref 8–23)
BUN/CREAT SERPL: 27.1 (ref 7–25)
CALCIUM SPEC-SCNC: 8.8 MG/DL (ref 8.2–9.6)
CHLORIDE SERPL-SCNC: 104 MMOL/L (ref 98–107)
CO2 SERPL-SCNC: 24 MMOL/L (ref 22–29)
CREAT SERPL-MCNC: 1.07 MG/DL (ref 0.76–1.27)
DEPRECATED RDW RBC AUTO: 58.2 FL (ref 37–54)
EGFRCR SERPLBLD CKD-EPI 2021: 65.9 ML/MIN/1.73
EOSINOPHIL # BLD AUTO: 0.22 10*3/MM3 (ref 0–0.4)
EOSINOPHIL NFR BLD AUTO: 2.9 % (ref 0.3–6.2)
ERYTHROCYTE [DISTWIDTH] IN BLOOD BY AUTOMATED COUNT: 16.5 % (ref 12.3–15.4)
GLUCOSE BLDC GLUCOMTR-MCNC: 101 MG/DL (ref 70–130)
GLUCOSE BLDC GLUCOMTR-MCNC: 121 MG/DL (ref 70–130)
GLUCOSE SERPL-MCNC: 105 MG/DL (ref 65–99)
HCT VFR BLD AUTO: 26.2 % (ref 37.5–51)
HEMOCCULT STL QL: NEGATIVE
HGB BLD-MCNC: 8.3 G/DL (ref 13–17.7)
IMM GRANULOCYTES # BLD AUTO: 0.04 10*3/MM3 (ref 0–0.05)
IMM GRANULOCYTES NFR BLD AUTO: 0.5 % (ref 0–0.5)
IRON 24H UR-MRATE: 28 MCG/DL (ref 59–158)
IRON SATN MFR SERPL: 17 % (ref 20–50)
LYMPHOCYTES # BLD AUTO: 1.11 10*3/MM3 (ref 0.7–3.1)
LYMPHOCYTES NFR BLD AUTO: 14.7 % (ref 19.6–45.3)
MCH RBC QN AUTO: 31.3 PG (ref 26.6–33)
MCHC RBC AUTO-ENTMCNC: 31.7 G/DL (ref 31.5–35.7)
MCV RBC AUTO: 98.9 FL (ref 79–97)
MONOCYTES # BLD AUTO: 0.75 10*3/MM3 (ref 0.1–0.9)
MONOCYTES NFR BLD AUTO: 10 % (ref 5–12)
NEUTROPHILS NFR BLD AUTO: 5.39 10*3/MM3 (ref 1.7–7)
NEUTROPHILS NFR BLD AUTO: 71.6 % (ref 42.7–76)
NRBC BLD AUTO-RTO: 0 /100 WBC (ref 0–0.2)
PLATELET # BLD AUTO: 152 10*3/MM3 (ref 140–450)
PMV BLD AUTO: 9.3 FL (ref 6–12)
POTASSIUM SERPL-SCNC: 4.6 MMOL/L (ref 3.5–5.2)
RBC # BLD AUTO: 2.65 10*6/MM3 (ref 4.14–5.8)
SODIUM SERPL-SCNC: 138 MMOL/L (ref 136–145)
TIBC SERPL-MCNC: 168 MCG/DL (ref 298–536)
TRANSFERRIN SERPL-MCNC: 113 MG/DL (ref 200–360)
TSH SERPL DL<=0.05 MIU/L-ACNC: 1.87 UIU/ML (ref 0.27–4.2)
VIT B12 BLD-MCNC: 537 PG/ML (ref 211–946)
WBC NRBC COR # BLD AUTO: 7.53 10*3/MM3 (ref 3.4–10.8)

## 2025-03-01 PROCEDURE — 97535 SELF CARE MNGMENT TRAINING: CPT | Performed by: OCCUPATIONAL THERAPIST

## 2025-03-01 PROCEDURE — 97166 OT EVAL MOD COMPLEX 45 MIN: CPT | Performed by: OCCUPATIONAL THERAPIST

## 2025-03-01 PROCEDURE — 82607 VITAMIN B-12: CPT | Performed by: FAMILY MEDICINE

## 2025-03-01 PROCEDURE — 84443 ASSAY THYROID STIM HORMONE: CPT | Performed by: FAMILY MEDICINE

## 2025-03-01 PROCEDURE — 82948 REAGENT STRIP/BLOOD GLUCOSE: CPT

## 2025-03-01 PROCEDURE — 84466 ASSAY OF TRANSFERRIN: CPT | Performed by: FAMILY MEDICINE

## 2025-03-01 PROCEDURE — 83540 ASSAY OF IRON: CPT | Performed by: FAMILY MEDICINE

## 2025-03-01 PROCEDURE — 82272 OCCULT BLD FECES 1-3 TESTS: CPT | Performed by: FAMILY MEDICINE

## 2025-03-01 PROCEDURE — 80048 BASIC METABOLIC PNL TOTAL CA: CPT | Performed by: FAMILY MEDICINE

## 2025-03-01 PROCEDURE — 85025 COMPLETE CBC W/AUTO DIFF WBC: CPT | Performed by: FAMILY MEDICINE

## 2025-03-01 RX ORDER — ACETAMINOPHEN 325 MG/1
650 TABLET ORAL EVERY 6 HOURS PRN
Status: DISCONTINUED | OUTPATIENT
Start: 2025-03-01 | End: 2025-03-04 | Stop reason: HOSPADM

## 2025-03-01 RX ORDER — PREDNISONE 5 MG/1
2.5 TABLET ORAL DAILY
COMMUNITY

## 2025-03-01 RX ADMIN — Medication 150 MG: at 08:34

## 2025-03-01 RX ADMIN — ASPIRIN 81 MG: 81 TABLET, COATED ORAL at 08:34

## 2025-03-01 RX ADMIN — FINASTERIDE 5 MG: 5 TABLET, FILM COATED ORAL at 08:34

## 2025-03-01 RX ADMIN — FOLIC ACID 1 MG: 1 TABLET ORAL at 08:35

## 2025-03-01 RX ADMIN — MONTELUKAST SODIUM 10 MG: 10 TABLET, COATED ORAL at 21:12

## 2025-03-01 RX ADMIN — METOPROLOL SUCCINATE 25 MG: 25 TABLET, EXTENDED RELEASE ORAL at 08:34

## 2025-03-01 RX ADMIN — ACETAMINOPHEN 650 MG: 325 TABLET ORAL at 12:33

## 2025-03-01 RX ADMIN — LOSARTAN POTASSIUM 25 MG: 25 TABLET, FILM COATED ORAL at 08:34

## 2025-03-01 RX ADMIN — PANTOPRAZOLE SODIUM 40 MG: 40 TABLET, DELAYED RELEASE ORAL at 08:34

## 2025-03-01 RX ADMIN — LORAZEPAM 0.5 MG: 0.5 TABLET ORAL at 21:12

## 2025-03-01 RX ADMIN — LEVOTHYROXINE SODIUM 75 MCG: 75 TABLET ORAL at 06:15

## 2025-03-01 RX ADMIN — APIXABAN 5 MG: 5 TABLET, FILM COATED ORAL at 21:12

## 2025-03-01 RX ADMIN — ALLOPURINOL 300 MG: 300 TABLET ORAL at 08:35

## 2025-03-01 RX ADMIN — APIXABAN 5 MG: 5 TABLET, FILM COATED ORAL at 08:38

## 2025-03-01 RX ADMIN — ACETAMINOPHEN 650 MG: 325 TABLET ORAL at 21:12

## 2025-03-01 NOTE — CASE MANAGEMENT/SOCIAL WORK
Discharge Planning Assessment  [facility]     Patient Name: Dexter Suggs  MRN: [id number]  Today's Date: 3/1/2025    Admit Date: 2/28/2025        Discharge Needs Assessment       Row Name 03/01/25 1322       Living Environment    People in Home spouse    Current Living Arrangements home    Potentially Unsafe Housing Conditions none    Primary Care Provided by self    Provides Primary Care For no one    Family Caregiver if Needed spouse    Quality of Family Relationships helpful;involved;supportive    Able to Return to Prior Arrangements yes       Resource/Environmental Concerns    Resource/Environmental Concerns none    Home Accessibility Concerns none    Transportation Concerns none       Transition Planning    Patient/Family Anticipates Transition to inpatient rehabilitation facility    Patient/Family Anticipated Services at Transition skilled nursing    Transportation Anticipated family or friend will provide       Discharge Needs Assessment    Equipment Currently Used at Home walker, standard    Concerns to be Addressed no discharge needs identified    Anticipated Changes Related to Illness none    Equipment Needed After Discharge none    Outpatient/Agency/Support Group Needs skilled nursing facility    Discharge Facility/Level of Care Needs nursing facility, skilled    Discharge Coordination/Progress Pt currently lives with his wife in their home. Pt has discussed going to [facility] for rehab. Pt stated that its only 2 miles from his house and if he wa going anywhere but home, it would only be there. Pt stated his spouse has been speaking with [facility] (she was out of the room when SW was there). PT does have DME and is not requesting any at this time. SW will follow and make referral if pt and spouse agree to rehab.                   Discharge Plan    No documentation.                 Continued Care and Services - Admitted Since 2/28/2025    No active coordination exists for this encounter.           Demographic Summary    No documentation.                  Functional Status    No documentation.                  Psychosocial    No documentation.                  Abuse/Neglect    No documentation.                  Legal    No documentation.                  Substance Abuse    No documentation.                  Patient Forms    No documentation.                     Rafat Lundberg

## 2025-03-01 NOTE — PLAN OF CARE
Goal Outcome Evaluation:  Plan of Care Reviewed With: patient, spouse        Progress: no change  Outcome Evaluation: OT eval completed. A&Ox4. Wife is primary historian, pt does well conversationally but has trouble recalling events related to his medical status. C/O pain in RUE. Pt rolled BL and bridged with min A for bed mobility.  Pt had one major BM that took up most of session to use bedpan and change sheets. Toileting skills were Dependent. BUE sensation intact. ROM was WFL and strength in LUE was 4/5 based on functional movement. Unable to assess RUE due to fx. Pt demos poor strength in BLE based on bed mobility. OT services would be beneficial to improve strength, endurance, safety awareness, adaptive equipment training, and BADL performance. Pt left in fowlers with wife in room. Recommend SNF at d/c.    Anticipated Discharge Disposition (OT): skilled nursing facility

## 2025-03-01 NOTE — THERAPY EVALUATION
Patient Name: Dexter Suggs  : 1934    MRN: [id number]                              Today's Date: 3/1/2025       Admit Date: 2025    Visit Dx:     ICD-10-CM ICD-9-CM   1. Weakness  R53.1 780.79   2. Unable to care for self  Z78.9 V49.89   3. 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    Actinic keratosis    Benign prostatic hyperplasia with lower urinary tract symptoms    Complete heart block    Pacemaker    Laceration of face without complication    PAF (paroxysmal atrial fibrillation)    Paroxysmal atrial flutter    Pernicious anemia    Alkaline phosphatase elevation    Anemia    Gastroesophageal reflux disease    Hypothyroidism due to Hashimoto's thyroiditis    Impaired fasting glucose    Overlapping malignant melanoma of skin    Primary osteoarthritis of both hands    COVID-19 virus infection    Closed fracture of distal end of radius    Arthritis of hand    Hip fracture, left    Gross hematuria    Chronic anticoagulation    Postoperative anemia due to acute blood loss    Closed nondisplaced fracture of lesser trochanter of right femur with routine healing    Closed subcapital fracture of femur, left, initial encounter    Confusion and disorientation    Ischemic heart disease    LVH (left ventricular hypertrophy)    Hematochezia    Acquired hypothyroidism    Anxiety    Atherosclerosis of coronary artery without angina pectoris    Disorder of vitamin B12    Excessive anticoagulation    Gait abnormality    Glaucoma    Gout    History of COVID-19    History of malignant neoplasm of skin    Onychomycosis of toenail    Pathological fracture of left femur due to osteoporosis    Pressure ulcer of sacral region    S/p left hip fracture    Anemia    Rheumatoid arthritis     BPH (benign prostatic hyperplasia)    Chronic anticoagulation    Complete atrioventricular block    Gastroesophageal reflux disease without esophagitis    Impaired fasting glucose    Prostate cancer    Hip pain    Atrial fibrillation    Essential hypertension    Sick sinus syndrome    Compression fracture of T12 vertebra    Body mass index (BMI) of 20.0 to 20.9 in adult    Nonsmoker    Degeneration of lumbar or lumbosacral intervertebral disc    Controlled type 2 diabetes mellitus without complication, without long-term current use of insulin    Elevated troponin    Rib fracture    Weakness     Past Medical History:   Diagnosis Date    Abnormal nuclear stress test     Anemia 2021    Arthritis     BPH (benign prostatic hyperplasia)     Coronary artery disease     Diabetes mellitus     Disease of thyroid gland     Fracture     spinal t12    GERD (gastroesophageal reflux disease)     Glaucoma 02/18/2022    Gout     Hyperlipidemia LDL goal <70 11/14/2016    Ischemic heart disease 06/23/2022    Ischemic heart disease 06/23/2022    Low back pain     LVH (left ventricular hypertrophy) 06/23/2022    Melanoma     Onychomycosis of toenail 06/24/2022    Pacemaker 01/18/2019    PAF (paroxysmal atrial fibrillation) 10/29/2019    Chads vas score 3    Primary hypertension 11/14/2016    Prostate CA     Sick sinus syndrome 03/01/2019    Stress fracture Aug. 2021     Past Surgical History:   Procedure Laterality Date    APPENDECTOMY      CARDIAC CATHETERIZATION Left 12/10/2012    CARDIAC ELECTROPHYSIOLOGY PROCEDURE N/A 11/23/2018    Procedure: Pacemaker DC new 11/23/2018;  Surgeon: Austin Granados MD;  Location: [facility] INVASIVE LOCATION;  Service: Cardiology    CHOLECYSTECTOMY      COLONOSCOPY N/A 06/09/2017    Tics, hemorrhoids repeat exam prn    COLONOSCOPY W/ POLYPECTOMY  02/16/2012    adenomatous polyp at 80cm    ENDOSCOPY N/A 09/13/2022    Procedure: ESOPHAGOGASTRODUODENOSCOPY WITH ANESTHESIA;  Surgeon: Felice aMrroquin,  MD;  Location:  [facility] ENDOSCOPY;  Service: Gastroenterology;  Laterality: N/A;  pre hematochezia  post; normal   Ashish Callejas MD    HAND SURGERY Right     HERNIA REPAIR      HIP CANNULATED SCREW PLACEMENT Left 01/18/2022    Procedure: HIP CANNULATED SCREW PLACEMENT;  Surgeon: Isaiah Sheehan MD;  Location:  [facility] OR;  Service: Orthopedics;  Laterality: Left;    INGUINAL HERNIA REPAIR      INSERT / REPLACE / REMOVE PACEMAKER      KNEE SURGERY      PROSTATE SURGERY      SKIN CANCER EXCISION      face    SKIN LESION EXCISION      TOTAL HIP ARTHROPLASTY Right 08/27/2021    Procedure: TOTAL HIP REPLACEMENT;  Surgeon: Arik Magaña MD;  Location:  [facility] OR;  Service: Orthopedics;  Laterality: Right;    TOTAL HIP ARTHROPLASTY Left 03/25/2022    Procedure: LEFT HIP SCREW REMOVAL / LEFT TOTAL HIP ARTHROPLASTY;  Surgeon: BASHIR Monsivais MD;  Location:  [facility] OR;  Service: Orthopedics;  Laterality: Left;      General Information       Row Name 03/01/25 0928          OT Time and Intention    Subjective Information complains of;pain  -JODY (r) DEANDRE (t) JODY (c)     Document Type evaluation;other (see comments)  cc: weakness; dx: Weakness; h/o: a-fib, DM II, CAD, Glaucoma  -JODY (r) DEANDRE (t) JW (c)     Mode of Treatment occupational therapy  -JODY (r) DEANDRE (t) JODY (c)       Row Name 03/01/25 0928          General Information    Patient Profile Reviewed yes  -JODY (r) DEANDRE (t) JW (c)     Prior Level of Function min assist:;ADL's;dependent:;cooking;cleaning;driving  pt poor historian, wife is primary historian  -JODY (r) DEANDRE (t) JW (c)     Existing Precautions/Restrictions fall  -JW (r) CB (t) JW (c)     Barriers to Rehab medically complex;cognitive status;previous functional deficit  -JODY (r) DEANDRE (t) JW (c)       Row Name 03/01/25 0928          Occupational Profile    Environmental Supports and Barriers (Occupational Profile) rwx, cane, lift chair, tub shower,  -JW (r) DEANDRE (t) JW (c)       Row Name 03/01/25 0928          Living  Environment    People in Home spouse  -JW (r) CB (t) JW (c)       Row Name 03/01/25 0928          Cognition    Orientation Status (Cognition) oriented x 4  -JW (r) CB (t) JW (c)       Row Name 03/01/25 0928          Safety Issues/Impairments Affecting Functional Mobility    Safety Issues Affecting Function (Mobility) ability to follow commands;awareness of need for assistance;insight into deficits/self-awareness;friction/shear risk;problem-solving;safety precaution awareness;safety precautions follow-through/compliance  -JW (r) CB (t) JW (c)     Impairments Affecting Function (Mobility) strength;balance;coordination;endurance/activity tolerance;pain;cognition  -JW (r) CB (t) JW (c)     Cognitive Impairments, Mobility Safety/Performance awareness, need for assistance;insight into deficits/self-awareness;judgment;problem-solving/reasoning;safety precaution awareness;safety precaution follow-through  -JW (r) CB (t) JW (c)               User Key  (r) = Recorded By, (t) = Taken By, (c) = Cosigned By      Initials Name Provider Type    Emma Naranjo, OTR/L, CSRS Occupational Therapist    aHng Carrera, OT Student OT Student                     Mobility/ADL's       Row Name 03/01/25 0928          Bed Mobility    Bed Mobility rolling left;rolling right;scooting/bridging  -JW (r) CB (t) JW (c)     Rolling Left [place] (Bed Mobility) minimum assist (75% patient effort)  -JW (r) CB (t) JW (c)     Rolling Right [place] (Bed Mobility) minimum assist (75% patient effort)  -JW (r) CB (t) JW (c)     Scooting/Bridging [place] (Bed Mobility) minimum assist (75% patient effort)  -JW (r) CB (t) JW (c)     Bed Mobility, Safety Issues decreased use of arms for pushing/pulling  -JW (r) CB (t) JW (c)     Assistive Device (Bed Mobility) bed rails;head of bed elevated  -JW (r) CB (t) JW (c)       Row Name 03/01/25 0928          Transfers    Comment, (Transfers) unable to assess  -JW (r) CB (t) JW (c)       Row  Name 03/01/25 0928          Functional Mobility    Functional Mobility- Ind. Level maximum assist (25% patient effort);2 person assist required;verbal cues required  -JW (r) CB (t) JW (c)     Functional Mobility- Device walker, william  -JW (r) CB (t) JW (c)     Functional Mobility- Safety Issues weight-shifting ability decreased;step length decreased;balance decreased during turns;steps too close front assistive device  -JW (r) CB (t) JW (c)     Functional Mobility- Comment anticipated  -JW (r) CB (t) JW (c)       Row Name 03/01/25 0928          Activities of Daily Living    BADL Assessment/Intervention toileting  -JW (r) CB (t) JW (c)       Row Name 03/01/25 0928          Toileting Assessment/Training    [place] Level (Toileting) toileting skills;dependent (less than 25% patient effort)  -JW (r) CB (t) JW (c)     Assistive Devices (Toileting) bedpan  -JW (r) CB (t) JW (c)     Position (Toileting) supine  -JW (r) CB (t) JW (c)               User Key  (r) = Recorded By, (t) = Taken By, (c) = Cosigned By      Initials Name Provider Type    Emma Naranjo, OTR/L, CSRS Occupational Therapist    Hang Carrera, OT Student OT Student                   Obj/Interventions       Row Name 03/01/25 0928          Sensory Assessment (Somatosensory)    Sensory Assessment (Somatosensory) UE sensation intact  -JW (r) CB (t) JW (c)       Row Name 03/01/25 0928          Vision Assessment/Intervention    Visual Impairment/Limitations WFL;corrective lenses full-time  -JW (r) CB (t) JW (c)       Row Name 03/01/25 0928          Range of Motion Comprehensive    General Range of Motion other (see comments)  -JW (r) CB (t) JW (c)     Comment, General Range of Motion LUE was WFL. Unable to assess RUE due to fx.  -JW (r) CB (t) JW (c)       Row Name 03/01/25 0928          Strength Comprehensive (MMT)    General Manual Muscle Testing (MMT) Assessment upper extremity strength deficits identified  -JW (r) CB (t) JW (c)     Comment,  General Manual Muscle Testing (MMT) Assessment LUE was functionally 4/5. Unable to assess RUE due to fx.  -JW (r) CB (t) JW (c)       Row Name 03/01/25 0928          Motor Skills    Motor Skills coordination  -JW (r) CB (t) JW (c)     Coordination WFL;left;upper extremity;finger to nose  -JW (r) CB (t) JW (c)       Row Name 03/01/25 0928          Balance    Comment, Balance unable to assess, majority of session spent on bed mobility for major BM.  -JW (r) CB (t) JW (c)               User Key  (r) = Recorded By, (t) = Taken By, (c) = Cosigned By      Initials Name Provider Type    Emma Naranjo, OTR/L, CSRS Occupational Therapist    Hang Carrera, OT Student OT Student                   Goals/Plan       Row Name 03/01/25 0928          Transfer Goal 1 (OT)    Activity/Assistive Device (Transfer Goal 1, OT) toilet;shower chair  -JW (r) CB (t) JW (c)     [place] Level/Cues Needed (Transfer Goal 1, OT) minimum assist (75% or more patient effort)  -JW (r) CB (t) JW (c)     Time Frame (Transfer Goal 1, OT) long term goal (LTG);10 days  -JW (r) CB (t) JW (c)     Progress/Outcome (Transfer Goal 1, OT) new goal  -JW (r) CB (t) JW (c)       Row Name 03/01/25 0928          Bathing Goal 1 (OT)    Activity/Device (Bathing Goal 1, OT) upper body bathing;lower body bathing  -JW (r) CB (t) JW (c)     [place] Level/Cues Needed (Bathing Goal 1, OT) minimum assist (75% or more patient effort)  -JW (r) CB (t) JW (c)     Time Frame (Bathing Goal 1, OT) long term goal (LTG);10 days  -JW (r) CB (t) JW (c)     Progress/Outcomes (Bathing Goal 1, OT) new goal  -JW (r) CB (t) JW (c)       Row Name 03/01/25 0928          Toileting Goal 1 (OT)    Activity/Device (Toileting Goal 1, OT) toileting skills, all  -JODY (kassidy) DEANDRE (mirna) JODY (dragan)     [place] Level/Cues Needed (Toileting Goal 1, OT) minimum assist (75% or more patient effort)  -JODY (kassidy) DEANDRE (mirna) JODY (c)     Time Frame (Toileting Goal 1, OT) long term goal (LTG);10 days  -JODY  (r) CB (t) JW (c)     Progress/Outcome (Toileting Goal 1, OT) new goal  -JW (r) CB (t) JW (c)       Row Name 03/01/25 0928          Problem Specific Goal 1 (OT)    Problem Specific Goal 1 (OT) Pt will independently implement one pain management technique to decrease pain and improve functional adl performance.  -JW (r) CB (t) JW (c)     Time Frame (Problem Specific Goal 1, OT) long term goal (LTG);by discharge  -JW (r) CB (t) JW (c)     Progress/Outcome (Problem Specific Goal 1, OT) new goal  -JW (r) CB (t) JW (c)       Row Name 03/01/25 0928          Therapy Assessment/Plan (OT)    Planned Therapy Interventions (OT) activity tolerance training;adaptive equipment training;BADL retraining;occupation/activity based interventions;passive ROM/stretching;patient/caregiver education/training;ROM/therapeutic exercise;strengthening exercise;transfer/mobility retraining  -JW (r) CB (t) JW (c)               User Key  (r) = Recorded By, (t) = Taken By, (c) = Cosigned By      Initials Name Provider Type    Emma Naranjo, OTR/L, CSRS Occupational Therapist    Hang Carrera, OT Student OT Student                   Clinical Impression       Row Name 03/01/25 0928          Pain Assessment    Pain Location extremity  -JW (r) CB (t) JW (c)     Pain Side/Orientation right  -JW (r) CB (t) JW (c)     Pain Management Interventions activity modification encouraged;positioning techniques utilized  -JW (r) CB (t) JW (c)     Response to Pain Interventions activity participation with tolerable pain  -JW (r) CB (t) JW (c)     Additional Documentation Pain Scale: FACES Pre/Post-Treatment (Group)  -JW (r) CB (t) JW (c)       Row Name 03/01/25 0928          Pain Scale: FACES Pre/Post-Treatment    Pain: FACES Scale, Pretreatment 2-->hurts little bit  -JW (r) CB (t) JW (c)     Posttreatment Pain Rating 2-->hurts little bit  -JW (r) CB (t) JW (c)       Row Name 03/01/25 0928          Plan of Care Review    Plan of Care Reviewed With  patient;spouse  -JODY (r) DEANDRE (t) JODY (c)     Progress no change  -JW (r) CB (t) JW (c)     Outcome Evaluation OT eval completed. A&Ox4. Wife is primary historian, pt does well conversationally but has trouble recalling events related to his medical status. C/O pain in RUE. Pt rolled BL and bridged with min A for bed mobility.  Pt had one major BM that took up most of session to use bedpan and change sheets. Toileting skills were Dependent. BUE sensation intact. ROM was WFL and strength in LUE was 4/5 based on functional movement. Unable to assess RUE due to fx. Pt demos poor strength in BLE based on bed mobility. OT services would be beneficial to improve strength, endurance, safety awareness, adaptive equipment training, and BADL performance. Pt left in fowlers with wife in room. Recommend SNF at d/c.  -JODY (r) DEANDRE (t) JODY (c)       Row Name 03/01/25 0928          Therapy Assessment/Plan (OT)    Rehab Potential (OT) good  -JODY (r) CB (t) JW (c)     Criteria for Skilled Therapeutic Interventions Met (OT) yes;skilled treatment is necessary  -JODY (r) CB (t) JW (c)     Therapy Frequency (OT) 5 times/wk  -JODY (r) CB (t) JW (c)       Row Name 03/01/25 0928          Therapy Plan Review/Discharge Plan (OT)    Anticipated Discharge Disposition (OT) skilled nursing facility  -JODY (r) CB (t) JW (c)       Row Name 03/01/25 0968          Positioning and Restraints    Pre-Treatment Position in bed  -JODY (r) CB (t) JW (c)     Post Treatment Position bed  -JW (r) CB (t) JW (c)     In Bed fowlers;call light within reach;encouraged to call for assist;side rails up x3;with family/caregiver  -JODY (r) CB (t) JW (c)               User Key  (r) = Recorded By, (t) = Taken By, (c) = Cosigned By      Initials Name Provider Type    Emma Naranjo, OTR/L, CSRS Occupational Therapist    Hang Carrera, OT Student OT Student                   Outcome Measures       Row Name 03/01/25 0995          How much help from another is currently needed...     Putting on and taking off regular lower body clothing? 1  -JW (r) CB (t) JW (c)     Bathing (including washing, rinsing, and drying) 1  -JW (r) CB (t) JW (c)     Toileting (which includes using toilet bed pan or urinal) 1  -JW (r) CB (t) JW (c)     Putting on and taking off regular upper body clothing 2  -JW (r) CB (t) JW (c)     Taking care of personal grooming (such as brushing teeth) 2  -JW (r) CB (t) JW (c)     Eating meals 2  -JW (r) CB (t) JW (c)     AM-PAC 6 Clicks Score (OT) 9  -JW (r) CB (t)       Row Name 03/01/25 0827          How much help from another person do you currently need...    Turning from your back to your side while in flat bed without using bedrails? 1  -MB     Moving from lying on back to sitting on the side of a flat bed without bedrails? 1  -MB     Moving to and from a bed to a chair (including a wheelchair)? 1  -MB     Standing up from a chair using your arms (e.g., wheelchair, bedside chair)? 1  -MB     Climbing 3-5 steps with a railing? 1  -MB     To walk in hospital room? 1  -MB     AM-PAC 6 Clicks Score (PT) 6  -MB       Row Name 03/01/25 0928          Functional Assessment    Outcome Measure Options AM-PAC 6 Clicks Daily Activity (OT)  -JW (r) CB (t) JW (c)               User Key  (r) = Recorded By, (t) = Taken By, (c) = Cosigned By      Initials Name Provider Type    Emma Naranjo, OTR/L, CSRS Occupational Therapist    Hang Carrera, OT Student OT Student    Eric Castro, RN Registered Nurse                    Occupational Therapy Education       Title: PT OT SLP Therapies (In Progress)       Topic: Occupational Therapy (In Progress)       Point: ADL training (Done)       Description:   Instruct learner(s) on proper safety adaptation and remediation techniques during self care or transfers.   Instruct in proper use of assistive devices.                  Learning Progress Summary            Patient Acceptance, E, VU by DEANDRE at 3/1/2025 1200    Comment: Role of  OT                      Point: Home exercise program (Not Started)       Description:   Instruct learner(s) on appropriate technique for monitoring, assisting and/or progressing therapeutic exercises/activities.                  Learner Progress:  Not documented in this visit.              Point: Precautions (Done)       Description:   Instruct learner(s) on prescribed precautions during self-care and functional transfers.                  Learning Progress Summary            Patient Acceptance, E, VU by CB at 3/1/2025 1200    Comment: Role of OT                      Point: Body mechanics (Done)       Description:   Instruct learner(s) on proper positioning and spine alignment during self-care, functional mobility activities and/or exercises.                  Learning Progress Summary            Patient Acceptance, E, VU by CB at 3/1/2025 1200    Comment: Role of OT                                      User Key       Initials Effective Dates Name Provider Type Discipline     12/17/24 -  Hang Salinas, OT Student OT Student OT                  OT Recommendation and Plan  Planned Therapy Interventions (OT): activity tolerance training, adaptive equipment training, BADL retraining, occupation/activity based interventions, passive ROM/stretching, patient/caregiver education/training, ROM/therapeutic exercise, strengthening exercise, transfer/mobility retraining  Therapy Frequency (OT): 5 times/wk  Plan of Care Review  Plan of Care Reviewed With: patient, spouse  Progress: no change  Outcome Evaluation: OT eval completed. A&Ox4. Wife is primary historian, pt does well conversationally but has trouble recalling events related to his medical status. C/O pain in RUE. Pt rolled BL and bridged with min A for bed mobility.  Pt had one major BM that took up most of session to use bedpan and change sheets. Toileting skills were Dependent. BUE sensation intact. ROM was WFL and strength in LUE was 4/5 based on functional  movement. Unable to assess RUE due to fx. Pt demos poor strength in BLE based on bed mobility. OT services would be beneficial to improve strength, endurance, safety awareness, adaptive equipment training, and BADL performance. Pt left in fowlers with wife in room. Recommend SNF at d/c.     Time Calculation:         Time Calculation- OT       Row Name 03/01/25 0928             Time Calculation- OT    OT Start Time 0928  -JW (r) CB (t) JW (c)      OT Stop Time 1043  -JW (r) CB (t) JW (c)      OT Time Calculation (min) 75 min  -JW (r) CB (t)      Total Timed Code Minutes- OT 15 minute(s)  -JW (r) CB (t) JW (c)      OT Received On 03/01/25  -JW (r) CB (t) JW (c)      OT Goal Re-Cert Due Date 03/11/25  -JW (r) CB (t) JW (c)         Timed Charges    68763 - OT Self Care/Mgmt Minutes 15  -JW (r) CB (t) JW (c)         Untimed Charges    OT Eval/Re-eval Minutes 60  -JW (r) CB (t) JW (c)         Total Minutes    Timed Charges Total Minutes 15  -JW (r) CB (t)      Untimed Charges Total Minutes 60  -JW (r) CB (t)       Total Minutes 75  -JW (r) CB (t)                User Key  (r) = Recorded By, (t) = Taken By, (c) = Cosigned By      Initials Name Provider Type    Emma Naranjo OTR/L, CSRS Occupational Therapist    Hang Carrera, OT Student OT Student                           Hang Salinas OT Student  3/1/2025

## 2025-03-01 NOTE — PLAN OF CARE
Goal Outcome Evaluation:              Outcome Evaluation: Pt. pleasantly confused.  Sling in place.Purewick in place. Paced on telemetry, [place], Foam on glutes replaced pink and blanchable Blood sugar checked BID.

## 2025-03-01 NOTE — PLAN OF CARE
Goal Outcome Evaluation:  Plan of Care Reviewed With: patient, spouse        Progress: no change  Outcome Evaluation: Received pt from ED to room 358 with decline in ambulation d/t previous rt upper arm fracture. A&Ox4.[place].  Rt arm in sling. Incontinent. Purewick. BM x 2 in ED and x1 on floor. RA.  Seeking placement.  AV paced on Tele.

## 2025-03-01 NOTE — H&P
History and Physical      CHIEF COMPLAINT:  Weakness    Reason for Admission:  Weakness, ADDIS    History Obtained From:  patient, chart    HISTORY OF PRESENT ILLNESS:      The patient is a 89 y.o. male who was admitted from ER with weakness, inability to ambulate safely. He had a fal at home within last few days and has a right humeral fracture. He is in a sling. He says since the fall and fracture, he has been unable to take care of himself at home and his Wife is unable to provide care. He has no c/o chest pain or SOA. He has had no abdominal pain or N/V. He has had no fevers. He denies any dysuria. No pain other than in right arm.     Past Medical History:    Past Medical History:   Diagnosis Date    Abnormal nuclear stress test     Anemia 2021    Arthritis     BPH (benign prostatic hyperplasia)     Coronary artery disease     Diabetes mellitus     Disease of thyroid gland     Fracture     spinal t12    GERD (gastroesophageal reflux disease)     Glaucoma 02/18/2022    Gout     Hyperlipidemia LDL goal <70 11/14/2016    Ischemic heart disease 06/23/2022    Ischemic heart disease 06/23/2022    Low back pain     LVH (left ventricular hypertrophy) 06/23/2022    Melanoma     Onychomycosis of toenail 06/24/2022    Pacemaker 01/18/2019    PAF (paroxysmal atrial fibrillation) 10/29/2019    Chads vas score 3    Primary hypertension 11/14/2016    Prostate CA     Sick sinus syndrome 03/01/2019    Stress fracture Aug. 2021     Past Surgical History:    Past Surgical History:   Procedure Laterality Date    APPENDECTOMY      CARDIAC CATHETERIZATION Left 12/10/2012    CARDIAC ELECTROPHYSIOLOGY PROCEDURE N/A 11/23/2018    Procedure: Pacemaker DC new 11/23/2018;  Surgeon: Austin Granados MD;  Location: [facility] CATH INVASIVE LOCATION;  Service: Cardiology    CHOLECYSTECTOMY      COLONOSCOPY N/A 06/09/2017    Tics, hemorrhoids repeat exam prn    COLONOSCOPY W/ POLYPECTOMY  02/16/2012    adenomatous polyp at 80cm    ENDOSCOPY N/A  09/13/2022    Procedure: ESOPHAGOGASTRODUODENOSCOPY WITH ANESTHESIA;  Surgeon: Felice Marroquin MD;  Location:  [facility];  Service: Gastroenterology;  Laterality: N/A;  pre hematochezia  post; normal   Ashish Callejas MD    HAND SURGERY Right     HERNIA REPAIR      HIP CANNULATED SCREW PLACEMENT Left 01/18/2022    Procedure: HIP CANNULATED SCREW PLACEMENT;  Surgeon: Isaiah Sheehan MD;  Location:  [facility] OR;  Service: Orthopedics;  Laterality: Left;    INGUINAL HERNIA REPAIR      INSERT / REPLACE / REMOVE PACEMAKER      KNEE SURGERY      PROSTATE SURGERY      SKIN CANCER EXCISION      face    SKIN LESION EXCISION      TOTAL HIP ARTHROPLASTY Right 08/27/2021    Procedure: TOTAL HIP REPLACEMENT;  Surgeon: Arik Magaña MD;  Location:  [facility] OR;  Service: Orthopedics;  Laterality: Right;    TOTAL HIP ARTHROPLASTY Left 03/25/2022    Procedure: LEFT HIP SCREW REMOVAL / LEFT TOTAL HIP ARTHROPLASTY;  Surgeon: BASHIR Monsivais MD;  Location:  [facility] OR;  Service: Orthopedics;  Laterality: Left;         Medications Prior to Admission:    Medications Prior to Admission   Medication Sig Dispense Refill Last Dose/Taking    allopurinol (ZYLOPRIM) 300 MG tablet Take 1 tablet by mouth Daily.       apixaban (Eliquis) 5 MG tablet tablet Take 1 tablet by mouth Every 12 (Twelve) Hours. 180 tablet 4     aspirin 81 MG EC tablet Take 1 tablet by mouth Daily.       benzonatate (TESSALON) 100 MG capsule Take 1 capsule by mouth 2 (Two) Times a Day As Needed for Cough. (Patient not taking: Reported on 1/3/2025) 60 capsule 2     finasteride (PROSCAR) 5 MG tablet Take 1 tablet by mouth Daily. 90 tablet 3     folic acid (FOLVITE) 1 MG tablet Take 1 tablet by mouth Daily.       HYDROcodone-acetaminophen (NORCO) 5-325 MG per tablet Take 1 tablet by mouth Every 6 (Six) Hours As Needed for Severe Pain. 10 tablet 0     iron polysaccharides (NIFEREX) 150 MG capsule Take 1 capsule by mouth Daily.       levothyroxine  (SYNTHROID, LEVOTHROID) 75 MCG tablet Take 1 tablet by mouth Daily.       LORazepam (ATIVAN) 0.5 MG tablet Take 1 tablet by mouth Daily As Needed for Anxiety. (Patient taking differently: Take 1 tablet by mouth Every Night.) 12 tablet 0     losartan (COZAAR) 25 MG tablet Take 1 tablet by mouth Daily. 90 tablet 3     methotrexate 2.5 MG tablet Take 6 tablets by mouth 1 (One) Time Per Week.       metoprolol succinate XL (TOPROL-XL) 25 MG 24 hr tablet Take 1 tablet by mouth Daily. 90 tablet 11     montelukast (SINGULAIR) 10 MG tablet Take 1 tablet by mouth Every Night.       naloxone (NARCAN) 4 MG/0.1ML nasal spray Call 911. Don't prime. [place] in 1 nostril for overdose. Repeat in 2-3 minutes in other nostril if no or minimal breathing/responsiveness. 2 each 0     nitroglycerin (NITROSTAT) 0.4 MG SL tablet Place 1 tablet under the tongue Every 5 (Five) Minutes As Needed.       pantoprazole (PROTONIX) 40 MG EC tablet Take 1 tablet by mouth Daily.       predniSONE (DELTASONE) 2.5 MG tablet Take 1 tablet by mouth Daily.       triamterene-hydrochlorothiazide (MAXZIDE-25) 37.5-25 MG per tablet Take 1 tablet by mouth Daily.          Allergies:  Adhesive tape, Simvastatin, Tramadol, and Neosporin [neomycin-bacitracin zn-polymyx]    Social History:   TOBACCO:   reports that he quit smoking about 40 years ago. His smoking use included cigarettes. He started smoking about 70 years ago. He has been exposed to tobacco smoke. He has never used smokeless tobacco.  ETOH:   reports no history of alcohol use.  DRUGS:   reports no history of drug use.  MARITAL STATUS:    OCCUPATION:  not working  Patient currently lives with Wife      Family History:   Family History   Problem Relation Age of Onset    Alzheimer's disease Mother     Cancer Father     Cancer Sister      REVIEW OF SYSTEMS:  Constitutional: weakness  CV: Negative  Pulmonary: Negative  GI: Negative  : Negative  Psych: Negative  Neuro: Negative  Skin:  "Negative  MusculoSkeletal: Negative  HEENT: Negative  Joints: right arm pain  Vitals:  /65 (BP Location: Left arm, Patient Position: Lying)   Pulse 62   Temp 97.7 °F (36.5 °C) (Oral)   Resp 16   Ht 182.9 cm (72\")   Wt 75.6 kg (166 lb 10.7 oz)   SpO2 98%   BMI 22.60 kg/m²     PHYSICAL EXAM:  Gen: NAD, alert, pleasant  HEENT: WNL  Lymph: no LAD  Neck: no JVD or masses  Chest: CTA bilaterally  CV: RRR  Abdomen: NT/ND  Extrem: no C/C/E, right arm is in a sling  Neuro: Nonfocal  Skin: no rashes  Joints: no redness  DATA:  I have reviewed the admission labs and imaging tests.    ASSESSMENT AND PLAN:      Weakness----PT consult, supportive care, work on placement   Right Humeral Fracture---consult Ortho, pain control    DM2--follow glucose    P A Fib---continue medical care, anticoagulation    Anemia      Samantha Pyane MD  10:02 CST 3/1/2025  "

## 2025-03-02 LAB
ANION GAP SERPL CALCULATED.3IONS-SCNC: 12 MMOL/L (ref 5–15)
BASOPHILS # BLD AUTO: 0.04 10*3/MM3 (ref 0–0.2)
BASOPHILS NFR BLD AUTO: 0.6 % (ref 0–1.5)
BUN SERPL-MCNC: 25 MG/DL (ref 8–23)
BUN/CREAT SERPL: 24.8 (ref 7–25)
CALCIUM SPEC-SCNC: 9.3 MG/DL (ref 8.2–9.6)
CHLORIDE SERPL-SCNC: 107 MMOL/L (ref 98–107)
CO2 SERPL-SCNC: 22 MMOL/L (ref 22–29)
CREAT SERPL-MCNC: 1.01 MG/DL (ref 0.76–1.27)
DEPRECATED RDW RBC AUTO: 54.9 FL (ref 37–54)
EGFRCR SERPLBLD CKD-EPI 2021: 70.6 ML/MIN/1.73
EOSINOPHIL # BLD AUTO: 0.44 10*3/MM3 (ref 0–0.4)
EOSINOPHIL NFR BLD AUTO: 6.5 % (ref 0.3–6.2)
ERYTHROCYTE [DISTWIDTH] IN BLOOD BY AUTOMATED COUNT: 15.9 % (ref 12.3–15.4)
GLUCOSE BLDC GLUCOMTR-MCNC: 100 MG/DL (ref 70–130)
GLUCOSE BLDC GLUCOMTR-MCNC: 121 MG/DL (ref 70–130)
GLUCOSE SERPL-MCNC: 108 MG/DL (ref 65–99)
HCT VFR BLD AUTO: 27 % (ref 37.5–51)
HGB BLD-MCNC: 9 G/DL (ref 13–17.7)
IMM GRANULOCYTES # BLD AUTO: 0.06 10*3/MM3 (ref 0–0.05)
IMM GRANULOCYTES NFR BLD AUTO: 0.9 % (ref 0–0.5)
LYMPHOCYTES # BLD AUTO: 1.3 10*3/MM3 (ref 0.7–3.1)
LYMPHOCYTES NFR BLD AUTO: 19.2 % (ref 19.6–45.3)
MCH RBC QN AUTO: 31.6 PG (ref 26.6–33)
MCHC RBC AUTO-ENTMCNC: 33.3 G/DL (ref 31.5–35.7)
MCV RBC AUTO: 94.7 FL (ref 79–97)
MONOCYTES # BLD AUTO: 0.74 10*3/MM3 (ref 0.1–0.9)
MONOCYTES NFR BLD AUTO: 10.9 % (ref 5–12)
NEUTROPHILS NFR BLD AUTO: 4.18 10*3/MM3 (ref 1.7–7)
NEUTROPHILS NFR BLD AUTO: 61.9 % (ref 42.7–76)
NRBC BLD AUTO-RTO: 0 /100 WBC (ref 0–0.2)
PLATELET # BLD AUTO: 185 10*3/MM3 (ref 140–450)
PMV BLD AUTO: 10 FL (ref 6–12)
POTASSIUM SERPL-SCNC: 4.3 MMOL/L (ref 3.5–5.2)
RBC # BLD AUTO: 2.85 10*6/MM3 (ref 4.14–5.8)
SODIUM SERPL-SCNC: 141 MMOL/L (ref 136–145)
WBC NRBC COR # BLD AUTO: 6.76 10*3/MM3 (ref 3.4–10.8)

## 2025-03-02 PROCEDURE — 80048 BASIC METABOLIC PNL TOTAL CA: CPT | Performed by: FAMILY MEDICINE

## 2025-03-02 PROCEDURE — 85025 COMPLETE CBC W/AUTO DIFF WBC: CPT | Performed by: FAMILY MEDICINE

## 2025-03-02 PROCEDURE — 97162 PT EVAL MOD COMPLEX 30 MIN: CPT

## 2025-03-02 PROCEDURE — 82948 REAGENT STRIP/BLOOD GLUCOSE: CPT

## 2025-03-02 RX ORDER — BENZONATATE 100 MG/1
100 CAPSULE ORAL 3 TIMES DAILY PRN
Status: DISCONTINUED | OUTPATIENT
Start: 2025-03-02 | End: 2025-03-04 | Stop reason: HOSPADM

## 2025-03-02 RX ADMIN — LOSARTAN POTASSIUM 25 MG: 25 TABLET, FILM COATED ORAL at 09:04

## 2025-03-02 RX ADMIN — Medication 10 ML: at 09:05

## 2025-03-02 RX ADMIN — LEVOTHYROXINE SODIUM 75 MCG: 75 TABLET ORAL at 06:01

## 2025-03-02 RX ADMIN — Medication 150 MG: at 09:05

## 2025-03-02 RX ADMIN — PANTOPRAZOLE SODIUM 40 MG: 40 TABLET, DELAYED RELEASE ORAL at 09:04

## 2025-03-02 RX ADMIN — ACETAMINOPHEN 650 MG: 325 TABLET ORAL at 20:54

## 2025-03-02 RX ADMIN — FOLIC ACID 1 MG: 1 TABLET ORAL at 09:05

## 2025-03-02 RX ADMIN — LORAZEPAM 0.5 MG: 0.5 TABLET ORAL at 20:54

## 2025-03-02 RX ADMIN — MONTELUKAST SODIUM 10 MG: 10 TABLET, COATED ORAL at 20:54

## 2025-03-02 RX ADMIN — ASPIRIN 81 MG: 81 TABLET, COATED ORAL at 09:04

## 2025-03-02 RX ADMIN — APIXABAN 5 MG: 5 TABLET, FILM COATED ORAL at 20:54

## 2025-03-02 RX ADMIN — APIXABAN 5 MG: 5 TABLET, FILM COATED ORAL at 09:05

## 2025-03-02 RX ADMIN — FINASTERIDE 5 MG: 5 TABLET, FILM COATED ORAL at 09:04

## 2025-03-02 RX ADMIN — ALLOPURINOL 300 MG: 300 TABLET ORAL at 09:04

## 2025-03-02 RX ADMIN — METOPROLOL SUCCINATE 25 MG: 25 TABLET, EXTENDED RELEASE ORAL at 09:04

## 2025-03-02 NOTE — PROGRESS NOTES
"Progress Note  Dexter Suggs  3/2/2025 06:57 CST  Subjective:   Admit Date:   2/28/2025      CC/ADMIT DX:       Interval History:   Reviewed overnight events and nursing notes.  He has no c/o chest pain or SOA. He has had no GI issues.     I have reviewed all labs/diagnostics from the last 24hrs.       ROS:   I have done a 10 point ROS and all are negative, except what is mentioned in the HPI.    Diet: Regular/House; Fluid Consistency: Thin (IDDSI 0)    Medications:      allopurinol, 300 mg, Oral, Daily  apixaban, 5 mg, Oral, Q12H  aspirin, 81 mg, Oral, Daily  finasteride, 5 mg, Oral, Daily  folic acid, 1 mg, Oral, Daily  iron polysaccharides, 150 mg, Oral, Daily  levothyroxine, 75 mcg, Oral, Q AM  losartan, 25 mg, Oral, Daily  metoprolol succinate XL, 25 mg, Oral, Daily  montelukast, 10 mg, Oral, Nightly  pantoprazole, 40 mg, Oral, Daily            Objective:   Vitals: /74 (BP Location: Left arm, Patient Position: Lying)   Pulse 80   Temp 98.6 °F (37 °C) (Oral)   Resp 16   Ht 182.9 cm (72\")   Wt 75.6 kg (166 lb 10.7 oz)   SpO2 94%   BMI 22.60 kg/m²    Intake/Output Summary (Last 24 hours) at 3/2/2025 0657  Last data filed at 3/1/2025 2008  Gross per 24 hour   Intake 120 ml   Output --   Net 120 ml     General appearance: alert and cooperative with exam  Lungs: clear to auscultation bilaterally  Heart: RRR  Abdomen: soft, non-tender; bowel sounds normal; no masses,  no organomegaly  Extremities: extremities normal, atraumatic, no cyanosis or edema  Neurologic:  No obvious focal neurologic deficits.    Assessment and Plan:     Weakness    Right Humeral Fracture    DM2    P A Fib    Anemia    Plan:   Continue present medication(s)    Follow with supportive care   Needs placement, social service consulted   Labs stable/improved      Discharge planning:   a skilled nursing facility     Reviewed treatment plans with the patient and/or family.             Electronically signed by Samantha Payne MD " on 3/2/2025 at 06:57 CST

## 2025-03-02 NOTE — THERAPY EVALUATION
Patient Name: Dexter Suggs  : 1934    MRN: [id number]                              Today's Date: 3/2/2025       Admit Date: 2025    Visit Dx:     ICD-10-CM ICD-9-CM   1. Weakness  R53.1 780.79   2. Unable to care for self  Z78.9 V49.89   3. Acute kidney injury  N17.9 584.9   4. 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    Actinic keratosis    Benign prostatic hyperplasia with lower urinary tract symptoms    Complete heart block    Pacemaker    Laceration of face without complication    PAF (paroxysmal atrial fibrillation)    Paroxysmal atrial flutter    Pernicious anemia    Alkaline phosphatase elevation    Anemia    Gastroesophageal reflux disease    Hypothyroidism due to Hashimoto's thyroiditis    Impaired fasting glucose    Overlapping malignant melanoma of skin    Primary osteoarthritis of both hands    COVID-19 virus infection    Closed fracture of distal end of radius    Arthritis of hand    Hip fracture, left    Gross hematuria    Chronic anticoagulation    Postoperative anemia due to acute blood loss    Closed nondisplaced fracture of lesser trochanter of right femur with routine healing    Closed subcapital fracture of femur, left, initial encounter    Confusion and disorientation    Ischemic heart disease    LVH (left ventricular hypertrophy)    Hematochezia    Acquired hypothyroidism    Anxiety    Atherosclerosis of coronary artery without angina pectoris    Disorder of vitamin B12    Excessive anticoagulation    Gait abnormality    Glaucoma    Gout    History of COVID-19    History of malignant neoplasm of skin    Onychomycosis of toenail    Pathological fracture of left femur due to osteoporosis    Pressure ulcer of sacral region    S/p left  hip fracture    Anemia    Rheumatoid arthritis    BPH (benign prostatic hyperplasia)    Chronic anticoagulation    Complete atrioventricular block    Gastroesophageal reflux disease without esophagitis    Impaired fasting glucose    Prostate cancer    Hip pain    Atrial fibrillation    Essential hypertension    Sick sinus syndrome    Compression fracture of T12 vertebra    Body mass index (BMI) of 20.0 to 20.9 in adult    Nonsmoker    Degeneration of lumbar or lumbosacral intervertebral disc    Controlled type 2 diabetes mellitus without complication, without long-term current use of insulin    Elevated troponin    Rib fracture    Weakness     Past Medical History:   Diagnosis Date    Abnormal nuclear stress test     Anemia 2021    Arthritis     BPH (benign prostatic hyperplasia)     Coronary artery disease     Diabetes mellitus     Disease of thyroid gland     Fracture     spinal t12    GERD (gastroesophageal reflux disease)     Glaucoma 02/18/2022    Gout     Hyperlipidemia LDL goal <70 11/14/2016    Ischemic heart disease 06/23/2022    Ischemic heart disease 06/23/2022    Low back pain     LVH (left ventricular hypertrophy) 06/23/2022    Melanoma     Onychomycosis of toenail 06/24/2022    Pacemaker 01/18/2019    PAF (paroxysmal atrial fibrillation) 10/29/2019    Chads vas score 3    Primary hypertension 11/14/2016    Prostate CA     Sick sinus syndrome 03/01/2019    Stress fracture Aug. 2021     Past Surgical History:   Procedure Laterality Date    APPENDECTOMY      CARDIAC CATHETERIZATION Left 12/10/2012    CARDIAC ELECTROPHYSIOLOGY PROCEDURE N/A 11/23/2018    Procedure: Pacemaker DC new 11/23/2018;  Surgeon: Austin Granados MD;  Location: [facility] CATH INVASIVE LOCATION;  Service: Cardiology    CHOLECYSTECTOMY      COLONOSCOPY N/A 06/09/2017    Tics, hemorrhoids repeat exam prn    COLONOSCOPY W/ POLYPECTOMY  02/16/2012    adenomatous polyp at 80cm    ENDOSCOPY N/A 09/13/2022    Procedure: ESOPHAGOGASTRODUODENOSCOPY  WITH ANESTHESIA;  Surgeon: Felice Marroquin MD;  Location:  [facility];  Service: Gastroenterology;  Laterality: N/A;  pre hematochezia  post; normal   Ashish Callejas MD    HAND SURGERY Right     HERNIA REPAIR      HIP CANNULATED SCREW PLACEMENT Left 01/18/2022    Procedure: HIP CANNULATED SCREW PLACEMENT;  Surgeon: Isaiah Sheehan MD;  Location:  [facility] OR;  Service: Orthopedics;  Laterality: Left;    INGUINAL HERNIA REPAIR      INSERT / REPLACE / REMOVE PACEMAKER      KNEE SURGERY      PROSTATE SURGERY      SKIN CANCER EXCISION      face    SKIN LESION EXCISION      TOTAL HIP ARTHROPLASTY Right 08/27/2021    Procedure: TOTAL HIP REPLACEMENT;  Surgeon: Arik Magaña MD;  Location:  [facility] OR;  Service: Orthopedics;  Laterality: Right;    TOTAL HIP ARTHROPLASTY Left 03/25/2022    Procedure: LEFT HIP SCREW REMOVAL / LEFT TOTAL HIP ARTHROPLASTY;  Surgeon: BASHIR Monsivais MD;  Location:  [facility] OR;  Service: Orthopedics;  Laterality: Left;      General Information       Row Name 03/02/25 1256          Physical Therapy Time and Intention    Document Type evaluation  presents after being found down in bathroom Dx; generalized weakness. H/o R UE humeral fx and in sling  -AJ     Mode of Treatment physical therapy  -       Row Name 03/02/25 1256          General Information    Patient Profile Reviewed yes  -AJ     Prior Level of Function min assist:;gait;community mobility;transfer;home management;bed mobility;max assist:;dependent:;ADL's  -AJ     Existing Precautions/Restrictions fall;other (see comments)  sling R UE  -AJ     Barriers to Rehab medically complex;cognitive status;previous functional deficit  -AJ       Row Name 03/02/25 1256          Living Environment    People in Home spouse  -AJ       Row Name 03/02/25 1256          Home Main Entrance    Number of Stairs, Main Entrance none  -AJ       Row Name 03/02/25 1256          Stairs Within Home, Primary    Number of Stairs, Within Home,  Primary none  -       Row Name 03/02/25 1256          Cognition    Orientation Status (Cognition) oriented x 4  -       Row Name 03/02/25 1256          Safety Issues/Impairments Affecting Functional Mobility    Safety Issues Affecting Function (Mobility) safety precaution awareness;safety precautions follow-through/compliance;awareness of need for assistance  -     Impairments Affecting Function (Mobility) strength;balance;coordination;endurance/activity tolerance;pain;cognition  -AJ     Cognitive Impairments, Mobility Safety/Performance awareness, need for assistance;insight into deficits/self-awareness;judgment;problem-solving/reasoning;safety precaution awareness;safety precaution follow-through  -               User Key  (r) = Recorded By, (t) = Taken By, (c) = Cosigned By      Initials Name Provider Type    Parish Mae PT DPT Physical Therapist                   Mobility       Row Name 03/02/25 1256          Bed Mobility    Bed Mobility rolling right;supine-sit;sit-supine  -AJ     Rolling Right [place] (Bed Mobility) minimum assist (75% patient effort)  -AJ     Supine-Sit [place] (Bed Mobility) minimum assist (75% patient effort);moderate assist (50% patient effort)  -     Sit-Supine [place] (Bed Mobility) minimum assist (75% patient effort)  -     Assistive Device (Bed Mobility) head of bed elevated;bed rails  -       Row Name 03/02/25 1256          Sit-Stand Transfer    Sit-Stand [place] (Transfers) minimum assist (75% patient effort);1 person assist  -     Comment, (Sit-Stand Transfer) HHA  -       Row Name 03/02/25 1256          Gait/Stairs (Locomotion)    [place] Level (Gait) minimum assist (75% patient effort)  -     Assistive Device (Gait) --  HHA  -     Distance in Feet (Gait) 5  side steps EOB  -               User Key  (r) = Recorded By, (t) = Taken By, (c) = Cosigned By      Initials Name Provider Type    Parish Mae PT DPT Physical  Therapist                   Obj/Interventions       Row Name 03/02/25 1256          Range of Motion Comprehensive    General Range of Motion lower extremity range of motion deficits identified  -AJ     Comment, General Range of Motion pt demo roughly 50% AROM in B LE but remained WFL  -AJ       Row Name 03/02/25 1256          Strength Comprehensive (MMT)    General Manual Muscle Testing (MMT) Assessment lower extremity strength deficits identified  -AJ     Comment, General Manual Muscle Testing (MMT) Assessment No formal MMT but performed t/f and required assist for t/f  -AJ       Row Name 03/02/25 1256          Balance    Balance Assessment sitting static balance;sitting dynamic balance;standing static balance;standing dynamic balance  -AJ     Static Sitting Balance standby assist  -AJ     Dynamic Sitting Balance standby assist  -AJ     Position, Sitting Balance unsupported;sitting edge of bed  -AJ     Static Standing Balance minimal assist  -AJ     Dynamic Standing Balance minimal assist;moderate assist  -AJ     Position/Device Used, Standing Balance supported;other (see comments)  HHA  -AJ     Balance Interventions sitting;sit to stand;standing;supported;static;dynamic  -AJ       Row Name 03/02/25 1256          Sensory Assessment (Somatosensory)    Sensory Assessment (Somatosensory) sensation intact  -               User Key  (r) = Recorded By, (t) = Taken By, (c) = Cosigned By      Initials Name Provider Type    Parish Mae, PT DPT Physical Therapist                   Goals/Plan       Row Name 03/02/25 1256          Bed Mobility Goal 1 (PT)    Activity/Assistive Device (Bed Mobility Goal 1, PT) bed mobility activities, all;rolling to left;rolling to right;supine to sit;sit to supine  -AJ     [place] Level/Cues Needed (Bed Mobility Goal 1, PT) standby assist  -AJ     Time Frame (Bed Mobility Goal 1, PT) long term goal (LTG);10 days  -     Progress/Outcomes (Bed Mobility Goal 1, PT) new goal  -AJ        Row Name 03/02/25 1256          Transfer Goal 1 (PT)    Activity/Assistive Device (Transfer Goal 1, PT) sit-to-stand/stand-to-sit;bed-to-chair/chair-to-bed;toilet;car transfer  -AJ     [place] Level/Cues Needed (Transfer Goal 1, PT) independent  -AJ     Time Frame (Transfer Goal 1, PT) long term goal (LTG);10 days  -AJ     Progress/Outcome (Transfer Goal 1, PT) new goal  -       Row Name 03/02/25 1256          Gait Training Goal 1 (PT)    Activity/Assistive Device (Gait Training Goal 1, PT) gait (walking locomotion);decrease asymmetrical patterns;decrease fall risk;diminish gait deviation;forward stepping;improve balance and speed;increase endurance/gait distance;increase energy conservation  -AJ     [place] Level (Gait Training Goal 1, PT) standby assist  -AJ     Distance (Gait Training Goal 1, PT) 20' with no LOB  -AJ     Time Frame (Gait Training Goal 1, PT) long term goal (LTG);10 days  -AJ     Progress/Outcome (Gait Training Goal 1, PT) new goal  -       Row Name 03/02/25 1256          Balance Goal 1 (PT)    Activity/Assistive Device (Balance Goal) sit to stand dynamic balance;standing static balance;standing dynamic balance;unsupported;with functional activities/occupations;with functional mobility activities;with functional reaching activities  -AJ     [place] Level/Cues Needed (Balance Goal 1, PT) independent  -AJ     Time Frame (Balance Goal 1, PT) long-term goal (LTG);by discharge  -AJ     Progress/Outcomes (Balance Goal 1, PT) other (see comments)  new goal  -       Row Name 03/02/25 1256          Therapy Assessment/Plan (PT)    Planned Therapy Interventions (PT) balance training;bed mobility training;gait training;home exercise program;postural re-education;patient/family education;transfer training;ROM (range of motion);strengthening  -AJ               User Key  (r) = Recorded By, (t) = Taken By, (c) = Cosigned By      Initials Name Provider Type    Parish Mae, PT DPT  Physical Therapist                   Clinical Impression       Row Name 03/02/25 1256          Pain    Pretreatment Pain Rating 1/10  -     Posttreatment Pain Rating 4/10  -     Pain Location extremity  -AJ     Pain Side/Orientation right;upper  -     Pain Management Interventions nursing notified;exercise or physical activity utilized  -     Response to Pain Interventions activity participation with increased pain  -       Row Name 03/02/25 1256          Plan of Care Review    Plan of Care Reviewed With patient;family  -     Outcome Evaluation PT eval complete. Pt in fowlers position, AOx4 during session and agreeable to session. Pt reports he is not very active at home and spends majority of time in his lift chair. Today pt required Min A for bed mobility and maintained static sitting balance with SBA. Pt demo decreased AROM to roughly 50% in B LE. No formal MMT due to decreased AROM but did require assist for t/f, so demo decreased strength. Pt performed sit<>stand at EOB, demo posterior lean initially but corrected with verbal cues. pt required Min A x1 for t/f and performed side steps EOB x5. Pt fatigued quickly and returned to fowlers position and left with family members at bedside. Pt will beenfit from skilled PT services to improve activity tolerance, decrease fall risk and return to PLOF. Recommend SNF when medically stable.  -       Row Name 03/02/25 1256          Therapy Assessment/Plan (PT)    Patient/Family Therapy Goals Statement (PT) get his strength back  -     Rehab Potential (PT) good  -     Criteria for Skilled Interventions Met (PT) yes;meets criteria;skilled treatment is necessary  -     Predicted Duration of Therapy Intervention (PT) until d/c  -       Row Name 03/02/25 1256          Positioning and Restraints    Pre-Treatment Position in bed  -     Post Treatment Position bed  -AJ     In Bed notified [place];milli;call light within reach;encouraged to call for  assist;side rails up x2;with family/caregiver  -MAURO               User Key  (r) = Recorded By, (t) = Taken By, (c) = Cosigned By      Initials Name Provider Type    Parish Mae PT DPT Physical Therapist                   Outcome Measures       Row Name 03/02/25 1256 03/02/25 0830       How much help from another person do you currently need...    Turning from your back to your side while in flat bed without using bedrails? 3  -AJ 2  -MB    Moving from lying on back to sitting on the side of a flat bed without bedrails? 3  -AJ 2  -MB    Moving to and from a bed to a chair (including a wheelchair)? 3  -AJ 2  -MB    Standing up from a chair using your arms (e.g., wheelchair, bedside chair)? 3  -AJ 2  -MB    Climbing 3-5 steps with a railing? 1  -MAURO 1  -MB    To walk in hospital room? 2  -AJ 1  -MB    AM-PAC 6 Clicks Score (PT) 15  -MAURO 10  -MB    Highest Level of Mobility Goal 4 --> Transfer to chair/commode  -AJ 4 --> Transfer to chair/commode  -MB      Row Name 03/02/25 1256          Functional Assessment    Outcome Measure Options AM-PAC 6 Clicks Basic Mobility (PT)  -MAURO               User Key  (r) = Recorded By, (t) = Taken By, (c) = Cosigned By      Initials Name Provider Type    Parish Mae PT DPT Physical Therapist    Eric Castro, RN Registered Nurse                                 Physical Therapy Education       Title: PT OT SLP Therapies (In Progress)       Topic: Physical Therapy (Done)       Point: Mobility training (Done)       Learning Progress Summary            Patient Acceptance, E, VU by MAURO at 3/2/2025 1417    Comment: role of PT, benefits of OOB activity, d/c planning, safety with sling                      Point: Home exercise program (Done)       Learning Progress Summary            Patient Acceptance, E, VU by MAURO at 3/2/2025 1417    Comment: role of PT, benefits of OOB activity, d/c planning, safety with sling                      Point: Body mechanics (Done)       Learning  Progress Summary            Patient Acceptance, E, VU by  at 3/2/2025 1417    Comment: role of PT, benefits of OOB activity, d/c planning, safety with sling                      Point: Precautions (Done)       Learning Progress Summary            Patient Acceptance, E, VU by  at 3/2/2025 1417    Comment: role of PT, benefits of OOB activity, d/c planning, safety with sling                                      User Key       Initials Effective Dates Name Provider Type Discipline     08/15/24 -  Parish Dominguez, PT DPT Physical Therapist PT                  PT Recommendation and Plan  Planned Therapy Interventions (PT): balance training, bed mobility training, gait training, home exercise program, postural re-education, patient/family education, transfer training, ROM (range of motion), strengthening  Outcome Evaluation: PT eval complete. Pt in fowlers position, AOx4 during session and agreeable to session. Pt reports he is not very active at home and spends majority of time in his lift chair. Today pt required Min A for bed mobility and maintained static sitting balance with SBA. Pt demo decreased AROM to roughly 50% in B LE. No formal MMT due to decreased AROM but did require assist for t/f, so demo decreased strength. Pt performed sit<>stand at EOB, demo posterior lean initially but corrected with verbal cues. pt required Min A x1 for t/f and performed side steps EOB x5. Pt fatigued quickly and returned to fowlers position and left with family members at bedside. Pt will beenfit from skilled PT services to improve activity tolerance, decrease fall risk and return to PLOF. Recommend SNF when medically stable.     Time Calculation:         PT Charges       Row Name 03/02/25 1256             Time Calculation    Start Time 1256  -      Stop Time 1335  -      Time Calculation (min) 39 min  -      PT Received On 03/02/25  -      PT Goal Re-Cert Due Date 03/12/25  -         Untimed Charges    PT Eval/Re-eval  Minutes 39  -AJ         Total Minutes    Untimed Charges Total Minutes 39  -AJ       Total Minutes 39  -AJ                User Key  (r) = Recorded By, (t) = Taken By, (c) = Cosigned By      Initials Name Provider Type    Parish Mae, PT DPT Physical Therapist                  Therapy Charges for Today       Code Description Service Date Service Provider Modifiers Qty    06652048106 HC PT EVAL MOD COMPLEXITY 3 3/2/2025 Parish Dominguez PT DPT GP 1            PT G-Codes  Outcome Measure Options: AM-PAC 6 Clicks Basic Mobility (PT)  AM-PAC 6 Clicks Score (PT): 15  AM-PAC 6 Clicks Score (OT): 9  PT Discharge Summary  Anticipated Discharge Disposition (PT): skilled nursing facility    Parish Dominguez PT DPT  3/2/2025

## 2025-03-02 NOTE — PLAN OF CARE
Goal Outcome Evaluation:  Plan of Care Reviewed With: patient, family           Outcome Evaluation: PT eval complete. Pt in fowlers position, AOx4 during session and agreeable to session. Pt reports he is not very active at home and spends majority of time in his lift chair. Today pt required Min A for bed mobility and maintained static sitting balance with SBA. Pt demo decreased AROM to roughly 50% in B LE. No formal MMT due to decreased AROM but did require assist for t/f, so demo decreased strength. Pt performed sit<>stand at EOB, demo posterior lean initially but corrected with verbal cues. pt required Min A x1 for t/f and performed side steps EOB x5. Pt fatigued quickly and returned to fowlers position and left with family members at bedside. Pt will beenfit from skilled PT services to improve activity tolerance, decrease fall risk and return to PLOF. Recommend SNF when medically stable.    Anticipated Discharge Disposition (PT): skilled nursing facility

## 2025-03-02 NOTE — PLAN OF CARE
Goal Outcome Evaluation:              Outcome Evaluation: Pt pleasantly confused. Spouse visited today. Patient with cough. Bedpan and male purewick for bathroom activities.

## 2025-03-02 NOTE — PLAN OF CARE
Goal Outcome Evaluation:  Plan of Care Reviewed With: patient        Progress: no change  Outcome Evaluation: Pt A/Ox2, disoriented to time and situation. Immobilizer sling to right arm. Pt voiding via external catheter. Tele monitor in place. Blood glucose monitored. Safety maintained. Call light in reach.

## 2025-03-03 LAB
ANION GAP SERPL CALCULATED.3IONS-SCNC: 9 MMOL/L (ref 5–15)
BASOPHILS # BLD AUTO: 0.02 10*3/MM3 (ref 0–0.2)
BASOPHILS NFR BLD AUTO: 0.3 % (ref 0–1.5)
BUN SERPL-MCNC: 19 MG/DL (ref 8–23)
BUN/CREAT SERPL: 21.3 (ref 7–25)
CALCIUM SPEC-SCNC: 8.9 MG/DL (ref 8.2–9.6)
CHLORIDE SERPL-SCNC: 106 MMOL/L (ref 98–107)
CO2 SERPL-SCNC: 25 MMOL/L (ref 22–29)
CREAT SERPL-MCNC: 0.89 MG/DL (ref 0.76–1.27)
DEPRECATED RDW RBC AUTO: 56.9 FL (ref 37–54)
EGFRCR SERPLBLD CKD-EPI 2021: 81.4 ML/MIN/1.73
EOSINOPHIL # BLD AUTO: 0.39 10*3/MM3 (ref 0–0.4)
EOSINOPHIL NFR BLD AUTO: 5.9 % (ref 0.3–6.2)
ERYTHROCYTE [DISTWIDTH] IN BLOOD BY AUTOMATED COUNT: 16.1 % (ref 12.3–15.4)
GLUCOSE BLDC GLUCOMTR-MCNC: 122 MG/DL (ref 70–130)
GLUCOSE BLDC GLUCOMTR-MCNC: 134 MG/DL (ref 70–130)
GLUCOSE BLDC GLUCOMTR-MCNC: 152 MG/DL (ref 70–130)
GLUCOSE SERPL-MCNC: 105 MG/DL (ref 65–99)
HCT VFR BLD AUTO: 26.3 % (ref 37.5–51)
HGB BLD-MCNC: 8.5 G/DL (ref 13–17.7)
IMM GRANULOCYTES # BLD AUTO: 0.05 10*3/MM3 (ref 0–0.05)
IMM GRANULOCYTES NFR BLD AUTO: 0.8 % (ref 0–0.5)
LYMPHOCYTES # BLD AUTO: 1.61 10*3/MM3 (ref 0.7–3.1)
LYMPHOCYTES NFR BLD AUTO: 24.5 % (ref 19.6–45.3)
MCH RBC QN AUTO: 31.4 PG (ref 26.6–33)
MCHC RBC AUTO-ENTMCNC: 32.3 G/DL (ref 31.5–35.7)
MCV RBC AUTO: 97 FL (ref 79–97)
MONOCYTES # BLD AUTO: 0.74 10*3/MM3 (ref 0.1–0.9)
MONOCYTES NFR BLD AUTO: 11.3 % (ref 5–12)
NEUTROPHILS NFR BLD AUTO: 3.76 10*3/MM3 (ref 1.7–7)
NEUTROPHILS NFR BLD AUTO: 57.2 % (ref 42.7–76)
NRBC BLD AUTO-RTO: 0 /100 WBC (ref 0–0.2)
PLATELET # BLD AUTO: 188 10*3/MM3 (ref 140–450)
PMV BLD AUTO: 9.2 FL (ref 6–12)
POTASSIUM SERPL-SCNC: 3.9 MMOL/L (ref 3.5–5.2)
QT INTERVAL: 462 MS
QTC INTERVAL: 526 MS
RBC # BLD AUTO: 2.71 10*6/MM3 (ref 4.14–5.8)
SODIUM SERPL-SCNC: 140 MMOL/L (ref 136–145)
WBC NRBC COR # BLD AUTO: 6.57 10*3/MM3 (ref 3.4–10.8)

## 2025-03-03 PROCEDURE — 82948 REAGENT STRIP/BLOOD GLUCOSE: CPT

## 2025-03-03 PROCEDURE — 80048 BASIC METABOLIC PNL TOTAL CA: CPT | Performed by: FAMILY MEDICINE

## 2025-03-03 PROCEDURE — 85025 COMPLETE CBC W/AUTO DIFF WBC: CPT | Performed by: FAMILY MEDICINE

## 2025-03-03 PROCEDURE — 97530 THERAPEUTIC ACTIVITIES: CPT

## 2025-03-03 PROCEDURE — 97116 GAIT TRAINING THERAPY: CPT

## 2025-03-03 PROCEDURE — 97110 THERAPEUTIC EXERCISES: CPT

## 2025-03-03 RX ADMIN — LEVOTHYROXINE SODIUM 75 MCG: 75 TABLET ORAL at 05:38

## 2025-03-03 RX ADMIN — ASPIRIN 81 MG: 81 TABLET, COATED ORAL at 08:52

## 2025-03-03 RX ADMIN — MONTELUKAST SODIUM 10 MG: 10 TABLET, COATED ORAL at 21:18

## 2025-03-03 RX ADMIN — LORAZEPAM 0.5 MG: 0.5 TABLET ORAL at 21:17

## 2025-03-03 RX ADMIN — BENZONATATE 100 MG: 100 CAPSULE ORAL at 05:38

## 2025-03-03 RX ADMIN — METOPROLOL SUCCINATE 25 MG: 25 TABLET, EXTENDED RELEASE ORAL at 08:51

## 2025-03-03 RX ADMIN — Medication 150 MG: at 08:52

## 2025-03-03 RX ADMIN — FINASTERIDE 5 MG: 5 TABLET, FILM COATED ORAL at 08:52

## 2025-03-03 RX ADMIN — PANTOPRAZOLE SODIUM 40 MG: 40 TABLET, DELAYED RELEASE ORAL at 08:51

## 2025-03-03 RX ADMIN — APIXABAN 5 MG: 5 TABLET, FILM COATED ORAL at 08:52

## 2025-03-03 RX ADMIN — APIXABAN 5 MG: 5 TABLET, FILM COATED ORAL at 21:17

## 2025-03-03 RX ADMIN — LOSARTAN POTASSIUM 25 MG: 25 TABLET, FILM COATED ORAL at 08:51

## 2025-03-03 RX ADMIN — ACETAMINOPHEN 650 MG: 325 TABLET ORAL at 21:17

## 2025-03-03 RX ADMIN — ALLOPURINOL 300 MG: 300 TABLET ORAL at 08:52

## 2025-03-03 RX ADMIN — BENZONATATE 100 MG: 100 CAPSULE ORAL at 21:18

## 2025-03-03 RX ADMIN — FOLIC ACID 1 MG: 1 TABLET ORAL at 08:52

## 2025-03-03 NOTE — CASE MANAGEMENT/SOCIAL WORK
Continued Stay Note   [place]     Patient Name: Dexter Suggs  MRN: [id number]  Today's Date: 3/3/2025    Admit Date: 2/28/2025        Discharge Plan       Row Name 03/03/25 1445       Plan    Plan Comments [facility] can offer a rehab bed and accept when pt is medically stable. Will follow.                   Discharge Codes    No documentation.                       KARINE Parikh

## 2025-03-03 NOTE — PLAN OF CARE
Goal Outcome Evaluation:  Plan of Care Reviewed With: patient        Progress: no change  Outcome Evaluation: Pt A/Ox2. PRN meds given for cough this AM. Blood glucose monitored. Safety maintained. Call light in reach.

## 2025-03-03 NOTE — PLAN OF CARE
Goal Outcome Evaluation:  Plan of Care Reviewed With: patient, spouse        Progress: improving  Outcome Evaluation: PT tx completed. Pt cont to c/o R shoulder pain with any mobility. RUE sling donned while pt in bed. Requires mod x1 for supine to sit and increased time to scoot to EOB d/t weakness. Able to stand with min x1 and bed slightly elevated. Amb with HHA x15' with min x1 d/t decreased balance and posterior lean at times. Provided pt with HW and ambulation somewhat improved. Amb up to 25' at a time. Min x1 for toilet t/f and assist provided with hygiene care. Pt agreed to sit up in chair. RUE supported with pillow. Will cont to follow.

## 2025-03-03 NOTE — CONSULTS
[facility]  INPATIENT WOUND & OSTOMY CARE    Today's Date: 03/03/25    Patient Name: Dexter Suggs  MRN: [id number]  CSN: [id number]  PCP: Ashish Callejas MD  Attending Provider: Ashish Callejas MD  Length of Stay: 3    Wound care consulted for pressure injury to thoracic spine and sacral spine, present on admission. Nursing has uploaded picture to chart. Patient was admitted with weakness on 2/28/2025. Patient is currently sitting up in recliner working with PT. His wife is at bedside.     Patient states he has had a sore to his bottom for 3 years. He thinks this started during a stay at rehab. They have tried multiple creams. Unable to recall the names of these but can recall that Zinc was in one of the creams prescribed.     Patient's wife states the wound to his thoracic spine was from a recent fall.     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 Apply therahoney to wound bed, cover with Vaseline gauze, cover with silicone foam border dressing.    Cleanse Normal Saline    Intervention Thera Honey    Intervention Vaseline Gauze    Securement Silicone Border Dressing        03/03/25 1030    03/03/25 1031  Turn Patient  Now Then Every 2 Hours         03/03/25 1030    03/03/25 1031  Elevate Heels Off of Bed  Until Discontinued         03/03/25 1030    03/03/25 1031  Use Seat Cushion When Up In Chair  Continuous          03/03/25 1030    03/03/25 1031  Silicone Border Dressing to Bony Prominences  Per Order Details        Comments: Apply silicone border foam dressing per protocol to bilateral heels for protection.  Nursing to change dressing every 3 days and PRN if soiled. Nursing is to peel back dressing with every assessment to assess skin underneath dressing. No barrier cream under dressing.    03/03/25 1030    03/03/25 1031  Use Repositioning Wedge to Position Patient  Continuous        Comments: Please use comfort glide and wedges for repositioning patient    03/03/25 1030    03/03/25 1030  Specialty Bed Dolphin FIS Mattress  Once        Comments: For Dolphin FIS Mattress, call [employer] to order a HBCS bed frame.  If bariatric/bariatric dolphin, [employer] provides frame, mattress, pump, and trapeze (if needed).  Nurse or HUC is to call [employer], the rental company, to order the equipment, provide patient's name, room number, and if in isolation. [phone].   Question:  Specialty Bed needed  Answer:  Dolphin FIS Mattress    03/03/25 1030    Unscheduled  Wound Care  As Needed      Question:  Wound Care Instructions  Answer:  Apply Moisture Barrier After Any Incontinence    03/03/25 1030        Patient and family educated on importance of turning and repositioning at frequent intervals to prevent skin breakdown. They voiced understanding. All questions answered.     Inpatient wound care will continue to follow during hospital stay.  Please contact if any issues or concerns arise.     This document has been electronically signed by Zenia Jamison RN on 3/3/2025 10:30 CST

## 2025-03-03 NOTE — PROGRESS NOTES
Family Medicine Progress Note    Patient:  Dexter Suggs  YOB: 1934    MRN: [id number]     Acct: [id number]     Admit date: 2/28/2025    Patient Seen, Chart, Consults notes, Labs, Radiology studies reviewed.    Subjective: Day 3 of stay with generalized weakness including a fall a couple of days prior to admission for which she was seen in the emergency room and diagnosed with right humeral head fracture and most recent (in last 24 hours) has had admission for further treatment and evaluation.  He is accompanied by his wife today.  Doing a little bit better.  Still very weak.  Pain in shoulder is controlled reasonably well but has been basically nonambulatory.  Had a little bit of a cough that is productive for some colored sputum.    Past, Family, Social History unchanged from admission.    Diet: Diet: Regular/House; Fluid Consistency: Thin (IDDSI 0)    Medications:  Scheduled Meds:allopurinol, 300 mg, Oral, Daily  apixaban, 5 mg, Oral, Q12H  aspirin, 81 mg, Oral, Daily  finasteride, 5 mg, Oral, Daily  folic acid, 1 mg, Oral, Daily  iron polysaccharides, 150 mg, Oral, Daily  levothyroxine, 75 mcg, Oral, Q AM  losartan, 25 mg, Oral, Daily  metoprolol succinate XL, 25 mg, Oral, Daily  montelukast, 10 mg, Oral, Nightly  pantoprazole, 40 mg, Oral, Daily      Continuous Infusions:   PRN Meds:  acetaminophen    benzonatate    LORazepam    nitroglycerin    sodium chloride    [COMPLETED] Insert Peripheral IV **AND** sodium chloride    Objective:    Lab Results (last 24 hours)       Procedure Component Value Units Date/Time    Basic Metabolic Panel [[id number]]  (Abnormal) Collected: 03/03/25 0525    Specimen: Blood Updated: 03/03/25 0632     Glucose 105 mg/dL      BUN 19 mg/dL      Creatinine 0.89 mg/dL      Sodium 140 mmol/L      Potassium 3.9 mmol/L      Chloride 106 mmol/L      CO2 25.0 mmol/L      Calcium 8.9 mg/dL      BUN/Creatinine Ratio 21.3     Anion Gap 9.0 mmol/L      eGFR 81.4 mL/min/1.73      Narrative:      GFR Categories in Chronic Kidney Disease (CKD)      GFR Category          GFR (mL/min/1.73)    Interpretation  G1                     90 or greater         Normal or high (1)  G2                      60-89                Mild decrease (1)  G3a                   45-59                Mild to moderate decrease  G3b                   30-44                Moderate to severe decrease  G4                    15-29                Severe decrease  G5                    14 or less           Kidney failure          (1)In the absence of evidence of kidney disease, neither GFR category G1 or G2 fulfill the criteria for CKD.    eGFR calculation 2021 CKD-EPI creatinine equation, which does not include race as a factor    CBC & Differential [[id number]]  (Abnormal) Collected: 03/03/25 0525    Specimen: Blood Updated: 03/03/25 0610    Narrative:      The following orders were created for panel order CBC & Differential.  Procedure                               Abnormality         Status                     ---------                               -----------         ------                     CBC Auto Differential[[id number]]        Abnormal            Final result                 Please view results for these tests on the individual orders.    CBC Auto Differential [[id number]]  (Abnormal) Collected: 03/03/25 0525    Specimen: Blood Updated: 03/03/25 0610     WBC 6.57 10*3/mm3      RBC 2.71 10*6/mm3      Hemoglobin 8.5 g/dL      Hematocrit 26.3 %      MCV 97.0 fL      MCH 31.4 pg      MCHC 32.3 g/dL      RDW 16.1 %      RDW-SD 56.9 fl      MPV 9.2 fL      Platelets 188 10*3/mm3      Neutrophil % 57.2 %      Lymphocyte % 24.5 %      Monocyte % 11.3 %      Eosinophil % 5.9 %      Basophil % 0.3 %      Immature Grans % 0.8 %      Neutrophils, Absolute 3.76 10*3/mm3      Lymphocytes, Absolute 1.61 10*3/mm3      Monocytes, Absolute 0.74 10*3/mm3      Eosinophils, Absolute 0.39 10*3/mm3      Basophils, Absolute 0.02 10*3/mm3  "     Immature Grans, Absolute 0.05 10*3/mm3      nRBC 0.0 /100 WBC     POC Glucose Once [[id number]]  (Normal) Collected: 03/02/25 2201    Specimen: Blood Updated: 03/02/25 2213     Glucose 121 mg/dL      Comment: : [id number] Ezequiel MarcyMeter ID: [id number]       POC Glucose Once [[id number]]  (Normal) Collected: 03/02/25 0823    Specimen: Blood Updated: 03/02/25 0834     Glucose 100 mg/dL      Comment: : [email or web address] Masters MarkusMeter ID: [id number]                Imaging Results (Last 72 Hours)       Procedure Component Value Units Date/Time    XR Abdomen KUB [[id number]] Collected: 02/28/25 1703     Updated: 02/28/25 1708    Narrative:      EXAM: XR ABDOMEN KUB-      DATE: 2/28/2025 4:02 PM     HISTORY: Constipation       COMPARISON: 11/11/2022.     TECHNIQUE:   Frontal views of the abdomen. 2 images.     FINDINGS:    There is a nonspecific bowel gas pattern with borderline air-filled  loops of bowel and bowel gas and stool distally in the colon. There is  no large amount of stool in the colon. There is no evidence for bowel  obstruction. Left abdominal calcifications are again noted likely  nonobstructing left renal stones largest measures 0.9 cm. There is  osteopenia with multilevel lumbar spine spondylosis and bilateral total  hip arthroplasty. There is a probable chronic fracture of the left  inferior pubic ramus.          Impression:         1. No large stool in the colon to suggest significant constipation.  2. Bowel gas pattern is nonspecific with borderline air-filled loops of  small bowel but nonobstructive.  3. Probable stable nonobstructing stones in the left kidney.     This report was signed and finalized on 2/28/2025 5:05 PM by Montana Fountain.                Physical Exam:    Vitals: /72 (BP Location: Left arm, Patient Position: Lying)   Pulse 66   Temp 97.9 °F (36.6 °C) (Oral)   Resp 16   Ht 182.9 cm (72\")   Wt 75.6 kg (166 lb 10.7 oz)   SpO2 98%   BMI 22.60 kg/m²   24 " hour intake/output:  Intake/Output Summary (Last 24 hours) at 3/3/2025 0759  Last data filed at 3/2/2025 1100  Gross per 24 hour   Intake 240 ml   Output 1300 ml   Net -1060 ml     Last 3 weights:  Wt Readings from Last 3 Encounters:   02/28/25 75.6 kg (166 lb 10.7 oz)   02/26/25 75.4 kg (166 lb 4.8 oz)   01/03/25 69.9 kg (154 lb)       General Appearance alert, appears stated age, cooperative, and overall frail  Head normocephalic, without obvious abnormality  Eyes lids and lashes normal, conjunctivae and sclerae normal, and no icterus  Neck supple, trachea midline, and no thyromegaly  Lungs clear to auscultation, respirations regular, respirations even, and respirations unlabored  Heart regular rhythm & normal rate and normal S1, S2  Abdomen normal bowel sounds, soft non-tender, and no guarding  Extremities no edema, no cyanosis, and no redness  Skin turgor normal and color normal  Neurologic Mental Status orientated to person, place, time and situation        Assessment:      Weakness    Hypertension  History of complete heart block with pacemaker  Hashimoto's thyroiditis  Rheumatoid arthritis  Frailty  Gait abnormality  Ischemic heart disease      Plan:  Chronically ill gentleman with weakness that is worsened recently.  Has some emphysematous changes on chest x-ray.  This is likely the cause of his sputum production.  Monitor for now as he has normal oxygen saturation.  He has physical therapy and Occupational Therapy ordered.  He has not seen an orthopedist and was instructed to do so as an outpatient.  I will consult them today to review his films although I believe this is likely a nonoperative issue.  Certainly complicates the fact that he uses a walker at home and is nonweightbearing on right upper extremity currently.  Discussed with wife and patient will need some skilled nursing facility acute rehab I believe.  Care management consultation ordered.    Greater than 25 minutes spent in coordination of  care today.      Electronically signed by Ashish Callejas MD on 3/3/2025 at 07:59 CST

## 2025-03-03 NOTE — THERAPY TREATMENT NOTE
Acute Care - Physical Therapy Treatment Note  [facility]     Patient Name: Dexter Suggs  : 1934  MRN: [id number]  Today's Date: 3/3/2025      Visit Dx:     ICD-10-CM ICD-9-CM   1. Weakness  R53.1 780.79   2. Unable to care for self  Z78.9 V49.89   3. Acute kidney injury  N17.9 584.9   4. 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    Actinic keratosis    Benign prostatic hyperplasia with lower urinary tract symptoms    Complete heart block    Pacemaker    Laceration of face without complication    PAF (paroxysmal atrial fibrillation)    Paroxysmal atrial flutter    Pernicious anemia    Alkaline phosphatase elevation    Anemia    Gastroesophageal reflux disease    Hypothyroidism due to Hashimoto's thyroiditis    Impaired fasting glucose    Overlapping malignant melanoma of skin    Primary osteoarthritis of both hands    COVID-19 virus infection    Closed fracture of distal end of radius    Arthritis of hand    Hip fracture, left    Gross hematuria    Chronic anticoagulation    Postoperative anemia due to acute blood loss    Closed nondisplaced fracture of lesser trochanter of right femur with routine healing    Closed subcapital fracture of femur, left, initial encounter    Confusion and disorientation    Ischemic heart disease    LVH (left ventricular hypertrophy)    Hematochezia    Acquired hypothyroidism    Anxiety    Atherosclerosis of coronary artery without angina pectoris    Disorder of vitamin B12    Excessive anticoagulation    Gait abnormality    Glaucoma    Gout    History of COVID-19    History of malignant neoplasm of skin    Onychomycosis of toenail    Pathological fracture of left femur due to osteoporosis    Pressure ulcer of sacral region    S/p  left hip fracture    Anemia    Rheumatoid arthritis    BPH (benign prostatic hyperplasia)    Chronic anticoagulation    Complete atrioventricular block    Gastroesophageal reflux disease without esophagitis    Impaired fasting glucose    Prostate cancer    Hip pain    Atrial fibrillation    Essential hypertension    Sick sinus syndrome    Compression fracture of T12 vertebra    Body mass index (BMI) of 20.0 to 20.9 in adult    Nonsmoker    Degeneration of lumbar or lumbosacral intervertebral disc    Controlled type 2 diabetes mellitus without complication, without long-term current use of insulin    Elevated troponin    Rib fracture    Weakness     Past Medical History:   Diagnosis Date    Abnormal nuclear stress test     Anemia 2021    Arthritis     BPH (benign prostatic hyperplasia)     Coronary artery disease     Diabetes mellitus     Disease of thyroid gland     Fracture     spinal t12    GERD (gastroesophageal reflux disease)     Glaucoma 02/18/2022    Gout     Hyperlipidemia LDL goal <70 11/14/2016    Ischemic heart disease 06/23/2022    Ischemic heart disease 06/23/2022    Low back pain     LVH (left ventricular hypertrophy) 06/23/2022    Melanoma     Onychomycosis of toenail 06/24/2022    Pacemaker 01/18/2019    PAF (paroxysmal atrial fibrillation) 10/29/2019    Chads vas score 3    Primary hypertension 11/14/2016    Prostate CA     Sick sinus syndrome 03/01/2019    Stress fracture Aug. 2021     Past Surgical History:   Procedure Laterality Date    APPENDECTOMY      CARDIAC CATHETERIZATION Left 12/10/2012    CARDIAC ELECTROPHYSIOLOGY PROCEDURE N/A 11/23/2018    Procedure: Pacemaker DC new 11/23/2018;  Surgeon: Austin Granados MD;  Location: [facility] CATH INVASIVE LOCATION;  Service: Cardiology    CHOLECYSTECTOMY      COLONOSCOPY N/A 06/09/2017    Tics, hemorrhoids repeat exam prn    COLONOSCOPY W/ POLYPECTOMY  02/16/2012    adenomatous polyp at 80cm    ENDOSCOPY N/A 09/13/2022    Procedure:  ESOPHAGOGASTRODUODENOSCOPY WITH ANESTHESIA;  Surgeon: Felice Marroquin MD;  Location:  [facility] ENDOS[facility];  Service: Gastroenterology;  Laterality: N/A;  pre hematochezia  post; normal   Ashish Callejas MD    HAND SURGERY Right     HERNIA REPAIR      HIP CANNULATED SCREW PLACEMENT Left 01/18/2022    Procedure: HIP CANNULATED SCREW PLACEMENT;  Surgeon: Isaiah Sheehan MD;  Location:  [facility] OR;  Service: Orthopedics;  Laterality: Left;    INGUINAL HERNIA REPAIR      INSERT / REPLACE / REMOVE PACEMAKER      KNEE SURGERY      PROSTATE SURGERY      SKIN CANCER EXCISION      face    SKIN LESION EXCISION      TOTAL HIP ARTHROPLASTY Right 08/27/2021    Procedure: TOTAL HIP REPLACEMENT;  Surgeon: Arik Magaña MD;  Location:  [facility] OR;  Service: Orthopedics;  Laterality: Right;    TOTAL HIP ARTHROPLASTY Left 03/25/2022    Procedure: LEFT HIP SCREW REMOVAL / LEFT TOTAL HIP ARTHROPLASTY;  Surgeon: BASHIR Monsivais MD;  Location:  [facility] OR;  Service: Orthopedics;  Laterality: Left;     PT Assessment (Last 12 Hours)       PT Evaluation and Treatment       Row Name 03/03/25 1505 03/03/25 0922       Physical Therapy Time and Intention    Subjective Information complains of;weakness;pain  -LY complains of;weakness;fatigue;pain  -LY    Document Type therapy note (daily note)  -LY therapy note (daily note)  -LY    Mode of Treatment physical therapy  -LY physical therapy  -LY      Row Name 03/03/25 1505 03/03/25 0922       General Information    Existing Precautions/Restrictions fall;other (see comments)  sling R UE  -LY fall;other (see comments)  sling R UE  -LY      Row Name 03/03/25 1505 03/03/25 0922       Pain    Pretreatment Pain Rating 4/10  -LY 2/10  -LY    Posttreatment Pain Rating 4/10  -LY 4/10  -LY    Pain Location shoulder  -LY extremity  -LY    Pain Side/Orientation right;upper  -LY right;upper  -LY    Pain Management Interventions exercise or physical activity utilized  -LY exercise or physical activity  utilized  -LY    Response to Pain Interventions activity participation with tolerable pain  -LY activity participation with tolerable pain  -LY      Row Name 03/03/25 1505 03/03/25 0922       Bed Mobility    Rolling Left [place] (Bed Mobility) minimum assist (75% patient effort)  -LY --    Supine-Sit [place] (Bed Mobility) -- minimum assist (75% patient effort);moderate assist (50% patient effort)  -LY    Sit-Supine [place] (Bed Mobility) verbal cues;minimum assist (75% patient effort);moderate assist (50% patient effort)  -LY --    Bed Mobility, Safety Issues decreased use of arms for pushing/pulling  -LY decreased use of arms for pushing/pulling  -LY    Assistive Device (Bed Mobility) head of bed elevated;bed rails  -LY head of bed elevated;bed rails  -LY    Comment, (Bed Mobility) positioned in L sidelying for pressure relief, also removed sling d/t wounds on back. Supported R shoulder with pillows.  -LY --      Row Name 03/03/25 0922          Transfers    Transfers stand-sit transfer;sit-stand transfer;toilet transfer  -LY     Comment, (Transfers) posterior lean upon standing  -LY       Row Name 03/03/25 1505 03/03/25 0922       Sit-Stand Transfer    Sit-Stand [place] (Transfers) verbal cues;minimum assist (75% patient effort)  -LY verbal cues;minimum assist (75% patient effort)  -LY    Assistive Device (Sit-Stand Transfers) walker, william  -LY walker, william  -LY    Comment, (Sit-Stand Transfer) -- slghtly elevated bed  -LY      Row Name 03/03/25 1505 03/03/25 0922       Stand-Sit Transfer    Stand-Sit [place] (Transfers) verbal cues;minimum assist (75% patient effort)  -LY verbal cues;minimum assist (75% patient effort)  -LY      Row Name 03/03/25 0922          Toilet Transfer    Type (Toilet Transfer) sit-stand;stand-sit  -LY     [place] Level (Toilet Transfer) verbal cues;minimum assist (75% patient effort)  -LY     Assistive Device (Toilet Transfer) commode;grab bars/safety  frame;walker, william  -LY       Row Name 03/03/25 1505 03/03/25 0922       Gait/Stairs (Locomotion)    [place] Level (Gait) verbal cues;minimum assist (75% patient effort)  -LY verbal cues;minimum assist (75% patient effort)  -LY    Assistive Device (Gait) walker, william  -LY walker, william  -LY    Distance in Feet (Gait) 20  -LY 25  x3  -LY    Deviations/Abnormal Patterns (Gait) gait speed decreased;stride length decreased  -LY gait speed decreased;stride length decreased  -LY    Bilateral Gait Deviations forward flexed posture  -LY forward flexed posture  -LY    Comment, (Gait/Stairs) -- balance much improved with use of HW  -LY      Row Name 03/03/25 0922          Motor Skills    Comments, Therapeutic Exercise BLE AROM seated x15 reps  -LY     Additional Documentation Comments, Therapeutic Exercise (Row)  -LY       Row Name             Wound 02/28/25 2046 Left thoracic spine    Wound - Properties Group Placement Date: 02/28/25  -LF Placement Time: 2046  -LF Side: Left  -LF Location: thoracic spine  -LF    Retired Wound - Properties Group Placement Date: 02/28/25  -LF Placement Time: 2046  -LF Side: Left  -LF Location: thoracic spine  -LF    Retired Wound - Properties Group Placement Date: 02/28/25  -LF Placement Time: 2046  -LF Side: Left  -LF Location: thoracic spine  -LF    Retired Wound - Properties Group Date first assessed: 02/28/25  -LF Time first assessed: 2046  -LF Side: Left  -LF Location: thoracic spine  -LF      Row Name             Wound 03/23/22 0846 perineum    Wound - Properties Group Placement Date: 03/23/22  -ST Placement Time: 0846  -ST Location: perineum  -ST Present on Original Admission: Y  -ST Additional Comments: redness present upon admission  -ST    Retired Wound - Properties Group Placement Date: 03/23/22  -ST Placement Time: 0846  -ST Present on Original Admission: Y  -ST Location: perineum  -ST Additional Comments: redness present upon admission  -ST    Retired Wound - Properties Group  Placement Date: 03/23/22 -ST Placement Time: 0846  -ST Present on Original Admission: Y  -ST Location: perineum  -ST Additional Comments: redness present upon admission  -ST    Retired Wound - Properties Group Date first assessed: 03/23/22 -ST Time first assessed: 0846  -ST Present on Original Admission: Y  -ST Location: perineum  -ST Additional Comments: redness present upon admission  -ST      Row Name 03/03/25 0922          Plan of Care Review    Plan of Care Reviewed With patient;spouse  -LY     Progress improving  -LY     Outcome Evaluation PT tx completed. Pt cont to c/o R shoulder pain with any mobility. RUE sling donned while pt in bed. Requires mod x1 for supine to sit and increased time to scoot to EOB d/t weakness. Able to stand with min x1 and bed slightly elevated. Amb with HHA x15' with min x1 d/t decreased balance and posterior lean at times. Provided pt with HW and ambulation somewhat improved. Amb up to 25' at a time. Min x1 for toilet t/f and assist provided with hygiene care. Pt agreed to sit up in chair. RUE supported with pillow. Will cont to follow.  -LY       Row Name 03/03/25 1505 03/03/25 0922       Positioning and Restraints    Pre-Treatment Position sitting in chair/recliner  -LY in bed  -LY    Post Treatment Position bed  -LY chair  -LY    In Bed side lying left;call light within reach;encouraged to call for assist;exit alarm on;with family/caregiver  -LY --    In Chair -- reclined;call light within reach;encouraged to call for assist;exit alarm on;with family/caregiver  -LY              User Key  (r) = Recorded By, (t) = Taken By, (c) = Cosigned By      Initials Name Provider Type    Zunilda Cassidy PTA Physical Therapist Assistant    Veronique Argueta, RN Registered Nurse    Yissel Mcleod, RN Registered Nurse                    Physical Therapy Education       Title: PT OT SLP Therapies (In Progress)       Topic: Physical Therapy (Done)       Point: Mobility training (Done)        Learning Progress Summary            Patient Acceptance, E, VU by MAURO at 3/2/2025 1417    Comment: role of PT, benefits of OOB activity, d/c planning, safety with sling                      Point: Home exercise program (Done)       Learning Progress Summary            Patient Acceptance, E, VU by MAURO at 3/2/2025 1417    Comment: role of PT, benefits of OOB activity, d/c planning, safety with sling                      Point: Body mechanics (Done)       Learning Progress Summary            Patient Acceptance, E, VU by MAURO at 3/2/2025 1417    Comment: role of PT, benefits of OOB activity, d/c planning, safety with sling                      Point: Precautions (Done)       Learning Progress Summary            Patient Acceptance, E, VU by MAURO at 3/2/2025 1417    Comment: role of PT, benefits of OOB activity, d/c planning, safety with sling                                      User Key       Initials Effective Dates Name Provider Type Discipline     08/15/24 -  Parish Dominguez, PT DPT Physical Therapist PT                  PT Recommendation and Plan     Plan of Care Reviewed With: patient, spouse  Progress: improving  Outcome Evaluation: PT tx completed. Pt cont to c/o R shoulder pain with any mobility. RUE sling donned while pt in bed. Requires mod x1 for supine to sit and increased time to scoot to EOB d/t weakness. Able to stand with min x1 and bed slightly elevated. Amb with HHA x15' with min x1 d/t decreased balance and posterior lean at times. Provided pt with HW and ambulation somewhat improved. Amb up to 25' at a time. Min x1 for toilet t/f and assist provided with hygiene care. Pt agreed to sit up in chair. RUE supported with pillow. Will cont to follow.       Time Calculation:    PT Charges       Row Name 03/03/25 1609 03/03/25 1419          Time Calculation    Start Time 1505  -LY 0922  -LY     Stop Time 1535  -LY 1025  -LY     Time Calculation (min) 30 min  -LY 63 min  -LY     PT Received On 03/03/25  -LY  03/03/25  -LY        Time Calculation- PT    Total Timed Code Minutes- PT 30 minute(s)  -LY 63 minute(s)  -LY        Timed Charges    05705 - PT Therapeutic Exercise Minutes -- 15  -LY     92988 - Gait Training Minutes  15  -LY 28  -LY     15181 - PT Therapeutic Activity Minutes 15  -LY 20  -LY        Total Minutes    Timed Charges Total Minutes 30  -LY 63  -LY      Total Minutes 30  -LY 63  -LY               User Key  (r) = Recorded By, (t) = Taken By, (c) = Cosigned By      Initials Name Provider Type    Zunilda Cassidy PTA Physical Therapist Assistant                  Therapy Charges for Today       Code Description Service Date Service Provider Modifiers Qty    89216130262 HC PT THER PROC EA 15 MIN 3/3/2025 Zunilda Donaldson, PTA GP 1    15434866670 HC GAIT TRAINING EA 15 MIN 3/3/2025 Zunilda Donaldson, PTA GP 2    88994976659 HC PT THERAPEUTIC ACT EA 15 MIN 3/3/2025 Zunilda Donaldson, PTA GP 1    73191502759 HC PT THERAPEUTIC ACT EA 15 MIN 3/3/2025 Zunilda Donaldson, PTA GP 1    42360909930 HC GAIT TRAINING EA 15 MIN 3/3/2025 Zunilda Donaldson, PTA GP 1            PT G-Codes  Outcome Measure Options: AM-PAC 6 Clicks Basic Mobility (PT)  AM-PAC 6 Clicks Score (PT): 15  AM-PAC 6 Clicks Score (OT): 9    Zunilda Donaldson PTA  3/3/2025

## 2025-03-03 NOTE — THERAPY TREATMENT NOTE
Acute Care - Physical Therapy Treatment Note  [facility]     Patient Name: Dexter Suggs  : 1934  MRN: [id number]  Today's Date: 3/3/2025      Visit Dx:     ICD-10-CM ICD-9-CM   1. Weakness  R53.1 780.79   2. Unable to care for self  Z78.9 V49.89   3. Acute kidney injury  N17.9 584.9   4. 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    Actinic keratosis    Benign prostatic hyperplasia with lower urinary tract symptoms    Complete heart block    Pacemaker    Laceration of face without complication    PAF (paroxysmal atrial fibrillation)    Paroxysmal atrial flutter    Pernicious anemia    Alkaline phosphatase elevation    Anemia    Gastroesophageal reflux disease    Hypothyroidism due to Hashimoto's thyroiditis    Impaired fasting glucose    Overlapping malignant melanoma of skin    Primary osteoarthritis of both hands    COVID-19 virus infection    Closed fracture of distal end of radius    Arthritis of hand    Hip fracture, left    Gross hematuria    Chronic anticoagulation    Postoperative anemia due to acute blood loss    Closed nondisplaced fracture of lesser trochanter of right femur with routine healing    Closed subcapital fracture of femur, left, initial encounter    Confusion and disorientation    Ischemic heart disease    LVH (left ventricular hypertrophy)    Hematochezia    Acquired hypothyroidism    Anxiety    Atherosclerosis of coronary artery without angina pectoris    Disorder of vitamin B12    Excessive anticoagulation    Gait abnormality    Glaucoma    Gout    History of COVID-19    History of malignant neoplasm of skin    Onychomycosis of toenail    Pathological fracture of left femur due to osteoporosis    Pressure ulcer of sacral region    S/p  left hip fracture    Anemia    Rheumatoid arthritis    BPH (benign prostatic hyperplasia)    Chronic anticoagulation    Complete atrioventricular block    Gastroesophageal reflux disease without esophagitis    Impaired fasting glucose    Prostate cancer    Hip pain    Atrial fibrillation    Essential hypertension    Sick sinus syndrome    Compression fracture of T12 vertebra    Body mass index (BMI) of 20.0 to 20.9 in adult    Nonsmoker    Degeneration of lumbar or lumbosacral intervertebral disc    Controlled type 2 diabetes mellitus without complication, without long-term current use of insulin    Elevated troponin    Rib fracture    Weakness     Past Medical History:   Diagnosis Date    Abnormal nuclear stress test     Anemia 2021    Arthritis     BPH (benign prostatic hyperplasia)     Coronary artery disease     Diabetes mellitus     Disease of thyroid gland     Fracture     spinal t12    GERD (gastroesophageal reflux disease)     Glaucoma 02/18/2022    Gout     Hyperlipidemia LDL goal <70 11/14/2016    Ischemic heart disease 06/23/2022    Ischemic heart disease 06/23/2022    Low back pain     LVH (left ventricular hypertrophy) 06/23/2022    Melanoma     Onychomycosis of toenail 06/24/2022    Pacemaker 01/18/2019    PAF (paroxysmal atrial fibrillation) 10/29/2019    Chads vas score 3    Primary hypertension 11/14/2016    Prostate CA     Sick sinus syndrome 03/01/2019    Stress fracture Aug. 2021     Past Surgical History:   Procedure Laterality Date    APPENDECTOMY      CARDIAC CATHETERIZATION Left 12/10/2012    CARDIAC ELECTROPHYSIOLOGY PROCEDURE N/A 11/23/2018    Procedure: Pacemaker DC new 11/23/2018;  Surgeon: Austin Granados MD;  Location: [facility] CATH INVASIVE LOCATION;  Service: Cardiology    CHOLECYSTECTOMY      COLONOSCOPY N/A 06/09/2017    Tics, hemorrhoids repeat exam prn    COLONOSCOPY W/ POLYPECTOMY  02/16/2012    adenomatous polyp at 80cm    ENDOSCOPY N/A 09/13/2022    Procedure:  ESOPHAGOGASTRODUODENOSCOPY WITH ANESTHESIA;  Surgeon: Felice Marroquin MD;  Location:  [facility];  Service: Gastroenterology;  Laterality: N/A;  pre hematochezia  post; normal   Ashish Callejas MD    HAND SURGERY Right     HERNIA REPAIR      HIP CANNULATED SCREW PLACEMENT Left 01/18/2022    Procedure: HIP CANNULATED SCREW PLACEMENT;  Surgeon: Isaiah Sheehan MD;  Location:  [facility] OR;  Service: Orthopedics;  Laterality: Left;    INGUINAL HERNIA REPAIR      INSERT / REPLACE / REMOVE PACEMAKER      KNEE SURGERY      PROSTATE SURGERY      SKIN CANCER EXCISION      face    SKIN LESION EXCISION      TOTAL HIP ARTHROPLASTY Right 08/27/2021    Procedure: TOTAL HIP REPLACEMENT;  Surgeon: Arik Magaña MD;  Location:  [facility] OR;  Service: Orthopedics;  Laterality: Right;    TOTAL HIP ARTHROPLASTY Left 03/25/2022    Procedure: LEFT HIP SCREW REMOVAL / LEFT TOTAL HIP ARTHROPLASTY;  Surgeon: BASHIR Monsivais MD;  Location:  [facility] OR;  Service: Orthopedics;  Laterality: Left;     PT Assessment (Last 12 Hours)       PT Evaluation and Treatment       Row Name 03/03/25 0922          Physical Therapy Time and Intention    Subjective Information complains of;weakness;fatigue;pain  -LY     Document Type therapy note (daily note)  -LY     Mode of Treatment physical therapy  -LY       Row Name 03/03/25 0922          General Information    Existing Precautions/Restrictions fall;other (see comments)  sling R UE  -LY       Row Name 03/03/25 0922          Pain    Pretreatment Pain Rating 2/10  -LY     Posttreatment Pain Rating 4/10  -LY     Pain Location extremity  -LY     Pain Side/Orientation right;upper  -LY     Pain Management Interventions exercise or physical activity utilized  -LY     Response to Pain Interventions activity participation with tolerable pain  -LY       Row Name 03/03/25 0922          Bed Mobility    Supine-Sit [place] (Bed Mobility) minimum assist (75% patient effort);moderate assist (50%  patient effort)  -LY     Bed Mobility, Safety Issues decreased use of arms for pushing/pulling  -LY     Assistive Device (Bed Mobility) head of bed elevated;bed rails  -LY       Row Name 03/03/25 0922          Transfers    Transfers stand-sit transfer;sit-stand transfer;toilet transfer  -LY     Comment, (Transfers) posterior lean upon standing  -LY       Row Name 03/03/25 0922          Sit-Stand Transfer    Sit-Stand [place] (Transfers) verbal cues;minimum assist (75% patient effort)  -LY     Assistive Device (Sit-Stand Transfers) walker, william  -LY     Comment, (Sit-Stand Transfer) slghtly elevated bed  -LY       Row Name 03/03/25 0922          Stand-Sit Transfer    Stand-Sit [place] (Transfers) verbal cues;minimum assist (75% patient effort)  -LY       Row Name 03/03/25 0922          Toilet Transfer    Type (Toilet Transfer) sit-stand;stand-sit  -LY     [place] Level (Toilet Transfer) verbal cues;minimum assist (75% patient effort)  -LY     Assistive Device (Toilet Transfer) commode;grab bars/safety frame;walker, william  -LY       Row Name 03/03/25 0922          Gait/Stairs (Locomotion)    [place] Level (Gait) verbal cues;minimum assist (75% patient effort)  -LY     Assistive Device (Gait) walker, william  -LY     Distance in Feet (Gait) 25  x3  -LY     Deviations/Abnormal Patterns (Gait) gait speed decreased;stride length decreased  -LY     Bilateral Gait Deviations forward flexed posture  -LY     Comment, (Gait/Stairs) balance much improved with use of HW  -LY       Row Name 03/03/25 0922          Motor Skills    Comments, Therapeutic Exercise BLE AROM seated x15 reps  -LY     Additional Documentation Comments, Therapeutic Exercise (Row)  -LY       Row Name             Wound 02/28/25 2046 Left thoracic spine    Wound - Properties Group Placement Date: 02/28/25  -LF Placement Time: 2046  -LF Side: Left  -LF Location: thoracic spine  -LF    Retired Wound - Properties Group Placement Date: 02/28/25   -LF Placement Time: 2046 -LF Side: Left  -LF Location: thoracic spine  -LF    Retired Wound - Properties Group Placement Date: 02/28/25  -LF Placement Time: 2046 -LF Side: Left  -LF Location: thoracic spine  -LF    Retired Wound - Properties Group Date first assessed: 02/28/25  -LF Time first assessed: 2046 -LF Side: Left  -LF Location: thoracic spine  -LF      Row Name             Wound 03/23/22 0846 perineum    Wound - Properties Group Placement Date: 03/23/22  -ST Placement Time: 0846  -ST Location: perineum  -ST Present on Original Admission: Y  -ST Additional Comments: redness present upon admission  -ST    Retired Wound - Properties Group Placement Date: 03/23/22  -ST Placement Time: 0846  -ST Present on Original Admission: Y  -ST Location: perineum  -ST Additional Comments: redness present upon admission  -ST    Retired Wound - Properties Group Placement Date: 03/23/22  -ST Placement Time: 0846  -ST Present on Original Admission: Y  -ST Location: perineum  -ST Additional Comments: redness present upon admission  -ST    Retired Wound - Properties Group Date first assessed: 03/23/22  -ST Time first assessed: 0846  -ST Present on Original Admission: Y  -ST Location: perineum  -ST Additional Comments: redness present upon admission  -ST      Row Name 03/03/25 0922          Plan of Care Review    Plan of Care Reviewed With patient;spouse  -LY     Progress improving  -LY     Outcome Evaluation PT tx completed. Pt cont to c/o R shoulder pain with any mobility. RUE sling donned while pt in bed. Requires mod x1 for supine to sit and increased time to scoot to EOB d/t weakness. Able to stand with min x1 and bed slightly elevated. Amb with HHA x15' with min x1 d/t decreased balance and posterior lean at times. Provided pt with HW and ambulation somewhat improved. Amb up to 25' at a time. Min x1 for toilet t/f and assist provided with hygiene care. Pt agreed to sit up in chair. RUE supported with pillow. Will cont to  follow.  -LY               User Key  (r) = Recorded By, (t) = Taken By, (c) = Cosigned By      Initials Name Provider Type    LY Zunilda Donaldson, PTA Physical Therapist Assistant    Veronique Argueta, RN Registered Nurse    Yissel Mcleod RN Registered Nurse                    Physical Therapy Education       Title: PT OT SLP Therapies (In Progress)       Topic: Physical Therapy (Done)       Point: Mobility training (Done)       Learning Progress Summary            Patient Acceptance, E, VU by  at 3/2/2025 1417    Comment: role of PT, benefits of OOB activity, d/c planning, safety with sling                      Point: Home exercise program (Done)       Learning Progress Summary            Patient Acceptance, E, VU by  at 3/2/2025 1417    Comment: role of PT, benefits of OOB activity, d/c planning, safety with sling                      Point: Body mechanics (Done)       Learning Progress Summary            Patient Acceptance, E, VU by  at 3/2/2025 1417    Comment: role of PT, benefits of OOB activity, d/c planning, safety with sling                      Point: Precautions (Done)       Learning Progress Summary            Patient Acceptance, E, VU by  at 3/2/2025 1417    Comment: role of PT, benefits of OOB activity, d/c planning, safety with sling                                      User Key       Initials Effective Dates Name Provider Type Discipline     08/15/24 -  Parish Dominguez, OMER DPT Physical Therapist PT                  PT Recommendation and Plan     Plan of Care Reviewed With: patient, spouse  Progress: improving  Outcome Evaluation: PT tx completed. Pt cont to c/o R shoulder pain with any mobility. RUE sling donned while pt in bed. Requires mod x1 for supine to sit and increased time to scoot to EOB d/t weakness. Able to stand with min x1 and bed slightly elevated. Amb with HHA x15' with min x1 d/t decreased balance and posterior lean at times. Provided pt with HW and ambulation somewhat  improved. Amb up to 25' at a time. Min x1 for toilet t/f and assist provided with hygiene care. Pt agreed to sit up in chair. RUE supported with pillow. Will cont to follow.       Time Calculation:    PT Charges       Row Name 03/03/25 1419             Time Calculation    Start Time 0922  -LY      Stop Time 1025  -LY      Time Calculation (min) 63 min  -LY      PT Received On 03/03/25  -LY         Time Calculation- PT    Total Timed Code Minutes- PT 63 minute(s)  -LY         Timed Charges    89072 - PT Therapeutic Exercise Minutes 15  -LY      51635 - Gait Training Minutes  28  -LY      39104 - PT Therapeutic Activity Minutes 20  -LY         Total Minutes    Timed Charges Total Minutes 63  -LY       Total Minutes 63  -LY                User Key  (r) = Recorded By, (t) = Taken By, (c) = Cosigned By      Initials Name Provider Type    Zunilda Cassidy PTA Physical Therapist Assistant                  Therapy Charges for Today       Code Description Service Date Service Provider Modifiers Qty    47151245369 HC PT THER PROC EA 15 MIN 3/3/2025 Zunilda Donaldson, KODI GP 1    71921922600 HC GAIT TRAINING EA 15 MIN 3/3/2025 Zunilda Donaldson PTA GP 2    35787638644 HC PT THERAPEUTIC ACT EA 15 MIN 3/3/2025 Zunilda Donaldson, KODI GP 1            PT G-Codes  Outcome Measure Options: AM-PAC 6 Clicks Basic Mobility (PT)  AM-PAC 6 Clicks Score (PT): 15  AM-PAC 6 Clicks Score (OT): 9    Zunilda Donaldson PTA  3/3/2025

## 2025-03-03 NOTE — CONSULTS
Orthopaedic Inpatient Consultation    NAME:  Dexter Suggs   : 1934  MRN: [id number]    2025  3:55 PM        CHIEF COMPLAINT: Right shoulder pain      HISTORY OF PRESENT ILLNESS:   The patient is a 89 y.o. male right-hand-dominant who presents with the above complaint after a fall few days ago.  No prior problems with his right shoulder.  He has been weak in his legs of late and had trouble walking.  Fell on his right side.  Have replaced his left hip many years ago in his right knee several years ago.      Review of Systems   Otherwise complete ROS is negative except as mentioned above.    Past Medical History:   Past Medical History:   Diagnosis Date    Abnormal nuclear stress test     Anemia     Arthritis     BPH (benign prostatic hyperplasia)     Coronary artery disease     Diabetes mellitus     Disease of thyroid gland     Fracture     spinal t12    GERD (gastroesophageal reflux disease)     Glaucoma 2022    Gout     Hyperlipidemia LDL goal <70 2016    Ischemic heart disease 2022    Ischemic heart disease 2022    Low back pain     LVH (left ventricular hypertrophy) 2022    Melanoma     Onychomycosis of toenail 2022    Pacemaker 2019    PAF (paroxysmal atrial fibrillation) 10/29/2019    Chads vas score 3    Primary hypertension 2016    Prostate CA     Sick sinus syndrome 2019    Stress fracture Aug. 2021     Past Surgical History:  Past Surgical History:   Procedure Laterality Date    APPENDECTOMY      CARDIAC CATHETERIZATION Left 12/10/2012    CARDIAC ELECTROPHYSIOLOGY PROCEDURE N/A 2018    Procedure: Pacemaker DC new 2018;  Surgeon: Austin Granados MD;  Location: [facility] CATH INVASIVE LOCATION;  Service: Cardiology    CHOLECYSTECTOMY      COLONOSCOPY N/A 2017    Tics, hemorrhoids repeat exam prn    COLONOSCOPY W/ POLYPECTOMY  2012    adenomatous polyp at 80cm    ENDOSCOPY N/A 2022    Procedure:  ESOPHAGOGASTRODUODENOSCOPY WITH ANESTHESIA;  Surgeon: Felice Marroquin MD;  Location: [facility] ENDOSCOPY;  Service: Gastroenterology;  Laterality: N/A;  pre hematochezia  post; normal   Ashish Callejas MD    HAND SURGERY Right     HERNIA REPAIR      HIP CANNULATED SCREW PLACEMENT Left 01/18/2022    Procedure: HIP CANNULATED SCREW PLACEMENT;  Surgeon: Isaiah Sheehan MD;  Location:  [facility] OR;  Service: Orthopedics;  Laterality: Left;    INGUINAL HERNIA REPAIR      INSERT / REPLACE / REMOVE PACEMAKER      KNEE SURGERY      PROSTATE SURGERY      SKIN CANCER EXCISION      face    SKIN LESION EXCISION      TOTAL HIP ARTHROPLASTY Right 08/27/2021    Procedure: TOTAL HIP REPLACEMENT;  Surgeon: Arik Magaña MD;  Location:  [facility] OR;  Service: Orthopedics;  Laterality: Right;    TOTAL HIP ARTHROPLASTY Left 03/25/2022    Procedure: LEFT HIP SCREW REMOVAL / LEFT TOTAL HIP ARTHROPLASTY;  Surgeon: BASHIR Monsivais MD;  Location:  [facility] OR;  Service: Orthopedics;  Laterality: Left;     Social History:  reports that he quit smoking about 40 years ago. His smoking use included cigarettes. He started smoking about 70 years ago. He has been exposed to tobacco smoke. He has never used smokeless tobacco. He reports that he does not drink alcohol and does not use drugs.    Family History: family history includes Alzheimer's disease in his mother; Cancer in his father and sister.     Allergies:   Allergies   Allergen Reactions    Adhesive Tape     Simvastatin Other (See Comments)     Abnormal LFT's    Tramadol Hallucinations    Neosporin [Neomycin-Bacitracin Zn-Polymyx] Rash     Medications: Scheduled Meds:allopurinol, 300 mg, Oral, Daily  apixaban, 5 mg, Oral, Q12H  aspirin, 81 mg, Oral, Daily  finasteride, 5 mg, Oral, Daily  folic acid, 1 mg, Oral, Daily  iron polysaccharides, 150 mg, Oral, Daily  levothyroxine, 75 mcg, Oral, Q AM  losartan, 25 mg, Oral, Daily  metoprolol succinate XL, 25 mg, Oral,  Daily  montelukast, 10 mg, Oral, Nightly  pantoprazole, 40 mg, Oral, Daily      Continuous Infusions:   PRN Meds:.  acetaminophen    benzonatate    LORazepam    nitroglycerin    sodium chloride    [COMPLETED] Insert Peripheral IV **AND** sodium chloride            PHYSICAL EXAM:      Physical Examination:  Vitals:   Vitals:    03/02/25 0848 03/02/25 1100 03/02/25 2106 03/03/25 0414   BP: 143/77 132/62 140/62 135/72   BP Location: Left arm Left arm Left arm Left arm   Patient Position: Lying Lying Lying Lying   Pulse: 79 82 72 66   Resp: 15 16 18 16   Temp: 98.4 °F (36.9 °C) 98.6 °F (37 °C) 98.2 °F (36.8 °C) 97.9 °F (36.6 °C)   TempSrc: Oral Oral Oral Oral   SpO2: 96% 95% 97% 98%   Weight:       Height:         General:  Appears stated age, no distress.  Orientation:  Alert and oriented to time, place, and person.  Mood and Affect:  Cooperative and pleasant.  Gait:  Resting comfortably in bed.  Cardiovascular:  Symmetric 1-2 plus pulses in upper and lower extremities.  Lymph:  No cervical or inguinal lymphadenopathy noted.  Sensation:  Grossly intact to light touch.  DTR:  Normal, no pathologic reflexes.  Coordination/balance:  Normal    Musculoskeletal:  Right upper extremity exam: Patient has moderate swelling of his right shoulder.  Limited active range of motion or passive range of motion due to pain.  His elbow moves pretty well.  5/5 strength, normal sensation, good radial pulse and skin is normal.      Left upper extremity exam:  There is no tenderness to palpation about the shoulder, elbow, wrist or hand.  Unrestricted full function motion is present.  Stability is normal with provocative tests, 5/5 strength, normal sensation, good radial pulse and skin is normal.     Right lower extremity exam:  There is no tenderness to palpation about the hip, knee, ankle or foot.  Unrestricted full function motion is present.  Stability is normal with provocative tests, 5/5 strength, normal sensation, good dorsalis pedis  pulse  and skin is normal.     Left lower extremity exam:  There is no tenderness to palpation about the hip, knee, ankle or foot.  Unrestricted full function motion is present.  Stability is normal with provocative tests, 5/5 strength normal sensation, good dorsalis pedis pulse and skin is normal.      DATA:    Lab Results (last 24 hours)       Procedure Component Value Units Date/Time    POC Glucose Once [[id number]]  (Abnormal) Collected: 03/03/25 1219    Specimen: Blood Updated: 03/03/25 1229     Glucose 152 mg/dL      Comment: : [id number] Cristopher MedleyineMeter ID: [id number]       Basic Metabolic Panel [[id number]]  (Abnormal) Collected: 03/03/25 0525    Specimen: Blood Updated: 03/03/25 0632     Glucose 105 mg/dL      BUN 19 mg/dL      Creatinine 0.89 mg/dL      Sodium 140 mmol/L      Potassium 3.9 mmol/L      Chloride 106 mmol/L      CO2 25.0 mmol/L      Calcium 8.9 mg/dL      BUN/Creatinine Ratio 21.3     Anion Gap 9.0 mmol/L      eGFR 81.4 mL/min/1.73     Narrative:      GFR Categories in Chronic Kidney Disease (CKD)      GFR Category          GFR (mL/min/1.73)    Interpretation  G1                     90 or greater         Normal or high (1)  G2                      60-89                Mild decrease (1)  G3a                   45-59                Mild to moderate decrease  G3b                   30-44                Moderate to severe decrease  G4                    15-29                Severe decrease  G5                    14 or less           Kidney failure          (1)In the absence of evidence of kidney disease, neither GFR category G1 or G2 fulfill the criteria for CKD.    eGFR calculation 2021 CKD-EPI creatinine equation, which does not include race as a factor    CBC & Differential [[id number]]  (Abnormal) Collected: 03/03/25 0525    Specimen: Blood Updated: 03/03/25 0610    Narrative:      The following orders were created for panel order CBC & Differential.  Procedure                                Abnormality         Status                     ---------                               -----------         ------                     CBC Auto Differential[[id number]]        Abnormal            Final result                 Please view results for these tests on the individual orders.    CBC Auto Differential [[id number]]  (Abnormal) Collected: 03/03/25 0525    Specimen: Blood Updated: 03/03/25 0610     WBC 6.57 10*3/mm3      RBC 2.71 10*6/mm3      Hemoglobin 8.5 g/dL      Hematocrit 26.3 %      MCV 97.0 fL      MCH 31.4 pg      MCHC 32.3 g/dL      RDW 16.1 %      RDW-SD 56.9 fl      MPV 9.2 fL      Platelets 188 10*3/mm3      Neutrophil % 57.2 %      Lymphocyte % 24.5 %      Monocyte % 11.3 %      Eosinophil % 5.9 %      Basophil % 0.3 %      Immature Grans % 0.8 %      Neutrophils, Absolute 3.76 10*3/mm3      Lymphocytes, Absolute 1.61 10*3/mm3      Monocytes, Absolute 0.74 10*3/mm3      Eosinophils, Absolute 0.39 10*3/mm3      Basophils, Absolute 0.02 10*3/mm3      Immature Grans, Absolute 0.05 10*3/mm3      nRBC 0.0 /100 WBC     POC Glucose Once [[id number]]  (Normal) Collected: 03/02/25 2201    Specimen: Blood Updated: 03/02/25 2213     Glucose 121 mg/dL      Comment: : [id number] Ezequiel AddisonyMeter ID: [id number]               Radiology:   Imaging Results (Last 24 Hours)       ** No results found for the last 24 hours. **          Assessment:   right surgical neck humerus fracture nondisplaced  Plan: Sling and swath immobilization for 3 weeks.  He can have a sling off in bed.  See him back in the office in 3 weeks with x-rays    Provider: TYREL Monsivais MD  Date: 3/3/2025

## 2025-03-03 NOTE — CASE MANAGEMENT/SOCIAL WORK
Continued Stay Note   [place]     Patient Name: Dexter Suggs  MRN: [id number]  Today's Date: 3/3/2025    Admit Date: 2/28/2025        Discharge Plan       Row Name 03/03/25 1031       Plan    Plan Comments Referral sent to [facility]. Await answer from [facility].                   Discharge Codes    No documentation.                       KARINE Parikh

## 2025-03-04 VITALS
TEMPERATURE: 97.8 F | DIASTOLIC BLOOD PRESSURE: 74 MMHG | RESPIRATION RATE: 16 BRPM | OXYGEN SATURATION: 96 % | HEIGHT: 72 IN | BODY MASS INDEX: 22.57 KG/M2 | SYSTOLIC BLOOD PRESSURE: 149 MMHG | HEART RATE: 87 BPM | WEIGHT: 166.67 LBS

## 2025-03-04 LAB — GLUCOSE BLDC GLUCOMTR-MCNC: 102 MG/DL (ref 70–130)

## 2025-03-04 PROCEDURE — 97116 GAIT TRAINING THERAPY: CPT

## 2025-03-04 PROCEDURE — 97530 THERAPEUTIC ACTIVITIES: CPT

## 2025-03-04 PROCEDURE — 82948 REAGENT STRIP/BLOOD GLUCOSE: CPT

## 2025-03-04 RX ORDER — LORAZEPAM 0.5 MG/1
0.5 TABLET ORAL DAILY PRN
Qty: 12 TABLET | Refills: 0 | Status: SHIPPED | OUTPATIENT
Start: 2025-03-04

## 2025-03-04 RX ADMIN — BENZONATATE 100 MG: 100 CAPSULE ORAL at 05:12

## 2025-03-04 RX ADMIN — ALLOPURINOL 300 MG: 300 TABLET ORAL at 10:11

## 2025-03-04 RX ADMIN — APIXABAN 5 MG: 5 TABLET, FILM COATED ORAL at 10:10

## 2025-03-04 RX ADMIN — FINASTERIDE 5 MG: 5 TABLET, FILM COATED ORAL at 10:11

## 2025-03-04 RX ADMIN — Medication 150 MG: at 10:11

## 2025-03-04 RX ADMIN — LEVOTHYROXINE SODIUM 75 MCG: 75 TABLET ORAL at 05:12

## 2025-03-04 RX ADMIN — METOPROLOL SUCCINATE 25 MG: 25 TABLET, EXTENDED RELEASE ORAL at 10:10

## 2025-03-04 RX ADMIN — ASPIRIN 81 MG: 81 TABLET, COATED ORAL at 10:10

## 2025-03-04 RX ADMIN — PANTOPRAZOLE SODIUM 40 MG: 40 TABLET, DELAYED RELEASE ORAL at 10:10

## 2025-03-04 RX ADMIN — LOSARTAN POTASSIUM 25 MG: 25 TABLET, FILM COATED ORAL at 10:10

## 2025-03-04 RX ADMIN — FOLIC ACID 1 MG: 1 TABLET ORAL at 10:11

## 2025-03-04 NOTE — DISCHARGE SUMMARY
Date of Discharge:  3/4/2025    Discharge Diagnosis:   Generalized weakness  Fall at home with right humerus fracture  Chronic gait abnormalities  Rheumatoid arthritis  Cardiac arrhythmia with pacemaker  Coronary artery disease  Advanced age    Problem List:  Active Hospital Problems    Diagnosis  POA    **Weakness [R53.1]  Yes    Ischemic heart disease [I25.9]  Yes    Gait abnormality [R26.9]  Yes    Essential hypertension [I10]  Yes    Rheumatoid arthritis [M06.9]  Yes    Hypothyroidism due to Hashimoto's thyroiditis [E06.3]  Yes    Paroxysmal atrial flutter [I48.92]  Yes    Pacemaker [Z95.0]  Yes    Complete heart block [I44.2]  Yes    Single vessel coronary artery disease [I25.10]  Yes      Resolved Hospital Problems   No resolved problems to display.       Presenting Problem/History of Present Illness  Weakness [R53.1]        Hospital Course  Patient is a 89 y.o. male presented with fall at home and right upper extremity pain.  Initially seen in the emergency room on Wednesday of last week where he was diagnosed with right humerus fracture.  He was placed in a sling and told to follow-up with orthopedic surgery.  Unfortunately patient is walker dependent therefore upon going home was unable to ambulate.  In the preceding days he became weaker and weaker.  His wife was unable to assist him with activities of daily living.  I discussed the possibility of a direct admit, however no beds available for direct admission therefore he was directed back to the emergency room.  He was subsequently admitted due to his weakness.  He was seen in consultation by physical therapy as well as orthopedic surgery.  No surgery is required.  He is recommended to follow-up in their office in 3 weeks for repeat imaging.  He has been instructed is nonweightbearing right upper extremity.  Has significant ambulatory difficulties.  Medically he is done reasonably well other than his generalized weakness.  He has been accepted to  short-term rehab at [facility].  It is felt he has maximized acute inpatient stay and plan is to discharge him there for acute rehab.      Procedures Performed         Consults:   Consults       Date and Time Order Name Status Description    3/3/2025  7:58 AM Inpatient Orthopedic Surgery Consult Completed             Pertinent Test Results: radiology: Right humerus fracture at the surgical neck    Condition on Discharge: Stable    Vital Signs  Temp:  [97.5 °F (36.4 °C)-98.3 °F (36.8 °C)] 97.5 °F (36.4 °C)  Heart Rate:  [62-98] 62  Resp:  [16-20] 18  BP: (105-136)/(57-71) 110/65    Discharge Disposition  Skilled Nursing Facility (DC - External)    Discharge Medications     Discharge Medications        Changes to Medications        Instructions Start Date   LORazepam 0.5 MG tablet  Commonly known as: ATIVAN  What changed:   when to take this  reasons to take this   0.5 mg, Oral, Daily PRN             Continue These Medications        Instructions Start Date   allopurinol 300 MG tablet  Commonly known as: ZYLOPRIM   300 mg, Daily      apixaban 5 MG tablet tablet  Commonly known as: Eliquis   5 mg, Oral, Every 12 Hours Scheduled      aspirin 81 MG EC tablet   81 mg, Daily      benzonatate 100 MG capsule  Commonly known as: TESSALON   100 mg, Oral, 2 Times Daily PRN      finasteride 5 MG tablet  Commonly known as: PROSCAR   5 mg, Oral, Daily      folic acid 1 MG tablet  Commonly known as: FOLVITE   1 mg, Daily      iron polysaccharides 150 MG capsule  Commonly known as: NIFEREX   150 mg, Daily      levothyroxine 75 MCG tablet  Commonly known as: SYNTHROID, LEVOTHROID   75 mcg, Daily      losartan 25 MG tablet  Commonly known as: COZAAR   25 mg, Oral, Daily      methotrexate 2.5 MG tablet   15 mg, Weekly      metoprolol succinate XL 25 MG 24 hr tablet  Commonly known as: TOPROL-XL   25 mg, Oral, Daily      nitroglycerin 0.4 MG SL tablet  Commonly known as: NITROSTAT   Place 1 tablet  under the tongue Every 5 (Five) Minutes As Needed.      pantoprazole 40 MG EC tablet  Commonly known as: PROTONIX   40 mg, Daily      predniSONE 5 MG tablet  Commonly known as: DELTASONE   2.5 mg, Daily             Stop These Medications      naloxone 4 MG/0.1ML nasal spray  Commonly known as: NARCAN     triamterene-hydrochlorothiazide 37.5-25 MG per tablet  Commonly known as: MAXZIDE-25              Discharge Diet   Cardiac    Activity at Discharge  Nonweightbearing of right upper extremity    Follow-up Appointments  Future Appointments   Date Time Provider Department Center   4/18/2025 10:15 AM [facility] DEVICE CHECK [facility]   4/18/2025 11:00 AM Deborah Whitmore APRN [facility]  [facility]   8/1/2025  9:10 AM Femi Moore PA [facility]         Test Results Pending at Discharge      Ashish Callejas MD    Time: Discharge 37 min

## 2025-03-04 NOTE — PLAN OF CARE
Goal Outcome Evaluation: Pt is alert and oriented, vitals are stable, no s/s of distress, room air, c/o moderate arm pain

## 2025-03-04 NOTE — CASE MANAGEMENT/SOCIAL WORK
Continued Stay Note  [facility]     Patient Name: Dexter Suggs  MRN: [id number]  Today's Date: 3/4/2025    Admit Date: 2/28/2025        Discharge Plan       Row Name 03/04/25 1142       Plan    Final Discharge Disposition Code 03 - skilled nursing facility (SNF)    Final Note Pt is being dcd to [facility] today. DC summary/orders/meds faxed to [phone]. Call report number is [phone]                   Discharge Codes    No documentation.                 Expected Discharge Date and Time       Expected Discharge Date Expected Discharge Time    Mar 4, 2025               KARINE Parikh

## 2025-03-04 NOTE — PLAN OF CARE
Goal Outcome Evaluation:  Plan of Care Reviewed With: patient        Progress: no change  Outcome Evaluation: Pt A/Ox2, pleasant and cooperative. PRN medication given for cough. Foam dressings applied to back and sacrum. Pt slept most of the night. Safety maintained. Call light in reach.

## 2025-03-04 NOTE — PLAN OF CARE
Goal Outcome Evaluation:  Plan of Care Reviewed With: patient        Progress: improving          Pt a/o x4. Room air. Up x1 with walker. NWB to right arm. Sling in place. BM yesterday. Voiding. Brief on. [facility]. Tele v paced in the 80s. No c/o pain at this time. PPP. VSS. Call light within reach. Safety maintained

## 2025-03-04 NOTE — THERAPY TREATMENT NOTE
Acute Care - Physical Therapy Treatment Note  [facility]     Patient Name: Dexter Suggs  : 1934  MRN: [id number]  Today's Date: 3/4/2025      Visit Dx:     ICD-10-CM ICD-9-CM   1. Weakness  R53.1 780.79   2. Unable to care for self  Z78.9 V49.89   3. Acute kidney injury  N17.9 584.9   4. Impaired mobility [Z74.09]  Z74.09 799.89   5. Closed fracture of left hip, initial encounter  S72.002A 820.8   6. Hip pain  M25.559 719.45   7. Closed subcapital fracture of femur, left, initial encounter  S72.012A 820.09   8. Closed nondisplaced fracture of lesser trochanter of right femur with routine healing  S72.124D V54.13   9. Postoperative anemia due to acute blood loss  D62 285.1   10. Chronic anticoagulation  Z79.01 V58.61   11. Gross hematuria  R31.0 599.71   12. Closed fracture of right hip with routine healing, subsequent encounter  S72.001D V54.13   13. Arthritis of hand  M19.049 716.94   14. Closed fracture of distal end of radius with malunion, unspecified fracture morphology, unspecified laterality, subsequent encounter  S52.509P 733.81   15. COVID-19 virus infection  U07.1 079.89   16. Primary osteoarthritis of both hands  M19.041 715.14    M19.042    17. Overlapping malignant melanoma of skin  C43.8 172.8   18. Impaired fasting glucose  R73.01 790.21   19. Hypothyroidism due to Hashimoto's thyroiditis  E06.3 245.2   20. Gastroesophageal reflux disease, unspecified whether esophagitis present  K21.9 530.81   21. Anemia, unspecified type  D64.9 285.9   22. Alkaline phosphatase elevation  R74.8 790.5   23. Pernicious anemia  D51.0 281.0   24. Paroxysmal atrial flutter  I48.92 427.32   25. PAF (paroxysmal atrial fibrillation) (CMS/HCC) on Eliquis   I48.0 427.31   26. Laceration of face without complication, subsequent encounter  S01.81XD V58.89     873.40   27. Sick sinus syndrome  I49.5 427.81   28. Sick sinus syndrome s/p pacemaker   Z95.0 V45.01   29. Complete heart block  I44.2 426.0   30. Benign  prostatic hyperplasia with lower urinary tract symptoms, symptom details unspecified  N40.1 600.01   31. Actinic keratosis  L57.0 702.0   32. 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 Malignant neoplasm of prostate  C61 185   34. HTN (hypertension), benign  I10 401.1   35. Hx of colonic polyps  Z86.0100 V12.72   36. Mixed hyperlipidemia  E78.2 272.2   37. Essential hypertension  I10 401.9   38. 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    Actinic keratosis    Benign prostatic hyperplasia with lower urinary tract symptoms    Complete heart block    Pacemaker    Laceration of face without complication    PAF (paroxysmal atrial fibrillation)    Paroxysmal atrial flutter    Pernicious anemia    Alkaline phosphatase elevation    Anemia    Gastroesophageal reflux disease    Hypothyroidism due to Hashimoto's thyroiditis    Impaired fasting glucose    Overlapping malignant melanoma of skin    Primary osteoarthritis of both hands    COVID-19 virus infection    Closed fracture of distal end of radius    Arthritis of hand    Hip fracture, left    Gross hematuria    Chronic anticoagulation    Postoperative anemia due to acute blood loss    Closed nondisplaced fracture of lesser trochanter of right femur with routine healing    Closed subcapital fracture of femur, left, initial encounter    Confusion and disorientation    Ischemic heart disease    LVH (left ventricular hypertrophy)    Hematochezia    Acquired hypothyroidism    Anxiety    Atherosclerosis of coronary artery without angina pectoris    Disorder of vitamin B12    Excessive anticoagulation    Gait abnormality    Glaucoma    Gout    History of COVID-19    History of malignant neoplasm of  skin    Onychomycosis of toenail    Pathological fracture of left femur due to osteoporosis    Pressure ulcer of sacral region    S/p left hip fracture    Anemia    Rheumatoid arthritis    BPH (benign prostatic hyperplasia)    Chronic anticoagulation    Complete atrioventricular block    Gastroesophageal reflux disease without esophagitis    Impaired fasting glucose    Prostate cancer    Hip pain    Atrial fibrillation    Essential hypertension    Sick sinus syndrome    Compression fracture of T12 vertebra    Body mass index (BMI) of 20.0 to 20.9 in adult    Nonsmoker    Degeneration of lumbar or lumbosacral intervertebral disc    Controlled type 2 diabetes mellitus without complication, without long-term current use of insulin    Elevated troponin    Rib fracture    Weakness     Past Medical History:   Diagnosis Date    Abnormal nuclear stress test     Anemia 2021    Arthritis     BPH (benign prostatic hyperplasia)     Coronary artery disease     Diabetes mellitus     Disease of thyroid gland     Fracture     spinal t12    GERD (gastroesophageal reflux disease)     Glaucoma 02/18/2022    Gout     Hyperlipidemia LDL goal <70 11/14/2016    Ischemic heart disease 06/23/2022    Ischemic heart disease 06/23/2022    Low back pain     LVH (left ventricular hypertrophy) 06/23/2022    Melanoma     Onychomycosis of toenail 06/24/2022    Pacemaker 01/18/2019    PAF (paroxysmal atrial fibrillation) 10/29/2019    Chads vas score 3    Primary hypertension 11/14/2016    Prostate CA     Sick sinus syndrome 03/01/2019    Stress fracture Aug. 2021     Past Surgical History:   Procedure Laterality Date    APPENDECTOMY      CARDIAC CATHETERIZATION Left 12/10/2012    CARDIAC ELECTROPHYSIOLOGY PROCEDURE N/A 11/23/2018    Procedure: Pacemaker DC new 11/23/2018;  Surgeon: Austin Granados MD;  Location:  [facility] CATH [facility]VASIVE [facility];  Service: Cardiology    CHOLECYSTECTOMY      COLONOSCOPY N/A 06/09/2017    Tics, hemorrhoids repeat exam prn     COLONOSCOPY W/ POLYPECTOMY  02/16/2012    adenomatous polyp at 80cm    ENDOSCOPY N/A 09/13/2022    Procedure: ESOPHAGOGASTRODUODENOSCOPY WITH ANESTHESIA;  Surgeon: Felice Marroquin MD;  Location: [facility] ENDOSCOPY;  Service: Gastroenterology;  Laterality: N/A;  pre hematochezia  post; normal   Ashish Callejas MD    HAND SURGERY Right     HERNIA REPAIR      HIP CANNULATED SCREW PLACEMENT Left 01/18/2022    Procedure: HIP CANNULATED SCREW PLACEMENT;  Surgeon: Isaiah Sheehan MD;  Location: [facility] OR;  Service: Orthopedics;  Laterality: Left;    INGUINAL HERNIA REPAIR      INSERT / REPLACE / REMOVE PACEMAKER      KNEE SURGERY      PROSTATE SURGERY      SKIN CANCER EXCISION      face    SKIN LESION EXCISION      TOTAL HIP ARTHROPLASTY Right 08/27/2021    Procedure: TOTAL HIP REPLACEMENT;  Surgeon: Arik Magaña MD;  Location:  [facility] OR;  Service: Orthopedics;  Laterality: Right;    TOTAL HIP ARTHROPLASTY Left 03/25/2022    Procedure: LEFT HIP SCREW REMOVAL / LEFT TOTAL HIP ARTHROPLASTY;  Surgeon: BASHIR Monsivais MD;  Location: [facility] OR;  Service: Orthopedics;  Laterality: Left;     PT Assessment (Last 12 Hours)       PT Evaluation and Treatment       Row Name 03/04/25 1100          Physical Therapy Time and Intention    Subjective Information no complaints  -LY     Document Type therapy note (daily note)  -LY     Mode of Treatment physical therapy  -LY       Row Name 03/04/25 1100          General Information    Existing Precautions/Restrictions fall;other (see comments)  sling R UE  -LY       Row Name 03/04/25 1100          Pain    Pretreatment Pain Rating 4/10  -LY     Posttreatment Pain Rating 4/10  -LY     Pain Location shoulder  -LY     Pain Side/Orientation right;upper  -LY     Pain Management Interventions exercise or physical activity utilized  -LY     Response to Pain Interventions activity participation with tolerable pain  -LY       Row Name 03/04/25 1100          Bed Mobility     Supine-Sit [place] (Bed Mobility) minimum assist (75% patient effort)  -LY     Bed Mobility, Safety Issues decreased use of arms for pushing/pulling  -LY     Assistive Device (Bed Mobility) head of bed elevated;bed rails  -LY       Row Name 03/04/25 1100          Sit-Stand Transfer    Sit-Stand [place] (Transfers) verbal cues;minimum assist (75% patient effort)  -LY     Assistive Device (Sit-Stand Transfers) walker, william  -LY       Row Name 03/04/25 1100          Stand-Sit Transfer    Stand-Sit [place] (Transfers) verbal cues;minimum assist (75% patient effort)  -LY       Row Name 03/04/25 1100          Gait/Stairs (Locomotion)    [place] Level (Gait) verbal cues;minimum assist (75% patient effort)  -LY     Assistive Device (Gait) walker, william  -LY     Distance in Feet (Gait) 25  x2  -LY     Pattern (Gait) step-through  -LY     Deviations/Abnormal Patterns (Gait) gait speed decreased;stride length decreased  -LY     Bilateral Gait Deviations forward flexed posture  -LY       Row Name             Wound 02/28/25 2046 Left thoracic spine    Wound - Properties Group Placement Date: 02/28/25  -LF Placement Time: 2046 -LF Side: Left  -LF Location: thoracic spine  -LF    Retired Wound - Properties Group Placement Date: 02/28/25  -LF Placement Time: 2046  -LF Side: Left  -LF Location: thoracic spine  -LF    Retired Wound - Properties Group Placement Date: 02/28/25  -LF Placement Time: 2046 -LF Side: Left  -LF Location: thoracic spine  -LF    Retired Wound - Properties Group Date first assessed: 02/28/25  -LF Time first assessed: 2046  -LF Side: Left  -LF Location: thoracic spine  -LF      Row Name             Wound 03/23/22 0846 perineum    Wound - Properties Group Placement Date: 03/23/22  -ST Placement Time: 0846 -ST Location: perineum  -ST Present on Original Admission: Y  -ST Additional Comments: redness present upon admission  -ST    Retired Wound - Properties Group Placement Date: 03/23/22  -ST  Placement Time: 0846  -ST Present on Original Admission: Y  -ST Location: perineum  -ST Additional Comments: redness present upon admission  -ST    Retired Wound - Properties Group Placement Date: 03/23/22 -ST Placement Time: 0846  -ST Present on Original Admission: Y  -ST Location: perineum  -ST Additional Comments: redness present upon admission  -ST    Retired Wound - Properties Group Date first assessed: 03/23/22 -ST Time first assessed: 0846  -ST Present on Original Admission: Y  -ST Location: perineum  -ST Additional Comments: redness present upon admission  -ST      Row Name 03/04/25 1100          Positioning and Restraints    Pre-Treatment Position in bed  -LY     Post Treatment Position chair  -LY     In Chair notified nsg;reclined;call light within reach;encouraged to call for assist;exit alarm on;waffle cushion  -LY               User Key  (r) = Recorded By, (t) = Taken By, (c) = Cosigned By      Initials Name Provider Type    Zunilda Cassidy, PTA Physical Therapist Assistant    Veronique Argueta RN Registered Nurse    Yissel Mcleod RN Registered Nurse                    Physical Therapy Education       Title: PT OT SLP Therapies (Resolved)       Topic: Physical Therapy (Resolved)       Point: Mobility training (Resolved)       Learning Progress Summary            Patient Acceptance, E, VU by  at 3/2/2025 1417    Comment: role of PT, benefits of OOB activity, d/c planning, safety with sling                      Point: Home exercise program (Resolved)       Learning Progress Summary            Patient Acceptance, E, VU by  at 3/2/2025 1417    Comment: role of PT, benefits of OOB activity, d/c planning, safety with sling                      Point: Body mechanics (Resolved)       Learning Progress Summary            Patient Acceptance, E, VU by  at 3/2/2025 1417    Comment: role of PT, benefits of OOB activity, d/c planning, safety with sling                      Point: Precautions (Resolved)        Learning Progress Summary            Patient Acceptance, E, VU by MAURO at 3/2/2025 1417    Comment: role of PT, benefits of OOB activity, d/c planning, safety with sling                                      User Key       Initials Effective Dates Name Provider Type Discipline    MAURO 08/15/24 -  Parish Dominguez, PT DPT Physical Therapist PT                  PT Recommendation and Plan     Plan of Care Reviewed With: patient, spouse  Progress: improving  Outcome Evaluation: PT tx completed. Pt cont to c/o R shoulder pain with any mobility. RUE sling donned while pt in bed. Requires mod x1 for supine to sit and increased time to scoot to EOB d/t weakness. Able to stand with min x1 and bed slightly elevated. Amb with HHA x15' with min x1 d/t decreased balance and posterior lean at times. Provided pt with HW and ambulation somewhat improved. Amb up to 25' at a time. Min x1 for toilet t/f and assist provided with hygiene care. Pt agreed to sit up in chair. RUE supported with pillow. Will cont to follow.       Time Calculation:    PT Charges       Row Name 03/04/25 1507             Time Calculation    Start Time 1100  -LY      Stop Time 1140  -LY      Time Calculation (min) 40 min  -LY      PT Received On 03/04/25  -LY         Time Calculation- PT    Total Timed Code Minutes- PT 40 minute(s)  -LY         Timed Charges    68222 - Gait Training Minutes  25  -LY      99868 - PT Therapeutic Activity Minutes 15  -LY         Total Minutes    Timed Charges Total Minutes 40  -LY       Total Minutes 40  -LY                User Key  (r) = Recorded By, (t) = Taken By, (c) = Cosigned By      Initials Name Provider Type    LY Zunilda Donaldson PTA Physical Therapist Assistant                  Therapy Charges for Today       Code Description Service Date Service Provider Modifiers Qty    98331252347 HC PT THER PROC EA 15 MIN 3/3/2025 Zunilda Donaldson PTA GP 1    08146410672 HC GAIT TRAINING EA 15 MIN 3/3/2025 Zunilda Donaldson PTA GP 2     60900657349 HC PT THERAPEUTIC ACT EA 15 MIN 3/3/2025 Zunilda Donaldson, PTA GP 1    36928769607 HC PT THERAPEUTIC ACT EA 15 MIN 3/3/2025 Mendoza Zunilda CASTRO, PTA GP 1    37677690667 HC GAIT TRAINING EA 15 MIN 3/3/2025 Salinas Donaldsonn M, PTA GP 1    39476074059 HC GAIT TRAINING EA 15 MIN 3/4/2025 Mendoza Zunilda CASTRO, PTA GP 2    66821624076 HC PT THERAPEUTIC ACT EA 15 MIN 3/4/2025 [place] Donaldsonn M, PTA GP 1            PT G-Codes  Outcome Measure Options: AM-PAC 6 Clicks Basic Mobility (PT)  AM-PAC 6 Clicks Score (PT): 15  AM-PAC 6 Clicks Score (OT): 9    Zunilda Donaldson PTA  3/4/2025

## 2025-03-05 NOTE — THERAPY DISCHARGE NOTE
Acute Care - Physical Therapy Discharge Summary  [facility]       Patient Name: Dexter Suggs  : 1934  MRN: [id number]    Today's Date: 3/5/2025                 Admit Date: 2025      PT Recommendation and Plan    Visit Dx:    ICD-10-CM ICD-9-CM   1. Weakness  R53.1 780.79   2. Unable to care for self  Z78.9 V49.89   3. Acute kidney injury  N17.9 584.9   4. Impaired mobility [Z74.09]  Z74.09 799.89   5. Closed fracture of left hip, initial encounter  S72.002A 820.8   6. Hip pain  M25.559 719.45   7. Closed subcapital fracture of femur, left, initial encounter  S72.012A 820.09   8. Closed nondisplaced fracture of lesser trochanter of right femur with routine healing  S72.124D V54.13   9. Postoperative anemia due to acute blood loss  D62 285.1   10. Chronic anticoagulation  Z79.01 V58.61   11. Gross hematuria  R31.0 599.71   12. Closed fracture of right hip with routine healing, subsequent encounter  S72.001D V54.13   13. Arthritis of hand  M19.049 716.94   14. Closed fracture of distal end of radius with malunion, unspecified fracture morphology, unspecified laterality, subsequent encounter  S52.509P 733.81   15. COVID-19 virus infection  U07.1 079.89   16. Primary osteoarthritis of both hands  M19.041 715.14    M19.042    17. Overlapping malignant melanoma of skin  C43.8 172.8   18. Impaired fasting glucose  R73.01 790.21   19. Hypothyroidism due to Hashimoto's thyroiditis  E06.3 245.2   20. Gastroesophageal reflux disease, unspecified whether esophagitis present  K21.9 530.81   21. Anemia, unspecified type  D64.9 285.9   22. Alkaline phosphatase elevation  R74.8 790.5   23. Pernicious anemia  D51.0 281.0   24. Paroxysmal atrial flutter  I48.92 427.32   25. PAF (paroxysmal atrial fibrillation) (CMS/HCC) on Eliquis   I48.0 427.31   26. Laceration of face without complication, subsequent encounter  S01.81XD V58.89     873.40   27. Sick sinus syndrome  I49.5 427.81   28. Sick sinus syndrome s/p pacemaker    Z95.0 V45.01   29. Complete heart block  I44.2 426.0   30. Benign prostatic hyperplasia with lower urinary tract symptoms, symptom details unspecified  N40.1 600.01   31. Actinic keratosis  L57.0 702.0   32. 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 Malignant neoplasm of prostate  C61 185   34. HTN (hypertension), benign  I10 401.1   35. Hx of colonic polyps  Z86.0100 V12.72   36. Mixed hyperlipidemia  E78.2 272.2   37. Essential hypertension  I10 401.9   38. Single vessel coronary artery disease  I25.10 414.00                PT Rehab Goals       Row Name 03/05/25 0900             Bed Mobility Goal 1 (PT)    Activity/Assistive Device (Bed Mobility Goal 1, PT) bed mobility activities, all;rolling to left;rolling to right;supine to sit;sit to supine  -AB      [place] Level/Cues Needed (Bed Mobility Goal 1, PT) standby assist  -AB      Time Frame (Bed Mobility Goal 1, PT) long term goal (LTG);10 days  -AB      Progress/Outcomes (Bed Mobility Goal 1, PT) goal not met  -AB         Transfer Goal 1 (PT)    Activity/Assistive Device (Transfer Goal 1, PT) sit-to-stand/stand-to-sit;bed-to-chair/chair-to-bed;toilet;car transfer  -AB      [place] Level/Cues Needed (Transfer Goal 1, PT) independent  -AB      Time Frame (Transfer Goal 1, PT) long term goal (LTG);10 days  -AB      Progress/Outcome (Transfer Goal 1, PT) goal not met  -AB         Gait Training Goal 1 (PT)    Activity/Assistive Device (Gait Training Goal 1, PT) gait (walking locomotion);decrease asymmetrical patterns;decrease fall risk;diminish gait deviation;forward stepping;improve balance and speed;increase endurance/gait distance;increase energy conservation  -AB      [place] Level (Gait Training Goal 1, PT) standby assist  -AB      Distance (Gait Training Goal 1, PT) 20' with no LOB  -AB      Time Frame (Gait Training Goal 1, PT) long term goal (LTG);10 days  -AB      Progress/Outcome (Gait Training Goal 1, PT) goal not  met  -AB         Balance Goal 1 (PT)    Activity/Assistive Device (Balance Goal) sit to stand dynamic balance;standing static balance;standing dynamic balance;unsupported;with functional activities/occupations;with functional mobility activities;with functional reaching activities  -AB      [place] Level/Cues Needed (Balance Goal 1, PT) independent  -AB      Time Frame (Balance Goal 1, PT) long-term goal (LTG);by discharge  -AB      Progress/Outcomes (Balance Goal 1, PT) goal not met  -AB                User Key  (r) = Recorded By, (t) = Taken By, (c) = Cosigned By      Initials Name Provider Type Discipline    Liya Fink, PTA Physical Therapist Assistant PT                        PT Discharge Summary  Anticipated Discharge Disposition (PT): skilled nursing facility  Reason for Discharge: Discharge from facility  Outcomes Achieved: Refer to plan of care for updates on goals achieved  Discharge Destination: SNF      Liya Muniz PTA   3/5/2025

## 2025-03-05 NOTE — THERAPY DISCHARGE NOTE
Acute Care - Occupational Therapy Discharge Summary  [facility]     Patient Name: Dexter Suggs  : 1934  MRN: [id number]    Today's Date: 3/5/2025                 Admit Date: 2025        OT Recommendation and Plan    Visit Dx:    ICD-10-CM ICD-9-CM   1. Weakness  R53.1 780.79   2. Unable to care for self  Z78.9 V49.89   3. Acute kidney injury  N17.9 584.9   4. Impaired mobility [Z74.09]  Z74.09 799.89   5. Closed fracture of left hip, initial encounter  S72.002A 820.8   6. Hip pain  M25.559 719.45   7. Closed subcapital fracture of femur, left, initial encounter  S72.012A 820.09   8. Closed nondisplaced fracture of lesser trochanter of right femur with routine healing  S72.124D V54.13   9. Postoperative anemia due to acute blood loss  D62 285.1   10. Chronic anticoagulation  Z79.01 V58.61   11. Gross hematuria  R31.0 599.71   12. Closed fracture of right hip with routine healing, subsequent encounter  S72.001D V54.13   13. Arthritis of hand  M19.049 716.94   14. Closed fracture of distal end of radius with malunion, unspecified fracture morphology, unspecified laterality, subsequent encounter  S52.509P 733.81   15. COVID-19 virus infection  U07.1 079.89   16. Primary osteoarthritis of both hands  M19.041 715.14    M19.042    17. Overlapping malignant melanoma of skin  C43.8 172.8   18. Impaired fasting glucose  R73.01 790.21   19. Hypothyroidism due to Hashimoto's thyroiditis  E06.3 245.2   20. Gastroesophageal reflux disease, unspecified whether esophagitis present  K21.9 530.81   21. Anemia, unspecified type  D64.9 285.9   22. Alkaline phosphatase elevation  R74.8 790.5   23. Pernicious anemia  D51.0 281.0   24. Paroxysmal atrial flutter  I48.92 427.32   25. PAF (paroxysmal atrial fibrillation) (CMS/HCC) on Eliquis   I48.0 427.31   26. Laceration of face without complication, subsequent encounter  S01.81XD V58.89     873.40   27. Sick sinus syndrome  I49.5 427.81   28. Sick sinus syndrome s/p  pacemaker   Z95.0 V45.01   29. Complete heart block  I44.2 426.0   30. Benign prostatic hyperplasia with lower urinary tract symptoms, symptom details unspecified  N40.1 600.01   31. Actinic keratosis  L57.0 702.0   32. 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 Malignant neoplasm of prostate  C61 185   34. HTN (hypertension), benign  I10 401.1   35. Hx of colonic polyps  Z86.0100 V12.72   36. Mixed hyperlipidemia  E78.2 272.2   37. Essential hypertension  I10 401.9   38. Single vessel coronary artery disease  I25.10 414.00                OT Rehab Goals       Row Name 03/05/25 1500             Transfer Goal 1 (OT)    Activity/Assistive Device (Transfer Goal 1, OT) toilet;shower chair  -EC      [place] Level/Cues Needed (Transfer Goal 1, OT) minimum assist (75% or more patient effort)  -EC      Time Frame (Transfer Goal 1, OT) long term goal (LTG);10 days  -EC      Progress/Outcome (Transfer Goal 1, OT) goal not met  -EC         Bathing Goal 1 (OT)    Activity/Device (Bathing Goal 1, OT) upper body bathing;lower body bathing  -EC      [place] Level/Cues Needed (Bathing Goal 1, OT) minimum assist (75% or more patient effort)  -EC      Time Frame (Bathing Goal 1, OT) long term goal (LTG);10 days  -EC      Progress/Outcomes (Bathing Goal 1, OT) goal not met  -EC         Toileting Goal 1 (OT)    Activity/Device (Toileting Goal 1, OT) toileting skills, all  -EC      [place] Level/Cues Needed (Toileting Goal 1, OT) minimum assist (75% or more patient effort)  -EC      Time Frame (Toileting Goal 1, OT) long term goal (LTG);10 days  -EC      Progress/Outcome (Toileting Goal 1, OT) goal not met  -EC         Problem Specific Goal 1 (OT)    Problem Specific Goal 1 (OT) Pt will independently implement one pain management technique to decrease pain and improve functional adl performance.  -EC      Time Frame (Problem Specific Goal 1, OT) long term goal (LTG);by discharge  -EC       Progress/Outcome (Problem Specific Goal 1, OT) goal not met  -EC                User Key  (r) = Recorded By, (t) = Taken By, (c) = Cosigned By      Initials Name Provider Type Discipline    EC Nataliia Martinez OTR/L Occupational Therapist OT                        Timed Therapy Charges  Total Units: 1      Suggested Charges  Total Units: 1      Procedure Name Documented Minutes Units Code    HC OT SELF CARE/MGMT/TRAIN EA 15 MIN 15 1   41904 (CPT®)                 Documented Minutes  Total Minutes: 15      Therapy Provided Minutes    56127 - OT Self Care/Mgmt Minutes 15                        OT Discharge Summary  Anticipated Discharge Disposition (OT): skilled nursing facility  Reason for Discharge: Discharge from facility  Outcomes Achieved: Refer to plan of care for updates on goals achieved  Discharge Destination: SNF      MARIA T Thompson  3/5/2025

## (undated) DEVICE — YANKAUER,BULB TIP WITH VENT: Brand: ARGYLE

## (undated) DEVICE — GLV SURG TRIUMPH PF LTX 7.5 STRL

## (undated) DEVICE — GOWN,PREVENTION PLUS,XLNG/XXLARGE,STRL: Brand: MEDLINE

## (undated) DEVICE — SUT ETHLN 2/0 FSLX 30IN 1674H

## (undated) DEVICE — THE CHANNEL CLEANING BRUSH IS A NYLON FLEXI BRUSH ATTACHED TO A FLEXIBLE PLASTIC SHEATH DESIGNED TO SAFELY REMOVE DEBRIS FROM FLEXIBLE ENDOSCOPES.

## (undated) DEVICE — Device: Brand: DEFENDO AIR/WATER/SUCTION AND BIOPSY VALVE

## (undated) DEVICE — CABL BIPOL W/ALLGTR CLIP/SM 12FT

## (undated) DEVICE — BIPOLAR SEALER 23-112-1 AQM 6.0: Brand: AQUAMANTYS™

## (undated) DEVICE — CONMED SCOPE SAVER BITE BLOCK, 20X27 MM: Brand: SCOPE SAVER

## (undated) DEVICE — ADHS SKIN PREMIERPRO EXOFIN TOPICAL HI/VISC .5ML

## (undated) DEVICE — DRAPE,U/ SHT,SPLIT,PLAS,STERIL: Brand: MEDLINE

## (undated) DEVICE — SYR LL TP 10ML STRL

## (undated) DEVICE — DRSNG WND SIL OPTIFOAM GENTLE BRDR ADHS W/SA 4X4IN

## (undated) DEVICE — PK HIP TOTL ANT 30

## (undated) DEVICE — SHEET,DRAPE,53X77,STERILE: Brand: MEDLINE

## (undated) DEVICE — PK HIP ORIF FX TABL 30

## (undated) DEVICE — SUT GUT CHRM 2/0 CT1 36IN 923H

## (undated) DEVICE — SENSR O2 OXIMAX FNGR A/ 18IN NONSTR

## (undated) DEVICE — TP SILK DURAPORE 3IN

## (undated) DEVICE — SKIN AFFIX SURG ADHESIVE 72/CS 0.55ML: Brand: MEDLINE

## (undated) DEVICE — GLV SURG DERMASSURE GRN LF PF 8.5

## (undated) DEVICE — SPNG GZ WOVN 4X4IN 12PLY 10/BX STRL

## (undated) DEVICE — OPTIFOAM GENTLE SA, POSTOP, 4X8: Brand: MEDLINE

## (undated) DEVICE — SUT VIC 0 SUTUPAK TIES 18IN J906G

## (undated) DEVICE — SPK10183 ORTHOPEDIC FRACTURE AND TRAUMA KIT: Brand: SPK10183 ORTHOPEDIC FRACTURE AND TRAUMA KIT

## (undated) DEVICE — TBG PENCL TELESCP MEGADYNE SMOKE EVAC 10FT

## (undated) DEVICE — CUFF,BP,DISP,1 TUBE,ADULT,HP: Brand: MEDLINE

## (undated) DEVICE — 4-PORT MANIFOLD: Brand: NEPTUNE 2

## (undated) DEVICE — DRSNG WND GZ CURAD OIL EMULSION 3X8IN STRL PK/3

## (undated) DEVICE — TRAP FLD MINIVAC MEGADYNE 100ML

## (undated) DEVICE — GLV SURG TRIUMPH MICRO PF LTX 8.5 STRL

## (undated) DEVICE — GLV SURG SENSICARE PI ORTHO SZ8 LF STRL

## (undated) DEVICE — ELECTRD BLD EZ CLN STD 6.5IN

## (undated) DEVICE — 3M™ IOBAN™ 2 ANTIMICROBIAL INCISE DRAPE 6651EZ: Brand: IOBAN™ 2

## (undated) DEVICE — MSK O2 MD CONCENTR A/ LF 7FT 1P/U

## (undated) DEVICE — POOLE SUCTION INSTRUMENT WITH REMOVABLE SHEATH: Brand: POOLE

## (undated) DEVICE — STCKNT IMPERV 9X36IN STRL

## (undated) DEVICE — DUAL CUT SAGITTAL BLADE

## (undated) DEVICE — ELECTRD PAD DEFIB A/

## (undated) DEVICE — CVR BRD ARM 13X30

## (undated) DEVICE — Device

## (undated) DEVICE — DRSNG SURESITE WNDW 4X4.5

## (undated) DEVICE — ANTIBACTERIAL UNDYED BRAIDED (POLYGLACTIN 910), SYNTHETIC ABSORBABLE SUTURE: Brand: COATED VICRYL

## (undated) DEVICE — APPL CHLORAPREP W/TINT 26ML ORNG

## (undated) DEVICE — GOLDVAC PUSH BUTTON ELECTROSURGICAL SMOKE EVACUATION HANDPIECE: Brand: GOLDVAC

## (undated) DEVICE — SYS CLS SKIN PREMIERPRO EXOFINFUSION 22CM

## (undated) DEVICE — TBG SMPL FLTR LINE NASL 02/C02 A/ BX/100

## (undated) DEVICE — 3M™ STERI-DRAPE™ U-DRAPE 1015: Brand: STERI-DRAPE™

## (undated) DEVICE — SHORT LENS-STERILE

## (undated) DEVICE — PK TURNOVER RM ADV

## (undated) DEVICE — GLV SURG SENSICARE PI ORTHO SZ8.5 LF STRL

## (undated) DEVICE — BNDG ELAS CO-FLEX SLF ADHR 6IN 5YD LF STRL

## (undated) DEVICE — SCINTILLANT SURGICAL LIGHT WITH SUCTION: Brand: SCINTILLANT

## (undated) DEVICE — SUT ETHLN 3/0 PSLX 30IN 1683H

## (undated) DEVICE — GLV SURG DERMASSURE GRN LF PF 8.0

## (undated) DEVICE — SUT SILK 0 SUTUPAK TIES 60IN SA6H

## (undated) DEVICE — ST EXT IV SMRTSTE 2VLV FIX M LL 6ML 41

## (undated) DEVICE — 3M™ STERI-STRIP™ REINFORCED ADHESIVE SKIN CLOSURES, R1546, 1/4 IN X 4 IN (6 MM X 100 MM), 10 STRIPS/ENVELOPE: Brand: 3M™ STERI-STRIP™

## (undated) DEVICE — INTRO TEAR AWAY/LVD W/SD PRT 6F 13CM

## (undated) DEVICE — PK PM 30

## (undated) DEVICE — SOL NS 500ML

## (undated) DEVICE — SOL IRR NACL 0.9PCT BT 1000ML

## (undated) DEVICE — NDL HYPO PRECISIONGLIDE REG 22G 1 1/2

## (undated) DEVICE — PENCL E/S PLUMEPEN 9.5MM 10FT LF

## (undated) DEVICE — GW THRD TROC/PT 2.8X300MM

## (undated) DEVICE — 3M™ IOBAN™ 2 ANTIMICROBIAL INCISE DRAPE 6650EZ: Brand: IOBAN™ 2

## (undated) DEVICE — KT DRN EVAC WND PVC PCH WTROC RND 10F400

## (undated) DEVICE — ENDOGATOR AUXILIARY WATER JET CONNECTOR: Brand: ENDOGATOR